# Patient Record
Sex: FEMALE | Race: WHITE | Employment: OTHER
[De-identification: names, ages, dates, MRNs, and addresses within clinical notes are randomized per-mention and may not be internally consistent; named-entity substitution may affect disease eponyms.]

---

## 2017-01-03 ENCOUNTER — HOME CARE VISIT (OUTPATIENT)
Dept: SCHEDULING | Facility: HOME HEALTH | Age: 62
End: 2017-01-03
Payer: COMMERCIAL

## 2017-01-03 VITALS
HEART RATE: 77 BPM | SYSTOLIC BLOOD PRESSURE: 130 MMHG | OXYGEN SATURATION: 98 % | TEMPERATURE: 96.3 F | DIASTOLIC BLOOD PRESSURE: 82 MMHG | RESPIRATION RATE: 16 BRPM

## 2017-01-03 PROCEDURE — G0299 HHS/HOSPICE OF RN EA 15 MIN: HCPCS

## 2017-01-05 ENCOUNTER — HOME CARE VISIT (OUTPATIENT)
Dept: HOME HEALTH SERVICES | Facility: HOME HEALTH | Age: 62
End: 2017-01-05
Payer: COMMERCIAL

## 2017-01-16 ENCOUNTER — HOME CARE VISIT (OUTPATIENT)
Dept: SCHEDULING | Facility: HOME HEALTH | Age: 62
End: 2017-01-16
Payer: COMMERCIAL

## 2017-01-16 VITALS
HEART RATE: 82 BPM | RESPIRATION RATE: 18 BRPM | TEMPERATURE: 97.2 F | DIASTOLIC BLOOD PRESSURE: 86 MMHG | OXYGEN SATURATION: 97 % | SYSTOLIC BLOOD PRESSURE: 154 MMHG

## 2017-01-16 PROCEDURE — G0299 HHS/HOSPICE OF RN EA 15 MIN: HCPCS

## 2017-02-21 ENCOUNTER — HOSPITAL ENCOUNTER (OUTPATIENT)
Dept: MAMMOGRAPHY | Age: 62
Discharge: HOME | End: 2017-02-21
Attending: OBSTETRICS & GYNECOLOGY
Payer: COMMERCIAL

## 2017-02-21 DIAGNOSIS — Z12.31 ENCOUNTER FOR SCREENING MAMMOGRAM FOR BREAST CANCER: ICD-10-CM

## 2017-02-21 PROCEDURE — 77067 SCR MAMMO BI INCL CAD: CPT

## 2017-05-23 ENCOUNTER — HOSPITAL ENCOUNTER (OUTPATIENT)
Dept: WOUND CARE | Age: 62
Discharge: HOME | End: 2017-05-23
Attending: SURGERY
Payer: COMMERCIAL

## 2017-05-23 PROCEDURE — 99214 OFFICE O/P EST MOD 30 MIN: CPT

## 2017-06-06 ENCOUNTER — HOSPITAL ENCOUNTER (OUTPATIENT)
Dept: WOUND CARE | Age: 62
Discharge: HOME | End: 2017-06-06
Attending: SURGERY
Payer: COMMERCIAL

## 2017-06-06 PROCEDURE — 99213 OFFICE O/P EST LOW 20 MIN: CPT

## 2017-09-13 PROBLEM — E03.9 ACQUIRED HYPOTHYROIDISM: Status: ACTIVE | Noted: 2017-09-13

## 2017-09-14 ENCOUNTER — HOSPITAL ENCOUNTER (OUTPATIENT)
Dept: CT IMAGING | Age: 62
Discharge: HOME | End: 2017-09-14
Attending: FAMILY MEDICINE
Payer: COMMERCIAL

## 2017-09-14 DIAGNOSIS — R79.89 ELEVATED D-DIMER: ICD-10-CM

## 2017-09-14 DIAGNOSIS — R06.02 SHORTNESS OF BREATH: ICD-10-CM

## 2017-09-14 PROCEDURE — 74011636320 HC RX REV CODE- 636/320: Performed by: FAMILY MEDICINE

## 2017-09-14 PROCEDURE — 74011000258 HC RX REV CODE- 258: Performed by: FAMILY MEDICINE

## 2017-09-14 PROCEDURE — 71260 CT THORAX DX C+: CPT

## 2017-09-14 RX ORDER — SODIUM CHLORIDE 0.9 % (FLUSH) 0.9 %
10 SYRINGE (ML) INJECTION
Status: COMPLETED | OUTPATIENT
Start: 2017-09-14 | End: 2017-09-14

## 2017-09-14 RX ADMIN — IOPAMIDOL 100 ML: 755 INJECTION, SOLUTION INTRAVENOUS at 15:23

## 2017-09-14 RX ADMIN — SODIUM CHLORIDE 100 ML: 900 INJECTION, SOLUTION INTRAVENOUS at 15:23

## 2017-09-14 RX ADMIN — Medication 10 ML: at 15:23

## 2017-09-20 ENCOUNTER — HOSPITAL ENCOUNTER (OUTPATIENT)
Dept: CT IMAGING | Age: 62
Discharge: HOME | End: 2017-09-20
Attending: FAMILY MEDICINE
Payer: COMMERCIAL

## 2017-09-20 DIAGNOSIS — R31.9 HEMATURIA: ICD-10-CM

## 2017-09-20 DIAGNOSIS — R10.9 LEFT FLANK PAIN: ICD-10-CM

## 2017-09-20 PROCEDURE — 74176 CT ABD & PELVIS W/O CONTRAST: CPT

## 2017-09-21 NOTE — PROGRESS NOTES
Please tell the patient that the CT scan final report did not show any abnormality.   See how her symptoms are doing and let me know if they persist.  The lung bases also are now clear

## 2017-12-22 PROBLEM — E66.01 OBESITY, MORBID (HCC): Status: ACTIVE | Noted: 2017-12-22

## 2018-01-10 ENCOUNTER — HOSPITAL ENCOUNTER (OUTPATIENT)
Dept: MRI IMAGING | Age: 63
Discharge: HOME | End: 2018-01-10
Attending: FAMILY MEDICINE
Payer: COMMERCIAL

## 2018-01-10 DIAGNOSIS — M51.26 HNP (HERNIATED NUCLEUS PULPOSUS), LUMBAR: ICD-10-CM

## 2018-01-10 DIAGNOSIS — G89.29 CHRONIC BILATERAL LOW BACK PAIN WITHOUT SCIATICA: ICD-10-CM

## 2018-01-10 DIAGNOSIS — M54.50 CHRONIC BILATERAL LOW BACK PAIN WITHOUT SCIATICA: ICD-10-CM

## 2018-01-10 PROCEDURE — 72148 MRI LUMBAR SPINE W/O DYE: CPT

## 2018-01-12 NOTE — PROGRESS NOTES
Tell the patient the MRI does show some degenerative changes and spinal stenosis. No major herniated disc seen or nerve impingement. If she would like to see a spine specialist let me know.

## 2018-03-16 ENCOUNTER — HOSPITAL ENCOUNTER (OUTPATIENT)
Dept: MAMMOGRAPHY | Age: 63
Discharge: HOME | End: 2018-03-16
Attending: OBSTETRICS & GYNECOLOGY
Payer: COMMERCIAL

## 2018-03-16 DIAGNOSIS — Z12.31 VISIT FOR SCREENING MAMMOGRAM: ICD-10-CM

## 2018-03-16 PROCEDURE — 77067 SCR MAMMO BI INCL CAD: CPT

## 2018-05-02 ENCOUNTER — HOSPITAL ENCOUNTER (OUTPATIENT)
Dept: ULTRASOUND IMAGING | Age: 63
Discharge: HOME | End: 2018-05-02
Attending: FAMILY MEDICINE
Payer: COMMERCIAL

## 2018-05-02 DIAGNOSIS — I65.21 CALCIFICATION OF RIGHT CAROTID ARTERY: ICD-10-CM

## 2018-05-02 PROCEDURE — 93880 EXTRACRANIAL BILAT STUDY: CPT

## 2018-05-02 NOTE — PROGRESS NOTES
The ultrasound overall looks good as it does not show any significant plaque formation. There is usually only concern if it is over 50% and on both sides it reports it at less than 50%. Would consider a baby aspirin a day just as a precaution.   Let me know if you have any questions

## 2018-10-06 ENCOUNTER — ANESTHESIA EVENT (OUTPATIENT)
Dept: ENDOSCOPY | Age: 63
End: 2018-10-06
Payer: COMMERCIAL

## 2018-10-06 RX ORDER — SODIUM CHLORIDE 0.9 % (FLUSH) 0.9 %
5-10 SYRINGE (ML) INJECTION AS NEEDED
Status: CANCELLED | OUTPATIENT
Start: 2018-10-06

## 2018-10-06 RX ORDER — SODIUM CHLORIDE, SODIUM LACTATE, POTASSIUM CHLORIDE, CALCIUM CHLORIDE 600; 310; 30; 20 MG/100ML; MG/100ML; MG/100ML; MG/100ML
100 INJECTION, SOLUTION INTRAVENOUS CONTINUOUS
Status: CANCELLED | OUTPATIENT
Start: 2018-10-06

## 2018-10-08 ENCOUNTER — ANESTHESIA (OUTPATIENT)
Dept: ENDOSCOPY | Age: 63
End: 2018-10-08
Payer: COMMERCIAL

## 2018-10-08 ENCOUNTER — HOSPITAL ENCOUNTER (OUTPATIENT)
Age: 63
Setting detail: OUTPATIENT SURGERY
Discharge: HOME | End: 2018-10-08
Attending: INTERNAL MEDICINE | Admitting: INTERNAL MEDICINE
Payer: COMMERCIAL

## 2018-10-08 VITALS
HEART RATE: 76 BPM | SYSTOLIC BLOOD PRESSURE: 133 MMHG | HEIGHT: 65 IN | TEMPERATURE: 98.4 F | RESPIRATION RATE: 18 BRPM | WEIGHT: 290 LBS | OXYGEN SATURATION: 99 % | DIASTOLIC BLOOD PRESSURE: 74 MMHG | BODY MASS INDEX: 48.32 KG/M2

## 2018-10-08 PROCEDURE — 74011250636 HC RX REV CODE- 250/636

## 2018-10-08 PROCEDURE — 76060000032 HC ANESTHESIA 0.5 TO 1 HR: Performed by: INTERNAL MEDICINE

## 2018-10-08 PROCEDURE — 74011250636 HC RX REV CODE- 250/636: Performed by: ANESTHESIOLOGY

## 2018-10-08 PROCEDURE — 77030009426 HC FCPS BIOP ENDOSC BSC -B: Performed by: INTERNAL MEDICINE

## 2018-10-08 PROCEDURE — 76040000025: Performed by: INTERNAL MEDICINE

## 2018-10-08 PROCEDURE — 88305 TISSUE EXAM BY PATHOLOGIST: CPT

## 2018-10-08 RX ORDER — LIDOCAINE HYDROCHLORIDE 20 MG/ML
INJECTION, SOLUTION EPIDURAL; INFILTRATION; INTRACAUDAL; PERINEURAL AS NEEDED
Status: DISCONTINUED | OUTPATIENT
Start: 2018-10-08 | End: 2018-10-08 | Stop reason: HOSPADM

## 2018-10-08 RX ORDER — PROPOFOL 10 MG/ML
INJECTION, EMULSION INTRAVENOUS AS NEEDED
Status: DISCONTINUED | OUTPATIENT
Start: 2018-10-08 | End: 2018-10-08 | Stop reason: HOSPADM

## 2018-10-08 RX ORDER — SODIUM CHLORIDE, SODIUM LACTATE, POTASSIUM CHLORIDE, CALCIUM CHLORIDE 600; 310; 30; 20 MG/100ML; MG/100ML; MG/100ML; MG/100ML
100 INJECTION, SOLUTION INTRAVENOUS CONTINUOUS
Status: DISCONTINUED | OUTPATIENT
Start: 2018-10-08 | End: 2018-10-08 | Stop reason: HOSPADM

## 2018-10-08 RX ORDER — PROPOFOL 10 MG/ML
INJECTION, EMULSION INTRAVENOUS
Status: DISCONTINUED | OUTPATIENT
Start: 2018-10-08 | End: 2018-10-08 | Stop reason: HOSPADM

## 2018-10-08 RX ADMIN — PROPOFOL 180 MCG/KG/MIN: 10 INJECTION, EMULSION INTRAVENOUS at 09:31

## 2018-10-08 RX ADMIN — SODIUM CHLORIDE, SODIUM LACTATE, POTASSIUM CHLORIDE, AND CALCIUM CHLORIDE 100 ML/HR: 600; 310; 30; 20 INJECTION, SOLUTION INTRAVENOUS at 08:53

## 2018-10-08 RX ADMIN — LIDOCAINE HYDROCHLORIDE 40 MG: 20 INJECTION, SOLUTION EPIDURAL; INFILTRATION; INTRACAUDAL; PERINEURAL at 09:30

## 2018-10-08 RX ADMIN — PROPOFOL 70 MG: 10 INJECTION, EMULSION INTRAVENOUS at 09:31

## 2018-10-08 NOTE — DISCHARGE INSTRUCTIONS
Gastrointestinal Colonoscopy/Flexible Sigmoidoscopy - Lower Exam Discharge Instructions  1. Call Mi Alcocer at [phone] for any problems or questions. 2. Contact the doctors office for follow up appointment as directed  3. Medication may cause drowsiness for several hours, therefore, do not drive or operate machinery for remainder of the day. 4. No alcohol today. 5. Ordinarily, you may resume regular diet and activity after exam unless otherwise specified by your physician. 6. Because of air put into your colon during exam, you may experience some abdominal distension, relieved by the passage of gas, for several hours. 7. Contact your physician if you have any of the following:  a. Excessive amount of bleeding - large amount when having a bowel movement. Occasional specks of blood with bowel movement would not be unusual.  b. Severe abdominal pain  c. Fever or Chills  8. Polyp Removal - follow these additional instructions  Any additional instructions:  Office will contact you with Pathology report, office will contact you with follow up visit       Instructions given to Marlen Callahan and other family members.   Instructions given by:

## 2018-10-08 NOTE — OP NOTES
Gastroenterology Procedure Note           Procedure:  Colonoscopy    Date of Procedure:  10/8/2018    Patient:  Kaylee Troncoso     1955    Indication:  Rectal bleeding, family history of colon cancer     Sedation:  MAC    Pre-Procedure Exam:    Mental status:  alert and oriented  Airway:  normal oropharyngeal airway and neck mobility  CV:  regular rate and rhythm   Respiratory:  clear to auscultation    Procedure:  A History and Physical has been performed, and patient medication allergies have been  reviewed. Risks of perforation, hemorrhage, adverse drug reaction, and aspiration were discussed. Informed consent was obtained for the procedure, including sedation. The patient was placed in the left lateral decubitus position. The heart rate, oxygen saturations, blood pressure, and response to care were monitored throughout the procedure. After performing a rectal exam, the colonoscope was passed through the anus and advanced under direct vision to the cecum, identified by appendiceal orifice and ileocecal valve. The quality of prep was adequate. A careful inspection was made as the colonoscope was withdrawn, including a retroflexed view of the rectum. The patient tolerated the procedure well. Findings:     ANUS:  Anal exam reveals no masses or hemorrhoids. RECTUM:  Small internal hemorrhoids were seen in the rectum. SIGMOID COLON:  The mucosa is normal with good vascular pattern and without ulcers, diverticula, and polyps. DESCENDING COLON:  One 3 mm sessile polyp was removed using a cold biopsy forceps. SPLENIC FLEXURE:  The splenic flexure is normal.   TRANSVERSE COLON:  The mucosa is normal with good vascular pattern and without ulcers, diverticula, and polyps. HEPATIC FLEXURE:  The hepatic flexure is normal.   ASCENDING COLON:  The mucosa is normal with good vascular pattern and without ulcers, diverticula, and polyps.    CECUM:  The appendiceal orifice appears normal. The ileocecal valve appears normal.   TERMINAL ILEUM:  The terminal ileum was not entered. Specimen:  Yes    Estimated Blood Loss:  Minimal    Implant:  None           Impression:    Colon polyps. .  Internal hemorrhoids. Plan:  1. Follow up pathology results. 2. Repeat colonoscopy for surveillance based on pathology results. 3. Return to office on an as-needed basis.     Signed:  Bandar Fang MD  10/8/2018  9:20 AM

## 2018-10-08 NOTE — H&P
History and Physical for Colonoscopy Date: 10/8/2018 History of Present Illness:  Patient presents to undergo colonoscopy. Past Medical History:  
Diagnosis Date  Acquired hypothyroidism 9/13/2017  Arrhythmia 2017  
 a-fib-- ablation---followed by dr [place]--- [place] cardio  Arthritis   
 osteo  Cancer ([facility] [address] skin  H/O bone density study 10/2011  
 normal  
 Hypertension   
 controlled by med  Low back pain  Morbid obesity ([facility] [address] bmi 47  
 Rectal bleeding onset x 2 months  Rosacea  Sleep apnea   
 has test planned for 11/2018  Smoker 1/2 - 3/4 ppd  Spinal stenosis  Staph infection 03/2016  
 from back surgery----- NOT mrsa Past Surgical History:  
Procedure Laterality Date  ELBOW ARTHROSCOP,DIAGNOSTIC Left 2013  
 along with carpal tunnel Brenna Del Rio APPENDECTOMY  age 18  
 HX BACK SURGERY  03/28/2016 Lumbar stenosis  HX BREAST BIOPSY  HX CARPAL TUNNEL RELEASE Left  HX CHOLECYSTECTOMY  2014  HX COLONOSCOPY  2014  HX KNEE ARTHROSCOPY Right   
 knee scope  HX KNEE ARTHROSCOPY Left  HX MALIGNANT SKIN LESION EXCISION    
 x 3   
 HX ORTHOPAEDIC    
 heel spur  HX ORTHOPAEDIC Left 2000's  
 achilles tendon  HX PELVIC LAPAROSCOPY    
 x 2  
 HX PREMALIG/BENIGN SKIN LESION EXCISION    
 HX GUSTAVO AND BSO  HX TONSILLECTOMY  age 12  
 adenoids  HX TUBAL LIGATION Family History Problem Relation Age of Onset  Cancer Mother   
  colon ca  Arthritis-rheumatoid Mother  Heart Disease Father  Diabetes Father  Hypertension Father Social History Substance Use Topics  Smoking status: Former Smoker Packs/day: 0.75 Years: 25.00 Types: Cigarettes Quit date: 5/29/2017  Smokeless tobacco: Never Used  Alcohol use No  
 
  
Allergies Allergen Reactions  Blue Dye Anaphylaxis BLUE INDIGO DYE  Pcn [Penicillins] Hives  Bee Venom Protein (Honey Bee) Swelling No current facility-administered medications for this encounter. Current Outpatient Prescriptions Medication Sig  propafenone (RYTHMOL) 150 mg tablet Take 150 mg by mouth two (2) times a day.  zolpidem (AMBIEN) 10 mg tablet TAKE 1 TABLET BY MOUTH HD AS NEEDED FOR SLEEP  estrogens, conjugated,-methylTESTOSTERone (ESTRATEST) 1.25-2.5 mg per tablet TAKE 1 TABLET BY MOUTH EVERY MORNING  
 apixaban (ELIQUIS) 5 mg tablet Take 2.5 mg by mouth two (2) times a day. Stopped 10/6/18 --oked with dr Alex Chong-- cardio  doxycycline (MONODOX) 100 mg capsule Take 1 Cap by mouth daily.  LORazepam (ATIVAN) 0.5 mg tablet Take 1 Tab by mouth every eight (8) hours as needed for Anxiety. Max Daily Amount: 1.5 mg.  
 omeprazole (PRILOSEC) 40 mg capsule Take 1 Cap by mouth daily.  escitalopram oxalate (LEXAPRO) 10 mg tablet TAKE 1 TAB BY MOUTH DAILY.  benazepril-hydroCHLOROthiazide (LOTENSIN HCT) 20-12.5 mg per tablet TAKE 1 TABLET BY MOUTH TWICE A DAY  levothyroxine (SYNTHROID) 50 mcg tablet TAKE 1 TAB BY MOUTH DAILY (BEFORE BREAKFAST).  cyclobenzaprine (FLEXERIL) 10 mg tablet Take 1 Tab by mouth three (3) times daily as needed for Muscle Spasm(s).  cholecalciferol (VITAMIN D3) 1,000 unit tablet Take 1,000 Units by mouth every morning. Review of Systems: A detailed 10 organ review of systems is obtained with pertinent positives as listed in the History of Present Illness. All others are negative. Objective:  
 
Physical Exam: 
There were no vitals taken for this visit. General:  Alert and oriented. Heart: Regular rate and rhythm Lungs:  Clear to auscultation bilaterally Abdomen: Soft, nontender, nondistended Impression/Plan:  
 
Proceed with colonoscopy as planned.  I have discussed with the patient the technique, benefits, alternatives, and risks of these procedures, including medication reaction, immediate or delayed bleeding, or perforation of the gastrointestinal tract. Signed By: Melody Milan MD   
 October 8, 2018

## 2018-10-08 NOTE — IP AVS SNAPSHOT
[address] [place] [address] 
[phone] Patient: Ziggy Barker MRN: [id number] WNA:[id number] About your hospitalization You were admitted on:  October 8, 2018 You last received care in the:  [facility] You were discharged on:  October 8, 2018 Why you were hospitalized Your primary diagnosis was:  Not on File Follow-up Information None Your Scheduled Appointments Friday October 26, 2018 10:30 AM EDT Office Visit with Lola Mayes MD  
[facility] ([address] [place] [address]) Barbara Bueno [facility] [id number] [phone] Bring all medications, insurance and prescription cards and please be prepared to pay any copay that may be due. Discharge Orders None A check adrienne indicates which time of day the medication should be taken. My Medications ASK your doctor about these medications Instructions Each Dose to Equal  
 Morning Noon Evening Bedtime  
 benazepril-hydroCHLOROthiazide 20-12.5 mg per tablet Commonly known as:  LOTENSIN HCT Your last dose was: Your next dose is: TAKE 1 TABLET BY MOUTH TWICE A DAY  
     
   
   
   
  
 cyclobenzaprine 10 mg tablet Commonly known as:  FLEXERIL Your last dose was: Your next dose is: Take 1 Tab by mouth three (3) times daily as needed for Muscle Spasm(s). 10 mg  
    
   
   
   
  
 doxycycline 100 mg capsule Commonly known as:  Rannie Piles Your last dose was: Your next dose is: Take 1 Cap by mouth daily. 100 mg  
    
   
   
   
  
 ELIQUIS 5 mg tablet Generic drug:  apixaban Your last dose was: Your next dose is: Take 2.5 mg by mouth two (2) times a day. Stopped 10/6/18 --oked with dr Zohaib Medel-- cardio  2.5 mg  
    
   
   
   
  
 escitalopram oxalate 10 mg tablet Commonly known as:  Heraclio Jeffery Your last dose was: Your next dose is: TAKE 1 TAB BY MOUTH DAILY. estrogens (conjugated)-methylTESTOSTERone 1.25-2.5 mg per tablet Commonly known as:  ESTRATEST Your last dose was: Your next dose is: TAKE 1 TABLET BY MOUTH EVERY MORNING  
     
   
   
   
  
 levothyroxine 50 mcg tablet Commonly known as:  SYNTHROID Your last dose was: Your next dose is: TAKE 1 TAB BY MOUTH DAILY (BEFORE BREAKFAST). LORazepam 0.5 mg tablet Commonly known as:  ATIVAN Your last dose was: Your next dose is: Take 1 Tab by mouth every eight (8) hours as needed for Anxiety. Max Daily Amount: 1.5 mg.  
 0.5 mg  
    
   
   
   
  
 omeprazole 40 mg capsule Commonly known as:  PRILOSEC Your last dose was: Your next dose is: Take 1 Cap by mouth daily. 40 mg  
    
   
   
   
  
 propafenone 150 mg tablet Commonly known as:  RYTHMOL Your last dose was: Your next dose is: Take 150 mg by mouth two (2) times a day. 150 mg  
    
   
   
   
  
 VITAMIN D3 1,000 unit tablet Generic drug:  cholecalciferol Your last dose was: Your next dose is: Take 1,000 Units by mouth every morning. 1000 Units  
    
   
   
   
  
 zolpidem 10 mg tablet Commonly known as:  AMBIEN Your last dose was: Your next dose is: TAKE 1 TABLET BY MOUTH HD AS NEEDED FOR SLEEP Discharge Instructions Gastrointestinal Colonoscopy/Flexible Sigmoidoscopy - Lower Exam Discharge Instructions 1. Call Layne Boothe at [phone] for any problems or questions. 2. Contact the doctors office for follow up appointment as directed 3.  Medication may cause drowsiness for several hours, therefore, do not drive or operate machinery for remainder of the day. 4. No alcohol today. 5. Ordinarily, you may resume regular diet and activity after exam unless otherwise specified by your physician. 6. Because of air put into your colon during exam, you may experience some abdominal distension, relieved by the passage of gas, for several hours. 7. Contact your physician if you have any of the following: 
a. Excessive amount of bleeding  large amount when having a bowel movement. Occasional specks of blood with bowel movement would not be unusual. 
b. Severe abdominal pain 
c. Fever or Chills 8. Polyp Removal  follow these additional instructions Any additional instructions:  Office will contact you with Pathology report, office will contact you with follow up visit Instructions given to David Cole and other family members. Instructions given by: Introducing [facility]! Dear Emigdio Wren: 
Thank you for requesting a [employer] account. Our records indicate that you already have an active [employer] account. You can access your account anytime at [email or web address] Did you know that you can access your hospital and ER discharge instructions at any time in [employer]? You can also review all of your test results from your hospital stay or ER visit. Additional Information If you have questions, please visit the Frequently Asked Questions section of the [employer] website at [email or web address] Remember, [employer] is NOT to be used for urgent needs. For medical emergencies, dial 911. Now available from your iPhone and Android! Introducing Mark Mathews As a Martha Subha patient, I wanted to make you aware of our electronic visit tool called Mark Mathews. Martha Mascorro 24/7 allows you to connect within minutes with a medical provider 24 hours a day, seven days a week via a mobile device or tablet or logging into a secure website from your computer. You can access [employer] from anywhere in the [place]. A virtual visit might be right for you when you have a simple condition and feel like you just dont want to get out of bed, or cant get away from work for an appointment, when your regular [facility] provider is not available (evenings, weekends or holidays), or when youre out of town and need minor care. Electronic visits cost only $49 and if the [employer] provider determines a prescription is needed to treat your condition, one can be electronically transmitted to a nearby pharmacy*. Please take a moment to enroll today if you have not already done so. The enrollment process is free and takes just a few minutes. To enroll, please download the [employer] yina to your tablet or phone, or visit [email or web address] to enroll on your computer. And, as an [address] [place] [address] patient with a [email or web address] account, the results of your visits will be scanned into your electronic medical record and your primary care provider will be able to view the scanned results. We urge you to continue to see your regular [facility] provider for your ongoing medical care. And while your primary care provider may not be the one available when you seek a Mark Luzfin virtual visit, the peace of mind you get from getting a real diagnosis real time can be priceless. For more information on [email or web address], view our Frequently Asked Questions (FAQs) at [email or web address]. Sincerely, 
 
Jakob Jose MD 
Chief Medical Officer [address] *:  certain medications cannot be prescribed via myVBOgiovannifin Providers Seen During Your Hospitalization Provider Specialty Primary office phone Michi Schroeder MD Gastroenterology [phone] Your Primary Care Physician (PCP) Primary Care Physician Office Phone Office Fax Carine  [phone] You are allergic to the following Allergen Reactions Blue Dye Anaphylaxis BLUE INDIGO DYE Pcn (Penicillins) Hives Bee Venom Protein (Honey Bee) Swelling Recent Documentation Height Weight BMI OB Status Smoking Status 1.651 m 131.5 kg 48.26 kg/m2 Hysterectomy Former Smoker Emergency Contacts Name Discharge Info Relation Home Work Mobile Coleman Balderrama CAREGIVER [3] Spouse [3] [phone] Patient Belongings The following personal items are in your possession at time of discharge: 
  Dental Appliances: None  Visual Aid: Glasses Please provide this summary of care documentation to your next provider. Signatures-by signing, you are acknowledging that this After Visit Summary has been reviewed with you and you have received a copy. Patient Signature:  ____________________________________________________________ Date:  ____________________________________________________________  
  
Jean Staple Provider Signature:  ____________________________________________________________ Date:  ____________________________________________________________

## 2018-10-08 NOTE — ANESTHESIA POSTPROCEDURE EVALUATION
Post-Anesthesia Evaluation and Assessment Patient: Irwin Hector MRN: [id number]  SSN: [id number] YOB: 1955  Age: 59 y.o. Sex: female Cardiovascular Function/Vital Signs Visit Vitals  /74  Pulse 76  Temp 36.9 °C (98.4 °F)  Resp 18  Ht 5' 5\" (1.651 m)  Wt 131.5 kg (290 lb)  SpO2 99%  BMI 48.26 kg/m2 Patient is status post total IV anesthesia anesthesia for Procedure(s): 
COLONOSCOPY / BMI=50  
ENDOSCOPIC POLYPECTOMY. Nausea/Vomiting: None Postoperative hydration reviewed and adequate. Pain: 
Pain Scale 1: Numeric (0 - 10) (10/08/18 1004) Pain Intensity 1: 0 (10/08/18 1004) Managed Neurological Status: At baseline Mental Status and Level of Consciousness: Alert and oriented Pulmonary Status:  
O2 Device: Room air (10/08/18 1004) Adequate oxygenation and airway patent Complications related to anesthesia: None Post-anesthesia assessment completed. No concerns Signed By: Diana Casanova MD   
 October 8, 2018

## 2018-10-08 NOTE — ANESTHESIA PREPROCEDURE EVALUATION
Anesthetic History   No history of anesthetic complications            Review of Systems / Medical History  Patient summary reviewed, nursing notes reviewed and pertinent labs reviewed    Pulmonary        Sleep apnea  Smoker (former; quit 2017)         Neuro/Psych   Within defined limits           Cardiovascular    Hypertension        Dysrhythmias (s/p ablation April 2018) : atrial fibrillation        Comments: Normal EF- low risk perfusion scan with normal perfusion March 2018   GI/Hepatic/Renal     GERD: well controlled           Endo/Other      Hypothyroidism: well controlled  Morbid obesity and arthritis     Other Findings            Physical Exam    Airway  Mallampati: III      Mouth opening: Normal     Cardiovascular  Regular rate and rhythm,  S1 and S2 normal,  no murmur, click, rub, or gallop             Dental  No notable dental hx       Pulmonary  Breath sounds clear to auscultation               Abdominal         Other Findings            Anesthetic Plan    ASA: 3  Anesthesia type: total IV anesthesia          Induction: Intravenous  Anesthetic plan and risks discussed with: Patient      Discussed TIVA with its benefits (lower risk of nausea and sore throat, etc.) and risks including possible awareness, patient understands and elects to proceed

## 2019-04-12 ENCOUNTER — HOSPITAL ENCOUNTER (OUTPATIENT)
Dept: MAMMOGRAPHY | Age: 64
Discharge: HOME | End: 2019-04-12
Attending: OBSTETRICS & GYNECOLOGY
Payer: COMMERCIAL

## 2019-04-12 DIAGNOSIS — Z12.31 VISIT FOR SCREENING MAMMOGRAM: ICD-10-CM

## 2019-04-12 PROCEDURE — 77067 SCR MAMMO BI INCL CAD: CPT

## 2020-01-10 ENCOUNTER — HOSPITAL ENCOUNTER (OUTPATIENT)
Dept: CT IMAGING | Age: 65
Discharge: HOME | End: 2020-01-10
Attending: FAMILY MEDICINE
Payer: COMMERCIAL

## 2020-01-10 DIAGNOSIS — R51.9 NONINTRACTABLE EPISODIC HEADACHE, UNSPECIFIED HEADACHE TYPE: ICD-10-CM

## 2020-01-10 DIAGNOSIS — R20.2 PARESTHESIA: ICD-10-CM

## 2020-01-10 DIAGNOSIS — R42 DIZZINESS: ICD-10-CM

## 2020-01-10 PROCEDURE — 70450 CT HEAD/BRAIN W/O DYE: CPT

## 2020-01-13 NOTE — PROGRESS NOTES
Please tell the patient that her CT scan was normal except she does have some fluid in the left maxillary sinus.   Let me know if she is having symptoms of sinus congestion or sinusitis and I will send in an antibiotic

## 2020-05-30 ENCOUNTER — HOSPITAL ENCOUNTER (OUTPATIENT)
Dept: MAMMOGRAPHY | Age: 65
Discharge: HOME | End: 2020-05-30
Attending: FAMILY MEDICINE
Payer: COMMERCIAL

## 2020-05-30 DIAGNOSIS — Z12.31 ENCOUNTER FOR SCREENING MAMMOGRAM FOR BREAST CANCER: ICD-10-CM

## 2020-05-30 PROCEDURE — 77067 SCR MAMMO BI INCL CAD: CPT

## 2020-06-24 ENCOUNTER — HOSPITAL ENCOUNTER (OUTPATIENT)
Dept: ULTRASOUND IMAGING | Age: 65
Discharge: HOME | End: 2020-06-24
Attending: FAMILY MEDICINE
Payer: COMMERCIAL

## 2020-06-24 DIAGNOSIS — M79.605 LEFT LEG PAIN: ICD-10-CM

## 2020-06-24 DIAGNOSIS — I83.892 VARICOSE VEINS OF LEFT LEG WITH EDEMA: ICD-10-CM

## 2020-06-24 PROCEDURE — 93971 EXTREMITY STUDY: CPT

## 2020-06-25 NOTE — PROGRESS NOTES
Please tell the patient that the ultrasound was negative for a blood clot. See how the swelling is doing with the Lasix as we could increase that to see if we can get that improved.   Me know what she says

## 2021-06-11 ENCOUNTER — HOSPITAL ENCOUNTER (OUTPATIENT)
Dept: MAMMOGRAPHY | Age: 66
Discharge: HOME | End: 2021-06-11
Attending: OBSTETRICS & GYNECOLOGY
Payer: COMMERCIAL

## 2021-06-11 DIAGNOSIS — Z12.31 VISIT FOR SCREENING MAMMOGRAM: ICD-10-CM

## 2021-06-11 PROCEDURE — 77067 SCR MAMMO BI INCL CAD: CPT

## 2021-07-21 ENCOUNTER — HOSPITAL ENCOUNTER (OUTPATIENT)
Dept: MRI IMAGING | Age: 66
Discharge: HOME | End: 2021-07-21
Attending: FAMILY MEDICINE
Payer: COMMERCIAL

## 2021-07-21 DIAGNOSIS — M48.061 SPINAL STENOSIS OF LUMBAR REGION, UNSPECIFIED WHETHER NEUROGENIC CLAUDICATION PRESENT: ICD-10-CM

## 2021-07-21 DIAGNOSIS — M54.41 ACUTE RIGHT-SIDED LOW BACK PAIN WITH RIGHT-SIDED SCIATICA: ICD-10-CM

## 2021-07-21 PROCEDURE — 72148 MRI LUMBAR SPINE W/O DYE: CPT

## 2021-10-18 ENCOUNTER — HOSPITAL ENCOUNTER (OUTPATIENT)
Dept: SURGERY | Age: 66
Discharge: HOME | End: 2021-10-18
Attending: ORTHOPAEDIC SURGERY
Payer: MEDICARE

## 2021-10-18 VITALS
BODY MASS INDEX: 48.36 KG/M2 | RESPIRATION RATE: 18 BRPM | WEIGHT: 283.3 LBS | HEART RATE: 74 BPM | DIASTOLIC BLOOD PRESSURE: 73 MMHG | TEMPERATURE: 97.3 F | SYSTOLIC BLOOD PRESSURE: 136 MMHG | OXYGEN SATURATION: 98 % | HEIGHT: 64 IN

## 2021-10-18 LAB
ANION GAP SERPL CALC-SCNC: 6 MMOL/L (ref 7–16)
APPEARANCE UR: NORMAL
ATRIAL RATE: 69 BPM
BACTERIA SPEC CULT: NORMAL
BASOPHILS # BLD: 0.1 K/UL (ref 0–0.2)
BASOPHILS NFR BLD: 1 % (ref 0–2)
BILIRUB UR QL: NEGATIVE
BUN SERPL-MCNC: 13 MG/DL (ref 8–23)
CALCIUM SERPL-MCNC: 9.4 MG/DL (ref 8.3–10.4)
CALCULATED P AXIS, ECG09: 74 DEGREES
CALCULATED R AXIS, ECG10: 82 DEGREES
CALCULATED T AXIS, ECG11: 38 DEGREES
CHLORIDE SERPL-SCNC: 97 MMOL/L (ref 98–107)
CO2 SERPL-SCNC: 34 MMOL/L (ref 21–32)
COLOR UR: YELLOW
CREAT SERPL-MCNC: 0.82 MG/DL (ref 0.6–1)
DIAGNOSIS, 93000: NORMAL
DIFFERENTIAL METHOD BLD: ABNORMAL
EOSINOPHIL # BLD: 0.1 K/UL (ref 0–0.8)
EOSINOPHIL NFR BLD: 1 % (ref 0.5–7.8)
ERYTHROCYTE [DISTWIDTH] IN BLOOD BY AUTOMATED COUNT: 15.9 % (ref 11.9–14.6)
GLUCOSE SERPL-MCNC: 86 MG/DL (ref 65–100)
GLUCOSE UR STRIP.AUTO-MCNC: NEGATIVE MG/DL
HCT VFR BLD AUTO: 40.8 % (ref 35.8–46.3)
HGB BLD-MCNC: 13.5 G/DL (ref 11.7–15.4)
HGB UR QL STRIP: NEGATIVE
IMM GRANULOCYTES # BLD AUTO: 0.1 K/UL (ref 0–0.5)
IMM GRANULOCYTES NFR BLD AUTO: 0 % (ref 0–5)
KETONES UR QL STRIP.AUTO: NEGATIVE MG/DL
LEUKOCYTE ESTERASE UR QL STRIP.AUTO: NEGATIVE
LYMPHOCYTES # BLD: 2.4 K/UL (ref 0.5–4.6)
LYMPHOCYTES NFR BLD: 19 % (ref 13–44)
MCH RBC QN AUTO: 30 PG (ref 26.1–32.9)
MCHC RBC AUTO-ENTMCNC: 33.1 G/DL (ref 31.4–35)
MCV RBC AUTO: 90.7 FL (ref 79.6–97.8)
MONOCYTES # BLD: 0.9 K/UL (ref 0.1–1.3)
MONOCYTES NFR BLD: 7 % (ref 4–12)
NEUTS SEG # BLD: 9.1 K/UL (ref 1.7–8.2)
NEUTS SEG NFR BLD: 72 % (ref 43–78)
NITRITE UR QL STRIP.AUTO: NEGATIVE
NRBC # BLD: 0 K/UL (ref 0–0.2)
P-R INTERVAL, ECG05: 152 MS
PH UR STRIP: 6.5 [PH] (ref 5–9)
PLATELET # BLD AUTO: 296 K/UL (ref 150–450)
PMV BLD AUTO: 10 FL (ref 9.4–12.3)
POTASSIUM SERPL-SCNC: 3.2 MMOL/L (ref 3.5–5.1)
PROT UR STRIP-MCNC: NEGATIVE MG/DL
Q-T INTERVAL, ECG07: 404 MS
QRS DURATION, ECG06: 82 MS
QTC CALCULATION (BEZET), ECG08: 432 MS
RBC # BLD AUTO: 4.5 M/UL (ref 4.05–5.2)
SERVICE CMNT-IMP: NORMAL
SODIUM SERPL-SCNC: 137 MMOL/L (ref 136–145)
SP GR UR REFRACTOMETRY: 1.01 (ref 1–1.02)
UROBILINOGEN UR QL STRIP.AUTO: 0.2 EU/DL (ref 0.2–1)
VENTRICULAR RATE, ECG03: 69 BPM
WBC # BLD AUTO: 12.6 K/UL (ref 4.3–11.1)

## 2021-10-18 PROCEDURE — 85025 COMPLETE CBC W/AUTO DIFF WBC: CPT

## 2021-10-18 PROCEDURE — 87641 MR-STAPH DNA AMP PROBE: CPT

## 2021-10-18 PROCEDURE — 80048 BASIC METABOLIC PNL TOTAL CA: CPT

## 2021-10-18 PROCEDURE — 81003 URINALYSIS AUTO W/O SCOPE: CPT

## 2021-10-18 PROCEDURE — 77030027138 HC INCENT SPIROMETER -A

## 2021-10-18 PROCEDURE — 93005 ELECTROCARDIOGRAM TRACING: CPT

## 2021-10-18 RX ORDER — ASPIRIN 81 MG/1
81 TABLET ORAL
COMMUNITY
End: 2021-12-06 | Stop reason: ALTCHOICE

## 2021-10-18 NOTE — PERIOP NOTES
PLEASE CONTINUE TAKING ALL PRESCRIPTION MEDICATIONS UP TO THE DAY OF SURGERY UNLESS OTHERWISE DIRECTED BELOW. DISCONTINUE all vitamins and supplements 5 days prior to surgery. DISCONTINUE Non-Steriodal Anti-Inflammatory (NSAIDS) such as Advil and Aleve 5 days prior to surgery. Home Medications to take  the day of surgery    Lexapro/escitalopram  Metoprolol/ lopressor  Synthroid/ levothyroxine   Propafenone/ Rhyhmol  Omeprazole/Prilosec     If needed Lorazepam/ Ativan  If needed tramadol     Home Medications   to Hold   Eliquis- hold 3 days prior to surgery per cardiologist & surgeon- takes ASPIRIN 81 mg every evening while holding Eliquis for surgery        Comments    Covid test 10/21/21 10 am @ 2 [address]    On the day before surgery please take Acetaminophen 1000mg in the morning and then again before bed. You may substitute for Tylenol 650 mg. Please do not bring home medications with you on the day of surgery unless otherwise directed by your nurse. If you are instructed to bring home medications, please give them to your nurse as they will be administered by the nursing staff. If you have any questions, please call [address] [place] [address] [phone] or [address] [place] [address] [phone]. A copy of this note was provided to the patient for reference. How to Use Your Incentive Spirometer       About Your Incentive Spirometer  An incentive spirometer is a device that will expand your lungs by helping you to breathe more deeply and fully. The parts of your incentive spirometer are labeled in Figure 1. Using your incentive spirometer  When youre using your incentive spirometer, make sure to breathe through your mouth. If you breathe through your nose, the incentive spirometer wont work properly. You can hold your nose if you have trouble. DO NOT BLOW INTO THE DEVICE.  If you feel dizzy at any time, stop and rest. Try again at a later time.  1. Sit upright in a chair or in bed. Hold the incentive spirometer at eye level. 2. Put the mouthpiece in your mouth and close your lips tightly around it. Slowly breathe out (exhale) completely. 3. Breathe in (inhale) slowly through your mouth as deeply as you can. As you take the breath, you will see the piston rise inside the large column. While the piston rises, the indicator on the right should move upwards. It should stay in between the 2 arrows (see Figure 1). 4. Try to get the piston as high as you can, while keeping the indicator between the arrows. If the indicator doesnt stay between the arrows, youre breathing either too fast or too slow. 5. When you get it as high as you can, hold your breath for 10 seconds, or as long as possible. While youre holding your breath, the piston will slowly fall to the base of the spirometer. 6. Once the piston reaches the bottom of the spirometer, breathe out slowly through your mouth. Rest for a few seconds. 7. Repeat 10 times. Try to get the piston to the same level with each breath. 8. After each set of 10 breaths, try to cough as coughing will help loosen or clear any mucus in your lungs. 9. Put the marker at the level the piston reached on your incentive spirometer. This will be your goal next time. Repeat these steps every hour that youre awake.   Cover the mouthpiece of the incentive spirometer when you arent using it

## 2021-10-18 NOTE — PERIOP NOTES
Recent Results (from the past 12 hour(s))   URINALYSIS W/ RFLX MICROSCOPIC    Collection Time: 10/18/21  1:09 PM   Result Value Ref Range    Color YELLOW      Appearance CLOUDY      Specific gravity 1.014 1.001 - 1.023      pH (UA) 6.5 5.0 - 9.0      Protein Negative NEG mg/dL    Glucose Negative mg/dL    Ketone Negative NEG mg/dL    Bilirubin Negative NEG      Blood Negative NEG      Urobilinogen 0.2 0.2 - 1.0 EU/dL    Nitrites Negative NEG      Leukocyte Esterase Negative NEG     MSSA/MRSA SC BY PCR, NASAL SWAB    Collection Time: 10/18/21  1:09 PM    Specimen: Nasal swab   Result Value Ref Range    Special Requests: NO SPECIAL REQUESTS      Culture result:        SA target not detected. A MRSA NEGATIVE, SA NEGATIVE test result does not preclude MRSA or SA nasal colonization. EKG, 12 LEAD, INITIAL    Collection Time: 10/18/21  1:24 PM   Result Value Ref Range    Ventricular Rate 69 BPM    Atrial Rate 69 BPM    P-R Interval 152 ms    QRS Duration 82 ms    Q-T Interval 404 ms    QTC Calculation (Bezet) 432 ms    Calculated P Axis 74 degrees    Calculated R Axis 82 degrees    Calculated T Axis 38 degrees    Diagnosis       Normal sinus rhythm  Normal ECG  When compared with ECG of 05-DEC-2005 13:16,  No significant change was found     CBC WITH AUTOMATED DIFF    Collection Time: 10/18/21  1:33 PM   Result Value Ref Range    WBC 12.6 (H) 4.3 - 11.1 K/uL    RBC 4.50 4.05 - 5.2 M/uL    HGB 13.5 11.7 - 15.4 g/dL    HCT 40.8 35.8 - 46.3 %    MCV 90.7 79.6 - 97.8 FL    MCH 30.0 26.1 - 32.9 PG    MCHC 33.1 31.4 - 35.0 g/dL    RDW 15.9 (H) 11.9 - 14.6 %    PLATELET 306 418 - 618 K/uL    MPV 10.0 9.4 - 12.3 FL    ABSOLUTE NRBC 0.00 0.0 - 0.2 K/uL    DF AUTOMATED      NEUTROPHILS 72 43 - 78 %    LYMPHOCYTES 19 13 - 44 %    MONOCYTES 7 4.0 - 12.0 %    EOSINOPHILS 1 0.5 - 7.8 %    BASOPHILS 1 0.0 - 2.0 %    IMMATURE GRANULOCYTES 0 0.0 - 5.0 %    ABS. NEUTROPHILS 9.1 (H) 1.7 - 8.2 K/UL    ABS. LYMPHOCYTES 2.4 0.5 - 4.6 K/UL    ABS. MONOCYTES 0.9 0.1 - 1.3 K/UL    ABS. EOSINOPHILS 0.1 0.0 - 0.8 K/UL    ABS. BASOPHILS 0.1 0.0 - 0.2 K/UL    ABS. IMM. GRANS. 0.1 0.0 - 0.5 K/UL   METABOLIC PANEL, BASIC    Collection Time: 10/18/21  1:33 PM   Result Value Ref Range    Sodium 137 136 - 145 mmol/L    Potassium 3.2 (L) 3.5 - 5.1 mmol/L    Chloride 97 (L) 98 - 107 mmol/L    CO2 34 (H) 21 - 32 mmol/L    Anion gap 6 (L) 7 - 16 mmol/L    Glucose 86 65 - 100 mg/dL    BUN 13 8 - 23 MG/DL    Creatinine 0.82 0.6 - 1.0 MG/DL    GFR est AA >60 >60 ml/min/1.73m2    GFR est non-AA >60 >60 ml/min/1.73m2    Calcium 9.4 8.3 - 10.4 MG/DL    Lab results reviewed.

## 2021-10-18 NOTE — PERIOP NOTES
Patient verified name & . Order to obtain consent  found in EHR  and matches case posting. TYPE  CASE:3              Orders:  received  Labs per Spine protocol:  CBC with dif, BMP, MRSA/MSSA nasal UA    Labs per anesthesia protocol: T&S on DOS and EKG today  EKG/cardiac records  :  10/18/21 NSR, hx of abnormal stress 2018- hx Afib & A Flutter controlled. Medication hold clearance [facility] - hold 3 days prior to surgery. Pt verbalizes understanding to take aspirin 81 mg while holding Eliquis per anetheia protocol. Hx and length of surgery discussed with Dr Kathi Alcala. No new orders received at this time. Glucose: not indicated    Patient verbalizes understanding Covid 19 swab swab scheduled 10/21/21 10 am at [address]. Medication list updated today. did not bring medication bottles or updated list today, but provided list of medications from memory to the best of their ability. Pt answered medical and surgical history questions to the best of their ability. Requested pt to validate each medication, dose and frequency while here today. Copy of medication reconciliation provided to the patient and instructed patient to compare to medication bottles. If any changes need to be made call this RN at [phone]. Pt viewed Spine Pre-hab Video. All further questions were addressed. Pt was provided with antibacterial or non-moisturizing soap, Hibiclens, lSpine Recovery booklet and incentive spirometer. Handouts and all Surgery instructions provided to pt and pt verbalizes understanding. Patient Guide to Surgery Packet provided to patient. Packet includes Patient Guide to surgery handout, Facts about Pain Management handout, Facts about Urinary Catherization handout, Hand Hygiene handout, Patient Education and Teaching Sheet -Transfusion of Blood and Blood Products handout, and  [facility] frequent question and answer sheet.  Guide reviewed with the patient and all questions answered to the satisfaction of the patient. Patient instructed on the following and verbalized understanding:  Arrive at MAIN entrance, time of arrival to be called the day before by 1700. Responsible adult must drive patient to and from hospital, stay during surgery and 24 hours postoperatively. Npo after midnight including gum, mints and ice chips. Shower using hibiclens the night before and the morning of surgery. Hibiclens provided to the patient with handout and verbal instructions for use. Leave all valuables at home. Instructed on no jewelry or body piercings on the dos. Bring insurance card and ID. No perfumes, oil, powder, colognes, makeup or  lotions on the skin. You may be required to pay a deductible or co-pay on the day of your procedure. You can pre-pay by calling [phone] if your surgery is at the [facility] or [phone] if your surgery is at the [facility]. Patient verbalized understanding of all instructions and provided all medical/health information to the best of their ability.

## 2021-10-24 ENCOUNTER — ANESTHESIA EVENT (OUTPATIENT)
Dept: SURGERY | Age: 66
DRG: 453 | End: 2021-10-24
Payer: MEDICARE

## 2021-10-24 NOTE — PROGRESS NOTES
Name: Rosie Drummond  YOB: 1955  Gender: female  MRN: [id number]     DATE: 10/22/2021     CC: Follow-up (after injection)        HPI this is a recheck on patient Mata Pérez. She is a 67-year-old white female who has a difficult problem. She has had a previous either L to or L3-S1 laminectomy by Dr. Vann Brunner in 2016 and developed a postoperative infection that required an I&D by Dr. Dewayne Carvajal. Eventually after that surgery the infection cleared up and she got very good relief of pain but over time she is noted slow progressive return of pain. In the right leg it seems to go down the lateral leg to the ankle with numbness and tingling. The pain is exacerbated by standing walking it is improved by sitting so this sounds like claudication. She has had 2 epidural steroid injections which she said helped for just a couple of days and then wore off but it was notable improvement of the pain during those several days. Her medical history is significant for taking Eliquis for atrial fibrillation and she has also had ablation treatment for that. She has no other heart disease and she is not a diabetic. She is a notably obese female but she has lost 40 pounds in the last 6 months. Her MRI scan shows significant spinal stenosis at L3-4 and L4-5 but she also has some stenosis at L2-3 that does not extend up above the disc space. She also has L4-5 spondylolisthesis. Problem is on the x-rays the L3-4 disc is not in a lordotic position and if she had a fusion she is highly likely to develop breakdown the L2-3 level with worsening stenosis and the need for and the subsequent scar which is kind of typical for post infection patient's however the scar feels much softer than I would normally expect. At that level. L2-3 disc is in a lordotic position.   Probably the patient would need laminectomy and fusion from L2 down to L5.           PE: Patient is a notably obese female who is alert and oriented and very pleasant. She is got good strength in both lower extremities. Her pupils are equal round reactive to light. Her heart is regular rate and rhythm without murmur and her abdomen is soft nontender. Her lungs are clear to auscultation bilaterally. On her back she has a deep indention where she has had     CURRENT MEDS:      Current Outpatient Medications:     benazepril-hydroCHLOROthiazide (LOTENSIN HCT) 20-12.5 mg per tablet, Take 1 Tablet by mouth two (2) times a day., Disp: 180 Tablet, Rfl: 3    furosemide (LASIX) 40 mg tablet, Take 1 Tablet by mouth daily. , Disp: 90 Tablet, Rfl: 1    apixaban (ELIQUIS) 5 mg tablet, Take 1 Tablet by mouth two (2) times a day., Disp: 180 Tablet, Rfl: 3    potassium chloride SR (KLOR-CON 10) 10 mEq tablet, Take 1 Tablet by mouth daily. , Disp: 90 Tablet, Rfl: 1    levothyroxine (SYNTHROID) 50 mcg tablet, Take 1 Tablet by mouth Daily (before breakfast). , Disp: 30 Tablet, Rfl: 6    rOPINIRole (REQUIP) 1 mg tablet, TAKE 1/2 TABLET BY MOUTH NIGHTLY, Disp: 45 Tablet, Rfl: 3    gabapentin (NEURONTIN) 300 mg capsule, Take 2 Capsules by mouth nightly., Disp: 180 Capsule, Rfl: 3    zolpidem (AMBIEN) 10 mg tablet, TAKE 1 TABLET BY MOUTH AT BEDTIME AS NEEDED FOR SLEEP, Disp: 30 Tablet, Rfl: 5    escitalopram oxalate (LEXAPRO) 20 mg tablet, TAKE 1 TABLET BY MOUTH EVERY DAY, Disp: 90 Tablet, Rfl: 3    omeprazole (PRILOSEC) 40 mg capsule, TAKE 1 CAPSULE BY MOUTH EVERY DAY, Disp: 30 Capsule, Rfl: 6    metoprolol tartrate (LOPRESSOR) 25 mg tablet, TAKE 1 TABLET (25 MG) BY MOUTH 2 (TWO) TIMES A DAY, Disp: , Rfl:     traMADoL (ULTRAM) 50 mg tablet, TAKE 1 TAB BY MOUTH EVERY 6 HRS AS NEEDED FOR PAIN FOR UP TO 3 DAYS.  MAX DAILY AMT  200 MG, Disp: , Rfl:     doxycycline (MONODOX) 100 mg capsule, TAKE 1 CAPSULE BY MOUTH EVERY DAY, Disp: 90 Capsule, Rfl: 3    LORazepam (ATIVAN) 0.5 mg tablet, TAKE 1 TAB BY MOUTH EVERY EIGHT HOURS AS NEEDED FOR ANXIETY., Disp: 45 Tablet, Rfl: 5   OTHER, Nebulizer (J11.00) Influenza and pneumonia  (primary encounter diagnosis) (J20.9) Acute bronchitis, unspecified organism (R06.2) Wheezing, Disp: 40 g, Rfl: 3    albuterol (PROVENTIL VENTOLIN) 2.5 mg /3 mL (0.083 %) nebu, 3 mL by Nebulization route every six (6) hours as needed for Wheezing., Disp: 60 Nebule, Rfl: 3    propafenone (RYTHMOL) 150 mg tablet, Take 150 mg by mouth two (2) times a day., Disp: , Rfl:     cholecalciferol (VITAMIN D3) 1,000 unit tablet, Take 1,000 Units by mouth every morning., Disp: , Rfl:          ASSESSMENT/PLAN: Patient is ready to proceed on with surgery. I had a very long discussion with her and told her it is a very difficult surgery because of her size and the fact that she is got recurrent stenosis which she will include a lot of scar tissue but she is also had an infection which will make it even more difficult. I would recommend that we do a D LIF at L2-3 and possibly L3-4 if it was easy to do. L4-5 levels below the crest so she is going to need a TLIF at that level. She will need a redo laminectomy from 2-5 and pedicle screw fusion instrumentation. We will need to contact her cardiologist to make sure she can come off her Eliquis for several days prior to surgery. We also talked about hospital stay and recovery as well as possible rehab and the use of a brace post surgery. I went through the risk including death, paralysis or nerve injury, infection, bleeding, heart attack, stroke, clot leg, clot long, dural tear, failure of fusion, failure to get adequate pain relief. Given her history of previous infection she has a greater chance of infection with the surgery as well. She understands and wants to proceed. We will see about scheduling her surgery.     Diagnoses and all orders for this visit:     1. Spinal stenosis of lumbar region with neurogenic claudication     2.  Acquired spondylolisthesis           No orders of the defined types were placed in this encounter.        Follow-up and Dispositions  ·   Return for Schedule Surgery.            Electronically signed by Obdulio Mills MD      Electronically signed by Nhi Anne MD

## 2021-10-25 ENCOUNTER — ANESTHESIA (OUTPATIENT)
Dept: SURGERY | Age: 66
DRG: 453 | End: 2021-10-25
Payer: MEDICARE

## 2021-10-25 ENCOUNTER — APPOINTMENT (OUTPATIENT)
Dept: GENERAL RADIOLOGY | Age: 66
DRG: 453 | End: 2021-10-25
Attending: ORTHOPAEDIC SURGERY
Payer: MEDICARE

## 2021-10-25 ENCOUNTER — HOSPITAL ENCOUNTER (INPATIENT)
Age: 66
LOS: 9 days | Discharge: NURSING, REHAB OR LONG-TERM CARE | DRG: 453 | End: 2021-11-03
Attending: ORTHOPAEDIC SURGERY | Admitting: ORTHOPAEDIC SURGERY
Payer: MEDICARE

## 2021-10-25 DIAGNOSIS — I95.0 IDIOPATHIC HYPOTENSION: ICD-10-CM

## 2021-10-25 DIAGNOSIS — E86.1 HYPOTENSION DUE TO HYPOVOLEMIA: ICD-10-CM

## 2021-10-25 DIAGNOSIS — M43.19 SPONDYLOLISTHESIS OF MULTIPLE SITES IN SPINE: ICD-10-CM

## 2021-10-25 DIAGNOSIS — E66.01 OBESITY, MORBID (HCC): ICD-10-CM

## 2021-10-25 DIAGNOSIS — M48.062 LUMBAR STENOSIS WITH NEUROGENIC CLAUDICATION: ICD-10-CM

## 2021-10-25 DIAGNOSIS — N80.9 ENDOMETRIOSIS: ICD-10-CM

## 2021-10-25 DIAGNOSIS — R57.1 HYPOVOLEMIC SHOCK (HCC): ICD-10-CM

## 2021-10-25 DIAGNOSIS — I95.89 HYPOTENSION DUE TO HYPOVOLEMIA: ICD-10-CM

## 2021-10-25 DIAGNOSIS — I48.0 PAROXYSMAL ATRIAL FIBRILLATION (HCC): ICD-10-CM

## 2021-10-25 PROBLEM — M48.00 SPINAL STENOSIS: Status: ACTIVE | Noted: 2021-10-25

## 2021-10-25 LAB
BASE EXCESS BLD CALC-SCNC: 2.5 MMOL/L
BASE EXCESS BLD CALC-SCNC: 4.2 MMOL/L
CA-I BLD-MCNC: 1.11 MMOL/L (ref 1.12–1.32)
CA-I BLD-MCNC: 1.12 MMOL/L (ref 1.12–1.32)
CO2 BLD-SCNC: 28 MMOL/L (ref 13–23)
CO2 BLD-SCNC: 29 MMOL/L (ref 13–23)
ERYTHROCYTE [DISTWIDTH] IN BLOOD BY AUTOMATED COUNT: 15.8 % (ref 11.9–14.6)
GLUCOSE BLD STRIP.AUTO-MCNC: 156 MG/DL (ref 65–100)
GLUCOSE BLD STRIP.AUTO-MCNC: 177 MG/DL (ref 65–100)
HCO3 BLD-SCNC: 27.5 MMOL/L (ref 22–26)
HCO3 BLD-SCNC: 29 MMOL/L (ref 22–26)
HCT VFR BLD AUTO: 27 % (ref 35.8–46.3)
HCT VFR BLD AUTO: 34 % (ref 35.8–46.3)
HGB BLD-MCNC: 11.2 G/DL (ref 11.7–15.4)
HGB BLD-MCNC: 8.9 G/DL (ref 11.7–15.4)
MCH RBC QN AUTO: 29.6 PG (ref 26.1–32.9)
MCHC RBC AUTO-ENTMCNC: 32.9 G/DL (ref 31.4–35)
MCV RBC AUTO: 89.7 FL (ref 79.6–97.8)
NRBC # BLD: 0 K/UL (ref 0–0.2)
PCO2 BLD: 43.6 MMHG (ref 35–45)
PCO2 BLD: 43.7 MMHG (ref 35–45)
PH BLD: 7.41 [PH] (ref 7.35–7.45)
PH BLD: 7.43 [PH] (ref 7.35–7.45)
PLATELET # BLD AUTO: 257 K/UL (ref 150–450)
PMV BLD AUTO: 10 FL (ref 9.4–12.3)
PO2 BLD: 197 MMHG (ref 75–100)
PO2 BLD: 278 MMHG (ref 75–100)
POTASSIUM BLD-SCNC: 3.3 MMOL/L (ref 3.5–5.1)
POTASSIUM BLD-SCNC: 3.4 MMOL/L (ref 3.5–5.1)
RBC # BLD AUTO: 3.79 M/UL (ref 4.05–5.2)
SAO2 % BLD: 100 %
SAO2 % BLD: 100 %
SERVICE CMNT-IMP: ABNORMAL
SERVICE CMNT-IMP: ABNORMAL
SODIUM BLD-SCNC: 135 MMOL/L (ref 136–145)
SODIUM BLD-SCNC: 136 MMOL/L (ref 136–145)
SPECIMEN SITE: ABNORMAL
SPECIMEN SITE: ABNORMAL
WBC # BLD AUTO: 15.5 K/UL (ref 4.3–11.1)

## 2021-10-25 PROCEDURE — 76010000186 HC OR TIME 9.5 TO 10 HR INTENSV-TIER 1: Performed by: ORTHOPAEDIC SURGERY

## 2021-10-25 PROCEDURE — 77030031139 HC SUT VCRL2 J&J -A: Performed by: ORTHOPAEDIC SURGERY

## 2021-10-25 PROCEDURE — 77030020269 HC MISC IMPL: Performed by: ORTHOPAEDIC SURGERY

## 2021-10-25 PROCEDURE — 74011250637 HC RX REV CODE- 250/637: Performed by: ORTHOPAEDIC SURGERY

## 2021-10-25 PROCEDURE — P9045 ALBUMIN (HUMAN), 5%, 250 ML: HCPCS | Performed by: NURSE ANESTHETIST, CERTIFIED REGISTERED

## 2021-10-25 PROCEDURE — 22633 ARTHRD CMBN 1NTRSPC LUMBAR: CPT | Performed by: ORTHOPAEDIC SURGERY

## 2021-10-25 PROCEDURE — 77030037713 HC CLOSR DEV INCIS ZIP STRY -B: Performed by: ORTHOPAEDIC SURGERY

## 2021-10-25 PROCEDURE — 77030040922 HC BLNKT HYPOTHRM STRY -A: Performed by: ANESTHESIOLOGY

## 2021-10-25 PROCEDURE — 72100 X-RAY EXAM L-S SPINE 2/3 VWS: CPT

## 2021-10-25 PROCEDURE — 22585 ARTHRD ANT NTRBD MIN DSC EA: CPT | Performed by: ORTHOPAEDIC SURGERY

## 2021-10-25 PROCEDURE — 77030019557 HC ELECTRD VES SEAL MEDT -F: Performed by: ORTHOPAEDIC SURGERY

## 2021-10-25 PROCEDURE — 0ST20ZZ RESECTION OF LUMBAR VERTEBRAL DISC, OPEN APPROACH: ICD-10-PCS | Performed by: ORTHOPAEDIC SURGERY

## 2021-10-25 PROCEDURE — 63048 LAM FACETEC &FORAMOT EA ADDL: CPT | Performed by: ORTHOPAEDIC SURGERY

## 2021-10-25 PROCEDURE — 63047 LAM FACETEC & FORAMOT LUMBAR: CPT | Performed by: ORTHOPAEDIC SURGERY

## 2021-10-25 PROCEDURE — 65270000029 HC RM PRIVATE

## 2021-10-25 PROCEDURE — 84295 ASSAY OF SERUM SODIUM: CPT

## 2021-10-25 PROCEDURE — 77030019908 HC STETH ESOPH SIMS -A: Performed by: ANESTHESIOLOGY

## 2021-10-25 PROCEDURE — 77030041394 HC GRFT BN PROT GRWTH FCTR BSYC -I1: Performed by: ORTHOPAEDIC SURGERY

## 2021-10-25 PROCEDURE — C1713 ANCHOR/SCREW BN/BN,TIS/BN: HCPCS | Performed by: ORTHOPAEDIC SURGERY

## 2021-10-25 PROCEDURE — 77030018673: Performed by: ORTHOPAEDIC SURGERY

## 2021-10-25 PROCEDURE — 22845 INSERT SPINE FIXATION DEVICE: CPT | Performed by: ORTHOPAEDIC SURGERY

## 2021-10-25 PROCEDURE — 82947 ASSAY GLUCOSE BLOOD QUANT: CPT

## 2021-10-25 PROCEDURE — 74011250637 HC RX REV CODE- 250/637: Performed by: ANESTHESIOLOGY

## 2021-10-25 PROCEDURE — 77030008542 HC TBNG MON PRSS EDWD -A: Performed by: ANESTHESIOLOGY

## 2021-10-25 PROCEDURE — 86901 BLOOD TYPING SEROLOGIC RH(D): CPT

## 2021-10-25 PROCEDURE — 82803 BLOOD GASES ANY COMBINATION: CPT

## 2021-10-25 PROCEDURE — 22842 INSERT SPINE FIXATION DEVICE: CPT | Performed by: ORTHOPAEDIC SURGERY

## 2021-10-25 PROCEDURE — 74011250636 HC RX REV CODE- 250/636: Performed by: ANESTHESIOLOGY

## 2021-10-25 PROCEDURE — 74011250636 HC RX REV CODE- 250/636: Performed by: ORTHOPAEDIC SURGERY

## 2021-10-25 PROCEDURE — 77030038601 HC DEV SYS W/CANN LITE BIO STRY -F: Performed by: ORTHOPAEDIC SURGERY

## 2021-10-25 PROCEDURE — 74011000272 HC RX REV CODE- 272: Performed by: ORTHOPAEDIC SURGERY

## 2021-10-25 PROCEDURE — 76210000016 HC OR PH I REC 1 TO 1.5 HR: Performed by: ORTHOPAEDIC SURGERY

## 2021-10-25 PROCEDURE — 85018 HEMOGLOBIN: CPT

## 2021-10-25 PROCEDURE — 85027 COMPLETE CBC AUTOMATED: CPT

## 2021-10-25 PROCEDURE — 2709999900 HC NON-CHARGEABLE SUPPLY: Performed by: ORTHOPAEDIC SURGERY

## 2021-10-25 PROCEDURE — 74011250636 HC RX REV CODE- 250/636: Performed by: NURSE ANESTHETIST, CERTIFIED REGISTERED

## 2021-10-25 PROCEDURE — 77030032817 HC GRFT BN CHIPS STRY -C: Performed by: ORTHOPAEDIC SURGERY

## 2021-10-25 PROCEDURE — 77030040356 HC CORD BPLR FRCP COVD -A: Performed by: ORTHOPAEDIC SURGERY

## 2021-10-25 PROCEDURE — 86923 COMPATIBILITY TEST ELECTRIC: CPT

## 2021-10-25 PROCEDURE — 77030013794 HC KT TRNSDUC BLD EDWD -B: Performed by: ANESTHESIOLOGY

## 2021-10-25 PROCEDURE — 77030040361 HC SLV COMPR DVT MDII -B: Performed by: ORTHOPAEDIC SURGERY

## 2021-10-25 PROCEDURE — 77030021678 HC GLIDESCP STAT DISP VERT -B: Performed by: ANESTHESIOLOGY

## 2021-10-25 PROCEDURE — 22853 INSJ BIOMECHANICAL DEVICE: CPT | Performed by: ORTHOPAEDIC SURGERY

## 2021-10-25 PROCEDURE — 74011000250 HC RX REV CODE- 250: Performed by: NURSE ANESTHETIST, CERTIFIED REGISTERED

## 2021-10-25 PROCEDURE — 77030028270 HC SRGFL HEMSTAT MTRX J&J -C: Performed by: ORTHOPAEDIC SURGERY

## 2021-10-25 PROCEDURE — 0HB6XZZ EXCISION OF BACK SKIN, EXTERNAL APPROACH: ICD-10-PCS | Performed by: ORTHOPAEDIC SURGERY

## 2021-10-25 PROCEDURE — 0SG10A0 FUSION OF 2 OR MORE LUMBAR VERTEBRAL JOINTS WITH INTERBODY FUSION DEVICE, ANTERIOR APPROACH, ANTERIOR COLUMN, OPEN APPROACH: ICD-10-PCS | Performed by: ORTHOPAEDIC SURGERY

## 2021-10-25 PROCEDURE — 22614 ARTHRD PST TQ 1NTRSPC EA ADD: CPT | Performed by: ORTHOPAEDIC SURGERY

## 2021-10-25 PROCEDURE — 01NB0ZZ RELEASE LUMBAR NERVE, OPEN APPROACH: ICD-10-PCS | Performed by: ORTHOPAEDIC SURGERY

## 2021-10-25 PROCEDURE — 77030005401 HC CATH RAD ARRO -A: Performed by: ANESTHESIOLOGY

## 2021-10-25 PROCEDURE — 77030037088 HC TUBE ENDOTRACH ORAL NSL COVD-A: Performed by: ANESTHESIOLOGY

## 2021-10-25 PROCEDURE — 77030034475 HC MISC IMPL SPN: Performed by: ORTHOPAEDIC SURGERY

## 2021-10-25 PROCEDURE — 0SG10K1 FUSION OF 2 OR MORE LUMBAR VERTEBRAL JOINTS WITH NONAUTOLOGOUS TISSUE SUBSTITUTE, POSTERIOR APPROACH, POSTERIOR COLUMN, OPEN APPROACH: ICD-10-PCS | Performed by: ORTHOPAEDIC SURGERY

## 2021-10-25 PROCEDURE — 76060000050 HC ANESTHESIA 9.5 TO 10 HR: Performed by: ORTHOPAEDIC SURGERY

## 2021-10-25 PROCEDURE — 74011000250 HC RX REV CODE- 250: Performed by: ORTHOPAEDIC SURGERY

## 2021-10-25 PROCEDURE — 77030029099 HC BN WAX SSPC -A: Performed by: ORTHOPAEDIC SURGERY

## 2021-10-25 PROCEDURE — 77030040830 HC CATH URETH FOL MDII -A: Performed by: ORTHOPAEDIC SURGERY

## 2021-10-25 PROCEDURE — 22558 ARTHRD ANT NTRBD MIN DSC LUM: CPT | Performed by: ORTHOPAEDIC SURGERY

## 2021-10-25 PROCEDURE — 77030025623 HC BUR RND PRECIS STRY -D: Performed by: ORTHOPAEDIC SURGERY

## 2021-10-25 PROCEDURE — 77030020268 HC MISC GENERAL SUPPLY: Performed by: ORTHOPAEDIC SURGERY

## 2021-10-25 PROCEDURE — 77030038552 HC DRN WND MDII -A: Performed by: ORTHOPAEDIC SURGERY

## 2021-10-25 DEVICE — POLYAXIAL SCREW
Type: IMPLANTABLE DEVICE | Site: SPINE LUMBAR | Status: FUNCTIONAL
Brand: XIA 3 SYSTEM - SERRATO

## 2021-10-25 DEVICE — TI ALLOY RAD ROD
Type: IMPLANTABLE DEVICE | Site: SPINE LUMBAR | Status: FUNCTIONAL
Brand: XIA 3

## 2021-10-25 DEVICE — BLOCKER
Type: IMPLANTABLE DEVICE | Site: SPINE LUMBAR | Status: FUNCTIONAL
Brand: XIA 3

## 2021-10-25 DEVICE — BIO DBM PLUS PUTTY (WITH CANCELLOUS)
Type: IMPLANTABLE DEVICE | Site: SPINE LUMBAR | Status: FUNCTIONAL
Brand: BIO DBM

## 2021-10-25 RX ORDER — ALBUTEROL SULFATE 0.83 MG/ML
2.5 SOLUTION RESPIRATORY (INHALATION) AS NEEDED
Status: DISCONTINUED | OUTPATIENT
Start: 2021-10-25 | End: 2021-10-25 | Stop reason: HOSPADM

## 2021-10-25 RX ORDER — KETAMINE HYDROCHLORIDE 50 MG/ML
INJECTION, SOLUTION INTRAMUSCULAR; INTRAVENOUS AS NEEDED
Status: DISCONTINUED | OUTPATIENT
Start: 2021-10-25 | End: 2021-10-25 | Stop reason: HOSPADM

## 2021-10-25 RX ORDER — LIDOCAINE HYDROCHLORIDE 20 MG/ML
INJECTION, SOLUTION EPIDURAL; INFILTRATION; INTRACAUDAL; PERINEURAL AS NEEDED
Status: DISCONTINUED | OUTPATIENT
Start: 2021-10-25 | End: 2021-10-25 | Stop reason: HOSPADM

## 2021-10-25 RX ORDER — METOPROLOL TARTRATE 25 MG/1
25 TABLET, FILM COATED ORAL 2 TIMES DAILY
Status: DISCONTINUED | OUTPATIENT
Start: 2021-10-25 | End: 2021-11-03 | Stop reason: HOSPADM

## 2021-10-25 RX ORDER — DEXTROSE, SODIUM CHLORIDE, AND POTASSIUM CHLORIDE 5; .45; .15 G/100ML; G/100ML; G/100ML
100 INJECTION INTRAVENOUS CONTINUOUS
Status: DISCONTINUED | OUTPATIENT
Start: 2021-10-25 | End: 2021-10-26

## 2021-10-25 RX ORDER — LEVOTHYROXINE SODIUM 50 UG/1
50 TABLET ORAL
Status: DISCONTINUED | OUTPATIENT
Start: 2021-10-26 | End: 2021-11-03 | Stop reason: HOSPADM

## 2021-10-25 RX ORDER — GABAPENTIN 300 MG/1
600 CAPSULE ORAL
Status: DISCONTINUED | OUTPATIENT
Start: 2021-10-25 | End: 2021-10-28

## 2021-10-25 RX ORDER — TRAMADOL HYDROCHLORIDE 50 MG/1
50 TABLET ORAL
Status: DISCONTINUED | OUTPATIENT
Start: 2021-10-25 | End: 2021-11-03 | Stop reason: HOSPADM

## 2021-10-25 RX ORDER — SUFENTANIL CITRATE 50 UG/ML
INJECTION EPIDURAL; INTRAVENOUS AS NEEDED
Status: DISCONTINUED | OUTPATIENT
Start: 2021-10-25 | End: 2021-10-25 | Stop reason: HOSPADM

## 2021-10-25 RX ORDER — ACETAMINOPHEN 500 MG
1000 TABLET ORAL ONCE
Status: COMPLETED | OUTPATIENT
Start: 2021-10-25 | End: 2021-10-25

## 2021-10-25 RX ORDER — ROPINIROLE 0.5 MG/1
0.5 TABLET, FILM COATED ORAL
Status: DISCONTINUED | OUTPATIENT
Start: 2021-10-25 | End: 2021-11-03 | Stop reason: HOSPADM

## 2021-10-25 RX ORDER — BENAZEPRIL HYDROCHLORIDE AND HYDROCHLOROTHIAZIDE 20; 12.5 MG/1; MG/1
1 TABLET ORAL 2 TIMES DAILY
Status: DISCONTINUED | OUTPATIENT
Start: 2021-10-25 | End: 2021-10-25 | Stop reason: CLARIF

## 2021-10-25 RX ORDER — ALBUMIN HUMAN 50 G/1000ML
SOLUTION INTRAVENOUS AS NEEDED
Status: DISCONTINUED | OUTPATIENT
Start: 2021-10-25 | End: 2021-10-25 | Stop reason: HOSPADM

## 2021-10-25 RX ORDER — HYDROCODONE BITARTRATE AND ACETAMINOPHEN 10; 325 MG/1; MG/1
1 TABLET ORAL
Status: DISCONTINUED | OUTPATIENT
Start: 2021-10-25 | End: 2021-11-03 | Stop reason: HOSPADM

## 2021-10-25 RX ORDER — DOXYCYCLINE 100 MG/1
100 CAPSULE ORAL DAILY
Status: DISCONTINUED | OUTPATIENT
Start: 2021-10-26 | End: 2021-10-28

## 2021-10-25 RX ORDER — ESCITALOPRAM OXALATE 10 MG/1
20 TABLET ORAL EVERY MORNING
Status: DISCONTINUED | OUTPATIENT
Start: 2021-10-26 | End: 2021-11-03 | Stop reason: HOSPADM

## 2021-10-25 RX ORDER — HYDROMORPHONE HYDROCHLORIDE 1 MG/ML
1 INJECTION, SOLUTION INTRAMUSCULAR; INTRAVENOUS; SUBCUTANEOUS
Status: DISCONTINUED | OUTPATIENT
Start: 2021-10-25 | End: 2021-11-03 | Stop reason: HOSPADM

## 2021-10-25 RX ORDER — VANCOMYCIN HYDROCHLORIDE 1 G/20ML
INJECTION, POWDER, LYOPHILIZED, FOR SOLUTION INTRAVENOUS AS NEEDED
Status: DISCONTINUED | OUTPATIENT
Start: 2021-10-25 | End: 2021-10-25 | Stop reason: HOSPADM

## 2021-10-25 RX ORDER — HYDROCHLOROTHIAZIDE 12.5 MG/1
12.5 CAPSULE ORAL 2 TIMES DAILY
Status: DISCONTINUED | OUTPATIENT
Start: 2021-10-26 | End: 2021-11-03 | Stop reason: HOSPADM

## 2021-10-25 RX ORDER — SODIUM CHLORIDE 0.9 G/100ML
IRRIGANT IRRIGATION AS NEEDED
Status: DISCONTINUED | OUTPATIENT
Start: 2021-10-25 | End: 2021-10-25 | Stop reason: HOSPADM

## 2021-10-25 RX ORDER — NALOXONE HYDROCHLORIDE 0.4 MG/ML
0.4 INJECTION, SOLUTION INTRAMUSCULAR; INTRAVENOUS; SUBCUTANEOUS AS NEEDED
Status: DISCONTINUED | OUTPATIENT
Start: 2021-10-25 | End: 2021-11-03 | Stop reason: HOSPADM

## 2021-10-25 RX ORDER — MIDAZOLAM HYDROCHLORIDE 1 MG/ML
2 INJECTION, SOLUTION INTRAMUSCULAR; INTRAVENOUS
Status: DISCONTINUED | OUTPATIENT
Start: 2021-10-25 | End: 2021-10-25 | Stop reason: HOSPADM

## 2021-10-25 RX ORDER — PROPAFENONE HYDROCHLORIDE 150 MG/1
150 TABLET, FILM COATED ORAL 2 TIMES DAILY
Status: DISCONTINUED | OUTPATIENT
Start: 2021-10-25 | End: 2021-10-30

## 2021-10-25 RX ORDER — PROPOFOL 10 MG/ML
INJECTION, EMULSION INTRAVENOUS AS NEEDED
Status: DISCONTINUED | OUTPATIENT
Start: 2021-10-25 | End: 2021-10-25 | Stop reason: HOSPADM

## 2021-10-25 RX ORDER — PANTOPRAZOLE SODIUM 40 MG/1
40 TABLET, DELAYED RELEASE ORAL
Status: DISCONTINUED | OUTPATIENT
Start: 2021-10-26 | End: 2021-11-03 | Stop reason: HOSPADM

## 2021-10-25 RX ORDER — FENTANYL CITRATE 50 UG/ML
100 INJECTION, SOLUTION INTRAMUSCULAR; INTRAVENOUS ONCE
Status: DISCONTINUED | OUTPATIENT
Start: 2021-10-25 | End: 2021-10-25 | Stop reason: HOSPADM

## 2021-10-25 RX ORDER — DOCUSATE SODIUM 100 MG/1
100 CAPSULE, LIQUID FILLED ORAL 2 TIMES DAILY
Status: DISCONTINUED | OUTPATIENT
Start: 2021-10-25 | End: 2021-11-03 | Stop reason: HOSPADM

## 2021-10-25 RX ORDER — ROCURONIUM BROMIDE 10 MG/ML
INJECTION, SOLUTION INTRAVENOUS AS NEEDED
Status: DISCONTINUED | OUTPATIENT
Start: 2021-10-25 | End: 2021-10-25 | Stop reason: HOSPADM

## 2021-10-25 RX ORDER — FENTANYL CITRATE 50 UG/ML
INJECTION, SOLUTION INTRAMUSCULAR; INTRAVENOUS AS NEEDED
Status: DISCONTINUED | OUTPATIENT
Start: 2021-10-25 | End: 2021-10-25 | Stop reason: HOSPADM

## 2021-10-25 RX ORDER — HYDROMORPHONE HYDROCHLORIDE 2 MG/ML
0.5 INJECTION, SOLUTION INTRAMUSCULAR; INTRAVENOUS; SUBCUTANEOUS
Status: DISCONTINUED | OUTPATIENT
Start: 2021-10-25 | End: 2021-10-25 | Stop reason: HOSPADM

## 2021-10-25 RX ORDER — ONDANSETRON 4 MG/1
4 TABLET, ORALLY DISINTEGRATING ORAL
Status: DISCONTINUED | OUTPATIENT
Start: 2021-10-25 | End: 2021-11-03 | Stop reason: HOSPADM

## 2021-10-25 RX ORDER — POTASSIUM CHLORIDE 750 MG/1
10 TABLET, EXTENDED RELEASE ORAL DAILY
Status: DISCONTINUED | OUTPATIENT
Start: 2021-10-26 | End: 2021-11-03 | Stop reason: HOSPADM

## 2021-10-25 RX ORDER — MIDAZOLAM HYDROCHLORIDE 1 MG/ML
2 INJECTION, SOLUTION INTRAMUSCULAR; INTRAVENOUS ONCE
Status: DISCONTINUED | OUTPATIENT
Start: 2021-10-25 | End: 2021-10-25 | Stop reason: HOSPADM

## 2021-10-25 RX ORDER — FUROSEMIDE 40 MG/1
40 TABLET ORAL DAILY
Status: DISCONTINUED | OUTPATIENT
Start: 2021-10-26 | End: 2021-10-26

## 2021-10-25 RX ORDER — SODIUM CHLORIDE, SODIUM LACTATE, POTASSIUM CHLORIDE, CALCIUM CHLORIDE 600; 310; 30; 20 MG/100ML; MG/100ML; MG/100ML; MG/100ML
50 INJECTION, SOLUTION INTRAVENOUS CONTINUOUS
Status: DISCONTINUED | OUTPATIENT
Start: 2021-10-25 | End: 2021-10-25 | Stop reason: HOSPADM

## 2021-10-25 RX ORDER — LORAZEPAM 0.5 MG/1
0.5 TABLET ORAL
Status: DISCONTINUED | OUTPATIENT
Start: 2021-10-25 | End: 2021-11-03 | Stop reason: HOSPADM

## 2021-10-25 RX ORDER — GLYCOPYRROLATE 0.2 MG/ML
INJECTION INTRAMUSCULAR; INTRAVENOUS AS NEEDED
Status: DISCONTINUED | OUTPATIENT
Start: 2021-10-25 | End: 2021-10-25 | Stop reason: HOSPADM

## 2021-10-25 RX ORDER — SUCCINYLCHOLINE CHLORIDE 20 MG/ML
INJECTION INTRAMUSCULAR; INTRAVENOUS AS NEEDED
Status: DISCONTINUED | OUTPATIENT
Start: 2021-10-25 | End: 2021-10-25 | Stop reason: HOSPADM

## 2021-10-25 RX ORDER — LISINOPRIL 20 MG/1
20 TABLET ORAL 2 TIMES DAILY
Status: DISCONTINUED | OUTPATIENT
Start: 2021-10-26 | End: 2021-11-03 | Stop reason: HOSPADM

## 2021-10-25 RX ORDER — ZOLPIDEM TARTRATE 5 MG/1
10 TABLET ORAL
Status: DISCONTINUED | OUTPATIENT
Start: 2021-10-25 | End: 2021-11-03 | Stop reason: HOSPADM

## 2021-10-25 RX ORDER — DIPHENHYDRAMINE HCL 25 MG
25 CAPSULE ORAL
Status: DISCONTINUED | OUTPATIENT
Start: 2021-10-25 | End: 2021-11-03 | Stop reason: HOSPADM

## 2021-10-25 RX ORDER — CEFAZOLIN SODIUM/WATER 2 G/20 ML
2 SYRINGE (ML) INTRAVENOUS EVERY 8 HOURS
Status: COMPLETED | OUTPATIENT
Start: 2021-10-26 | End: 2021-10-26

## 2021-10-25 RX ORDER — EPHEDRINE SULFATE/0.9% NACL/PF 50 MG/5 ML
SYRINGE (ML) INTRAVENOUS AS NEEDED
Status: DISCONTINUED | OUTPATIENT
Start: 2021-10-25 | End: 2021-10-25 | Stop reason: HOSPADM

## 2021-10-25 RX ORDER — ASPIRIN 81 MG/1
81 TABLET ORAL
Status: DISCONTINUED | OUTPATIENT
Start: 2021-10-25 | End: 2021-11-03 | Stop reason: HOSPADM

## 2021-10-25 RX ORDER — OXYCODONE HYDROCHLORIDE 5 MG/1
5 TABLET ORAL
Status: COMPLETED | OUTPATIENT
Start: 2021-10-25 | End: 2021-10-25

## 2021-10-25 RX ORDER — PROPOFOL 10 MG/ML
INJECTION, EMULSION INTRAVENOUS
Status: DISCONTINUED | OUTPATIENT
Start: 2021-10-25 | End: 2021-10-25 | Stop reason: HOSPADM

## 2021-10-25 RX ORDER — DEXAMETHASONE SODIUM PHOSPHATE 4 MG/ML
INJECTION, SOLUTION INTRA-ARTICULAR; INTRALESIONAL; INTRAMUSCULAR; INTRAVENOUS; SOFT TISSUE AS NEEDED
Status: DISCONTINUED | OUTPATIENT
Start: 2021-10-25 | End: 2021-10-25 | Stop reason: HOSPADM

## 2021-10-25 RX ORDER — LIDOCAINE HYDROCHLORIDE 10 MG/ML
0.1 INJECTION INFILTRATION; PERINEURAL AS NEEDED
Status: DISCONTINUED | OUTPATIENT
Start: 2021-10-25 | End: 2021-10-25 | Stop reason: HOSPADM

## 2021-10-25 RX ORDER — SODIUM CHLORIDE, SODIUM LACTATE, POTASSIUM CHLORIDE, CALCIUM CHLORIDE 600; 310; 30; 20 MG/100ML; MG/100ML; MG/100ML; MG/100ML
75 INJECTION, SOLUTION INTRAVENOUS CONTINUOUS
Status: DISCONTINUED | OUTPATIENT
Start: 2021-10-25 | End: 2021-10-25 | Stop reason: HOSPADM

## 2021-10-25 RX ORDER — ONDANSETRON 2 MG/ML
INJECTION INTRAMUSCULAR; INTRAVENOUS AS NEEDED
Status: DISCONTINUED | OUTPATIENT
Start: 2021-10-25 | End: 2021-10-25 | Stop reason: HOSPADM

## 2021-10-25 RX ORDER — SODIUM CHLORIDE 0.9 % (FLUSH) 0.9 %
5-40 SYRINGE (ML) INJECTION AS NEEDED
Status: DISCONTINUED | OUTPATIENT
Start: 2021-10-25 | End: 2021-11-03 | Stop reason: HOSPADM

## 2021-10-25 RX ORDER — SODIUM CHLORIDE 0.9 % (FLUSH) 0.9 %
5-40 SYRINGE (ML) INJECTION EVERY 8 HOURS
Status: DISCONTINUED | OUTPATIENT
Start: 2021-10-25 | End: 2021-11-03 | Stop reason: HOSPADM

## 2021-10-25 RX ADMIN — Medication 15 MG: at 08:13

## 2021-10-25 RX ADMIN — Medication 15 MG: at 08:27

## 2021-10-25 RX ADMIN — Medication 10 MG: at 13:03

## 2021-10-25 RX ADMIN — GABAPENTIN 600 MG: 300 CAPSULE ORAL at 23:44

## 2021-10-25 RX ADMIN — SODIUM CHLORIDE 120 MCG: 900 INJECTION, SOLUTION INTRAVENOUS at 16:31

## 2021-10-25 RX ADMIN — OXYCODONE 5 MG: 5 TABLET ORAL at 20:21

## 2021-10-25 RX ADMIN — POTASSIUM CHLORIDE, DEXTROSE MONOHYDRATE AND SODIUM CHLORIDE 100 ML/HR: 150; 5; 450 INJECTION, SOLUTION INTRAVENOUS at 23:41

## 2021-10-25 RX ADMIN — ONDANSETRON 4 MG: 2 INJECTION INTRAMUSCULAR; INTRAVENOUS at 08:30

## 2021-10-25 RX ADMIN — FENTANYL CITRATE 50 MCG: 50 INJECTION INTRAMUSCULAR; INTRAVENOUS at 17:44

## 2021-10-25 RX ADMIN — CEFAZOLIN 3 G: 1 INJECTION, POWDER, FOR SOLUTION INTRAVENOUS at 08:40

## 2021-10-25 RX ADMIN — PHENYLEPHRINE HYDROCHLORIDE 100 MCG: 10 INJECTION INTRAVENOUS at 16:51

## 2021-10-25 RX ADMIN — Medication 10 MG: at 08:36

## 2021-10-25 RX ADMIN — PROPOFOL 150 MG: 10 INJECTION, EMULSION INTRAVENOUS at 08:03

## 2021-10-25 RX ADMIN — KETAMINE HYDROCHLORIDE 24 MG: 50 INJECTION INTRAMUSCULAR; INTRAVENOUS at 10:45

## 2021-10-25 RX ADMIN — KETAMINE HYDROCHLORIDE 12 MG: 50 INJECTION INTRAMUSCULAR; INTRAVENOUS at 15:45

## 2021-10-25 RX ADMIN — Medication 1 AMPULE: at 23:43

## 2021-10-25 RX ADMIN — ROCURONIUM BROMIDE 5 MG: 10 INJECTION, SOLUTION INTRAVENOUS at 08:03

## 2021-10-25 RX ADMIN — Medication 3 AMPULE: at 06:39

## 2021-10-25 RX ADMIN — DEXAMETHASONE SODIUM PHOSPHATE 10 MG: 4 INJECTION, SOLUTION INTRAMUSCULAR; INTRAVENOUS at 08:30

## 2021-10-25 RX ADMIN — ALBUMIN (HUMAN) 12.5 G: 12.5 INJECTION, SOLUTION INTRAVENOUS at 14:05

## 2021-10-25 RX ADMIN — PHENYLEPHRINE HYDROCHLORIDE 100 MCG: 10 INJECTION INTRAVENOUS at 16:18

## 2021-10-25 RX ADMIN — PROPAFENONE HYDROCHLORIDE 150 MG: 150 TABLET, FILM COATED ORAL at 23:44

## 2021-10-25 RX ADMIN — KETAMINE HYDROCHLORIDE 12 MG: 50 INJECTION INTRAMUSCULAR; INTRAVENOUS at 11:45

## 2021-10-25 RX ADMIN — SUFENTANIL CITRATE 5 MCG: 50 INJECTION EPIDURAL; INTRAVENOUS at 15:25

## 2021-10-25 RX ADMIN — SODIUM CHLORIDE, SODIUM LACTATE, POTASSIUM CHLORIDE, AND CALCIUM CHLORIDE 75 ML/HR: 600; 310; 30; 20 INJECTION, SOLUTION INTRAVENOUS at 06:37

## 2021-10-25 RX ADMIN — PHENYLEPHRINE HYDROCHLORIDE 100 MCG: 10 INJECTION INTRAVENOUS at 17:17

## 2021-10-25 RX ADMIN — Medication 5 ML: at 23:45

## 2021-10-25 RX ADMIN — Medication 10 MG: at 17:18

## 2021-10-25 RX ADMIN — PHENYLEPHRINE HYDROCHLORIDE 100 MCG: 10 INJECTION INTRAVENOUS at 17:14

## 2021-10-25 RX ADMIN — PHENYLEPHRINE HYDROCHLORIDE 100 MCG: 10 INJECTION INTRAVENOUS at 17:06

## 2021-10-25 RX ADMIN — SUFENTANIL CITRATE 0.2 MCG/KG/HR: 50 INJECTION EPIDURAL; INTRAVENOUS at 08:40

## 2021-10-25 RX ADMIN — LIDOCAINE HYDROCHLORIDE 15 ML/HR: 20 INJECTION, SOLUTION EPIDURAL; INFILTRATION; INTRACAUDAL; PERINEURAL at 08:40

## 2021-10-25 RX ADMIN — Medication 10 MG: at 17:22

## 2021-10-25 RX ADMIN — PHENYLEPHRINE HYDROCHLORIDE 100 MCG: 10 INJECTION INTRAVENOUS at 17:22

## 2021-10-25 RX ADMIN — KETAMINE HYDROCHLORIDE 12 MG: 50 INJECTION INTRAMUSCULAR; INTRAVENOUS at 13:45

## 2021-10-25 RX ADMIN — PROPOFOL 200 MCG/KG/MIN: 10 INJECTION, EMULSION INTRAVENOUS at 08:40

## 2021-10-25 RX ADMIN — KETAMINE HYDROCHLORIDE 24 MG: 50 INJECTION INTRAMUSCULAR; INTRAVENOUS at 09:40

## 2021-10-25 RX ADMIN — ALBUMIN (HUMAN) 12.5 G: 12.5 INJECTION, SOLUTION INTRAVENOUS at 13:10

## 2021-10-25 RX ADMIN — CEFAZOLIN 3 G: 1 INJECTION, POWDER, FOR SOLUTION INTRAVENOUS at 12:40

## 2021-10-25 RX ADMIN — CEFAZOLIN 3 G: 1 INJECTION, POWDER, FOR SOLUTION INTRAVENOUS at 16:15

## 2021-10-25 RX ADMIN — SUFENTANIL CITRATE 25 MCG: 50 INJECTION EPIDURAL; INTRAVENOUS at 08:03

## 2021-10-25 RX ADMIN — Medication 20 MG: at 13:05

## 2021-10-25 RX ADMIN — FENTANYL CITRATE 100 MCG: 50 INJECTION INTRAMUSCULAR; INTRAVENOUS at 16:22

## 2021-10-25 RX ADMIN — HYDROMORPHONE HYDROCHLORIDE 1 MG: 1 INJECTION, SOLUTION INTRAMUSCULAR; INTRAVENOUS; SUBCUTANEOUS at 22:01

## 2021-10-25 RX ADMIN — ACETAMINOPHEN 1000 MG: 500 TABLET ORAL at 06:39

## 2021-10-25 RX ADMIN — SODIUM CHLORIDE 30 MCG/MIN: 900 INJECTION, SOLUTION INTRAVENOUS at 13:01

## 2021-10-25 RX ADMIN — ROPINIROLE HYDROCHLORIDE 0.5 MG: 0.5 TABLET, FILM COATED ORAL at 23:44

## 2021-10-25 RX ADMIN — LIDOCAINE HYDROCHLORIDE 70 MG: 20 INJECTION, SOLUTION EPIDURAL; INFILTRATION; INTRACAUDAL; PERINEURAL at 08:03

## 2021-10-25 RX ADMIN — ASPIRIN 81 MG: 81 TABLET ORAL at 23:43

## 2021-10-25 RX ADMIN — GLYCOPYRROLATE 0.2 MG: 0.2 INJECTION, SOLUTION INTRAMUSCULAR; INTRAVENOUS at 08:17

## 2021-10-25 RX ADMIN — PHENYLEPHRINE HYDROCHLORIDE 100 MCG: 10 INJECTION INTRAVENOUS at 16:57

## 2021-10-25 RX ADMIN — KETAMINE HYDROCHLORIDE 3 MG: 50 INJECTION INTRAMUSCULAR; INTRAVENOUS at 16:00

## 2021-10-25 RX ADMIN — DOCUSATE SODIUM 100 MG: 100 CAPSULE, LIQUID FILLED ORAL at 23:44

## 2021-10-25 RX ADMIN — KETAMINE HYDROCHLORIDE 12 MG: 50 INJECTION INTRAMUSCULAR; INTRAVENOUS at 12:45

## 2021-10-25 RX ADMIN — SUCCINYLCHOLINE CHLORIDE 160 MG: 20 INJECTION, SOLUTION INTRAMUSCULAR; INTRAVENOUS at 08:04

## 2021-10-25 NOTE — ANESTHESIA PREPROCEDURE EVALUATION
Anesthetic History   No history of anesthetic complications            Review of Systems / Medical History  Patient summary reviewed, nursing notes reviewed and pertinent labs reviewed    Pulmonary        Sleep apnea  Smoker         Neuro/Psych         Psychiatric history     Cardiovascular    Hypertension        Dysrhythmias (s/p ablation April 2018) : atrial fibrillation        Comments: Normal EF- low risk perfusion scan with normal perfusion March 2018    Holter Oct 2021- 0% AF, PACs with a few short SVT runs none longer than 7 beats, occ PVCs   GI/Hepatic/Renal     GERD: well controlled           Endo/Other      Hypothyroidism: well controlled  Morbid obesity and arthritis     Other Findings              Physical Exam    Airway  Mallampati: III      Mouth opening: Normal     Cardiovascular  Regular rate and rhythm,  S1 and S2 normal,  no murmur, click, rub, or gallop             Dental  No notable dental hx       Pulmonary  Breath sounds clear to auscultation               Abdominal         Other Findings            Anesthetic Plan    ASA: 3  Anesthesia type: total IV anesthesia    Monitoring Plan: Arterial line      Induction: Intravenous  Anesthetic plan and risks discussed with: Patient

## 2021-10-25 NOTE — BRIEF OP NOTE
Brief Postoperative Note    Patient: Pricilla Gallego  YOB: 1955  MRN: [id number]    Date of Procedure: 10/25/2021     Pre-Op Diagnosis: Lumbar stenosis with neurogenic claudication [M48.062]  Spondylolisthesis of lumbar region [M43.16]    Post-Op Diagnosis: same     Procedure(s):  L2-L4 SPINE LUMBAR DIRECT LATERAL INTERBODY FUSION (DLIF) ANTERIOR PLATING / SSEP  REDO L2-L5 LAMINECTOMY FUSION AND L4-L5 TLIF / NEUROMONITORING    Surgeon(s):  Donnald Kussmaul, MD    Surgical Assistant: staff    Anesthesia: General     Estimated Blood Loss (mL): 3231FO    Complications: none    Specimens: * No specimens in log *     Implants:   Implant Name Type Inv.  Item Serial No.  Lot No. LRB No. Used Action   10cc ProteiOS growth factor    [id number] BIOLOGICA  N/A 1 Implanted   15cc Cancellous chips    [id number]   N/A 1 Implanted   SERVICE FEE 10ML BIO DBM PTTY W CHIP - [id number]  SERVICE FEE 10ML BIO DBM PTTY W CHIP  JUSTINA Ecosia_ [id number] N/A 1 Implanted   SERVICE FEE 10ML BIO DBM Malika Michael CHIP - [id number]  SERVICE FEE 10ML BIO DBM PTTY W CHIP  JUSTINA Ecosia_WD [id number] N/A 1 Implanted   SERVICE FEE 10ML BIO DBM Malika Michael CHIP - [id number]  SERVICE FEE 10ML BIO DBM PTTY W CHIP  JUSTINA CORP_WD [id number] N/A 1 Implanted   one screw plate, 06 and center screw 15mm    ALPHA VERENA Corepair [id number] N/A 1 Implanted   one plate screw [id number],39 and screw, 99PC    SPEEDELO VERENA Corepair [id number] N/A 1 Implanted   5.5 x 35 bone screw x2     SPEEDELO VERENA Corepair [id number] N/A 1 Implanted   porous spacer [id number] 10 degree cage    SPEEDELO VERENA Corepair [id number] N/A 1 Implanted   porous spacer [id number] 10degree cage    ALPHA VERENA Corepair [id number] N/A 1 Implanted   Prolift Expandable cage [id number] 15 degree     JUSTINA NICHOLAS [id number] N/A 1 Implanted   BLOCKER SPNL L50MM DIA6MM TI 1 LEV LUIS DANIEL 3 - [id number]  BLOCKER SPNL L50MM DIA6MM TI 1 RADHA CARY 3  JUSTINA SPINE HOWM_WD [id number] N/A 8 Implanted   SCREW SPNL L50MM DIA6.5MM POLYAX THOMAS - [id number]  SCREW SPNL L50MM DIA6. 5MM POLYAX THOMAS  JUSTINA SPINE HOWM_WD [id number] N/A 8 Implanted   CYNTHIA SPNL L120MM DIA6MM ANT POST TI SMOOTH CRV LUIS DANIEL III - [id number]  CYNTHIA SPNL L120MM DIA6MM ANT POST TI SMOOTH CRV LUIS DANIEL III  JUSTINA SPINE HOWM_WD [id number] N/A 2 Implanted   Cellerate RX acitvated collagen powder 1gm   [id number]  [id number] N/A 1 Implanted       Drains:   Hemovac Lower;Left;Upper Back (Active)       Hemovac Lower; Left Back (Active)       Hemovac Lower;Right Back (Active)       Findings: severe spinal stenosis,spondylolisthesis, back wound scar    Electronically Signed by Doretha Conn MD on 10/25/2021 at 4:46 PM

## 2021-10-25 NOTE — PERIOP NOTES
TRANSFER - OUT REPORT:    Verbal report given to DRE Galaviz(name) on Rossi Craig  being transferred to 729(unit) for routine post - op       Report consisted of patients Situation, Background, Assessment and   Recommendations(SBAR). Information from the following report(s) SBAR, OR Summary, Procedure Summary and MAR was reviewed with the receiving nurse. Lines:   Peripheral IV 10/25/21 Left Hand (Active)   Site Assessment Clean, dry, & intact 10/25/21 1916   Phlebitis Assessment 0 10/25/21 1916   Infiltration Assessment 0 10/25/21 1916   Dressing Status Clean, dry, & intact 10/25/21 1916   Dressing Type Tape;Transparent 10/25/21 1916   Hub Color/Line Status Patent 10/25/21 1916       Peripheral IV 10/25/21 Hand (Active)   Site Assessment Clean, dry, & intact 10/25/21 1916   Phlebitis Assessment 0 10/25/21 1916   Infiltration Assessment 0 10/25/21 1916   Dressing Status Clean, dry, & intact; Clean 10/25/21 1916   Dressing Type Tape;Transparent 10/25/21 1916   Hub Color/Line Status Patent 10/25/21 1916        Opportunity for questions and clarification was provided.       Patient transported with:   O2 @ 2 liters

## 2021-10-25 NOTE — ANESTHESIA PROCEDURE NOTES
Arterial Line Placement    Start time: 10/25/2021 8:08 AM  End time: 10/25/2021 8:11 AM  Performed by: Karl Mays MD  Authorized by: Karl Mays MD     Pre-Procedure  Indications:  Arterial pressure monitoring  Preanesthetic Checklist: patient identified, risks and benefits discussed, anesthesia consent, patient being monitored, timeout performed and patient being monitored    Timeout Time: 08:08 EDT        Procedure:   Prep:  ChloraPrep  Seldinger Technique?: Yes    Orientation:  Left  Location:  Radial artery  Catheter size:  20 G  Number of attempts:  1  Cont Cardiac Output Sensor: No      Assessment:   Post-procedure:  Line secured and sterile dressing applied  Patient Tolerance:  Patient tolerated the procedure well with no immediate complications

## 2021-10-25 NOTE — ANESTHESIA POSTPROCEDURE EVALUATION
Procedure(s):  L2-L4 SPINE LUMBAR DIRECT LATERAL INTERBODY FUSION (DLIF) ANTERIOR PLATING / SSEP  REDO L2-L5 LAMINECTOMY FUSION AND L4-L5 TLIF / NEUROMONITORING. general    Anesthesia Post Evaluation        Patient location during evaluation: PACU  Patient participation: complete - patient participated  Level of consciousness: awake  Pain management: satisfactory to patient  Airway patency: patent  Anesthetic complications: no  Cardiovascular status: hemodynamically stable  Respiratory status: spontaneous ventilation  Hydration status: euvolemic  Post anesthesia nausea and vomiting:  none      INITIAL Post-op Vital signs:   Vitals Value Taken Time   /59 10/25/21 1943   Temp 36.7 °C (98 °F) 10/25/21 1858   Pulse 70 10/25/21 1945   Resp 16 10/25/21 1918   SpO2 97 % 10/25/21 1945   Vitals shown include unvalidated device data.

## 2021-10-26 PROBLEM — I95.9 HYPOTENSION: Status: ACTIVE | Noted: 2021-10-26

## 2021-10-26 PROBLEM — R57.1 HYPOVOLEMIC SHOCK (HCC): Status: ACTIVE | Noted: 2021-10-26

## 2021-10-26 LAB
ANION GAP SERPL CALC-SCNC: 3 MMOL/L (ref 7–16)
ATRIAL RATE: 67 BPM
BUN SERPL-MCNC: 17 MG/DL (ref 8–23)
CALCIUM SERPL-MCNC: 8.2 MG/DL (ref 8.3–10.4)
CALCULATED P AXIS, ECG09: 55 DEGREES
CALCULATED R AXIS, ECG10: 63 DEGREES
CALCULATED T AXIS, ECG11: 39 DEGREES
CHLORIDE SERPL-SCNC: 101 MMOL/L (ref 98–107)
CO2 SERPL-SCNC: 30 MMOL/L (ref 21–32)
CREAT SERPL-MCNC: 1.26 MG/DL (ref 0.6–1)
DIAGNOSIS, 93000: NORMAL
ERYTHROCYTE [DISTWIDTH] IN BLOOD BY AUTOMATED COUNT: 15.9 % (ref 11.9–14.6)
GLUCOSE SERPL-MCNC: 138 MG/DL (ref 65–100)
HCT VFR BLD AUTO: 22.8 % (ref 35.8–46.3)
HCT VFR BLD AUTO: 25.4 % (ref 35.8–46.3)
HGB BLD-MCNC: 7.3 G/DL (ref 11.7–15.4)
HGB BLD-MCNC: 8.3 G/DL (ref 11.7–15.4)
MCH RBC QN AUTO: 30.7 PG (ref 26.1–32.9)
MCHC RBC AUTO-ENTMCNC: 32.7 G/DL (ref 31.4–35)
MCV RBC AUTO: 94.1 FL (ref 79.6–97.8)
NRBC # BLD: 0 K/UL (ref 0–0.2)
P-R INTERVAL, ECG05: 144 MS
PLATELET # BLD AUTO: 246 K/UL (ref 150–450)
PMV BLD AUTO: 10.3 FL (ref 9.4–12.3)
POTASSIUM SERPL-SCNC: 3.9 MMOL/L (ref 3.5–5.1)
Q-T INTERVAL, ECG07: 410 MS
QRS DURATION, ECG06: 80 MS
QTC CALCULATION (BEZET), ECG08: 433 MS
RBC # BLD AUTO: 2.7 M/UL (ref 4.05–5.2)
SODIUM SERPL-SCNC: 134 MMOL/L (ref 136–145)
VENTRICULAR RATE, ECG03: 67 BPM
WBC # BLD AUTO: 25.1 K/UL (ref 4.3–11.1)

## 2021-10-26 PROCEDURE — 74011000250 HC RX REV CODE- 250: Performed by: FAMILY MEDICINE

## 2021-10-26 PROCEDURE — 85027 COMPLETE CBC AUTOMATED: CPT

## 2021-10-26 PROCEDURE — 65610000006 HC RM INTENSIVE CARE

## 2021-10-26 PROCEDURE — 74011250636 HC RX REV CODE- 250/636: Performed by: ORTHOPAEDIC SURGERY

## 2021-10-26 PROCEDURE — 74011250637 HC RX REV CODE- 250/637: Performed by: STUDENT IN AN ORGANIZED HEALTH CARE EDUCATION/TRAINING PROGRAM

## 2021-10-26 PROCEDURE — 74011000250 HC RX REV CODE- 250: Performed by: STUDENT IN AN ORGANIZED HEALTH CARE EDUCATION/TRAINING PROGRAM

## 2021-10-26 PROCEDURE — 74011000250 HC RX REV CODE- 250: Performed by: ORTHOPAEDIC SURGERY

## 2021-10-26 PROCEDURE — 36415 COLL VENOUS BLD VENIPUNCTURE: CPT

## 2021-10-26 PROCEDURE — 97530 THERAPEUTIC ACTIVITIES: CPT

## 2021-10-26 PROCEDURE — 86580 TB INTRADERMAL TEST: CPT | Performed by: ORTHOPAEDIC SURGERY

## 2021-10-26 PROCEDURE — 74011250637 HC RX REV CODE- 250/637: Performed by: ORTHOPAEDIC SURGERY

## 2021-10-26 PROCEDURE — 97161 PT EVAL LOW COMPLEX 20 MIN: CPT

## 2021-10-26 PROCEDURE — 74011000258 HC RX REV CODE- 258: Performed by: STUDENT IN AN ORGANIZED HEALTH CARE EDUCATION/TRAINING PROGRAM

## 2021-10-26 PROCEDURE — 85018 HEMOGLOBIN: CPT

## 2021-10-26 PROCEDURE — 97164 PT RE-EVAL EST PLAN CARE: CPT

## 2021-10-26 PROCEDURE — 80048 BASIC METABOLIC PNL TOTAL CA: CPT

## 2021-10-26 PROCEDURE — 74011250636 HC RX REV CODE- 250/636: Performed by: STUDENT IN AN ORGANIZED HEALTH CARE EDUCATION/TRAINING PROGRAM

## 2021-10-26 RX ORDER — NOREPINEPHRINE BITARTRATE/D5W 4MG/250ML
.5-3 PLASTIC BAG, INJECTION (ML) INTRAVENOUS
Status: DISCONTINUED | OUTPATIENT
Start: 2021-10-26 | End: 2021-11-03

## 2021-10-26 RX ORDER — MIDODRINE HYDROCHLORIDE 5 MG/1
20 TABLET ORAL ONCE
Status: COMPLETED | OUTPATIENT
Start: 2021-10-26 | End: 2021-10-26

## 2021-10-26 RX ORDER — NOREPINEPHRINE BITARTRATE/D5W 4MG/250ML
.5-16 PLASTIC BAG, INJECTION (ML) INTRAVENOUS
Status: DISCONTINUED | OUTPATIENT
Start: 2021-10-26 | End: 2021-10-26

## 2021-10-26 RX ORDER — MIDODRINE HYDROCHLORIDE 5 MG/1
10 TABLET ORAL
Status: DISCONTINUED | OUTPATIENT
Start: 2021-10-26 | End: 2021-10-30

## 2021-10-26 RX ORDER — SODIUM CHLORIDE 9 MG/ML
125 INJECTION, SOLUTION INTRAVENOUS CONTINUOUS
Status: DISCONTINUED | OUTPATIENT
Start: 2021-10-26 | End: 2021-10-30

## 2021-10-26 RX ADMIN — ASPIRIN 81 MG: 81 TABLET ORAL at 22:16

## 2021-10-26 RX ADMIN — Medication 10 ML: at 14:59

## 2021-10-26 RX ADMIN — DOXYCYCLINE HYCLATE 100 MG: 100 CAPSULE ORAL at 08:27

## 2021-10-26 RX ADMIN — Medication 1 AMPULE: at 20:36

## 2021-10-26 RX ADMIN — MIDODRINE HYDROCHLORIDE 20 MG: 5 TABLET ORAL at 13:24

## 2021-10-26 RX ADMIN — SODIUM CHLORIDE 1000 ML: 900 INJECTION, SOLUTION INTRAVENOUS at 13:20

## 2021-10-26 RX ADMIN — PROPAFENONE HYDROCHLORIDE 150 MG: 150 TABLET, FILM COATED ORAL at 17:24

## 2021-10-26 RX ADMIN — GABAPENTIN 600 MG: 300 CAPSULE ORAL at 22:16

## 2021-10-26 RX ADMIN — NOREPINEPHRINE BITARTRATE 1.5 MCG/MIN: 1 INJECTION, SOLUTION INTRAVENOUS at 15:21

## 2021-10-26 RX ADMIN — SODIUM CHLORIDE 75 ML/HR: 900 INJECTION, SOLUTION INTRAVENOUS at 11:52

## 2021-10-26 RX ADMIN — PROPAFENONE HYDROCHLORIDE 150 MG: 150 TABLET, FILM COATED ORAL at 08:27

## 2021-10-26 RX ADMIN — SODIUM CHLORIDE 500 ML: 900 INJECTION, SOLUTION INTRAVENOUS at 07:35

## 2021-10-26 RX ADMIN — SODIUM CHLORIDE 500 ML: 900 INJECTION, SOLUTION INTRAVENOUS at 12:00

## 2021-10-26 RX ADMIN — LEVOTHYROXINE SODIUM 50 MCG: 0.05 TABLET ORAL at 05:34

## 2021-10-26 RX ADMIN — Medication 10 ML: at 23:23

## 2021-10-26 RX ADMIN — NOREPINEPHRINE BITARTRATE 0.5 MCG/MIN: 1 INJECTION, SOLUTION INTRAVENOUS at 15:13

## 2021-10-26 RX ADMIN — ROPINIROLE HYDROCHLORIDE 0.5 MG: 0.5 TABLET, FILM COATED ORAL at 20:35

## 2021-10-26 RX ADMIN — DOCUSATE SODIUM 100 MG: 100 CAPSULE, LIQUID FILLED ORAL at 08:27

## 2021-10-26 RX ADMIN — HYDROMORPHONE HYDROCHLORIDE 1 MG: 1 INJECTION, SOLUTION INTRAMUSCULAR; INTRAVENOUS; SUBCUTANEOUS at 05:34

## 2021-10-26 RX ADMIN — HYDROCHLOROTHIAZIDE 12.5 MG: 12.5 CAPSULE ORAL at 08:27

## 2021-10-26 RX ADMIN — Medication 10 ML: at 05:26

## 2021-10-26 RX ADMIN — ALBUTEROL SULFATE 2.5 MG: 2.5 SOLUTION RESPIRATORY (INHALATION) at 04:10

## 2021-10-26 RX ADMIN — HYDROCODONE BITARTRATE AND ACETAMINOPHEN 1 TABLET: 10; 325 TABLET ORAL at 02:25

## 2021-10-26 RX ADMIN — HYDROMORPHONE HYDROCHLORIDE 1 MG: 1 INJECTION, SOLUTION INTRAMUSCULAR; INTRAVENOUS; SUBCUTANEOUS at 20:36

## 2021-10-26 RX ADMIN — ESCITALOPRAM OXALATE 20 MG: 10 TABLET ORAL at 08:27

## 2021-10-26 RX ADMIN — TRAMADOL HYDROCHLORIDE 50 MG: 50 TABLET ORAL at 15:53

## 2021-10-26 RX ADMIN — LISINOPRIL 20 MG: 20 TABLET ORAL at 08:27

## 2021-10-26 RX ADMIN — CEFAZOLIN SODIUM 2 G: 100 INJECTION, POWDER, LYOPHILIZED, FOR SOLUTION INTRAVENOUS at 02:10

## 2021-10-26 RX ADMIN — METOPROLOL TARTRATE 25 MG: 25 TABLET, FILM COATED ORAL at 08:27

## 2021-10-26 RX ADMIN — POTASSIUM CHLORIDE 10 MEQ: 10 TABLET, EXTENDED RELEASE ORAL at 08:27

## 2021-10-26 RX ADMIN — Medication 1 AMPULE: at 08:27

## 2021-10-26 RX ADMIN — SODIUM CHLORIDE 125 ML/HR: 900 INJECTION, SOLUTION INTRAVENOUS at 23:23

## 2021-10-26 RX ADMIN — TUBERCULIN PURIFIED PROTEIN DERIVATIVE 5 UNITS: 5 INJECTION, SOLUTION INTRADERMAL at 02:10

## 2021-10-26 RX ADMIN — MIDODRINE HYDROCHLORIDE 10 MG: 5 TABLET ORAL at 17:24

## 2021-10-26 RX ADMIN — FUROSEMIDE 40 MG: 40 TABLET ORAL at 08:27

## 2021-10-26 RX ADMIN — PANTOPRAZOLE SODIUM 40 MG: 40 TABLET, DELAYED RELEASE ORAL at 05:34

## 2021-10-26 RX ADMIN — DOCUSATE SODIUM 100 MG: 100 CAPSULE, LIQUID FILLED ORAL at 17:24

## 2021-10-26 NOTE — ADDENDUM NOTE
Addendum  created 10/25/21 2016 by Chris Maddox, HANNAH    Flowsheet accepted, Intraprocedure Event edited, Intraprocedure Flowsheets edited, Intraprocedure Staff edited

## 2021-10-26 NOTE — PROGRESS NOTES
Pt received 1 tab of Norco 10-325mg PO for complaint of lower back pain 8/10. Will continue to monitor.

## 2021-10-26 NOTE — PROGRESS NOTES
Pt is resting in bed at this time. Pt is on 2L NC. Pt has no s/sx of acute distress at this time. Pt dressings are clean, dry, and intact. Pt hemovacs are charged and draining. Pt has safety measures in place. Pt has call light within reach and is encouraged to call for assistance if needed.  Report given to [facility]

## 2021-10-26 NOTE — PROGRESS NOTES
ORTHO PROGRESS NOTE    2021    Admit Date: 10/25/2021  Admit Diagnosis: Lumbar stenosis with neurogenic claudication [M48.062]; Spondylolisthesis of lumbar region [M43.16]; Spinal stenosis [M48.00]  Post Op day: 1 Day Post-Op      Subjective:     Joshua Richardson is a patient who is now 1 Day Post-Op  and has no complaints. Objective:     PT/OT:        Vital Signs:    Patient Vitals for the past 8 hrs:   BP Temp Pulse Resp SpO2   10/26/21 1039 (!) /43 97.9 °F (36.6 °C) 79 15 92 %   10/26/21 1035 (!) 71/43 -- -- -- --   10/26/21 0850 (!) 81/60 97.8 °F (36.6 °C) 82 16 93 %   10/26/21 0756 (!) 70/54 -- -- -- --   10/26/21 0534 106/76 -- 71 -- --     Temp (24hrs), Av.9 °F (36.6 °C), Min:97.2 °F (36.2 °C), Max:98.3 °F (36.8 °C)      LAB:    Recent Labs     10/26/21  0751   HGB 8.3*   WBC 25.1*          I/O:  No intake/output data recorded. 10/24 190 - 10/26 0700  In: 5431 [P.O.:60; I.V.:3150]  Out: 9615 [Urine:375; Drains:355]    Physical Exam:    Awake and in no acute distress. Mood and affect appropriate. Respirations unlabored and no evidence cyanosis. Calves nontender. Abdomen soft and nontender. Dressing clean/dry  No new neurologic deficit.     Assessment:      Patient Active Problem List   Diagnosis Code    Essential hypertension, benign I10    Endometriosis N80.9    Colon polyp K63.5    Squamous cell skin cancer C44.92    Lumbar stenosis with neurogenic claudication M48.062    Postoperative wound infection T81.49XA    Acquired hypothyroidism E03.9    Obesity, morbid ([address] E66.01    Spondylolisthesis of multiple sites in spine M43.19    Spinal stenosis M48.00       1 Day Post-Op STATUS POST Procedure(s):  L2-L4 SPINE LUMBAR DIRECT LATERAL INTERBODY FUSION (DLIF) ANTERIOR PLATING / SSEP  REDO L2-L5 LAMINECTOMY FUSION AND L4-L5 TLIF / NEUROMONITORING      Plan:     Continue PT/OT/Rehab  Discontinue: drain  as per protocol  Consult: none  Anticipate discharge to: HOME Using cocaine   possibly thursday        Signed By: Cortney Kaiser MD

## 2021-10-26 NOTE — PROGRESS NOTES
Dr. Bhakta notified of hypotension of 70/54. New orders obtained for 500ml bolus and to verify CBC drawn this am by lab. RN called to verify lab draw and was told that someone would be up to draw, as it had not been collected.

## 2021-10-26 NOTE — PROGRESS NOTES
PT note: PM treatment attempted however pt currently very hypotensive in supine (62/43). RN notified. Will re-attempt as schedule allows.      TOSHIA HurstT

## 2021-10-26 NOTE — PROGRESS NOTES
Bedside and verbal shift change report received from  Crista Graf RN (offgoing nurse). Report included the following information SBAR, Kardex, ED Summary, Intake/Output, MAR, Recent Results, Cardiac Rhythm NSR and Alarm Parameters . Dual skin assessment completed at bedside: incision to mid back w/ drains, dressing w/ old drainage. Incision to L side, dressing c/d/i.      Dual verification of gtts completed (name of gtts verified): levo

## 2021-10-26 NOTE — PROGRESS NOTES
ACUTE PHYSICAL THERAPY GOALS:  (Developed with and agreed upon by patient and/or caregiver. )  LTG:  (1.)Ms. Thacker Rater will move from supine to sit and sit to supine, scoot up and down and roll side to side in bed INDEPENDENTLY with bed flat within 7 treatment day(s). (2.)Ms. Thacker Rater will transfer from bed to chair and chair to bed with SUPERVISION using the least restrictive device within 7 treatment day(s). (3.)Ms. Thacker Rater will ambulate with STAND BY ASSIST for 150+ feet with the least restrictive device within 7 treatment day(s).   ________________________________________________________________________________________________      PHYSICAL THERAPY ASSESSMENT: Initial Assessment and AM PT Treatment Day # 1      Pricilla Gallego is a 73 y.o. female   PRIMARY DIAGNOSIS: Lumbar stenosis with neurogenic claudication  Lumbar stenosis with neurogenic claudication [M48.062]  Spondylolisthesis of lumbar region [M43.16]  Spinal stenosis [M48.00]  Procedure(s) (LRB):  L2-L4 SPINE LUMBAR DIRECT LATERAL INTERBODY FUSION (DLIF) ANTERIOR PLATING / SSEP (Left)  REDO L2-L5 LAMINECTOMY FUSION AND L4-L5 TLIF / NEUROMONITORING (N/A)  1 Day Post-Op  Reason for Referral:  Mobility following above surgery  ICD-10: Treatment Diagnosis: Generalized Muscle Weakness (M62.81)  Difficulty in walking, Not elsewhere classified (R26.2)  Other abnormalities of gait and mobility (R26.89)  INPATIENT: [place] / [place] / Product Type: Medicare /     ASSESSMENT:     REHAB RECOMMENDATIONS:   Recommendation to date pending progress:  [address] [place] [address]  Equipment:    To Be Determined     PRIOR LEVEL OF FUNCTION:  (Prior to Hospitalization) INITIAL/CURRENT LEVEL OF FUNCTION:  (Most Recently Demonstrated)   Bed Mobility:   Independent  Sit to Stand:   Independent  Transfers:   Independent  Gait/Mobility:   Modified Independent Bed Mobility:   Minimal Assistance-moderate assistance  Sit to Stand:   Minimal Assistance x 2  Transfers:   Minimal Assistance  Gait/Mobility:   Minimal Assistance     ASSESSMENT:  Ms. Tg Villatoro is seen for initial therapy assessment following above surgery. At baseline she lives alone and is independent to modified independent using cane in community, occasionally at night. Presents with expected post op soreness and symptomatic hypotension today. She needs cueing for log roll technique, seated posture and mobility, and for spinal precautions (able to verbalize 1/3). Worked on seated activities, sit-stand transfers, and a few side steps to head of bed. Unable to progress mobility due to hypotension, therefore returned to supine after session with additional time and cueing. BP still a little low. Hopefully can progress mobility and activity when improved. Dc needs TBD pending progress. 78/57 sitting  81/52 supine     SUBJECTIVE:   Ms. Tg Villatoro states, \"I'm feeling pretty dizzy. \"    SOCIAL HISTORY/LIVING ENVIRONMENT: Lives alone. Independent in home (cane sometimes at night), mod I in community with cane. Drives. One recent fall when she was helping her mother walk. Independent with adls.    Home Environment: Private residence  One/Two Story Residence: Two story  Living Alone: Yes  Support Systems: Child(rober)  OBJECTIVE:     PAIN: VITAL SIGNS: LINES/DRAINS:   Pre Treatment: Pain Screen  Pain Scale 1: Numeric (0 - 10)  Pain Intensity 1: 6  Pain Location 1: Back  Pain Intervention(s) 1: Repositioned  Post Treatment: 3/10 Vital Signs  O2 Device: Nasal cannula  O2 Flow Rate (L/min): 2 l/min Hemovac, IV and Purewick  O2 Device: Nasal cannula     GROSS EVALUATION:   Within Functional Limits Abnormal/ Functional Abnormal/ Non-Functional (see comments) Not Tested Comments:   AROM [x] [] [] []    PROM [] [] [] []    Strength [] [x] [] []    Balance [] [x] [] []    Posture [] [x] [] []    Sensation [] [] [] []    Coordination [] [] [] []    Tone [] [] [] []    Edema [] [] [] [] Activity Tolerance [] [x] [] []     [] [] [] []      COGNITION/  PERCEPTION: Intact Impaired   (see comments) Comments:   Orientation [x] []    Vision [x] []    Hearing [x] []    Command Following [x] []    Safety Awareness [x] []     [] []      MOBILITY: I Mod I S SBA CGA Min Mod Max Total  NT x2 Comments:   Bed Mobility    Rolling [] [] [] [] [] [x] [] [] [] [] []    Supine to Sit [] [] [] [] [] [x] [x] [] [] [] []    Scooting [] [] [] [] [] [x] [] [] [] [] []    Sit to Supine [] [] [] [] [] [x] [] [] [] [] []    Transfers    Sit to Stand [] [] [] [] [] [x] [] [] [] [] [x]    Bed to Chair [] [] [] [] [] [] [] [] [] [] []    Stand to Sit [] [] [] [] [] [x] [] [] [] [] [x]    I=Independent, Mod I=Modified Independent, S=Supervision, SBA=Standby Assistance, CGA=Contact Guard Assistance,   Min=Minimal Assistance, Mod=Moderate Assistance, Max=Maximal Assistance, Total=Total Assistance, NT=Not Tested  GAIT: I Mod I S SBA CGA Min Mod Max Total  NT x2 Comments:   Level of Assistance [] [] [] [] [] [x] [] [] [] [] [x]    Distance 3'    DME handheld assist of 2    Gait Quality Slow, shuffled    Weightbearing Status N/A     I=Independent, Mod I=Modified Independent, S=Supervision, SBA=Standby Assistance, CGA=Contact Guard Assistance,   Min=Minimal Assistance, Mod=Moderate Assistance, Max=Maximal Assistance, Total=Total Assistance, NT=Not Tested    [address] [facility] [address] [address] [facility] [address] [facility]       How much difficulty does the patient currently have. .. Unable A Lot A Little None   1. Turning over in bed (including adjusting bedclothes, sheets and blankets)? [] 1   [] 2   [x] 3   [] 4   2. Sitting down on and standing up from a chair with arms ( e.g., wheelchair, bedside commode, etc.)   [] 1   [] 2   [x] 3   [] 4   3. Moving from lying on back to sitting on the side of the bed? [] 1   [] 2   [x] 3   [] 4   How much help from another person does the patient currently need. ..  Total A Lot A Little None   4. Moving to and from a bed to a chair (including a wheelchair)? [] 1   [] 2   [x] 3   [] 4   5. Need to walk in hospital room? [] 1   [x] 2   [] 3   [] 4   6. Climbing 3-5 steps with a railing? [x] 1   [] 2   [] 3   [] 4   © 2007, Trustees of [address] [place] [address], under license to [employer]. All rights reserved     Score:  Initial: 15 Most Recent: X (Date: -- )    Interpretation of Tool:  Represents activities that are increasingly more difficult (i.e. Bed mobility, Transfers, Gait). PLAN:   FREQUENCY/DURATION: PT Plan of Care: BID for duration of hospital stay or until stated goals are met, whichever comes first.    PROBLEM LIST:   (Skilled intervention is medically necessary to address:)  1. Decreased Activity Tolerance  2. Decreased Balance  3. Decreased Gait Ability  4. Decreased Strength  5. Decreased Transfer Abilities  6. Increased Pain   INTERVENTIONS PLANNED:   (Benefits and precautions of physical therapy have been discussed with the patient.)  1. Therapeutic Activity  2. Therapeutic Exercise/HEP  3. Neuromuscular Re-education  4. Gait Training  5. Manual Therapy  6. Education     TREATMENT:     EVALUATION: Low Complexity : (Untimed Charge)    TREATMENT:   ($$ Therapeutic Activity: 23-37 mins    )  Therapeutic Activity (23 Minutes): Therapeutic activity included Rolling, Supine to Sit, Sit to Supine, Scooting, Transfer Training, Ambulation on level ground, Sitting balance  and Standing balance to improve functional Mobility, Strength, Activity tolerance and balance, posture.     TREATMENT GRID:  N/A    AFTER TREATMENT POSITION/PRECAUTIONS:  Bed, Needs within reach, RN notified and Visitors at bedside    INTERDISCIPLINARY COLLABORATION:  RN/PCT and PT/PTA    TOTAL TREATMENT DURATION:  PT Patient Time In/Time Out  Time In: 1000  Time Out: [address], DPT

## 2021-10-26 NOTE — PROGRESS NOTES
Attempted to call Dr. Jose Daniel Darling and Dr. Edita Hemphill to notify of transfer order to ICU for levo per hospitalist. No answer received, will re-attempt to notify.

## 2021-10-26 NOTE — CONSULTS
Francisco J Hospitalist Consult   Admit Date:  10/25/2021  5:34 AM   Name:  Cassandra Chapman   Age:  73 y.o. Sex:  female  :  1955   MRN:  [id number]   Room:  [facility]/    Presenting Complaint: No chief complaint on file. Reason(s) for Admission: Lumbar stenosis with neurogenic claudication [M48.062]  Spondylolisthesis of lumbar region [M43.16]  Spinal stenosis [M48.00]     Hospitalists consulted by Jacey Paige MD for: hypotension    History of Presenting Illness:   Cassandra Chapman is a 73 y.o. female with history of Afib s/p ablation, Hypothyroidism, OA, lumbar spinal stenosis, HTN, morbid obesity, JULIANN who was admitted for lumbar laminectomy. Patient is postop day 1 from L2 L4 lumbar direct lateral interbody fusion with anterior plating and redo of L2 L5 laminectomy by spinal Ortho. Patient has multiple drains in place postoperatively. This morning, patient received dose of IV Dilaudid as well as multiple blood pressure medications. She subsequently suffered from hypotension with BP 70/54. She also was working with PT/OT and became significantly hypotensive when standing up. She did complain of orthostasis at this time. She was given normal saline IV fluid boluses but remains hypotensive with MAPS as low as 49 during my evaluation. Of note, she also received Midodrine 20mg once without no improvement in MAPs. Patient complains of significant drowsiness, and is hardly able to keep her eyes open with current low BP's. She denies any shortness of breath, chest pain or pressure, palpitations, nausea, vomiting, fevers or chills, diaphoresis, near-syncope or syncope. Patient is currently suffering from ÁNGEL with bump in creatinine and WBC this AM.       Review of Systems:  10 systems reviewed and negative except as noted in HPI.   Assessment & Plan:   Principal Problem:  #Hypotension  - likely due to medications including pain meds and multiple antihypertensives this AM  - s/p 2L NS boluses and Midodrine 20mg without any improvement in Bps  - STAT HH ordered  - WBC 25k, but currently on Abx per ortho and surgical site looks ok so do not suspect sepsis as source  - check EKG   - start aggressive IVF resuscitation and  scheduled midodrine   - starting Levophed due to persistent hypotension  - hold all BP meds    #ÁNGEL  - Cr bump to 1.26 this AM from normal baseline  - likely hypovolemic  - s/p Lasix and HCTZ this AM with persistent hypotension, so suspect RF may worsen tomorrow  - start on aggressive IVF rescusitation    #Lumbar stenosis with neurogenic claudication   - s/p laminectomy and drain placements per ortho spine  - management per primary ortho spine  - avoid opioids in setting of hypotension at this time    There is a high probability of acute organ impairment or life-threatening deterioration in the patient's condition from: persistent hypotension    Critical care interventions: Vasopressor support with Levo    Total critical care time spent: 38 minutes. Time is indicative of direct patient attendance at bedside and on the patient's floor nearby. Includes time spent at bedside performing history and exam, performing chart review, discussing findings and treatment plan with patient and/or family, discussing patient with consultants and colleagues, ordering and reviewing pertinent laboratory and radiographic evaluations, and discussing patient with nursing staff. Time excludes procedures.           Hospital Problems as of 10/26/2021 Date Reviewed: 9/22/2021        Codes Class Noted - Resolved POA    Hypovolemic shock ([facility] [address] ICD-10-CM: R57.1  ICD-9-CM: 785.59  10/26/2021 - Present Unknown        Hypotension ICD-10-CM: I95.9  ICD-9-CM: 458.9  10/26/2021 - Present Unknown        Spondylolisthesis of multiple sites in spine ICD-10-CM: M43.19  ICD-9-CM: 738.4  10/25/2021 - Present Yes        Spinal stenosis ICD-10-CM: M48.00  ICD-9-CM: 724.00  10/25/2021 - Present Unknown        * (Principal) Lumbar stenosis with neurogenic claudication ICD-10-CM: M48.062  ICD-9-CM: 724.03  3/28/2016 - Present Yes              Past Medical History:   Diagnosis Date    A-fib [facility])     Acquired hypothyroidism 9/13/2017    Anxiety and depression     Arthritis     osteo    Chronic pain     back and R leg    GERD (gastroesophageal reflux disease)     well controlled with med    H/O bone density study 10/2011    normal    Hypertension     controlled by med    Low back pain     Morbid obesity ([address]     bmi 48.63    Pneumonia     1/2020    Rectal bleeding onset x 2 months    Restless legs     Rosacea     Skin cancer     squamous- removed    Smoker     current 10/18/21 5-10 daily \"trying to quit\"-- previously 1 ppd x since age 22    Spinal stenosis     Staph infection 03/2016    from back surgery----- NOT mrsa    Suspected sleep apnea      test not completed 11/2018      Past Surgical History:   Procedure Laterality Date    COLONOSCOPY N/A 10/8/2018    COLONOSCOPY / BMI=50  performed by Pérez Fung MD at [address] [facility] HX AFIB ABLATION  2017 & 2018    HX APPENDECTOMY  age 13    HX BACK SURGERY  2016    spine I&D post op infection    HX BREAST BIOPSY Right 2011    benign    HX CARPAL TUNNEL RELEASE Left     HX CHOLECYSTECTOMY  2014    HX COLONOSCOPY  2014         HX CYST REMOVAL      foot    HX KNEE ARTHROSCOPY Right     HX KNEE ARTHROSCOPY Left     HX LUMBAR LAMINECTOMY  2016    L2-S1    HX MALIGNANT SKIN LESION EXCISION      x 3     HX MALIGNANT SKIN LESION EXCISION      Jan 29,2019    HX MALIGNANT SKIN LESION EXCISION  06/16/2020    Nasal    HX MALIGNANT SKIN LESION EXCISION  06/30/2021    face    HX ORTHOPAEDIC      heel spur    HX ORTHOPAEDIC Left 2000's    achilles tendon    HX PELVIC LAPAROSCOPY      x 2    HX PREMALIG/BENIGN SKIN LESION EXCISION      HX GUSTAVO AND BSO  2004    HX TONSIL AND ADENOIDECTOMY  age 12   [de-identified] TUBAL LIGATION      NC ELBOW ARTHROSCOP,DIAGNOSTIC Left 2013    along with carpal tunnel      Allergies   Allergen Reactions    Blue Dye Anaphylaxis     BLUE INDIGO DYE     Pcn [Penicillins] Hives    Adhesive Other (comments)     Skin irritation     Bee Venom Protein (Honey Bee) Swelling    Cefdinir Other (comments)     Tremor, can tolerate Ceftin       Social History     Tobacco Use    Smoking status: Current Every Day Smoker     Packs/day: 0.75     Years: 25.00     Pack years: 18.75     Types: Cigarettes    Smokeless tobacco: Never Used    Tobacco comment: 05-1 ppd since age 22 (quit briefly   Substance Use Topics    Alcohol use: No      Family History   Problem Relation Age of Onset    Cancer Mother         colon ca    Arthritis-rheumatoid Mother     Heart Disease Father     Diabetes Father     Hypertension Father       Family history reviewed and negative unless otherwise noted above.   Immunization History   Administered Date(s) Administered    COVID-19, PFIZER, MRNA, LNP-S, PF, 30MCG/0.3ML DOSE 03/17/2021    Covid-19, MODERNA, Mrna, Lnp-s, Pf, 100mcg/0.5mL 03/17/2021, 04/14/2021    Influenza Vaccine 10/26/2015, 10/24/2016, 10/02/2018, 10/30/2019, 08/28/2020    TB Skin Test (PPD) Intradermal 10/26/2021    Zoster Recombinant 10/30/2018       Objective:     Patient Vitals for the past 24 hrs:   Temp Pulse Resp BP SpO2   10/26/21 1400 -- -- -- (!) 83/45 --   10/26/21 1353 -- -- -- (!) 84/53 --   10/26/21 1337 -- -- -- (!) 75/42 --   10/26/21 1336 -- 68 -- (!) 76/37 99 %   10/26/21 1332 -- 69 -- (!) 69/43 97 %   10/26/21 1330 -- -- -- (!) 71/44 --   10/26/21 1326 -- 70 -- (!) 72/46 95 %   10/26/21 1039 97.9 °F (36.6 °C) 79 15 (!) 71/43 92 %   10/26/21 1035 -- -- -- (!) 71/43 --   10/26/21 0850 97.8 °F (36.6 °C) 82 16 (!) 81/60 93 %   10/26/21 0756 -- -- -- (!) 70/54 --   10/26/21 0534 -- 71 -- 106/76 --   10/26/21 0429 98.3 °F (36.8 °C) 70 16 (!) 98/50 96 %   10/25/21 2308 97.6 °F (36.4 °C) 70 18 (!) 100/56 97 %   10/25/21 2200 -- -- -- (!) 95/56 --   10/25/21 2158 -- -- -- 113/71 --   10/25/21 2049 97.9 °F (36.6 °C) 70 18 (!) 97/59 97 %   10/25/21 2015 98.1 °F (36.7 °C) 70 18 115/60 98 %   10/25/21 2000 -- 70 20 104/64 96 %   10/25/21 1945 -- 70 18 (!) 107/59 97 %   10/25/21 1930 -- 70 20 (!) 109/59 95 %   10/25/21 1928 -- 70 -- 108/63 95 %   10/25/21 1923 -- 70 -- 106/62 94 %   10/25/21 1918 -- 71 16 108/60 95 %   10/25/21 1913 -- 72 18 (!) 106/57 96 %   10/25/21 1908 -- 72 16 (!) 104/56 95 %   10/25/21 1903 -- 72 18 108/60 95 %   10/25/21 1858 98 °F (36.7 °C) 73 16 (!) 105/57 95 %   10/25/21 1853 -- 73 18 (!) 104/59 96 %   10/25/21 1848 -- 72 16 111/62 96 %   10/25/21 1844 -- 72 18 100/61 93 %   10/25/21 1839 -- 73 16 (!) 99/58 95 %   10/25/21 1834 -- 72 18 (!) 105/57 96 %   10/25/21 1828 -- 72 16 103/60 95 %   10/25/21 1823 -- 73 14 102/62 96 %   10/25/21 1818 -- 72 16 (!) 102/59 95 %   10/25/21 1813 -- 73 18 (!) 100/58 95 %   10/25/21 1808 -- 73 18 (!) 96/57 95 %   10/25/21 1805 -- 77 16 (!) 87/58 99 %   10/25/21 1758 -- 75 16 (!) 85/53 100 %   10/25/21 1753 -- 74 16 (!) 85/53 99 %   10/25/21 1751 97.2 °F (36.2 °C) 78 10 100/61 99 %   10/25/21 1748 -- 74 -- (!) 90/51 100 %     Oxygen Therapy  O2 Sat (%): 99 % (10/26/21 1336)  Pulse via Oximetry: 71 beats per minute (10/25/21 2015)  O2 Device: Nasal cannula (10/26/21 1336)  O2 Flow Rate (L/min): 2 l/min (10/26/21 1336)    Estimated body mass index is 48.58 kg/m² as calculated from the following:    Height as of this encounter: 5' 4\" (1.626 m). Weight as of this encounter: 128.4 kg (283 lb). Intake/Output Summary (Last 24 hours) at 10/26/2021 1401  Last data filed at 10/26/2021 0426  Gross per 24 hour   Intake 1210 ml   Output 1030 ml   Net 180 ml         Physical Exam:    Blood pressure (!) 83/45, pulse 68, temperature 97.9 °F (36.6 °C), resp. rate 15, height 5' 4\" (1.626 m), weight 128.4 kg (283 lb), SpO2 99 %. General:    Well nourished. No overt distress.  Drowsy  Head:  Normocephalic, atraumatic  Eyes:  Sclerae appear normal.  Pupils equally round. ENT:  Nares appear normal, no drainage. Moist oral mucosa  Neck:  No restricted ROM. Trachea midline   CV:   RRR. No m/r/g. No jugular venous distension. Lungs:   CTAB. No wheezing, rhonchi, or rales. Respirations even, unlabored  Abdomen: Bowel sounds present. Soft, nontender, nondistended. Extremities: No cyanosis or clubbing. No edema  Skin:     No rashes and normal coloration. Warm and dry. Back: Surgical dressing with slight drainage present, drains x3 in place with bloody output  Neuro:  CN II-XII grossly intact. Sensation intact. A&Ox3  Psych:  Normal mood and affect.       I have reviewed ordered lab tests and independently visualized imaging below:    Recent Labs:  Recent Results (from the past 48 hour(s))   TYPE & SCREEN    Collection Time: 10/25/21  6:37 AM   Result Value Ref Range    Crossmatch Expiration 10/28/2021,2359     ABO/Rh(D) A NEGATIVE     Antibody screen NEG    CBC W/O DIFF    Collection Time: 10/25/21  1:25 PM   Result Value Ref Range    WBC 15.5 (H) 4.3 - 11.1 K/uL    RBC 3.79 (L) 4.05 - 5.2 M/uL    HGB 11.2 (L) 11.7 - 15.4 g/dL    HCT 34.0 (L) 35.8 - 46.3 %    MCV 89.7 79.6 - 97.8 FL    MCH 29.6 26.1 - 32.9 PG    MCHC 32.9 31.4 - 35.0 g/dL    RDW 15.8 (H) 11.9 - 14.6 %    PLATELET 886 827 - 536 K/uL    MPV 10.0 9.4 - 12.3 FL    ABSOLUTE NRBC 0.00 0.0 - 0.2 K/uL   BLOOD GAS & LYTES, ARTERIAL    Collection Time: 10/25/21  1:36 PM   Result Value Ref Range    pH (POC) 7.43 7.35 - 7.45      pCO2 (POC) 43.7 35 - 45 MMHG    pO2 (POC) 197 (H) 75 - 100 MMHG    Sodium,  136 - 145 MMOL/L    Potassium, POC 3.3 (L) 3.5 - 5.1 MMOL/L    Calcium, ionized (POC) 1.12 1.12 - 1.32 mmol/L    Glucose,  (H) 65 - 100 MG/DL    Base excess (POC) 4.2 mmol/L    HCO3 (POC) 29.0 (H) 22 - 26 MMOL/L    CO2, POC 29 (H) 13 - 23 MMOL/L    O2  %    Sample source ARTERIAL      Performed by Benjamín Parker, POC Cannot be calculated >60 ml/min/1.73m2    GFRNA, POC Cannot be calculated >60 ml/min/1.73m2   BLOOD GAS & LYTES, ARTERIAL    Collection Time: 10/25/21  4:37 PM   Result Value Ref Range    pH (POC) 7.41 7.35 - 7.45      pCO2 (POC) 43.6 35 - 45 MMHG    pO2 (POC) 278 (H) 75 - 100 MMHG    Sodium,  (L) 136 - 145 MMOL/L    Potassium, POC 3.4 (L) 3.5 - 5.1 MMOL/L    Calcium, ionized (POC) 1.11 (L) 1.12 - 1.32 mmol/L    Glucose,  (H) 65 - 100 MG/DL    Base excess (POC) 2.5 mmol/L    HCO3 (POC) 27.5 (H) 22 - 26 MMOL/L    CO2, POC 28 (H) 13 - 23 MMOL/L    O2  %    Sample source ARTERIAL      Performed by Олег Rouse, POC Cannot be calculated >60 ml/min/1.73m2    GFRNA, POC Cannot be calculated >60 ml/min/1.73m2   HGB & HCT    Collection Time: 10/25/21  6:39 PM   Result Value Ref Range    HGB 8.9 (L) 11.7 - 15.4 g/dL    HCT 27.0 (L) 35.8 - 39.2 %   METABOLIC PANEL, BASIC    Collection Time: 10/26/21  7:51 AM   Result Value Ref Range    Sodium 134 (L) 136 - 145 mmol/L    Potassium 3.9 3.5 - 5.1 mmol/L    Chloride 101 98 - 107 mmol/L    CO2 30 21 - 32 mmol/L    Anion gap 3 (L) 7 - 16 mmol/L    Glucose 138 (H) 65 - 100 mg/dL    BUN 17 8 - 23 MG/DL    Creatinine 1.26 (H) 0.6 - 1.0 MG/DL    GFR est AA 55 (L) >60 ml/min/1.73m2    GFR est non-AA 45 (L) >60 ml/min/1.73m2    Calcium 8.2 (L) 8.3 - 10.4 MG/DL   CBC W/O DIFF    Collection Time: 10/26/21  7:51 AM   Result Value Ref Range    WBC 25.1 (H) 4.3 - 11.1 K/uL    RBC 2.70 (L) 4.05 - 5.2 M/uL    HGB 8.3 (L) 11.7 - 15.4 g/dL    HCT 25.4 (L) 35.8 - 46.3 %    MCV 94.1 79.6 - 97.8 FL    MCH 30.7 26.1 - 32.9 PG    MCHC 32.7 31.4 - 35.0 g/dL    RDW 15.9 (H) 11.9 - 14.6 %    PLATELET 911 329 - 503 K/uL    MPV 10.3 9.4 - 12.3 FL    ABSOLUTE NRBC 0.00 0.0 - 0.2 K/uL       All Micro Results     Procedure Component Value Units Date/Time    MSSA/MRSA SC BY PCR, NASAL SWAB [[id number]]     Order Status: Sent Specimen: Swab           Other Studies:  XR SPINE LUMB 2 OR 3 V    Result Date: 10/25/2021  Intraoperative fluoroscopic spot images of the lumbar spine INDICATION: Lumbar spine fusion COMPARISON: Radiographs 8/3/2021 FINDINGS: Postoperative changes at L2-L5. No gross abnormality evident radiographically. Fluoroscopy time: 5 minutes, 58 seconds Fluoroscopic spot images: 3     Postoperative changes at L2-L5 without gross abnormality evident radiographically. NC XR TECHNOLOGIST SERVICE    Result Date: 10/25/2021  Administrative Report Fluoroscopy was provided in the operating room. Images may have been saved as a reference for the surgical procedure. The operative report can be viewed in [facility] and/or retrieved by the Medical Records department.      A Radiologist has not reviewed images associated with this exam.      Current Meds:  Current Facility-Administered Medications   Medication Dose Route Frequency    0.9% sodium chloride infusion  75 mL/hr IntraVENous CONTINUOUS    sodium chloride 0.9 % bolus infusion 1,000 mL  1,000 mL IntraVENous ONCE    midodrine (PROAMATINE) tablet 10 mg  10 mg Oral TID WITH MEALS    NOREPINephrine (LEVOPHED) 4 mg in 5% dextrose 250 mL infusion  0.5-16 mcg/min IntraVENous TITRATE    aspirin delayed-release tablet 81 mg  81 mg Oral QHS    doxycycline (VIBRAMYCIN) capsule 100 mg  100 mg Oral DAILY    escitalopram oxalate (LEXAPRO) tablet 20 mg  20 mg Oral QAM    gabapentin (NEURONTIN) capsule 600 mg  600 mg Oral QHS    levothyroxine (SYNTHROID) tablet 50 mcg  50 mcg Oral ACB    LORazepam (ATIVAN) tablet 0.5 mg  0.5 mg Oral Q6H PRN    [Held by provider] metoprolol tartrate (LOPRESSOR) tablet 25 mg  25 mg Oral BID    pantoprazole (PROTONIX) tablet 40 mg  40 mg Oral ACB    potassium chloride (KLOR-CON) tablet 10 mEq  10 mEq Oral DAILY    propafenone (RYTHMOL) tablet 150 mg  150 mg Oral BID    rOPINIRole (REQUIP) tablet 0.5 mg  0.5 mg Oral QHS    traMADoL (ULTRAM) tablet 50 mg  50 mg Oral Q6H PRN    zolpidem (AMBIEN) tablet 10 mg 10 mg Oral QHS PRN    alcohol 62% (NOZIN) nasal  1 Ampule  1 Ampule Topical Q12H    sodium chloride (NS) flush 5-40 mL  5-40 mL IntraVENous Q8H    sodium chloride (NS) flush 5-40 mL  5-40 mL IntraVENous PRN    naloxone (NARCAN) injection 0.4 mg  0.4 mg IntraVENous PRN    phenol throat spray (CHLORASEPTIC) 1 Spray  1 Spray Oral PRN    diphenhydrAMINE (BENADRYL) capsule 25 mg  25 mg Oral Q4H PRN    docusate sodium (COLACE) capsule 100 mg  100 mg Oral BID    HYDROcodone-acetaminophen (NORCO)  mg tablet 1 Tablet  1 Tablet Oral Q4H PRN    HYDROmorphone (DILAUDID) injection 1 mg  1 mg IntraVENous Q4H PRN    ondansetron (ZOFRAN ODT) tablet 4 mg  4 mg Oral Q4H PRN    tuberculin injection 5 Units  5 Units IntraDERMal ONCE    [Held by provider] lisinopriL (PRINIVIL, ZESTRIL) tablet 20 mg  20 mg Oral BID    And    [Held by provider] hydroCHLOROthiazide (MICROZIDE) capsule 12.5 mg  12.5 mg Oral BID       Signed:  Zachary Baron MD    Part of this note may have been written by using a voice dictation software. The note has been proof read but may still contain some grammatical/other typographical errors.

## 2021-10-26 NOTE — PROGRESS NOTES
TRANSFER - OUT REPORT:    Verbal report given to DRE Elias(name) on London Skaggs  being transferred to ICU(unit) for change in patient condition(Hypotensive)       Report consisted of patients Situation, Background, Assessment and   Recommendations(SBAR). Information from the following report(s) SBAR, Kardex, [facility] and Recent Results was reviewed with the receiving nurse. Lines:   Peripheral IV 10/25/21 Left Hand (Active)   Site Assessment Clean, dry, & intact 10/26/21 0800   Phlebitis Assessment 0 10/26/21 0800   Infiltration Assessment 0 10/26/21 0800   Dressing Status Clean, dry, & intact 10/26/21 0800   Dressing Type Tape;Transparent 10/26/21 0800   Hub Color/Line Status Infusing 10/26/21 5806   Alcohol Cap Used No 10/25/21 2015        Opportunity for questions and clarification was provided.       Patient transported with:   Registered Nurse

## 2021-10-26 NOTE — PROGRESS NOTES
TRANSFER - IN REPORT:    Verbal report received from [facility] (name) on Erendira Ashraf  being received from 7th floor (unit) for change in patient condition(severe hypotension)      Report consisted of patients Situation, Background, Assessment and   Recommendations(SBAR). Information from the following report(s) SBAR, Kardex, Intake/Output, MAR, Recent Results, Cardiac Rhythm NSR, Alarm Parameters  and Quality Measures was reviewed with the receiving nurse. Opportunity for questions and clarification was provided. Assessment completed upon patients arrival to unit and care assumed.

## 2021-10-26 NOTE — PROGRESS NOTES
TRANSFER - IN REPORT:    Verbal report received from [facility] on Jackye Darrell  being received from PACU for routine progression of care      Report consisted of patients Situation, Background, Assessment and   Recommendations(SBAR). Information from the following report(s) SBAR, Kardex, OR Summary, Intake/Output, MAR and Recent Results was reviewed with the receiving nurse. Opportunity for questions and clarification was provided. Assessment completed upon patients arrival to unit and care assumed.

## 2021-10-26 NOTE — PROGRESS NOTES
Pt remains hypotensive after first 500ml bolus. MD notified and order obtained for another 500ml bolus and to consult hospitalist. See eMAR.

## 2021-10-26 NOTE — PROGRESS NOTES
Pt has arrived to room 729 vis stretcher. Pt is drowsy, but awakens to voice and is alert and oriented times 4. Pt is on 2L NC. Pt has no s/sx of acute distress at this time. Pt has 3 hemovacs in her back. Pt has gauze and tegaderm dressing to midline back, Left lower back, and right lower back. Pt has laboy in place and is draining. Pt has LR running to gravity at this time. Pt has MARIS and SCDs on at this time. Pt complains of pain 7/10 at this time. Pt has been oriented to room and surroundings. Pt has call light within reach and is encouraged to call for assistance if needed. Will continue to monitor.

## 2021-10-27 ENCOUNTER — APPOINTMENT (OUTPATIENT)
Dept: ULTRASOUND IMAGING | Age: 66
DRG: 453 | End: 2021-10-27
Attending: STUDENT IN AN ORGANIZED HEALTH CARE EDUCATION/TRAINING PROGRAM
Payer: MEDICARE

## 2021-10-27 ENCOUNTER — APPOINTMENT (OUTPATIENT)
Dept: ULTRASOUND IMAGING | Age: 66
DRG: 453 | End: 2021-10-27
Attending: HOSPITALIST
Payer: MEDICARE

## 2021-10-27 LAB
ANION GAP SERPL CALC-SCNC: 2 MMOL/L (ref 7–16)
APPEARANCE UR: CLEAR
BASOPHILS # BLD: 0.1 K/UL (ref 0–0.2)
BASOPHILS NFR BLD: 0 % (ref 0–2)
BILIRUB UR QL: NEGATIVE
BUN SERPL-MCNC: 25 MG/DL (ref 8–23)
CALCIUM SERPL-MCNC: 7.8 MG/DL (ref 8.3–10.4)
CHLORIDE SERPL-SCNC: 104 MMOL/L (ref 98–107)
CO2 SERPL-SCNC: 29 MMOL/L (ref 21–32)
COLOR UR: YELLOW
CREAT SERPL-MCNC: 1.9 MG/DL (ref 0.6–1)
DIFFERENTIAL METHOD BLD: ABNORMAL
EOSINOPHIL # BLD: 0 K/UL (ref 0–0.8)
EOSINOPHIL NFR BLD: 0 % (ref 0.5–7.8)
ERYTHROCYTE [DISTWIDTH] IN BLOOD BY AUTOMATED COUNT: 16 % (ref 11.9–14.6)
GLUCOSE SERPL-MCNC: 123 MG/DL (ref 65–100)
GLUCOSE UR STRIP.AUTO-MCNC: NEGATIVE MG/DL
HCT VFR BLD AUTO: 24.1 % (ref 35.8–46.3)
HGB BLD-MCNC: 7.5 G/DL (ref 11.7–15.4)
HGB UR QL STRIP: NEGATIVE
HISTORY CHECKED?,CKHIST: NORMAL
IMM GRANULOCYTES # BLD AUTO: 0.2 K/UL (ref 0–0.5)
IMM GRANULOCYTES NFR BLD AUTO: 1 % (ref 0–5)
KETONES UR QL STRIP.AUTO: NEGATIVE MG/DL
LEUKOCYTE ESTERASE UR QL STRIP.AUTO: NEGATIVE
LYMPHOCYTES # BLD: 3.3 K/UL (ref 0.5–4.6)
LYMPHOCYTES NFR BLD: 12 % (ref 13–44)
MCH RBC QN AUTO: 29.1 PG (ref 26.1–32.9)
MCHC RBC AUTO-ENTMCNC: 31.1 G/DL (ref 31.4–35)
MCV RBC AUTO: 93.4 FL (ref 79.6–97.8)
MONOCYTES # BLD: 2.2 K/UL (ref 0.1–1.3)
MONOCYTES NFR BLD: 8 % (ref 4–12)
NEUTS SEG # BLD: 21.2 K/UL (ref 1.7–8.2)
NEUTS SEG NFR BLD: 78 % (ref 43–78)
NITRITE UR QL STRIP.AUTO: NEGATIVE
NRBC # BLD: 0 K/UL (ref 0–0.2)
PH UR STRIP: 5.5 [PH] (ref 5–9)
PLATELET # BLD AUTO: 262 K/UL (ref 150–450)
PMV BLD AUTO: 10.5 FL (ref 9.4–12.3)
POTASSIUM SERPL-SCNC: 4.4 MMOL/L (ref 3.5–5.1)
PROT UR STRIP-MCNC: NEGATIVE MG/DL
RBC # BLD AUTO: 2.58 M/UL (ref 4.05–5.2)
SODIUM SERPL-SCNC: 135 MMOL/L (ref 136–145)
SP GR UR REFRACTOMETRY: 1.01 (ref 1–1.02)
TROPONIN-HIGH SENSITIVITY: 21.3 PG/ML (ref 0–14)
UROBILINOGEN UR QL STRIP.AUTO: 0.2 EU/DL (ref 0.2–1)
WBC # BLD AUTO: 27 K/UL (ref 4.3–11.1)

## 2021-10-27 PROCEDURE — 85025 COMPLETE CBC W/AUTO DIFF WBC: CPT

## 2021-10-27 PROCEDURE — 51798 US URINE CAPACITY MEASURE: CPT

## 2021-10-27 PROCEDURE — 36430 TRANSFUSION BLD/BLD COMPNT: CPT

## 2021-10-27 PROCEDURE — 30233N1 TRANSFUSION OF NONAUTOLOGOUS RED BLOOD CELLS INTO PERIPHERAL VEIN, PERCUTANEOUS APPROACH: ICD-10-PCS | Performed by: ORTHOPAEDIC SURGERY

## 2021-10-27 PROCEDURE — 94640 AIRWAY INHALATION TREATMENT: CPT

## 2021-10-27 PROCEDURE — 74011250636 HC RX REV CODE- 250/636: Performed by: STUDENT IN AN ORGANIZED HEALTH CARE EDUCATION/TRAINING PROGRAM

## 2021-10-27 PROCEDURE — 84484 ASSAY OF TROPONIN QUANT: CPT

## 2021-10-27 PROCEDURE — 74011250637 HC RX REV CODE- 250/637: Performed by: STUDENT IN AN ORGANIZED HEALTH CARE EDUCATION/TRAINING PROGRAM

## 2021-10-27 PROCEDURE — P9016 RBC LEUKOCYTES REDUCED: HCPCS

## 2021-10-27 PROCEDURE — 76775 US EXAM ABDO BACK WALL LIM: CPT

## 2021-10-27 PROCEDURE — 80048 BASIC METABOLIC PNL TOTAL CA: CPT

## 2021-10-27 PROCEDURE — 65610000006 HC RM INTENSIVE CARE

## 2021-10-27 PROCEDURE — 74011000258 HC RX REV CODE- 258: Performed by: STUDENT IN AN ORGANIZED HEALTH CARE EDUCATION/TRAINING PROGRAM

## 2021-10-27 PROCEDURE — 93970 EXTREMITY STUDY: CPT

## 2021-10-27 PROCEDURE — 74011000250 HC RX REV CODE- 250: Performed by: STUDENT IN AN ORGANIZED HEALTH CARE EDUCATION/TRAINING PROGRAM

## 2021-10-27 PROCEDURE — 36415 COLL VENOUS BLD VENIPUNCTURE: CPT

## 2021-10-27 PROCEDURE — 74011250636 HC RX REV CODE- 250/636: Performed by: INTERNAL MEDICINE

## 2021-10-27 PROCEDURE — 74011250636 HC RX REV CODE- 250/636: Performed by: FAMILY MEDICINE

## 2021-10-27 PROCEDURE — 74011250637 HC RX REV CODE- 250/637: Performed by: ORTHOPAEDIC SURGERY

## 2021-10-27 PROCEDURE — 94664 DEMO&/EVAL PT USE INHALER: CPT

## 2021-10-27 PROCEDURE — 81003 URINALYSIS AUTO W/O SCOPE: CPT

## 2021-10-27 PROCEDURE — 94761 N-INVAS EAR/PLS OXIMETRY MLT: CPT

## 2021-10-27 PROCEDURE — 77010033678 HC OXYGEN DAILY

## 2021-10-27 RX ORDER — VANCOMYCIN HYDROCHLORIDE 1 G/20ML
INJECTION, POWDER, LYOPHILIZED, FOR SOLUTION INTRAVENOUS ONCE
Status: DISCONTINUED | OUTPATIENT
Start: 2021-10-27 | End: 2021-10-27

## 2021-10-27 RX ORDER — ALBUTEROL SULFATE 0.83 MG/ML
2.5 SOLUTION RESPIRATORY (INHALATION)
Status: DISCONTINUED | OUTPATIENT
Start: 2021-10-27 | End: 2021-11-03 | Stop reason: HOSPADM

## 2021-10-27 RX ORDER — SODIUM CHLORIDE 9 MG/ML
250 INJECTION, SOLUTION INTRAVENOUS AS NEEDED
Status: DISCONTINUED | OUTPATIENT
Start: 2021-10-27 | End: 2021-11-01

## 2021-10-27 RX ADMIN — PANTOPRAZOLE SODIUM 40 MG: 40 TABLET, DELAYED RELEASE ORAL at 05:26

## 2021-10-27 RX ADMIN — SODIUM CHLORIDE 125 ML/HR: 900 INJECTION, SOLUTION INTRAVENOUS at 20:37

## 2021-10-27 RX ADMIN — Medication 10 ML: at 13:31

## 2021-10-27 RX ADMIN — MIDODRINE HYDROCHLORIDE 10 MG: 5 TABLET ORAL at 17:08

## 2021-10-27 RX ADMIN — SODIUM CHLORIDE 125 ML/HR: 900 INJECTION, SOLUTION INTRAVENOUS at 09:31

## 2021-10-27 RX ADMIN — NOREPINEPHRINE BITARTRATE 6 MCG/MIN: 1 INJECTION, SOLUTION INTRAVENOUS at 02:57

## 2021-10-27 RX ADMIN — POTASSIUM CHLORIDE 10 MEQ: 10 TABLET, EXTENDED RELEASE ORAL at 08:17

## 2021-10-27 RX ADMIN — SODIUM CHLORIDE 500 ML: 900 INJECTION, SOLUTION INTRAVENOUS at 20:01

## 2021-10-27 RX ADMIN — DOCUSATE SODIUM 100 MG: 100 CAPSULE, LIQUID FILLED ORAL at 17:08

## 2021-10-27 RX ADMIN — GABAPENTIN 600 MG: 300 CAPSULE ORAL at 21:19

## 2021-10-27 RX ADMIN — NOREPINEPHRINE BITARTRATE 8 MCG/MIN: 1 INJECTION, SOLUTION INTRAVENOUS at 22:01

## 2021-10-27 RX ADMIN — Medication 1 AMPULE: at 20:42

## 2021-10-27 RX ADMIN — SODIUM CHLORIDE 1000 ML: 900 INJECTION, SOLUTION INTRAVENOUS at 01:35

## 2021-10-27 RX ADMIN — PROPAFENONE HYDROCHLORIDE 150 MG: 150 TABLET, FILM COATED ORAL at 17:08

## 2021-10-27 RX ADMIN — VANCOMYCIN HYDROCHLORIDE 2500 MG: 10 INJECTION, POWDER, LYOPHILIZED, FOR SOLUTION INTRAVENOUS at 18:24

## 2021-10-27 RX ADMIN — NOREPINEPHRINE BITARTRATE 8 MCG/MIN: 1 INJECTION, SOLUTION INTRAVENOUS at 14:10

## 2021-10-27 RX ADMIN — PROPAFENONE HYDROCHLORIDE 150 MG: 150 TABLET, FILM COATED ORAL at 08:16

## 2021-10-27 RX ADMIN — Medication 10 ML: at 05:18

## 2021-10-27 RX ADMIN — DOXYCYCLINE HYCLATE 100 MG: 100 CAPSULE ORAL at 08:16

## 2021-10-27 RX ADMIN — HYDROCODONE BITARTRATE AND ACETAMINOPHEN 1 TABLET: 10; 325 TABLET ORAL at 12:50

## 2021-10-27 RX ADMIN — ASPIRIN 81 MG: 81 TABLET ORAL at 21:20

## 2021-10-27 RX ADMIN — DOCUSATE SODIUM 100 MG: 100 CAPSULE, LIQUID FILLED ORAL at 08:16

## 2021-10-27 RX ADMIN — ESCITALOPRAM OXALATE 20 MG: 10 TABLET ORAL at 08:16

## 2021-10-27 RX ADMIN — MIDODRINE HYDROCHLORIDE 10 MG: 5 TABLET ORAL at 08:16

## 2021-10-27 RX ADMIN — Medication 10 ML: at 21:20

## 2021-10-27 RX ADMIN — CEFEPIME HYDROCHLORIDE 2 G: 2 INJECTION, POWDER, FOR SOLUTION INTRAVENOUS at 17:46

## 2021-10-27 RX ADMIN — Medication 1 AMPULE: at 08:16

## 2021-10-27 RX ADMIN — MIDODRINE HYDROCHLORIDE 10 MG: 5 TABLET ORAL at 11:58

## 2021-10-27 RX ADMIN — LEVOTHYROXINE SODIUM 50 MCG: 0.05 TABLET ORAL at 05:26

## 2021-10-27 NOTE — PROGRESS NOTES
PT NOTE:    Pt was moved to ICU. Per protocol will discontinue from physical therapy at this time. Please reconsult when pt is medically appropriate. Thank you.     Kinza Wells, PTA

## 2021-10-27 NOTE — PROGRESS NOTES
Pt c/o legs \"feeling tight\". BLE edema present w/ R>L. . Dr. Sukhdeep King notified, orders received for bilateral venous ultrasound.

## 2021-10-27 NOTE — OP NOTES
[address] [place] [address]  OPERATIVE REPORT    Name:  Godfrey Gutiérrez  MR#:  [id number]  :  1955  ACCOUNT #:  [de-identified]  DATE OF SERVICE:  10/25/2021    PREOPERATIVE DIAGNOSES:  Recurrent spinal stenosis at L2-3, L3-4, and L4-5 with spondylolisthesis at L3-4 and L4-5 and history of previous L2 through L5 laminectomy. POSTOPERATIVE DIAGNOSES:  Recurrent spinal stenosis at L2-3, L3-4, and L4-5 with spondylolisthesis at L3-4 and L4-5 and history of previous L2 through L5 laminectomy. PROCEDURE PERFORMED:  Anterior procedure:  1. L2-3 and L3-4 anterior lumbar interbody fusion through a prone DLIF approach, CPT code 71022 and 05397. 2.  Placement of biomechanical interbody device at L2-3 and L3-4 from [address], CPT code 84241 x2.  3.  Placement of anterior spinal instrumentation from L2 through L4 using the Alphatec anterior spinal plating system, CPT code 09839. Posterior procedure:  1. Redo bilateral L2, L3, and L4 laminectomy and diskectomy, CPT code 54642-44 and 06110.  2. L4-5 combined posterolateral and posterior interbody fusion, CPT 78674. 3.  Placement of biomechanical interbody device at L4-5 using the [employer] expandable cage, CPT code 55802.  4.  L2-3 and L3-4 posterolateral fusion, CPT code 71345 x2.  5.  Placement of segmental posterior spinal instrumentation from L2 through L5, CPT code 88904. 6.  Revision closure of old wound scar deformity. SURGEON:  Barbara Medina MD    ASSISTANT:  Staff. ANESTHESIA:  GETA. COMPLICATIONS:  None. SPECIMENS REMOVED:  None. IMPLANTS:  Alphatec and [employer]. ESTIMATED BLOOD LOSS:  2000 mL. FLUIDS:  2800 mL of crystalloid. DRAINS:  Three Hemovacs.     INDICATIONS:  The patient is a 23-year-old female who has had previous L2 through L5 laminectomy by Dr. Ankit Alves in the past and initially done well, but developed recurrent stenosis and spondylolisthesis and had ongoing pain and was found by MRI scan to have recurrent stenosis from L2 through L5 with development of spondylolisthesis at L3-4 and L4-5. She felt to get better with conservative measures including physical therapy and medications, activity restriction, and epidural steroid injections. So, she is here for surgical treatment. PROCEDURE:  The patient was brought to the operating room. After administration of anesthesia, IV antibiotics and placement of monitoring lines, she was positioned prone on the [facility] frame with the Alphatec prone lateral DLIF frame attached. She was secured to the frame and was placed into a left-sided convex positioning for the procedure. Her back and flank areas were prepped with Betadine and sterilely draped. The first procedure was an anterior prone lateral DLIF fusion and C-arm was brought in and we identified the L2-3 and L3-4 disk marked front and back, and we then made an incision in between these two areas with the scalpel and dissected down to electrocautery through fascial layer. Then, we digitally probed and eventually used the Bills scissors to go through further facial layers. The patient was morbidly obese and could not put my finger in deep enough to touch her psoas muscle. We did sweep away any soft tissue in the retroperitoneal space from the psoas. Using the c-arm, we were able to take a guide pin and insert it first in the L3-4 disk space about the midline and inserted it in and then placed the distractors over this sequentially and we tested with nerve stimulation to make sure there was no stimulation below 7 to 10 mA, and once that was done, we placed the retractor over this and we ended up with the C-arm on both AP and lateral views to be aimed at the center of the disk space and we secured it with the attachment frame to the bed, and once that was done, we opened the retractor and we tested inside to make sure we were not near any nerves and there was no stimulation.   Then, we stripped the overlying muscle off the disk space with Kittners and bipolar. We then incised the disk annulus with a scalpel and removed it with pituitaries. We then used a  to remove further disk and elevated the disk off the endplates with a Rios elevator, removed disk with pituitaries and we placed the trials into the disk space and we did this in both L2-3 and L3-4. We determined at the L2-3 disk, which was much taller than the L3-4 disk that a #8-mm 10-degree lordosis cage would fit the best and then we had the cages packed with the ProteiOS bone graft substitute and we inserted it into the L2-3 disk space that was an 8 x 10 x 50 with 10 degrees lordosis, and at L3-4, we used the 6 x 10 x 50 with 10 degrees lordosis. Occasionally we inserted those into the disk space and then we took the appropriate plates, secured them to the graft and we used punch drill to make holes in the vertebral body at L2, L3, L4, and then secured the plate to the bone with 35-mm length cancellous screws to secure the plate. We checked after this for any bleeding and there was no significant bleeding. We did cauterize with bipolar some muscle bleeding areas. We then removed our retractor and took final AP and lateral C-arm x-rays and it showed the DLIF cages to be well positioned in the anterior aspect of the disk space. There was good lordosis. Recreation of lordosis was noted and the anterior plate was in position from L2 to L4 securing the vertebral bodies. At this point, we closed the lateral flank incision with interrupted figure-of-eight stitches of #1 Vicryl. Subcutaneous tissues were reapproximated with interrupted stitches of 2-0 Vicryl and the skin was closed with running subcuticular stitch of 3-0 Vicryl and Dermabond Prineo was applied to the incision. At this point, the patient was already in the prone position.   So, we uncranked the frame on the bed to put her back into the neutral position and brought C-arm in and we identified the L2 area and then L5 area and marked them on the skin. We also determined midline using the C-arm and marked this. The patient had previous wound scar defect from previous surgery with an infection and we elected to address that, but initially, we just made the midline incision, inspected down to very thick fatty tissue layer as well as scar layer with electrocautery  and there was no bleeding, which was cauterized using the electrocautery as well as the Aquamantys. We got down to the deep fascia. We incised on either side of the spinous processes of above and below and then dissected out laterally and we found bony margins and cleaned those off. We also dissected down the lateral gutter bilaterally excising the L2, L3, L4, and L5 transverse processes bilaterally and stripped down the membrane in between. Once again, there was a lot of muscle bleeding, which we cauterized with Aquamantys and electrocautery. We also packed this off. We removed the dense scar tissue off the dorsal spine and we took C-arm picture with markers on the L2 and L4 pedicles and an angled curette in the L4-5 interlaminar space and this confirmed our location at L3 of those spots. The L4-5 level had extensive hypertrophic bone and severe stenosis and we used a yumiko to yumiko the bone over the dorsal spine and burred down the medial aspect of the very thickened facet joints bilaterally and we carefully had to strip scar tissue off the thecal sac and we used small Kerrison to perform bilateral redo-laminectomy at L4-5 and then we got into the ligamentum of the above and we completed the diskectomy, laminectomy up to L2, once again, worse stenosis was L4-5 followed by L3-4. There seemed to be some degree of decompression and directly from the DLIF cage placement at L2-3 and L3-4. We retracted the neural structures and we incised the disk annulus at all three levels and removed some protruding disk with pituitaries.   At L4-5, we elected to place DLIF interbody device. We did it from the left side. We continued to yumiko out on the facet joint. We identified the nerves and retracted those, cauterized the tissue over the disk space, exposed the disk, incised the annulus with the scalpel, and removed disk with pituitaries. Then, we used the distractors and khai to further repair the endplate and remove disk with pituitaries and the angle curettes as well. Once this was done, we had used the distractor up to 9 mm in height and with the disc being somewhat fish-mouthed tolerant in the anterior aspect and more compressed posteriorly, we elected to use the [employer] expandable cage to address this lordosis. We used the cage that we had from 8 mm to 15 mm and we packed it with the previous bone graft substitute. We also injected 3 mL of ProteiOS into the disk space and tamped anteriorly. We then inserted into the disk space with the neural structures were cracked at the expandable cage and final seating was done under lateral C-arm guidance and we expanded the cage with appropriate height and we removed the insertion device. We checked the wound. Everything appeared normal.  There was no undue compression of the nerves or the thecal sac. After we were sure that all the levels were well decompressed and checked the nerve roots bilaterally at each level, we irrigated the wound all the time, suctioned it dry. We then were ready to place our pedicle screw instrumentation, which was [employer] and we made starting holes with a yumiko and then inserted the Steffee probe and tested it with nerve stimulation. There was no stimulation below 20 mA. We kept with a 5.5 mm tap and re-probed each level with nerve stimulation. No stimulation was seen below 20 mA, and then at each level, we inserted 6.5 x 50 mm length pedicle screws.   Prior to inserting the screws, we decorticated the transverse processes and the lateral bony margins with high-speed yumiko bilaterally. We then took AP and lateral C-arm x-rays to make sure the screws were appropriately placed and they were. Then, we took rods and placed them into the screw heads and placed the locking knots and securely tightened those and compressed posteriorly to improve the lordosis, took repeat AP and lateral C-arm x-ray, which showed good placement of the instrumentation as well as the interbody cages at all three levels and good alignment of the spine. We  then placed our ProteiOS bone graft substitute in the lateral gutters bilaterally from L2 down to L5 and we placed demineralized bone matrix putty on top of this, pressed it into place and then we were ready to close the wound. We suctioned out the canal and placed FloSeal over this for hemostasis. We ended up placing two deep drains and then we closed the deep fascia over that with interrupted figure-of-eight stitches of #1 Vicryl. Because of the depth of the fatty subcutaneous tissue, we elected to place another drain superficial to the deep fascia and I placed some vancomycin powder as well. Prior to closing this part, we had to excise the large depressed scar that was along the left side of the incision. We ellipsed whole skin out on the left side so that we could close the wound and we excised this with the scalpel and electrocautery. Then, we manually reapproximated the wound edges while we placed interrupted 0 Vicryl sutures deep and then 2-0 Vicryl sutures more superficially to obtain good realignment of the skin after the excision of the sunken scar. The skin was closed with a running subcuticular stitch of 3-0 Vicryl and a ZipLine was applied to the incision for more secure fixation of the edges. We placed some Telfa over that followed by dry sterile dressings. We placed a dressing over the flank incision as well. At that point, the patient was rolled supine on to the recovery room bed having tolerated the procedure well.       ROGERIO DRAKE MD BLAYNE SETH [id number]  D:  10/26/2021 13:24  T:  10/27/2021 1:34  JOB #:  [id number]

## 2021-10-27 NOTE — PROGRESS NOTES
ORTHO PROGRESS NOTE    2021    Admit Date: 10/25/2021  Admit Diagnosis: Lumbar stenosis with neurogenic claudication [M48.062]; Spondylolisthesis of lumbar region [M43.16]; Spinal stenosis [M48.00]  Post Op day: 2 Days Post-Op      Subjective:     Taylor Schmitt is a patient who is now 2 Days Post-Op  and has no complaints.        Objective:     PT/OT:        Vital Signs:    Patient Vitals for the past 8 hrs:   BP Temp Pulse Resp SpO2   10/27/21 1506 (!) 123/52 98.5 °F (36.9 °C) 77 16 99 %   10/27/21 1437 (!) 104/50 -- 79 10 99 %   10/27/21 1436 (!) 104/50 98 °F (36.7 °C) 76 10 98 %   10/27/21 1421 (!) 89/53 97.8 °F (36.6 °C) 76 11 91 %   10/27/21 1406 (!) 100/47 98.2 °F (36.8 °C) 81 11 92 %   10/27/21 1351 (!) 113/54 -- 77 10 92 %   10/27/21 1336 (!) 76/59 -- 78 23 93 %   10/27/21 1323 (!) 108/58 98.6 °F (37 °C) 75 10 94 %   10/27/21 1320 (!) 108/58 -- 73 11 94 %   10/27/21 1305 (!) 83/64 -- 77 18 95 %   10/27/21 1253 (!) 92/52 98.3 °F (36.8 °C) 78 12 95 %   10/27/21 1251 (!) 92/52 -- 79 18 94 %   10/27/21 1238 (!) 101/42 99.1 °F (37.3 °C) 78 13 95 %   10/27/21 1236 (!) 101/42 -- 78 13 96 %   10/27/21 1223 (!) 99/44 98.4 °F (36.9 °C) 78 14 99 %   10/27/21 1221 (!) 99/44 -- 78 18 99 %   10/27/21 1155 (!) 84/45 -- 74 11 99 %   10/27/21 1151 (!) 73/48 -- 75 10 99 %   10/27/21 1136 (!) 107/57 -- 74 11 100 %   10/27/21 1106 92/68 -- 77 12 99 %   10/27/21 1051 (!) 96/53 -- 78 14 99 %   10/27/21 1021 (!) 93/58 -- 76 11 99 %   10/27/21 1006 113/60 -- 77 15 99 %   10/27/21 0956 112/71 -- 77 12 99 %   10/27/21 0936 (!) 111/46 -- 78 11 99 %   10/27/21 0921 (!) 97/44 -- 77 11 99 %   10/27/21 0905 (!) 105/48 -- 79 16 100 %   10/27/21 0850 90/68 -- 79 18 99 %   10/27/21 0820 (!) 86/59 -- 77 16 100 %   10/27/21 0810 (!) 85/54 97.7 °F (36.5 °C) 76 13 100 %     Temp (24hrs), Av.2 °F (36.8 °C), Min:97.4 °F (36.3 °C), Max:99.1 °F (37.3 °C)      LAB:    Recent Labs     10/27/21  0552   HGB 7.5*   WBC 27.0*    I/O:  10/27 0701 - 10/27 1900  In: 937.5 [P.O.:660]  Out: 390 [Urine:275; Drains:115]  10/25 1901 - 10/27 0700  In: 4049.5 [P.O.:660; I.V.:3389.5]  Out: 1025 [Urine:275; Drains:750]    Physical Exam:    Awake and in no acute distress. Mood and affect appropriate. Respirations unlabored and no evidence cyanosis. Calves nontender. Abdomen soft and nontender. Dressing dressing not removed  No new neurologic deficit. Assessment:      Patient Active Problem List   Diagnosis Code    Essential hypertension, benign I10    Endometriosis N80.9    Colon polyp K63.5    Squamous cell skin cancer C44.92    Lumbar stenosis with neurogenic claudication M48.062    Postoperative wound infection T81.49XA    Acquired hypothyroidism E03.9    Obesity, morbid ([address] E66.01    Spondylolisthesis of multiple sites in spine M43.19    Spinal stenosis M48.00    Hypovolemic shock ([address] R57.1    Hypotension I95.9       2 Days Post-Op STATUS POST Procedure(s):  L2-L4 SPINE LUMBAR DIRECT LATERAL INTERBODY FUSION (DLIF) ANTERIOR PLATING / SSEP  REDO L2-L5 LAMINECTOMY FUSION AND L4-L5 TLIF / NEUROMONITORING      Plan:     Hypotension - appreciate hospitalist assistance  Continue PT/OT/Rehab when BP regulated.   Anticipate discharge to: HOME      Signed By: Missouri Castleman, MD

## 2021-10-27 NOTE — PROGRESS NOTES
Problem: Falls - Risk of  Goal: *Absence of Falls  Description: Document Jorge Cea Fall Risk and appropriate interventions in the flowsheet. Outcome: Progressing Towards Goal  Note: Fall Risk Interventions:  Mobility Interventions: Assess mobility with egress test, Bed/chair exit alarm, Patient to call before getting OOB, PT Consult for mobility concerns, PT Consult for assist device competence, Strengthening exercises (ROM-active/passive), Utilize walker, cane, or other assistive device         Medication Interventions: Assess postural VS orthostatic hypotension, Bed/chair exit alarm, Evaluate medications/consider consulting pharmacy, Patient to call before getting OOB, Teach patient to arise slowly, Utilize gait belt for transfers/ambulation    Elimination Interventions: Bed/chair exit alarm, Call light in reach, Patient to call for help with toileting needs, Toileting schedule/hourly rounds    History of Falls Interventions: Bed/chair exit alarm, Consult care management for discharge planning, Door open when patient unattended, Evaluate medications/consider consulting pharmacy, Investigate reason for fall, Room close to nurse's station, Utilize gait belt for transfer/ambulation, Assess for delayed presentation/identification of injury for 48 hrs (comment for end date), Vital signs minimum Q4HRs X 24 hrs (comment for end date)         Problem: Patient Education: Go to Patient Education Activity  Goal: Patient/Family Education  Outcome: Progressing Towards Goal     Problem: Pressure Injury - Risk of  Goal: *Prevention of pressure injury  Description: Document Trung Scale and appropriate interventions in the flowsheet.   Outcome: Progressing Towards Goal  Note: Pressure Injury Interventions:       Moisture Interventions: Apply protective barrier, creams and emollients, Absorbent underpads, Check for incontinence Q2 hours and as needed, Internal/External urinary devices, Maintain skin hydration (lotion/cream), Minimize layers, Limit adult briefs, Moisture barrier    Activity Interventions: Assess need for specialty bed, Pressure redistribution bed/mattress(bed type)    Mobility Interventions: Assess need for specialty bed, Float heels, HOB 30 degrees or less, Pressure redistribution bed/mattress (bed type), Turn and reposition approx.  every two hours(pillow and wedges)    Nutrition Interventions: Document food/fluid/supplement intake, Discuss nutritional consult with provider, Offer support with meals,snacks and hydration    Friction and Shear Interventions: Apply protective barrier, creams and emollients, Feet elevated on foot rest, Foam dressings/transparent film/skin sealants, HOB 30 degrees or less, Minimize layers, Transfer aides:transfer board/Hardik lift/ceiling lift, Transferring/repositioning devices                Problem: Patient Education: Go to Patient Education Activity  Goal: Patient/Family Education  Outcome: Progressing Towards Goal     Problem: Patient Education: Go to Patient Education Activity  Goal: Patient/Family Education  Outcome: Progressing Towards Goal

## 2021-10-27 NOTE — PROGRESS NOTES
Pt c/o inability to void, bladder scanner showing 200ml. Dr. Tin Gomez notified, orders received for I&O cath.

## 2021-10-27 NOTE — PROGRESS NOTES
Hospitalist Progress Note   Admit Date:  10/25/2021  5:34 AM   Name:  Gus Smith   Age:  73 y.o. Sex:  female  :  1955   MRN:  [id number]   Room:  [facility]/    Presenting Complaint: No chief complaint on file. Reason(s) for Admission: Lumbar stenosis with neurogenic claudication [M48.062]  Spondylolisthesis of lumbar region [M43.16]  Spinal stenosis [facility] Course & Interval History:   Gus Smith is a 73 y.o. female with history of Afib s/p ablation, Hypothyroidism, OA, lumbar spinal stenosis, HTN, morbid obesity, JULIANN who was admitted for lumbar laminectomy. Patient is postop day 1 from L2 L4 lumbar direct lateral interbody fusion with anterior plating and redo of L2 L5 laminectomy by spinal Ortho. Patient has multiple drains in place postoperatively. patient received dose of IV Dilaudid as well as multiple blood pressure medications the morning of initial hypotension. She subsequently suffered from hypotension with BP 70/54. She also was working with PT/OT and became significantly hypotensive when standing up. She did complain of orthostasis at this time. She was given normal saline IV fluid boluses but remains hypotensive with MAPS as low as 49 during my evaluation. Of note, she also received Midodrine 20mg once without no improvement in MAPs. Patient complains of significant drowsiness, and is hardly able to keep her eyes open with current low BP's. She denies any shortness of breath, chest pain or pressure, palpitations, nausea, vomiting, fevers or chills, diaphoresis, near-syncope or syncope. Patient is currently suffering from ÁNGEL with bump in creatinine and WBC this AM.     Subjective (10/27/21): Patient seen and examined at bedside this morning. Patient remains  in ICU but has required increased amounts of Levophed this morning to maintain MAPs> 65. Patient states she feels better than yesterday with less drowsiness, dizziness, and fatigue.   She denies any current chest pain or pressure, shortness of breath, nausea, vomiting, diaphoresis, shoulder or jaw pain, palpitations, fevers, chills, anorexia, near-syncope or syncope. Assessment & Plan:     Principal Problem:  #Hypotension  - likely due to medications including pain meds and multiple antihypertensives   - s/p 2L NS boluses and Midodrine 20mg without any improvement in Bps  - STAT HH 7.3, stable 7.5 this AM  - WBC up to 27k, give dose of vanc and start cefepime for empiric coverage of possible infection  - EKG without ST elevation or depression, check Trop today   - cont aggressive IVF resuscitation and scheduled midodrine   - cont Levophed due to persistent hypotension  - cont to hold all BP meds, hold home requip today with continued hypotension      #ÁNGEL  - Cr bump to 1.26 from normal baseline  - likely hypovolemic  - s/p Lasix and HCTZ 10/26 with persistent hypotension, so suspect RF may continue to worsen despite IVF in setting of ATN  - Cr up to 1.90 this AM, cont on mIVF   - check UA and ELAINE  - consider nephro consult in the Am if Cr worsening     #Lumbar stenosis with neurogenic claudication   - s/p laminectomy and drain placements per ortho spine  - management per primary ortho spine  - avoid opioids in setting of hypotension at this time     There is a high probability of acute organ impairment or life-threatening deterioration in the patient's condition from: persistent hypotension     Critical care interventions: Vasopressor support with Levo, worsening renal function and urinary output     Total critical care time spent: 38 minutes. Time is indicative of direct patient attendance at bedside and on the patient's floor nearby.   Includes time spent at bedside performing history and exam, performing chart review, discussing findings and treatment plan with patient and/or family, discussing patient with consultants and colleagues, ordering and reviewing pertinent laboratory and radiographic evaluations, and discussing patient with nursing staff. Time excludes procedures.       Dispo/Discharge Planning:    Unclear dispo with continued hypotension     Diet:  ADULT DIET Regular  DVT PPx: SCDs  Code status: No Order    Hospital Problems as of 10/27/2021 Date Reviewed: 9/22/2021        Codes Class Noted - Resolved POA    Hypovolemic shock ([facility] [address] ICD-10-CM: R57.1  ICD-9-CM: 785.59  10/26/2021 - Present Unknown        Hypotension ICD-10-CM: I95.9  ICD-9-CM: 458.9  10/26/2021 - Present Unknown        Spondylolisthesis of multiple sites in spine ICD-10-CM: M43.19  ICD-9-CM: 738.4  10/25/2021 - Present Yes        Spinal stenosis ICD-10-CM: M48.00  ICD-9-CM: 724.00  10/25/2021 - Present Unknown        * (Principal) Lumbar stenosis with neurogenic claudication ICD-10-CM: M48.062  ICD-9-CM: 724.03  3/28/2016 - Present Yes              Objective:     Patient Vitals for the past 24 hrs:   Temp Pulse Resp BP SpO2   10/27/21 1223 98.4 °F (36.9 °C) 78 14 (!) 99/44 99 %   10/27/21 1221 -- 78 18 (!) 99/44 99 %   10/27/21 1155 -- 74 11 (!) 84/45 99 %   10/27/21 1151 -- 75 10 (!) 73/48 99 %   10/27/21 1136 -- 74 11 (!) 107/57 100 %   10/27/21 1106 -- 77 12 92/68 99 %   10/27/21 1051 -- 78 14 (!) 96/53 99 %   10/27/21 1006 -- 77 15 113/60 99 %   10/27/21 0956 -- 77 12 112/71 99 %   10/27/21 0936 -- 78 11 (!) 111/46 99 %   10/27/21 0921 -- 77 11 (!) 97/44 99 %   10/27/21 0905 -- 79 16 (!) 105/48 100 %   10/27/21 0850 -- 79 18 90/68 99 %   10/27/21 0820 -- 77 16 (!) 86/59 100 %   10/27/21 0810 97.7 °F (36.5 °C) 76 13 (!) 85/54 100 %   10/27/21 0752 -- 76 9 (!) 112/53 100 %   10/27/21 0751 -- 73 13 -- 100 %   10/27/21 0736 -- 72 13 108/61 100 %   10/27/21 0720 -- 72 18 (!) 86/45 100 %   10/27/21 0706 -- 70 8 (!) 90/57 100 %   10/27/21 0651 -- 71 -- (!) 104/47 100 %   10/27/21 0620 -- 72 -- (!) 82/55 100 %   10/27/21 0555 -- 73 14 (!) 95/56 100 %   10/27/21 0537 -- 75 11 -- 100 %   10/27/21 0536 -- 77 -- (!) 72/48 100 % 10/27/21 0521 -- 73 -- (!) 92/59 100 %   10/27/21 0520 -- 74 9 -- 100 %   10/27/21 0504 -- 72 -- 108/67 100 %   10/27/21 0503 -- 72 11 -- 100 %   10/27/21 0451 -- 73 -- (!) 99/52 100 %   10/27/21 0435 -- 75 -- (!) 90/51 91 %   10/27/21 0420 -- 76 12 (!) 82/45 100 %   10/27/21 0411 -- -- -- -- 98 %   10/27/21 0404 -- 73 -- (!) 109/56 98 %   10/27/21 0351 -- 72 -- (!) 107/53 99 %   10/27/21 0335 -- 73 -- (!) 111/51 98 %   10/27/21 0319 -- 72 -- (!) 103/58 99 %   10/27/21 0317 -- 72 10 -- --   10/27/21 0304 -- 72 -- (!) 101/55 99 %   10/27/21 0251 -- 72 10 -- 98 %   10/27/21 0250 -- 72 -- (!) 82/56 98 %   10/27/21 0235 -- 71 -- 106/60 99 %   10/27/21 0220 -- 71 -- (!) 73/44 98 %   10/27/21 0219 -- 72 10 -- 98 %   10/27/21 0205 -- 71 -- (!) 100/56 98 %   10/27/21 0153 -- 72 13 (!) 92/50 97 %   10/27/21 0136 -- 72 10 -- 98 %   10/27/21 0135 -- 72 -- 100/66 98 %   10/27/21 0120 -- 72 -- (!) 100/59 98 %   10/27/21 0105 -- 75 11 -- 97 %   10/27/21 0104 -- 75 -- (!) 95/58 97 %   10/27/21 0051 -- 75 12 (!) 83/58 96 %   10/27/21 0036 -- 74 11 92/62 97 %   10/27/21 0019 -- 72 15 (!) 97/56 96 %   10/27/21 0007 -- 70 11 (!) 93/58 97 %   10/26/21 2350 -- 70 -- (!) 102/57 97 %   10/26/21 2336 -- 70 -- (!) 95/56 96 %   10/26/21 2319 -- 72 (!) 7 -- 95 %   10/26/21 2304 98.1 °F (36.7 °C) 70 12 (!) 96/56 95 %   10/26/21 2303 -- 73 12 -- 95 %   10/26/21 2251 -- 70 -- (!) 103/53 96 %   10/26/21 2235 -- 71 12 -- 95 %   10/26/21 2234 -- 72 -- 92/63 95 %   10/26/21 2219 -- 75 11 (!) 86/51 95 %   10/26/21 2204 -- 68 -- (!) 96/56 96 %   10/26/21 2151 -- 68 -- (!) 101/55 96 %   10/26/21 2136 -- 70 11 -- 95 %   10/26/21 2135 -- 69 -- (!) 70/50 95 %   10/26/21 2119 -- 68 -- (!) 85/53 98 %   10/26/21 2058 -- 68 -- 91/60 95 %   10/26/21 2049 -- 71 -- (!) 69/50 95 %   10/26/21 2032 -- 69 16 (!) 106/58 99 %   10/26/21 2017 -- 68 -- (!) 95/57 99 %   10/26/21 2001 97.4 °F (36.3 °C) 69 10 (!) 90/55 99 %   10/26/21 1948 -- 69 10 -- 98 %   10/26/21 1947 -- 69 -- 101/61 98 %   10/26/21 1932 -- 69 10 -- 99 %   10/26/21 1931 -- 69 -- 106/65 98 %   10/26/21 1921 -- 69 10 -- 99 %   10/26/21 1920 -- 69 -- 96/62 99 %   10/26/21 1902 -- 68 10 -- 99 %   10/26/21 1901 -- 68 -- 105/60 99 %   10/26/21 1831 -- 69 11 98/60 99 %   10/26/21 1816 -- 68 11 (!) 92/52 99 %   10/26/21 1801 -- 69 8 (!) 76/55 99 %   10/26/21 1750 -- 69 10 (!) 87/59 99 %   10/26/21 1745 -- 69 10 91/61 99 %   10/26/21 1740 -- 69 9 (!) 96/52 99 %   10/26/21 1734 -- 70 8 101/64 98 %   10/26/21 1701 -- 73 14 (!) 95/56 92 %   10/26/21 1648 -- 68 12 -- 98 %   10/26/21 1647 -- 68 8 (!) 96/57 98 %   10/26/21 1632 -- 68 10 -- 97 %   10/26/21 1631 -- 67 8 (!) 99/59 96 %   10/26/21 1616 -- 67 10 (!) 93/51 99 %   10/26/21 1601 -- 67 8 (!) 98/56 99 %   10/26/21 1556 -- 68 20 -- 98 %   10/26/21 1542 -- 67 10 (!) 104/55 100 %   10/26/21 1537 -- 68 8 (!) 96/57 100 %   10/26/21 1532 -- 69 22 (!) 79/42 100 %   10/26/21 1523 -- 66 11 (!) 78/49 100 %   10/26/21 1517 -- 67 13 (!) 73/52 100 %   10/26/21 1459 97.3 °F (36.3 °C) 71 14 94/65 92 %   10/26/21 1409 -- -- -- (!) 88/52 --   10/26/21 1404 -- -- -- (!) 83/50 --   10/26/21 1403 -- -- -- (!) 87/55 --   10/26/21 1400 -- -- -- (!) 83/45 --   10/26/21 1353 -- -- -- (!) 84/53 --   10/26/21 1337 -- -- -- (!) 75/42 --   10/26/21 1336 -- 68 -- (!) 76/37 99 %   10/26/21 1332 -- 69 -- (!) 69/43 97 %   10/26/21 1330 -- -- -- (!) 71/44 --   10/26/21 1326 -- 70 -- (!) 72/46 95 %     Oxygen Therapy  O2 Sat (%): 99 % (10/27/21 1223)  Pulse via Oximetry: 79 beats per minute (10/27/21 1223)  O2 Device: None (Room air) (10/27/21 1230)  Skin Assessment: Clean, dry, & intact (10/27/21 0810)  Skin Protection for O2 Device: No (10/27/21 0810)  O2 Flow Rate (L/min): 1 l/min (10/27/21 1223)    Estimated body mass index is 54 kg/m² as calculated from the following:    Height as of this encounter: 5' 4\" (1.626 m). Weight as of this encounter: 142.7 kg (314 lb 9.5 oz).     Intake/Output Summary (Last 24 hours) at 10/27/2021 1240  Last data filed at 10/27/2021 1223  Gross per 24 hour   Intake 4549.46 ml   Output 935 ml   Net 3614.46 ml         Physical Exam:     Blood pressure (!) 99/44, pulse 78, temperature 98.4 °F (36.9 °C), resp. rate 14, height 5' 4\" (1.626 m), weight 142.7 kg (314 lb 9.5 oz), SpO2 99 %. General:    Well nourished. No overt distress  Head:  Normocephalic, atraumatic  Eyes:  Sclerae appear normal.  Pupils equally round. ENT:  Nares appear normal, no drainage. Moist oral mucosa  Neck:  No restricted ROM. Trachea midline   CV:   RRR. No m/r/g. No jugular venous distension. Lungs:   CTAB. No wheezing, rhonchi, or rales. Respirations even, unlabored  Abdomen: Bowel sounds present. Soft, nontender, nondistended. Extremities: No cyanosis or clubbing. No edema  Skin:     No rashes and normal coloration. Warm and dry. Back:   Surgical dressing with slight drainage present, drains x3 in place with bloody output  Neuro:  CN II-XII grossly intact. Sensation intact. A&Ox3  Psych:  Normal mood and affect.       I have reviewed ordered lab tests and independently visualized imaging below:    Recent Labs:  Recent Results (from the past 48 hour(s))   CBC W/O DIFF    Collection Time: 10/25/21  1:25 PM   Result Value Ref Range    WBC 15.5 (H) 4.3 - 11.1 K/uL    RBC 3.79 (L) 4.05 - 5.2 M/uL    HGB 11.2 (L) 11.7 - 15.4 g/dL    HCT 34.0 (L) 35.8 - 46.3 %    MCV 89.7 79.6 - 97.8 FL    MCH 29.6 26.1 - 32.9 PG    MCHC 32.9 31.4 - 35.0 g/dL    RDW 15.8 (H) 11.9 - 14.6 %    PLATELET 618 273 - 068 K/uL    MPV 10.0 9.4 - 12.3 FL    ABSOLUTE NRBC 0.00 0.0 - 0.2 K/uL   BLOOD GAS & LYTES, ARTERIAL    Collection Time: 10/25/21  1:36 PM   Result Value Ref Range    pH (POC) 7.43 7.35 - 7.45      pCO2 (POC) 43.7 35 - 45 MMHG    pO2 (POC) 197 (H) 75 - 100 MMHG    Sodium,  136 - 145 MMOL/L    Potassium, POC 3.3 (L) 3.5 - 5.1 MMOL/L    Calcium, ionized (POC) 1.12 1.12 - 1.32 mmol/L Glucose,  (H) 65 - 100 MG/DL    Base excess (POC) 4.2 mmol/L    HCO3 (POC) 29.0 (H) 22 - 26 MMOL/L    CO2, POC 29 (H) 13 - 23 MMOL/L    O2  %    Sample source ARTERIAL      Performed by Mónica Brown, POC Cannot be calculated >60 ml/min/1.73m2    GFRNA, POC Cannot be calculated >60 ml/min/1.73m2   BLOOD GAS & LYTES, ARTERIAL    Collection Time: 10/25/21  4:37 PM   Result Value Ref Range    pH (POC) 7.41 7.35 - 7.45      pCO2 (POC) 43.6 35 - 45 MMHG    pO2 (POC) 278 (H) 75 - 100 MMHG    Sodium,  (L) 136 - 145 MMOL/L    Potassium, POC 3.4 (L) 3.5 - 5.1 MMOL/L    Calcium, ionized (POC) 1.11 (L) 1.12 - 1.32 mmol/L    Glucose,  (H) 65 - 100 MG/DL    Base excess (POC) 2.5 mmol/L    HCO3 (POC) 27.5 (H) 22 - 26 MMOL/L    CO2, POC 28 (H) 13 - 23 MMOL/L    O2  %    Sample source ARTERIAL      Performed by Caryle Li, POC Cannot be calculated >60 ml/min/1.73m2    GFRNA, POC Cannot be calculated >60 ml/min/1.73m2   HGB & HCT    Collection Time: 10/25/21  6:39 PM   Result Value Ref Range    HGB 8.9 (L) 11.7 - 15.4 g/dL    HCT 27.0 (L) 35.8 - 90.4 %   METABOLIC PANEL, BASIC    Collection Time: 10/26/21  7:51 AM   Result Value Ref Range    Sodium 134 (L) 136 - 145 mmol/L    Potassium 3.9 3.5 - 5.1 mmol/L    Chloride 101 98 - 107 mmol/L    CO2 30 21 - 32 mmol/L    Anion gap 3 (L) 7 - 16 mmol/L    Glucose 138 (H) 65 - 100 mg/dL    BUN 17 8 - 23 MG/DL    Creatinine 1.26 (H) 0.6 - 1.0 MG/DL    GFR est AA 55 (L) >60 ml/min/1.73m2    GFR est non-AA 45 (L) >60 ml/min/1.73m2    Calcium 8.2 (L) 8.3 - 10.4 MG/DL   CBC W/O DIFF    Collection Time: 10/26/21  7:51 AM   Result Value Ref Range    WBC 25.1 (H) 4.3 - 11.1 K/uL    RBC 2.70 (L) 4.05 - 5.2 M/uL    HGB 8.3 (L) 11.7 - 15.4 g/dL    HCT 25.4 (L) 35.8 - 46.3 %    MCV 94.1 79.6 - 97.8 FL    MCH 30.7 26.1 - 32.9 PG    MCHC 32.7 31.4 - 35.0 g/dL    RDW 15.9 (H) 11.9 - 14.6 %    PLATELET 921 506 - 515 K/uL    MPV 10.3 9.4 - 12.3 FL    ABSOLUTE NRBC 0.00 0.0 - 0.2 K/uL   HGB & HCT    Collection Time: 10/26/21  1:53 PM   Result Value Ref Range    HGB 7.3 (L) 11.7 - 15.4 g/dL    HCT 22.8 (L) 35.8 - 46.3 %   EKG, 12 LEAD, INITIAL    Collection Time: 10/26/21  4:19 PM   Result Value Ref Range    Ventricular Rate 67 BPM    Atrial Rate 67 BPM    P-R Interval 144 ms    QRS Duration 80 ms    Q-T Interval 410 ms    QTC Calculation (Bezet) 433 ms    Calculated P Axis 55 degrees    Calculated R Axis 63 degrees    Calculated T Axis 39 degrees    Diagnosis       Normal sinus rhythm  Cannot rule out Anterior infarct , age undetermined  Abnormal ECG  When compared with ECG of 18-OCT-2021 13:24,  T wave amplitude has decreased in Lateral leads  Confirmed by [facility] DELIA & RUTHANN [facility]  MD (), Cam Serna ([id number]) on 10/26/2021 9:09:15 PM     METABOLIC PANEL, BASIC    Collection Time: 10/27/21  5:51 AM   Result Value Ref Range    Sodium 135 (L) 136 - 145 mmol/L    Potassium 4.4 3.5 - 5.1 mmol/L    Chloride 104 98 - 107 mmol/L    CO2 29 21 - 32 mmol/L    Anion gap 2 (L) 7 - 16 mmol/L    Glucose 123 (H) 65 - 100 mg/dL    BUN 25 (H) 8 - 23 MG/DL    Creatinine 1.90 (H) 0.6 - 1.0 MG/DL    GFR est AA 34 (L) >60 ml/min/1.73m2    GFR est non-AA 28 (L) >60 ml/min/1.73m2    Calcium 7.8 (L) 8.3 - 10.4 MG/DL   CBC WITH AUTOMATED DIFF    Collection Time: 10/27/21  5:52 AM   Result Value Ref Range    WBC 27.0 (H) 4.3 - 11.1 K/uL    RBC 2.58 (L) 4.05 - 5.2 M/uL    HGB 7.5 (L) 11.7 - 15.4 g/dL    HCT 24.1 (L) 35.8 - 46.3 %    MCV 93.4 79.6 - 97.8 FL    MCH 29.1 26.1 - 32.9 PG    MCHC 31.1 (L) 31.4 - 35.0 g/dL    RDW 16.0 (H) 11.9 - 14.6 %    PLATELET 620 214 - 083 K/uL    MPV 10.5 9.4 - 12.3 FL    ABSOLUTE NRBC 0.00 0.0 - 0.2 K/uL    DF AUTOMATED      NEUTROPHILS 78 43 - 78 %    LYMPHOCYTES 12 (L) 13 - 44 %    MONOCYTES 8 4.0 - 12.0 %    EOSINOPHILS 0 (L) 0.5 - 7.8 %    BASOPHILS 0 0.0 - 2.0 %    IMMATURE GRANULOCYTES 1 0.0 - 5.0 %    ABS. NEUTROPHILS 21.2 (H) 1.7 - 8.2 K/UL    ABS. LYMPHOCYTES 3.3 0.5 - 4.6 K/UL    ABS. MONOCYTES 2.2 (H) 0.1 - 1.3 K/UL    ABS. EOSINOPHILS 0.0 0.0 - 0.8 K/UL    ABS. BASOPHILS 0.1 0.0 - 0.2 K/UL    ABS. IMM. GRANS. 0.2 0.0 - 0.5 K/UL   URINALYSIS W/ RFLX MICROSCOPIC    Collection Time: 10/27/21 11:01 AM   Result Value Ref Range    Color YELLOW      Appearance CLEAR      Specific gravity 1.013 1.001 - 1.023      pH (UA) 5.5 5.0 - 9.0      Protein Negative NEG mg/dL    Glucose Negative mg/dL    Ketone Negative NEG mg/dL    Bilirubin Negative NEG      Blood Negative NEG      Urobilinogen 0.2 0.2 - 1.0 EU/dL    Nitrites Negative NEG      Leukocyte Esterase Negative NEG     RBC, ALLOCATE    Collection Time: 10/27/21 12:00 PM   Result Value Ref Range    HISTORY CHECKED? Historical check performed        All Micro Results     Procedure Component Value Units Date/Time    MSSA/MRSA SC BY PCR, NASAL SWAB [[id number]]     Order Status: Sent Specimen: Swab           Other Studies:  DUPLEX LOWER EXT VENOUS BILAT    Result Date: 10/27/2021  Bilateral lower extremity duplex venous doppler ultrasound INDICATION: Evaluate for DVT. Bilateral lower extremity edema and tightness TECHNIQUE: Gray-scale ultrasound with and without compression and color Doppler evaluation were performed of the deep veins of bilateral lower extremities from the level of the common femoral veins to the level of the peroneal and posterior tibial veins. FINDINGS: Bilateral common femoral veins,  femoral veins, posterior tibial and peroneal veins, and popliteal veins are compressible and opacify with color at color Doppler evaluation. There is no evidence of deep vein thrombosis. No evidence of deep venous thrombosis.        Current Meds:  Current Facility-Administered Medications   Medication Dose Route Frequency    albuterol (PROVENTIL VENTOLIN) nebulizer solution 2.5 mg  2.5 mg Nebulization Q6H PRN    0.9% sodium chloride infusion 250 mL  250 mL IntraVENous PRN    0.9% sodium chloride infusion  125 mL/hr IntraVENous CONTINUOUS    midodrine (PROAMATINE) tablet 10 mg  10 mg Oral TID WITH MEALS    NOREPINephrine (LEVOPHED) 4 mg in 5% dextrose 250 mL infusion  0.5-30 mcg/min IntraVENous TITRATE    aspirin delayed-release tablet 81 mg  81 mg Oral QHS    doxycycline (VIBRAMYCIN) capsule 100 mg  100 mg Oral DAILY    escitalopram oxalate (LEXAPRO) tablet 20 mg  20 mg Oral QAM    gabapentin (NEURONTIN) capsule 600 mg  600 mg Oral QHS    levothyroxine (SYNTHROID) tablet 50 mcg  50 mcg Oral ACB    LORazepam (ATIVAN) tablet 0.5 mg  0.5 mg Oral Q6H PRN    [Held by provider] metoprolol tartrate (LOPRESSOR) tablet 25 mg  25 mg Oral BID    pantoprazole (PROTONIX) tablet 40 mg  40 mg Oral ACB    potassium chloride (KLOR-CON) tablet 10 mEq  10 mEq Oral DAILY    propafenone (RYTHMOL) tablet 150 mg  150 mg Oral BID    rOPINIRole (REQUIP) tablet 0.5 mg  0.5 mg Oral QHS    traMADoL (ULTRAM) tablet 50 mg  50 mg Oral Q6H PRN    zolpidem (AMBIEN) tablet 10 mg  10 mg Oral QHS PRN    alcohol 62% (NOZIN) nasal  1 Ampule  1 Ampule Topical Q12H    sodium chloride (NS) flush 5-40 mL  5-40 mL IntraVENous Q8H    sodium chloride (NS) flush 5-40 mL  5-40 mL IntraVENous PRN    naloxone (NARCAN) injection 0.4 mg  0.4 mg IntraVENous PRN    phenol throat spray (CHLORASEPTIC) 1 Spray  1 Spray Oral PRN    diphenhydrAMINE (BENADRYL) capsule 25 mg  25 mg Oral Q4H PRN    docusate sodium (COLACE) capsule 100 mg  100 mg Oral BID    HYDROcodone-acetaminophen (NORCO)  mg tablet 1 Tablet  1 Tablet Oral Q4H PRN    HYDROmorphone (DILAUDID) injection 1 mg  1 mg IntraVENous Q4H PRN    ondansetron (ZOFRAN ODT) tablet 4 mg  4 mg Oral Q4H PRN    [Held by provider] lisinopriL (PRINIVIL, ZESTRIL) tablet 20 mg  20 mg Oral BID    And    [Held by provider] hydroCHLOROthiazide (MICROZIDE) capsule 12.5 mg  12.5 mg Oral BID       Signed:  Lincoln Guerrier MD    Part of this note may have been written by using a voice dictation software. The note has been proof read but may still contain some grammatical/other typographical errors.

## 2021-10-27 NOTE — PROGRESS NOTES
Dr. Ricardo Matias notified by charge nurse about low blood pressure. Order received for 500ml bolus.

## 2021-10-28 LAB
ABO + RH BLD: NORMAL
ANION GAP SERPL CALC-SCNC: 3 MMOL/L (ref 7–16)
BASOPHILS # BLD: 0.1 K/UL (ref 0–0.2)
BASOPHILS NFR BLD: 0 % (ref 0–2)
BLD PROD TYP BPU: NORMAL
BLD PROD TYP BPU: NORMAL
BLOOD GROUP ANTIBODIES SERPL: NORMAL
BPU ID: NORMAL
BPU ID: NORMAL
BUN SERPL-MCNC: 16 MG/DL (ref 8–23)
CALCIUM SERPL-MCNC: 7.7 MG/DL (ref 8.3–10.4)
CHLORIDE SERPL-SCNC: 109 MMOL/L (ref 98–107)
CO2 SERPL-SCNC: 27 MMOL/L (ref 21–32)
CREAT SERPL-MCNC: 0.75 MG/DL (ref 0.6–1)
CROSSMATCH RESULT,%XM: NORMAL
CROSSMATCH RESULT,%XM: NORMAL
DIFFERENTIAL METHOD BLD: ABNORMAL
EOSINOPHIL # BLD: 0.1 K/UL (ref 0–0.8)
EOSINOPHIL NFR BLD: 1 % (ref 0.5–7.8)
ERYTHROCYTE [DISTWIDTH] IN BLOOD BY AUTOMATED COUNT: 16.3 % (ref 11.9–14.6)
GLUCOSE SERPL-MCNC: 117 MG/DL (ref 65–100)
HCT VFR BLD AUTO: 27.1 % (ref 35.8–46.3)
HGB BLD-MCNC: 8.8 G/DL (ref 11.7–15.4)
IMM GRANULOCYTES # BLD AUTO: 0.1 K/UL (ref 0–0.5)
IMM GRANULOCYTES NFR BLD AUTO: 1 % (ref 0–5)
INR PPP: 1.1
LYMPHOCYTES # BLD: 1.9 K/UL (ref 0.5–4.6)
LYMPHOCYTES NFR BLD: 12 % (ref 13–44)
MCH RBC QN AUTO: 29.3 PG (ref 26.1–32.9)
MCHC RBC AUTO-ENTMCNC: 32.5 G/DL (ref 31.4–35)
MCV RBC AUTO: 90.3 FL (ref 79.6–97.8)
MONOCYTES # BLD: 1.4 K/UL (ref 0.1–1.3)
MONOCYTES NFR BLD: 9 % (ref 4–12)
NEUTS SEG # BLD: 12 K/UL (ref 1.7–8.2)
NEUTS SEG NFR BLD: 77 % (ref 43–78)
NRBC # BLD: 0 K/UL (ref 0–0.2)
PLATELET # BLD AUTO: 199 K/UL (ref 150–450)
PMV BLD AUTO: 9.8 FL (ref 9.4–12.3)
POTASSIUM SERPL-SCNC: 4 MMOL/L (ref 3.5–5.1)
PROCALCITONIN SERPL-MCNC: 0.26 NG/ML
PROTHROMBIN TIME: 14.4 SEC (ref 12.6–14.5)
RBC # BLD AUTO: 3 M/UL (ref 4.05–5.2)
SODIUM SERPL-SCNC: 139 MMOL/L (ref 136–145)
SPECIMEN EXP DATE BLD: NORMAL
STATUS OF UNIT,%ST: NORMAL
STATUS OF UNIT,%ST: NORMAL
UNIT DIVISION, %UDIV: 0
UNIT DIVISION, %UDIV: 0
VANCOMYCIN SERPL-MCNC: 13.3 UG/ML
WBC # BLD AUTO: 15.6 K/UL (ref 4.3–11.1)

## 2021-10-28 PROCEDURE — 77030020847 HC STATLOK BARD -A

## 2021-10-28 PROCEDURE — 84145 PROCALCITONIN (PCT): CPT

## 2021-10-28 PROCEDURE — 74011250636 HC RX REV CODE- 250/636: Performed by: STUDENT IN AN ORGANIZED HEALTH CARE EDUCATION/TRAINING PROGRAM

## 2021-10-28 PROCEDURE — 85025 COMPLETE CBC W/AUTO DIFF WBC: CPT

## 2021-10-28 PROCEDURE — 36415 COLL VENOUS BLD VENIPUNCTURE: CPT

## 2021-10-28 PROCEDURE — 80048 BASIC METABOLIC PNL TOTAL CA: CPT

## 2021-10-28 PROCEDURE — 74011000250 HC RX REV CODE- 250: Performed by: STUDENT IN AN ORGANIZED HEALTH CARE EDUCATION/TRAINING PROGRAM

## 2021-10-28 PROCEDURE — 76937 US GUIDE VASCULAR ACCESS: CPT

## 2021-10-28 PROCEDURE — 77010033678 HC OXYGEN DAILY

## 2021-10-28 PROCEDURE — 85610 PROTHROMBIN TIME: CPT

## 2021-10-28 PROCEDURE — 74011250636 HC RX REV CODE- 250/636: Performed by: ORTHOPAEDIC SURGERY

## 2021-10-28 PROCEDURE — 74011000258 HC RX REV CODE- 258: Performed by: STUDENT IN AN ORGANIZED HEALTH CARE EDUCATION/TRAINING PROGRAM

## 2021-10-28 PROCEDURE — 74011250637 HC RX REV CODE- 250/637: Performed by: ORTHOPAEDIC SURGERY

## 2021-10-28 PROCEDURE — 65610000006 HC RM INTENSIVE CARE

## 2021-10-28 PROCEDURE — 77030041974 HC CATH SYS PERIPH TELE -B

## 2021-10-28 PROCEDURE — 74011250637 HC RX REV CODE- 250/637: Performed by: STUDENT IN AN ORGANIZED HEALTH CARE EDUCATION/TRAINING PROGRAM

## 2021-10-28 PROCEDURE — 80202 ASSAY OF VANCOMYCIN: CPT

## 2021-10-28 RX ORDER — DOXYCYCLINE 100 MG/1
100 CAPSULE ORAL EVERY 12 HOURS
Status: COMPLETED | OUTPATIENT
Start: 2021-10-28 | End: 2021-11-01

## 2021-10-28 RX ORDER — DOXYCYCLINE 100 MG/1
100 CAPSULE ORAL EVERY 12 HOURS
Status: DISCONTINUED | OUTPATIENT
Start: 2021-10-28 | End: 2021-10-28

## 2021-10-28 RX ORDER — GABAPENTIN 300 MG/1
300 CAPSULE ORAL ONCE
Status: COMPLETED | OUTPATIENT
Start: 2021-10-28 | End: 2021-10-28

## 2021-10-28 RX ORDER — GABAPENTIN 300 MG/1
300 CAPSULE ORAL EVERY 12 HOURS
Status: DISCONTINUED | OUTPATIENT
Start: 2021-10-28 | End: 2021-11-03 | Stop reason: HOSPADM

## 2021-10-28 RX ORDER — GABAPENTIN 300 MG/1
300 CAPSULE ORAL 2 TIMES DAILY
Status: DISCONTINUED | OUTPATIENT
Start: 2021-10-28 | End: 2021-10-28

## 2021-10-28 RX ORDER — LEVOFLOXACIN 250 MG/1
500 TABLET ORAL EVERY 24 HOURS
Status: COMPLETED | OUTPATIENT
Start: 2021-10-29 | End: 2021-11-01

## 2021-10-28 RX ORDER — LEVOFLOXACIN 500 MG/1
500 TABLET, FILM COATED ORAL EVERY 24 HOURS
Status: DISCONTINUED | OUTPATIENT
Start: 2021-10-28 | End: 2021-10-28

## 2021-10-28 RX ORDER — VANCOMYCIN/0.9 % SOD CHLORIDE 1.5G/250ML
1500 PLASTIC BAG, INJECTION (ML) INTRAVENOUS
Status: DISCONTINUED | OUTPATIENT
Start: 2021-10-28 | End: 2021-10-28

## 2021-10-28 RX ADMIN — ONDANSETRON 4 MG: 4 TABLET, ORALLY DISINTEGRATING ORAL at 22:18

## 2021-10-28 RX ADMIN — Medication 1 AMPULE: at 21:08

## 2021-10-28 RX ADMIN — GABAPENTIN 300 MG: 300 CAPSULE ORAL at 21:08

## 2021-10-28 RX ADMIN — PROPAFENONE HYDROCHLORIDE 150 MG: 150 TABLET, FILM COATED ORAL at 17:51

## 2021-10-28 RX ADMIN — SODIUM CHLORIDE 125 ML/HR: 900 INJECTION, SOLUTION INTRAVENOUS at 13:08

## 2021-10-28 RX ADMIN — SODIUM CHLORIDE 125 ML/HR: 900 INJECTION, SOLUTION INTRAVENOUS at 04:45

## 2021-10-28 RX ADMIN — PANTOPRAZOLE SODIUM 40 MG: 40 TABLET, DELAYED RELEASE ORAL at 05:00

## 2021-10-28 RX ADMIN — Medication 10 ML: at 21:15

## 2021-10-28 RX ADMIN — HYDROCODONE BITARTRATE AND ACETAMINOPHEN 1 TABLET: 10; 325 TABLET ORAL at 14:12

## 2021-10-28 RX ADMIN — DOXYCYCLINE HYCLATE 100 MG: 100 CAPSULE ORAL at 08:40

## 2021-10-28 RX ADMIN — Medication 10 ML: at 05:00

## 2021-10-28 RX ADMIN — Medication 10 ML: at 14:03

## 2021-10-28 RX ADMIN — ASPIRIN 81 MG: 81 TABLET ORAL at 21:08

## 2021-10-28 RX ADMIN — MIDODRINE HYDROCHLORIDE 10 MG: 5 TABLET ORAL at 11:43

## 2021-10-28 RX ADMIN — ESCITALOPRAM OXALATE 20 MG: 10 TABLET ORAL at 08:15

## 2021-10-28 RX ADMIN — LEVOTHYROXINE SODIUM 50 MCG: 0.05 TABLET ORAL at 05:00

## 2021-10-28 RX ADMIN — HYDROCODONE BITARTRATE AND ACETAMINOPHEN 1 TABLET: 10; 325 TABLET ORAL at 08:47

## 2021-10-28 RX ADMIN — GABAPENTIN 300 MG: 300 CAPSULE ORAL at 16:06

## 2021-10-28 RX ADMIN — HYDROMORPHONE HYDROCHLORIDE 1 MG: 1 INJECTION, SOLUTION INTRAMUSCULAR; INTRAVENOUS; SUBCUTANEOUS at 22:18

## 2021-10-28 RX ADMIN — VANCOMYCIN HYDROCHLORIDE 1500 MG: 10 INJECTION, POWDER, LYOPHILIZED, FOR SOLUTION INTRAVENOUS at 11:43

## 2021-10-28 RX ADMIN — CEFEPIME HYDROCHLORIDE 2 G: 2 INJECTION, POWDER, FOR SOLUTION INTRAVENOUS at 05:06

## 2021-10-28 RX ADMIN — Medication 1 AMPULE: at 08:16

## 2021-10-28 RX ADMIN — DOCUSATE SODIUM 100 MG: 100 CAPSULE, LIQUID FILLED ORAL at 17:51

## 2021-10-28 RX ADMIN — MIDODRINE HYDROCHLORIDE 10 MG: 5 TABLET ORAL at 08:16

## 2021-10-28 RX ADMIN — MIDODRINE HYDROCHLORIDE 10 MG: 5 TABLET ORAL at 16:05

## 2021-10-28 RX ADMIN — CEFEPIME HYDROCHLORIDE 2 G: 2 INJECTION, POWDER, FOR SOLUTION INTRAVENOUS at 13:59

## 2021-10-28 RX ADMIN — NOREPINEPHRINE BITARTRATE 8 MCG/MIN: 1 INJECTION, SOLUTION INTRAVENOUS at 04:45

## 2021-10-28 RX ADMIN — DOXYCYCLINE HYCLATE 100 MG: 100 CAPSULE ORAL at 21:08

## 2021-10-28 RX ADMIN — DOCUSATE SODIUM 100 MG: 100 CAPSULE, LIQUID FILLED ORAL at 08:15

## 2021-10-28 RX ADMIN — POTASSIUM CHLORIDE 10 MEQ: 10 TABLET, EXTENDED RELEASE ORAL at 08:15

## 2021-10-28 RX ADMIN — SODIUM CHLORIDE 125 ML/HR: 900 INJECTION, SOLUTION INTRAVENOUS at 20:59

## 2021-10-28 NOTE — PROGRESS NOTES
POD 3  Hgb - 8.8  Drains 1,2,3 - 55, 5, 8cc    AF,VSS  Patient is alert and oriented and feeling much better, only up with PT x 1  Neuro intact  Dressings okay  Improving, remove two drains, resume PT

## 2021-10-28 NOTE — PROGRESS NOTES
[facility],[address] Pharmacokinetic Monitoring Service - Vancomycin     Antonio Urena is a 73 y.o. female starting on vancomycin therapy for sepsis. Pharmacy consulted by Dr. Darvin Chambers for monitoring and adjustment. Target Concentration: Goal AUC/DALLIN 400-600 mg*hr/L    Additional Antimicrobials: cefepime, doxycycline    Pertinent Laboratory Values: Wt Readings from Last 1 Encounters:   10/27/21 142.7 kg (314 lb 9.5 oz)     Temp Readings from Last 1 Encounters:   10/28/21 98.7 °F (37.1 °C)     No components found for: PROCAL  Recent Labs     10/28/21  0607 10/27/21  0552 10/27/21  0551 10/26/21  0751   BUN 16  --  25* 17   CREA 0.75  --  1.90* 1.26*   WBC 15.6* 27.0*  --  25.1*   PCT 0.26  --   --   --      Estimated Creatinine Clearance: 106.1 mL/min (based on SCr of 0.75 mg/dL). Lab Results   Component Value Date/Time    Vancomycin,trough 17.8 05/23/2016 03:20 AM    Vancomycin, random 13.3 10/28/2021 06:07 AM       MRSA Nasal Swab: N/A. Non-respiratory infection. .    Plan:  Dosing recommendations based on Bayesian software  Start vancomycin 1500 mg every 18 hours  Anticipated AUC of 463 and trough concentration of 14.2 at steady state  Renal labs as indicated   Vancomycin concentration ordered for 10/29 @ 0400   Pharmacy will continue to monitor patient and adjust therapy as indicated    Thank you for the consult,  Veronica RubiD, [facility] Specialist  [phone]

## 2021-10-28 NOTE — PROGRESS NOTES
PICC Consult    Bilateral UE assessed for PICC for: Hemodynamically Unstable, Requiring Monitoring, Vasopressors or Volume Resuscitation. To reduce the risk of thrombosis, ultrasound measurement of patient's vessels did not have a catheter to vein ratio of 45% or less, patient is not a candidate for PICC. [address] [facility],[address]  Wayne Richard RN, PCCN    Right Basilic      Right Cephalic    Left Cephalic    Left Basilic    Left Brachial

## 2021-10-28 NOTE — PROGRESS NOTES
CM reviewed patient's chart. Per chart, patient lives alone in a two story home. Patient is independent with ADL's, uses a Cane to ambulate sometimes. Patient insured with PCP listed and daughter listed as emergency contact. Previous PT note states that due to patient being moved to ICU, PT has been discontinued at this time but can resume when medically appropriate. CM following to assist with discharge planning.

## 2021-10-28 NOTE — PROGRESS NOTES
Bedside and verbal shift change report received from  [facility] Report included the following information SBAR, Kardex, Procedure Summary, Intake/Output, MAR, Recent Results, Cardiac Rhythm NSR, Alarm Parameters  and Quality Measures.      Dual skin assessment completed at bedside: WDL   (list pertinent skin assessment findings)    Dual verification of gtts completed (name of gtts verified): NS, levo

## 2021-10-28 NOTE — PROGRESS NOTES
IV Access    Patient is not a candidate for a PICC. Noted left hand and forearm swelling from previous IV infiltrations. Placed 20g 6cm Endurance EDC in right forearm with ultrasound assistance x1 attempt Lot#[id number]. Blood return noted and PIV flushed without difficulty. Pt parvez well. [address] [facility],[address]  Hammad Benjamin RN, PCCN, VAT

## 2021-10-28 NOTE — PROGRESS NOTES
Hospitalist Progress Note   Admit Date:  10/25/2021  5:34 AM   Name:  Christiane Linn   Age:  73 y.o. Sex:  female  :  1955   MRN:  [id number]   Room:  [facility]/    Presenting Complaint: No chief complaint on file. Reason(s) for Admission: Lumbar stenosis with neurogenic claudication [M48.062]  Spondylolisthesis of lumbar region [M43.16]  Spinal stenosis [place] [facility] Course & Interval History:   Christiane Linn is a 73 y.o. female with history of Afib s/p ablation, Hypothyroidism, OA, lumbar spinal stenosis, HTN, morbid obesity, JULIANN who was admitted for lumbar laminectomy. Patient is postop day 1 from L2 L4 lumbar direct lateral interbody fusion with anterior plating and redo of L2 L5 laminectomy by spinal Ortho. Patient has multiple drains in place postoperatively. patient received dose of IV Dilaudid as well as multiple blood pressure medications the morning of initial hypotension. She subsequently suffered from hypotension with BP 70/54. She also was working with PT/OT and became significantly hypotensive when standing up. She did complain of orthostasis at this time. She was given normal saline IV fluid boluses but remains hypotensive with MAPS as low as 49 during my evaluation. Of note, she also received Midodrine 20mg once without no improvement in MAPs. Patient complains of significant drowsiness, and is hardly able to keep her eyes open with current low BP's. She denies any shortness of breath, chest pain or pressure, palpitations, nausea, vomiting, fevers or chills, diaphoresis, near-syncope or syncope. Patient is currently suffering from ÁNGEL with bump in creatinine and WBC this AM.     Subjective (10/28/21): Patient seen and examined at bedside this morning. Patient remains  in ICU and on Levophed. Patient's blood pressures have fluctuated and at times tolerates weaning of Levophed.   Only potential medication that patient is on to cause continued hypotension is Rythmol for her tachyarrhythmias with failed ablations in the past.  Patient states that she feels much better this morning and is sitting up in bed with more color to her skin. She denies fevers, chills, chest pain or pressure, shortness of breath, nausea, vomiting, diaphoresis, shoulder or jaw pain, palpitations, fevers, chills, anorexia, lightheadedness, dizziness, near-syncope or syncope. Assessment & Plan:     Principal Problem:  #Hypotension  - likely due to medications including pain meds and multiple antihypertensives   - s/p 2L NS boluses and Midodrine 20mg without any improvement in Bps  - STAT HH 7.3, s/p 1U PRBC yesterday, up to 8.8 this AM  - WBC up to 27k, s/p Vanc/Cefepime for 1 day and Wbc down to 15K this Am - transition to Levaquin and Doxycycline PO for empiric coverage x 5 days and monitor for signs of sepsis, has not spiked any fevers- consider BCx's if clinical status worsens  - EKG without ST elevation or depression, Trop negative  - cont mIVF resuscitation and scheduled midodrine   - cont Levophed due to persistent hypotension, wean as able  - cont to hold all BP meds, Requip and will hold AM dose of Rythmol with potential to worsen hypotension     #ÁNGEL  - Cr peaked at 1.90 from normal baseline  - likely hypovolemic s/p Lasix, HCTZ, Lisinopril and persistent hypotension  - UA and ELAINE unremarkable  - Urinary retention yesterday with >700cc in bladder - cont laboy for now  - on mIVF and Cr now down to 0.75 this AM     #Lumbar stenosis with neurogenic claudication   - s/p laminectomy and drain placements per ortho spine  - management per primary ortho spine  - avoid opioids in setting of hypotension at this time     There is a high probability of acute organ impairment or life-threatening deterioration in the patient's condition from: persistent hypotension     Critical care interventions: Vasopressor support with Levo     Total critical care time spent: 38 minutes.   Time is indicative of direct patient attendance at bedside and on the patient's floor nearby. Includes time spent at bedside performing history and exam, performing chart review, discussing findings and treatment plan with patient and/or family, discussing patient with consultants and colleagues, ordering and reviewing pertinent laboratory and radiographic evaluations, and discussing patient with nursing staff. Time excludes procedures.       Dispo/Discharge Planning:    PT/OT once patient able to come off Levophed    Diet:  ADULT DIET Regular  DVT PPx: SCDs  Code status: No Order    Hospital Problems as of 10/28/2021 Date Reviewed: 9/22/2021        Codes Class Noted - Resolved POA    Hypovolemic shock ([facility], [place] [address] ICD-10-CM: R57.1  ICD-9-CM: 785.59  10/26/2021 - Present Unknown        Hypotension ICD-10-CM: I95.9  ICD-9-CM: 458.9  10/26/2021 - Present Unknown        Spondylolisthesis of multiple sites in spine ICD-10-CM: M43.19  ICD-9-CM: 738.4  10/25/2021 - Present Yes        Spinal stenosis ICD-10-CM: M48.00  ICD-9-CM: 724.00  10/25/2021 - Present Unknown        * (Principal) Lumbar stenosis with neurogenic claudication ICD-10-CM: M48.062  ICD-9-CM: 724.03  3/28/2016 - Present Yes              Objective:     Patient Vitals for the past 24 hrs:   Temp Pulse Resp BP SpO2   10/28/21 1300 -- 85 16 (!) 117/54 96 %   10/28/21 1219 -- 84 16 119/62 97 %   10/28/21 1204 -- 81 23 115/60 97 %   10/28/21 1149 98.8 °F (37.1 °C) 81 18 115/62 97 %   10/28/21 1134 -- 81 13 (!) 102/49 94 %   10/28/21 1119 -- 83 20 (!) 103/50 95 %   10/28/21 1104 -- 84 21 (!) 106/51 97 %   10/28/21 1050 -- 84 20 (!) 103/55 96 %   10/28/21 1034 -- 83 16 (!) 105/54 95 %   10/28/21 1019 -- 84 21 (!) 112/54 96 %   10/28/21 1004 -- 84 21 (!) 108/58 96 %   10/28/21 0949 -- 86 20 (!) 132/51 98 %   10/28/21 0934 -- 86 26 (!) 126/55 98 %   10/28/21 0933 -- 87 22 (!) 120/54 98 %   10/28/21 0903 -- 90 29 (!) 97/50 99 %   10/28/21 0849 -- 92 24 (!) 102/57 98 %   10/28/21 0834 -- 87 25 (!) 121/56 97 %   10/28/21 0818 98.7 °F (37.1 °C) 85 30 130/63 100 %   10/28/21 0749 -- 78 16 117/67 100 %   10/28/21 0718 -- 77 23 116/73 99 %   10/28/21 0703 -- 78 26 (!) 96/56 99 %   10/28/21 0649 -- 77 21 (!) 111/47 99 %   10/28/21 0645 -- 77 17 -- 99 %   10/28/21 0630 -- 79 28 (!) 116/49 98 %   10/28/21 0615 -- 77 20 (!) 111/56 99 %   10/28/21 0600 -- 76 25 (!) 94/44 99 %   10/28/21 0545 -- 73 26 (!) 121/43 99 %   10/28/21 0530 -- 76 23 (!) 110/47 99 %   10/28/21 0515 -- 80 14 (!) 97/40 99 %   10/28/21 0500 -- 72 20 (!) 109/42 99 %   10/28/21 0445 -- 77 22 (!) 100/53 99 %   10/28/21 0430 -- 82 26 (!) 86/57 99 %   10/28/21 0415 -- 76 11 (!) 113/47 99 %   10/28/21 0400 -- 80 9 (!) 100/48 98 %   10/28/21 0345 -- 76 23 (!) 112/49 98 %   10/28/21 0330 -- 78 22 (!) 118/53 98 %   10/28/21 0315 -- 77 24 -- 98 %   10/28/21 0300 -- 77 23 (!) 122/51 99 %   10/28/21 0254 -- 80 24 -- 98 %   10/28/21 0253 -- 73 25 -- 98 %   10/28/21 0252 -- 75 19 -- 98 %   10/28/21 0251 -- 77 23 -- 98 %   10/28/21 0250 -- 75 22 (!) 102/38 97 %   10/28/21 0249 -- 74 23 -- 98 %   10/28/21 0248 -- 76 23 -- 99 %   10/28/21 0247 -- 76 29 -- 99 %   10/28/21 0246 -- 76 27 -- 98 %   10/28/21 0245 -- 77 22 -- 99 %   10/28/21 0244 -- 78 28 -- 99 %   10/28/21 0243 -- 73 26 -- 98 %   10/28/21 0242 -- 76 22 -- 99 %   10/28/21 0241 -- 76 23 -- 98 %   10/28/21 0240 -- 76 30 -- 98 %   10/28/21 0239 -- 78 25 -- 98 %   10/28/21 0238 -- 76 (!) 33 -- 98 %   10/28/21 0237 -- 75 28 -- 98 %   10/28/21 0236 -- 76 22 -- 98 %   10/28/21 0235 -- 76 22 -- 99 %   10/28/21 0234 -- 73 28 -- 99 %   10/28/21 0233 -- 76 24 -- 99 %   10/28/21 0232 -- 77 23 -- 99 %   10/28/21 0231 -- 79 21 -- 98 %   10/28/21 0230 -- 78 25 (!) 132/51 98 %   10/28/21 0215 -- 76 26 (!) 121/51 99 %   10/28/21 0200 -- 76 27 -- 98 %   10/28/21 0145 -- 74 25 (!) 83/40 99 %   10/28/21 0130 -- 75 26 (!) 103/48 99 %   10/28/21 0115 -- 75 23 110/61 98 %   10/28/21 0106 -- 73 19 (!) 125/47 99 %   10/28/21 0100 -- 74 24 -- 99 %   10/28/21 0051 -- 74 19 (!) 121/52 98 %   10/28/21 0045 -- 75 21 -- 98 %   10/28/21 0036 -- 74 20 (!) 128/44 99 %   10/28/21 0030 -- 74 15 -- 99 %   10/28/21 0021 -- 75 23 (!) 122/49 99 %   10/28/21 0015 -- 75 17 -- 99 %   10/28/21 0006 -- 73 14 (!) 117/47 98 %   10/28/21 0000 -- 75 14 -- 99 %   10/27/21 2351 -- 74 13 (!) 130/51 99 %   10/27/21 2336 -- 77 18 (!) 110/51 99 %   10/27/21 2321 -- 72 12 -- 98 %   10/27/21 2320 -- 74 17 -- 99 %   10/27/21 2319 -- 74 12 (!) 95/46 98 %   10/27/21 2318 -- 75 17 -- 99 %   10/27/21 2317 -- 73 23 -- 99 %   10/27/21 2316 -- 75 23 -- 98 %   10/27/21 2315 98.4 °F (36.9 °C) 75 11 (!) 105/53 99 %   10/27/21 2314 -- 75 15 -- 98 %   10/27/21 2313 -- 75 16 -- 98 %   10/27/21 2312 -- 75 13 -- 98 %   10/27/21 2311 -- 72 16 -- 99 %   10/27/21 2310 -- 75 14 -- 99 %   10/27/21 2309 -- 75 15 -- 98 %   10/27/21 2308 -- 74 21 -- 98 %   10/27/21 2307 -- 75 18 -- 99 %   10/27/21 2306 -- 73 23 -- 98 %   10/27/21 2305 -- 75 19 -- 98 %   10/27/21 2304 -- 72 21 -- 97 %   10/27/21 2303 -- 76 13 -- 98 %   10/27/21 2302 -- 75 19 -- 98 %   10/27/21 2301 -- 76 25 -- 98 %   10/27/21 2300 -- 76 17 -- 99 %   10/27/21 2259 -- 76 19 -- 99 %   10/27/21 2258 -- 76 14 -- 99 %   10/27/21 2257 -- 76 18 -- 99 %   10/27/21 2256 -- 76 15 -- 99 %   10/27/21 2255 -- 76 15 -- 99 %   10/27/21 2254 -- 75 13 -- 99 %   10/27/21 2253 -- 78 15 -- 98 %   10/27/21 2252 -- 77 15 -- 99 %   10/27/21 2251 -- 75 22 -- 99 %   10/27/21 2250 -- 76 13 -- 99 %   10/27/21 2249 -- 76 13 110/63 99 %   10/27/21 2248 -- 76 26 -- 99 %   10/27/21 2247 -- 76 18 -- 99 %   10/27/21 2246 -- 76 17 -- 98 %   10/27/21 2245 -- 77 13 -- 99 %   10/27/21 2244 -- 76 12 -- 98 %   10/27/21 2243 -- 74 12 -- 99 %   10/27/21 2242 -- 76 20 -- 99 %   10/27/21 2241 -- 79 12 -- 99 %   10/27/21 2240 -- 74 12 -- 99 %   10/27/21 2239 -- 76 12 -- 99 %   10/27/21 2238 -- 76 14 -- 99 %   10/27/21 2237 -- 76 12 -- 99 %   10/27/21 2236 -- 75 18 -- 99 %   10/27/21 2235 -- 76 11 -- 99 %   10/27/21 2234 -- 75 12 (!) 114/43 99 %   10/27/21 2233 -- 74 13 -- 99 %   10/27/21 2232 -- 76 20 -- 99 %   10/27/21 2231 -- 76 18 -- 99 %   10/27/21 2230 -- 77 22 -- 98 %   10/27/21 2229 -- 78 23 -- 98 %   10/27/21 2228 -- 77 19 -- 99 %   10/27/21 2227 -- 76 10 -- 99 %   10/27/21 2226 -- 75 12 -- 99 %   10/27/21 2225 -- 79 11 -- 100 %   10/27/21 2224 -- 76 12 -- 100 %   10/27/21 2223 -- 75 13 -- 100 %   10/27/21 2222 -- 74 11 -- 100 %   10/27/21 2221 -- 75 12 -- 100 %   10/27/21 2220 -- 72 12 -- 100 %   10/27/21 2219 -- 75 12 -- 99 %   10/27/21 2218 -- 75 11 -- 99 %   10/27/21 2217 -- 75 18 -- 99 %   10/27/21 2216 -- 76 14 -- 100 %   10/27/21 2215 -- 75 11 -- 100 %   10/27/21 2214 -- 76 11 -- 99 %   10/27/21 2213 -- 73 13 -- 100 %   10/27/21 2212 -- 76 11 -- 100 %   10/27/21 2211 -- 76 12 -- 100 %   10/27/21 2210 -- 75 11 -- 100 %   10/27/21 2209 -- 72 11 -- 100 %   10/27/21 2208 -- 75 12 -- 99 %   10/27/21 2207 -- 75 11 -- 100 %   10/27/21 2206 -- 75 11 -- 99 %   10/27/21 2205 -- 75 12 -- 99 %   10/27/21 2204 -- 74 17 (!) 78/60 99 %   10/27/21 2203 -- 75 12 -- 100 %   10/27/21 2202 -- 76 11 (!) 92/54 99 %   10/27/21 2201 -- 75 11 -- 100 %   10/27/21 2200 -- 75 10 (!) 105/45 99 %   10/27/21 2159 -- 75 13 -- 99 %   10/27/21 2158 -- 75 11 -- 99 %   10/27/21 2157 -- 78 12 -- 99 %   10/27/21 2156 -- 76 11 -- 99 %   10/27/21 2155 -- 75 11 -- 99 %   10/27/21 2154 -- 75 12 -- 99 %   10/27/21 2153 -- 75 10 -- 99 %   10/27/21 2152 -- 75 13 -- 99 %   10/27/21 2151 -- 75 15 -- 99 %   10/27/21 2150 -- 75 10 -- 100 %   10/27/21 2149 -- 78 12 -- 99 %   10/27/21 2148 -- 76 13 -- 99 %   10/27/21 2147 -- 76 11 -- 99 %   10/27/21 2146 -- 73 11 -- 99 %   10/27/21 2145 -- 75 10 -- 99 %   10/27/21 2144 -- 75 12 -- 100 %   10/27/21 2143 -- 75 12 -- 100 %   10/27/21 2142 -- 79 11 -- 99 %   10/27/21 2141 -- 75 12 -- 99 %   10/27/21 2140 -- 75 12 -- 99 %   10/27/21 2139 -- 77 12 -- 99 %   10/27/21 2138 -- 75 12 -- 99 %   10/27/21 2137 -- 75 11 -- 99 %   10/27/21 2136 -- 76 12 -- 99 %   10/27/21 2135 -- 78 11 -- 100 %   10/27/21 2134 -- 76 11 -- 99 %   10/27/21 2133 -- 76 13 -- 99 %   10/27/21 2132 -- 77 12 -- 99 %   10/27/21 2131 -- 77 12 -- 99 %   10/27/21 2130 -- 80 11 -- 99 %   10/27/21 2129 -- 78 10 -- 98 %   10/27/21 2128 -- 80 (!) 35 -- 94 %   10/27/21 2127 -- 81 23 -- 98 %   10/27/21 2126 -- 78 16 -- 97 %   10/27/21 2125 -- 78 10 -- 98 %   10/27/21 2124 -- 79 13 -- 98 %   10/27/21 2123 -- 77 (!) 36 -- 99 %   10/27/21 2122 -- 78 19 -- 99 %   10/27/21 2121 -- 78 14 -- 99 %   10/27/21 2120 -- 78 19 -- 97 %   10/27/21 2119 -- 73 17 -- 99 %   10/27/21 2118 -- 76 13 -- 99 %   10/27/21 2117 -- 72 11 -- 99 %   10/27/21 2116 -- 76 12 -- 99 %   10/27/21 2115 -- 76 11 (!) 105/53 99 %   10/27/21 2114 -- 76 13 -- 99 %   10/27/21 2113 -- 76 13 -- 99 %   10/27/21 2112 -- 76 12 -- 99 %   10/27/21 2111 -- 76 12 -- 99 %   10/27/21 2110 -- 76 12 -- 99 %   10/27/21 2109 -- 76 12 -- 99 %   10/27/21 2108 -- 76 15 -- 99 %   10/27/21 2107 -- 77 13 -- 99 %   10/27/21 2106 -- 77 12 -- 99 %   10/27/21 2105 -- 77 11 -- 99 %   10/27/21 2104 -- 76 11 -- 99 %   10/27/21 2103 -- 77 11 -- 99 %   10/27/21 2102 -- 77 11 -- 99 %   10/27/21 2101 -- 77 12 -- 99 %   10/27/21 2100 -- 77 10 (!) 97/49 99 %   10/27/21 2045 -- 76 11 (!) 73/51 100 %   10/27/21 2044 98.4 °F (36.9 °C) -- -- -- --   10/27/21 2030 -- 77 11 -- 98 %   10/27/21 2015 -- 76 11 (!) 95/58 98 %   10/27/21 2000 -- 77 11 95/62 98 %   10/27/21 1945 -- 77 14 95/62 98 %   10/27/21 1930 -- 76 12 (!) 93/57 98 %   10/27/21 1915 -- 77 12 -- 98 %   10/27/21 1905 -- 79 22 109/66 98 %   10/27/21 1900 -- 79 13 -- 98 %   10/27/21 1850 -- 76 15 (!) 80/50 98 %   10/27/21 1845 -- 76 11 -- 99 %   10/27/21 1844 -- 76 13 (!) 90/44 98 %   10/27/21 1830 -- 77 10 -- 98 %   10/27/21 1823 -- 75 12 (!) 94/51 98 %   10/27/21 1815 -- 77 13 -- 96 %   10/27/21 1805 -- 73 11 101/67 98 %   10/27/21 1800 -- 73 12 -- 99 %   10/27/21 1737 -- 75 13 128/76 99 %   10/27/21 1706 -- 72 11 (!) 99/51 99 %   10/27/21 1642 -- 76 11 (!) 103/43 99 %   10/27/21 1638 -- 89 18 (!) 82/55 98 %   10/27/21 1636 -- 77 15 (!) 82/63 99 %   10/27/21 1623 -- 78 15 (!) 93/58 97 %   10/27/21 1614 -- 79 14 124/63 98 %   10/27/21 1605 -- 76 11 (!) 103/53 98 %   10/27/21 1550 99.2 °F (37.3 °C) 79 16 114/79 98 %   10/27/21 1535 -- 80 17 (!) 121/54 98 %   10/27/21 1520 -- 85 20 108/74 98 %   10/27/21 1506 98.5 °F (36.9 °C) 77 16 (!) 123/52 99 %   10/27/21 1437 -- 79 10 (!) 104/50 99 %   10/27/21 1436 98 °F (36.7 °C) 76 10 (!) 104/50 98 %   10/27/21 1421 97.8 °F (36.6 °C) 76 11 (!) 89/53 91 %   10/27/21 1406 98.2 °F (36.8 °C) 81 11 (!) 100/47 92 %   10/27/21 1351 -- 77 10 (!) 113/54 92 %   10/27/21 1336 -- 78 23 (!) 76/59 93 %     Oxygen Therapy  O2 Sat (%): 96 % (10/28/21 1300)  Pulse via Oximetry: 86 beats per minute (10/28/21 1300)  O2 Device: None (Room air) (10/28/21 1149)  Skin Assessment: Clean, dry, & intact (10/28/21 1149)  Skin Protection for O2 Device: No (10/27/21 0810)  O2 Flow Rate (L/min): 1 l/min (10/27/21 2053)    Estimated body mass index is 54 kg/m² as calculated from the following:    Height as of this encounter: 5' 4\" (1.626 m). Weight as of this encounter: 142.7 kg (314 lb 9.5 oz). Intake/Output Summary (Last 24 hours) at 10/28/2021 1331  Last data filed at 10/28/2021 1149  Gross per 24 hour   Intake 5074.79 ml   Output 3998 ml   Net 1076.79 ml         Physical Exam:     Blood pressure (!) 117/54, pulse 85, temperature 98.8 °F (37.1 °C), resp. rate 16, height 5' 4\" (1.626 m), weight 142.7 kg (314 lb 9.5 oz), SpO2 96 %. General:    Well nourished. No overt distress  Head:  Normocephalic, atraumatic  Eyes:  Sclerae appear normal.  Pupils equally round. ENT:  Nares appear normal, no drainage. Moist oral mucosa  Neck:  No restricted ROM. Trachea midline   CV:   RRR. No m/r/g. No jugular venous distension. Lungs:   CTAB. No wheezing, rhonchi, or rales. Respirations even, unlabored  Abdomen: Bowel sounds present. Soft, nontender, nondistended. Extremities: No cyanosis or clubbing. No edema  Skin:     No rashes and normal coloration. Warm and dry. Back:   drains x3 in place with bloody output  Neuro:  CN II-XII grossly intact. Sensation intact. A&Ox3  Psych:  Normal mood and affect.       I have reviewed ordered lab tests and independently visualized imaging below:    Recent Labs:  Recent Results (from the past 48 hour(s))   HGB & HCT    Collection Time: 10/26/21  1:53 PM   Result Value Ref Range    HGB 7.3 (L) 11.7 - 15.4 g/dL    HCT 22.8 (L) 35.8 - 46.3 %   EKG, 12 LEAD, INITIAL    Collection Time: 10/26/21  4:19 PM   Result Value Ref Range    Ventricular Rate 67 BPM    Atrial Rate 67 BPM    P-R Interval 144 ms    QRS Duration 80 ms    Q-T Interval 410 ms    QTC Calculation (Bezet) 433 ms    Calculated P Axis 55 degrees    Calculated R Axis 63 degrees    Calculated T Axis 39 degrees    Diagnosis       Normal sinus rhythm  Cannot rule out Anterior infarct , age undetermined  Abnormal ECG  When compared with ECG of 18-OCT-2021 13:24,  T wave amplitude has decreased in Lateral leads  Confirmed by Oneil Jang MD (), Antoinette Rosenberg ([id number]) on 10/26/2021 5:90:77 PM     METABOLIC PANEL, BASIC    Collection Time: 10/27/21  5:51 AM   Result Value Ref Range    Sodium 135 (L) 136 - 145 mmol/L    Potassium 4.4 3.5 - 5.1 mmol/L    Chloride 104 98 - 107 mmol/L    CO2 29 21 - 32 mmol/L    Anion gap 2 (L) 7 - 16 mmol/L    Glucose 123 (H) 65 - 100 mg/dL    BUN 25 (H) 8 - 23 MG/DL    Creatinine 1.90 (H) 0.6 - 1.0 MG/DL    GFR est AA 34 (L) >60 ml/min/1.73m2    GFR est non-AA 28 (L) >60 ml/min/1.73m2    Calcium 7.8 (L) 8.3 - 10.4 MG/DL   CBC WITH AUTOMATED DIFF    Collection Time: 10/27/21  5:52 AM   Result Value Ref Range    WBC 27.0 (H) 4.3 - 11.1 K/uL    RBC 2.58 (L) 4.05 - 5.2 M/uL    HGB 7.5 (L) 11.7 - 15.4 g/dL    HCT 24.1 (L) 35.8 - 46.3 % MCV 93.4 79.6 - 97.8 FL    MCH 29.1 26.1 - 32.9 PG    MCHC 31.1 (L) 31.4 - 35.0 g/dL    RDW 16.0 (H) 11.9 - 14.6 %    PLATELET 968 570 - 645 K/uL    MPV 10.5 9.4 - 12.3 FL    ABSOLUTE NRBC 0.00 0.0 - 0.2 K/uL    DF AUTOMATED      NEUTROPHILS 78 43 - 78 %    LYMPHOCYTES 12 (L) 13 - 44 %    MONOCYTES 8 4.0 - 12.0 %    EOSINOPHILS 0 (L) 0.5 - 7.8 %    BASOPHILS 0 0.0 - 2.0 %    IMMATURE GRANULOCYTES 1 0.0 - 5.0 %    ABS. NEUTROPHILS 21.2 (H) 1.7 - 8.2 K/UL    ABS. LYMPHOCYTES 3.3 0.5 - 4.6 K/UL    ABS. MONOCYTES 2.2 (H) 0.1 - 1.3 K/UL    ABS. EOSINOPHILS 0.0 0.0 - 0.8 K/UL    ABS. BASOPHILS 0.1 0.0 - 0.2 K/UL    ABS. IMM. GRANS. 0.2 0.0 - 0.5 K/UL   URINALYSIS W/ RFLX MICROSCOPIC    Collection Time: 10/27/21 11:01 AM   Result Value Ref Range    Color YELLOW      Appearance CLEAR      Specific gravity 1.013 1.001 - 1.023      pH (UA) 5.5 5.0 - 9.0      Protein Negative NEG mg/dL    Glucose Negative mg/dL    Ketone Negative NEG mg/dL    Bilirubin Negative NEG      Blood Negative NEG      Urobilinogen 0.2 0.2 - 1.0 EU/dL    Nitrites Negative NEG      Leukocyte Esterase Negative NEG     RBC, ALLOCATE    Collection Time: 10/27/21 12:00 PM   Result Value Ref Range    HISTORY CHECKED?  Historical check performed    TROPONIN-HIGH SENSITIVITY    Collection Time: 10/27/21  4:23 PM   Result Value Ref Range    Troponin-High Sensitivity 21.3 (H) 0 - 14 pg/mL   CBC WITH AUTOMATED DIFF    Collection Time: 10/28/21  6:07 AM   Result Value Ref Range    WBC 15.6 (H) 4.3 - 11.1 K/uL    RBC 3.00 (L) 4.05 - 5.2 M/uL    HGB 8.8 (L) 11.7 - 15.4 g/dL    HCT 27.1 (L) 35.8 - 46.3 %    MCV 90.3 79.6 - 97.8 FL    MCH 29.3 26.1 - 32.9 PG    MCHC 32.5 31.4 - 35.0 g/dL    RDW 16.3 (H) 11.9 - 14.6 %    PLATELET 750 305 - 848 K/uL    MPV 9.8 9.4 - 12.3 FL    ABSOLUTE NRBC 0.00 0.0 - 0.2 K/uL    DF AUTOMATED      NEUTROPHILS 77 43 - 78 %    LYMPHOCYTES 12 (L) 13 - 44 %    MONOCYTES 9 4.0 - 12.0 %    EOSINOPHILS 1 0.5 - 7.8 %    BASOPHILS 0 0.0 - 2.0 %    IMMATURE GRANULOCYTES 1 0.0 - 5.0 %    ABS. NEUTROPHILS 12.0 (H) 1.7 - 8.2 K/UL    ABS. LYMPHOCYTES 1.9 0.5 - 4.6 K/UL    ABS. MONOCYTES 1.4 (H) 0.1 - 1.3 K/UL    ABS. EOSINOPHILS 0.1 0.0 - 0.8 K/UL    ABS. BASOPHILS 0.1 0.0 - 0.2 K/UL    ABS. IMM. GRANS. 0.1 0.0 - 0.5 K/UL   METABOLIC PANEL, BASIC    Collection Time: 10/28/21  6:07 AM   Result Value Ref Range    Sodium 139 136 - 145 mmol/L    Potassium 4.0 3.5 - 5.1 mmol/L    Chloride 109 (H) 98 - 107 mmol/L    CO2 27 21 - 32 mmol/L    Anion gap 3 (L) 7 - 16 mmol/L    Glucose 117 (H) 65 - 100 mg/dL    BUN 16 8 - 23 MG/DL    Creatinine 0.75 0.6 - 1.0 MG/DL    GFR est AA >60 >60 ml/min/1.73m2    GFR est non-AA >60 >60 ml/min/1.73m2    Calcium 7.7 (L) 8.3 - 10.4 MG/DL   PROTHROMBIN TIME + INR    Collection Time: 10/28/21  6:07 AM   Result Value Ref Range    Prothrombin time 14.4 12.6 - 14.5 sec    INR 1.1     VANCOMYCIN, RANDOM    Collection Time: 10/28/21  6:07 AM   Result Value Ref Range    Vancomycin, random 13.3 UG/ML   PROCALCITONIN    Collection Time: 10/28/21  6:07 AM   Result Value Ref Range    Procalcitonin 0.26 ng/mL       All Micro Results     Procedure Component Value Units Date/Time    MSSA/MRSA SC BY PCR, NASAL SWAB [[id number]] Collected: 10/25/21 0600    Order Status: Canceled Specimen: Swab           Other Studies:  US RETROPERITONEUM LTD    Result Date: 10/27/2021  EXAMINATION: RENAL ULTRASOUND 10/27/2021 7:20 PM ACCESSION NUMBER: [id number] INDICATION: ÁNGEL COMPARISON: Abdominal ultrasound 12/18/2014, CT abdomen and pelvis 9/20/2017 TECHNIQUE: Multiple real-time sonographic images of the kidneys and urinary bladder were obtained using a multihertz curvilinear transducer. FINDINGS: The right kidney measures 11.5 cm. The left kidney measures 11.6 cm. Normal cortical echogenicity bilaterally. No evidence of hydronephrosis bilaterally. The urinary bladder is decompressed with a Eugene catheter in place.      Unremarkable sonographic appearance of the kidneys, without evidence of obstructive uropathy.  VOICE DICTATED BY: Dr. Naz Ledezma       Current Meds:  Current Facility-Administered Medications   Medication Dose Route Frequency    vancomycin (VANCOCIN) 1500 mg in  ml infusion  1,500 mg IntraVENous Q18H    cefepime (MAXIPIME) 2 g in 0.9% sodium chloride (MBP/ADV) 100 mL MBP  2 g IntraVENous Q8H    albuterol (PROVENTIL VENTOLIN) nebulizer solution 2.5 mg  2.5 mg Nebulization Q6H PRN    0.9% sodium chloride infusion 250 mL  250 mL IntraVENous PRN    0.9% sodium chloride infusion  125 mL/hr IntraVENous CONTINUOUS    midodrine (PROAMATINE) tablet 10 mg  10 mg Oral TID WITH MEALS    NOREPINephrine (LEVOPHED) 4 mg in 5% dextrose 250 mL infusion  0.5-30 mcg/min IntraVENous TITRATE    aspirin delayed-release tablet 81 mg  81 mg Oral QHS    doxycycline (VIBRAMYCIN) capsule 100 mg  100 mg Oral DAILY    escitalopram oxalate (LEXAPRO) tablet 20 mg  20 mg Oral QAM    gabapentin (NEURONTIN) capsule 600 mg  600 mg Oral QHS    levothyroxine (SYNTHROID) tablet 50 mcg  50 mcg Oral ACB    LORazepam (ATIVAN) tablet 0.5 mg  0.5 mg Oral Q6H PRN    [Held by provider] metoprolol tartrate (LOPRESSOR) tablet 25 mg  25 mg Oral BID    pantoprazole (PROTONIX) tablet 40 mg  40 mg Oral ACB    potassium chloride (KLOR-CON) tablet 10 mEq  10 mEq Oral DAILY    [Held by provider] propafenone (RYTHMOL) tablet 150 mg  150 mg Oral BID    [Held by provider] rOPINIRole (REQUIP) tablet 0.5 mg  0.5 mg Oral QHS    traMADoL (ULTRAM) tablet 50 mg  50 mg Oral Q6H PRN    zolpidem (AMBIEN) tablet 10 mg  10 mg Oral QHS PRN    alcohol 62% (NOZIN) nasal  1 Ampule  1 Ampule Topical Q12H    sodium chloride (NS) flush 5-40 mL  5-40 mL IntraVENous Q8H    sodium chloride (NS) flush 5-40 mL  5-40 mL IntraVENous PRN    naloxone (NARCAN) injection 0.4 mg  0.4 mg IntraVENous PRN    phenol throat spray (CHLORASEPTIC) 1 Spray  1 Spray Oral PRN    diphenhydrAMINE (BENADRYL) capsule 25 mg  25 mg Oral Q4H PRN    docusate sodium (COLACE) capsule 100 mg  100 mg Oral BID    HYDROcodone-acetaminophen (NORCO)  mg tablet 1 Tablet  1 Tablet Oral Q4H PRN    HYDROmorphone (DILAUDID) injection 1 mg  1 mg IntraVENous Q4H PRN    ondansetron (ZOFRAN ODT) tablet 4 mg  4 mg Oral Q4H PRN    [Held by provider] lisinopriL (PRINIVIL, ZESTRIL) tablet 20 mg  20 mg Oral BID    And    [Held by provider] hydroCHLOROthiazide (MICROZIDE) capsule 12.5 mg  12.5 mg Oral BID       Signed:  Nyla Toure MD    Part of this note may have been written by using a voice dictation software. The note has been proof read but may still contain some grammatical/other typographical errors.

## 2021-10-29 ENCOUNTER — APPOINTMENT (OUTPATIENT)
Dept: NON INVASIVE DIAGNOSTICS | Age: 66
DRG: 453 | End: 2021-10-29
Attending: PHYSICIAN ASSISTANT
Payer: MEDICARE

## 2021-10-29 LAB
ANION GAP SERPL CALC-SCNC: 2 MMOL/L (ref 7–16)
BASOPHILS # BLD: 0.1 K/UL (ref 0–0.2)
BASOPHILS NFR BLD: 1 % (ref 0–2)
BUN SERPL-MCNC: 11 MG/DL (ref 8–23)
CALCIUM SERPL-MCNC: 8 MG/DL (ref 8.3–10.4)
CHLORIDE SERPL-SCNC: 110 MMOL/L (ref 98–107)
CO2 SERPL-SCNC: 30 MMOL/L (ref 21–32)
CREAT SERPL-MCNC: 0.46 MG/DL (ref 0.6–1)
DIFFERENTIAL METHOD BLD: ABNORMAL
ECHO AV AREA PEAK VELOCITY: 2.45 CM2
ECHO AV AREA VTI: 2.56 CM2
ECHO AV AREA/BSA PEAK VELOCITY: 1 CM2/M2
ECHO AV AREA/BSA VTI: 1.1 CM2/M2
ECHO AV CUSP MM: 1.6 CM
ECHO AV MEAN GRADIENT: 8 MMHG
ECHO AV PEAK GRADIENT: 19 MMHG
ECHO AV PEAK VELOCITY: 216 CM/S
ECHO AV VTI: 40.8 CM
ECHO EST RA PRESSURE: 3 MMHG
ECHO LA AREA 2C: 17 CM2
ECHO LA AREA 4C: 14.8 CM2
ECHO LA MAJOR AXIS: 4.72 CM
ECHO LA MINOR AXIS: 1.99 CM
ECHO LV E' LATERAL VELOCITY: 11.8 CM/S
ECHO LV E' SEPTAL VELOCITY: 7.93 CM/S
ECHO LV EDV A4C: 81.1 CM3
ECHO LV ESV A4C: 23.4 CM3
ECHO LV INTERNAL DIMENSION DIASTOLIC: 3.99 CM (ref 3.9–5.3)
ECHO LV INTERNAL DIMENSION SYSTOLIC: 2.97 CM
ECHO LV IVSD: 1.09 CM (ref 0.6–0.9)
ECHO LV MASS 2D: 135.7 G (ref 67–162)
ECHO LV MASS INDEX 2D: 57.2 G/M2 (ref 43–95)
ECHO LV POSTERIOR WALL DIASTOLIC: 1.01 CM (ref 0.6–0.9)
ECHO LVOT DIAM: 1.9 CM
ECHO LVOT PEAK GRADIENT: 14 MMHG
ECHO LVOT VTI: 36.8 CM
ECHO MV A VELOCITY: 76.2 CM/S
ECHO MV E DECELERATION TIME (DT): 194 MS
ECHO MV E VELOCITY: 127 CM/S
ECHO MV E/A RATIO: 1.67
ECHO MV E/E' LATERAL: 10.76
ECHO MV E/E' RATIO (AVERAGED): 13.39
ECHO MV E/E' SEPTAL: 16.02
ECHO MV MAX VELOCITY: 163 CM/S
ECHO MV MEAN GRADIENT: 3 MMHG
ECHO MV PEAK GRADIENT: 11 MMHG
ECHO MV VTI: 35.4 CM
ECHO RIGHT VENTRICULAR SYSTOLIC PRESSURE (RVSP): 61 MMHG
ECHO RV INTERNAL DIMENSION: 2.44 CM
ECHO RV TAPSE: 2.61 CM (ref 1.5–2)
ECHO TV REGURGITANT MAX VELOCITY: 381 CM/S
ECHO TV REGURGITANT PEAK GRADIENT: 58 MMHG
EOSINOPHIL # BLD: 0.2 K/UL (ref 0–0.8)
EOSINOPHIL NFR BLD: 2 % (ref 0.5–7.8)
ERYTHROCYTE [DISTWIDTH] IN BLOOD BY AUTOMATED COUNT: 16.7 % (ref 11.9–14.6)
GLUCOSE SERPL-MCNC: 100 MG/DL (ref 65–100)
HCT VFR BLD AUTO: 25.1 % (ref 35.8–46.3)
HCT VFR BLD AUTO: 25.8 % (ref 35.8–46.3)
HGB BLD-MCNC: 8.1 G/DL (ref 11.7–15.4)
HGB BLD-MCNC: 8.2 G/DL (ref 11.7–15.4)
IMM GRANULOCYTES # BLD AUTO: 0.1 K/UL (ref 0–0.5)
IMM GRANULOCYTES NFR BLD AUTO: 1 % (ref 0–5)
LYMPHOCYTES # BLD: 1.3 K/UL (ref 0.5–4.6)
LYMPHOCYTES NFR BLD: 11 % (ref 13–44)
MAGNESIUM SERPL-MCNC: 1.6 MG/DL (ref 1.8–2.4)
MCH RBC QN AUTO: 29.7 PG (ref 26.1–32.9)
MCHC RBC AUTO-ENTMCNC: 32.3 G/DL (ref 31.4–35)
MCV RBC AUTO: 91.9 FL (ref 79.6–97.8)
MONOCYTES # BLD: 0.9 K/UL (ref 0.1–1.3)
MONOCYTES NFR BLD: 8 % (ref 4–12)
NEUTS SEG # BLD: 8.8 K/UL (ref 1.7–8.2)
NEUTS SEG NFR BLD: 77 % (ref 43–78)
NRBC # BLD: 0 K/UL (ref 0–0.2)
PLATELET # BLD AUTO: 187 K/UL (ref 150–450)
PMV BLD AUTO: 9.7 FL (ref 9.4–12.3)
POTASSIUM SERPL-SCNC: 3.8 MMOL/L (ref 3.5–5.1)
RBC # BLD AUTO: 2.73 M/UL (ref 4.05–5.2)
SODIUM SERPL-SCNC: 142 MMOL/L (ref 136–145)
WBC # BLD AUTO: 11.4 K/UL (ref 4.3–11.1)

## 2021-10-29 PROCEDURE — 99223 1ST HOSP IP/OBS HIGH 75: CPT | Performed by: INTERNAL MEDICINE

## 2021-10-29 PROCEDURE — 65610000006 HC RM INTENSIVE CARE

## 2021-10-29 PROCEDURE — 74011000250 HC RX REV CODE- 250: Performed by: STUDENT IN AN ORGANIZED HEALTH CARE EDUCATION/TRAINING PROGRAM

## 2021-10-29 PROCEDURE — 85018 HEMOGLOBIN: CPT

## 2021-10-29 PROCEDURE — 97164 PT RE-EVAL EST PLAN CARE: CPT

## 2021-10-29 PROCEDURE — 36415 COLL VENOUS BLD VENIPUNCTURE: CPT

## 2021-10-29 PROCEDURE — 80048 BASIC METABOLIC PNL TOTAL CA: CPT

## 2021-10-29 PROCEDURE — 85025 COMPLETE CBC W/AUTO DIFF WBC: CPT

## 2021-10-29 PROCEDURE — 74011000258 HC RX REV CODE- 258: Performed by: STUDENT IN AN ORGANIZED HEALTH CARE EDUCATION/TRAINING PROGRAM

## 2021-10-29 PROCEDURE — 74011250637 HC RX REV CODE- 250/637: Performed by: STUDENT IN AN ORGANIZED HEALTH CARE EDUCATION/TRAINING PROGRAM

## 2021-10-29 PROCEDURE — 74011250637 HC RX REV CODE- 250/637: Performed by: ORTHOPAEDIC SURGERY

## 2021-10-29 PROCEDURE — 83735 ASSAY OF MAGNESIUM: CPT

## 2021-10-29 PROCEDURE — 77010033678 HC OXYGEN DAILY

## 2021-10-29 PROCEDURE — 74011250636 HC RX REV CODE- 250/636: Performed by: ORTHOPAEDIC SURGERY

## 2021-10-29 PROCEDURE — 74011250636 HC RX REV CODE- 250/636: Performed by: STUDENT IN AN ORGANIZED HEALTH CARE EDUCATION/TRAINING PROGRAM

## 2021-10-29 PROCEDURE — 74011000250 HC RX REV CODE- 250: Performed by: ORTHOPAEDIC SURGERY

## 2021-10-29 PROCEDURE — C8929 TTE W OR WO FOL WCON,DOPPLER: HCPCS

## 2021-10-29 PROCEDURE — 97530 THERAPEUTIC ACTIVITIES: CPT

## 2021-10-29 RX ORDER — FAMOTIDINE 20 MG/1
20 TABLET, FILM COATED ORAL EVERY EVENING
Status: DISCONTINUED | OUTPATIENT
Start: 2021-10-29 | End: 2021-11-03 | Stop reason: HOSPADM

## 2021-10-29 RX ORDER — POTASSIUM CHLORIDE 20 MEQ/1
40 TABLET, EXTENDED RELEASE ORAL
Status: COMPLETED | OUTPATIENT
Start: 2021-10-29 | End: 2021-10-29

## 2021-10-29 RX ORDER — MAGNESIUM SULFATE HEPTAHYDRATE 40 MG/ML
4 INJECTION, SOLUTION INTRAVENOUS ONCE
Status: COMPLETED | OUTPATIENT
Start: 2021-10-29 | End: 2021-10-29

## 2021-10-29 RX ADMIN — Medication 1 AMPULE: at 08:08

## 2021-10-29 RX ADMIN — MIDODRINE HYDROCHLORIDE 10 MG: 5 TABLET ORAL at 11:38

## 2021-10-29 RX ADMIN — LEVOTHYROXINE SODIUM 50 MCG: 0.05 TABLET ORAL at 05:00

## 2021-10-29 RX ADMIN — MIDODRINE HYDROCHLORIDE 10 MG: 5 TABLET ORAL at 17:43

## 2021-10-29 RX ADMIN — ESCITALOPRAM OXALATE 20 MG: 10 TABLET ORAL at 08:10

## 2021-10-29 RX ADMIN — HYDROMORPHONE HYDROCHLORIDE 1 MG: 1 INJECTION, SOLUTION INTRAMUSCULAR; INTRAVENOUS; SUBCUTANEOUS at 06:22

## 2021-10-29 RX ADMIN — Medication 10 ML: at 14:00

## 2021-10-29 RX ADMIN — NOREPINEPHRINE BITARTRATE 2 MCG/MIN: 1 INJECTION, SOLUTION INTRAVENOUS at 13:07

## 2021-10-29 RX ADMIN — DOCUSATE SODIUM 100 MG: 100 CAPSULE, LIQUID FILLED ORAL at 17:43

## 2021-10-29 RX ADMIN — Medication 10 ML: at 05:00

## 2021-10-29 RX ADMIN — Medication 1 AMPULE: at 21:13

## 2021-10-29 RX ADMIN — GABAPENTIN 300 MG: 300 CAPSULE ORAL at 08:07

## 2021-10-29 RX ADMIN — MIDODRINE HYDROCHLORIDE 10 MG: 5 TABLET ORAL at 08:10

## 2021-10-29 RX ADMIN — GABAPENTIN 300 MG: 300 CAPSULE ORAL at 21:14

## 2021-10-29 RX ADMIN — ZOLPIDEM TARTRATE 10 MG: 5 TABLET ORAL at 21:14

## 2021-10-29 RX ADMIN — SODIUM CHLORIDE 125 ML/HR: 900 INJECTION, SOLUTION INTRAVENOUS at 23:15

## 2021-10-29 RX ADMIN — POTASSIUM CHLORIDE 10 MEQ: 10 TABLET, EXTENDED RELEASE ORAL at 08:07

## 2021-10-29 RX ADMIN — PROPAFENONE HYDROCHLORIDE 150 MG: 150 TABLET, FILM COATED ORAL at 09:00

## 2021-10-29 RX ADMIN — PANTOPRAZOLE SODIUM 40 MG: 40 TABLET, DELAYED RELEASE ORAL at 05:00

## 2021-10-29 RX ADMIN — SODIUM CHLORIDE 125 ML/HR: 900 INJECTION, SOLUTION INTRAVENOUS at 04:47

## 2021-10-29 RX ADMIN — Medication 10 ML: at 21:15

## 2021-10-29 RX ADMIN — DOXYCYCLINE HYCLATE 100 MG: 100 CAPSULE ORAL at 08:07

## 2021-10-29 RX ADMIN — TRAMADOL HYDROCHLORIDE 50 MG: 50 TABLET ORAL at 21:14

## 2021-10-29 RX ADMIN — MAGNESIUM SULFATE HEPTAHYDRATE 4 G: 40 INJECTION, SOLUTION INTRAVENOUS at 18:21

## 2021-10-29 RX ADMIN — DOXYCYCLINE HYCLATE 100 MG: 100 CAPSULE ORAL at 21:14

## 2021-10-29 RX ADMIN — HYDROCODONE BITARTRATE AND ACETAMINOPHEN 1 TABLET: 10; 325 TABLET ORAL at 11:44

## 2021-10-29 RX ADMIN — ASPIRIN 81 MG: 81 TABLET ORAL at 21:14

## 2021-10-29 RX ADMIN — LEVOFLOXACIN 500 MG: 500 TABLET, FILM COATED ORAL at 08:07

## 2021-10-29 RX ADMIN — POTASSIUM CHLORIDE 40 MEQ: 20 TABLET, EXTENDED RELEASE ORAL at 15:17

## 2021-10-29 RX ADMIN — FAMOTIDINE 20 MG: 20 TABLET, FILM COATED ORAL at 17:43

## 2021-10-29 RX ADMIN — DOCUSATE SODIUM 100 MG: 100 CAPSULE, LIQUID FILLED ORAL at 08:07

## 2021-10-29 RX ADMIN — SODIUM CHLORIDE 125 ML/HR: 900 INJECTION, SOLUTION INTRAVENOUS at 14:15

## 2021-10-29 RX ADMIN — PERFLUTREN 1 ML: 6.52 INJECTION, SUSPENSION INTRAVENOUS at 13:50

## 2021-10-29 NOTE — PROGRESS NOTES
Hospitalist Progress Note   Admit Date:  10/25/2021  5:34 AM   Name:  Gayathri Ramírez   Age:  73 y.o. Sex:  female  :  1955   MRN:  [id number]   Room:  [facility]/    Presenting Complaint: No chief complaint on file. Reason(s) for Admission: Lumbar stenosis with neurogenic claudication [M48.062]  Spondylolisthesis of lumbar region [M43.16]  Spinal stenosis [facility] Course & Interval History:   Gayathri Ramírez is a 73 y.o. female with history of Afib s/p ablation, Hypothyroidism, OA, lumbar spinal stenosis, HTN, morbid obesity, JULIANN who was admitted for lumbar laminectomy. Patient is postop day 1 from L2 L4 lumbar direct lateral interbody fusion with anterior plating and redo of L2 L5 laminectomy by spinal Ortho. Patient has multiple drains in place postoperatively. patient received dose of IV Dilaudid as well as multiple blood pressure medications the morning of initial hypotension. She subsequently suffered from hypotension with BP 70/54. She also was working with PT/OT and became significantly hypotensive when standing up. She did complain of orthostasis at this time. She was given normal saline IV fluid boluses but remains hypotensive with MAPS as low as 49 during my evaluation. Of note, she also received Midodrine 20mg once without no improvement in MAPs. Patient complains of significant drowsiness, and is hardly able to keep her eyes open with current low BP's. She denies any shortness of breath, chest pain or pressure, palpitations, nausea, vomiting, fevers or chills, diaphoresis, near-syncope or syncope. Patient is currently suffering from ÁNGEL with bump in creatinine and WBC this AM.     Subjective (10/29/21):  Patient seen and examined at bedside this morning. Patient remains in ICU  For levophed. She was able to be weaned off Levophed yesterday with stable blood pressures. I also held her Rythmol yesterday which seemed to improve her episodes of hypotension.   After receiving Rythmol yesterday afternoon, her blood pressures began to decline overnight. She did require Levophed overnight but was weaned off this morning. Unfortunately, patient became hypotensive after Rythmol administration as well. Cardiology was consulted for assistance with hypotension in the setting of continued atrial fib. Patient states that she otherwise feels well this morning denies any chest pain or pressure, shortness of breath, nausea, vomiting, diaphoresis, shoulder or jaw pain, palpitations, fevers, chills, anorexia, near-syncope or syncope.     Assessment & Plan:     Principal Problem:  #Hypotension  - likely multifactorial due to post-operative blood loss and medications including pain meds, multiple antihypertensives   - s/p 2L NS boluses and Midodrine 20mg without any improvement in Bps  - post-op HH 7.3 from 11.2 prior to surgery  - WBC up to 27k, s/p Vanc/Cefepime for 1 day and Wbc down to 11.4K this Am - transitioned to Levaquin and Doxycycline PO for empiric coverage x 5 days and monitor for signs of sepsis, has not spiked any fevers- consider BCx's if clinical status worsens or fevers  - EKG without ST elevation or depression, check Trop wnl  - cont scheduled midodrine   - cont Levophed due to persistent hypotension  - Hgb down to 8.1 this AM from 8.8 yesterday, recheck [place] this afternoon and consider repeat blood transfusion if decreasing and remains hypotensive, Ortho cleared patient to restart Eliquis but will hold for now in setting of possible continued post-op blood loss  - cont to hold all BP meds, home requip with continued hypotension   - Will continue holding rythmol with clear hypotensive episode after rythmol administration     #ÁNGEL  - Cr peaked at 1.90 from normal baseline  - likely hypovolemic s/p Lasix, HCTZ, Lisinopril and persistent hypotension  - UA and ELAINE unremarkable  - Urinary retention with >700cc in bladder - cont laboy for now  - on mIVF and Cr now down to 0.46 this AM     #Afib  - s/p failed Ablations x2, managed with Rythmol, metoprolol and Eliquis outpt  - holding BB due to hypotension  - holding rythmol at this time with obvious correlation of hypotensive episodes with rythmol administration  - cont holding Eliquis with possible post-op blood loss   - keep K>4, Mg>2  - monitor on tele    #Lumbar stenosis with neurogenic claudication   - s/p laminectomy and drain placements per ortho spine  - management per primary ortho spine  - avoid opioids in setting of hypotension at this time     There is a high probability of acute organ impairment or life-threatening deterioration in the patient's condition from: persistent hypotension     Critical care interventions: Vasopressor support with Levo     Total critical care time spent: 38 minutes. Time is indicative of direct patient attendance at bedside and on the patient's floor nearby. Includes time spent at bedside performing history and exam, performing chart review, discussing findings and treatment plan with patient and/or family, discussing patient with consultants and colleagues, ordering and reviewing pertinent laboratory and radiographic evaluations, and discussing patient with nursing staff. Time excludes procedures. Dispo/Discharge Planning:    Home with HH vs rehab when able to participate with PT/OT.      Diet:  ADULT DIET Regular  DVT PPx: SCDs  Code status: No Order    Hospital Problems as of 10/29/2021 Date Reviewed: 9/22/2021        Codes Class Noted - Resolved POA    Hypovolemic shock ([facility] [address] ICD-10-CM: R57.1  ICD-9-CM: 785.59  10/26/2021 - Present Unknown        Hypotension ICD-10-CM: I95.9  ICD-9-CM: 458.9  10/26/2021 - Present Unknown        Spondylolisthesis of multiple sites in spine ICD-10-CM: M43.19  ICD-9-CM: 738.4  10/25/2021 - Present Yes        Spinal stenosis ICD-10-CM: M48.00  ICD-9-CM: 724.00  10/25/2021 - Present Unknown        * (Principal) Lumbar stenosis with neurogenic claudication ICD-10-CM: R68.991  ICD-9-CM: 724.03  3/28/2016 - Present Yes              Objective:     Patient Vitals for the past 24 hrs:   Temp Pulse Resp BP SpO2   10/29/21 1403 -- 86 19 (!) 116/59 98 %   10/29/21 1333 -- 85 13 (!) 127/52 95 %   10/29/21 1303 -- 94 17 109/63 (!) 89 %   10/29/21 1232 -- 94 16 105/61 96 %   10/29/21 1203 -- 95 23 93/65 98 %   10/29/21 1133 -- 89 20 (!) 135/57 95 %   10/29/21 1130 -- -- -- (!) 89/59 --   10/29/21 1109 -- 96 25 (!) 140/64 96 %   10/29/21 1100 97.2 °F (36.2 °C) -- -- -- --   10/29/21 1048 -- 86 28 (!) 91/44 96 %   10/29/21 1033 -- 83 17 (!) 87/37 97 %   10/29/21 1017 -- 77 9 (!) 80/51 95 %   10/29/21 0958 -- 90 13 (!) 82/41 95 %   10/29/21 0932 -- 98 19 (!) 86/50 97 %   10/29/21 0904 -- 84 14 (!) 89/55 95 %   10/29/21 0832 -- 82 19 (!) 103/52 95 %   10/29/21 0813 -- -- -- -- 100 %   10/29/21 0803 -- (!) 109 26 109/61 100 %   10/29/21 0733 -- -- -- (!) 85/41 100 %   10/29/21 0717 -- 90 13 (!) 94/51 100 %   10/29/21 0706 97.7 °F (36.5 °C) 93 10 (!) 97/47 100 %   10/29/21 0632 -- 88 8 (!) 94/48 99 %   10/29/21 0603 -- 94 (!) 35 99/60 100 %   10/29/21 0532 -- 95 18 (!) 104/49 100 %   10/29/21 0517 -- 99 25 (!) 119/54 100 %   10/29/21 0503 -- 81 17 (!) 133/57 100 %   10/29/21 0447 -- 98 24 (!) 137/58 100 %   10/29/21 0417 -- 79 18 (!) 116/59 100 %   10/29/21 0403 -- 79 11 120/63 100 %   10/29/21 0347 -- 79 15 (!) 131/58 100 %   10/29/21 0332 -- 83 11 (!) 130/56 100 %   10/29/21 0317 -- 81 12 (!) 120/59 100 %   10/29/21 0302 98.4 °F (36.9 °C) 80 17 122/62 99 %   10/29/21 0247 -- 81 16 (!) 108/56 100 %   10/29/21 0232 -- 75 28 120/60 100 %   10/29/21 0217 -- 72 -- (!) 97/50 99 %   10/29/21 0204 -- 73 11 (!) 106/53 100 %   10/29/21 0147 -- 74 19 (!) 82/49 100 %   10/29/21 0132 -- 74 11 (!) 83/51 100 %   10/29/21 0117 -- 79 18 (!) 83/46 100 %   10/29/21 0102 -- 76 -- (!) 87/42 99 %   10/29/21 0047 -- 78 17 (!) 85/45 99 %   10/29/21 0032 -- 83 15 (!) 84/45 99 %   10/29/21 0017 -- 84 10 (!) 80/43 99 %   10/29/21 0003 -- 79 13 (!) 93/47 99 %   10/28/21 2347 -- 83 18 (!) 77/47 98 %   10/28/21 2332 -- 89 9 (!) 93/47 98 %   10/28/21 2316 -- 86 12 (!) 77/50 98 %   10/28/21 2302 98.9 °F (37.2 °C) 86 18 (!) 85/47 98 %   10/28/21 2202 -- 89 24 110/60 96 %   10/28/21 2132 -- 87 23 (!) 119/56 96 %   10/28/21 2102 -- 86 24 (!) 99/45 94 %   10/28/21 2032 -- 87 20 (!) 112/48 96 %   10/28/21 2003 -- -- 18 -- --   10/28/21 2002 -- 88 -- (!) 113/48 97 %   10/28/21 1932 -- 90 12 (!) 104/57 97 %   10/28/21 1902 99 °F (37.2 °C) 90 22 (!) 106/43 98 %   10/28/21 1847 -- 91 23 (!) 95/50 99 %   10/28/21 1839 -- 90 23 (!) 106/56 97 %   10/28/21 1817 -- 92 23 (!) 94/49 99 %   10/28/21 1802 -- 94 (!) 39 (!) 97/46 97 %   10/28/21 1751 -- 93 23 (!) 116/58 97 %   10/28/21 1732 -- 90 15 (!) 107/49 97 %   10/28/21 1647 -- 92 19 (!) 100/48 93 %   10/28/21 1601 -- 84 17 (!) 111/49 94 %   10/28/21 1547 -- 88 14 (!) 105/54 96 %   10/28/21 1532 99.3 °F (37.4 °C) 96 17 (!) 98/49 96 %   10/28/21 1518 -- 96 12 -- 94 %   10/28/21 1517 -- 94 23 (!) 99/49 --   10/28/21 1501 -- 87 25 (!) 102/46 98 %   10/28/21 1447 -- 88 21 (!) 91/48 97 %   10/28/21 1432 -- 87 16 (!) 112/59 100 %   10/28/21 1429 -- 87 17 (!) 119/44 99 %   10/28/21 1417 -- 86 18 (!) 108/53 99 %     Oxygen Therapy  O2 Sat (%): 98 % (10/29/21 1403)  Pulse via Oximetry: 88 beats per minute (10/29/21 1403)  O2 Device: None (Room air) (10/29/21 9893)  Skin Assessment: Clean, dry, & intact (10/28/21 1532)  Skin Protection for O2 Device: No (10/27/21 0810)  O2 Flow Rate (L/min): 0 l/min (10/29/21 0814)    Estimated body mass index is 54 kg/m² as calculated from the following:    Height as of this encounter: 5' 4\" (1.626 m). Weight as of this encounter: 142.7 kg (314 lb 9.5 oz).     Intake/Output Summary (Last 24 hours) at 10/29/2021 1411  Last data filed at 10/29/2021 1100  Gross per 24 hour   Intake 3296.59 ml   Output 1391 ml   Net 1905.59 ml         Physical Exam:     Blood pressure (!) 116/59, pulse 86, temperature 97.2 °F (36.2 °C), resp. rate 19, height 5' 4\" (1.626 m), weight 142.7 kg (314 lb 9.5 oz), SpO2 98 %. General:    Well nourished. No overt distress  Head:  Normocephalic, atraumatic  Eyes:  Sclerae appear normal.  Pupils equally round. ENT:  Nares appear normal, no drainage. Moist oral mucosa  Neck:  No restricted ROM. Trachea midline   CV:   RRR. No m/r/g. No jugular venous distension. Lungs:   CTAB. No wheezing, rhonchi, or rales. Respirations even, unlabored  Abdomen: Bowel sounds present. Soft, nontender, nondistended. Extremities: No cyanosis or clubbing. No edema  Skin:     No rashes and normal coloration. Warm and dry. Back:   Surgical dressing with slight drainage present, drains x2 in place with bloody output  Neuro:  CN II-XII grossly intact. Sensation intact. A&Ox3  Psych:  Normal mood and affect. I have reviewed ordered lab tests and independently visualized imaging below:    Recent Labs:  Recent Results (from the past 48 hour(s))   TROPONIN-HIGH SENSITIVITY    Collection Time: 10/27/21  4:23 PM   Result Value Ref Range    Troponin-High Sensitivity 21.3 (H) 0 - 14 pg/mL   CBC WITH AUTOMATED DIFF    Collection Time: 10/28/21  6:07 AM   Result Value Ref Range    WBC 15.6 (H) 4.3 - 11.1 K/uL    RBC 3.00 (L) 4.05 - 5.2 M/uL    HGB 8.8 (L) 11.7 - 15.4 g/dL    HCT 27.1 (L) 35.8 - 46.3 %    MCV 90.3 79.6 - 97.8 FL    MCH 29.3 26.1 - 32.9 PG    MCHC 32.5 31.4 - 35.0 g/dL    RDW 16.3 (H) 11.9 - 14.6 %    PLATELET 379 752 - 701 K/uL    MPV 9.8 9.4 - 12.3 FL    ABSOLUTE NRBC 0.00 0.0 - 0.2 K/uL    DF AUTOMATED      NEUTROPHILS 77 43 - 78 %    LYMPHOCYTES 12 (L) 13 - 44 %    MONOCYTES 9 4.0 - 12.0 %    EOSINOPHILS 1 0.5 - 7.8 %    BASOPHILS 0 0.0 - 2.0 %    IMMATURE GRANULOCYTES 1 0.0 - 5.0 %    ABS. NEUTROPHILS 12.0 (H) 1.7 - 8.2 K/UL    ABS. LYMPHOCYTES 1.9 0.5 - 4.6 K/UL    ABS. MONOCYTES 1.4 (H) 0.1 - 1.3 K/UL    ABS.  EOSINOPHILS 0.1 0.0 - 0.8 K/UL    ABS. BASOPHILS 0.1 0.0 - 0.2 K/UL    ABS. IMM.  GRANS. 0.1 0.0 - 0.5 K/UL   METABOLIC PANEL, BASIC    Collection Time: 10/28/21  6:07 AM   Result Value Ref Range    Sodium 139 136 - 145 mmol/L    Potassium 4.0 3.5 - 5.1 mmol/L    Chloride 109 (H) 98 - 107 mmol/L    CO2 27 21 - 32 mmol/L    Anion gap 3 (L) 7 - 16 mmol/L    Glucose 117 (H) 65 - 100 mg/dL    BUN 16 8 - 23 MG/DL    Creatinine 0.75 0.6 - 1.0 MG/DL    GFR est AA >60 >60 ml/min/1.73m2    GFR est non-AA >60 >60 ml/min/1.73m2    Calcium 7.7 (L) 8.3 - 10.4 MG/DL   PROTHROMBIN TIME + INR    Collection Time: 10/28/21  6:07 AM   Result Value Ref Range    Prothrombin time 14.4 12.6 - 14.5 sec    INR 1.1     VANCOMYCIN, RANDOM    Collection Time: 10/28/21  6:07 AM   Result Value Ref Range    Vancomycin, random 13.3 UG/ML   PROCALCITONIN    Collection Time: 10/28/21  6:07 AM   Result Value Ref Range    Procalcitonin 5.37 ng/mL   METABOLIC PANEL, BASIC    Collection Time: 10/29/21  6:39 AM   Result Value Ref Range    Sodium 142 136 - 145 mmol/L    Potassium 3.8 3.5 - 5.1 mmol/L    Chloride 110 (H) 98 - 107 mmol/L    CO2 30 21 - 32 mmol/L    Anion gap 2 (L) 7 - 16 mmol/L    Glucose 100 65 - 100 mg/dL    BUN 11 8 - 23 MG/DL    Creatinine 0.46 (L) 0.6 - 1.0 MG/DL    GFR est AA >60 >60 ml/min/1.73m2    GFR est non-AA >60 >60 ml/min/1.73m2    Calcium 8.0 (L) 8.3 - 10.4 MG/DL   CBC WITH AUTOMATED DIFF    Collection Time: 10/29/21  6:39 AM   Result Value Ref Range    WBC 11.4 (H) 4.3 - 11.1 K/uL    RBC 2.73 (L) 4.05 - 5.2 M/uL    HGB 8.1 (L) 11.7 - 15.4 g/dL    HCT 25.1 (L) 35.8 - 46.3 %    MCV 91.9 79.6 - 97.8 FL    MCH 29.7 26.1 - 32.9 PG    MCHC 32.3 31.4 - 35.0 g/dL    RDW 16.7 (H) 11.9 - 14.6 %    PLATELET 703 966 - 676 K/uL    MPV 9.7 9.4 - 12.3 FL    ABSOLUTE NRBC 0.00 0.0 - 0.2 K/uL    DF AUTOMATED      NEUTROPHILS 77 43 - 78 %    LYMPHOCYTES 11 (L) 13 - 44 %    MONOCYTES 8 4.0 - 12.0 %    EOSINOPHILS 2 0.5 - 7.8 %    BASOPHILS 1 0.0 - 2.0 %    IMMATURE GRANULOCYTES 1 0.0 - 5.0 %    ABS. NEUTROPHILS 8.8 (H) 1.7 - 8.2 K/UL    ABS. LYMPHOCYTES 1.3 0.5 - 4.6 K/UL    ABS. MONOCYTES 0.9 0.1 - 1.3 K/UL    ABS. EOSINOPHILS 0.2 0.0 - 0.8 K/UL    ABS. BASOPHILS 0.1 0.0 - 0.2 K/UL    ABS. IMM. GRANS. 0.1 0.0 - 0.5 K/UL       All Micro Results     Procedure Component Value Units Date/Time    MSSA/MRSA SC BY PCR, NASAL SWAB [[id number]] Collected: 10/25/21 0600    Order Status: Canceled Specimen: Swab           Other Studies:  No results found.     Current Meds:  Current Facility-Administered Medications   Medication Dose Route Frequency    famotidine (PEPCID) tablet 20 mg  20 mg Oral QPM    [Held by provider] apixaban (ELIQUIS) tablet 5 mg  5 mg Oral BID    levoFLOXacin (LEVAQUIN) tablet 500 mg  500 mg Oral Q24H    doxycycline (VIBRAMYCIN) capsule 100 mg  100 mg Oral Q12H    gabapentin (NEURONTIN) capsule 300 mg  300 mg Oral Q12H    albuterol (PROVENTIL VENTOLIN) nebulizer solution 2.5 mg  2.5 mg Nebulization Q6H PRN    0.9% sodium chloride infusion 250 mL  250 mL IntraVENous PRN    0.9% sodium chloride infusion  125 mL/hr IntraVENous CONTINUOUS    midodrine (PROAMATINE) tablet 10 mg  10 mg Oral TID WITH MEALS    NOREPINephrine (LEVOPHED) 4 mg in 5% dextrose 250 mL infusion  0.5-30 mcg/min IntraVENous TITRATE    aspirin delayed-release tablet 81 mg  81 mg Oral QHS    escitalopram oxalate (LEXAPRO) tablet 20 mg  20 mg Oral QAM    levothyroxine (SYNTHROID) tablet 50 mcg  50 mcg Oral ACB    LORazepam (ATIVAN) tablet 0.5 mg  0.5 mg Oral Q6H PRN    [Held by provider] metoprolol tartrate (LOPRESSOR) tablet 25 mg  25 mg Oral BID    pantoprazole (PROTONIX) tablet 40 mg  40 mg Oral ACB    potassium chloride (KLOR-CON) tablet 10 mEq  10 mEq Oral DAILY    [Held by provider] propafenone (RYTHMOL) tablet 150 mg  150 mg Oral BID    [Held by provider] rOPINIRole (REQUIP) tablet 0.5 mg  0.5 mg Oral QHS    traMADoL (ULTRAM) tablet 50 mg  50 mg Oral Q6H PRN    zolpidem (AMBIEN) tablet 10 mg  10 mg Oral QHS PRN    alcohol 62% (NOZIN) nasal  1 Ampule  1 Ampule Topical Q12H    sodium chloride (NS) flush 5-40 mL  5-40 mL IntraVENous Q8H    sodium chloride (NS) flush 5-40 mL  5-40 mL IntraVENous PRN    naloxone (NARCAN) injection 0.4 mg  0.4 mg IntraVENous PRN    phenol throat spray (CHLORASEPTIC) 1 Spray  1 Spray Oral PRN    diphenhydrAMINE (BENADRYL) capsule 25 mg  25 mg Oral Q4H PRN    docusate sodium (COLACE) capsule 100 mg  100 mg Oral BID    HYDROcodone-acetaminophen (NORCO)  mg tablet 1 Tablet  1 Tablet Oral Q4H PRN    HYDROmorphone (DILAUDID) injection 1 mg  1 mg IntraVENous Q4H PRN    ondansetron (ZOFRAN ODT) tablet 4 mg  4 mg Oral Q4H PRN    [Held by provider] lisinopriL (PRINIVIL, ZESTRIL) tablet 20 mg  20 mg Oral BID    And    [Held by provider] hydroCHLOROthiazide (MICROZIDE) capsule 12.5 mg  12.5 mg Oral BID       Signed:  Rolanda Bamberger, MD    Part of this note may have been written by using a voice dictation software. The note has been proof read but may still contain some grammatical/other typographical errors.

## 2021-10-29 NOTE — H&P
[facility] Cardiology Initial Cardiac Evaluation      Date of  Admission: 10/25/2021  5:34 AM     Primary Care Physician: Nicki Portillo MD  Primary Cardiologist: [facility]  Referring Physician: Dr Mahesh Leonard  Supervising Physician: Dr Carmen Michele    CC/Reason for evaluation: atrial fibrillation, intolerant to Rythmol due to hypotension     HPI:  Erendira Ashraf is a 73 y.o. female with PMH of typical atrial flutter with hx ablation in 2017 and 2020, persistent atrial fibrillation with hx afib ablation in 2018, palpitations, HTN, GERD, hypothyroidism, JULIANN, OA, lumbar stenosis with neurogenic claudication, spondylolisthesis of lumbar region, and morbid obesity who presented to [facility] for  L2 L4 lumbar direct lateral interbody fusion with anterior plating and redo of L2 L5 laminectomy. Post op day 1 patient noted to have hypotension. Pt started on midodrine. Blood pressure continued to be low. Hospitalist consulted and patient transferred to ICU with levophed gtt. Patient's antihypertensive medications have been held secondary to hypotension. Cardiology consulted for atrial fibrillation and intolerant to rythmol due to hypotension.      Past Medical History:   Diagnosis Date    A-fib [facility])     Acquired hypothyroidism 9/13/2017    Anxiety and depression     Arthritis     osteo    Chronic pain     back and R leg    GERD (gastroesophageal reflux disease)     well controlled with med    H/O bone density study 10/2011    normal    Hypertension     controlled by med    Low back pain     Morbid obesity ([address]     bmi 48.63    Pneumonia     1/2020    Rectal bleeding onset x 2 months    Restless legs     Rosacea     Skin cancer     squamous- removed    Smoker     current 10/18/21 5-10 daily \"trying to quit\"-- previously 1 ppd x since age 22    Spinal stenosis     Staph infection 03/2016    from back surgery----- NOT mrsa    Suspected sleep apnea      test not completed 11/2018      Past Surgical History:   Procedure Laterality Date    COLONOSCOPY N/A 10/8/2018    COLONOSCOPY / BMI=50  performed by [facility] ENDOSCOPY    HX AFIB ABLATION  2017 & 2018    HX APPENDECTOMY  age 16    HX BACK SURGERY  2016    spine I&D post op infection    HX BREAST BIOPSY Right 2011    benign    HX CARPAL TUNNEL RELEASE Left     HX CHOLECYSTECTOMY  2014    HX COLONOSCOPY  2014         HX CYST REMOVAL      foot    HX KNEE ARTHROSCOPY Right     HX KNEE ARTHROSCOPY Left     HX LUMBAR LAMINECTOMY  2016    L2-S1    HX MALIGNANT SKIN LESION EXCISION      x 3     HX MALIGNANT SKIN LESION EXCISION      Jan 29,2019    HX MALIGNANT SKIN LESION EXCISION  06/16/2020    Nasal    HX MALIGNANT SKIN LESION EXCISION  06/30/2021    face    HX ORTHOPAEDIC      heel spur    HX ORTHOPAEDIC Left 2000's    achilles tendon    HX PELVIC LAPAROSCOPY      x 2    HX PREMALIG/BENIGN SKIN LESION EXCISION      HX GUSTAVO AND BSO  2004    HX TONSIL AND ADENOIDECTOMY  age 12   Anila Brooke TUBAL LIGATION      AK ELBOW ARTHROSCOP,DIAGNOSTIC Left 2013    along with carpal tunnel       Allergies   Allergen Reactions    Blue Dye Anaphylaxis     BLUE INDIGO DYE     Pcn [Penicillins] Hives    Adhesive Other (comments)     Skin irritation     Bee Venom Protein (Honey Bee) Swelling    Cefdinir Other (comments)     Tremor, can tolerate Ceftin       Social History     Socioeconomic History    Marital status:      Spouse name: Not on file    Number of children: Not on file    Years of education: Not on file    Highest education level: Not on file   Occupational History    Not on file   Tobacco Use    Smoking status: Current Every Day Smoker     Packs/day: 0.75     Years: 25.00     Pack years: 18.75     Types: Cigarettes    Smokeless tobacco: Never Used    Tobacco comment: 05-1 ppd since age 22 (quit briefly   Vaping Use    Vaping Use: Never used   Substance and Sexual Activity    Alcohol use: No    Drug use: No    Sexual activity: Not Currently Other Topics Concern     Service Not Asked    Blood Transfusions Not Asked    Caffeine Concern No    Occupational Exposure Not Asked    Hobby Hazards Not Asked    Sleep Concern Not Asked    Stress Concern Not Asked    Weight Concern Not Asked    Special Diet Not Asked    Back Care Not Asked    Exercise Yes    Bike Helmet Not Asked    Seat Belt Yes    Self-Exams Yes   Social History Narrative    No history of physical or sexual abuse feels safe at home     Social Determinants of Health     Financial Resource Strain:     Difficulty of Paying Living Expenses:    Food Insecurity:     Worried About Running Out of Food in the Last Year:     [address] in the Last Year:    Transportation Needs:     Lack of Transportation (Medical):      Lack of Transportation (Non-Medical):    Physical Activity:     Days of Exercise per Week:     Minutes of Exercise per Session:    Stress:     Feeling of Stress :    Social Connections:     Frequency of Communication with Friends and Family:     Frequency of Social Gatherings with Friends and Family:     Attends Gnosticist Services:     Active Member of Clubs or Organizations:     Attends Club or Organization Meetings:     Marital Status:    Intimate Partner Violence:     Fear of Current or Ex-Partner:     Emotionally Abused:     Physically Abused:     Sexually Abused:      Family History   Problem Relation Age of Onset    Cancer Mother         colon ca    Arthritis-rheumatoid Mother     Heart Disease Father     Diabetes Father     Hypertension Father         Current Facility-Administered Medications   Medication Dose Route Frequency    famotidine (PEPCID) tablet 20 mg  20 mg Oral QPM    apixaban (ELIQUIS) tablet 5 mg  5 mg Oral BID    levoFLOXacin (LEVAQUIN) tablet 500 mg  500 mg Oral Q24H    doxycycline (VIBRAMYCIN) capsule 100 mg  100 mg Oral Q12H    gabapentin (NEURONTIN) capsule 300 mg  300 mg Oral Q12H    albuterol (PROVENTIL VENTOLIN) nebulizer solution 2.5 mg  2.5 mg Nebulization Q6H PRN    0.9% sodium chloride infusion 250 mL  250 mL IntraVENous PRN    0.9% sodium chloride infusion  125 mL/hr IntraVENous CONTINUOUS    midodrine (PROAMATINE) tablet 10 mg  10 mg Oral TID WITH MEALS    NOREPINephrine (LEVOPHED) 4 mg in 5% dextrose 250 mL infusion  0.5-30 mcg/min IntraVENous TITRATE    aspirin delayed-release tablet 81 mg  81 mg Oral QHS    escitalopram oxalate (LEXAPRO) tablet 20 mg  20 mg Oral QAM    levothyroxine (SYNTHROID) tablet 50 mcg  50 mcg Oral ACB    LORazepam (ATIVAN) tablet 0.5 mg  0.5 mg Oral Q6H PRN    [Held by provider] metoprolol tartrate (LOPRESSOR) tablet 25 mg  25 mg Oral BID    pantoprazole (PROTONIX) tablet 40 mg  40 mg Oral ACB    potassium chloride (KLOR-CON) tablet 10 mEq  10 mEq Oral DAILY    [Held by provider] propafenone (RYTHMOL) tablet 150 mg  150 mg Oral BID    [Held by provider] rOPINIRole (REQUIP) tablet 0.5 mg  0.5 mg Oral QHS    traMADoL (ULTRAM) tablet 50 mg  50 mg Oral Q6H PRN    zolpidem (AMBIEN) tablet 10 mg  10 mg Oral QHS PRN    alcohol 62% (NOZIN) nasal  1 Ampule  1 Ampule Topical Q12H    sodium chloride (NS) flush 5-40 mL  5-40 mL IntraVENous Q8H    sodium chloride (NS) flush 5-40 mL  5-40 mL IntraVENous PRN    naloxone (NARCAN) injection 0.4 mg  0.4 mg IntraVENous PRN    phenol throat spray (CHLORASEPTIC) 1 Spray  1 Spray Oral PRN    diphenhydrAMINE (BENADRYL) capsule 25 mg  25 mg Oral Q4H PRN    docusate sodium (COLACE) capsule 100 mg  100 mg Oral BID    HYDROcodone-acetaminophen (NORCO)  mg tablet 1 Tablet  1 Tablet Oral Q4H PRN    HYDROmorphone (DILAUDID) injection 1 mg  1 mg IntraVENous Q4H PRN    ondansetron (ZOFRAN ODT) tablet 4 mg  4 mg Oral Q4H PRN    [Held by provider] lisinopriL (PRINIVIL, ZESTRIL) tablet 20 mg  20 mg Oral BID    And    [Held by provider] hydroCHLOROthiazide (MICROZIDE) capsule 12.5 mg  12.5 mg Oral BID       Review of Symptoms:    General: No weight changes, weakness, fever or chills  Skin: no rashes, lumps, or other skin changes  HEENT: no headache, dizziness, lightheadedness, vision changes, hearing changes, tinnitus, vertigo, sinus pressure/pain, bleeding gums, sore throat, or hoarseness  Neck: no swollen glands, goiter, pain or stiffness  Respiratory: No cough, sputum, hemoptysis, dyspnea, wheezing  Cardiovascular: + as per HPI  Gastrointestinal: Positive for GERD. No constipation, diarrhea, liver problems, or h/o GI bleed  Urinary: no frequency, urgency , hematuria, burning/pain with urination, recent flank pain, polyuria, nocturia, or difficulty urinating  Peripheral Vascular: Positive for claudication, bilateral lower extremity edema. No leg cramps, prior DVTs  Musculoskeletal: Positive for back pain. Psychiatric: Positive for depression   Neurological: no sensory or motor loss, seizures, syncope, tremors, numbness, no dementia  Hematologic: no anemia, easy bruising or bleeding  Endocrine: Positive for thyroid problems. No diabetes.        Subjective:     Physical Exam:    Vitals:    10/29/21 1100 10/29/21 1109 10/29/21 1130 10/29/21 1133   BP:  (!) 140/64 (!) 89/59 (!) 135/57   Pulse:  96  89   Resp:  25  20   Temp: 97.2 °F (36.2 °C)      SpO2:  96%  95%   Weight:       Height:           General: Well Developed, Well Nourished, No Acute Distress  HEENT: pupils equal and round, no abnormalities noted  Neck: supple, no JVD, no carotid bruits  Heart: IRR without murmurs or gallops  Lungs: Clear throughout auscultation bilaterally without adventitious sounds  Abd: soft, nontender, nondistended, with good bowel sounds  Ext: warm, 3+ edema, calves supple/nontender, pulses 2+ bilaterally  Skin: warm and dry  Psychiatric: Normal mood and affect  Neurologic: Alert and oriented X 3    Cardiographics    Telemetry: sinus rhythm  ECG: normal sinus rhythm    Labs:   Recent Results (from the past 24 hour(s))   METABOLIC PANEL, BASIC Collection Time: 10/29/21  6:39 AM   Result Value Ref Range    Sodium 142 136 - 145 mmol/L    Potassium 3.8 3.5 - 5.1 mmol/L    Chloride 110 (H) 98 - 107 mmol/L    CO2 30 21 - 32 mmol/L    Anion gap 2 (L) 7 - 16 mmol/L    Glucose 100 65 - 100 mg/dL    BUN 11 8 - 23 MG/DL    Creatinine 0.46 (L) 0.6 - 1.0 MG/DL    GFR est AA >60 >60 ml/min/1.73m2    GFR est non-AA >60 >60 ml/min/1.73m2    Calcium 8.0 (L) 8.3 - 10.4 MG/DL   CBC WITH AUTOMATED DIFF    Collection Time: 10/29/21  6:39 AM   Result Value Ref Range    WBC 11.4 (H) 4.3 - 11.1 K/uL    RBC 2.73 (L) 4.05 - 5.2 M/uL    HGB 8.1 (L) 11.7 - 15.4 g/dL    HCT 25.1 (L) 35.8 - 46.3 %    MCV 91.9 79.6 - 97.8 FL    MCH 29.7 26.1 - 32.9 PG    MCHC 32.3 31.4 - 35.0 g/dL    RDW 16.7 (H) 11.9 - 14.6 %    PLATELET 010 311 - 646 K/uL    MPV 9.7 9.4 - 12.3 FL    ABSOLUTE NRBC 0.00 0.0 - 0.2 K/uL    DF AUTOMATED      NEUTROPHILS 77 43 - 78 %    LYMPHOCYTES 11 (L) 13 - 44 %    MONOCYTES 8 4.0 - 12.0 %    EOSINOPHILS 2 0.5 - 7.8 %    BASOPHILS 1 0.0 - 2.0 %    IMMATURE GRANULOCYTES 1 0.0 - 5.0 %    ABS. NEUTROPHILS 8.8 (H) 1.7 - 8.2 K/UL    ABS. LYMPHOCYTES 1.3 0.5 - 4.6 K/UL    ABS. MONOCYTES 0.9 0.1 - 1.3 K/UL    ABS. EOSINOPHILS 0.2 0.0 - 0.8 K/UL    ABS. BASOPHILS 0.1 0.0 - 0.2 K/UL    ABS. IMM. GRANS. 0.1 0.0 - 0.5 K/UL     Pt has been seen and examined by Dr. Joanne Denver. He agrees with the following assessment and plan.      Assessment/Plan:      Principal Problem:    Lumbar stenosis with neurogenic claudication (3/28/2016)  - s/p  L2 L4 lumbar direct lateral interbody fusion with anterior plating and redo of L2 L5 laminectomy  - per primary     Active Problems:    Spondylolisthesis of multiple sites in spine (10/25/2021)  - per primary       Spinal stenosis (10/25/2021)  - per primary       Hypotension (10/26/2021)  - on Levophed   - likely hypovolemic shock secondary to large volume blood loss  - unlikely to be related to rythmol       Atrial fibrillation/Atrial flutter  - hx aflutter/fib ablation   - rate controlled  - on Eliquis for CVA protection  - cont Rythmol   - check limited ECHO      Anemia  - Hgb continued to trend down   - required transfusion  - per primary     Thank you for requesting cardiac evaluation and allowing us to participate in the care of this patient. We will continue to follow along with you.       Lalit Mora PA-C  Supervising Physician: Dr Ester Osullivan

## 2021-10-29 NOTE — PROGRESS NOTES
ORTHO PROGRESS NOTE    2021    Admit Date: 10/25/2021  Admit Diagnosis: Lumbar stenosis with neurogenic claudication [M48.062]; Spondylolisthesis of lumbar region [M43.16]; Spinal stenosis [M48.00]  Post Op day: 4 Days Post-Op      Subjective:     Sherie Watts is a patient who is now 4 Days Post-Op  and has no complaints. Objective:     PT/OT:    Progressing    Vital Signs:    Patient Vitals for the past 8 hrs:   BP Temp Pulse Resp SpO2 Height Weight   10/29/21 1442 (!) 135/57 -- -- -- -- 5' 4\" (1.626 m) 314 lb (142.4 kg)   10/29/21 1403 (!) 116/59 -- 86 19 98 % -- --   10/29/21 1333 (!) 127/52 -- 85 13 95 % -- --   10/29/21 1303 109/63 -- 94 17 (!) 89 % -- --   10/29/21 1232 105/61 -- 94 16 96 % -- --   10/29/21 1203 93/65 -- 95 23 98 % -- --   10/29/21 1133 (!) 135/57 -- 89 20 95 % -- --   10/29/21 1130 (!) 89/59 -- -- -- -- -- --   10/29/21 1109 (!) 140/64 -- 96 25 96 % -- --   10/29/21 1100 -- 97.2 °F (36.2 °C) -- -- -- -- --   10/29/21 1048 (!) 91/44 -- 86 28 96 % -- --   10/29/21 1033 (!) 87/37 -- 83 17 97 % -- --   10/29/21 1017 (!) 80/51 -- 77 9 95 % -- --   10/29/21 0958 (!) 82/41 -- 90 13 95 % -- --   10/29/21 0932 (!) 86/50 -- 98 19 97 % -- --   10/29/21 0904 (!) 89/55 -- 84 14 95 % -- --   10/29/21 0832 (!) 103/52 -- 82 19 95 % -- --   10/29/21 0813 -- -- -- -- 100 % -- --   10/29/21 0803 109/61 -- (!) 109 26 100 % -- --   10/29/21 0733 (!) 85/41 -- -- -- 100 % -- --   10/29/21 0717 (!) 94/51 -- 90 13 100 % -- --   10/29/21 0706 (!) 97/47 97.7 °F (36.5 °C) 93 10 100 % -- --     Temp (24hrs), Av.4 °F (36.9 °C), Min:97.2 °F (36.2 °C), Max:99.3 °F (37.4 °C)      LAB:    Recent Labs     10/29/21  0639   HGB 8.1*   WBC 11.4*          I/O:  10/29 0701 - 10/29 1900  In: -   Out: 300 [Urine:300]  10/27 1901 - 10/29 0700  In: 5970.2 [P.O.:900; I.V.:5070.2]  Out: 2625 [Urine:3875; Drains:339]    Physical Exam:    Awake and in no acute distress.   Mood and affect appropriate. Respirations unlabored and no evidence cyanosis. Calves nontender. Abdomen soft and nontender. No new neurologic deficit. Assessment:      Patient Active Problem List   Diagnosis Code    Essential hypertension, benign I10    Endometriosis N80.9    Colon polyp K63.5    Squamous cell skin cancer C44.92    Lumbar stenosis with neurogenic claudication M48.062    Postoperative wound infection T81.49XA    Acquired hypothyroidism E03.9    Obesity, morbid ([address] E66.01    Spondylolisthesis of multiple sites in spine M43.19    Spinal stenosis M48.00    Hypovolemic shock ([address] R57.1    Hypotension I95.9       4 Days Post-Op STATUS POST Procedure(s):  L2-L4 SPINE LUMBAR DIRECT LATERAL INTERBODY FUSION (DLIF) ANTERIOR PLATING / SSEP  REDO L2-L5 LAMINECTOMY FUSION AND L4-L5 TLIF / NEUROMONITORING      Plan:     Continue PT/OT  Drains removed. Needs dressing change.          Signed By: Chuy Rivera MD

## 2021-10-29 NOTE — PROGRESS NOTES
ACUTE PHYSICAL THERAPY GOALS:  (Developed with and agreed upon by patient and/or caregiver. )  LTG: (Reviewed and updated 10/29/21)  (1.)Ms. Mandi Parham will move from supine to sit and sit to supine, scoot up and down and roll side to side in bed INDEPENDENTLY via log roll within 7 treatment day(s). (2.)Ms. Mandi Parham will transfer from bed to chair and chair to bed with SUPERVISION using the least restrictive device within 7 treatment day(s). (3.)Ms. Mandi Parham will ambulate with STAND BY ASSIST for 150+ feet with the least restrictive device within 7 treatment day(s). (4.)Ms. Mandi Parham will perform LE exercises per HEP independently to improve strength and mobility within 7 days. (5.)Ms. Mandi Parham will independently verbalize 3/3 post op spinal precautions and maintain compliance within 7 days.   ________________________________________________________________________________________________      PHYSICAL THERAPY ASSESSMENT: Re-evaluation and AM PT Treatment Day # 1     **Corset brace w/ambulation      Antonio Urena is a 73 y.o. female   PRIMARY DIAGNOSIS: Lumbar stenosis with neurogenic claudication  Lumbar stenosis with neurogenic claudication [M48.062]  Spondylolisthesis of lumbar region [M43.16]  Spinal stenosis [M48.00]  Procedure(s) (LRB):  L2-L4 SPINE LUMBAR DIRECT LATERAL INTERBODY FUSION (DLIF) ANTERIOR PLATING / SSEP (Left)  REDO L2-L5 LAMINECTOMY FUSION AND L4-L5 TLIF / NEUROMONITORING (N/A)  4 Days Post-Op  Reason for Referral:  Mobility following above surgery  ICD-10: Treatment Diagnosis: Generalized Muscle Weakness (M62.81)  Difficulty in walking, Not elsewhere classified (R26.2)  Other abnormalities of gait and mobility (R26.89)  [place]: [place] / [place] / Product Type: Medicare /     ASSESSMENT:     REHAB RECOMMENDATIONS:   Recommendation to date pending progress:  [address] [place] [address] hopefully pending progress  Equipment:    To Be Determined PRIOR LEVEL OF FUNCTION:  (Prior to Hospitalization) INITIAL/CURRENT LEVEL OF FUNCTION:  (Most Recently Demonstrated)   Bed Mobility:   Independent  Sit to Stand:   Independent  Transfers:   Independent  Gait/Mobility:   Modified Independent Bed Mobility:   Minimal Assistance  Sit to Stand:   Minimal Assistance  Transfers:   Minimal Assistance  Gait/Mobility:   Minimal Assistance     ASSESSMENT:  Ms. Aishwarya Anderson is seen for therapy reassessment; she is still in ICU with continued hypotension but improved somewhat with levophed. Pt feels much better than on previous assessment; she was asymptomatic throughout with improved BP. Transfers to sitting with additional time and min assist. Worked on seated static/dynamic activities and mobility, seated exercises, sit-stand transfer training, and ambulation 5 ft from bed to chair with min HHA. Positioned comfortably in chair with stable BP, dtr at bedside, RN notified. Ms. Aishwarya Anderson did show progress today and did much better with BP under control. Discharge recommendations tbd pending progress- previous plan was [facility] PT.       SUBJECTIVE:   Ms. Aishwarya Anderson states, \"I'm ready to get out of this bed\"    SOCIAL HISTORY/LIVING ENVIRONMENT: Lives alone. Independent in home (cane sometimes at night), mod I in community with cane. Drives. One recent fall when she was helping her mother walk. Independent with adls.    Home Environment: Private residence  One/Two Story Residence: Two story  Living Alone: Yes  Support Systems: Child(rober)  OBJECTIVE:     PAIN: VITAL SIGNS: LINES/DRAINS:   Pre Treatment: Pain Screen  Pain Scale 1: Numeric (0 - 10)  Pain Intensity 1: 0  Post Treatment: 3/10 Vital Signs  BP: (!) 89/59  MAP (Calculated): 69  BP 1 Location: Left arm  BP 1 Method: Automatic  BP Patient Position: Supine  More BP/Pulse rows needed?: Yes  Additional Blood Pressure/Pulse Data  BP 2: 140/64  MAP 2 (Calculated): 89  BP 2 Location: Left arm  BP Method 2: Automatic  Patient Position 2: Sitting Eugene Catheter, Hemovac and IV  O2 Device: None (Room air)     GROSS EVALUATION:   Within Functional Limits Abnormal/ Functional Abnormal/ Non-Functional (see comments) Not Tested Comments:   AROM [x] [] [] []    PROM [] [] [] []    Strength [] [x] [] []    Balance [] [x] [] []    Posture [] [x] [] []    Sensation [] [] [] []    Coordination [] [] [] []    Tone [] [] [] []    Edema [] [] [] []    Activity Tolerance [] [x] [] []     [] [] [] []      COGNITION/  PERCEPTION: Intact Impaired   (see comments) Comments:   Orientation [x] []    Vision [x] []    Hearing [x] []    Command Following [x] []    Safety Awareness [x] []     [] []      MOBILITY: I Mod I S SBA CGA Min Mod Max Total  NT x2 Comments:   Bed Mobility    Rolling [] [] [] [] [] [x] [] [] [] [] []    Supine to Sit [] [] [] [] [] [x] [] [] [] [] []    Scooting [] [] [] [] [] [x] [] [] [] [] []    Sit to Supine [] [] [] [] [] [x] [] [] [] [] []    Transfers    Sit to Stand [] [] [] [] [] [x] [] [] [] [] []    Bed to Chair [] [] [] [] [] [x] [] [] [] [] []    Stand to Sit [] [] [] [] [] [x] [] [] [] [] []    I=Independent, Mod I=Modified Independent, S=Supervision, SBA=Standby Assistance, CGA=Contact Guard Assistance,   Min=Minimal Assistance, Mod=Moderate Assistance, Max=Maximal Assistance, Total=Total Assistance, NT=Not Tested  GAIT: I Mod I S SBA CGA Min Mod Max Total  NT x2 Comments:   Level of Assistance [] [] [] [] [] [x] [] [] [] [] []    Distance 5'    DME min HHA    Gait Quality Slow, shuffled    Weightbearing Status N/A     I=Independent, Mod I=Modified Independent, S=Supervision, SBA=Standby Assistance, CGA=Contact Guard Assistance,   Min=Minimal Assistance, Mod=Moderate Assistance, Max=Maximal Assistance, Total=Total Assistance, NT=Not Tested    [address] [place] [address] [address] [id number] [facility] [address] [facility]       How much difficulty does the patient currently have. .. Unable A Lot A Little None   1.   Turning over in bed (including adjusting bedclothes, sheets and blankets)? [] 1   [] 2   [] 3   [x] 4   2. Sitting down on and standing up from a chair with arms ( e.g., wheelchair, bedside commode, etc.)   [] 1   [] 2   [x] 3   [] 4   3. Moving from lying on back to sitting on the side of the bed? [] 1   [] 2   [x] 3   [] 4   How much help from another person does the patient currently need. .. Total A Lot A Little None   4. Moving to and from a bed to a chair (including a wheelchair)? [] 1   [] 2   [x] 3   [] 4   5. Need to walk in hospital room? [] 1   [] 2   [x] 3   [] 4   6. Climbing 3-5 steps with a railing? [x] 1   [] 2   [] 3   [] 4   © 2007, Trustees of [facility] [employer], under license to [employer]. All rights reserved     Score:  Initial: 15 Most Recent: 18 (Date: 10/29/21 )    Interpretation of Tool:  Represents activities that are increasingly more difficult (i.e. Bed mobility, Transfers, Gait). PLAN:   FREQUENCY/DURATION: PT Plan of Care: BID for duration of hospital stay or until stated goals are met, whichever comes first.    PROBLEM LIST:   (Skilled intervention is medically necessary to address:)  1. Decreased Activity Tolerance  2. Decreased Balance  3. Decreased Gait Ability  4. Decreased Strength  5. Decreased Transfer Abilities  6. Increased Pain   INTERVENTIONS PLANNED:   (Benefits and precautions of physical therapy have been discussed with the patient.)  1. Therapeutic Activity  2. Therapeutic Exercise/HEP  3. Neuromuscular Re-education  4. Gait Training  5. Manual Therapy  6. Education     TREATMENT:     EVALUATION: Low Complexity : (Untimed Charge)    TREATMENT:   (     )  Therapeutic Activity (23 Minutes):  Therapeutic activity included Rolling, Supine to Sit, Scooting, Transfer Training, Ambulation on level ground, Sitting balance , Standing balance and chair transfer, LE exercises at edge of bed to improve functional Mobility, Strength, Activity tolerance and balance, posture.     TREATMENT GRID:  N/A    AFTER TREATMENT POSITION/PRECAUTIONS:  Chair, Needs within reach, RN notified and Visitors at bedside    INTERDISCIPLINARY COLLABORATION:  RN/PCT and PT/PTA    TOTAL TREATMENT DURATION:  PT Patient Time In/Time Out  Time In: 1100  Time Out: [id number] [address] Angel Garcia DPT

## 2021-10-29 NOTE — PROGRESS NOTES
PT note: Returned for afternoon session however pt had just returned to bed after sitting up in chair following morning therapy. Will check back tomorrow.      Fernando Lu, DPT

## 2021-10-30 ENCOUNTER — APPOINTMENT (OUTPATIENT)
Dept: CT IMAGING | Age: 66
DRG: 453 | End: 2021-10-30
Attending: STUDENT IN AN ORGANIZED HEALTH CARE EDUCATION/TRAINING PROGRAM
Payer: MEDICARE

## 2021-10-30 ENCOUNTER — APPOINTMENT (OUTPATIENT)
Dept: GENERAL RADIOLOGY | Age: 66
DRG: 453 | End: 2021-10-30
Attending: STUDENT IN AN ORGANIZED HEALTH CARE EDUCATION/TRAINING PROGRAM
Payer: MEDICARE

## 2021-10-30 PROBLEM — J96.01 ACUTE RESPIRATORY FAILURE WITH HYPOXIA (HCC): Status: ACTIVE | Noted: 2021-10-30

## 2021-10-30 PROBLEM — N17.9 AKI (ACUTE KIDNEY INJURY) (HCC): Status: ACTIVE | Noted: 2021-10-30

## 2021-10-30 PROBLEM — I48.91 A-FIB (HCC): Status: ACTIVE | Noted: 2021-10-30

## 2021-10-30 LAB
ANION GAP SERPL CALC-SCNC: 1 MMOL/L (ref 7–16)
ATRIAL RATE: 86 BPM
BASOPHILS # BLD: 0.1 K/UL (ref 0–0.2)
BASOPHILS NFR BLD: 0 % (ref 0–2)
BUN SERPL-MCNC: 10 MG/DL (ref 8–23)
CALCIUM SERPL-MCNC: 7.9 MG/DL (ref 8.3–10.4)
CALCULATED P AXIS, ECG09: 75 DEGREES
CALCULATED R AXIS, ECG10: 68 DEGREES
CALCULATED T AXIS, ECG11: -22 DEGREES
CHLORIDE SERPL-SCNC: 110 MMOL/L (ref 98–107)
CO2 SERPL-SCNC: 30 MMOL/L (ref 21–32)
CREAT SERPL-MCNC: 0.42 MG/DL (ref 0.6–1)
DIAGNOSIS, 93000: NORMAL
DIFFERENTIAL METHOD BLD: ABNORMAL
EOSINOPHIL # BLD: 0.3 K/UL (ref 0–0.8)
EOSINOPHIL NFR BLD: 2 % (ref 0.5–7.8)
ERYTHROCYTE [DISTWIDTH] IN BLOOD BY AUTOMATED COUNT: 16.7 % (ref 11.9–14.6)
GLUCOSE SERPL-MCNC: 100 MG/DL (ref 65–100)
HCT VFR BLD AUTO: 23.5 % (ref 35.8–46.3)
HGB BLD-MCNC: 7.6 G/DL (ref 11.7–15.4)
HISTORY CHECKED?,CKHIST: NORMAL
IMM GRANULOCYTES # BLD AUTO: 0.1 K/UL (ref 0–0.5)
IMM GRANULOCYTES NFR BLD AUTO: 1 % (ref 0–5)
LACTATE SERPL-SCNC: 2.2 MMOL/L (ref 0.4–2)
LYMPHOCYTES # BLD: 1.4 K/UL (ref 0.5–4.6)
LYMPHOCYTES NFR BLD: 12 % (ref 13–44)
MAGNESIUM SERPL-MCNC: 2 MG/DL (ref 1.8–2.4)
MCH RBC QN AUTO: 30.5 PG (ref 26.1–32.9)
MCHC RBC AUTO-ENTMCNC: 32.3 G/DL (ref 31.4–35)
MCV RBC AUTO: 94.4 FL (ref 79.6–97.8)
MONOCYTES # BLD: 0.9 K/UL (ref 0.1–1.3)
MONOCYTES NFR BLD: 8 % (ref 4–12)
NEUTS SEG # BLD: 8.5 K/UL (ref 1.7–8.2)
NEUTS SEG NFR BLD: 76 % (ref 43–78)
NRBC # BLD: 0 K/UL (ref 0–0.2)
P-R INTERVAL, ECG05: 148 MS
PLATELET # BLD AUTO: 192 K/UL (ref 150–450)
PMV BLD AUTO: 9.6 FL (ref 9.4–12.3)
POTASSIUM SERPL-SCNC: 4 MMOL/L (ref 3.5–5.1)
PROCALCITONIN SERPL-MCNC: <0.05 NG/ML
Q-T INTERVAL, ECG07: 370 MS
QRS DURATION, ECG06: 76 MS
QTC CALCULATION (BEZET), ECG08: 442 MS
RBC # BLD AUTO: 2.49 M/UL (ref 4.05–5.2)
SODIUM SERPL-SCNC: 141 MMOL/L (ref 136–145)
VENTRICULAR RATE, ECG03: 86 BPM
WBC # BLD AUTO: 11.2 K/UL (ref 4.3–11.1)

## 2021-10-30 PROCEDURE — 80048 BASIC METABOLIC PNL TOTAL CA: CPT

## 2021-10-30 PROCEDURE — 77010033678 HC OXYGEN DAILY

## 2021-10-30 PROCEDURE — 36430 TRANSFUSION BLD/BLD COMPNT: CPT

## 2021-10-30 PROCEDURE — 86901 BLOOD TYPING SEROLOGIC RH(D): CPT

## 2021-10-30 PROCEDURE — 93005 ELECTROCARDIOGRAM TRACING: CPT | Performed by: INTERNAL MEDICINE

## 2021-10-30 PROCEDURE — 36415 COLL VENOUS BLD VENIPUNCTURE: CPT

## 2021-10-30 PROCEDURE — 71045 X-RAY EXAM CHEST 1 VIEW: CPT

## 2021-10-30 PROCEDURE — 74011000258 HC RX REV CODE- 258: Performed by: ORTHOPAEDIC SURGERY

## 2021-10-30 PROCEDURE — 77030041974 HC CATH SYS PERIPH TELE -B

## 2021-10-30 PROCEDURE — 65270000029 HC RM PRIVATE

## 2021-10-30 PROCEDURE — 74011250637 HC RX REV CODE- 250/637: Performed by: ORTHOPAEDIC SURGERY

## 2021-10-30 PROCEDURE — 74011250637 HC RX REV CODE- 250/637: Performed by: STUDENT IN AN ORGANIZED HEALTH CARE EDUCATION/TRAINING PROGRAM

## 2021-10-30 PROCEDURE — 99232 SBSQ HOSP IP/OBS MODERATE 35: CPT | Performed by: INTERNAL MEDICINE

## 2021-10-30 PROCEDURE — 83735 ASSAY OF MAGNESIUM: CPT

## 2021-10-30 PROCEDURE — 87040 BLOOD CULTURE FOR BACTERIA: CPT

## 2021-10-30 PROCEDURE — 76937 US GUIDE VASCULAR ACCESS: CPT

## 2021-10-30 PROCEDURE — P9016 RBC LEUKOCYTES REDUCED: HCPCS

## 2021-10-30 PROCEDURE — 85025 COMPLETE CBC W/AUTO DIFF WBC: CPT

## 2021-10-30 PROCEDURE — 83605 ASSAY OF LACTIC ACID: CPT

## 2021-10-30 PROCEDURE — 2709999900 HC NON-CHARGEABLE SUPPLY

## 2021-10-30 PROCEDURE — 84145 PROCALCITONIN (PCT): CPT

## 2021-10-30 PROCEDURE — 86923 COMPATIBILITY TEST ELECTRIC: CPT

## 2021-10-30 PROCEDURE — 74011000636 HC RX REV CODE- 636: Performed by: ORTHOPAEDIC SURGERY

## 2021-10-30 PROCEDURE — 97530 THERAPEUTIC ACTIVITIES: CPT

## 2021-10-30 PROCEDURE — 71260 CT THORAX DX C+: CPT

## 2021-10-30 RX ORDER — DIPHENHYDRAMINE HCL 25 MG
25 CAPSULE ORAL
Status: DISCONTINUED | OUTPATIENT
Start: 2021-10-30 | End: 2021-11-03 | Stop reason: HOSPADM

## 2021-10-30 RX ORDER — ACETAMINOPHEN 325 MG/1
650 TABLET ORAL
Status: DISCONTINUED | OUTPATIENT
Start: 2021-10-30 | End: 2021-11-03 | Stop reason: HOSPADM

## 2021-10-30 RX ORDER — PROPAFENONE HYDROCHLORIDE 150 MG/1
150 TABLET, FILM COATED ORAL 2 TIMES DAILY
Status: DISCONTINUED | OUTPATIENT
Start: 2021-10-30 | End: 2021-11-03 | Stop reason: HOSPADM

## 2021-10-30 RX ORDER — SODIUM CHLORIDE 9 MG/ML
250 INJECTION, SOLUTION INTRAVENOUS AS NEEDED
Status: DISCONTINUED | OUTPATIENT
Start: 2021-10-30 | End: 2021-11-03 | Stop reason: HOSPADM

## 2021-10-30 RX ORDER — MIDODRINE HYDROCHLORIDE 5 MG/1
10 TABLET ORAL EVERY 8 HOURS
Status: DISCONTINUED | OUTPATIENT
Start: 2021-10-30 | End: 2021-11-03 | Stop reason: HOSPADM

## 2021-10-30 RX ORDER — FACIAL-BODY WIPES
10 EACH TOPICAL DAILY PRN
Status: DISCONTINUED | OUTPATIENT
Start: 2021-10-30 | End: 2021-11-03 | Stop reason: HOSPADM

## 2021-10-30 RX ORDER — SODIUM CHLORIDE 0.9 % (FLUSH) 0.9 %
10 SYRINGE (ML) INJECTION
Status: COMPLETED | OUTPATIENT
Start: 2021-10-30 | End: 2021-10-30

## 2021-10-30 RX ADMIN — BISACODYL 10 MG: 10 SUPPOSITORY RECTAL at 12:47

## 2021-10-30 RX ADMIN — MIDODRINE HYDROCHLORIDE 10 MG: 5 TABLET ORAL at 13:43

## 2021-10-30 RX ADMIN — ZOLPIDEM TARTRATE 10 MG: 5 TABLET ORAL at 21:30

## 2021-10-30 RX ADMIN — Medication 1 AMPULE: at 08:55

## 2021-10-30 RX ADMIN — FAMOTIDINE 20 MG: 20 TABLET, FILM COATED ORAL at 16:30

## 2021-10-30 RX ADMIN — DOXYCYCLINE HYCLATE 100 MG: 100 CAPSULE ORAL at 08:55

## 2021-10-30 RX ADMIN — DOXYCYCLINE HYCLATE 100 MG: 100 CAPSULE ORAL at 21:30

## 2021-10-30 RX ADMIN — Medication 10 ML: at 13:51

## 2021-10-30 RX ADMIN — PROPAFENONE HYDROCHLORIDE 150 MG: 150 TABLET, FILM COATED ORAL at 10:45

## 2021-10-30 RX ADMIN — GABAPENTIN 300 MG: 300 CAPSULE ORAL at 08:55

## 2021-10-30 RX ADMIN — DOCUSATE SODIUM 100 MG: 100 CAPSULE, LIQUID FILLED ORAL at 08:54

## 2021-10-30 RX ADMIN — HYDROCODONE BITARTRATE AND ACETAMINOPHEN 1 TABLET: 10; 325 TABLET ORAL at 12:47

## 2021-10-30 RX ADMIN — GABAPENTIN 300 MG: 300 CAPSULE ORAL at 21:30

## 2021-10-30 RX ADMIN — PANTOPRAZOLE SODIUM 40 MG: 40 TABLET, DELAYED RELEASE ORAL at 05:00

## 2021-10-30 RX ADMIN — TRAMADOL HYDROCHLORIDE 50 MG: 50 TABLET ORAL at 21:30

## 2021-10-30 RX ADMIN — LEVOFLOXACIN 500 MG: 500 TABLET, FILM COATED ORAL at 08:54

## 2021-10-30 RX ADMIN — MIDODRINE HYDROCHLORIDE 10 MG: 5 TABLET ORAL at 21:30

## 2021-10-30 RX ADMIN — Medication 5 ML: at 21:30

## 2021-10-30 RX ADMIN — Medication 1 AMPULE: at 21:30

## 2021-10-30 RX ADMIN — LEVOTHYROXINE SODIUM 50 MCG: 0.05 TABLET ORAL at 05:00

## 2021-10-30 RX ADMIN — ESCITALOPRAM OXALATE 20 MG: 10 TABLET ORAL at 08:55

## 2021-10-30 RX ADMIN — SODIUM CHLORIDE 100 ML: 900 INJECTION, SOLUTION INTRAVENOUS at 19:39

## 2021-10-30 RX ADMIN — Medication 10 ML: at 19:39

## 2021-10-30 RX ADMIN — PROPAFENONE HYDROCHLORIDE 150 MG: 150 TABLET, FILM COATED ORAL at 16:30

## 2021-10-30 RX ADMIN — DOCUSATE SODIUM 100 MG: 100 CAPSULE, LIQUID FILLED ORAL at 16:30

## 2021-10-30 RX ADMIN — POTASSIUM CHLORIDE 10 MEQ: 10 TABLET, EXTENDED RELEASE ORAL at 08:55

## 2021-10-30 RX ADMIN — IOPAMIDOL 80 ML: 755 INJECTION, SOLUTION INTRAVENOUS at 19:38

## 2021-10-30 RX ADMIN — MIDODRINE HYDROCHLORIDE 10 MG: 5 TABLET ORAL at 08:55

## 2021-10-30 RX ADMIN — ASPIRIN 81 MG: 81 TABLET ORAL at 21:30

## 2021-10-30 RX ADMIN — Medication 10 ML: at 05:00

## 2021-10-30 NOTE — PROGRESS NOTES
TRANSFER - IN REPORT:    [facility] from [facility] on [facility]t  being received from ICU for routine progression of care      Report consisted of patients Situation, Background, Assessment and   Recommendations(SBAR). Information from the following report(s) SBAR was reviewed with the receiving nurse. Opportunity for questions and clarification was provided. Assessment completed upon patients arrival to unit and care assumed.

## 2021-10-30 NOTE — PROGRESS NOTES
IV access was not able to be obtained by nursing staff due to the patient having very difficult vein access. Skin was cleaned and disinfected prior to IV puncture. Ultrasound was used to find the vein which was compressible and does not have any ultrasound features of an artery or nerve bundle. Under real-time ultrasound guidance peripheral access was obtained in left fore arm after 2 attempts using 20g 1.75\" iv. 1st attempt with 20g 6cm EDC unsuccessful- unable to thread catheter lot [id number]. Blood return was present and IV flushed without difficulty with no clinical signs of infiltration. IV dressing applied and there were no immediate complications noted and patient tolerated the procedure well.

## 2021-10-30 NOTE — PROGRESS NOTES
Hospitalist Progress Note   Admit Date:  10/25/2021  5:34 AM   Name:  Rob Will   Age:  73 y.o. Sex:  female  :  1955   MRN:  [id number]   Room:  [facility]/    Presenting Complaint: No chief complaint on file. Reason(s) for Admission: Lumbar stenosis with neurogenic claudication [M48.062]  Spondylolisthesis of lumbar region [M43.16]  Spinal stenosis [facility] Course & Interval History:   Rob Will is a 73 y.o. female with history of Afib s/p ablation, Hypothyroidism, OA, lumbar spinal stenosis, HTN, morbid obesity, JULIANN who was admitted for lumbar laminectomy. Patient is postop day 1 from L2 L4 lumbar direct lateral interbody fusion with anterior plating and redo of L2 L5 laminectomy by spinal Ortho. Patient has multiple drains in place postoperatively. patient received dose of IV Dilaudid as well as multiple blood pressure medications the morning of initial hypotension. She subsequently suffered from hypotension with BP 70/54. She also was working with PT/OT and became significantly hypotensive when standing up. She did complain of orthostasis at this time. She was given normal saline IV fluid boluses but remains hypotensive with MAPS as low as 49 during my evaluation. Of note, she also received Midodrine 20mg once without no improvement in MAPs. Patient complains of significant drowsiness, and is hardly able to keep her eyes open with current low BP's. She denies any shortness of breath, chest pain or pressure, palpitations, nausea, vomiting, fevers or chills, diaphoresis, near-syncope or syncope. Patient suffered from ÁNGEL with bump in creatinine and WBC after hypotensive episodes. Her ÁNGEL resolved with Eugene catheter placement and IV fluid resuscitation. She continues to suffer from persistent hypotension requiring intermittent Levophed, likely related to postop blood loss and pain medication administration.   She has required 2 units PRBC transfusion for slow drop in hemoglobin and persistent hypotension. Her Rythmol was held due to hypotension occurring solely after Rythmol administration. Her blood pressures have slowly improved and she has been stable off of Levophed. Subjective (10/30/21):  Patient seen and examined at bedside this morning. Overnight, patient had slight hypotension requiring low-dose Levophed. She is off Levophed this morning with normal blood pressures. She received dose of Rythmol and blood pressures remained stable. Discussed with nursing patient's drop in O2 sat overnight, with patient likely suffering from JULIANN. Pt also had Tmax 100.2 overnight, but nursing reports that patient had several blankets covering her and and she felt hot. Pt denies any chills during elevated temp overnight. Patient states that she feels well this morning and denies any chest pain or pressure, shortness of breath, nausea, vomiting, diaphoresis, palpitations, fevers, chills, anorexia, near-syncope or syncope. She is tolerating her diet well without issues. She does complain of mild abd pain and constipation and requests something to help her have a BM.      Assessment & Plan:     Principal Problem:  #Hypotension  - likely multifactorial due to post-operative blood loss and medications including pain meds, multiple antihypertensives on day of hypotensive episode  - s/p 2L NS boluses and Midodrine 20mg without any improvement in Bps  - post-op HH 7.3 from 11.2 prior to surgery, s/p 2U PRBC transfusion due to persistent hypotension  - WBC up to 27k, s/p Vanc/Cefepime for 1 day and Wbc down to 11.4K this Am - transitioned to Levaquin and Doxycycline PO for empiric coverage x 5 days and monitor for signs of sepsis - 10/30 temp 100.2 overnight, obtain BCx's and LA. procal negative, will continue with PO abx for now  - EKG without ST elevation or depression, Trop wnl  - cont scheduled midodrine, switch from TID to q8h to assist with overnight drops in BP    - Hgb down to 7.6 this AM from 8.2 yesterday, repeat 1U PRBC transfusion today. Ortho cleared patient to restart Eliquis but will hold for now in setting of possible continued post-op blood loss  - weaned off Levophed, cont to hold all BP meds, home requip and restart back slowly with very labile BP's  - Held rythmol dosing yesterday due to persistent hypotension, pt given dose this AM and tolerated without any hypotension, Cont rythmol BID      #ÁNGEL  - Cr peaked at 1.90 from normal baseline  - likely hypovolemic s/p Lasix, HCTZ, Lisinopril and persistent hypotension  - UA and ELAINE unremarkable  - Urinary retention with >700cc in bladder - cont laboy for now  - on mIVF and Cr now down to 0.46 this AM  - stop IVF today with worsening oxygenation     #Afib  - s/p failed Ablations x2, managed with Rythmol, metoprolol and Eliquis outpt  - holding BB due to hypotension  - held rythmol with obvious correlation of hypotensive episodes with rythmol administration; pt tolerated rythmol dose this AM with recovering BP's, cont BID  - cont holding Eliquis with possible post-op blood loss   - keep K>4, Mg>2  - monitor on tele  - Cards consulted    #Lumbar stenosis with neurogenic claudication   - s/p laminectomy and drain placements per ortho spine  - management per primary ortho spine  - avoid opioids in setting of hypotension at this time    #acute hypoxic respiratory Failure  - pt desat 89% on RA yesterday, placed on 2L NC  - CXR reviewed and showing mild bibasilar atelectasis, no obvious edema or effusions  - encourage ICS use  - consider lasix dose as BP recovers and pt having been receiving IVF  - possible JULIANN component with additional desats during night, consdier BRIDGETT and outpt sleep study    #Constipation  - try dulcolax suppository today       Dispo/Discharge Planning:    Home with [facility] vs rehab when able to participate with PT/OT.      Diet:  ADULT DIET Regular  DVT PPx: SCDs  Code status: No Order    Hospital Problems as of 10/30/2021 Date Reviewed: 9/22/2021        Codes Class Noted - Resolved POA    A-fib [facility]) ICD-10-CM: I48.91  ICD-9-CM: 427.31  10/30/2021 - Present Unknown        ÁNGEL (acute kidney injury) ([facility] ICD-10-CM: N17.9  ICD-9-CM: 584.9  10/30/2021 - Present Unknown        Acute respiratory failure with hypoxia [facility]) ICD-10-CM: J96.01  ICD-9-CM: 518.81  10/30/2021 - Present Unknown        Hypovolemic shock ([facility] ICD-10-CM: R57.1  ICD-9-CM: 785.59  10/26/2021 - Present Unknown        Hypotension ICD-10-CM: I95.9  ICD-9-CM: 458.9  10/26/2021 - Present Unknown        Spondylolisthesis of multiple sites in spine ICD-10-CM: M43.19  ICD-9-CM: 738.4  10/25/2021 - Present Yes        Spinal stenosis ICD-10-CM: M48.00  ICD-9-CM: 724.00  10/25/2021 - Present Unknown        * (Principal) Lumbar stenosis with neurogenic claudication ICD-10-CM: M48.062  ICD-9-CM: 724.03  3/28/2016 - Present Yes              Objective:     Patient Vitals for the past 24 hrs:   Temp Pulse Resp BP SpO2   10/30/21 1233 -- 95 15 125/72 97 %   10/30/21 1218 -- 97 25 138/61 98 %   10/30/21 1202 -- 94 18 135/65 99 %   10/30/21 1148 98.8 °F (37.1 °C) 94 16 128/71 98 %   10/30/21 1045 -- 98 -- 130/68 --   10/30/21 1044 -- -- -- 130/68 --   10/30/21 1003 -- -- -- 115/67 100 %   10/30/21 0904 -- 98 15 121/65 100 %   10/30/21 0855 -- (!) 101 -- 106/60 --   10/30/21 0804 -- -- -- -- 100 %   10/30/21 0719 -- -- 16 -- --   10/30/21 0718 98.8 °F (37.1 °C) 92 16 117/63 97 %   10/30/21 0704 -- 93 22 (!) 106/42 96 %   10/30/21 0618 -- 93 (!) 31 100/61 94 %   10/30/21 0604 -- 92 (!) 35 106/65 95 %   10/30/21 0548 -- 90 28 (!) 100/42 94 %   10/30/21 0533 -- 94 15 108/62 94 %   10/30/21 0504 -- 94 29 (!) 115/49 95 %   10/30/21 0433 99.1 °F (37.3 °C) 93 16 116/60 100 %   10/30/21 0418 -- 92 25 (!) 119/53 99 %   10/30/21 0403 -- 93 23 125/62 99 %   10/30/21 0348 -- 93 21 120/63 99 %   10/30/21 0334 -- (!) 101 18 (!) 112/50 98 %   10/30/21 0318 -- 95 27 (!) 118/51 100 % 10/30/21 0303 100.2 °F (37.9 °C) 90 28 (!) 114/51 100 %   10/30/21 0248 -- 85 27 (!) 118/57 100 %   10/30/21 0233 -- 87 27 (!) 116/51 100 %   10/30/21 0217 -- 87 25 (!) 118/54 99 %   10/30/21 0203 -- 87 25 (!) 105/54 99 %   10/30/21 0148 -- 86 24 117/63 99 %   10/30/21 0133 -- 88 26 (!) 102/53 100 %   10/30/21 0118 -- 82 26 (!) 112/53 100 %   10/30/21 0103 -- 85 28 (!) 110/49 100 %   10/30/21 0048 -- 85 29 (!) 114/48 99 %   10/30/21 0033 -- 89 26 (!) 114/46 100 %   10/30/21 0018 -- 86 (!) 31 (!) 112/47 99 %   10/30/21 0003 -- 86 26 (!) 117/51 99 %   10/29/21 2348 -- 85 24 110/62 99 %   10/29/21 2333 -- 91 16 127/65 99 %   10/29/21 2318 -- 93 16 118/61 98 %   10/29/21 2303 98.9 °F (37.2 °C) 91 28 (!) 93/49 92 %   10/29/21 2248 -- 91 27 (!) 108/52 93 %   10/29/21 2233 -- 95 20 (!) 112/50 94 %   10/29/21 2217 -- 86 26 (!) 110/55 94 %   10/29/21 2205 -- 93 26 (!) 100/55 94 %   10/29/21 2148 -- 94 21 (!) 113/55 96 %   10/29/21 2132 -- 95 13 118/62 95 %   10/29/21 2118 -- 97 19 110/64 98 %   10/29/21 2103 -- 96 17 (!) 110/53 96 %   10/29/21 2048 -- 96 16 (!) 109/53 96 %   10/29/21 2033 -- 100 18 (!) 120/55 97 %   10/29/21 2018 -- 95 15 (!) 114/56 97 %   10/29/21 2003 -- 95 15 (!) 114/56 97 %   10/29/21 1948 -- 93 20 (!) 109/58 97 %   10/29/21 1933 -- 97 15 (!) 91/46 96 %   10/29/21 1903 98.8 °F (37.1 °C) 98 12 (!) 80/48 97 %   10/29/21 1748 -- 93 14 (!) 115/55 98 %   10/29/21 1733 98.8 °F (37.1 °C) 91 13 (!) 143/63 97 %   10/29/21 1500 97.4 °F (36.3 °C) -- -- -- --   10/29/21 1442 -- -- -- (!) 135/57 --   10/29/21 1403 -- 86 19 (!) 116/59 98 %   10/29/21 1333 -- 85 13 (!) 127/52 95 %   10/29/21 1303 -- 94 17 109/63 (!) 89 %     Oxygen Therapy  O2 Sat (%): 97 % (10/30/21 1233)  Pulse via Oximetry: 102 beats per minute (10/30/21 1233)  O2 Device: Nasal cannula (10/30/21 0804)  Skin Assessment: Clean, dry, & intact (10/30/21 3121)  Skin Protection for O2 Device: No (10/30/21 0718)  O2 Flow Rate (L/min): 2 l/min (10/30/21 9149)    Estimated body mass index is 54.57 kg/m² as calculated from the following:    Height as of this encounter: 5' 4\" (1.626 m). Weight as of this encounter: 144.2 kg (317 lb 14.5 oz). Intake/Output Summary (Last 24 hours) at 10/30/2021 1253  Last data filed at 10/30/2021 0501  Gross per 24 hour   Intake 1717.73 ml   Output 700 ml   Net 1017.73 ml         Physical Exam:     Blood pressure 125/72, pulse 95, temperature 98.8 °F (37.1 °C), resp. rate 15, height 5' 4\" (1.626 m), weight 144.2 kg (317 lb 14.5 oz), SpO2 97 %. General:    Well nourished. No overt distress  Head:  Normocephalic, atraumatic  Eyes:  Sclerae appear normal.  Pupils equally round. ENT:  Nares appear normal, no drainage. Moist oral mucosa  Neck:  No restricted ROM. Trachea midline   CV:   RRR. No m/r/g. No jugular venous distension. Lungs:   CTAB. No wheezing, rhonchi, or rales. Respirations even, unlabored  Abdomen: Bowel sounds present. Soft, nontender, nondistended. Extremities: No cyanosis or clubbing. No edema  Skin:     No rashes and normal coloration. Warm and dry. Back:   Surgical dressing c/d/i  Neuro:  CN II-XII grossly intact. Sensation intact. A&Ox3  Psych:  Normal mood and affect.       I have reviewed ordered lab tests and independently visualized imaging below:    Recent Labs:  Recent Results (from the past 48 hour(s))   METABOLIC PANEL, BASIC    Collection Time: 10/29/21  6:39 AM   Result Value Ref Range    Sodium 142 136 - 145 mmol/L    Potassium 3.8 3.5 - 5.1 mmol/L    Chloride 110 (H) 98 - 107 mmol/L    CO2 30 21 - 32 mmol/L    Anion gap 2 (L) 7 - 16 mmol/L    Glucose 100 65 - 100 mg/dL    BUN 11 8 - 23 MG/DL    Creatinine 0.46 (L) 0.6 - 1.0 MG/DL    GFR est AA >60 >60 ml/min/1.73m2    GFR est non-AA >60 >60 ml/min/1.73m2    Calcium 8.0 (L) 8.3 - 10.4 MG/DL   CBC WITH AUTOMATED DIFF    Collection Time: 10/29/21  6:39 AM   Result Value Ref Range    WBC 11.4 (H) 4.3 - 11.1 K/uL    RBC 2.73 (L) 4.05 - 5.2 M/uL    HGB 8.1 (L) 11.7 - 15.4 g/dL    HCT 25.1 (L) 35.8 - 46.3 %    MCV 91.9 79.6 - 97.8 FL    MCH 29.7 26.1 - 32.9 PG    MCHC 32.3 31.4 - 35.0 g/dL    RDW 16.7 (H) 11.9 - 14.6 %    PLATELET 358 853 - 686 K/uL    MPV 9.7 9.4 - 12.3 FL    ABSOLUTE NRBC 0.00 0.0 - 0.2 K/uL    DF AUTOMATED      NEUTROPHILS 77 43 - 78 %    LYMPHOCYTES 11 (L) 13 - 44 %    MONOCYTES 8 4.0 - 12.0 %    EOSINOPHILS 2 0.5 - 7.8 %    BASOPHILS 1 0.0 - 2.0 %    IMMATURE GRANULOCYTES 1 0.0 - 5.0 %    ABS. NEUTROPHILS 8.8 (H) 1.7 - 8.2 K/UL    ABS. LYMPHOCYTES 1.3 0.5 - 4.6 K/UL    ABS. MONOCYTES 0.9 0.1 - 1.3 K/UL    ABS. EOSINOPHILS 0.2 0.0 - 0.8 K/UL    ABS. BASOPHILS 0.1 0.0 - 0.2 K/UL    ABS. IMM.  GRANS. 0.1 0.0 - 0.5 K/UL   ECHO ADULT COMPLETE    Collection Time: 10/29/21  1:55 PM   Result Value Ref Range    Left Atrium Minor Axis 1.99 cm    Left Atrium Major Axis 4.72 cm    LA Area 2C 17.00 cm2    LA Area 4C 14.80 cm2    LV ED Vol A4C 81.10 cm3    LV ES Vol A4C 23.40 cm3    IVSd 1.09 (A) 0.60 - 0.90 cm    LVIDd 3.99 3.90 - 5.30 cm    LVIDs 2.97 cm    LVOT d 1.90 cm    LVOT VTI 36.80 cm    LVOT Peak Gradient 14.00 mmHg    LVPWd 1.01 (A) 0.60 - 0.90 cm    LV E' Lateral Velocity 11.80 cm/s    LV E' Septal Velocity 7.93 cm/s    AV Cusp 1.60 cm    Aortic Valve Systolic Mean Gradient 7.59 mmHg    AoV VTI 40.80 cm    Aortic Valve Systolic Peak Velocity 815.74 cm/s    AoV PG 19.00 mmHg    Aortic Valve Area by Continuity of VTI 2.56 cm2    Aortic Valve Area by Continuity of Peak Velocity 2.45 cm2    Mitral Valve E Wave Deceleration Time 194.00 ms    MV A Bib 76.20 cm/s    MV E Bib 127.00 cm/s    MV Mean Gradient 3.00 mmHg    Mitral Valve Annulus Velocity Time Integral 35.40 cm    Mitral Valve Max Velocity 163.00 cm/s    MV Peak Gradient 11.00 mmHg    RVIDd 2.44 cm    Tapse 2.61 (A) 1.50 - 2.00 cm    TR Max Velocity 381.00 cm/s    Triscuspid Valve Regurgitation Peak Gradient 58.00 mmHg    MV E/A 1.67     LV Mass .7 67.0 - 162.0 g    LV Mass AL Index 57.2 43.0 - 95.0 g/m2    E/E' lateral 10.76     E/E' septal 16.02     E/E' ratio (averaged) 13.39     CANDELARIO/BSA Pk Bib 1.0 cm2/m2    CANDELARIO/BSA VTI 1.1 cm2/m2    RVSP 61.0 mmHg    Est. RA Pressure 3.0 mmHg   MAGNESIUM    Collection Time: 10/29/21  3:11 PM   Result Value Ref Range    Magnesium 1.6 (L) 1.8 - 2.4 mg/dL   HGB & HCT    Collection Time: 10/29/21  3:11 PM   Result Value Ref Range    HGB 8.2 (L) 11.7 - 15.4 g/dL    HCT 25.8 (L) 35.8 - 41.6 %   METABOLIC PANEL, BASIC    Collection Time: 10/30/21  6:09 AM   Result Value Ref Range    Sodium 141 136 - 145 mmol/L    Potassium 4.0 3.5 - 5.1 mmol/L    Chloride 110 (H) 98 - 107 mmol/L    CO2 30 21 - 32 mmol/L    Anion gap 1 (L) 7 - 16 mmol/L    Glucose 100 65 - 100 mg/dL    BUN 10 8 - 23 MG/DL    Creatinine 0.42 (L) 0.6 - 1.0 MG/DL    GFR est AA >60 >60 ml/min/1.73m2    GFR est non-AA >60 >60 ml/min/1.73m2    Calcium 7.9 (L) 8.3 - 10.4 MG/DL   CBC WITH AUTOMATED DIFF    Collection Time: 10/30/21  6:09 AM   Result Value Ref Range    WBC 11.2 (H) 4.3 - 11.1 K/uL    RBC 2.49 (L) 4.05 - 5.2 M/uL    HGB 7.6 (L) 11.7 - 15.4 g/dL    HCT 23.5 (L) 35.8 - 46.3 %    MCV 94.4 79.6 - 97.8 FL    MCH 30.5 26.1 - 32.9 PG    MCHC 32.3 31.4 - 35.0 g/dL    RDW 16.7 (H) 11.9 - 14.6 %    PLATELET 955 410 - 136 K/uL    MPV 9.6 9.4 - 12.3 FL    ABSOLUTE NRBC 0.00 0.0 - 0.2 K/uL    DF AUTOMATED      NEUTROPHILS 76 43 - 78 %    LYMPHOCYTES 12 (L) 13 - 44 %    MONOCYTES 8 4.0 - 12.0 %    EOSINOPHILS 2 0.5 - 7.8 %    BASOPHILS 0 0.0 - 2.0 %    IMMATURE GRANULOCYTES 1 0.0 - 5.0 %    ABS. NEUTROPHILS 8.5 (H) 1.7 - 8.2 K/UL    ABS. LYMPHOCYTES 1.4 0.5 - 4.6 K/UL    ABS. MONOCYTES 0.9 0.1 - 1.3 K/UL    ABS. EOSINOPHILS 0.3 0.0 - 0.8 K/UL    ABS. BASOPHILS 0.1 0.0 - 0.2 K/UL    ABS. IMM.  GRANS. 0.1 0.0 - 0.5 K/UL   MAGNESIUM    Collection Time: 10/30/21  6:09 AM   Result Value Ref Range    Magnesium 2.0 1.8 - 2.4 mg/dL   PROCALCITONIN    Collection Time: 10/30/21  6:09 AM   Result Value Ref Range    Procalcitonin <0.05 ng/mL       All Micro Results     Procedure Component Value Units Date/Time    CULTURE, BLOOD [[id number]]     Order Status: Sent Specimen: Blood     MSSA/MRSA SC BY PCR, NASAL SWAB [[id number]] Collected: 10/25/21 0600    Order Status: Canceled Specimen: Swab           Other Studies:  XR CHEST SNGL V    Result Date: 10/30/2021  PORTABLE CHEST, October 30, 2021 at 0812 hours CLINICAL HISTORY:  Hypoxemia with sepsis. COMPARISON:  CT of September 14, 2017. FINDINGS:  AP erect image demonstrates no confluent infiltrate or significant pleural fluid. There is mild atelectasis/infiltrate at both lung bases. Mild elevation of the right hemidiaphragm is stable. The heart size is within normal limits without evidence of congestive heart failure or pneumothorax. The bony thorax appears intact on this view. There are overlying radiopaque support devices. MILD BIBASILAR ATELECTASIS/INFILTRATE WITHOUT CONSOLIDATIVE PNEUMONIA.    ECHO ADULT COMPLETE    Result Date: 10/29/2021  · Contrast used: DEFINITY. · Image quality for this study was technically difficult. · LV: Estimated LVEF is >70%. Normal cavity size and wall thickness. Hyperdynamic systolic function. · TV: Mild to moderate tricuspid valve regurgitation is present. Right Ventricular Arterial Pressure (RVSP) is 61 mmHg. Pulmonary hypertension found to be severe. · LA: Moderately dilated left atrium. · RA: Mildly dilated right atrium. · MV: Mild mitral valve regurgitation is present. · PV: Mild pulmonic valve regurgitation is present.   Consider CT of chest to rule out pulmonary embolus       Current Meds:  Current Facility-Administered Medications   Medication Dose Route Frequency    acetaminophen (TYLENOL) tablet 650 mg  650 mg Oral Q6H PRN    0.9% sodium chloride infusion 250 mL  250 mL IntraVENous PRN    diphenhydrAMINE (BENADRYL) capsule 25 mg  25 mg Oral Q6H PRN    propafenone (RYTHMOL) tablet 150 mg  150 mg Oral BID    midodrine (PROAMATINE) tablet 10 mg  10 mg Oral Q8H    bisacodyL (DULCOLAX) suppository 10 mg  10 mg Rectal DAILY PRN    famotidine (PEPCID) tablet 20 mg  20 mg Oral QPM    [Held by provider] apixaban (ELIQUIS) tablet 5 mg  5 mg Oral BID    levoFLOXacin (LEVAQUIN) tablet 500 mg  500 mg Oral Q24H    doxycycline (VIBRAMYCIN) capsule 100 mg  100 mg Oral Q12H    gabapentin (NEURONTIN) capsule 300 mg  300 mg Oral Q12H    albuterol (PROVENTIL VENTOLIN) nebulizer solution 2.5 mg  2.5 mg Nebulization Q6H PRN    0.9% sodium chloride infusion 250 mL  250 mL IntraVENous PRN    NOREPINephrine (LEVOPHED) 4 mg in 5% dextrose 250 mL infusion  0.5-30 mcg/min IntraVENous TITRATE    aspirin delayed-release tablet 81 mg  81 mg Oral QHS    escitalopram oxalate (LEXAPRO) tablet 20 mg  20 mg Oral QAM    levothyroxine (SYNTHROID) tablet 50 mcg  50 mcg Oral ACB    LORazepam (ATIVAN) tablet 0.5 mg  0.5 mg Oral Q6H PRN    [Held by provider] metoprolol tartrate (LOPRESSOR) tablet 25 mg  25 mg Oral BID    pantoprazole (PROTONIX) tablet 40 mg  40 mg Oral ACB    potassium chloride (KLOR-CON) tablet 10 mEq  10 mEq Oral DAILY    [Held by provider] rOPINIRole (REQUIP) tablet 0.5 mg  0.5 mg Oral QHS    traMADoL (ULTRAM) tablet 50 mg  50 mg Oral Q6H PRN    zolpidem (AMBIEN) tablet 10 mg  10 mg Oral QHS PRN    alcohol 62% (NOZIN) nasal  1 Ampule  1 Ampule Topical Q12H    sodium chloride (NS) flush 5-40 mL  5-40 mL IntraVENous Q8H    sodium chloride (NS) flush 5-40 mL  5-40 mL IntraVENous PRN    naloxone (NARCAN) injection 0.4 mg  0.4 mg IntraVENous PRN    phenol throat spray (CHLORASEPTIC) 1 Spray  1 Spray Oral PRN    diphenhydrAMINE (BENADRYL) capsule 25 mg  25 mg Oral Q4H PRN    docusate sodium (COLACE) capsule 100 mg  100 mg Oral BID    HYDROcodone-acetaminophen (NORCO)  mg tablet 1 Tablet  1 Tablet Oral Q4H PRN    HYDROmorphone (DILAUDID) injection 1 mg  1 mg IntraVENous Q4H PRN    ondansetron (ZOFRAN ODT) tablet 4 mg  4 mg Oral Q4H PRN    [Held by provider] lisinopriL (PRINIVIL, ZESTRIL) tablet 20 mg  20 mg Oral BID    And    [Held by provider] hydroCHLOROthiazide (MICROZIDE) capsule 12.5 mg  12.5 mg Oral BID       Signed:  Rod Daniels MD    Part of this note may have been written by using a voice dictation software. The note has been proof read but may still contain some grammatical/other typographical errors.

## 2021-10-30 NOTE — PROGRESS NOTES
ACUTE PHYSICAL THERAPY GOALS:  (Developed with and agreed upon by patient and/or caregiver. )  LTG: (Reviewed and updated 10/29/21)  (1.)Ms. Brandy Zambrano will move from supine to sit and sit to supine, scoot up and down and roll side to side in bed INDEPENDENTLY via log roll within 7 treatment day(s). (2.)Ms. Brandy Zambrano will transfer from bed to chair and chair to bed with SUPERVISION using the least restrictive device within 7 treatment day(s). (3.)Ms. Brandy Zambrano will ambulate with STAND BY ASSIST for 150+ feet with the least restrictive device within 7 treatment day(s). (4.)Ms. Brandy Zambrano will perform LE exercises per HEP independently to improve strength and mobility within 7 days. (5.)Ms. Brandy Zambrano will independently verbalize 3/3 post op spinal precautions and maintain compliance within 7 days. PHYSICAL THERAPY: Daily Note and PM Treatment Day # 2   **Corset brace w/ambulation    Gus Smith is a 73 y.o. female   PRIMARY DIAGNOSIS: Lumbar stenosis with neurogenic claudication  Lumbar stenosis with neurogenic claudication [M48.062]  Spondylolisthesis of lumbar region [M43.16]  Spinal stenosis [M48.00]  Procedure(s) (LRB):  L2-L4 SPINE LUMBAR DIRECT LATERAL INTERBODY FUSION (DLIF) ANTERIOR PLATING / SSEP (Left)  REDO L2-L5 LAMINECTOMY FUSION AND L4-L5 TLIF / NEUROMONITORING (N/A)  5 Days Post-Op    ASSESSMENT:     REHAB RECOMMENDATIONS: CURRENT LEVEL OF FUNCTION:  (Most Recently Demonstrated)   Recommendation to date pending progress:  [address] [place] [address]  Equipment:    Rolling Walker Bed Mobility:   Contact Guard Assistance  Sit to Stand:   Contact Guard Assistance  Transfers:   Contact Guard Assistance  Gait/Mobility:   Contact Guard Assistance- SBA     ASSESSMENT:  Ms. Brandy Zambrano continues to make good progress toward goals. She performed multiple transfers from various surfaces and increased gait distances to 15'x2 w/ RW and CGA/SBA. Good standing balance and improved gait mechanics.   On w/c for transport to 7th floor upon contact.       SUBJECTIVE:   Ms. David Barkley states, \"I am feeling a lot better\"    SOCIAL HISTORY/ LIVING ENVIRONMENT: see eval  Home Environment: Private residence  One/Two Story Residence: Two story  Living Alone: Yes  Support Systems: Child(rober)  OBJECTIVE:     PAIN: VITAL SIGNS: LINES/DRAINS:   Pre Treatment: Pain Screen  Pain Scale 1: Numeric (0 - 10)  Pain Intensity 1: 5  Post Treatment: 3      O2 Device: Nasal cannula     MOBILITY: I Mod I S SBA CGA Min Mod Max Total  NT x2 Comments:   Bed Mobility    Rolling [] [] [] [] [x] [] [] [] [] [] []    Supine to Sit [] [] [] [] [x] [x] [] [] [] [] []    Scooting [] [] [] [] [x] [] [] [] [] [] []    Sit to Supine [] [] [] [] [] [] [] [] [] [x] []    Transfers    Sit to Stand [] [] [] [] [x] [] [] [] [] [] []    Bed to Chair [] [] [] [x] [x] [] [] [] [] [] []    Stand to Sit [] [] [] [] [x] [] [] [] [] [] []    I=Independent, Mod I=Modified Independent, S=Supervision, SBA=Standby Assistance, CGA=Contact Guard Assistance,   Min=Minimal Assistance, Mod=Moderate Assistance, Max=Maximal Assistance, Total=Total Assistance, NT=Not Tested    BALANCE: Good Fair+ Fair Fair- Poor NT Comments   Sitting Static [x] [] [] [] [] []    Sitting Dynamic [x] [] [] [] [] []              Standing Static [] [x] [] [] [] []    Standing Dynamic [] [x] [] [] [] []      GAIT: I Mod I S SBA CGA Min Mod Max Total  NT x2 Comments:   Level of Assistance [] [] [] [x] [x] [] [] [] [] [] []    Distance 15'x2    DME Rolling Walker    Gait Quality Decreased pace    Weightbearing  Status N/A     I=Independent, Mod I=Modified Independent, S=Supervision, SBA=Standby Assistance, CGA=Contact Guard Assistance,   Min=Minimal Assistance, Mod=Moderate Assistance, Max=Maximal Assistance, Total=Total Assistance, NT=Not Tested    PLAN:   FREQUENCY/DURATION: PT Plan of Care: BID for duration of hospital stay or until stated goals are met, whichever comes first.  TREATMENT:     TREATMENT: ($$ Therapeutic Activity: 23-37 mins    )  Therapeutic Activity (24 Minutes): Therapeutic activity included Supine to Sit, Scooting, Transfer Training, Ambulation on level ground, Sitting balance  and Standing balance to improve functional Mobility, Strength and Activity tolerance.     TREATMENT GRID:  N/A    AFTER TREATMENT POSITION/PRECAUTIONS:  Needs within reach, RN notified, Visitors at bedside and On w/c for transport    INTERDISCIPLINARY COLLABORATION:  RN/PCT and PT/PTA    TOTAL TREATMENT DURATION:  PT Patient Time In/Time Out  Time In: 1342  Time Out: Zulay Villagran, PTA

## 2021-10-30 NOTE — PROGRESS NOTES
ACUTE PHYSICAL THERAPY GOALS:  (Developed with and agreed upon by patient and/or caregiver. )  LTG: (Reviewed and updated 10/29/21)  (1.)Ms. Manish Chakraborty will move from supine to sit and sit to supine, scoot up and down and roll side to side in bed INDEPENDENTLY via log roll within 7 treatment day(s). (2.)Ms. Manish Chakraborty will transfer from bed to chair and chair to bed with SUPERVISION using the least restrictive device within 7 treatment day(s). (3.)Ms. Manish Chakraborty will ambulate with STAND BY ASSIST for 150+ feet with the least restrictive device within 7 treatment day(s). (4.)Ms. Manish Chakraborty will perform LE exercises per HEP independently to improve strength and mobility within 7 days. (5.)Ms. Manish Chakraborty will independently verbalize 3/3 post op spinal precautions and maintain compliance within 7 days. PHYSICAL THERAPY: Daily Note and AM Treatment Day # 2   **Corset brace w/ambulation    Rand Quiles is a 73 y.o. female   PRIMARY DIAGNOSIS: Lumbar stenosis with neurogenic claudication  Lumbar stenosis with neurogenic claudication [M48.062]  Spondylolisthesis of lumbar region [M43.16]  Spinal stenosis [M48.00]  Procedure(s) (LRB):  L2-L4 SPINE LUMBAR DIRECT LATERAL INTERBODY FUSION (DLIF) ANTERIOR PLATING / SSEP (Left)  REDO L2-L5 LAMINECTOMY FUSION AND L4-L5 TLIF / NEUROMONITORING (N/A)  5 Days Post-Op    ASSESSMENT:     REHAB RECOMMENDATIONS: CURRENT LEVEL OF FUNCTION:  (Most Recently Demonstrated)   Recommendation to date pending progress:  [address] [place] [address]  Equipment:    Rolling Walker Bed Mobility:   Minimal Assistance  Sit to Stand:   Contact Guard Assistance  Transfers:   Contact Guard Assistance  Gait/Mobility:   Contact Guard Assistance     ASSESSMENT:  Ms. Manish Chakraborty is making good progress toward goals. BP still a little low, but moving well and asymptomatic. Multiple transfers and ambulated 5' with RW and CGA for multiple line management.   Brace not seen during treatment, will locate before increasing ambulation distances. Comfortable in chair post treatment. SUBJECTIVE:   Ms. Sarahi Petersen states, \"I'm ready to get movig. \"    SOCIAL HISTORY/ LIVING ENVIRONMENT: see eval  Home Environment: Private residence  One/Two Story Residence: Two story  Living Alone: Yes  Support Systems: Child(rober)  OBJECTIVE:     PAIN: VITAL SIGNS: LINES/DRAINS:   Pre Treatment: Pain Screen  Pain Scale 1: Numeric (0 - 10)  Pain Intensity 1: 3  Post Treatment: 3  supine: 112/58  Sittin/63  Stand: 107/54 IV and ICU lines  O2 Device: Nasal cannula     MOBILITY: I Mod I S SBA CGA Min Mod Max Total  NT x2 Comments:   Bed Mobility    Rolling [] [] [] [] [x] [x] [] [] [] [] []    Supine to Sit [] [] [] [] [] [x] [] [] [] [] []    Scooting [] [] [] [] [x] [] [] [] [] [] []    Sit to Supine [] [] [] [] [] [] [] [] [] [x] []    Transfers    Sit to Stand [] [] [] [] [x] [] [] [] [] [] []    Bed to Chair [] [] [] [] [x] [] [] [] [] [] []    Stand to Sit [] [] [] [] [x] [] [] [] [] [] []    I=Independent, Mod I=Modified Independent, S=Supervision, SBA=Standby Assistance, CGA=Contact Guard Assistance,   Min=Minimal Assistance, Mod=Moderate Assistance, Max=Maximal Assistance, Total=Total Assistance, NT=Not Tested    BALANCE: Good Fair+ Fair Fair- Poor NT Comments   Sitting Static [x] [] [] [] [] []    Sitting Dynamic [x] [] [] [] [] []              Standing Static [] [x] [] [] [] []    Standing Dynamic [] [x] [] [] [] []      GAIT: I Mod I S SBA CGA Min Mod Max Total  NT x2 Comments:   Level of Assistance [] [] [] [] [x] [] [] [] [] [] []    Distance 5'    DME Rolling Walker    Gait Quality Decreased pace    Weightbearing  Status N/A     I=Independent, Mod I=Modified Independent, S=Supervision, SBA=Standby Assistance, CGA=Contact Guard Assistance,   Min=Minimal Assistance, Mod=Moderate Assistance, Max=Maximal Assistance, Total=Total Assistance, NT=Not Tested    PLAN:   FREQUENCY/DURATION: PT Plan of Care: BID for duration of hospital stay or until stated goals are met, whichever comes first.  TREATMENT:     TREATMENT:   ($$ Therapeutic Activity: 38-52 mins    )  Therapeutic Activity (39 Minutes): Therapeutic activity included Supine to Sit, Scooting, Transfer Training, Ambulation on level ground, Sitting balance  and Standing balance to improve functional Mobility, Strength and Activity tolerance.     TREATMENT GRID:  N/A    AFTER TREATMENT POSITION/PRECAUTIONS:  Chair, Needs within reach and RN notified    INTERDISCIPLINARY COLLABORATION:  RN/PCT and PT/PTA    TOTAL TREATMENT DURATION:  PT Patient Time In/Time Out  Time In: 9373  Time Out: JODY Gregorio

## 2021-10-30 NOTE — PROGRESS NOTES
TRANSFER - OUT REPORT:    Verbal report given to Adama Holden RN on Sherie Watts  being transferred to   for routine progression of care       Report consisted of patients Situation, Background, Assessment and   Recommendations(SBAR). Information from the following report(s) SBAR, Kardex, OR Summary, Procedure Summary, Intake/Output, MAR, Recent Results, Med Rec Status, Cardiac Rhythm NSR and Alarm Parameters  was reviewed with the receiving nurse. Lines:   Peripheral IV 10/27/21 Right Antecubital (Active)   Site Assessment Clean, dry, & intact 10/30/21 0718   Phlebitis Assessment 0 10/30/21 0718   Infiltration Assessment 0 10/30/21 0718   Dressing Status Clean, dry, & intact 10/30/21 0718   Dressing Type Tape;Transparent 10/30/21 0718   Hub Color/Line Status Flushed;Patent 10/30/21 0718   Alcohol Cap Used No 10/30/21 0718       Peripheral IV 10/30/21 Anterior; Left Forearm (Active)        Opportunity for questions and clarification was provided.       Patient transported with:   Monitor  Registered Nurse

## 2021-10-30 NOTE — PROGRESS NOTES
October 30, 2021         Post Op day: 5 Days Post-Op   Admit Diagnosis: Lumbar stenosis with neurogenic claudication [M48.062]  Spondylolisthesis of lumbar region [M43.16]  Spinal stenosis [M48.00]  LAB:    Recent Results (from the past 24 hour(s))   ECHO ADULT COMPLETE    Collection Time: 10/29/21  1:55 PM   Result Value Ref Range    Left Atrium Minor Axis 1.99 cm    Left Atrium Major Axis 4.72 cm    LA Area 2C 17.00 cm2    LA Area 4C 14.80 cm2    LV ED Vol A4C 81.10 cm3    LV ES Vol A4C 23.40 cm3    IVSd 1.09 (A) 0.60 - 0.90 cm    LVIDd 3.99 3.90 - 5.30 cm    LVIDs 2.97 cm    LVOT d 1.90 cm    LVOT VTI 36.80 cm    LVOT Peak Gradient 14.00 mmHg    LVPWd 1.01 (A) 0.60 - 0.90 cm    LV E' Lateral Velocity 11.80 cm/s    LV E' Septal Velocity 7.93 cm/s    AV Cusp 1.60 cm    Aortic Valve Systolic Mean Gradient 5.28 mmHg    AoV VTI 40.80 cm    Aortic Valve Systolic Peak Velocity 358.81 cm/s    AoV PG 19.00 mmHg    Aortic Valve Area by Continuity of VTI 2.56 cm2    Aortic Valve Area by Continuity of Peak Velocity 2.45 cm2    Mitral Valve E Wave Deceleration Time 194.00 ms    MV A Bib 76.20 cm/s    MV E Bib 127.00 cm/s    MV Mean Gradient 3.00 mmHg    Mitral Valve Annulus Velocity Time Integral 35.40 cm    Mitral Valve Max Velocity 163.00 cm/s    MV Peak Gradient 11.00 mmHg    RVIDd 2.44 cm    Tapse 2.61 (A) 1.50 - 2.00 cm    TR Max Velocity 381.00 cm/s    Triscuspid Valve Regurgitation Peak Gradient 58.00 mmHg    MV E/A 1.67     LV Mass .7 67.0 - 162.0 g    LV Mass AL Index 57.2 43.0 - 95.0 g/m2    E/E' lateral 10.76     E/E' septal 16.02     E/E' ratio (averaged) 13.39     CANDELARIO/BSA Pk Bib 1.0 cm2/m2    CANDELARIO/BSA VTI 1.1 cm2/m2    RVSP 61.0 mmHg    Est. RA Pressure 3.0 mmHg   MAGNESIUM    Collection Time: 10/29/21  3:11 PM   Result Value Ref Range    Magnesium 1.6 (L) 1.8 - 2.4 mg/dL   HGB & HCT    Collection Time: 10/29/21  3:11 PM   Result Value Ref Range    HGB 8.2 (L) 11.7 - 15.4 g/dL    HCT 25.8 (L) 35.8 - 46.3 % METABOLIC PANEL, BASIC    Collection Time: 10/30/21  6:09 AM   Result Value Ref Range    Sodium 141 136 - 145 mmol/L    Potassium 4.0 3.5 - 5.1 mmol/L    Chloride 110 (H) 98 - 107 mmol/L    CO2 30 21 - 32 mmol/L    Anion gap 1 (L) 7 - 16 mmol/L    Glucose 100 65 - 100 mg/dL    BUN 10 8 - 23 MG/DL    Creatinine 0.42 (L) 0.6 - 1.0 MG/DL    GFR est AA >60 >60 ml/min/1.73m2    GFR est non-AA >60 >60 ml/min/1.73m2    Calcium 7.9 (L) 8.3 - 10.4 MG/DL   CBC WITH AUTOMATED DIFF    Collection Time: 10/30/21  6:09 AM   Result Value Ref Range    WBC 11.2 (H) 4.3 - 11.1 K/uL    RBC 2.49 (L) 4.05 - 5.2 M/uL    HGB 7.6 (L) 11.7 - 15.4 g/dL    HCT 23.5 (L) 35.8 - 46.3 %    MCV 94.4 79.6 - 97.8 FL    MCH 30.5 26.1 - 32.9 PG    MCHC 32.3 31.4 - 35.0 g/dL    RDW 16.7 (H) 11.9 - 14.6 %    PLATELET 768 264 - 089 K/uL    MPV 9.6 9.4 - 12.3 FL    ABSOLUTE NRBC 0.00 0.0 - 0.2 K/uL    DF AUTOMATED      NEUTROPHILS 76 43 - 78 %    LYMPHOCYTES 12 (L) 13 - 44 %    MONOCYTES 8 4.0 - 12.0 %    EOSINOPHILS 2 0.5 - 7.8 %    BASOPHILS 0 0.0 - 2.0 %    IMMATURE GRANULOCYTES 1 0.0 - 5.0 %    ABS. NEUTROPHILS 8.5 (H) 1.7 - 8.2 K/UL    ABS. LYMPHOCYTES 1.4 0.5 - 4.6 K/UL    ABS. MONOCYTES 0.9 0.1 - 1.3 K/UL    ABS. EOSINOPHILS 0.3 0.0 - 0.8 K/UL    ABS. BASOPHILS 0.1 0.0 - 0.2 K/UL    ABS. IMM.  GRANS. 0.1 0.0 - 0.5 K/UL   MAGNESIUM    Collection Time: 10/30/21  6:09 AM   Result Value Ref Range    Magnesium 2.0 1.8 - 2.4 mg/dL   PROCALCITONIN    Collection Time: 10/30/21  6:09 AM   Result Value Ref Range    Procalcitonin <0.05 ng/mL     Vital Signs:    Patient Vitals for the past 8 hrs:   BP Temp Pulse Resp SpO2 Weight   10/30/21 1045 130/68 -- 98 -- -- --   10/30/21 1044 130/68 -- -- -- -- --   10/30/21 1003 115/67 -- -- -- 100 % --   10/30/21 0904 121/65 -- 98 15 100 % --   10/30/21 0855 106/60 -- (!) 101 -- -- --   10/30/21 0804 -- -- -- -- 100 % --   10/30/21 0719 -- -- -- 16 -- --   10/30/21 0718 117/63 98.8 °F (37.1 °C) 92 16 97 % --   10/30/21 0704 (!) 106/42 -- 93 22 96 % --   10/30/21 0618 100/61 -- 93 (!) 31 94 % --   10/30/21 0604 106/65 -- 92 (!) 35 95 % --   10/30/21 0548 (!) 100/42 -- 90 28 94 % --   10/30/21 0533 108/62 -- 94 15 94 % --   10/30/21 0504 (!) 115/49 -- 94 29 95 % --   10/30/21 0500 -- -- -- -- -- 144.2 kg (317 lb 14.5 oz)   10/30/21 0433 116/60 99.1 °F (37.3 °C) 93 16 100 % --   10/30/21 0418 (!) 119/53 -- 92 25 99 % --   10/30/21 0403 125/62 -- 93 23 99 % --     Temp (24hrs), Av.9 °F (37.2 °C), Min:97.4 °F (36.3 °C), Max:100.2 °F (37.9 °C)    Pain Control:   Pain Assessment  Pain Scale 1: Numeric (0 - 10)  Pain Intensity 1: 3  Pain Onset 1: acute   Pain Location 1: Back  Pain Orientation 1: Mid, Lower  Pain Description 1: Aching  Pain Intervention(s) 1: Repositioned, Rest  Subjective: Doing well, resting comfortably in bed,  no complaints     Objective:  No Acute Distress, Alert and Oriented, Neurovascular exam is normal       Assessment:   Patient Active Problem List   Diagnosis Code    Essential hypertension, benign I10    Endometriosis N80.9    Colon polyp K63.5    Squamous cell skin cancer C44.92    Lumbar stenosis with neurogenic claudication M48.062    Postoperative wound infection T81.49XA    Acquired hypothyroidism E03.9    Obesity, morbid (HCC) E66.01    Spondylolisthesis of multiple sites in spine M43.19    Spinal stenosis M48.00    Hypovolemic shock (HCC) R57.1    Hypotension I95.9       Status Post Procedure(s) (LRB):  L2-L4 SPINE LUMBAR DIRECT LATERAL INTERBODY FUSION (DLIF) ANTERIOR PLATING / SSEP (Left)  REDO L2-L5 LAMINECTOMY FUSION AND L4-L5 TLIF / NEUROMONITORING (N/A)        Plan: Continue PT/OT, Monitor Hgb. Medical management per hospitalist and cards. Dispo planning per SW.    Signed By: GRAHAM Wilkerson

## 2021-10-30 NOTE — PROGRESS NOTES
Physician Progress Note      PATIENT:               Jade Hutchins  CSN #:                  [id number]  :                       1955  ADMIT DATE:       10/25/2021 5:34 AM  DISCH DATE:  RESPONDING  PROVIDER #:        Lazaro Nguyen MD        QUERY TEXT:    Type of Anemia: Please provide further specificity, if known. Clinical indicators include: blood loss, transfusion, hemoglobin, bleeding, cancer, blood transfusions, hemoptysis, gi bleed, hematuria, rbc, hgb, hct, platelet, anemia  Options provided:  -- Anemia due to acute blood loss  -- Anemia due to chronic blood loss  -- Anemia due to iron deficiency  -- Anemia due to postoperative blood loss  -- Anemia due to chronic disease  -- Other - I will add my own diagnosis  -- Disagree - Not applicable / Not valid  -- Disagree - Clinically Unable to determine / Unknown        PROVIDER RESPONSE TEXT:    The patient has anemia due to acute blood loss.       Electronically signed by:  Lazaro Nguyen MD 10/30/2021 6:19 PM

## 2021-10-30 NOTE — PROGRESS NOTES
[facility] CARDIOLOGY PROGRESS NOTE           10/30/2021 12:16 PM    Admit Date: 10/25/2021      Subjective:     No cp sob     Review of Systems   Constitutional: Positive for fever. Respiratory: Negative for cough. Cardiovascular: Negative for chest pain. Objective:      Vitals:    10/30/21 1044 10/30/21 1045 10/30/21 1148 10/30/21 1202   BP: 130/68 130/68 128/71 135/65   Pulse:  98 94 94   Resp:   16 18   Temp:   98.8 °F (37.1 °C)    SpO2:   98% 99%   Weight:       Height:             Physical Exam  HENT:      Head: Normocephalic. Nose: No congestion. Mouth/Throat:      Pharynx: No oropharyngeal exudate. Cardiovascular:      Rate and Rhythm: Normal rate. Pulmonary:      Effort: Pulmonary effort is normal.   Abdominal:      General: Bowel sounds are normal.   Musculoskeletal:      Right lower leg: Edema present. Left lower leg: Edema present. Neurological:      Mental Status: She is alert and oriented to person, place, and time. Psychiatric:         Mood and Affect: Mood normal.         Data Review:   Recent Labs     10/30/21  0609 10/29/21  1511 10/29/21  0639 10/29/21  0639 10/28/21  0607 10/28/21  0607     --   --  142   < > 139   K 4.0  --   --  3.8   < > 4.0   MG 2.0 1.6*  --   --   --   --    BUN 10  --   --  11   < > 16   CREA 0.42*  --   --  0.46*   < > 0.75     --   --  100   < > 117*   WBC 11.2*  --   --  11.4*   < > 15.6*   HGB 7.6* 8.2*   < > 8.1*   < > 8.8*   HCT 23.5* 25.8*   < > 25.1*   < > 27.1*     --   --  187   < > 199   INR  --   --   --   --   --  1.1    < > = values in this interval not displayed.          Intake/Output Summary (Last 24 hours) at 10/30/2021 1216  Last data filed at 10/30/2021 0501  Gross per 24 hour   Intake 1717.73 ml   Output 700 ml   Net 1017.73 ml     Current Facility-Administered Medications   Medication Dose Route Frequency    acetaminophen (TYLENOL) tablet 650 mg  650 mg Oral Q6H PRN    0.9% sodium chloride infusion 250 mL  250 mL IntraVENous PRN    diphenhydrAMINE (BENADRYL) capsule 25 mg  25 mg Oral Q6H PRN    propafenone (RYTHMOL) tablet 150 mg  150 mg Oral BID    midodrine (PROAMATINE) tablet 10 mg  10 mg Oral Q8H    bisacodyL (DULCOLAX) suppository 10 mg  10 mg Rectal DAILY PRN    famotidine (PEPCID) tablet 20 mg  20 mg Oral QPM    [Held by provider] apixaban (ELIQUIS) tablet 5 mg  5 mg Oral BID    levoFLOXacin (LEVAQUIN) tablet 500 mg  500 mg Oral Q24H    doxycycline (VIBRAMYCIN) capsule 100 mg  100 mg Oral Q12H    gabapentin (NEURONTIN) capsule 300 mg  300 mg Oral Q12H    albuterol (PROVENTIL VENTOLIN) nebulizer solution 2.5 mg  2.5 mg Nebulization Q6H PRN    0.9% sodium chloride infusion 250 mL  250 mL IntraVENous PRN    0.9% sodium chloride infusion  125 mL/hr IntraVENous CONTINUOUS    NOREPINephrine (LEVOPHED) 4 mg in 5% dextrose 250 mL infusion  0.5-30 mcg/min IntraVENous TITRATE    aspirin delayed-release tablet 81 mg  81 mg Oral QHS    escitalopram oxalate (LEXAPRO) tablet 20 mg  20 mg Oral QAM    levothyroxine (SYNTHROID) tablet 50 mcg  50 mcg Oral ACB    LORazepam (ATIVAN) tablet 0.5 mg  0.5 mg Oral Q6H PRN    [Held by provider] metoprolol tartrate (LOPRESSOR) tablet 25 mg  25 mg Oral BID    pantoprazole (PROTONIX) tablet 40 mg  40 mg Oral ACB    potassium chloride (KLOR-CON) tablet 10 mEq  10 mEq Oral DAILY    [Held by provider] rOPINIRole (REQUIP) tablet 0.5 mg  0.5 mg Oral QHS    traMADoL (ULTRAM) tablet 50 mg  50 mg Oral Q6H PRN    zolpidem (AMBIEN) tablet 10 mg  10 mg Oral QHS PRN    alcohol 62% (NOZIN) nasal  1 Ampule  1 Ampule Topical Q12H    sodium chloride (NS) flush 5-40 mL  5-40 mL IntraVENous Q8H    sodium chloride (NS) flush 5-40 mL  5-40 mL IntraVENous PRN    naloxone (NARCAN) injection 0.4 mg  0.4 mg IntraVENous PRN    phenol throat spray (CHLORASEPTIC) 1 Spray  1 Spray Oral PRN    diphenhydrAMINE (BENADRYL) capsule 25 mg  25 mg Oral Q4H PRN  docusate sodium (COLACE) capsule 100 mg  100 mg Oral BID    HYDROcodone-acetaminophen (NORCO)  mg tablet 1 Tablet  1 Tablet Oral Q4H PRN    HYDROmorphone (DILAUDID) injection 1 mg  1 mg IntraVENous Q4H PRN    ondansetron (ZOFRAN ODT) tablet 4 mg  4 mg Oral Q4H PRN    [Held by provider] lisinopriL (PRINIVIL, ZESTRIL) tablet 20 mg  20 mg Oral BID    And    [Held by provider] hydroCHLOROthiazide (MICROZIDE) capsule 12.5 mg  12.5 mg Oral BID           Assessment/Plan:     73 y.o. female     Hypotension  ·  improving as stated above multifactorial and . Agree with holding hypertensive medications. · Rythmol may be reresumed appears to have been given this a.m. Elevated RVSP  · Results discussed with the primary team due to recent hypotension as well as profoundly elevated RVSP work-up for pulmonary embolus will be considered. · Patient will need additional work-up for pulmonary hypertension including sleep apnea work-up discussed with patient. Patient stated   approximately a year ago but did noted snoring at night. Nighttime oximetry reasonable during hospitalization to evaluate for nighttime hypoxia additional outpatient sleep study should be considered as well  · Deferring additional right heart catheterization VQ scan RAMSEY PFTs until acute issues improved. Paroxysmal atrial fibrillation  ·  recent ECGs with sinus rhythm. Patient with some elevated heart rates today and ectopic beats on exam.  Plan for ECG to determine rhythm. · Eliquis to be resumed when safe from a bleeding standpoint  Now hgb worse. AC on Hold     Anemia   · HGB lower today.      Benigno Bender MD  10/30/2021 12:16 PM

## 2021-10-30 NOTE — PROGRESS NOTES
10/30/21 1408   Dual Skin Pressure Injury Assessment   Dual Skin Pressure Injury Assessment WDL   Second Care Provider (Based on [address] [place] [address]) Marina Grace RN    Skin Integumentary   Skin Integumentary (WDL) X   Skin Integrity Incision (comment); Blister  (back, l hip)   Skin Color Appropriate for ethnicity   Skin Condition/Temp Warm;Dry;Fragile   Turgor Epidermis thin w/ loss of subcut tissue   Wound Prevention and Protection Methods   Orientation of Wound Prevention Posterior   Location of Wound Prevention Sacrum/Coccyx   Dressing Present  Yes   Dressing Status Intact   Wound Offloading (Prevention Methods) Bed, pressure reduction mattress

## 2021-10-31 LAB
ABO + RH BLD: NORMAL
ANION GAP SERPL CALC-SCNC: 2 MMOL/L (ref 7–16)
BASOPHILS # BLD: 0.1 K/UL (ref 0–0.2)
BASOPHILS NFR BLD: 0 % (ref 0–2)
BLD PROD TYP BPU: NORMAL
BLOOD GROUP ANTIBODIES SERPL: NORMAL
BPU ID: NORMAL
BUN SERPL-MCNC: 9 MG/DL (ref 8–23)
CALCIUM SERPL-MCNC: 8.1 MG/DL (ref 8.3–10.4)
CHLORIDE SERPL-SCNC: 109 MMOL/L (ref 98–107)
CO2 SERPL-SCNC: 30 MMOL/L (ref 21–32)
CREAT SERPL-MCNC: 0.42 MG/DL (ref 0.6–1)
CROSSMATCH RESULT,%XM: NORMAL
DIFFERENTIAL METHOD BLD: ABNORMAL
EOSINOPHIL # BLD: 0.3 K/UL (ref 0–0.8)
EOSINOPHIL NFR BLD: 2 % (ref 0.5–7.8)
ERYTHROCYTE [DISTWIDTH] IN BLOOD BY AUTOMATED COUNT: 17.9 % (ref 11.9–14.6)
GLUCOSE SERPL-MCNC: 96 MG/DL (ref 65–100)
HCT VFR BLD AUTO: 27.1 % (ref 35.8–46.3)
HGB BLD-MCNC: 8.6 G/DL (ref 11.7–15.4)
IMM GRANULOCYTES # BLD AUTO: 0.1 K/UL (ref 0–0.5)
IMM GRANULOCYTES NFR BLD AUTO: 1 % (ref 0–5)
LYMPHOCYTES # BLD: 1.9 K/UL (ref 0.5–4.6)
LYMPHOCYTES NFR BLD: 15 % (ref 13–44)
MAGNESIUM SERPL-MCNC: 1.9 MG/DL (ref 1.8–2.4)
MCH RBC QN AUTO: 29.5 PG (ref 26.1–32.9)
MCHC RBC AUTO-ENTMCNC: 31.7 G/DL (ref 31.4–35)
MCV RBC AUTO: 92.8 FL (ref 79.6–97.8)
MONOCYTES # BLD: 1.2 K/UL (ref 0.1–1.3)
MONOCYTES NFR BLD: 10 % (ref 4–12)
NEUTS SEG # BLD: 8.8 K/UL (ref 1.7–8.2)
NEUTS SEG NFR BLD: 72 % (ref 43–78)
NRBC # BLD: 0 K/UL (ref 0–0.2)
PLATELET # BLD AUTO: 204 K/UL (ref 150–450)
PMV BLD AUTO: 9.5 FL (ref 9.4–12.3)
POTASSIUM SERPL-SCNC: 4 MMOL/L (ref 3.5–5.1)
RBC # BLD AUTO: 2.92 M/UL (ref 4.05–5.2)
SODIUM SERPL-SCNC: 141 MMOL/L (ref 136–145)
SPECIMEN EXP DATE BLD: NORMAL
STATUS OF UNIT,%ST: NORMAL
UNIT DIVISION, %UDIV: 0
WBC # BLD AUTO: 12.3 K/UL (ref 4.3–11.1)

## 2021-10-31 PROCEDURE — 74011250637 HC RX REV CODE- 250/637: Performed by: STUDENT IN AN ORGANIZED HEALTH CARE EDUCATION/TRAINING PROGRAM

## 2021-10-31 PROCEDURE — 97530 THERAPEUTIC ACTIVITIES: CPT

## 2021-10-31 PROCEDURE — 99232 SBSQ HOSP IP/OBS MODERATE 35: CPT | Performed by: INTERNAL MEDICINE

## 2021-10-31 PROCEDURE — 85025 COMPLETE CBC W/AUTO DIFF WBC: CPT

## 2021-10-31 PROCEDURE — 80048 BASIC METABOLIC PNL TOTAL CA: CPT

## 2021-10-31 PROCEDURE — 74011250637 HC RX REV CODE- 250/637: Performed by: ORTHOPAEDIC SURGERY

## 2021-10-31 PROCEDURE — 83735 ASSAY OF MAGNESIUM: CPT

## 2021-10-31 PROCEDURE — 74011250636 HC RX REV CODE- 250/636: Performed by: ORTHOPAEDIC SURGERY

## 2021-10-31 PROCEDURE — 36415 COLL VENOUS BLD VENIPUNCTURE: CPT

## 2021-10-31 PROCEDURE — 65270000029 HC RM PRIVATE

## 2021-10-31 RX ORDER — POLYETHYLENE GLYCOL 3350 17 G/17G
17 POWDER, FOR SOLUTION ORAL DAILY
Status: DISCONTINUED | OUTPATIENT
Start: 2021-10-31 | End: 2021-11-03 | Stop reason: HOSPADM

## 2021-10-31 RX ADMIN — MIDODRINE HYDROCHLORIDE 10 MG: 5 TABLET ORAL at 04:54

## 2021-10-31 RX ADMIN — METOPROLOL TARTRATE 25 MG: 25 TABLET, FILM COATED ORAL at 16:17

## 2021-10-31 RX ADMIN — Medication 1 AMPULE: at 21:40

## 2021-10-31 RX ADMIN — MIDODRINE HYDROCHLORIDE 10 MG: 5 TABLET ORAL at 21:40

## 2021-10-31 RX ADMIN — ASPIRIN 81 MG: 81 TABLET ORAL at 21:40

## 2021-10-31 RX ADMIN — POLYETHYLENE GLYCOL 3350 17 G: 17 POWDER, FOR SOLUTION ORAL at 10:08

## 2021-10-31 RX ADMIN — Medication 1 AMPULE: at 08:40

## 2021-10-31 RX ADMIN — LEVOFLOXACIN 500 MG: 500 TABLET, FILM COATED ORAL at 08:40

## 2021-10-31 RX ADMIN — APIXABAN 5 MG: 5 TABLET, FILM COATED ORAL at 08:40

## 2021-10-31 RX ADMIN — DOXYCYCLINE HYCLATE 100 MG: 100 CAPSULE ORAL at 21:40

## 2021-10-31 RX ADMIN — DOXYCYCLINE HYCLATE 100 MG: 100 CAPSULE ORAL at 08:41

## 2021-10-31 RX ADMIN — GABAPENTIN 300 MG: 300 CAPSULE ORAL at 08:41

## 2021-10-31 RX ADMIN — Medication 10 ML: at 21:40

## 2021-10-31 RX ADMIN — DOCUSATE SODIUM 100 MG: 100 CAPSULE, LIQUID FILLED ORAL at 08:40

## 2021-10-31 RX ADMIN — FAMOTIDINE 20 MG: 20 TABLET, FILM COATED ORAL at 16:18

## 2021-10-31 RX ADMIN — METOPROLOL TARTRATE 25 MG: 25 TABLET, FILM COATED ORAL at 08:40

## 2021-10-31 RX ADMIN — ESCITALOPRAM OXALATE 20 MG: 10 TABLET ORAL at 08:40

## 2021-10-31 RX ADMIN — LEVOTHYROXINE SODIUM 50 MCG: 0.05 TABLET ORAL at 04:54

## 2021-10-31 RX ADMIN — ZOLPIDEM TARTRATE 10 MG: 5 TABLET ORAL at 21:46

## 2021-10-31 RX ADMIN — Medication 10 ML: at 14:22

## 2021-10-31 RX ADMIN — HYDROMORPHONE HYDROCHLORIDE 1 MG: 1 INJECTION, SOLUTION INTRAMUSCULAR; INTRAVENOUS; SUBCUTANEOUS at 07:43

## 2021-10-31 RX ADMIN — HYDROMORPHONE HYDROCHLORIDE 1 MG: 1 INJECTION, SOLUTION INTRAMUSCULAR; INTRAVENOUS; SUBCUTANEOUS at 16:13

## 2021-10-31 RX ADMIN — PANTOPRAZOLE SODIUM 40 MG: 40 TABLET, DELAYED RELEASE ORAL at 04:54

## 2021-10-31 RX ADMIN — DOCUSATE SODIUM 100 MG: 100 CAPSULE, LIQUID FILLED ORAL at 16:18

## 2021-10-31 RX ADMIN — Medication 10 ML: at 04:54

## 2021-10-31 RX ADMIN — POTASSIUM CHLORIDE 10 MEQ: 10 TABLET, EXTENDED RELEASE ORAL at 08:40

## 2021-10-31 RX ADMIN — PROPAFENONE HYDROCHLORIDE 150 MG: 150 TABLET, FILM COATED ORAL at 16:17

## 2021-10-31 RX ADMIN — GABAPENTIN 300 MG: 300 CAPSULE ORAL at 21:40

## 2021-10-31 RX ADMIN — PROPAFENONE HYDROCHLORIDE 150 MG: 150 TABLET, FILM COATED ORAL at 08:41

## 2021-10-31 RX ADMIN — APIXABAN 5 MG: 5 TABLET, FILM COATED ORAL at 16:18

## 2021-10-31 NOTE — PROGRESS NOTES
POD 6    Hgb - 8.6  Eugene and drains removed    AF,VSS  Patient  Reports feeling good with out complaint. Minimal ambulation to date  Neuro intact  Continue PT, consult  for rehab/snf.

## 2021-10-31 NOTE — PROGRESS NOTES
ACUTE PHYSICAL THERAPY GOALS:  (Developed with and agreed upon by patient and/or caregiver. )  LTG: (Reviewed and updated 10/29/21)  (1.)Ms. Tg Villatoro will move from supine to sit and sit to supine, scoot up and down and roll side to side in bed INDEPENDENTLY via log roll within 7 treatment day(s). (2.)Ms. Tg Villatoro will transfer from bed to chair and chair to bed with SUPERVISION using the least restrictive device within 7 treatment day(s). (3.)Ms. Tg Villatoro will ambulate with STAND BY ASSIST for 150+ feet with the least restrictive device within 7 treatment day(s). (4.)Ms. Tg Villatoro will perform LE exercises per HEP independently to improve strength and mobility within 7 days. (5.)Ms. Tg Villatoro will independently verbalize 3/3 post op spinal precautions and maintain compliance within 7 days. PHYSICAL THERAPY: Daily Note and PM Treatment Day # 3   **Corset brace w/ambulation    Trevor Maxwell is a 73 y.o. female   PRIMARY DIAGNOSIS: Lumbar stenosis with neurogenic claudication  Lumbar stenosis with neurogenic claudication [M48.062]  Spondylolisthesis of lumbar region [M43.16]  Spinal stenosis [M48.00]  Procedure(s) (LRB):  L2-L4 SPINE LUMBAR DIRECT LATERAL INTERBODY FUSION (DLIF) ANTERIOR PLATING / SSEP (Left)  REDO L2-L5 LAMINECTOMY FUSION AND L4-L5 TLIF / NEUROMONITORING (N/A)  6 Days Post-Op    ASSESSMENT:     REHAB RECOMMENDATIONS: CURRENT LEVEL OF FUNCTION:  (Most Recently Demonstrated)   Recommendation to date pending progress:  Setting:   Short-term Rehab  Equipment:    Rolling Walker Bed Mobility:  Contact Guard Assistance -min A BTB  Sit to Stand:   Contact Guard Assistance  Transfers:   Contact Guard Assistance  Gait/Mobility:   Contact Guard Assistance     ASSESSMENT:  Ms. Tg Villatoro continues to make slow progress toward goals. She required additional assistance getting back to bed this session with lifting BLE. Multiple transfers and static/dynamic standing balance.   Review of spinal precautions and ambulation in the room with cueing for posture and gait technique.       SUBJECTIVE:   Ms. Carmen Paige states, \"I'm hurting in my legs today\"    SOCIAL HISTORY/ LIVING ENVIRONMENT: see eval  Home Environment: Private residence  One/Two Story Residence: Two story  Living Alone: Yes  Support Systems: Child(rober)  OBJECTIVE:     PAIN: VITAL SIGNS: LINES/DRAINS:   Pre Treatment: Pain Screen  Pain Scale 1: Numeric (0 - 10)  Pain Intensity 1: 0  Post Treatment: 0      O2 Device: None (Room air)     MOBILITY: I Mod I S SBA CGA Min Mod Max Total  NT x2 Comments:   Bed Mobility    Rolling [] [] [] [] [x] [] [] [] [] [] []    Supine to Sit [] [] [] [] [x] [] [] [] [] [] []    Scooting [] [] [] [] [x] [] [] [] [] [] []    Sit to Supine [] [] [] [] [] [x] [] [] [] [] []    Transfers    Sit to Stand [] [] [] [] [x] [] [] [] [] [] []    Bed to Chair [] [] [] [] [x] [] [] [] [] [] []    Stand to Sit [] [] [] [] [x] [] [] [] [] [] []    I=Independent, Mod I=Modified Independent, S=Supervision, SBA=Standby Assistance, CGA=Contact Guard Assistance,   Min=Minimal Assistance, Mod=Moderate Assistance, Max=Maximal Assistance, Total=Total Assistance, NT=Not Tested    BALANCE: Good Fair+ Fair Fair- Poor NT Comments   Sitting Static [x] [] [] [] [] []    Sitting Dynamic [x] [] [] [] [] []              Standing Static [] [x] [] [] [] []    Standing Dynamic [] [x] [] [] [] []      GAIT: I Mod I S SBA CGA Min Mod Max Total  NT x2 Comments:   Level of Assistance [] [] [] [] [x] [] [] [] [] [] []    Distance 40'    DME Rolling Walker    Gait Quality Decreased pace    Weightbearing  Status N/A     I=Independent, Mod I=Modified Independent, S=Supervision, SBA=Standby Assistance, CGA=Contact Guard Assistance,   Min=Minimal Assistance, Mod=Moderate Assistance, Max=Maximal Assistance, Total=Total Assistance, NT=Not Tested    PLAN:   FREQUENCY/DURATION: PT Plan of Care: BID for duration of hospital stay or until stated goals are met, whichever comes first.  TREATMENT:     TREATMENT:   ($$ Therapeutic Activity: 23-37 mins    )  Therapeutic Activity (27 Minutes): Therapeutic activity included Supine to Sit, Sit to Supine, Scooting, Transfer Training, Ambulation on level ground, Sitting balance  and Standing balance to improve functional Mobility, Strength and Activity tolerance.     TREATMENT GRID:  N/A    AFTER TREATMENT POSITION/PRECAUTIONS:  Bed, Needs within reach and RN notified    INTERDISCIPLINARY COLLABORATION:  RN/PCT and PT/PTA    TOTAL TREATMENT DURATION:  PT Patient Time In/Time Out  Time In: 1439  Time Out: JODY Norman

## 2021-10-31 NOTE — PROGRESS NOTES
Reviewed notes for concerns      Per notes:    Angeles Freeman    Daughter - Dianne Phoenix    Lives in [facility]      Exp d/c  3  Days    High risk sepsis          Will continue to assess how to best serve this family

## 2021-10-31 NOTE — PROGRESS NOTES
[facility] CARDIOLOGY PROGRESS NOTE           10/31/2021 12:16 PM    Admit Date: 10/25/2021      Subjective:     No cp sob     Review of Systems   Constitutional: Negative for chills. Eyes: Negative for blurred vision. Respiratory: Negative for cough. Cardiovascular: Negative for chest pain. Objective:      Vitals:    10/1955 10/31/21 0030 10/31/21 0357 10/31/21 0724   BP: 132/75 117/76 139/63 (!) 142/71   Pulse: 95 88 87 91   Resp: 20 18 20 20   Temp: 98.6 °F (37 °C) 98.4 °F (36.9 °C) 98.1 °F (36.7 °C) 98.2 °F (36.8 °C)   SpO2: 95% 94% 96% 97%   Weight:       Height:             Physical Exam  HENT:      Head: Normocephalic. Nose: No congestion. Mouth/Throat:      Pharynx: No oropharyngeal exudate. Cardiovascular:      Rate and Rhythm: Normal rate. Pulmonary:      Effort: Pulmonary effort is normal.   Abdominal:      General: Bowel sounds are normal.   Musculoskeletal:      Right lower leg: Edema present. Left lower leg: Edema present. Neurological:      Mental Status: She is alert and oriented to person, place, and time.    Psychiatric:         Mood and Affect: Mood normal.         Data Review:   Recent Labs     10/31/21  0558 10/30/21  0609    141   K 4.0 4.0   MG 1.9 2.0   BUN 9 10   CREA 0.42* 0.42*   GLU 96 100   WBC 12.3* 11.2*   HGB 8.6* 7.6*   HCT 27.1* 23.5*    192         Intake/Output Summary (Last 24 hours) at 10/31/2021 0838  Last data filed at 10/31/2021 0429  Gross per 24 hour   Intake 390 ml   Output 1050 ml   Net -660 ml     Current Facility-Administered Medications   Medication Dose Route Frequency    acetaminophen (TYLENOL) tablet 650 mg  650 mg Oral Q6H PRN    0.9% sodium chloride infusion 250 mL  250 mL IntraVENous PRN    diphenhydrAMINE (BENADRYL) capsule 25 mg  25 mg Oral Q6H PRN    propafenone (RYTHMOL) tablet 150 mg  150 mg Oral BID    midodrine (PROAMATINE) tablet 10 mg  10 mg Oral Q8H    bisacodyL (DULCOLAX) suppository 10 mg  10 mg Rectal DAILY PRN    famotidine (PEPCID) tablet 20 mg  20 mg Oral QPM    apixaban (ELIQUIS) tablet 5 mg  5 mg Oral BID    levoFLOXacin (LEVAQUIN) tablet 500 mg  500 mg Oral Q24H    doxycycline (VIBRAMYCIN) capsule 100 mg  100 mg Oral Q12H    gabapentin (NEURONTIN) capsule 300 mg  300 mg Oral Q12H    albuterol (PROVENTIL VENTOLIN) nebulizer solution 2.5 mg  2.5 mg Nebulization Q6H PRN    0.9% sodium chloride infusion 250 mL  250 mL IntraVENous PRN    NOREPINephrine (LEVOPHED) 4 mg in 5% dextrose 250 mL infusion  0.5-30 mcg/min IntraVENous TITRATE    aspirin delayed-release tablet 81 mg  81 mg Oral QHS    escitalopram oxalate (LEXAPRO) tablet 20 mg  20 mg Oral QAM    levothyroxine (SYNTHROID) tablet 50 mcg  50 mcg Oral ACB    LORazepam (ATIVAN) tablet 0.5 mg  0.5 mg Oral Q6H PRN    metoprolol tartrate (LOPRESSOR) tablet 25 mg  25 mg Oral BID    pantoprazole (PROTONIX) tablet 40 mg  40 mg Oral ACB    potassium chloride (KLOR-CON) tablet 10 mEq  10 mEq Oral DAILY    [Held by provider] rOPINIRole (REQUIP) tablet 0.5 mg  0.5 mg Oral QHS    traMADoL (ULTRAM) tablet 50 mg  50 mg Oral Q6H PRN    zolpidem (AMBIEN) tablet 10 mg  10 mg Oral QHS PRN    alcohol 62% (NOZIN) nasal  1 Ampule  1 Ampule Topical Q12H    sodium chloride (NS) flush 5-40 mL  5-40 mL IntraVENous Q8H    sodium chloride (NS) flush 5-40 mL  5-40 mL IntraVENous PRN    naloxone (NARCAN) injection 0.4 mg  0.4 mg IntraVENous PRN    phenol throat spray (CHLORASEPTIC) 1 Spray  1 Spray Oral PRN    diphenhydrAMINE (BENADRYL) capsule 25 mg  25 mg Oral Q4H PRN    docusate sodium (COLACE) capsule 100 mg  100 mg Oral BID    HYDROcodone-acetaminophen (NORCO)  mg tablet 1 Tablet  1 Tablet Oral Q4H PRN    HYDROmorphone (DILAUDID) injection 1 mg  1 mg IntraVENous Q4H PRN    ondansetron (ZOFRAN ODT) tablet 4 mg  4 mg Oral Q4H PRN    [Held by provider] lisinopriL (PRINIVIL, ZESTRIL) tablet 20 mg  20 mg Oral BID    And    [Held by provider] hydroCHLOROthiazide (MICROZIDE) capsule 12.5 mg  12.5 mg Oral BID           Assessment/Plan:     73 y.o. female     PE   · Subsegmental pulmonary embolus noted  · Anticoagulation should be initiated when possible  · PE regimen for Eliquis would be appropriate. · US 10/26 with no DVT  · Defer to primary team/ hospitalist    Hypotension  ·  improving as stated above multifactorial    · Agree with holding hypertensive medications. Elevated RVSP  · Small subsegmental pulmonary embolus unlikely to cause elevation of RVSP  · Chronic thromboembolic disease possible and would be better determined on VQ scan  · Deferring right heart catheterization at this time    Paroxysmal atrial fibrillation  ·  recent ECGs with sinus rhythm. Patient with some elevated heart rates today and ectopic beats on exam.   · Eliquis to be resumed when safe from a bleeding standpoint  · Patient now with PE and has indications for alternative dosing regimen.               Tete Luther MD  10/31/2021 12:16 PM

## 2021-10-31 NOTE — PROGRESS NOTES
Hospitalist Progress Note   Admit Date:  10/25/2021  5:34 AM   Name:  Alexander Wiggins   Age:  73 y.o. Sex:  female  :  1955   MRN:  [id number]   Room:      Presenting Complaint: No chief complaint on file. Reason(s) for Admission: Lumbar stenosis with neurogenic claudication [M48.062]  Spondylolisthesis of lumbar region [M43.16]  Spinal stenosis [facility] Course & Interval History:   Daryll Lesches a 73 y. o. female with history of Afib s/p ablation, Hypothyroidism, OA, lumbar spinal stenosis, HTN, morbid obesity, JULIANN who was admitted for lumbar laminectomy.  Patient is postop day 1 from L2 L4 lumbar direct lateral interbody fusion with anterior plating and redo of L2 L5 laminectomy by spinal Ortho.  Patient has multiple drains in place postoperatively.   patient received dose of IV Dilaudid as well as multiple blood pressure medications the morning of initial hypotension.  She subsequently suffered from hypotension with BP 70/54.  She also was working with PT/OT and became significantly hypotensive when standing up. Adriana Leigh did complain of orthostasis at this time. Adriana Leigh was given normal saline IV fluid boluses but remains hypotensive with MAPS as low as 49 during my evaluation. Of note, she also received Midodrine 20mg once without no improvement in MAPs. Patient complains of significant drowsiness, and is hardly able to keep her eyes open with current low BP's.  She denies any shortness of breath, chest pain or pressure, palpitations, nausea, vomiting, fevers or chills, diaphoresis, near-syncope or syncope.  Patient suffered from ÁNGEL with bump in creatinine and WBC after hypotensive episodes. Her ÁNGEL resolved with Eugene catheter placement and IV fluid resuscitation. She continues to suffer from persistent hypotension requiring intermittent Levophed, likely related to postop blood loss and pain medication administration.   She has required 2 units PRBC transfusion for slow drop in hemoglobin and persistent hypotension. Her Rythmol was held due to hypotension occurring solely after Rythmol administration. Her blood pressures have slowly improved and she has been stable off of Levophed. Subjective (10/31/21): Was seen and examined at the bedside. Patient had no overnight events. Blood pressures been stable patient off Levophed greater than 24 hours. Patient denies any cardiac chest pain/pressure, shortness of breath, diaphoresis, syncope, palpitations.     Assessment & Plan:   Hypotension  -Likely multifactorial due to postoperative blood loss and medications including antihypertensives and pain meds  -Patient started with fluid resuscitation and midodrine 20 mg without improvement  -Postop hemoglobin 7.3 from 11.2 prior to surgery, patient was given 20 units packed red blood cell  -WBC up to 27,000 status post Vanco/cefepime for 1 day and WBC downtrending to 11.4  -Currently on Levaquin and doxycycline p.o. for empiric coverage for next 5 days; continue to monitor for sepsis  -EKG without ST elevation or depression, troponin within normal limits  -Ortho cleared patient to restart Eliquis was on hold due to postop blood loss  -Patient currently off Levophed  -Blood pressures improving, restarted beta-blocker 10/31  -Continue Rythmol twice daily    ÁNGEL  -Creatinine peaked at 1.9 from baseline  -Likely secondary to hypovolemia status post Lasix, HCTZ, lisinopril persistent hypotension  -UA and ultrasound unremarkable  -Urinary retention with greater than 700 cc in bladder, continue Eugene  -Creatinine improved  -IV fluid stopped, patient tolerating oral intake    Atrial fibrillation  -Status post failed 2 ablations, managed with Rythmol, metoprolol and Eliquis outpatient  -Restarted beta-blocker 10/31 for better blood pressures  -Rythmol restarted was held due to hypotensive episodes  -Eliquis restarted in setting of small single segmental PE in the right upper lobe  -Continue to monitor electrolytes potassium and magnesium  -Continue on telemetry  -Cardiology consulted    Pulmonary embolism  -CTA showed single segmental pulmonary embolism in the right upper lobe  -Eliquis restarted 10/31  -Dose of Eliquis 5 mg twice daily we will continue at this dose due to postop bleeding prior  -Cardiology consulted  -Ultrasound 10/26 with no evidence of DVT    Lumbar stenosis with neurogenic claudication  -Status post laminectomy and drain placement per Ortho  -Management per Ortho spine  -Opioids were held in setting of hypotension  -Pain currently controlled    Acute hypoxemic respiratory failure  -Chest x-ray showed mid bibasilar atelectasis, no obvious edema or effusion  -Encourage ICS use  -Patient likely has some JULIANN component with additional desaturation during the night  -Patient will need outpatient sleep study    Constipation  -Status post Dulcolax suppository      Dispo/Discharge Planning:    Dispo pending clinical course. Patient to be evaluated by PT/OT. Per orthopedics and patient's decision she would like rehab.   Case management consult    Diet:  ADULT DIET Regular  DVT PPx: Eliquis  Code status: No Order    Hospital Problems as of 10/31/2021 Date Reviewed: 9/22/2021        Codes Class Noted - Resolved POA    A-fib [facility]) ICD-10-CM: I48.91  ICD-9-CM: 427.31  10/30/2021 - Present Unknown        ÁNGEL (acute kidney injury) ([facility] ICD-10-CM: N17.9  ICD-9-CM: 584.9  10/30/2021 - Present Unknown        Acute respiratory failure with hypoxia ([facility] ICD-10-CM: J96.01  ICD-9-CM: 518.81  10/30/2021 - Present Unknown        Hypovolemic shock ([facility] ICD-10-CM: R57.1  ICD-9-CM: 785.59  10/26/2021 - Present Unknown        Hypotension ICD-10-CM: I95.9  ICD-9-CM: 458.9  10/26/2021 - Present Unknown        Spondylolisthesis of multiple sites in spine ICD-10-CM: M43.19  ICD-9-CM: 738.4  10/25/2021 - Present Yes        Spinal stenosis ICD-10-CM: M48.00  ICD-9-CM: 724.00  10/25/2021 - Present Unknown        * (Principal) Lumbar stenosis with neurogenic claudication ICD-10-CM: M48.062  ICD-9-CM: 724.03  3/28/2016 - Present Yes              Objective:     Patient Vitals for the past 24 hrs:   Temp Pulse Resp BP SpO2   10/31/21 1131 98.2 °F (36.8 °C) 71 18 123/87 97 %   10/31/21 0724 98.2 °F (36.8 °C) 91 20 (!) 142/71 97 %   10/31/21 0357 98.1 °F (36.7 °C) 87 20 139/63 96 %   10/31/21 0030 98.4 °F (36.9 °C) 88 18 117/76 94 %   10/1955 98.6 °F (37 °C) 95 20 132/75 95 %   10/30/21 1753 99.1 °F (37.3 °C) 100 20 (!) 164/76 96 %   10/30/21 1722 99 °F (37.2 °C) 87 20 133/66 99 %   10/30/21 1622 97.9 °F (36.6 °C) 91 20 (!) 154/71 97 %   10/30/21 1522 98.7 °F (37.1 °C) 87 16 (!) 143/65 96 %   10/30/21 1507 98.6 °F (37 °C) 87 20 131/62 96 %   10/30/21 1427 98.7 °F (37.1 °C) (!) 104 18 (!) 135/90 98 %   10/30/21 1343 -- (!) 105 -- (!) 119/50 --   10/30/21 1333 -- 94 18 (!) 119/50 98 %   10/30/21 1302 -- 94 27 (!) 117/53 99 %   10/30/21 1233 -- 95 15 125/72 97 %   10/30/21 1218 -- 97 25 138/61 98 %   10/30/21 1202 -- 94 18 135/65 99 %   10/30/21 1148 98.8 °F (37.1 °C) 94 16 128/71 98 %     Oxygen Therapy  O2 Sat (%): 97 % (10/31/21 1131)  Pulse via Oximetry: 98 beats per minute (10/30/21 1333)  O2 Device: None (Room air) (10/30/21 2000)  Skin Assessment: Clean, dry, & intact (10/30/21 2903)  Skin Protection for O2 Device: No (10/30/21 0718)  O2 Flow Rate (L/min): 2 l/min (10/30/21 0804)    Estimated body mass index is 54.57 kg/m² as calculated from the following:    Height as of this encounter: 5' 4\" (1.626 m). Weight as of this encounter: 144.2 kg (317 lb 14.5 oz). Intake/Output Summary (Last 24 hours) at 10/31/2021 1145  Last data filed at 10/31/2021 0853  Gross per 24 hour   Intake 390 ml   Output 1350 ml   Net -960 ml         Physical Exam:   Blood pressure 123/87, pulse 71, temperature 98.2 °F (36.8 °C), resp. rate 18, height 5' 4\" (1.626 m), weight 144.2 kg (317 lb 14.5 oz), SpO2 97 %. General:    Well nourished. No overt distress  Head:  Normocephalic, atraumatic  Eyes:  Sclerae appear normal.  Pupils equally round. ENT:  Nares appear normal, no drainage. Moist oral mucosa  Neck:  No restricted ROM. Trachea midline   CV:   RRR. No m/r/g. No jugular venous distension. Lungs:   CTAB. No wheezing, rhonchi, or rales. Respirations even, unlabored  Abdomen: Bowel sounds present. Soft, nontender, nondistended. Extremities: No cyanosis or clubbing. No edema  Skin:     No rashes and normal coloration. Warm and dry. Neuro:  CN II-XII grossly intact. Sensation intact. A&Ox3  Psych:  Normal mood and affect.       I have reviewed ordered lab tests and independently visualized imaging below:    Recent Labs:  Recent Results (from the past 48 hour(s))   ECHO ADULT COMPLETE    Collection Time: 10/29/21  1:55 PM   Result Value Ref Range    Left Atrium Minor Axis 1.99 cm    Left Atrium Major Axis 4.72 cm    LA Area 2C 17.00 cm2    LA Area 4C 14.80 cm2    LV ED Vol A4C 81.10 cm3    LV ES Vol A4C 23.40 cm3    IVSd 1.09 (A) 0.60 - 0.90 cm    LVIDd 3.99 3.90 - 5.30 cm    LVIDs 2.97 cm    LVOT d 1.90 cm    LVOT VTI 36.80 cm    LVOT Peak Gradient 14.00 mmHg    LVPWd 1.01 (A) 0.60 - 0.90 cm    LV E' Lateral Velocity 11.80 cm/s    LV E' Septal Velocity 7.93 cm/s    AV Cusp 1.60 cm    Aortic Valve Systolic Mean Gradient 2.21 mmHg    AoV VTI 40.80 cm    Aortic Valve Systolic Peak Velocity 664.28 cm/s    AoV PG 19.00 mmHg    Aortic Valve Area by Continuity of VTI 2.56 cm2    Aortic Valve Area by Continuity of Peak Velocity 2.45 cm2    Mitral Valve E Wave Deceleration Time 194.00 ms    MV A Bib 76.20 cm/s    MV E Bib 127.00 cm/s    MV Mean Gradient 3.00 mmHg    Mitral Valve Annulus Velocity Time Integral 35.40 cm    Mitral Valve Max Velocity 163.00 cm/s    MV Peak Gradient 11.00 mmHg    RVIDd 2.44 cm    Tapse 2.61 (A) 1.50 - 2.00 cm    TR Max Velocity 381.00 cm/s    Triscuspid Valve Regurgitation Peak Gradient 58.00 mmHg    MV E/A 1. 67     LV Mass .7 67.0 - 162.0 g    LV Mass AL Index 57.2 43.0 - 95.0 g/m2    E/E' lateral 10.76     E/E' septal 16.02     E/E' ratio (averaged) 13.39     CANDELARIO/BSA Pk Bib 1.0 cm2/m2    CANDELARIO/BSA VTI 1.1 cm2/m2    RVSP 61.0 mmHg    Est. RA Pressure 3.0 mmHg   MAGNESIUM    Collection Time: 10/29/21  3:11 PM   Result Value Ref Range    Magnesium 1.6 (L) 1.8 - 2.4 mg/dL   HGB & HCT    Collection Time: 10/29/21  3:11 PM   Result Value Ref Range    HGB 8.2 (L) 11.7 - 15.4 g/dL    HCT 25.8 (L) 35.8 - 06.9 %   METABOLIC PANEL, BASIC    Collection Time: 10/30/21  6:09 AM   Result Value Ref Range    Sodium 141 136 - 145 mmol/L    Potassium 4.0 3.5 - 5.1 mmol/L    Chloride 110 (H) 98 - 107 mmol/L    CO2 30 21 - 32 mmol/L    Anion gap 1 (L) 7 - 16 mmol/L    Glucose 100 65 - 100 mg/dL    BUN 10 8 - 23 MG/DL    Creatinine 0.42 (L) 0.6 - 1.0 MG/DL    GFR est AA >60 >60 ml/min/1.73m2    GFR est non-AA >60 >60 ml/min/1.73m2    Calcium 7.9 (L) 8.3 - 10.4 MG/DL   CBC WITH AUTOMATED DIFF    Collection Time: 10/30/21  6:09 AM   Result Value Ref Range    WBC 11.2 (H) 4.3 - 11.1 K/uL    RBC 2.49 (L) 4.05 - 5.2 M/uL    HGB 7.6 (L) 11.7 - 15.4 g/dL    HCT 23.5 (L) 35.8 - 46.3 %    MCV 94.4 79.6 - 97.8 FL    MCH 30.5 26.1 - 32.9 PG    MCHC 32.3 31.4 - 35.0 g/dL    RDW 16.7 (H) 11.9 - 14.6 %    PLATELET 664 215 - 186 K/uL    MPV 9.6 9.4 - 12.3 FL    ABSOLUTE NRBC 0.00 0.0 - 0.2 K/uL    DF AUTOMATED      NEUTROPHILS 76 43 - 78 %    LYMPHOCYTES 12 (L) 13 - 44 %    MONOCYTES 8 4.0 - 12.0 %    EOSINOPHILS 2 0.5 - 7.8 %    BASOPHILS 0 0.0 - 2.0 %    IMMATURE GRANULOCYTES 1 0.0 - 5.0 %    ABS. NEUTROPHILS 8.5 (H) 1.7 - 8.2 K/UL    ABS. LYMPHOCYTES 1.4 0.5 - 4.6 K/UL    ABS. MONOCYTES 0.9 0.1 - 1.3 K/UL    ABS. EOSINOPHILS 0.3 0.0 - 0.8 K/UL    ABS. BASOPHILS 0.1 0.0 - 0.2 K/UL    ABS. IMM.  GRANS. 0.1 0.0 - 0.5 K/UL   MAGNESIUM    Collection Time: 10/30/21  6:09 AM   Result Value Ref Range    Magnesium 2.0 1.8 - 2.4 mg/dL   PROCALCITONIN Collection Time: 10/30/21  6:09 AM   Result Value Ref Range    Procalcitonin <0.05 ng/mL   RBC, ALLOCATE    Collection Time: 10/30/21  7:30 AM   Result Value Ref Range    HISTORY CHECKED?  Historical check performed    CULTURE, BLOOD    Collection Time: 10/30/21  1:19 PM    Specimen: Blood   Result Value Ref Range    Special Requests: LEFT  Antecubital        Culture result: NO GROWTH AFTER 16 HOURS     LACTIC ACID    Collection Time: 10/30/21  1:19 PM   Result Value Ref Range    Lactic acid 2.2 (H) 0.4 - 2.0 MMOL/L   TYPE & SCREEN    Collection Time: 10/30/21  1:20 PM   Result Value Ref Range    Crossmatch Expiration 11/02/2021,2359     ABO/Rh(D) A NEGATIVE     Antibody screen NEG     Unit number [id number]     Blood component type RC LR     Unit division 00     Status of unit TRANSFUSED     Crossmatch result Compatible    EKG, 12 LEAD, INITIAL    Collection Time: 10/30/21  3:34 PM   Result Value Ref Range    Ventricular Rate 86 BPM    Atrial Rate 86 BPM    P-R Interval 148 ms    QRS Duration 76 ms    Q-T Interval 370 ms    QTC Calculation (Bezet) 442 ms    Calculated P Axis 75 degrees    Calculated R Axis 68 degrees    Calculated T Axis -22 degrees    Diagnosis       Sinus rhythm with Premature supraventricular complexes  Nonspecific T wave abnormality  Abnormal ECG  When compared with ECG of 26-OCT-2021 16:19,  Premature supraventricular complexes are now Present  Confirmed by Francesco Shepard ([id number]) on 10/30/2021 6:35:16 PM     CBC WITH AUTOMATED DIFF    Collection Time: 10/31/21  5:58 AM   Result Value Ref Range    WBC 12.3 (H) 4.3 - 11.1 K/uL    RBC 2.92 (L) 4.05 - 5.2 M/uL    HGB 8.6 (L) 11.7 - 15.4 g/dL    HCT 27.1 (L) 35.8 - 46.3 %    MCV 92.8 79.6 - 97.8 FL    MCH 29.5 26.1 - 32.9 PG    MCHC 31.7 31.4 - 35.0 g/dL    RDW 17.9 (H) 11.9 - 14.6 %    PLATELET 718 358 - 439 K/uL    MPV 9.5 9.4 - 12.3 FL    ABSOLUTE NRBC 0.00 0.0 - 0.2 K/uL    DF AUTOMATED      NEUTROPHILS 72 43 - 78 %    LYMPHOCYTES 15 13 - 44 %    MONOCYTES 10 4.0 - 12.0 %    EOSINOPHILS 2 0.5 - 7.8 %    BASOPHILS 0 0.0 - 2.0 %    IMMATURE GRANULOCYTES 1 0.0 - 5.0 %    ABS. NEUTROPHILS 8.8 (H) 1.7 - 8.2 K/UL    ABS. LYMPHOCYTES 1.9 0.5 - 4.6 K/UL    ABS. MONOCYTES 1.2 0.1 - 1.3 K/UL    ABS. EOSINOPHILS 0.3 0.0 - 0.8 K/UL    ABS. BASOPHILS 0.1 0.0 - 0.2 K/UL    ABS. IMM.  GRANS. 0.1 0.0 - 0.5 K/UL   METABOLIC PANEL, BASIC    Collection Time: 10/31/21  5:58 AM   Result Value Ref Range    Sodium 141 136 - 145 mmol/L    Potassium 4.0 3.5 - 5.1 mmol/L    Chloride 109 (H) 98 - 107 mmol/L    CO2 30 21 - 32 mmol/L    Anion gap 2 (L) 7 - 16 mmol/L    Glucose 96 65 - 100 mg/dL    BUN 9 8 - 23 MG/DL    Creatinine 0.42 (L) 0.6 - 1.0 MG/DL    GFR est AA >60 >60 ml/min/1.73m2    GFR est non-AA >60 >60 ml/min/1.73m2    Calcium 8.1 (L) 8.3 - 10.4 MG/DL   MAGNESIUM    Collection Time: 10/31/21  5:58 AM   Result Value Ref Range    Magnesium 1.9 1.8 - 2.4 mg/dL       All Micro Results     Procedure Component Value Units Date/Time    CULTURE, BLOOD [[id number]] Collected: 10/30/21 1319    Order Status: Completed Specimen: Blood Updated: 10/31/21 0641     Special Requests: --        LEFT  Antecubital       Culture result: NO GROWTH AFTER 16 HOURS       MSSA/MRSA SC BY PCR, NASAL SWAB [[id number]] Collected: 10/25/21 0600    Order Status: Canceled Specimen: Swab           Other Studies:  CT CHEST PULMONARY EMBOLISM    Result Date: 10/30/2021  EXAMINATION: CT CHEST PULMONARY EMBOLISM 10/30/2021 7:46 PM INDICATION: Elevated right heart pressure COMPARISON: Chest radiograph 10/30/2021 and CTA chest 9/14/2017 TECHNIQUE: Multiple contiguous axial CT images of the chest were obtained from the lung apices to the lung bases after the intravenous administration of 80 mL Isovue-370 contrast material . Radiation dose reduction techniques were used for this study:  Our CT scanners use one or all of the following: Automated exposure control, adjustment of the mA and/or kVp according to patient's size, iterative reconstruction. FINDINGS: Lower Neck: No abnormality Chest soft tissues: No abnormality Heart/Mediastinum: No pulmonary embolism. Normal caliber thoracic aorta. No pericardial abnormality. No abnormality of the right ventricle on CT Lungs: There is a single pulmonary embolism in the apical segment right upper lobe with some segmental extension. There is a peripheral consolidation with slight central cavitation likely reflecting pulmonary infarct in the right upper lobe. Mild diffuse large airways thickening. Pleura: No pleural effusion or pneumothorax. Visualized upper abdomen: No abnormality. Osseous structures: No suspicious osseous lesion. 1. Single segmental pulmonary embolism and apical segment right upper lobe. Associated pulmonary infarct in the right upper lobe. No CT evidence of right heart strain.  Dr. Annamarie Courtney reviewed the findings with the patient's nurse on 10/30/2021 8:09 PM       Current Meds:  Current Facility-Administered Medications   Medication Dose Route Frequency    polyethylene glycol (MIRALAX) packet 17 g  17 g Oral DAILY    acetaminophen (TYLENOL) tablet 650 mg  650 mg Oral Q6H PRN    0.9% sodium chloride infusion 250 mL  250 mL IntraVENous PRN    diphenhydrAMINE (BENADRYL) capsule 25 mg  25 mg Oral Q6H PRN    propafenone (RYTHMOL) tablet 150 mg  150 mg Oral BID    midodrine (PROAMATINE) tablet 10 mg  10 mg Oral Q8H    bisacodyL (DULCOLAX) suppository 10 mg  10 mg Rectal DAILY PRN    famotidine (PEPCID) tablet 20 mg  20 mg Oral QPM    apixaban (ELIQUIS) tablet 5 mg  5 mg Oral BID    levoFLOXacin (LEVAQUIN) tablet 500 mg  500 mg Oral Q24H    doxycycline (VIBRAMYCIN) capsule 100 mg  100 mg Oral Q12H    gabapentin (NEURONTIN) capsule 300 mg  300 mg Oral Q12H    albuterol (PROVENTIL VENTOLIN) nebulizer solution 2.5 mg  2.5 mg Nebulization Q6H PRN    0.9% sodium chloride infusion 250 mL  250 mL IntraVENous PRN    NOREPINephrine (LEVOPHED) 4 mg in 5% dextrose 250 mL infusion  0.5-30 mcg/min IntraVENous TITRATE    aspirin delayed-release tablet 81 mg  81 mg Oral QHS    escitalopram oxalate (LEXAPRO) tablet 20 mg  20 mg Oral QAM    levothyroxine (SYNTHROID) tablet 50 mcg  50 mcg Oral ACB    LORazepam (ATIVAN) tablet 0.5 mg  0.5 mg Oral Q6H PRN    metoprolol tartrate (LOPRESSOR) tablet 25 mg  25 mg Oral BID    pantoprazole (PROTONIX) tablet 40 mg  40 mg Oral ACB    potassium chloride (KLOR-CON) tablet 10 mEq  10 mEq Oral DAILY    [Held by provider] rOPINIRole (REQUIP) tablet 0.5 mg  0.5 mg Oral QHS    traMADoL (ULTRAM) tablet 50 mg  50 mg Oral Q6H PRN    zolpidem (AMBIEN) tablet 10 mg  10 mg Oral QHS PRN    alcohol 62% (NOZIN) nasal  1 Ampule  1 Ampule Topical Q12H    sodium chloride (NS) flush 5-40 mL  5-40 mL IntraVENous Q8H    sodium chloride (NS) flush 5-40 mL  5-40 mL IntraVENous PRN    naloxone (NARCAN) injection 0.4 mg  0.4 mg IntraVENous PRN    phenol throat spray (CHLORASEPTIC) 1 Spray  1 Spray Oral PRN    diphenhydrAMINE (BENADRYL) capsule 25 mg  25 mg Oral Q4H PRN    docusate sodium (COLACE) capsule 100 mg  100 mg Oral BID    HYDROcodone-acetaminophen (NORCO)  mg tablet 1 Tablet  1 Tablet Oral Q4H PRN    HYDROmorphone (DILAUDID) injection 1 mg  1 mg IntraVENous Q4H PRN    ondansetron (ZOFRAN ODT) tablet 4 mg  4 mg Oral Q4H PRN    [Held by provider] lisinopriL (PRINIVIL, ZESTRIL) tablet 20 mg  20 mg Oral BID    And    [Held by provider] hydroCHLOROthiazide (MICROZIDE) capsule 12.5 mg  12.5 mg Oral BID       Signed:  Viky Griggs MD    Part of this note may have been written by using a voice dictation software. The note has been proof read but may still contain some grammatical/other typographical errors.

## 2021-10-31 NOTE — PROGRESS NOTES
ACUTE PHYSICAL THERAPY GOALS:  (Developed with and agreed upon by patient and/or caregiver. )  LTG: (Reviewed and updated 10/29/21)  (1.)Ms. Aishwarya Anderson will move from supine to sit and sit to supine, scoot up and down and roll side to side in bed INDEPENDENTLY via log roll within 7 treatment day(s). (2.)Ms. Aishwarya Anderson will transfer from bed to chair and chair to bed with SUPERVISION using the least restrictive device within 7 treatment day(s). (3.)Ms. Aishwarya Anderson will ambulate with STAND BY ASSIST for 150+ feet with the least restrictive device within 7 treatment day(s). (4.)Ms. Aishwarya Anderson will perform LE exercises per HEP independently to improve strength and mobility within 7 days. (5.)Ms. Aishwarya Anderson will independently verbalize 3/3 post op spinal precautions and maintain compliance within 7 days. PHYSICAL THERAPY: Daily Note and AM Treatment Day # 3   **Corset brace w/ambulation    Joshua Richardson is a 73 y.o. female   PRIMARY DIAGNOSIS: Lumbar stenosis with neurogenic claudication  Lumbar stenosis with neurogenic claudication [M48.062]  Spondylolisthesis of lumbar region [M43.16]  Spinal stenosis [M48.00]  Procedure(s) (LRB):  L2-L4 SPINE LUMBAR DIRECT LATERAL INTERBODY FUSION (DLIF) ANTERIOR PLATING / SSEP (Left)  REDO L2-L5 LAMINECTOMY FUSION AND L4-L5 TLIF / NEUROMONITORING (N/A)  6 Days Post-Op    ASSESSMENT:     REHAB RECOMMENDATIONS: CURRENT LEVEL OF FUNCTION:  (Most Recently Demonstrated)   Recommendation to date pending progress:  [address] [place] [address]  Equipment:    Rolling Walker Bed Mobility:   Contact Guard Assistance  Sit to Stand:   Contact Guard Assistance  Transfers:   Contact Guard Assistance  Gait/Mobility:   Contact Guard Assistance     ASSESSMENT:  Ms. Aishwarya Anderson demonstrated increased gait distances, multiple transfers, and good progress with balance and mobility. Sitting and standing balance good throughout.   Assisted with donning LSO brace for ambulation with education on technique. Brace was very tight due to swelling and required assist to fasten.       SUBJECTIVE:   Ms. Rios Nolan states, \"Yes, I'll walk a bit\"    SOCIAL HISTORY/ LIVING ENVIRONMENT: see eval  Home Environment: Private residence  One/Two Story Residence: Two story  Living Alone: Yes  Support Systems: Child(rober)  OBJECTIVE:     PAIN: VITAL SIGNS: LINES/DRAINS:   Pre Treatment: Pain Screen  Pain Scale 1: Numeric (0 - 10)  Pain Intensity 1: 0  Post Treatment: 0      O2 Device: None (Room air)     MOBILITY: I Mod I S SBA CGA Min Mod Max Total  NT x2 Comments:   Bed Mobility    Rolling [] [] [] [] [x] [] [] [] [] [] []    Supine to Sit [] [] [] [] [x] [] [] [] [] [] []    Scooting [] [] [] [] [x] [] [] [] [] [] []    Sit to Supine [] [] [] [] [] [] [] [] [] [x] []    Transfers    Sit to Stand [] [] [] [] [x] [] [] [] [] [] []    Bed to Chair [] [] [] [] [x] [] [] [] [] [] []    Stand to Sit [] [] [] [] [x] [] [] [] [] [] []    I=Independent, Mod I=Modified Independent, S=Supervision, SBA=Standby Assistance, CGA=Contact Guard Assistance,   Min=Minimal Assistance, Mod=Moderate Assistance, Max=Maximal Assistance, Total=Total Assistance, NT=Not Tested    BALANCE: Good Fair+ Fair Fair- Poor NT Comments   Sitting Static [x] [] [] [] [] []    Sitting Dynamic [x] [] [] [] [] []              Standing Static [] [x] [] [] [] []    Standing Dynamic [] [x] [] [] [] []      GAIT: I Mod I S SBA CGA Min Mod Max Total  NT x2 Comments:   Level of Assistance [] [] [] [] [x] [] [] [] [] [] []    Distance 50'    DME Rolling Walker and BRACE    Gait Quality Decreased pace    Weightbearing  Status N/A     I=Independent, Mod I=Modified Independent, S=Supervision, SBA=Standby Assistance, CGA=Contact Guard Assistance,   Min=Minimal Assistance, Mod=Moderate Assistance, Max=Maximal Assistance, Total=Total Assistance, NT=Not Tested    PLAN:   FREQUENCY/DURATION: PT Plan of Care: BID for duration of hospital stay or until stated goals are met, whichever comes first.  TREATMENT:     TREATMENT:   ($$ Therapeutic Activity: 23-37 mins    )  Therapeutic Activity (25 Minutes): Therapeutic activity included Supine to Sit, Scooting, Transfer Training, Ambulation on level ground, Sitting balance  and Standing balance to improve functional Mobility, Strength and Activity tolerance.     TREATMENT GRID:  N/A    AFTER TREATMENT POSITION/PRECAUTIONS:  Chair, Needs within reach, RN notified and Visitors at bedside    INTERDISCIPLINARY COLLABORATION:  RN/PCT and PT/PTA    TOTAL TREATMENT DURATION:  PT Patient Time In/Time Out  Time In: 1116  Time Out: [address] [place] 80, PTA

## 2021-11-01 LAB
ALBUMIN SERPL-MCNC: 1.8 G/DL (ref 3.2–4.6)
ALBUMIN/GLOB SERPL: 0.6 {RATIO} (ref 1.2–3.5)
ALP SERPL-CCNC: 118 U/L (ref 50–136)
ALT SERPL-CCNC: 26 U/L (ref 12–65)
ANION GAP SERPL CALC-SCNC: 2 MMOL/L (ref 7–16)
AST SERPL-CCNC: 54 U/L (ref 15–37)
BILIRUB SERPL-MCNC: 0.6 MG/DL (ref 0.2–1.1)
BUN SERPL-MCNC: 14 MG/DL (ref 8–23)
CALCIUM SERPL-MCNC: 8.5 MG/DL (ref 8.3–10.4)
CHLORIDE SERPL-SCNC: 106 MMOL/L (ref 98–107)
CO2 SERPL-SCNC: 33 MMOL/L (ref 21–32)
CREAT SERPL-MCNC: 0.51 MG/DL (ref 0.6–1)
ERYTHROCYTE [DISTWIDTH] IN BLOOD BY AUTOMATED COUNT: 17.7 % (ref 11.9–14.6)
GLOBULIN SER CALC-MCNC: 3.1 G/DL (ref 2.3–3.5)
GLUCOSE SERPL-MCNC: 95 MG/DL (ref 65–100)
HCT VFR BLD AUTO: 28.1 % (ref 35.8–46.3)
HGB BLD-MCNC: 8.8 G/DL (ref 11.7–15.4)
MAGNESIUM SERPL-MCNC: 1.9 MG/DL (ref 1.8–2.4)
MCH RBC QN AUTO: 30 PG (ref 26.1–32.9)
MCHC RBC AUTO-ENTMCNC: 31.3 G/DL (ref 31.4–35)
MCV RBC AUTO: 95.9 FL (ref 79.6–97.8)
MM INDURATION POC: 0 MM (ref 0–5)
NRBC # BLD: 0 K/UL (ref 0–0.2)
PLATELET # BLD AUTO: 220 K/UL (ref 150–450)
PMV BLD AUTO: 9.4 FL (ref 9.4–12.3)
POTASSIUM SERPL-SCNC: 4.5 MMOL/L (ref 3.5–5.1)
PPD POC: NEGATIVE NEGATIVE
PROT SERPL-MCNC: 4.9 G/DL (ref 6.3–8.2)
RBC # BLD AUTO: 2.93 M/UL (ref 4.05–5.2)
SARS-COV-2, COV2: NORMAL
SARS-COV-2, COV2: NOT DETECTED
SODIUM SERPL-SCNC: 141 MMOL/L (ref 136–145)
SPECIMEN SOURCE, FCOV2M: NORMAL
WBC # BLD AUTO: 13.1 K/UL (ref 4.3–11.1)

## 2021-11-01 PROCEDURE — U0005 INFEC AGEN DETEC AMPLI PROBE: HCPCS

## 2021-11-01 PROCEDURE — 65270000029 HC RM PRIVATE

## 2021-11-01 PROCEDURE — 99232 SBSQ HOSP IP/OBS MODERATE 35: CPT | Performed by: INTERNAL MEDICINE

## 2021-11-01 PROCEDURE — 99221 1ST HOSP IP/OBS SF/LOW 40: CPT | Performed by: PHYSICAL MEDICINE & REHABILITATION

## 2021-11-01 PROCEDURE — 97116 GAIT TRAINING THERAPY: CPT

## 2021-11-01 PROCEDURE — 36415 COLL VENOUS BLD VENIPUNCTURE: CPT

## 2021-11-01 PROCEDURE — 74011250636 HC RX REV CODE- 250/636: Performed by: ORTHOPAEDIC SURGERY

## 2021-11-01 PROCEDURE — 80053 COMPREHEN METABOLIC PANEL: CPT

## 2021-11-01 PROCEDURE — 74011250637 HC RX REV CODE- 250/637: Performed by: STUDENT IN AN ORGANIZED HEALTH CARE EDUCATION/TRAINING PROGRAM

## 2021-11-01 PROCEDURE — 83735 ASSAY OF MAGNESIUM: CPT

## 2021-11-01 PROCEDURE — 97530 THERAPEUTIC ACTIVITIES: CPT

## 2021-11-01 PROCEDURE — 85027 COMPLETE CBC AUTOMATED: CPT

## 2021-11-01 PROCEDURE — 74011250637 HC RX REV CODE- 250/637: Performed by: ORTHOPAEDIC SURGERY

## 2021-11-01 RX ADMIN — ASPIRIN 81 MG: 81 TABLET ORAL at 21:48

## 2021-11-01 RX ADMIN — POLYETHYLENE GLYCOL 3350 17 G: 17 POWDER, FOR SOLUTION ORAL at 09:29

## 2021-11-01 RX ADMIN — LEVOFLOXACIN 500 MG: 500 TABLET, FILM COATED ORAL at 09:31

## 2021-11-01 RX ADMIN — FAMOTIDINE 20 MG: 20 TABLET, FILM COATED ORAL at 17:45

## 2021-11-01 RX ADMIN — GABAPENTIN 300 MG: 300 CAPSULE ORAL at 09:31

## 2021-11-01 RX ADMIN — DOCUSATE SODIUM 100 MG: 100 CAPSULE, LIQUID FILLED ORAL at 09:31

## 2021-11-01 RX ADMIN — HYDROMORPHONE HYDROCHLORIDE 1 MG: 1 INJECTION, SOLUTION INTRAMUSCULAR; INTRAVENOUS; SUBCUTANEOUS at 01:24

## 2021-11-01 RX ADMIN — PROPAFENONE HYDROCHLORIDE 150 MG: 150 TABLET, FILM COATED ORAL at 17:45

## 2021-11-01 RX ADMIN — ZOLPIDEM TARTRATE 10 MG: 5 TABLET ORAL at 21:48

## 2021-11-01 RX ADMIN — Medication 1 AMPULE: at 21:48

## 2021-11-01 RX ADMIN — ESCITALOPRAM OXALATE 20 MG: 10 TABLET ORAL at 09:31

## 2021-11-01 RX ADMIN — PROPAFENONE HYDROCHLORIDE 150 MG: 150 TABLET, FILM COATED ORAL at 09:30

## 2021-11-01 RX ADMIN — MIDODRINE HYDROCHLORIDE 10 MG: 5 TABLET ORAL at 06:24

## 2021-11-01 RX ADMIN — LEVOTHYROXINE SODIUM 50 MCG: 0.05 TABLET ORAL at 06:24

## 2021-11-01 RX ADMIN — HYDROCODONE BITARTRATE AND ACETAMINOPHEN 1 TABLET: 10; 325 TABLET ORAL at 17:45

## 2021-11-01 RX ADMIN — GABAPENTIN 300 MG: 300 CAPSULE ORAL at 21:49

## 2021-11-01 RX ADMIN — POTASSIUM CHLORIDE 10 MEQ: 10 TABLET, EXTENDED RELEASE ORAL at 09:31

## 2021-11-01 RX ADMIN — PANTOPRAZOLE SODIUM 40 MG: 40 TABLET, DELAYED RELEASE ORAL at 06:24

## 2021-11-01 RX ADMIN — METOPROLOL TARTRATE 25 MG: 25 TABLET, FILM COATED ORAL at 17:45

## 2021-11-01 RX ADMIN — APIXABAN 5 MG: 5 TABLET, FILM COATED ORAL at 09:30

## 2021-11-01 RX ADMIN — HYDROCODONE BITARTRATE AND ACETAMINOPHEN 1 TABLET: 10; 325 TABLET ORAL at 21:48

## 2021-11-01 RX ADMIN — Medication 10 ML: at 21:49

## 2021-11-01 RX ADMIN — Medication 1 AMPULE: at 09:29

## 2021-11-01 RX ADMIN — DOCUSATE SODIUM 100 MG: 100 CAPSULE, LIQUID FILLED ORAL at 17:45

## 2021-11-01 RX ADMIN — APIXABAN 5 MG: 5 TABLET, FILM COATED ORAL at 17:45

## 2021-11-01 RX ADMIN — HYDROCODONE BITARTRATE AND ACETAMINOPHEN 1 TABLET: 10; 325 TABLET ORAL at 09:31

## 2021-11-01 RX ADMIN — DOXYCYCLINE HYCLATE 100 MG: 100 CAPSULE ORAL at 09:30

## 2021-11-01 RX ADMIN — Medication 10 ML: at 06:24

## 2021-11-01 RX ADMIN — Medication 5 ML: at 14:23

## 2021-11-01 RX ADMIN — MIDODRINE HYDROCHLORIDE 10 MG: 5 TABLET ORAL at 21:51

## 2021-11-01 NOTE — PROGRESS NOTES
POD 7    AF,VSS  No complaints  Still very limited ambulation  Moves all 4 extremities but some weakness with left hip flexion. Slow progress, probably needs snf before going home.

## 2021-11-01 NOTE — PROGRESS NOTES
ACUTE PHYSICAL THERAPY GOALS:  (Developed with and agreed upon by patient and/or caregiver. )  LTG: (Reviewed and updated 10/29/21)  (1.)Ms. Ladi Escamilla will move from supine to sit and sit to supine, scoot up and down and roll side to side in bed INDEPENDENTLY via log roll within 7 treatment day(s). (2.)Ms. Ladi Escamilla will transfer from bed to chair and chair to bed with SUPERVISION using the least restrictive device within 7 treatment day(s). (3.)Ms. Ladi Escamilla will ambulate with STAND BY ASSIST for 150+ feet with the least restrictive device within 7 treatment day(s). (4.)Ms. Ladi Escamilla will perform LE exercises per HEP independently to improve strength and mobility within 7 days. (5.)Ms. Ladi Escamilla will independently verbalize 3/3 post op spinal precautions and maintain compliance within 7 days. PHYSICAL THERAPY: Daily Note and PM Treatment Day # 4   **Corset brace w/ambulation    Rayna Morales is a 73 y.o. female   PRIMARY DIAGNOSIS: Lumbar stenosis with neurogenic claudication  Lumbar stenosis with neurogenic claudication [M48.062]  Spondylolisthesis of lumbar region [M43.16]  Spinal stenosis [M48.00]  Procedure(s) (LRB):  L2-L4 SPINE LUMBAR DIRECT LATERAL INTERBODY FUSION (DLIF) ANTERIOR PLATING / SSEP (Left)  REDO L2-L5 LAMINECTOMY FUSION AND L4-L5 TLIF / NEUROMONITORING (N/A)  7 Days Post-Op    ASSESSMENT:     REHAB RECOMMENDATIONS: CURRENT LEVEL OF FUNCTION:  (Most Recently Demonstrated)   Recommendation to date pending progress:  [address] [place] [address]  Equipment:    Rolling Walker Bed Mobility:  Minimal Assistance   Sit to Stand:   Contact Guard Assistance  Transfers:   Contact Guard Assistance  Gait/Mobility:   Contact Guard Assistance     ASSESSMENT:  Ms. Ladi Escamilla continues to progress slowly toward goals with increased gait distances. Minimal assist with bed mobility for LE during supine<>sit. Steady during gait with use of RW. Reminders for spinal precautions to prevent twisting. SUBJECTIVE:   Ms. Thacker Rater states, \"I do feel more positive. \"    SOCIAL HISTORY/ LIVING ENVIRONMENT: see eval  Home Environment: Private residence  One/Two Story Residence: Two story  Living Alone: Yes  Support Systems: Child(rober)  OBJECTIVE:     PAIN: VITAL SIGNS: LINES/DRAINS:   Pre Treatment: Pain Screen  Pain Scale 1: Numeric (0 - 10)  Pain Intensity 1: 0  Post Treatment: 0      O2 Device: None (Room air)     MOBILITY: I Mod I S SBA CGA Min Mod Max Total  NT x2 Comments:   Bed Mobility    Rolling [] [] [] [] [x] [] [] [] [] [] []    Supine to Sit [] [] [] [] [] [x] [] [] [] [] []    Scooting [] [] [] [] [x] [] [] [] [] [] []    Sit to Supine [] [] [] [] [] [x] [] [] [] [] []    Transfers    Sit to Stand [] [] [] [] [x] [] [] [] [] [] []    Bed to Chair [] [] [] [] [x] [] [] [] [] [] []    Stand to Sit [] [] [] [] [x] [] [] [] [] [] []    I=Independent, Mod I=Modified Independent, S=Supervision, SBA=Standby Assistance, CGA=Contact Guard Assistance,   Min=Minimal Assistance, Mod=Moderate Assistance, Max=Maximal Assistance, Total=Total Assistance, NT=Not Tested    BALANCE: Good Fair+ Fair Fair- Poor NT Comments   Sitting Static [x] [] [] [] [] []    Sitting Dynamic [x] [] [] [] [] []              Standing Static [x] [x] [] [] [] []    Standing Dynamic [] [x] [] [] [] []      GAIT: I Mod I S SBA CGA Min Mod Max Total  NT x2 Comments:   Level of Assistance [] [] [] [] [x] [] [] [] [] [] []    Distance 90'    DME Rolling Walker    Gait Quality Decreased pace    Weightbearing  Status N/A     I=Independent, Mod I=Modified Independent, S=Supervision, SBA=Standby Assistance, CGA=Contact Guard Assistance,   Min=Minimal Assistance, Mod=Moderate Assistance, Max=Maximal Assistance, Total=Total Assistance, NT=Not Tested    PLAN:   FREQUENCY/DURATION: PT Plan of Care: BID for duration of hospital stay or until stated goals are met, whichever comes first.  TREATMENT:     TREATMENT:   ($$ Gait Trainin-37 mins  $$ Therapeutic Activity: 8-22 mins    )  Therapeutic Activity (28 Minutes): Therapeutic activity included Rolling, Supine to Sit, Sit to Supine, Scooting, Transfer Training, Sitting balance  and Standing balance to improve functional Mobility, Strength and Activity tolerance. Gait Training (20 Minutes): Gait training for 90 feet utilizing [address] and chair follow. Patient required Manual and Verbal cueing to improve Activity Pacing, Assistive Device Utilization, Dynamic Standing Balance and Gait Mechanics.      TREATMENT GRID:  N/A    AFTER TREATMENT POSITION/PRECAUTIONS:  Bed, Needs within reach, RN notified and Visitors at bedside    INTERDISCIPLINARY COLLABORATION:  RN/PCT and PT/PTA    TOTAL TREATMENT DURATION:  PT Patient Time In/Time Out  Time In: 1500  Time Out: [address] PTA

## 2021-11-01 NOTE — PROGRESS NOTES
CM in to see patient for discharge planning. Patient is alert and laying in bed. Juanjose placed to daughter, Joan Sharma, on speaker phone. Demographics, PCP and insurance verified. Therapy recommendations reviewed. Patient and daughter are hopeful for IRF with [facility] at discharge, but if not appropriate, patient would like referral sent to [facility] for STR. Message sent to Dr. Rik Rust who has screened chart and is agreeable to accept patient pending complete consult. Per daughter, upon discharge from [facility], patient's sister will be staying with her around the clock. OT and physiatrist consult ordered. Covid testing completed today, 11/1/2021. IRF can admit on Wednesday, 11/3/2021. Daughter updated over phone. Care Management Interventions  PCP Verified by CM:  Yes Romulo Lala)  Mode of Transport at Discharge: BLS  Transition of Care Consult (CM Consult): Discharge Planning  Discharge Durable Medical Equipment: No  Physical Therapy Consult: Yes  Occupational Therapy Consult: Yes  Speech Therapy Consult: No  Support Systems: Child(rober)  Confirm Follow Up Transport: Family  The Plan for Transition of Care is Related to the Following Treatment Goals : Patient will discharge to IRF in order to return home with family assistance and [facility]   The Patient and/or Patient Representative was Provided with a Choice of Provider and Agrees with the Discharge Plan?: Yes  Freedom of Choice List was Provided with Basic Dialogue that Supports the Patient's Individualized Plan of Care/Goals, Treatment Preferences and Shares the Quality Data Associated with the Providers?: Yes  Discharge Location  Discharge Placement: Rehab hospital/unit acute

## 2021-11-01 NOTE — PROGRESS NOTES
Hospitalist Progress Note   Admit Date:  10/25/2021  5:34 AM   Name:  Gus Smith   Age:  73 y.o. Sex:  female  :  1955   MRN:  [id number]   Room:  /    Presenting Complaint: No chief complaint on file. Reason(s) for Admission: Lumbar stenosis with neurogenic claudication [M48.062]  Spondylolisthesis of lumbar region [M43.16]  Spinal stenosis [facility] Course & Interval History:   Melbourne Mcardle a 73 y. o. female with history of Afib s/p ablation, Hypothyroidism, OA, lumbar spinal stenosis, HTN, morbid obesity, JULIANN who was admitted for lumbar laminectomy.  Patient is postop day 1 from L2 L4 lumbar direct lateral interbody fusion with anterior plating and redo of L2 L5 laminectomy by spinal Ortho.  Patient has multiple drains in place postoperatively.   patient received dose of IV Dilaudid as well as multiple blood pressure medications the morning of initial hypotension.  She subsequently suffered from hypotension with BP 70/54.  She also was working with PT/OT and became significantly hypotensive when standing up. Arcelia Trinidad did complain of orthostasis at this time. Arcelia Trinidad was given normal saline IV fluid boluses but remains hypotensive with MAPS as low as 49 during my evaluation. Of note, she also received Midodrine 20mg once without no improvement in MAPs. Patient complains of significant drowsiness, and is hardly able to keep her eyes open with current low BP's.  She denies any shortness of breath, chest pain or pressure, palpitations, nausea, vomiting, fevers or chills, diaphoresis, near-syncope or syncope.  Patient suffered from ÁNGEL with bump in creatinine and WBC after hypotensive episodes. Her ÁNGEL resolved with Eugene catheter placement and IV fluid resuscitation. She continues to suffer from persistent hypotension requiring intermittent Levophed, likely related to postop blood loss and pain medication administration.   She has required 2 units PRBC transfusion for slow drop in hemoglobin and persistent hypotension. Her Rythmol was held due to hypotension occurring solely after Rythmol administration. Her blood pressures have slowly improved and she has been stable off of Levophed. Subjective (11/01/21): Was seen and examined at the bedside. Patient had no overnight events. Pressures continue to remain stable. Patient continues to work with physical therapy. Patient without any complaints this morning. Patient denies any cardiac chest pain/pressure, shortness of breath, diaphoresis, syncope, palpitations.     Assessment & Plan:   Hypotension  -Likely multifactorial due to postoperative blood loss and medications including antihypertensives and pain meds  -Patient started with fluid resuscitation and midodrine 20 mg without improvement  -Postop hemoglobin 7.3 from 11.2 prior to surgery, patient was given 20 units packed red blood cell  -WBC up to 27,000 status post Vanco/cefepime for 1 day and WBC downtrending to 11.4  -Currently on Levaquin and doxycycline p.o. for empiric coverage for next 5 days; continue to monitor for sepsis  -EKG without ST elevation or depression, troponin within normal limits  -Ortho cleared patient to restart Eliquis was on hold due to postop blood loss  -Patient currently off Levophed  -Blood pressures improving, restarted beta-blocker 10/31  -Continue Rythmol twice daily    ÁNGEL  -Creatinine peaked at 1.9 from baseline  -Likely secondary to hypovolemia status post Lasix, HCTZ, lisinopril persistent hypotension  -UA and ultrasound unremarkable  -Urinary retention with greater than 700 cc in bladder, continue Eugene  -Creatinine improved  -IV fluid stopped, patient tolerating oral intake  -Creatinine at baseline    Atrial fibrillation  -Status post failed 2 ablations, managed with Rythmol, metoprolol and Eliquis outpatient  -Restarted beta-blocker 10/31 for better blood pressures  -Rythmol restarted was held due to hypotensive episodes  -Eliquis restarted in setting of small single segmental PE in the right upper lobe  -Continue to monitor electrolytes potassium and magnesium  -Continue on telemetry  -Cardiology consulted  -Patient in normal sinus rhythm    Pulmonary embolism  -CTA showed single segmental pulmonary embolism in the right upper lobe  -Eliquis restarted 10/31  -Dose of Eliquis 5 mg twice daily we will continue at this dose due to postop bleeding prior  -Cardiology consulted  -Ultrasound 10/26 with no evidence of DVT    Lumbar stenosis with neurogenic claudication  -Status post laminectomy and drain placement per Ortho  -Management per Ortho spine  -Opioids were held in setting of hypotension  -Pain currently controlled   -11/1 patient continues to work with physical therapy, able to move all 4 extremities but weakness with left hip flexion; progress has been slow recommend SNF before going home    Acute hypoxemic respiratory failure  -Chest x-ray showed mid bibasilar atelectasis, no obvious edema or effusion  -Encourage ICS use  -Patient likely has some JULIANN component with additional desaturation during the night  -Patient will need outpatient sleep study    Constipation  -Status post Dulcolax suppository      Dispo/Discharge Planning:    Dispo pending clinical course. Patient to be evaluated by PT/OT. Per orthopedics and patient's decision she would like rehab. Case management consult. Physical therapy recommending short-term rehab. Referral sent.   PPD placed    Diet:  ADULT DIET Regular  DVT PPx: Eliquis  Code status: No Order    Hospital Problems as of 11/1/2021 Date Reviewed: 9/22/2021        Codes Class Noted - Resolved POA    A-fib [facility]) ICD-10-CM: I48.91  ICD-9-CM: 427.31  10/30/2021 - Present Unknown        ÁNGEL (acute kidney injury) ([facility] [address] ICD-10-CM: N17.9  ICD-9-CM: 584.9  10/30/2021 - Present Unknown        Acute respiratory failure with hypoxia ([facility] [address] ICD-10-CM: J96.01  ICD-9-CM: 518.81  10/30/2021 - Present Unknown Hypovolemic shock ([facility] [address] ICD-10-CM: R57.1  ICD-9-CM: 785.59  10/26/2021 - Present Unknown        Hypotension ICD-10-CM: I95.9  ICD-9-CM: 458.9  10/26/2021 - Present Unknown        Spondylolisthesis of multiple sites in spine ICD-10-CM: M43.19  ICD-9-CM: 738.4  10/25/2021 - Present Yes        Spinal stenosis ICD-10-CM: M48.00  ICD-9-CM: 724.00  10/25/2021 - Present Unknown        * (Principal) Lumbar stenosis with neurogenic claudication ICD-10-CM: M48.062  ICD-9-CM: 724.03  3/28/2016 - Present Yes              Objective:     Patient Vitals for the past 24 hrs:   Temp Pulse Resp BP SpO2   11/01/21 1133 97.6 °F (36.4 °C) 70 16 121/66 100 %   11/01/21 0703 97.9 °F (36.6 °C) 64 18 117/80 98 %   11/01/21 0432 97.7 °F (36.5 °C) 69 18 103/65 93 %   11/01/21 0036 98.4 °F (36.9 °C) 82 18 131/66 96 %   10/31/21 1933 98.6 °F (37 °C) 70 18 107/61 97 %   10/31/21 1611 98.1 °F (36.7 °C) 77 18 (!) 142/77 98 %   10/31/21 1420 -- 68 -- 121/66 --     Oxygen Therapy  O2 Sat (%): 100 % (11/01/21 1133)  Pulse via Oximetry: 98 beats per minute (10/30/21 1333)  O2 Device: None (Room air) (10/30/21 2000)  Skin Assessment: Clean, dry, & intact (10/30/21 2748)  Skin Protection for O2 Device: No (10/30/21 0718)  O2 Flow Rate (L/min): 2 l/min (10/30/21 0804)    Estimated body mass index is 54.57 kg/m² as calculated from the following:    Height as of this encounter: 5' 4\" (1.626 m). Weight as of this encounter: 144.2 kg (317 lb 14.5 oz). No intake or output data in the 24 hours ending 11/01/21 1416      Physical Exam:   Blood pressure 121/66, pulse 70, temperature 97.6 °F (36.4 °C), resp. rate 16, height 5' 4\" (1.626 m), weight 144.2 kg (317 lb 14.5 oz), SpO2 100 %. General:    Well nourished. No overt distress  Head:  Normocephalic, atraumatic  Eyes:  Sclerae appear normal.  Pupils equally round. ENT:  Nares appear normal, no drainage. Moist oral mucosa  Neck:  No restricted ROM. Trachea midline   CV:   RRR. No m/r/g.   No jugular venous distension. Lungs:   CTAB. No wheezing, rhonchi, or rales. Respirations even, unlabored  Abdomen: Bowel sounds present. Soft, nontender, nondistended. Extremities: No cyanosis or clubbing. No edema  Skin:     No rashes and normal coloration. Warm and dry. Neuro:  CN II-XII grossly intact. Sensation intact. A&Ox3  Psych:  Normal mood and affect. I have reviewed ordered lab tests and independently visualized imaging below:    Recent Labs:  Recent Results (from the past 48 hour(s))   EKG, 12 LEAD, INITIAL    Collection Time: 10/30/21  3:34 PM   Result Value Ref Range    Ventricular Rate 86 BPM    Atrial Rate 86 BPM    P-R Interval 148 ms    QRS Duration 76 ms    Q-T Interval 370 ms    QTC Calculation (Bezet) 442 ms    Calculated P Axis 75 degrees    Calculated R Axis 68 degrees    Calculated T Axis -22 degrees    Diagnosis       Sinus rhythm with Premature supraventricular complexes  Nonspecific T wave abnormality  Abnormal ECG  When compared with ECG of 26-OCT-2021 16:19,  Premature supraventricular complexes are now Present  Confirmed by Juan Pablo Salomon ([id number]) on 10/30/2021 6:35:16 PM     CBC WITH AUTOMATED DIFF    Collection Time: 10/31/21  5:58 AM   Result Value Ref Range    WBC 12.3 (H) 4.3 - 11.1 K/uL    RBC 2.92 (L) 4.05 - 5.2 M/uL    HGB 8.6 (L) 11.7 - 15.4 g/dL    HCT 27.1 (L) 35.8 - 46.3 %    MCV 92.8 79.6 - 97.8 FL    MCH 29.5 26.1 - 32.9 PG    MCHC 31.7 31.4 - 35.0 g/dL    RDW 17.9 (H) 11.9 - 14.6 %    PLATELET 754 807 - 494 K/uL    MPV 9.5 9.4 - 12.3 FL    ABSOLUTE NRBC 0.00 0.0 - 0.2 K/uL    DF AUTOMATED      NEUTROPHILS 72 43 - 78 %    LYMPHOCYTES 15 13 - 44 %    MONOCYTES 10 4.0 - 12.0 %    EOSINOPHILS 2 0.5 - 7.8 %    BASOPHILS 0 0.0 - 2.0 %    IMMATURE GRANULOCYTES 1 0.0 - 5.0 %    ABS. NEUTROPHILS 8.8 (H) 1.7 - 8.2 K/UL    ABS. LYMPHOCYTES 1.9 0.5 - 4.6 K/UL    ABS. MONOCYTES 1.2 0.1 - 1.3 K/UL    ABS. EOSINOPHILS 0.3 0.0 - 0.8 K/UL    ABS.  BASOPHILS 0.1 0.0 - 0.2 K/UL ABS. IMM. GRANS. 0.1 0.0 - 0.5 K/UL   METABOLIC PANEL, BASIC    Collection Time: 10/31/21  5:58 AM   Result Value Ref Range    Sodium 141 136 - 145 mmol/L    Potassium 4.0 3.5 - 5.1 mmol/L    Chloride 109 (H) 98 - 107 mmol/L    CO2 30 21 - 32 mmol/L    Anion gap 2 (L) 7 - 16 mmol/L    Glucose 96 65 - 100 mg/dL    BUN 9 8 - 23 MG/DL    Creatinine 0.42 (L) 0.6 - 1.0 MG/DL    GFR est AA >60 >60 ml/min/1.73m2    GFR est non-AA >60 >60 ml/min/1.73m2    Calcium 8.1 (L) 8.3 - 10.4 MG/DL   MAGNESIUM    Collection Time: 10/31/21  5:58 AM   Result Value Ref Range    Magnesium 1.9 1.8 - 2.4 mg/dL   CBC W/O DIFF    Collection Time: 11/01/21  6:22 AM   Result Value Ref Range    WBC 13.1 (H) 4.3 - 11.1 K/uL    RBC 2.93 (L) 4.05 - 5.2 M/uL    HGB 8.8 (L) 11.7 - 15.4 g/dL    HCT 28.1 (L) 35.8 - 46.3 %    MCV 95.9 79.6 - 97.8 FL    MCH 30.0 26.1 - 32.9 PG    MCHC 31.3 (L) 31.4 - 35.0 g/dL    RDW 17.7 (H) 11.9 - 14.6 %    PLATELET 773 007 - 623 K/uL    MPV 9.4 9.4 - 12.3 FL    ABSOLUTE NRBC 0.00 0.0 - 0.2 K/uL   METABOLIC PANEL, COMPREHENSIVE    Collection Time: 11/01/21  6:22 AM   Result Value Ref Range    Sodium 141 136 - 145 mmol/L    Potassium 4.5 3.5 - 5.1 mmol/L    Chloride 106 98 - 107 mmol/L    CO2 33 (H) 21 - 32 mmol/L    Anion gap 2 (L) 7 - 16 mmol/L    Glucose 95 65 - 100 mg/dL    BUN 14 8 - 23 MG/DL    Creatinine 0.51 (L) 0.6 - 1.0 MG/DL    GFR est AA >60 >60 ml/min/1.73m2    GFR est non-AA >60 >60 ml/min/1.73m2    Calcium 8.5 8.3 - 10.4 MG/DL    Bilirubin, total 0.6 0.2 - 1.1 MG/DL    ALT (SGPT) 26 12 - 65 U/L    AST (SGOT) 54 (H) 15 - 37 U/L    Alk.  phosphatase 118 50 - 136 U/L    Protein, total 4.9 (L) 6.3 - 8.2 g/dL    Albumin 1.8 (L) 3.2 - 4.6 g/dL    Globulin 3.1 2.3 - 3.5 g/dL    A-G Ratio 0.6 (L) 1.2 - 3.5     MAGNESIUM    Collection Time: 11/01/21  6:22 AM   Result Value Ref Range    Magnesium 1.9 1.8 - 2.4 mg/dL       All Micro Results     Procedure Component Value Units Date/Time    CULTURE, BLOOD [[id number]] Collected: 10/30/21 1319    Order Status: Completed Specimen: Blood Updated: 11/01/21 0706     Special Requests: --        LEFT  Antecubital       Culture result: NO GROWTH 2 DAYS       MSSA/MRSA SC BY PCR, NASAL SWAB [[id number]] Collected: 10/25/21 0600    Order Status: Canceled Specimen: Swab           Other Studies:  No results found.     Current Meds:  Current Facility-Administered Medications   Medication Dose Route Frequency    polyethylene glycol (MIRALAX) packet 17 g  17 g Oral DAILY    acetaminophen (TYLENOL) tablet 650 mg  650 mg Oral Q6H PRN    0.9% sodium chloride infusion 250 mL  250 mL IntraVENous PRN    diphenhydrAMINE (BENADRYL) capsule 25 mg  25 mg Oral Q6H PRN    propafenone (RYTHMOL) tablet 150 mg  150 mg Oral BID    midodrine (PROAMATINE) tablet 10 mg  10 mg Oral Q8H    bisacodyL (DULCOLAX) suppository 10 mg  10 mg Rectal DAILY PRN    famotidine (PEPCID) tablet 20 mg  20 mg Oral QPM    apixaban (ELIQUIS) tablet 5 mg  5 mg Oral BID    gabapentin (NEURONTIN) capsule 300 mg  300 mg Oral Q12H    albuterol (PROVENTIL VENTOLIN) nebulizer solution 2.5 mg  2.5 mg Nebulization Q6H PRN    0.9% sodium chloride infusion 250 mL  250 mL IntraVENous PRN    NOREPINephrine (LEVOPHED) 4 mg in 5% dextrose 250 mL infusion  0.5-30 mcg/min IntraVENous TITRATE    aspirin delayed-release tablet 81 mg  81 mg Oral QHS    escitalopram oxalate (LEXAPRO) tablet 20 mg  20 mg Oral QAM    levothyroxine (SYNTHROID) tablet 50 mcg  50 mcg Oral ACB    LORazepam (ATIVAN) tablet 0.5 mg  0.5 mg Oral Q6H PRN    metoprolol tartrate (LOPRESSOR) tablet 25 mg  25 mg Oral BID    pantoprazole (PROTONIX) tablet 40 mg  40 mg Oral ACB    potassium chloride (KLOR-CON) tablet 10 mEq  10 mEq Oral DAILY    [Held by provider] rOPINIRole (REQUIP) tablet 0.5 mg  0.5 mg Oral QHS    traMADoL (ULTRAM) tablet 50 mg  50 mg Oral Q6H PRN    zolpidem (AMBIEN) tablet 10 mg  10 mg Oral QHS PRN    alcohol 62% (NOZIN) nasal  1 Ampule  1 Ampule Topical Q12H    sodium chloride (NS) flush 5-40 mL  5-40 mL IntraVENous Q8H    sodium chloride (NS) flush 5-40 mL  5-40 mL IntraVENous PRN    naloxone (NARCAN) injection 0.4 mg  0.4 mg IntraVENous PRN    phenol throat spray (CHLORASEPTIC) 1 Spray  1 Spray Oral PRN    diphenhydrAMINE (BENADRYL) capsule 25 mg  25 mg Oral Q4H PRN    docusate sodium (COLACE) capsule 100 mg  100 mg Oral BID    HYDROcodone-acetaminophen (NORCO)  mg tablet 1 Tablet  1 Tablet Oral Q4H PRN    HYDROmorphone (DILAUDID) injection 1 mg  1 mg IntraVENous Q4H PRN    ondansetron (ZOFRAN ODT) tablet 4 mg  4 mg Oral Q4H PRN    [Held by provider] lisinopriL (PRINIVIL, ZESTRIL) tablet 20 mg  20 mg Oral BID    And    [Held by provider] hydroCHLOROthiazide (MICROZIDE) capsule 12.5 mg  12.5 mg Oral BID       Signed:  Oracio Reagan MD    Part of this note may have been written by using a voice dictation software. The note has been proof read but may still contain some grammatical/other typographical errors.

## 2021-11-01 NOTE — PROGRESS NOTES
ACUTE PHYSICAL THERAPY GOALS:  (Developed with and agreed upon by patient and/or caregiver. )  LTG: (Reviewed and updated 10/29/21)  (1.)Ms. Yudith Castillo will move from supine to sit and sit to supine, scoot up and down and roll side to side in bed INDEPENDENTLY via log roll within 7 treatment day(s). (2.)Ms. Yudith Castillo will transfer from bed to chair and chair to bed with SUPERVISION using the least restrictive device within 7 treatment day(s). (3.)Ms. Yudith Castillo will ambulate with STAND BY ASSIST for 150+ feet with the least restrictive device within 7 treatment day(s). (4.)Ms. Yudith Castillo will perform LE exercises per HEP independently to improve strength and mobility within 7 days. (5.)Ms. Yudith Castillo will independently verbalize 3/3 post op spinal precautions and maintain compliance within 7 days. PHYSICAL THERAPY: Daily Note and AM Treatment Day # 4   **Corset brace w/ambulation    Katherine Mendez is a 73 y.o. female   PRIMARY DIAGNOSIS: Lumbar stenosis with neurogenic claudication  Lumbar stenosis with neurogenic claudication [M48.062]  Spondylolisthesis of lumbar region [M43.16]  Spinal stenosis [M48.00]  Procedure(s) (LRB):  L2-L4 SPINE LUMBAR DIRECT LATERAL INTERBODY FUSION (DLIF) ANTERIOR PLATING / SSEP (Left)  REDO L2-L5 LAMINECTOMY FUSION AND L4-L5 TLIF / NEUROMONITORING (N/A)  7 Days Post-Op    ASSESSMENT:     REHAB RECOMMENDATIONS: CURRENT LEVEL OF FUNCTION:  (Most Recently Demonstrated)   Recommendation to date pending progress:  Setting:   Short-term Rehab  Equipment:    Rolling Walker Bed Mobility:  Contact Guard Assistance   Sit to Stand:   Contact Guard Assistance  Transfers:   Contact Guard Assistance  Gait/Mobility:   Contact Guard Assistance     ASSESSMENT:  Ms. Yudith Castillo demonstrated slow progress toward goals with increased gait distances. Upset about going to rehab, provided encouragement. Transfers with CGA and ambulation for 80' w/ RW and chair follow.   Improved balance and good gait mechanics. Would benefit from STR to improve gait and safety.       SUBJECTIVE:   Ms. Martha Mccann states, \"my legs are still hurting\"    SOCIAL HISTORY/ LIVING ENVIRONMENT: see eval  Home Environment: Private residence  One/Two Story Residence: Two story  Living Alone: Yes  Support Systems: Child(rober)  OBJECTIVE:     PAIN: VITAL SIGNS: LINES/DRAINS:   Pre Treatment: Pain Screen  Pain Scale 1: Numeric (0 - 10)  Pain Intensity 1: 3  Post Treatment: 3      O2 Device: None (Room air)     MOBILITY: I Mod I S SBA CGA Min Mod Max Total  NT x2 Comments:   Bed Mobility    Rolling [] [] [] [] [x] [] [] [] [] [] []    Supine to Sit [] [] [] [] [x] [] [] [] [] [] []    Scooting [] [] [] [] [x] [] [] [] [] [] []    Sit to Supine [] [] [] [] [] [] [] [] [] [x] []    Transfers    Sit to Stand [] [] [] [] [x] [] [] [] [] [] []    Bed to Chair [] [] [] [] [x] [] [] [] [] [] []    Stand to Sit [] [] [] [] [x] [] [] [] [] [] []    I=Independent, Mod I=Modified Independent, S=Supervision, SBA=Standby Assistance, CGA=Contact Guard Assistance,   Min=Minimal Assistance, Mod=Moderate Assistance, Max=Maximal Assistance, Total=Total Assistance, NT=Not Tested    BALANCE: Good Fair+ Fair Fair- Poor NT Comments   Sitting Static [x] [] [] [] [] []    Sitting Dynamic [x] [] [] [] [] []              Standing Static [x] [x] [] [] [] []    Standing Dynamic [] [x] [] [] [] []      GAIT: I Mod I S SBA CGA Min Mod Max Total  NT x2 Comments:   Level of Assistance [] [] [] [] [x] [] [] [] [] [] []    Distance 80'    DME Rolling Walker    Gait Quality Decreased pace    Weightbearing  Status N/A     I=Independent, Mod I=Modified Independent, S=Supervision, SBA=Standby Assistance, CGA=Contact Guard Assistance,   Min=Minimal Assistance, Mod=Moderate Assistance, Max=Maximal Assistance, Total=Total Assistance, NT=Not Tested    PLAN:   FREQUENCY/DURATION: PT Plan of Care: BID for duration of hospital stay or until stated goals are met, whichever comes first.  TREATMENT:     TREATMENT:   ($$ Gait Trainin-37 mins    )  Gait Training (24 Minutes): Gait training for 80 feet utilizing [facility] [facility]. Patient required Manual and Verbal cueing to improve Activity Pacing, Assistive Device Utilization, Dynamic Standing Balance and Gait Mechanics.      TREATMENT GRID:  N/A    AFTER TREATMENT POSITION/PRECAUTIONS:  Chair, Needs within reach, RN notified and Visitors at bedside    INTERDISCIPLINARY COLLABORATION:  RN/PCT and PT/PTA    TOTAL TREATMENT DURATION:  PT Patient Time In/Time Out  Time In: 9749  Time Out: [address] [place], PTA

## 2021-11-01 NOTE — PROGRESS NOTES
[facility] CARDIOLOGY PROGRESS NOTE           11/1/2021   Admit Date: 10/25/2021      Subjective:     No cp sob. Hemodynamics markedly better. Review of Systems   Constitutional: Negative for chills. Eyes: Negative for blurred vision. Respiratory: Negative for cough. Cardiovascular: Negative for chest pain. Objective:      Vitals:    10/31/21 1933 11/01/21 0036 11/01/21 0432 11/01/21 0703   BP: 107/61 131/66 103/65 117/80   Pulse: 70 82 69 64   Resp: 18 18 18 18   Temp: 98.6 °F (37 °C) 98.4 °F (36.9 °C) 97.7 °F (36.5 °C) 97.9 °F (36.6 °C)   SpO2: 97% 96% 93% 98%   Weight:       Height:             Physical Exam  HENT:      Head: Normocephalic. Nose: No congestion. Mouth/Throat:      Pharynx: No oropharyngeal exudate. Cardiovascular:      Rate and Rhythm: Normal rate. Pulmonary:      Effort: Pulmonary effort is normal.   Abdominal:      General: Bowel sounds are normal.   Musculoskeletal:      Right lower leg: Edema present. Left lower leg: Edema present. Neurological:      Mental Status: She is alert and oriented to person, place, and time.    Psychiatric:         Mood and Affect: Mood normal.         Data Review:   Recent Labs     11/01/21  0622 10/31/21  0558    141   K 4.5 4.0   MG 1.9 1.9   BUN 14 9   CREA 0.51* 0.42*   GLU 95 96   WBC 13.1* 12.3*   HGB 8.8* 8.6*   HCT 28.1* 27.1*    204       No intake or output data in the 24 hours ending 11/01/21 1116  Current Facility-Administered Medications   Medication Dose Route Frequency    polyethylene glycol (MIRALAX) packet 17 g  17 g Oral DAILY    acetaminophen (TYLENOL) tablet 650 mg  650 mg Oral Q6H PRN    0.9% sodium chloride infusion 250 mL  250 mL IntraVENous PRN    diphenhydrAMINE (BENADRYL) capsule 25 mg  25 mg Oral Q6H PRN    propafenone (RYTHMOL) tablet 150 mg  150 mg Oral BID    midodrine (PROAMATINE) tablet 10 mg  10 mg Oral Q8H    bisacodyL (DULCOLAX) suppository 10 mg  10 mg Rectal DAILY PRN    famotidine (PEPCID) tablet 20 mg  20 mg Oral QPM    apixaban (ELIQUIS) tablet 5 mg  5 mg Oral BID    gabapentin (NEURONTIN) capsule 300 mg  300 mg Oral Q12H    albuterol (PROVENTIL VENTOLIN) nebulizer solution 2.5 mg  2.5 mg Nebulization Q6H PRN    0.9% sodium chloride infusion 250 mL  250 mL IntraVENous PRN    NOREPINephrine (LEVOPHED) 4 mg in 5% dextrose 250 mL infusion  0.5-30 mcg/min IntraVENous TITRATE    aspirin delayed-release tablet 81 mg  81 mg Oral QHS    escitalopram oxalate (LEXAPRO) tablet 20 mg  20 mg Oral QAM    levothyroxine (SYNTHROID) tablet 50 mcg  50 mcg Oral ACB    LORazepam (ATIVAN) tablet 0.5 mg  0.5 mg Oral Q6H PRN    metoprolol tartrate (LOPRESSOR) tablet 25 mg  25 mg Oral BID    pantoprazole (PROTONIX) tablet 40 mg  40 mg Oral ACB    potassium chloride (KLOR-CON) tablet 10 mEq  10 mEq Oral DAILY    [Held by provider] rOPINIRole (REQUIP) tablet 0.5 mg  0.5 mg Oral QHS    traMADoL (ULTRAM) tablet 50 mg  50 mg Oral Q6H PRN    zolpidem (AMBIEN) tablet 10 mg  10 mg Oral QHS PRN    alcohol 62% (NOZIN) nasal  1 Ampule  1 Ampule Topical Q12H    sodium chloride (NS) flush 5-40 mL  5-40 mL IntraVENous Q8H    sodium chloride (NS) flush 5-40 mL  5-40 mL IntraVENous PRN    naloxone (NARCAN) injection 0.4 mg  0.4 mg IntraVENous PRN    phenol throat spray (CHLORASEPTIC) 1 Spray  1 Spray Oral PRN    diphenhydrAMINE (BENADRYL) capsule 25 mg  25 mg Oral Q4H PRN    docusate sodium (COLACE) capsule 100 mg  100 mg Oral BID    HYDROcodone-acetaminophen (NORCO)  mg tablet 1 Tablet  1 Tablet Oral Q4H PRN    HYDROmorphone (DILAUDID) injection 1 mg  1 mg IntraVENous Q4H PRN    ondansetron (ZOFRAN ODT) tablet 4 mg  4 mg Oral Q4H PRN    [Held by provider] lisinopriL (PRINIVIL, ZESTRIL) tablet 20 mg  20 mg Oral BID    And    [Held by provider] hydroCHLOROthiazide (MICROZIDE) capsule 12.5 mg  12.5 mg Oral BID           Assessment/Plan:     73 y.o. female     PE · Subsegmental pulmonary embolus noted  · Anticoagulation should be initiated when possible  · PE regimen for Eliquis would be appropriate. · US 10/26 with no DVT  · Defer to primary team/ hospitalist    Hypotension  ·  improving as stated above multifactorial    · Agree with holding hypertensive medications. · Continue midodrine. Suspect this is primarily multifactorial with CNS, pulmonary embolus, morbid obesity and severe pulmonary hypertension with vasodilation. Elevated RVSP  · Small subsegmental pulmonary embolus unlikely to cause elevation of RVSP  · Chronic thromboembolic disease possible and would be better determined on VQ scan  · Deferring right heart catheterization at this time    Paroxysmal atrial fibrillation  ·  recent ECGs with sinus rhythm. Patient with some elevated heart rates today and ectopic beats on exam.   · Eliquis to be resumed when safe from a bleeding standpoint  · Patient now with PE and has indications for alternative dosing regimen.               Gus Lam MD  11/1/2021

## 2021-11-01 NOTE — CONSULTS
Liza Stewart MD  Medical Director  [address] [place] [address], [address] [facility]  Tel: [phone]       Physical Medicine & Rehabilitation Consult    Subjective:     Date of Consultation:  November 1, 2021  Referring Provider: Dr Rose Mary Broderick is a 73 y.o. female who is being evaluated at the request of the  service for consideration for inpatient rehabilitation following lumbar spine surgery due to  Spinal stenosis with neurogenic claudication    HPI: The patient is a right-hand dominant, previously functionally independent 74 yo super obese female 73 y.o. with history of Afib s/p ablation, Hypothyroidism, OA, lumbar spinal stenosis, HTN, morbid obesity, JULIANN who was admitted for lumbar laminectomy. Patient is postop day 1 from L2 L4 lumbar direct lateral interbody fusion with anterior plating and redo of L2 L5 laminectomy by spinal Ortho. Patient had multiple drains in place postoperatively. on 10/26 POD 1, patient received one dose of IV Dilaudid as well as multiple blood pressure medications. She subsequently suffered from hypotension with BP 70/54. She also was working with PT/OT and became significantly hypotensive when standing up. She did complain of orthostasis at this time. She was given normal saline IV fluid boluses but remains hypotensive with MAPS as low as 49 during my evaluation. Of note, she also received Midodrine 20mg once without no improvement in MAPs. Patient complained of significant drowsiness, and was hardly able to keep her eyes open with current low BP's. She denied any shortness of breath, chest pain or pressure, palpitations, nausea, vomiting, fevers or chills, diaphoresis, near-syncope or syncope. Patient was suffering from ÁNGEL with bump in creatinine and WBC 1027. Hypotension was thought to be secondary to pain meds and mutli antihypertensives. WBC was 25K. And pod 1, she reqiuired Levophed to maintain BP.  This occurred in the face of large volume blood loss. Cardiology, Shaila, felt that hypotension is mixed etiology related to CNS driven event postoperatively and hypovolemic component due to large volume blood loss. Patient with history of atrial fibrillation ablation. She has had intermittent episodes of A. fib atrial fibrillation which are rate controlled. Radha not feel Rythmol is contributing to hypotension. Certainly large volume blood loss was likely etiology with possible underlying CNS mediated component. Agreed with ProAmatine. Volume status appearedstable. Held  Eliquis at this time until we feel patient is no longer actively bleeding.    With ST is min assist, CGA for STS, CGA tranfers gait, CGA 9fgt with SR     Principal Problem:    Lumbar stenosis with neurogenic claudication (3/28/2016)    Active Problems:    Spondylolisthesis of multiple sites in spine (10/25/2021)      Spinal stenosis (10/25/2021)      Hypovolemic shock ([address] (10/26/2021)      Hypotension (10/26/2021)      A-fib ([address] (10/30/2021)      ÁNGEL (acute kidney injury) ([address] (10/30/2021)      Acute respiratory failure with hypoxia ([address] (10/30/2021)      Past Medical History:   Diagnosis Date    A-fib ([address]     Acquired hypothyroidism 9/13/2017    Anxiety and depression     Arthritis     osteo    Chronic pain     back and R leg    GERD (gastroesophageal reflux disease)     well controlled with med    H/O bone density study 10/2011    normal    Hypertension     controlled by med    Low back pain     Morbid obesity ([address]     bmi 48.63    Pneumonia     1/2020    Rectal bleeding onset x 2 months    Restless legs     Rosacea     Skin cancer     squamous- removed    Smoker     current 10/18/21 5-10 daily \"trying to quit\"-- previously 1 ppd x since age 22    Spinal stenosis     Staph infection 03/2016    from back surgery----- NOT mrsa    Suspected sleep apnea      test not completed 11/2018      Past Surgical History: Procedure Laterality Date    COLONOSCOPY N/A 10/8/2018    COLONOSCOPY / BMI=50  performed by Earle Hurley MD at [facility] END[facility]    HX AFIB ABLATION  2017 & 2018    HX APPENDECTOMY  age 13    HX BACK SURGERY  2016    spine I&D post op infection    HX BREAST BIOPSY Right 2011    benign    HX CARPAL TUNNEL RELEASE Left     HX CHOLECYSTECTOMY  2014    HX COLONOSCOPY  2014         HX CYST REMOVAL      foot    HX KNEE ARTHROSCOPY Right     HX KNEE ARTHROSCOPY Left     HX LUMBAR LAMINECTOMY  2016    L2-S1    HX MALIGNANT SKIN LESION EXCISION      x 3     HX MALIGNANT SKIN LESION EXCISION      Jan 29,2019    HX MALIGNANT SKIN LESION EXCISION  06/16/2020    Nasal    HX MALIGNANT SKIN LESION EXCISION  06/30/2021    face    HX ORTHOPAEDIC      heel spur    HX ORTHOPAEDIC Left 2000's    achilles tendon    HX PELVIC LAPAROSCOPY      x 2    HX PREMALIG/BENIGN SKIN LESION EXCISION      HX GUSTAVO AND BSO  2004    HX TONSIL AND ADENOIDECTOMY  age 12   [de-identified] TUBAL LIGATION      LA ELBOW ARTHROSCOP,DIAGNOSTIC Left 2013    along with carpal tunnel      Family History   Problem Relation Age of Onset    Cancer Mother         colon ca   [employer] Arthritis-rheumatoid Mother     Heart Disease Father     Diabetes Father     Hypertension Father       Social History     Tobacco Use    Smoking status: Current Every Day Smoker     Packs/day: 0.75     Years: 25.00     Pack years: 18.75     Types: Cigarettes    Smokeless tobacco: Never Used    Tobacco comment: 05-1 ppd since age 22 (quit briefly   Substance Use Topics    Alcohol use: No     Home Environment: Private residence  One/Two Story Residence: Two story  Living Alone: Yes  Support Systems: Child(rober)    Prior to Admission medications    Medication Sig Start Date End Date Taking? Authorizing Provider   aspirin delayed-release 81 mg tablet Take 81 mg by mouth nightly.  Take only while holding Eliquis for surgery per anesthesia protocol   Yes Provider, Historical benazepril-hydroCHLOROthiazide (LOTENSIN HCT) 20-12.5 mg per tablet Take 1 Tablet by mouth two (2) times a day. 9/21/21  Yes Ryland Estrada MD   potassium chloride SR (KLOR-CON 10) 10 mEq tablet Take 1 Tablet by mouth daily. Patient taking differently: Take 10 mEq by mouth daily as needed. With Lasix prn 9/21/21  Yes Ryland Estrada MD   levothyroxine (SYNTHROID) 50 mcg tablet Take 1 Tablet by mouth Daily (before breakfast). 9/21/21  Yes Ryland Estrada MD   rOPINIRole (REQUIP) 1 mg tablet TAKE 1/2 TABLET BY MOUTH NIGHTLY  Patient taking differently: TAKE 1/2 TABLET BY MOUTH NIGHTLY  Indications: restless legs syndrome, an extreme discomfort in the calf muscles when sitting or lying down 9/21/21  Yes Ryland Estrada MD   gabapentin (NEURONTIN) 300 mg capsule Take 2 Capsules by mouth nightly. 9/21/21  Yes Ryland Estrada MD   zolpidem (AMBIEN) 10 mg tablet TAKE 1 TABLET BY MOUTH AT BEDTIME AS NEEDED FOR SLEEP 9/15/21  Yes Ryland Estrada MD   escitalopram oxalate (LEXAPRO) 20 mg tablet TAKE 1 TABLET BY MOUTH EVERY DAY  Patient taking differently: Take 20 mg by mouth Every morning. 9/8/21  Yes Ryland Estrada MD   omeprazole (PRILOSEC) 40 mg capsule TAKE 1 CAPSULE BY MOUTH EVERY DAY 8/9/21  Yes Ryland Estrada MD   metoprolol tartrate (LOPRESSOR) 25 mg tablet TAKE 1 TABLET (25 MG) BY MOUTH 2 (TWO) TIMES A DAY 6/17/21  Yes Provider, Historical   traMADoL (ULTRAM) 50 mg tablet TAKE 1 TAB BY MOUTH EVERY 6 HRS AS NEEDED FOR PAIN FOR UP TO 3 DAYS. MAX DAILY AMT  200 MG 7/1/21  Yes Provider, Historical   doxycycline (MONODOX) 100 mg capsule TAKE 1 CAPSULE BY MOUTH EVERY DAY 7/6/21  Yes Ryland Estrada MD   LORazepam (ATIVAN) 0.5 mg tablet TAKE 1 TAB BY MOUTH EVERY EIGHT HOURS AS NEEDED FOR ANXIETY. 7/6/21  Yes Ryland Estrada MD   propafenone (RYTHMOL) 150 mg tablet Take 150 mg by mouth two (2) times a day.    Yes Provider, Historical   cholecalciferol (VITAMIN D3) 1,000 unit tablet Take 1,000 Units by mouth every morning. Yes Provider, Historical   furosemide (LASIX) 40 mg tablet Take 1 Tablet by mouth daily. Patient taking differently: Take 40 mg by mouth daily as needed. 21   Zina Hyman MD   apixaban (ELIQUIS) 5 mg tablet Take 1 Tablet by mouth two (2) times a day. Patient taking differently: Take 5 mg by mouth two (2) times a day. Cardiology instructions- hold 3 days prior to surgery 21   Zina Hyman MD   OTHER Nebulizer (J11.00) Influenza and pneumonia  (primary encounter diagnosis)  (J20.9) Acute bronchitis, unspecified organism  (R06.2) Wheezing 3/12/20   Jeannie Em MD     Allergies   Allergen Reactions    Blue Dye Anaphylaxis     BLUE INDIGO DYE     Pcn [Penicillins] Hives    Adhesive Other (comments)     Skin irritation     Bee Venom Protein (Honey Bee) Swelling    Cefdinir Other (comments)     Tremor, can tolerate Ceftin         Review of Systems:  A comprehensive review of systems was negative. Objective:     Vitals:  Blood pressure 132/77, pulse 90, temperature 97.8 °F (36.6 °C), resp. rate 18, height 5' 4\" (1.626 m), weight 317 lb 14.5 oz (144.2 kg), SpO2 100 %.   Temp (24hrs), Av °F (36.7 °C), Min:97.6 °F (36.4 °C), Max:98.6 °F (37 °C)      Intake and Output:  10/30 1901 -  0700  In: -   Out: 950 [Urine:950]    Physical Exam:  Awake , alert, NAD , morbidly obese  HEENT NCAT  UE strength 5/5  LEs prox 3+, distally intact  Pt working with PT/PTA    Labs/Studies:  Recent Results (from the past 72 hour(s))   PLEASE READ & DOCUMENT PPD TEST IN 72 HRS    Collection Time: 10/29/21  9:00 PM   Result Value Ref Range    PPD Negative Negative    mm Induration 0 0 - 5 mm   METABOLIC PANEL, BASIC    Collection Time: 10/30/21  6:09 AM   Result Value Ref Range    Sodium 141 136 - 145 mmol/L    Potassium 4.0 3.5 - 5.1 mmol/L    Chloride 110 (H) 98 - 107 mmol/L    CO2 30 21 - 32 mmol/L    Anion gap 1 (L) 7 - 16 mmol/L    Glucose 100 65 - 100 mg/dL    BUN 10 8 - 23 MG/DL    Creatinine 0.42 (L) 0.6 - 1.0 MG/DL    GFR est AA >60 >60 ml/min/1.73m2    GFR est non-AA >60 >60 ml/min/1.73m2    Calcium 7.9 (L) 8.3 - 10.4 MG/DL   CBC WITH AUTOMATED DIFF    Collection Time: 10/30/21  6:09 AM   Result Value Ref Range    WBC 11.2 (H) 4.3 - 11.1 K/uL    RBC 2.49 (L) 4.05 - 5.2 M/uL    HGB 7.6 (L) 11.7 - 15.4 g/dL    HCT 23.5 (L) 35.8 - 46.3 %    MCV 94.4 79.6 - 97.8 FL    MCH 30.5 26.1 - 32.9 PG    MCHC 32.3 31.4 - 35.0 g/dL    RDW 16.7 (H) 11.9 - 14.6 %    PLATELET 589 343 - 235 K/uL    MPV 9.6 9.4 - 12.3 FL    ABSOLUTE NRBC 0.00 0.0 - 0.2 K/uL    DF AUTOMATED      NEUTROPHILS 76 43 - 78 %    LYMPHOCYTES 12 (L) 13 - 44 %    MONOCYTES 8 4.0 - 12.0 %    EOSINOPHILS 2 0.5 - 7.8 %    BASOPHILS 0 0.0 - 2.0 %    IMMATURE GRANULOCYTES 1 0.0 - 5.0 %    ABS. NEUTROPHILS 8.5 (H) 1.7 - 8.2 K/UL    ABS. LYMPHOCYTES 1.4 0.5 - 4.6 K/UL    ABS. MONOCYTES 0.9 0.1 - 1.3 K/UL    ABS. EOSINOPHILS 0.3 0.0 - 0.8 K/UL    ABS. BASOPHILS 0.1 0.0 - 0.2 K/UL    ABS. IMM. GRANS. 0.1 0.0 - 0.5 K/UL   MAGNESIUM    Collection Time: 10/30/21  6:09 AM   Result Value Ref Range    Magnesium 2.0 1.8 - 2.4 mg/dL   PROCALCITONIN    Collection Time: 10/30/21  6:09 AM   Result Value Ref Range    Procalcitonin <0.05 ng/mL   RBC, ALLOCATE    Collection Time: 10/30/21  7:30 AM   Result Value Ref Range    HISTORY CHECKED?  Historical check performed    CULTURE, BLOOD    Collection Time: 10/30/21  1:19 PM    Specimen: Blood   Result Value Ref Range    Special Requests: LEFT  Antecubital        Culture result: NO GROWTH 2 DAYS     LACTIC ACID    Collection Time: 10/30/21  1:19 PM   Result Value Ref Range    Lactic acid 2.2 (H) 0.4 - 2.0 MMOL/L   TYPE & SCREEN    Collection Time: 10/30/21  1:20 PM   Result Value Ref Range    Crossmatch Expiration 11/02/2021,2359     ABO/Rh(D) A NEGATIVE     Antibody screen NEG     Unit number [id number]     Blood component type  LR     Unit division 00     Status of unit TRANSFUSED     Crossmatch result Compatible    EKG, 12 LEAD, INITIAL    Collection Time: 10/30/21  3:34 PM   Result Value Ref Range    Ventricular Rate 86 BPM    Atrial Rate 86 BPM    P-R Interval 148 ms    QRS Duration 76 ms    Q-T Interval 370 ms    QTC Calculation (Bezet) 442 ms    Calculated P Axis 75 degrees    Calculated R Axis 68 degrees    Calculated T Axis -22 degrees    Diagnosis       Sinus rhythm with Premature supraventricular complexes  Nonspecific T wave abnormality  Abnormal ECG  When compared with ECG of 26-OCT-2021 16:19,  Premature supraventricular complexes are now Present  Confirmed by Sabi Edwards ([id number]) on 10/30/2021 6:35:16 PM     CBC WITH AUTOMATED DIFF    Collection Time: 10/31/21  5:58 AM   Result Value Ref Range    WBC 12.3 (H) 4.3 - 11.1 K/uL    RBC 2.92 (L) 4.05 - 5.2 M/uL    HGB 8.6 (L) 11.7 - 15.4 g/dL    HCT 27.1 (L) 35.8 - 46.3 %    MCV 92.8 79.6 - 97.8 FL    MCH 29.5 26.1 - 32.9 PG    MCHC 31.7 31.4 - 35.0 g/dL    RDW 17.9 (H) 11.9 - 14.6 %    PLATELET 113 482 - 605 K/uL    MPV 9.5 9.4 - 12.3 FL    ABSOLUTE NRBC 0.00 0.0 - 0.2 K/uL    DF AUTOMATED      NEUTROPHILS 72 43 - 78 %    LYMPHOCYTES 15 13 - 44 %    MONOCYTES 10 4.0 - 12.0 %    EOSINOPHILS 2 0.5 - 7.8 %    BASOPHILS 0 0.0 - 2.0 %    IMMATURE GRANULOCYTES 1 0.0 - 5.0 %    ABS. NEUTROPHILS 8.8 (H) 1.7 - 8.2 K/UL    ABS. LYMPHOCYTES 1.9 0.5 - 4.6 K/UL    ABS. MONOCYTES 1.2 0.1 - 1.3 K/UL    ABS. EOSINOPHILS 0.3 0.0 - 0.8 K/UL    ABS. BASOPHILS 0.1 0.0 - 0.2 K/UL    ABS. IMM.  GRANS. 0.1 0.0 - 0.5 K/UL   METABOLIC PANEL, BASIC    Collection Time: 10/31/21  5:58 AM   Result Value Ref Range    Sodium 141 136 - 145 mmol/L    Potassium 4.0 3.5 - 5.1 mmol/L    Chloride 109 (H) 98 - 107 mmol/L    CO2 30 21 - 32 mmol/L    Anion gap 2 (L) 7 - 16 mmol/L    Glucose 96 65 - 100 mg/dL    BUN 9 8 - 23 MG/DL    Creatinine 0.42 (L) 0.6 - 1.0 MG/DL    GFR est AA >60 >60 ml/min/1.73m2    GFR est non-AA >60 >60 ml/min/1.73m2    Calcium 8.1 (L) 8.3 - 10.4 MG/DL MAGNESIUM    Collection Time: 10/31/21  5:58 AM   Result Value Ref Range    Magnesium 1.9 1.8 - 2.4 mg/dL   CBC W/O DIFF    Collection Time: 11/01/21  6:22 AM   Result Value Ref Range    WBC 13.1 (H) 4.3 - 11.1 K/uL    RBC 2.93 (L) 4.05 - 5.2 M/uL    HGB 8.8 (L) 11.7 - 15.4 g/dL    HCT 28.1 (L) 35.8 - 46.3 %    MCV 95.9 79.6 - 97.8 FL    MCH 30.0 26.1 - 32.9 PG    MCHC 31.3 (L) 31.4 - 35.0 g/dL    RDW 17.7 (H) 11.9 - 14.6 %    PLATELET 368 334 - 229 K/uL    MPV 9.4 9.4 - 12.3 FL    ABSOLUTE NRBC 0.00 0.0 - 0.2 K/uL   METABOLIC PANEL, COMPREHENSIVE    Collection Time: 11/01/21  6:22 AM   Result Value Ref Range    Sodium 141 136 - 145 mmol/L    Potassium 4.5 3.5 - 5.1 mmol/L    Chloride 106 98 - 107 mmol/L    CO2 33 (H) 21 - 32 mmol/L    Anion gap 2 (L) 7 - 16 mmol/L    Glucose 95 65 - 100 mg/dL    BUN 14 8 - 23 MG/DL    Creatinine 0.51 (L) 0.6 - 1.0 MG/DL    GFR est AA >60 >60 ml/min/1.73m2    GFR est non-AA >60 >60 ml/min/1.73m2    Calcium 8.5 8.3 - 10.4 MG/DL    Bilirubin, total 0.6 0.2 - 1.1 MG/DL    ALT (SGPT) 26 12 - 65 U/L    AST (SGOT) 54 (H) 15 - 37 U/L    Alk. phosphatase 118 50 - 136 U/L    Protein, total 4.9 (L) 6.3 - 8.2 g/dL    Albumin 1.8 (L) 3.2 - 4.6 g/dL    Globulin 3.1 2.3 - 3.5 g/dL    A-G Ratio 0.6 (L) 1.2 - 3.5     MAGNESIUM    Collection Time: 11/01/21  6:22 AM   Result Value Ref Range    Magnesium 1.9 1.8 - 2.4 mg/dL   SARS-COV-2    Collection Time: 11/01/21  2:38 PM   Result Value Ref Range    SARS-CoV-2 Please find results under separate order         Ambulation:  Activity and Safety  Activity Level: Up with Assistance  Ambulate: Ambulate to bathroom  Activity: In bed  Activity Assistance: Partial (one person)  Weight Bearing Status: WBAT (Weight Bearing as Tolerated)  Mode of Transportation: Stretcher  Repositioned: Head of bed elevated (degrees)  Patient Turned: Turns self  Head of Bed Elevated: Self regulated  Activity Response:  Tolerated well  Assistive Device: Fall prevention device  Safety Measures: Bed/Chair-Wheels locked, Bed in low position, Call light within reach, Fall prevention (comment), Gripper socks     Impression / Assessment:     Principal Problem:    Lumbar stenosis with neurogenic claudication (3/28/2016)    Active Problems:    Spondylolisthesis of multiple sites in spine (10/25/2021)      Spinal stenosis (10/25/2021)      Hypovolemic shock ([address] (10/26/2021)      Hypotension (10/26/2021)      A-fib ([address] (10/30/2021)      ÁNGEL (acute kidney injury) ([address] (10/30/2021)      Acute respiratory failure with hypoxia ([address] (10/30/2021)    LUMBAR STENOSIS WITH NEUROGENIC CLAUDICATION    Plan / Recommendations / Medical Decision Making:     Recommendations:   Continue Acute Rehab Program  Coordination of rehab / medical care  Counseling of PM&R care issues management  Monitoring and management of medical conditions per plan of care / orders  -patient appropriate for [facility]. pts morbid obesity and med comorbidities makes her hi risk for decompensation  -cont PT/OT  -will plan of [facility] Wed 11/3   Discussion with Family / Caregiver / Staff    Reviewed Therapies / Gurpreet Vaughn / Sherif Nazario / Records      Thank you very much for this referral. We appreciate the opportunity to participate in this patient's care. We will continue to follow. Margret Damon MD, Medical Director  [address] [place] [address]

## 2021-11-02 LAB
ALBUMIN SERPL-MCNC: 1.9 G/DL (ref 3.2–4.6)
ALBUMIN/GLOB SERPL: 0.6 {RATIO} (ref 1.2–3.5)
ALP SERPL-CCNC: 107 U/L (ref 50–136)
ALT SERPL-CCNC: 22 U/L (ref 12–65)
ANION GAP SERPL CALC-SCNC: 0 MMOL/L (ref 7–16)
AST SERPL-CCNC: 29 U/L (ref 15–37)
BILIRUB SERPL-MCNC: 0.6 MG/DL (ref 0.2–1.1)
BUN SERPL-MCNC: 18 MG/DL (ref 8–23)
CALCIUM SERPL-MCNC: 8.4 MG/DL (ref 8.3–10.4)
CHLORIDE SERPL-SCNC: 105 MMOL/L (ref 98–107)
CO2 SERPL-SCNC: 34 MMOL/L (ref 21–32)
CREAT SERPL-MCNC: 0.58 MG/DL (ref 0.6–1)
ERYTHROCYTE [DISTWIDTH] IN BLOOD BY AUTOMATED COUNT: 17.5 % (ref 11.9–14.6)
GLOBULIN SER CALC-MCNC: 3.2 G/DL (ref 2.3–3.5)
GLUCOSE SERPL-MCNC: 88 MG/DL (ref 65–100)
HCT VFR BLD AUTO: 29.7 % (ref 35.8–46.3)
HGB BLD-MCNC: 9.1 G/DL (ref 11.7–15.4)
MAGNESIUM SERPL-MCNC: 1.9 MG/DL (ref 1.8–2.4)
MCH RBC QN AUTO: 29.1 PG (ref 26.1–32.9)
MCHC RBC AUTO-ENTMCNC: 30.6 G/DL (ref 31.4–35)
MCV RBC AUTO: 94.9 FL (ref 79.6–97.8)
NRBC # BLD: 0 K/UL (ref 0–0.2)
PLATELET # BLD AUTO: 260 K/UL (ref 150–450)
PMV BLD AUTO: 9.3 FL (ref 9.4–12.3)
POTASSIUM SERPL-SCNC: 4.7 MMOL/L (ref 3.5–5.1)
PROT SERPL-MCNC: 5.1 G/DL (ref 6.3–8.2)
RBC # BLD AUTO: 3.13 M/UL (ref 4.05–5.2)
SODIUM SERPL-SCNC: 139 MMOL/L (ref 136–145)
WBC # BLD AUTO: 11.5 K/UL (ref 4.3–11.1)

## 2021-11-02 PROCEDURE — 97530 THERAPEUTIC ACTIVITIES: CPT

## 2021-11-02 PROCEDURE — 85027 COMPLETE CBC AUTOMATED: CPT

## 2021-11-02 PROCEDURE — 65270000029 HC RM PRIVATE

## 2021-11-02 PROCEDURE — 83735 ASSAY OF MAGNESIUM: CPT

## 2021-11-02 PROCEDURE — 80053 COMPREHEN METABOLIC PANEL: CPT

## 2021-11-02 PROCEDURE — 99231 SBSQ HOSP IP/OBS SF/LOW 25: CPT | Performed by: PHYSICAL MEDICINE & REHABILITATION

## 2021-11-02 PROCEDURE — 97535 SELF CARE MNGMENT TRAINING: CPT

## 2021-11-02 PROCEDURE — 74011250637 HC RX REV CODE- 250/637: Performed by: STUDENT IN AN ORGANIZED HEALTH CARE EDUCATION/TRAINING PROGRAM

## 2021-11-02 PROCEDURE — 97165 OT EVAL LOW COMPLEX 30 MIN: CPT

## 2021-11-02 PROCEDURE — 99232 SBSQ HOSP IP/OBS MODERATE 35: CPT | Performed by: INTERNAL MEDICINE

## 2021-11-02 PROCEDURE — 74011250636 HC RX REV CODE- 250/636: Performed by: ORTHOPAEDIC SURGERY

## 2021-11-02 PROCEDURE — 36415 COLL VENOUS BLD VENIPUNCTURE: CPT

## 2021-11-02 PROCEDURE — 74011250637 HC RX REV CODE- 250/637: Performed by: ORTHOPAEDIC SURGERY

## 2021-11-02 RX ADMIN — HYDROMORPHONE HYDROCHLORIDE 1 MG: 1 INJECTION, SOLUTION INTRAMUSCULAR; INTRAVENOUS; SUBCUTANEOUS at 19:24

## 2021-11-02 RX ADMIN — ASPIRIN 81 MG: 81 TABLET ORAL at 21:38

## 2021-11-02 RX ADMIN — Medication 10 ML: at 21:41

## 2021-11-02 RX ADMIN — HYDROCODONE BITARTRATE AND ACETAMINOPHEN 1 TABLET: 10; 325 TABLET ORAL at 21:40

## 2021-11-02 RX ADMIN — POTASSIUM CHLORIDE 10 MEQ: 10 TABLET, EXTENDED RELEASE ORAL at 08:20

## 2021-11-02 RX ADMIN — MIDODRINE HYDROCHLORIDE 10 MG: 5 TABLET ORAL at 06:36

## 2021-11-02 RX ADMIN — GABAPENTIN 300 MG: 300 CAPSULE ORAL at 21:38

## 2021-11-02 RX ADMIN — MIDODRINE HYDROCHLORIDE 10 MG: 5 TABLET ORAL at 21:38

## 2021-11-02 RX ADMIN — Medication 1 AMPULE: at 08:19

## 2021-11-02 RX ADMIN — Medication 10 ML: at 06:36

## 2021-11-02 RX ADMIN — Medication 1 AMPULE: at 21:38

## 2021-11-02 RX ADMIN — GABAPENTIN 300 MG: 300 CAPSULE ORAL at 08:20

## 2021-11-02 RX ADMIN — HYDROCODONE BITARTRATE AND ACETAMINOPHEN 1 TABLET: 10; 325 TABLET ORAL at 08:20

## 2021-11-02 RX ADMIN — PROPAFENONE HYDROCHLORIDE 150 MG: 150 TABLET, FILM COATED ORAL at 08:20

## 2021-11-02 RX ADMIN — APIXABAN 5 MG: 5 TABLET, FILM COATED ORAL at 08:20

## 2021-11-02 RX ADMIN — FAMOTIDINE 20 MG: 20 TABLET, FILM COATED ORAL at 17:54

## 2021-11-02 RX ADMIN — PROPAFENONE HYDROCHLORIDE 150 MG: 150 TABLET, FILM COATED ORAL at 17:54

## 2021-11-02 RX ADMIN — PANTOPRAZOLE SODIUM 40 MG: 40 TABLET, DELAYED RELEASE ORAL at 06:36

## 2021-11-02 RX ADMIN — APIXABAN 5 MG: 5 TABLET, FILM COATED ORAL at 17:54

## 2021-11-02 RX ADMIN — ESCITALOPRAM OXALATE 20 MG: 10 TABLET ORAL at 08:20

## 2021-11-02 RX ADMIN — HYDROCODONE BITARTRATE AND ACETAMINOPHEN 1 TABLET: 10; 325 TABLET ORAL at 17:54

## 2021-11-02 RX ADMIN — Medication 10 ML: at 13:52

## 2021-11-02 RX ADMIN — HYDROCODONE BITARTRATE AND ACETAMINOPHEN 1 TABLET: 10; 325 TABLET ORAL at 12:32

## 2021-11-02 RX ADMIN — LEVOTHYROXINE SODIUM 50 MCG: 0.05 TABLET ORAL at 06:36

## 2021-11-02 NOTE — PROGRESS NOTES
[facility] CARDIOLOGY PROGRESS NOTE           11/2/2021   Admit Date: 10/25/2021      Subjective:     No cp sob. Hemodynamics markedly better. Review of Systems   Constitutional: Negative for chills. Eyes: Negative for blurred vision. Respiratory: Negative for cough. Cardiovascular: Negative for chest pain. Objective:      Vitals:    11/02/21 0032 11/02/21 0508 11/02/21 0720 11/02/21 1050   BP: 98/65 (!) 116/57 (!) 147/83 125/62   Pulse: 75 63 67 73   Resp: 18 18 17 18   Temp: 98 °F (36.7 °C) 97.8 °F (36.6 °C) 97.6 °F (36.4 °C) 98 °F (36.7 °C)   SpO2: 100% 96% 99% 95%   Weight:       Height:             Physical Exam  HENT:      Head: Normocephalic. Nose: No congestion. Mouth/Throat:      Pharynx: No oropharyngeal exudate. Cardiovascular:      Rate and Rhythm: Normal rate. Pulmonary:      Effort: Pulmonary effort is normal.   Abdominal:      General: Bowel sounds are normal.   Musculoskeletal:      Right lower leg: Edema present. Left lower leg: Edema present. Neurological:      Mental Status: She is alert and oriented to person, place, and time.    Psychiatric:         Mood and Affect: Mood normal.         Data Review:   Recent Labs     11/02/21  0559 11/01/21  0622    141   K 4.7 4.5   MG 1.9 1.9   BUN 18 14   CREA 0.58* 0.51*   GLU 88 95   WBC 11.5* 13.1*   HGB 9.1* 8.8*   HCT 29.7* 28.1*    220         Intake/Output Summary (Last 24 hours) at 11/2/2021 1346  Last data filed at 11/2/2021 9892  Gross per 24 hour   Intake 900 ml   Output --   Net 900 ml     Current Facility-Administered Medications   Medication Dose Route Frequency    polyethylene glycol (MIRALAX) packet 17 g  17 g Oral DAILY    acetaminophen (TYLENOL) tablet 650 mg  650 mg Oral Q6H PRN    0.9% sodium chloride infusion 250 mL  250 mL IntraVENous PRN    diphenhydrAMINE (BENADRYL) capsule 25 mg  25 mg Oral Q6H PRN    propafenone (RYTHMOL) tablet 150 mg  150 mg Oral BID    midodrine (PROAMATINE) tablet 10 mg  10 mg Oral Q8H    bisacodyL (DULCOLAX) suppository 10 mg  10 mg Rectal DAILY PRN    famotidine (PEPCID) tablet 20 mg  20 mg Oral QPM    apixaban (ELIQUIS) tablet 5 mg  5 mg Oral BID    gabapentin (NEURONTIN) capsule 300 mg  300 mg Oral Q12H    albuterol (PROVENTIL VENTOLIN) nebulizer solution 2.5 mg  2.5 mg Nebulization Q6H PRN    NOREPINephrine (LEVOPHED) 4 mg in 5% dextrose 250 mL infusion  0.5-30 mcg/min IntraVENous TITRATE    aspirin delayed-release tablet 81 mg  81 mg Oral QHS    escitalopram oxalate (LEXAPRO) tablet 20 mg  20 mg Oral QAM    levothyroxine (SYNTHROID) tablet 50 mcg  50 mcg Oral ACB    LORazepam (ATIVAN) tablet 0.5 mg  0.5 mg Oral Q6H PRN    metoprolol tartrate (LOPRESSOR) tablet 25 mg  25 mg Oral BID    pantoprazole (PROTONIX) tablet 40 mg  40 mg Oral ACB    potassium chloride (KLOR-CON) tablet 10 mEq  10 mEq Oral DAILY    [Held by provider] rOPINIRole (REQUIP) tablet 0.5 mg  0.5 mg Oral QHS    traMADoL (ULTRAM) tablet 50 mg  50 mg Oral Q6H PRN    zolpidem (AMBIEN) tablet 10 mg  10 mg Oral QHS PRN    alcohol 62% (NOZIN) nasal  1 Ampule  1 Ampule Topical Q12H    sodium chloride (NS) flush 5-40 mL  5-40 mL IntraVENous Q8H    sodium chloride (NS) flush 5-40 mL  5-40 mL IntraVENous PRN    naloxone (NARCAN) injection 0.4 mg  0.4 mg IntraVENous PRN    phenol throat spray (CHLORASEPTIC) 1 Spray  1 Spray Oral PRN    diphenhydrAMINE (BENADRYL) capsule 25 mg  25 mg Oral Q4H PRN    docusate sodium (COLACE) capsule 100 mg  100 mg Oral BID    HYDROcodone-acetaminophen (NORCO)  mg tablet 1 Tablet  1 Tablet Oral Q4H PRN    HYDROmorphone (DILAUDID) injection 1 mg  1 mg IntraVENous Q4H PRN    ondansetron (ZOFRAN ODT) tablet 4 mg  4 mg Oral Q4H PRN    [Held by provider] lisinopriL (PRINIVIL, ZESTRIL) tablet 20 mg  20 mg Oral BID    And    [Held by provider] hydroCHLOROthiazide (MICROZIDE) capsule 12.5 mg  12.5 mg Oral BID Assessment/Plan:     73 y.o. female     PE   · Subsegmental pulmonary embolus noted  · Anticoagulation with Eliquis 5 mg twice daily    Hypotension  ·  improving as stated above multifactorial    · Agree with holding hypertensive medications. · Continue midodrine. Suspect this is primarily multifactorial with CNS, pulmonary embolus, morbid obesity and severe pulmonary hypertension with vasodilation. Suspect can begin weaning midodrine over the next week. Elevated RVSP  · Small subsegmental pulmonary embolus unlikely to cause elevation of RVSP  · Chronic thromboembolic disease possible and would be better determined on VQ scan    Paroxysmal atrial fibrillation  · Recent ECGs with sinus rhythm. · On Eliquis 5 mg twice daily. No additional CV work-up needed at this time. Agree with current medical regimen. Suspect will be able to wean midodrine over the next several weeks. Recommend reducing to 5 mg 3 times daily at time of transfer to rehabilitation facility. We will be on standby please call with questions.       Sanaz Ambrocio MD  11/2/2021

## 2021-11-02 NOTE — PROGRESS NOTES
ACUTE OT GOALS:  (Developed with and agreed upon by patient and/or caregiver.)  1. Pt will toilet with SBA    2. Pt will complete functional mobility for ADLs with mod I using AD as needed  3. Pt will complete lower body dressing with mod I using AE as needed  4. Pt will complete grooming and hygiene at sink independently  5. Pt will demonstrate independence with HEP to promote increased BUE strength and functional use for ADLs  6.  Pt will complete UB bathing and dressing independently       Timeframe: 7 days      OCCUPATIONAL THERAPY ASSESSMENT: Initial Assessment and Daily Note OT Treatment Day # 1    Christiane Linn is a 73 y.o. female   PRIMARY DIAGNOSIS: Lumbar stenosis with neurogenic claudication  Lumbar stenosis with neurogenic claudication [M48.062]  Spondylolisthesis of lumbar region [M43.16]  Spinal stenosis [M48.00]  Procedure(s) (LRB):  L2-L4 SPINE LUMBAR DIRECT LATERAL INTERBODY FUSION (DLIF) ANTERIOR PLATING / SSEP (Left)  REDO L2-L5 LAMINECTOMY FUSION AND L4-L5 TLIF / NEUROMONITORING (N/A)  8 Days Post-Op  Reason for Referral:  Decreased ADLs post op lumbar surgery  ICD-10: Treatment Diagnosis: Low Back Pain (M54.5)  [place]PATIENT: [place] / [place] PART A [place] B / Product Type: Medicare /   ASSESSMENT:     REHAB RECOMMENDATIONS:   Recommendation to date pending progress:  [address] [place] [address]  Equipment:    None     PRIOR LEVEL OF FUNCTION:  (Prior to Hospitalization)  INITIAL/CURRENT LEVEL OF FUNCTION:  (Based on today's evaluation)   Bathing:   Independent  Dressing:   Independent  Feeding/Grooming:   Independent  Toileting:   Independent  Functional Mobility:   Modified Independent Bathing:   Minimal Assistance  Dressing:   Minimal Assistance  Feeding/Grooming:  [facility] Standby Assistance  Toileting:   Contact Guard Assistance  Functional Mobility:   Contact Guard Assistance     ASSESSMENT:  Ms. Jade Conley presented with decreased ADLs and mobility for ADLs post op L2-5 surgery. Pt educated on lumbar precautions and on adaptive techniques to maintain during ADLs and mobility for ADLs; pt was able to verbalize and demonstrate independently. Pt requires min A for LB bathing and dressing and may benefit from use of AE for ease of tasks. Pt completed ADLs at sink with SBA. CGA for bed mobility via log roll, mobilized around room and to/ from sink with CGA using RW. Pt is below her functional baseline and would benefit from skilled OT services to address deficits. SUBJECTIVE:   Ms. Arya Mathis states, \"I'm doing okay. \"    SOCIAL HISTORY/LIVING ENVIRONMENT: Lives alone; sister in law at bedside will be staying with pt after d/c. Independent, cane for mobility. WIS w/ shower chair, grab bars around shower and toilet, elevated toilet. Stays on 1L, ramp.    Home Environment: Private residence  One/Two Story Residence: Two story  Living Alone: Yes  Support Systems: Child(rober)    OBJECTIVE:     PAIN: VITAL SIGNS: LINES/DRAINS:   Pre Treatment: Pain Screen  Pain Scale 1: Numeric (0 - 10)  Pain Intensity 1: 3  Pain Location 1: Back  Pain Intervention(s) 1: Repositioned  Post Treatment: 3   O2 Device: None (Room air)     GROSS EVALUATION:  BUE Within Functional Limits Abnormal/ Functional Abnormal/ Non-Functional (see comments) Not Tested Comments:   AROM [x] [] [] []    PROM [] [] [] []    Strength [] [x] [] []    Balance [] [x] [] []    Posture [] [] [] []    Sensation [] [] [] []    Coordination [x] [] [] []    Tone [] [] [] []    Edema [] [] [] []    Activity Tolerance [] [x] [] []     [] [] [] []      COGNITION/  PERCEPTION: Intact Impaired   (see comments) Comments:   Orientation [x] []    Vision [x] []    Hearing [x] []    Judgment/ Insight [x] []    Attention [x] []    Memory [x] []    Command Following [x] []    Emotional Regulation [x] []     [] []      ACTIVITIES OF DAILY LIVING: I Mod I S SBA CGA Min Mod Max Total NT Comments   BASIC ADLs:              Bathing/ Showering [] [] [] [] [] [] [] [] [] []    Toileting [] [] [] [] [] [] [] [] [] []    Dressing [] [] [] [] [] [x] [] [] [] [] LB, SBA UB   Feeding [] [] [] [] [] [] [] [] [] []    Grooming [] [] [] [x] [] [] [] [] [] []    Personal Device Care [] [] [] [] [] [] [] [] [] []    Functional Mobility [] [] [] [] [x] [] [] [] [] [] RW   I=Independent, Mod I=Modified Independent, S=Supervision, SBA=Standby Assistance, CGA=Contact Guard Assistance,   Min=Minimal Assistance, Mod=Moderate Assistance, Max=Maximal Assistance, Total=Total Assistance, NT=Not Tested    MOBILITY: I Mod I S SBA CGA Min Mod Max Total  NT x2 Comments:   Supine to sit [] [] [] [] [x] [] [] [] [] [] []    Sit to supine [] [] [] [] [x] [] [] [] [] [] []    Sit to stand [] [] [] [] [x] [] [] [] [] [] []    Bed to chair [] [] [] [] [x] [] [] [] [] [] []    I=Independent, Mod I=Modified Independent, S=Supervision, SBA=Standby Assistance, CGA=Contact Guard Assistance,   Min=Minimal Assistance, Mod=Moderate Assistance, Max=Maximal Assistance, Total=Total Assistance, NT=Not Tested    [address] [facility] [address] [address] AM-PAC 6 Clicks   Daily Activity Inpatient Short Form        How much help from another person does the patient currently need. .. Total A Lot A Little None   1. Putting on and taking off regular lower body clothing? [] 1   [] 2   [x] 3   [] 4   2. Bathing (including washing, rinsing, drying)? [] 1   [] 2   [x] 3   [] 4   3. Toileting, which includes using toilet, bedpan or urinal?   [] 1   [] 2   [x] 3   [] 4   4. Putting on and taking off regular upper body clothing? [] 1   [] 2   [] 3   [x] 4   5. Taking care of personal grooming such as brushing teeth? [] 1   [] 2   [] 3   [x] 4   6. Eating meals? [] 1   [] 2   [] 3   [x] 4   © 2007, Trustees of [address] [place] [address] [address] [id number], under license to [employer].  All rights reserved     Score:  Initial: 21 Most Recent: X (Date: -- )   Interpretation of Tool:  Represents activities that are increasingly more difficult (i.e. Bed mobility, Transfers, Gait). PLAN:   FREQUENCY/DURATION: OT Plan of Care: 3 times/week for duration of hospital stay or until stated goals are met, whichever comes first.    PROBLEM LIST:   (Skilled intervention is medically necessary to address:)  1. Decreased ADL/Functional Activities  2. Decreased Activity Tolerance  3. Decreased Balance  4. Decreased Strength   INTERVENTIONS PLANNED:   (Benefits and precautions of occupational therapy have been discussed with the patient.)  1. Self Care Training  2. Therapeutic Activity  3. Therapeutic Exercise/HEP  4. Neuromuscular Re-education  5. Education     TREATMENT:     EVALUATION: Low Complexity : (Untimed Charge)    TREATMENT:   ($$ Self Care/Home Management: 8-22 mins    )  Self Care (10 Minutes): Self care including Lower Body Dressing and Grooming to increase independence and decrease level of assistance required.     TREATMENT GRID:  N/A    AFTER TREATMENT POSITION/PRECAUTIONS:  Bed, Needs within reach, RN notified and Visitors at bedside    INTERDISCIPLINARY COLLABORATION:  RN/PCT    TOTAL TREATMENT DURATION:  OT Patient Time In/Time Out  Time In: 1032  Time Out: [address], OT

## 2021-11-02 NOTE — PROGRESS NOTES
ORTHO PROGRESS NOTE    2021    Admit Date: 10/25/2021  Admit Diagnosis: Lumbar stenosis with neurogenic claudication [M48.062]; Spondylolisthesis of lumbar region [M43.16]; Spinal stenosis [M48.00]  Post Op day: 8 Days Post-Op      Subjective:     Leigha Cisneros is a patient who is now 8 Days Post-Op  and has no complaints. Objective:     PT/OT:    Progressing    Vital Signs:    Patient Vitals for the past 8 hrs:   BP Temp Pulse Resp SpO2   21 1050 125/62 98 °F (36.7 °C) 73 18 95 %   21 0720 (!) 147/83 97.6 °F (36.4 °C) 67 17 99 %     Temp (24hrs), Av.9 °F (36.6 °C), Min:97.6 °F (36.4 °C), Max:98.2 °F (36.8 °C)      LAB:    Recent Labs     21  0559   HGB 9.1*   WBC 11.5*          I/O:  No intake/output data recorded. 10/31 1901 -  0700  In: 900 [P.O.:900]  Out: -     Physical Exam:    Awake and in no acute distress. Mood and affect appropriate. Respirations unlabored and no evidence cyanosis. Calves nontender. Abdomen soft and nontender. Dressing clean/dry  No new neurologic deficit.     Assessment:      Patient Active Problem List   Diagnosis Code    Essential hypertension, benign I10    Endometriosis N80.9    Colon polyp K63.5    Squamous cell skin cancer C44.92    Lumbar stenosis with neurogenic claudication M48.062    Postoperative wound infection T81.49XA    Acquired hypothyroidism E03.9    Obesity, morbid ([address] E66.01    Spondylolisthesis of multiple sites in spine M43.19    Spinal stenosis M48.00    Hypovolemic shock ([address] R57.1    Hypotension I95.9    A-fib ([address] I48.91    ÁNGEL (acute kidney injury) ([address] N17.9    Acute respiratory failure with hypoxia ([address] J96.01       8 Days Post-Op STATUS POST Procedure(s):  L2-L4 SPINE LUMBAR DIRECT LATERAL INTERBODY FUSION (DLIF) ANTERIOR PLATING / SSEP  REDO L2-L5 LAMINECTOMY FUSION AND L4-L5 TLIF / NEUROMONITORING      Plan:     Continue PT/OT/Rehab  [facility] tomorrow      Signed By: Vanessa Martino Jazzy Vazquez MD

## 2021-11-02 NOTE — PROGRESS NOTES
Hospitalist Progress Note   Admit Date:  10/25/2021  5:34 AM   Name:  Antonio Urena   Age:  73 y.o. Sex:  female  :  1955   MRN:  [id number]   Room:      Presenting Complaint: No chief complaint on file. Reason(s) for Admission: Lumbar stenosis with neurogenic claudication [M48.062]  Spondylolisthesis of lumbar region [M43.16]  Spinal stenosis [facility] Course & Interval History:   Saulo Crockett a 73 y. o. female with history of Afib s/p ablation, Hypothyroidism, OA, lumbar spinal stenosis, HTN, morbid obesity, JULIANN who was admitted for lumbar laminectomy.  Patient is postop day 1 from L2 L4 lumbar direct lateral interbody fusion with anterior plating and redo of L2 L5 laminectomy by spinal Ortho.  Patient has multiple drains in place postoperatively.   patient received dose of IV Dilaudid as well as multiple blood pressure medications the morning of initial hypotension.  She subsequently suffered from hypotension with BP 70/54.  She also was working with PT/OT and became significantly hypotensive when standing up. Angelo Del Castillo did complain of orthostasis at this time. Angelo Del Castillo was given normal saline IV fluid boluses but remains hypotensive with MAPS as low as 49 during my evaluation. Of note, she also received Midodrine 20mg once without no improvement in MAPs. Patient complains of significant drowsiness, and is hardly able to keep her eyes open with current low BP's.  She denies any shortness of breath, chest pain or pressure, palpitations, nausea, vomiting, fevers or chills, diaphoresis, near-syncope or syncope.  Patient suffered from ÁNGEL with bump in creatinine and WBC after hypotensive episodes. Her ÁNGEL resolved with Eugene catheter placement and IV fluid resuscitation. She continues to suffer from persistent hypotension requiring intermittent Levophed, likely related to postop blood loss and pain medication administration.   She has required 2 units PRBC transfusion for slow drop in hemoglobin and persistent hypotension. Her Rythmol was held due to hypotension occurring solely after Rythmol administration. Her blood pressures have slowly improved and she has been stable off of Levophed. Subjective (11/02/21): Was seen and examined at the bedside. Patient had no overnight events. Pressures continue to remain stable. Sister at bedside. Patient without any complaints this morning. Patient denies any cardiac chest pain/pressure, shortness of breath, diaphoresis, syncope, palpitations.     Assessment & Plan:   Hypotension  -Likely multifactorial due to postoperative blood loss and medications including antihypertensives and pain meds  -Patient started with fluid resuscitation and midodrine 20 mg without improvement  -Postop hemoglobin 7.3 from 11.2 prior to surgery, patient was given 20 units packed red blood cell  -WBC up to 27,000 status post Vanco/cefepime for 1 day and WBC downtrending to 11.4  -Currently on Levaquin and doxycycline p.o. for empiric coverage for next 5 days; continue to monitor for sepsis  -EKG without ST elevation or depression, troponin within normal limits  -Ortho cleared patient to restart Eliquis was on hold due to postop blood loss  -Patient currently off Levophed  -Blood pressures improving, restarted beta-blocker 10/31  -Continue Rythmol twice daily    ÁNGEL  -Creatinine peaked at 1.9 from baseline  -Likely secondary to hypovolemia status post Lasix, HCTZ, lisinopril persistent hypotension  -UA and ultrasound unremarkable  -Urinary retention with greater than 700 cc in bladder, continue Eugene  -Creatinine improved  -IV fluid stopped, patient tolerating oral intake  -Creatinine at baseline    Atrial fibrillation  -Status post failed 2 ablations, managed with Rythmol, metoprolol and Eliquis outpatient  -Restarted beta-blocker 10/31 for better blood pressures  -Rythmol restarted was held due to hypotensive episodes  -Eliquis restarted in setting of small single segmental PE in the right upper lobe  -Continue to monitor electrolytes potassium and magnesium  -Continue on telemetry  -Cardiology consulted  -Patient in normal sinus rhythm    Pulmonary embolism  -CTA showed single segmental pulmonary embolism in the right upper lobe  -Eliquis restarted 10/31  -Dose of Eliquis 5 mg twice daily we will continue at this dose due to postop bleeding prior  -Cardiology consulted  -Ultrasound 10/26 with no evidence of DVT    Lumbar stenosis with neurogenic claudication  -Status post laminectomy and drain placement per Ortho  -Management per Ortho spine  -Opioids were held in setting of hypotension  -Pain currently controlled   -11/1 patient continues to work with physical therapy, able to move all 4 extremities but weakness with left hip flexion; progress has been slow recommend SNF before going home  -11/2 patient accepted to [facility] will be discharged 11/3    Acute hypoxemic respiratory failure  -Chest x-ray showed mid bibasilar atelectasis, no obvious edema or effusion  -Encourage ICS use  -Patient likely has some JULIANN component with additional desaturation during the night  -Patient will need outpatient sleep study    Constipation  -Status post Dulcolax suppository      Dispo/Discharge Planning:    Dispo pending clinical course. Patient to be evaluated by PT/OT. PPD placed.   Patient will be discharged to [facility] 11/3    Diet:  ADULT DIET Regular  DVT PPx: Eliquis  Code status: No Order    Hospital Problems as of 11/2/2021 Date Reviewed: 9/22/2021        Codes Class Noted - Resolved POA    A-fib [facility]) ICD-10-CM: I48.91  ICD-9-CM: 427.31  10/30/2021 - Present Unknown        ÁNGEL (acute kidney injury) ([address] ICD-10-CM: N17.9  ICD-9-CM: 584.9  10/30/2021 - Present Unknown        Acute respiratory failure with hypoxia ([facility] ICD-10-CM: J96.01  ICD-9-CM: 518.81  10/30/2021 - Present Unknown        Hypovolemic shock ([facility] ICD-10-CM: R57.1  ICD-9-CM: 785.59  10/26/2021 - Present Unknown Hypotension ICD-10-CM: I95.9  ICD-9-CM: 458.9  10/26/2021 - Present Unknown        Spondylolisthesis of multiple sites in spine ICD-10-CM: M43.19  ICD-9-CM: 738.4  10/25/2021 - Present Yes        Spinal stenosis ICD-10-CM: M48.00  ICD-9-CM: 724.00  10/25/2021 - Present Unknown        * (Principal) Lumbar stenosis with neurogenic claudication ICD-10-CM: M48.062  ICD-9-CM: 724.03  3/28/2016 - Present Yes              Objective:     Patient Vitals for the past 24 hrs:   Temp Pulse Resp BP SpO2   11/02/21 1050 98 °F (36.7 °C) 73 18 125/62 95 %   11/02/21 0720 97.6 °F (36.4 °C) 67 17 (!) 147/83 99 %   11/02/21 0508 97.8 °F (36.6 °C) 63 18 (!) 116/57 96 %   11/02/21 0032 98 °F (36.7 °C) 75 18 98/65 100 %   11/01/21 2041 98.2 °F (36.8 °C) 75 18 95/63 96 %   11/01/21 1650 97.8 °F (36.6 °C) 90 18 132/77 100 %     Oxygen Therapy  O2 Sat (%): 95 % (11/02/21 1050)  Pulse via Oximetry: 98 beats per minute (10/30/21 1333)  O2 Device: None (Room air) (10/30/21 2000)  Skin Assessment: Clean, dry, & intact (10/30/21 0870)  Skin Protection for O2 Device: No (10/30/21 0718)  O2 Flow Rate (L/min): 2 l/min (10/30/21 0804)    Estimated body mass index is 54.57 kg/m² as calculated from the following:    Height as of this encounter: 5' 4\" (1.626 m). Weight as of this encounter: 144.2 kg (317 lb 14.5 oz). Intake/Output Summary (Last 24 hours) at 11/2/2021 1500  Last data filed at 11/2/2021 0508  Gross per 24 hour   Intake 900 ml   Output --   Net 900 ml         Physical Exam:   Blood pressure 125/62, pulse 73, temperature 98 °F (36.7 °C), resp. rate 18, height 5' 4\" (1.626 m), weight 144.2 kg (317 lb 14.5 oz), SpO2 95 %. General:    Well nourished. No overt distress  Head:  Normocephalic, atraumatic  Eyes:  Sclerae appear normal.  Pupils equally round. ENT:  Nares appear normal, no drainage. Moist oral mucosa  Neck:  No restricted ROM. Trachea midline   CV:   RRR. No m/r/g. No jugular venous distension. Lungs:   CTAB.   No wheezing, rhonchi, or rales. Respirations even, unlabored  Abdomen: Bowel sounds present. Soft, nontender, nondistended. Extremities: No cyanosis or clubbing. No edema. Mild bilateral edema present  Skin:     No rashes and normal coloration. Warm and dry. Neuro:  CN II-XII grossly intact. Sensation intact. A&Ox3  Psych:  Normal mood and affect. I have reviewed ordered lab tests and independently visualized imaging below:    Recent Labs:  Recent Results (from the past 48 hour(s))   CBC W/O DIFF    Collection Time: 11/01/21  6:22 AM   Result Value Ref Range    WBC 13.1 (H) 4.3 - 11.1 K/uL    RBC 2.93 (L) 4.05 - 5.2 M/uL    HGB 8.8 (L) 11.7 - 15.4 g/dL    HCT 28.1 (L) 35.8 - 46.3 %    MCV 95.9 79.6 - 97.8 FL    MCH 30.0 26.1 - 32.9 PG    MCHC 31.3 (L) 31.4 - 35.0 g/dL    RDW 17.7 (H) 11.9 - 14.6 %    PLATELET 082 254 - 655 K/uL    MPV 9.4 9.4 - 12.3 FL    ABSOLUTE NRBC 0.00 0.0 - 0.2 K/uL   METABOLIC PANEL, COMPREHENSIVE    Collection Time: 11/01/21  6:22 AM   Result Value Ref Range    Sodium 141 136 - 145 mmol/L    Potassium 4.5 3.5 - 5.1 mmol/L    Chloride 106 98 - 107 mmol/L    CO2 33 (H) 21 - 32 mmol/L    Anion gap 2 (L) 7 - 16 mmol/L    Glucose 95 65 - 100 mg/dL    BUN 14 8 - 23 MG/DL    Creatinine 0.51 (L) 0.6 - 1.0 MG/DL    GFR est AA >60 >60 ml/min/1.73m2    GFR est non-AA >60 >60 ml/min/1.73m2    Calcium 8.5 8.3 - 10.4 MG/DL    Bilirubin, total 0.6 0.2 - 1.1 MG/DL    ALT (SGPT) 26 12 - 65 U/L    AST (SGOT) 54 (H) 15 - 37 U/L    Alk.  phosphatase 118 50 - 136 U/L    Protein, total 4.9 (L) 6.3 - 8.2 g/dL    Albumin 1.8 (L) 3.2 - 4.6 g/dL    Globulin 3.1 2.3 - 3.5 g/dL    A-G Ratio 0.6 (L) 1.2 - 3.5     MAGNESIUM    Collection Time: 11/01/21  6:22 AM   Result Value Ref Range    Magnesium 1.9 1.8 - 2.4 mg/dL   SARS-COV-2    Collection Time: 11/01/21  2:38 PM   Result Value Ref Range    SARS-CoV-2 Please find results under separate order     SARS-COV-2, PCR    Collection Time: 11/01/21  2:38 PM Specimen: Nasopharyngeal   Result Value Ref Range    Specimen source Nasopharyngeal      SARS-CoV-2 Not detected NOTD     CBC W/O DIFF    Collection Time: 11/02/21  5:59 AM   Result Value Ref Range    WBC 11.5 (H) 4.3 - 11.1 K/uL    RBC 3.13 (L) 4.05 - 5.2 M/uL    HGB 9.1 (L) 11.7 - 15.4 g/dL    HCT 29.7 (L) 35.8 - 46.3 %    MCV 94.9 79.6 - 97.8 FL    MCH 29.1 26.1 - 32.9 PG    MCHC 30.6 (L) 31.4 - 35.0 g/dL    RDW 17.5 (H) 11.9 - 14.6 %    PLATELET 446 159 - 693 K/uL    MPV 9.3 (L) 9.4 - 12.3 FL    ABSOLUTE NRBC 0.00 0.0 - 0.2 K/uL   METABOLIC PANEL, COMPREHENSIVE    Collection Time: 11/02/21  5:59 AM   Result Value Ref Range    Sodium 139 136 - 145 mmol/L    Potassium 4.7 3.5 - 5.1 mmol/L    Chloride 105 98 - 107 mmol/L    CO2 34 (H) 21 - 32 mmol/L    Anion gap 0 (L) 7 - 16 mmol/L    Glucose 88 65 - 100 mg/dL    BUN 18 8 - 23 MG/DL    Creatinine 0.58 (L) 0.6 - 1.0 MG/DL    GFR est AA >60 >60 ml/min/1.73m2    GFR est non-AA >60 >60 ml/min/1.73m2    Calcium 8.4 8.3 - 10.4 MG/DL    Bilirubin, total 0.6 0.2 - 1.1 MG/DL    ALT (SGPT) 22 12 - 65 U/L    AST (SGOT) 29 15 - 37 U/L    Alk.  phosphatase 107 50 - 136 U/L    Protein, total 5.1 (L) 6.3 - 8.2 g/dL    Albumin 1.9 (L) 3.2 - 4.6 g/dL    Globulin 3.2 2.3 - 3.5 g/dL    A-G Ratio 0.6 (L) 1.2 - 3.5     MAGNESIUM    Collection Time: 11/02/21  5:59 AM   Result Value Ref Range    Magnesium 1.9 1.8 - 2.4 mg/dL       All Micro Results     Procedure Component Value Units Date/Time    CULTURE, BLOOD [[id number]] Collected: 10/30/21 1319    Order Status: Completed Specimen: Blood Updated: 11/02/21 0749     Special Requests: --        LEFT  Antecubital       Culture result: NO GROWTH 3 DAYS       SARS-COV-2, PCR [[id number]] Collected: 11/01/21 1438    Order Status: Completed Specimen: Nasopharyngeal Updated: 11/01/21 2227     Specimen source Nasopharyngeal        SARS-CoV-2 Not detected        Comment:      The specimen is NEGATIVE for SARS-CoV-2, the novel coronavirus associated with COVID-19. This test has been authorized by the FDA under an Emergency Use Authorization (EUA) for use by authorized laboratories. Fact sheet for Healthcare Providers: ConventionUpdate.co.nz       Fact sheet for Patients: ConventionUpdate.co.nz       Methodology: RT-PCR         MSSA/MRSA SC BY PCR, NASAL SWAB [[id number]] Collected: 10/25/21 0600    Order Status: Canceled Specimen: Swab           Other Studies:  No results found.     Current Meds:  Current Facility-Administered Medications   Medication Dose Route Frequency    polyethylene glycol (MIRALAX) packet 17 g  17 g Oral DAILY    acetaminophen (TYLENOL) tablet 650 mg  650 mg Oral Q6H PRN    0.9% sodium chloride infusion 250 mL  250 mL IntraVENous PRN    diphenhydrAMINE (BENADRYL) capsule 25 mg  25 mg Oral Q6H PRN    propafenone (RYTHMOL) tablet 150 mg  150 mg Oral BID    midodrine (PROAMATINE) tablet 10 mg  10 mg Oral Q8H    bisacodyL (DULCOLAX) suppository 10 mg  10 mg Rectal DAILY PRN    famotidine (PEPCID) tablet 20 mg  20 mg Oral QPM    apixaban (ELIQUIS) tablet 5 mg  5 mg Oral BID    gabapentin (NEURONTIN) capsule 300 mg  300 mg Oral Q12H    albuterol (PROVENTIL VENTOLIN) nebulizer solution 2.5 mg  2.5 mg Nebulization Q6H PRN    NOREPINephrine (LEVOPHED) 4 mg in 5% dextrose 250 mL infusion  0.5-30 mcg/min IntraVENous TITRATE    aspirin delayed-release tablet 81 mg  81 mg Oral QHS    escitalopram oxalate (LEXAPRO) tablet 20 mg  20 mg Oral QAM    levothyroxine (SYNTHROID) tablet 50 mcg  50 mcg Oral ACB    LORazepam (ATIVAN) tablet 0.5 mg  0.5 mg Oral Q6H PRN    metoprolol tartrate (LOPRESSOR) tablet 25 mg  25 mg Oral BID    pantoprazole (PROTONIX) tablet 40 mg  40 mg Oral ACB    potassium chloride (KLOR-CON) tablet 10 mEq  10 mEq Oral DAILY    [Held by provider] rOPINIRole (REQUIP) tablet 0.5 mg  0.5 mg Oral QHS    traMADoL (ULTRAM) tablet 50 mg  50 mg Oral Q6H PRN    zolpidem (AMBIEN) tablet 10 mg  10 mg Oral QHS PRN    alcohol 62% (NOZIN) nasal  1 Ampule  1 Ampule Topical Q12H    sodium chloride (NS) flush 5-40 mL  5-40 mL IntraVENous Q8H    sodium chloride (NS) flush 5-40 mL  5-40 mL IntraVENous PRN    naloxone (NARCAN) injection 0.4 mg  0.4 mg IntraVENous PRN    phenol throat spray (CHLORASEPTIC) 1 Spray  1 Spray Oral PRN    diphenhydrAMINE (BENADRYL) capsule 25 mg  25 mg Oral Q4H PRN    docusate sodium (COLACE) capsule 100 mg  100 mg Oral BID    HYDROcodone-acetaminophen (NORCO)  mg tablet 1 Tablet  1 Tablet Oral Q4H PRN    HYDROmorphone (DILAUDID) injection 1 mg  1 mg IntraVENous Q4H PRN    ondansetron (ZOFRAN ODT) tablet 4 mg  4 mg Oral Q4H PRN    [Held by provider] lisinopriL (PRINIVIL, ZESTRIL) tablet 20 mg  20 mg Oral BID    And    [Held by provider] hydroCHLOROthiazide (MICROZIDE) capsule 12.5 mg  12.5 mg Oral BID       Signed:  Yolanda Jasmine MD    Part of this note may have been written by using a voice dictation software. The note has been proof read but may still contain some grammatical/other typographical errors.

## 2021-11-02 NOTE — PROGRESS NOTES
Jesus Austin MD  Medical Director  [address] [place] [address], [address] [facility]  Tel: [phone]       Physical Medicine & Rehab Consult Progress Note      Patient: Diana Roe  Admit Date: 10/25/2021  Admit Diagnosis: Lumbar stenosis with neurogenic claudication [M48.062]; Spondylolisthesis of lumbar region [M43.16]; Spinal stenosis [M48.00]    Recommendations:   Hospital Inpatient Rehab, Continue acute rehabilitation program, Coordination of rehab / medical care, Counseling of PM&R care issues management   -plan for [facility] admission tomorrow. Need OT eval documented. Ordered consult yesterday. Therapists are on a tight schedule  -Pain controlled. -off levophed. On Proamatine and BP stable  History / Subjective / Complaint:     Patient seen and examined, interim EMR reviewed. C/o left groin pain.  . Back pain controlled  Allergies   Allergen Reactions    Blue Dye Anaphylaxis     BLUE INDIGO DYE     Pcn [Penicillins] Hives    Adhesive Other (comments)     Skin irritation     Bee Venom Protein (Honey Bee) Swelling    Cefdinir Other (comments)     Tremor, can tolerate Ceftin      Current Facility-Administered Medications   Medication Dose Route Frequency    polyethylene glycol (MIRALAX) packet 17 g  17 g Oral DAILY    acetaminophen (TYLENOL) tablet 650 mg  650 mg Oral Q6H PRN    0.9% sodium chloride infusion 250 mL  250 mL IntraVENous PRN    diphenhydrAMINE (BENADRYL) capsule 25 mg  25 mg Oral Q6H PRN    propafenone (RYTHMOL) tablet 150 mg  150 mg Oral BID    midodrine (PROAMATINE) tablet 10 mg  10 mg Oral Q8H    bisacodyL (DULCOLAX) suppository 10 mg  10 mg Rectal DAILY PRN    famotidine (PEPCID) tablet 20 mg  20 mg Oral QPM    apixaban (ELIQUIS) tablet 5 mg  5 mg Oral BID    gabapentin (NEURONTIN) capsule 300 mg  300 mg Oral Q12H    albuterol (PROVENTIL VENTOLIN) nebulizer solution 2.5 mg  2.5 mg Nebulization Q6H PRN    NOREPINephrine (LEVOPHED) 4 mg in 5% dextrose 250 mL infusion  0.5-30 mcg/min IntraVENous TITRATE    aspirin delayed-release tablet 81 mg  81 mg Oral QHS    escitalopram oxalate (LEXAPRO) tablet 20 mg  20 mg Oral QAM    levothyroxine (SYNTHROID) tablet 50 mcg  50 mcg Oral ACB    LORazepam (ATIVAN) tablet 0.5 mg  0.5 mg Oral Q6H PRN    metoprolol tartrate (LOPRESSOR) tablet 25 mg  25 mg Oral BID    pantoprazole (PROTONIX) tablet 40 mg  40 mg Oral ACB    potassium chloride (KLOR-CON) tablet 10 mEq  10 mEq Oral DAILY    [Held by provider] rOPINIRole (REQUIP) tablet 0.5 mg  0.5 mg Oral QHS    traMADoL (ULTRAM) tablet 50 mg  50 mg Oral Q6H PRN    zolpidem (AMBIEN) tablet 10 mg  10 mg Oral QHS PRN    alcohol 62% (NOZIN) nasal  1 Ampule  1 Ampule Topical Q12H    sodium chloride (NS) flush 5-40 mL  5-40 mL IntraVENous Q8H    sodium chloride (NS) flush 5-40 mL  5-40 mL IntraVENous PRN    naloxone (NARCAN) injection 0.4 mg  0.4 mg IntraVENous PRN    phenol throat spray (CHLORASEPTIC) 1 Spray  1 Spray Oral PRN    diphenhydrAMINE (BENADRYL) capsule 25 mg  25 mg Oral Q4H PRN    docusate sodium (COLACE) capsule 100 mg  100 mg Oral BID    HYDROcodone-acetaminophen (NORCO)  mg tablet 1 Tablet  1 Tablet Oral Q4H PRN    HYDROmorphone (DILAUDID) injection 1 mg  1 mg IntraVENous Q4H PRN    ondansetron (ZOFRAN ODT) tablet 4 mg  4 mg Oral Q4H PRN    [Held by provider] lisinopriL (PRINIVIL, ZESTRIL) tablet 20 mg  20 mg Oral BID    And    [Held by provider] hydroCHLOROthiazide (MICROZIDE) capsule 12.5 mg  12.5 mg Oral BID       Objective:     Vitals:  Patient Vitals for the past 8 hrs:   BP Temp Pulse Resp SpO2   11/02/21 1050 125/62 98 °F (36.7 °C) 73 18 95 %   11/02/21 0720 (!) 147/83 97.6 °F (36.4 °C) 67 17 99 %   11/02/21 0508 (!) 116/57 97.8 °F (36.6 °C) 63 18 96 %      Intake and Output:  10/31 1901 - 11/02 0700  In: 900 [P.O.:900]  Out: -     Physical Exam:  Obese, pleasant and conversant female in NAD  CV rrr no m  LUNGS cta b  ABD obese ntnd bs +  EXT 1+ edema  NEURO UES 5/5. Hip flexion 2+, KE 4+ , DF 5  Left lower ext sltly weaker than right    Incision(s)/Wound(s): Wound Back Mid (Active)   Number of days: 1993       Incision 10/25/21 Back (Active)   Dressing Status Clean;Dry; Intact 11/02/21 0944   Dressing/Treatment ABD pad;Gauze dressing/dressing sponge 10/31/21 0300   Drainage Amount Scant 10/31/21 0300   Wound Odor None 10/31/21 0300   Number of days: 8       Incision 10/25/21 Flank Left (Active)   Dressing Status Clean;Dry; Intact 11/02/21 0944   Dressing/Treatment Open to air 11/02/21 0944   Drainage Amount None 10/31/21 0300   Wound Odor None 10/31/21 0300   Number of days: 8          Pain 1  Pain Scale 1: Numeric (0 - 10) (11/02/21 0944)  Pain Intensity 1: 3 (11/02/21 0944)  Patient Stated Pain Goal: 3 (11/02/21 0944)  Pain Reassessment 1: Patient resting w/respiratory rate greater than 10 (11/02/21 0712)  Pain Onset 1: acute (11/01/21 2145)  Pain Location 1: Back (11/01/21 2145)  Pain Orientation 1: Lower (11/01/21 2145)  Pain Description 1: Aching (11/02/21 0712)  Pain Intervention(s) 1: Medication (see MAR) (11/02/21 5494)         Labs/Studies:  Recent Results (from the past 72 hour(s))   CULTURE, BLOOD    Collection Time: 10/30/21  1:19 PM    Specimen: Blood   Result Value Ref Range    Special Requests: LEFT  Antecubital        Culture result: NO GROWTH 3 DAYS     LACTIC ACID    Collection Time: 10/30/21  1:19 PM   Result Value Ref Range    Lactic acid 2.2 (H) 0.4 - 2.0 MMOL/L   TYPE & SCREEN    Collection Time: 10/30/21  1:20 PM   Result Value Ref Range    Crossmatch Expiration 11/02/2021,2359     ABO/Rh(D) A NEGATIVE     Antibody screen NEG     Unit number [id number]     Blood component type RC LR     Unit division 00     Status of unit TRANSFUSED     Crossmatch result Compatible    EKG, 12 LEAD, INITIAL    Collection Time: 10/30/21  3:34 PM   Result Value Ref Range    Ventricular Rate 86 BPM    Atrial Rate 86 BPM    P-R Interval 148 ms    QRS Duration 76 ms    Q-T Interval 370 ms    QTC Calculation (Bezet) 442 ms    Calculated P Axis 75 degrees    Calculated R Axis 68 degrees    Calculated T Axis -22 degrees    Diagnosis       Sinus rhythm with Premature supraventricular complexes  Nonspecific T wave abnormality  Abnormal ECG  When compared with ECG of 26-OCT-2021 16:19,  Premature supraventricular complexes are now Present  Confirmed by Ivonne Buerger ([id number]) on 10/30/2021 6:35:16 PM     CBC WITH AUTOMATED DIFF    Collection Time: 10/31/21  5:58 AM   Result Value Ref Range    WBC 12.3 (H) 4.3 - 11.1 K/uL    RBC 2.92 (L) 4.05 - 5.2 M/uL    HGB 8.6 (L) 11.7 - 15.4 g/dL    HCT 27.1 (L) 35.8 - 46.3 %    MCV 92.8 79.6 - 97.8 FL    MCH 29.5 26.1 - 32.9 PG    MCHC 31.7 31.4 - 35.0 g/dL    RDW 17.9 (H) 11.9 - 14.6 %    PLATELET 078 053 - 611 K/uL    MPV 9.5 9.4 - 12.3 FL    ABSOLUTE NRBC 0.00 0.0 - 0.2 K/uL    DF AUTOMATED      NEUTROPHILS 72 43 - 78 %    LYMPHOCYTES 15 13 - 44 %    MONOCYTES 10 4.0 - 12.0 %    EOSINOPHILS 2 0.5 - 7.8 %    BASOPHILS 0 0.0 - 2.0 %    IMMATURE GRANULOCYTES 1 0.0 - 5.0 %    ABS. NEUTROPHILS 8.8 (H) 1.7 - 8.2 K/UL    ABS. LYMPHOCYTES 1.9 0.5 - 4.6 K/UL    ABS. MONOCYTES 1.2 0.1 - 1.3 K/UL    ABS. EOSINOPHILS 0.3 0.0 - 0.8 K/UL    ABS. BASOPHILS 0.1 0.0 - 0.2 K/UL    ABS. IMM.  GRANS. 0.1 0.0 - 0.5 K/UL   METABOLIC PANEL, BASIC    Collection Time: 10/31/21  5:58 AM   Result Value Ref Range    Sodium 141 136 - 145 mmol/L    Potassium 4.0 3.5 - 5.1 mmol/L    Chloride 109 (H) 98 - 107 mmol/L    CO2 30 21 - 32 mmol/L    Anion gap 2 (L) 7 - 16 mmol/L    Glucose 96 65 - 100 mg/dL    BUN 9 8 - 23 MG/DL    Creatinine 0.42 (L) 0.6 - 1.0 MG/DL    GFR est AA >60 >60 ml/min/1.73m2    GFR est non-AA >60 >60 ml/min/1.73m2    Calcium 8.1 (L) 8.3 - 10.4 MG/DL   MAGNESIUM    Collection Time: 10/31/21  5:58 AM   Result Value Ref Range    Magnesium 1.9 1.8 - 2.4 mg/dL   CBC W/O DIFF    Collection Time: 11/01/21  6:22 AM   Result Value Ref Range    WBC 13.1 (H) 4.3 - 11.1 K/uL    RBC 2.93 (L) 4.05 - 5.2 M/uL    HGB 8.8 (L) 11.7 - 15.4 g/dL    HCT 28.1 (L) 35.8 - 46.3 %    MCV 95.9 79.6 - 97.8 FL    MCH 30.0 26.1 - 32.9 PG    MCHC 31.3 (L) 31.4 - 35.0 g/dL    RDW 17.7 (H) 11.9 - 14.6 %    PLATELET 078 314 - 795 K/uL    MPV 9.4 9.4 - 12.3 FL    ABSOLUTE NRBC 0.00 0.0 - 0.2 K/uL   METABOLIC PANEL, COMPREHENSIVE    Collection Time: 11/01/21  6:22 AM   Result Value Ref Range    Sodium 141 136 - 145 mmol/L    Potassium 4.5 3.5 - 5.1 mmol/L    Chloride 106 98 - 107 mmol/L    CO2 33 (H) 21 - 32 mmol/L    Anion gap 2 (L) 7 - 16 mmol/L    Glucose 95 65 - 100 mg/dL    BUN 14 8 - 23 MG/DL    Creatinine 0.51 (L) 0.6 - 1.0 MG/DL    GFR est AA >60 >60 ml/min/1.73m2    GFR est non-AA >60 >60 ml/min/1.73m2    Calcium 8.5 8.3 - 10.4 MG/DL    Bilirubin, total 0.6 0.2 - 1.1 MG/DL    ALT (SGPT) 26 12 - 65 U/L    AST (SGOT) 54 (H) 15 - 37 U/L    Alk.  phosphatase 118 50 - 136 U/L    Protein, total 4.9 (L) 6.3 - 8.2 g/dL    Albumin 1.8 (L) 3.2 - 4.6 g/dL    Globulin 3.1 2.3 - 3.5 g/dL    A-G Ratio 0.6 (L) 1.2 - 3.5     MAGNESIUM    Collection Time: 11/01/21  6:22 AM   Result Value Ref Range    Magnesium 1.9 1.8 - 2.4 mg/dL   SARS-COV-2    Collection Time: 11/01/21  2:38 PM   Result Value Ref Range    SARS-CoV-2 Please find results under separate order     SARS-COV-2, PCR    Collection Time: 11/01/21  2:38 PM    Specimen: Nasopharyngeal   Result Value Ref Range    Specimen source Nasopharyngeal      SARS-CoV-2 Not detected NOTD     CBC W/O DIFF    Collection Time: 11/02/21  5:59 AM   Result Value Ref Range    WBC 11.5 (H) 4.3 - 11.1 K/uL    RBC 3.13 (L) 4.05 - 5.2 M/uL    HGB 9.1 (L) 11.7 - 15.4 g/dL    HCT 29.7 (L) 35.8 - 46.3 %    MCV 94.9 79.6 - 97.8 FL    MCH 29.1 26.1 - 32.9 PG    MCHC 30.6 (L) 31.4 - 35.0 g/dL    RDW 17.5 (H) 11.9 - 14.6 %    PLATELET 651 217 - 969 K/uL    MPV 9.3 (L) 9.4 - 12.3 FL    ABSOLUTE NRBC 0.00 0.0 - 0.2 K/uL   METABOLIC PANEL, COMPREHENSIVE    Collection Time: 11/02/21  5:59 AM   Result Value Ref Range    Sodium 139 136 - 145 mmol/L    Potassium 4.7 3.5 - 5.1 mmol/L    Chloride 105 98 - 107 mmol/L    CO2 34 (H) 21 - 32 mmol/L    Anion gap 0 (L) 7 - 16 mmol/L    Glucose 88 65 - 100 mg/dL    BUN 18 8 - 23 MG/DL    Creatinine 0.58 (L) 0.6 - 1.0 MG/DL    GFR est AA >60 >60 ml/min/1.73m2    GFR est non-AA >60 >60 ml/min/1.73m2    Calcium 8.4 8.3 - 10.4 MG/DL    Bilirubin, total 0.6 0.2 - 1.1 MG/DL    ALT (SGPT) 22 12 - 65 U/L    AST (SGOT) 29 15 - 37 U/L    Alk.  phosphatase 107 50 - 136 U/L    Protein, total 5.1 (L) 6.3 - 8.2 g/dL    Albumin 1.9 (L) 3.2 - 4.6 g/dL    Globulin 3.2 2.3 - 3.5 g/dL    A-G Ratio 0.6 (L) 1.2 - 3.5     MAGNESIUM    Collection Time: 11/02/21  5:59 AM   Result Value Ref Range    Magnesium 1.9 1.8 - 2.4 mg/dL        Assessment:     Principal Problem:    Lumbar stenosis with neurogenic claudication (3/28/2016)    Active Problems:    Spondylolisthesis of multiple sites in spine (10/25/2021)      Spinal stenosis (10/25/2021)      Hypovolemic shock ([facility] (10/26/2021)      Hypotension (10/26/2021)      A-fib ([address] (10/30/2021)      ÁNGEL (acute kidney injury) ([facility] [address] [facility] (10/30/2021)      Acute respiratory failure with hypoxia ([facility] [address] [facility] (10/30/2021)        Plan / Recommendations / Medical Decision Making:     Recommendations:   Continue acute rehabilitation program  Coordination of rehab / medical care  Counseling of PM&R care issues management  Monitoring and management of medical conditions per plan of care / orders    Discussion with Family / Caregiver / Staff    Reviewed Therapies / [place] [facility] / Medications / Records

## 2021-11-02 NOTE — PROGRESS NOTES
ACUTE PHYSICAL THERAPY GOALS:  (Developed with and agreed upon by patient and/or caregiver. )  LTG: (Reviewed and updated 10/29/21)  (1.)Ms. Manish Chakraborty will move from supine to sit and sit to supine, scoot up and down and roll side to side in bed INDEPENDENTLY via log roll within 7 treatment day(s). (2.)Ms. Manish Chakraborty will transfer from bed to chair and chair to bed with SUPERVISION using the least restrictive device within 7 treatment day(s). (3.)Ms. Manish Chakraborty will ambulate with STAND BY ASSIST for 150+ feet with the least restrictive device within 7 treatment day(s). (4.)Ms. Manish Chakraborty will perform LE exercises per HEP independently to improve strength and mobility within 7 days. (5.)Ms. Manish Chakraborty will independently verbalize 3/3 post op spinal precautions and maintain compliance within 7 days. PHYSICAL THERAPY: Daily Note and PM Treatment Day # 5   **Corset brace w/ambulation    Rand Quiles is a 73 y.o. female   PRIMARY DIAGNOSIS: Lumbar stenosis with neurogenic claudication  Lumbar stenosis with neurogenic claudication [M48.062]  Spondylolisthesis of lumbar region [M43.16]  Spinal stenosis [M48.00]  Procedure(s) (LRB):  L2-L4 SPINE LUMBAR DIRECT LATERAL INTERBODY FUSION (DLIF) ANTERIOR PLATING / SSEP (Left)  REDO L2-L5 LAMINECTOMY FUSION AND L4-L5 TLIF / NEUROMONITORING (N/A)  8 Days Post-Op    ASSESSMENT:     REHAB RECOMMENDATIONS: CURRENT LEVEL OF FUNCTION:  (Most Recently Demonstrated)   Recommendation to date pending progress:  [address] [place] [address]  Equipment:    Rolling Walker Bed Mobility:  Minimal Assistance   Sit to Stand:   Contact Guard Assistance  Transfers:   Minimal Assistance  Gait/Mobility:   Minimal Assistance     ASSESSMENT:  Ms. Manish Chakraborty presented with greatly increased pain this PM.  She required min A for all mobiltiy due to pain. Decreased gait distances and requiring additional time and cueing. Back to bed post treatment.   Patient was pre-medicated and very concerned about increased pain.         SUBJECTIVE:   Ms. Rickey Miles states, \"I don't understand why it's like this now\"    SOCIAL HISTORY/ LIVING ENVIRONMENT: see eval  Home Environment: Private residence  One/Two Story Residence: Two story  Living Alone: Yes  Support Systems: Child(rober)  OBJECTIVE:     PAIN: VITAL SIGNS: LINES/DRAINS:   Pre Treatment: Pain Screen  Pain Scale 1: Numeric (0 - 10)  Pain Intensity 1: 8  Post Treatment: 8      O2 Device: None (Room air)     MOBILITY: I Mod I S SBA CGA Min Mod Max Total  NT x2 Comments:   Bed Mobility    Rolling [] [] [] [] [x] [x] [] [] [] [] []    Supine to Sit [] [] [] [] [] [x] [] [] [] [] []    Scooting [] [] [] [] [x] [] [] [] [] [] []    Sit to Supine [] [] [] [] [] [x] [] [] [] [] []    Transfers    Sit to Stand [] [] [] [] [x] [x] [] [] [] [] []    Bed to Chair [] [] [] [] [x] [x] [] [] [] [] []    Stand to Sit [] [] [] [] [x] [x] [] [] [] [] []    I=Independent, Mod I=Modified Independent, S=Supervision, SBA=Standby Assistance, CGA=Contact Guard Assistance,   Min=Minimal Assistance, Mod=Moderate Assistance, Max=Maximal Assistance, Total=Total Assistance, NT=Not Tested    BALANCE: Good Fair+ Fair Fair- Poor NT Comments   Sitting Static [x] [] [] [] [] []    Sitting Dynamic [x] [] [] [] [] []              Standing Static [x] [x] [] [] [] []    Standing Dynamic [] [x] [] [] [] []      GAIT: I Mod I S SBA CGA Min Mod Max Total  NT x2 Comments:   Level of Assistance [] [] [] [] [x] [] [] [] [] [] []    Distance 15'    DME Rolling Walker    Gait Quality Decreased pace    Weightbearing  Status N/A     I=Independent, Mod I=Modified Independent, S=Supervision, SBA=Standby Assistance, CGA=Contact Guard Assistance,   Min=Minimal Assistance, Mod=Moderate Assistance, Max=Maximal Assistance, Total=Total Assistance, NT=Not Tested    PLAN:   FREQUENCY/DURATION: PT Plan of Care: BID for duration of hospital stay or until stated goals are met, whichever comes first.  TREATMENT: TREATMENT:   ($$ Therapeutic Activity: 23-37 mins    )  Therapeutic Activity (24 Minutes): Therapeutic activity included Supine to Sit, Sit to Supine, Scooting, Transfer Training, Ambulation on level ground, Sitting balance  and Standing balance to improve functional Mobility, Strength and Activity tolerance.     TREATMENT GRID:  N/A    AFTER TREATMENT POSITION/PRECAUTIONS:  Bed, Needs within reach, RN notified and Visitors at bedside    INTERDISCIPLINARY COLLABORATION:  RN/PCT, PT/PTA and Rehab Attendant     TOTAL TREATMENT DURATION:  PT Patient Time In/Time Out  Time In: 1439  Time Out: [address] PTA

## 2021-11-02 NOTE — PROGRESS NOTES
ACUTE PHYSICAL THERAPY GOALS:  (Developed with and agreed upon by patient and/or caregiver. )  LTG: (Reviewed and updated 10/29/21)  (1.)Ms. Rios Nolan will move from supine to sit and sit to supine, scoot up and down and roll side to side in bed INDEPENDENTLY via log roll within 7 treatment day(s). (2.)Ms. Rios Nolan will transfer from bed to chair and chair to bed with SUPERVISION using the least restrictive device within 7 treatment day(s). (3.)Ms. Rios Nolan will ambulate with STAND BY ASSIST for 150+ feet with the least restrictive device within 7 treatment day(s). (4.)Ms. Rios Nolan will perform LE exercises per HEP independently to improve strength and mobility within 7 days. (5.)Ms. Rios Nolan will independently verbalize 3/3 post op spinal precautions and maintain compliance within 7 days. PHYSICAL THERAPY: Daily Note and AM Treatment Day # 5   **Corset brace w/ambulation    Jacqui Santiago is a 73 y.o. female   PRIMARY DIAGNOSIS: Lumbar stenosis with neurogenic claudication  Lumbar stenosis with neurogenic claudication [M48.062]  Spondylolisthesis of lumbar region [M43.16]  Spinal stenosis [M48.00]  Procedure(s) (LRB):  L2-L4 SPINE LUMBAR DIRECT LATERAL INTERBODY FUSION (DLIF) ANTERIOR PLATING / SSEP (Left)  REDO L2-L5 LAMINECTOMY FUSION AND L4-L5 TLIF / NEUROMONITORING (N/A)  8 Days Post-Op    ASSESSMENT:     REHAB RECOMMENDATIONS: CURRENT LEVEL OF FUNCTION:  (Most Recently Demonstrated)   Recommendation to date pending progress:  [address] [place] [address]  Equipment:    Rolling Walker Bed Mobility:  Minimal Assistance   Sit to Stand:  Faud Innocent Assistance  Transfers:   Contact Guard Assistance  Gait/Mobility:   Contact Guard Assistance     ASSESSMENT:  Ms. Rios Nolan is making progress toward goals with increased gait distances and decreased assistance levels. Continued complaints of discomfort in the LLE especially in quad and glut. Good gait technique but decreased pace and use of RW. Comfortable in bed post treatment.       SUBJECTIVE:   Ms. Anabela Garcia states, \"I'm looking forward to rehab and getting home\"    SOCIAL HISTORY/ LIVING ENVIRONMENT: see eval  Home Environment: Private residence  One/Two Story Residence: Two story  Living Alone: Yes  Support Systems: Child(rober)  OBJECTIVE:     PAIN: VITAL SIGNS: LINES/DRAINS:   Pre Treatment: Pain Screen  Pain Scale 1: Numeric (0 - 10)  Pain Intensity 1: 0  Post Treatment: 0      O2 Device: None (Room air)     MOBILITY: I Mod I S SBA CGA Min Mod Max Total  NT x2 Comments:   Bed Mobility    Rolling [] [] [] [] [x] [] [] [] [] [] []    Supine to Sit [] [] [] [] [] [x] [] [] [] [] []    Scooting [] [] [] [] [x] [] [] [] [] [] []    Sit to Supine [] [] [] [] [] [x] [] [] [] [] []    Transfers    Sit to Stand [] [] [] [] [x] [] [] [] [] [] []    Bed to Chair [] [] [] [] [x] [] [] [] [] [] []    Stand to Sit [] [] [] [] [x] [] [] [] [] [] []    I=Independent, Mod I=Modified Independent, S=Supervision, SBA=Standby Assistance, CGA=Contact Guard Assistance,   Min=Minimal Assistance, Mod=Moderate Assistance, Max=Maximal Assistance, Total=Total Assistance, NT=Not Tested    BALANCE: Good Fair+ Fair Fair- Poor NT Comments   Sitting Static [x] [] [] [] [] []    Sitting Dynamic [x] [] [] [] [] []              Standing Static [x] [x] [] [] [] []    Standing Dynamic [] [x] [] [] [] []      GAIT: I Mod I S SBA CGA Min Mod Max Total  NT x2 Comments:   Level of Assistance [] [] [] [] [x] [] [] [] [] [] []    Distance 100'    DME Rolling Walker    Gait Quality Decreased pace    Weightbearing  Status N/A     I=Independent, Mod I=Modified Independent, S=Supervision, SBA=Standby Assistance, CGA=Contact Guard Assistance,   Min=Minimal Assistance, Mod=Moderate Assistance, Max=Maximal Assistance, Total=Total Assistance, NT=Not Tested    PLAN:   FREQUENCY/DURATION: PT Plan of Care: BID for duration of hospital stay or until stated goals are met, whichever comes first.  TREATMENT: TREATMENT:   ($$ Therapeutic Activity: 23-37 mins    )  Therapeutic Activity (24 Minutes): Therapeutic activity included Supine to Sit, Sit to Supine, Scooting, Transfer Training, Ambulation on level ground, Sitting balance  and Standing balance to improve functional Mobility, Strength and Activity tolerance.     TREATMENT GRID:  N/A    AFTER TREATMENT POSITION/PRECAUTIONS:  Bed, Needs within reach, RN notified and Visitors at bedside    INTERDISCIPLINARY COLLABORATION:  RN/PCT, PT/PTA and Rehab Attendant     TOTAL TREATMENT DURATION:  PT Patient Time In/Time Out  Time In: [address]  Time Out: [address], PTA

## 2021-11-02 NOTE — PROGRESS NOTES
Call received from [facility] admission who confirm they can accept patient tomorrow. Transport set up for 10:00 on 11/3/2021 with reference # [id number]. Patient will admit to room 121 and report can be called to [phone]. Attending notified. Care Management Interventions  PCP Verified by CM:  Yes Cyrus Sena)  Mode of Transport at Discharge: [address]  [address] Time of Discharge: 1000  Transition of Care Consult (CM Consult): Discharge Planning  Discharge Durable Medical Equipment: No  Physical Therapy Consult: Yes  Occupational Therapy Consult: Yes  Speech Therapy Consult: No  Support Systems: Child(rober)  Confirm Follow Up Transport: Family  The Plan for Transition of Care is Related to the Following Treatment Goals : Patient will discharge to IRF in order to [facility]   The Patient and/or Patient Representative was Provided with a Choice of Provider and Agrees with the Discharge Plan?: Yes  Freedom of Choice List was Provided with Basic Dialogue that Supports the Patient's Individualized Plan of Care/Goals, Treatment Preferences and Shares the Quality Data Associated with the Providers?: Yes  Discharge Location  Discharge Placement: Rehab hospital/unit acute

## 2021-11-03 ENCOUNTER — HOSPITAL ENCOUNTER (INPATIENT)
Age: 66
LOS: 20 days | Discharge: ANOTHER HOSPITAL | DRG: 560 | End: 2021-11-23
Attending: PHYSICAL MEDICINE & REHABILITATION | Admitting: PHYSICAL MEDICINE & REHABILITATION
Payer: MEDICARE

## 2021-11-03 VITALS
RESPIRATION RATE: 19 BRPM | OXYGEN SATURATION: 95 % | HEIGHT: 64 IN | BODY MASS INDEX: 50.02 KG/M2 | SYSTOLIC BLOOD PRESSURE: 117 MMHG | DIASTOLIC BLOOD PRESSURE: 54 MMHG | WEIGHT: 293 LBS | TEMPERATURE: 98.6 F | HEART RATE: 89 BPM

## 2021-11-03 DIAGNOSIS — N17.9 AKI (ACUTE KIDNEY INJURY) (HCC): ICD-10-CM

## 2021-11-03 DIAGNOSIS — E03.9 ACQUIRED HYPOTHYROIDISM: ICD-10-CM

## 2021-11-03 DIAGNOSIS — M48.062 LUMBAR STENOSIS WITH NEUROGENIC CLAUDICATION: ICD-10-CM

## 2021-11-03 DIAGNOSIS — J96.01 ACUTE RESPIRATORY FAILURE WITH HYPOXIA (HCC): ICD-10-CM

## 2021-11-03 DIAGNOSIS — E66.01 OBESITY, MORBID (HCC): ICD-10-CM

## 2021-11-03 DIAGNOSIS — I48.0 PAROXYSMAL ATRIAL FIBRILLATION (HCC): ICD-10-CM

## 2021-11-03 DIAGNOSIS — I10 ESSENTIAL HYPERTENSION, BENIGN: ICD-10-CM

## 2021-11-03 DIAGNOSIS — M48.062 SPINAL STENOSIS, LUMBAR REGION WITH NEUROGENIC CLAUDICATION: ICD-10-CM

## 2021-11-03 LAB
ALBUMIN SERPL-MCNC: 1.9 G/DL (ref 3.2–4.6)
ALBUMIN/GLOB SERPL: 0.6 {RATIO} (ref 1.2–3.5)
ALP SERPL-CCNC: 96 U/L (ref 50–136)
ALT SERPL-CCNC: 18 U/L (ref 12–65)
ANION GAP SERPL CALC-SCNC: 1 MMOL/L (ref 7–16)
AST SERPL-CCNC: 20 U/L (ref 15–37)
BILIRUB SERPL-MCNC: 0.5 MG/DL (ref 0.2–1.1)
BUN SERPL-MCNC: 17 MG/DL (ref 8–23)
CALCIUM SERPL-MCNC: 8.3 MG/DL (ref 8.3–10.4)
CHLORIDE SERPL-SCNC: 105 MMOL/L (ref 98–107)
CO2 SERPL-SCNC: 32 MMOL/L (ref 21–32)
CREAT SERPL-MCNC: 0.53 MG/DL (ref 0.6–1)
ERYTHROCYTE [DISTWIDTH] IN BLOOD BY AUTOMATED COUNT: 17.2 % (ref 11.9–14.6)
GLOBULIN SER CALC-MCNC: 3 G/DL (ref 2.3–3.5)
GLUCOSE BLD STRIP.AUTO-MCNC: 119 MG/DL (ref 65–100)
GLUCOSE SERPL-MCNC: 84 MG/DL (ref 65–100)
HCT VFR BLD AUTO: 29.6 % (ref 35.8–46.3)
HGB BLD-MCNC: 9 G/DL (ref 11.7–15.4)
MAGNESIUM SERPL-MCNC: 2 MG/DL (ref 1.8–2.4)
MCH RBC QN AUTO: 29.7 PG (ref 26.1–32.9)
MCHC RBC AUTO-ENTMCNC: 30.4 G/DL (ref 31.4–35)
MCV RBC AUTO: 97.7 FL (ref 79.6–97.8)
NRBC # BLD: 0 K/UL (ref 0–0.2)
PLATELET # BLD AUTO: 280 K/UL (ref 150–450)
PMV BLD AUTO: 9.3 FL (ref 9.4–12.3)
POTASSIUM SERPL-SCNC: 4.3 MMOL/L (ref 3.5–5.1)
PROT SERPL-MCNC: 4.9 G/DL (ref 6.3–8.2)
RBC # BLD AUTO: 3.03 M/UL (ref 4.05–5.2)
SERVICE CMNT-IMP: ABNORMAL
SODIUM SERPL-SCNC: 138 MMOL/L (ref 136–145)
WBC # BLD AUTO: 11.9 K/UL (ref 4.3–11.1)

## 2021-11-03 PROCEDURE — 97530 THERAPEUTIC ACTIVITIES: CPT

## 2021-11-03 PROCEDURE — 97116 GAIT TRAINING THERAPY: CPT

## 2021-11-03 PROCEDURE — 2709999900 HC NON-CHARGEABLE SUPPLY

## 2021-11-03 PROCEDURE — 36415 COLL VENOUS BLD VENIPUNCTURE: CPT

## 2021-11-03 PROCEDURE — 74011250637 HC RX REV CODE- 250/637: Performed by: PHYSICAL MEDICINE & REHABILITATION

## 2021-11-03 PROCEDURE — 74011250636 HC RX REV CODE- 250/636: Performed by: ORTHOPAEDIC SURGERY

## 2021-11-03 PROCEDURE — 85027 COMPLETE CBC AUTOMATED: CPT

## 2021-11-03 PROCEDURE — 83735 ASSAY OF MAGNESIUM: CPT

## 2021-11-03 PROCEDURE — 80053 COMPREHEN METABOLIC PANEL: CPT

## 2021-11-03 PROCEDURE — 82962 GLUCOSE BLOOD TEST: CPT

## 2021-11-03 PROCEDURE — 74011250637 HC RX REV CODE- 250/637: Performed by: STUDENT IN AN ORGANIZED HEALTH CARE EDUCATION/TRAINING PROGRAM

## 2021-11-03 PROCEDURE — 97535 SELF CARE MNGMENT TRAINING: CPT

## 2021-11-03 PROCEDURE — 97110 THERAPEUTIC EXERCISES: CPT

## 2021-11-03 PROCEDURE — 97165 OT EVAL LOW COMPLEX 30 MIN: CPT

## 2021-11-03 PROCEDURE — 99223 1ST HOSP IP/OBS HIGH 75: CPT | Performed by: PHYSICAL MEDICINE & REHABILITATION

## 2021-11-03 PROCEDURE — 74011250637 HC RX REV CODE- 250/637: Performed by: ORTHOPAEDIC SURGERY

## 2021-11-03 PROCEDURE — 97161 PT EVAL LOW COMPLEX 20 MIN: CPT

## 2021-11-03 PROCEDURE — 65310000000 HC RM PRIVATE REHAB

## 2021-11-03 RX ORDER — ESCITALOPRAM OXALATE 10 MG/1
20 TABLET ORAL EVERY MORNING
Status: CANCELLED | OUTPATIENT
Start: 2021-11-04

## 2021-11-03 RX ORDER — FAMOTIDINE 20 MG/1
20 TABLET, FILM COATED ORAL EVERY EVENING
Status: DISCONTINUED | OUTPATIENT
Start: 2021-11-03 | End: 2021-11-18 | Stop reason: HOSPADM

## 2021-11-03 RX ORDER — NALOXONE HYDROCHLORIDE 0.4 MG/ML
0.4 INJECTION, SOLUTION INTRAMUSCULAR; INTRAVENOUS; SUBCUTANEOUS AS NEEDED
Status: CANCELLED | OUTPATIENT
Start: 2021-11-03

## 2021-11-03 RX ORDER — PANTOPRAZOLE SODIUM 40 MG/1
40 TABLET, DELAYED RELEASE ORAL
Status: DISCONTINUED | OUTPATIENT
Start: 2021-11-04 | End: 2021-11-18 | Stop reason: HOSPADM

## 2021-11-03 RX ORDER — MAG HYDROX/ALUMINUM HYD/SIMETH 200-200-20
30 SUSPENSION, ORAL (FINAL DOSE FORM) ORAL
Status: DISCONTINUED | OUTPATIENT
Start: 2021-11-03 | End: 2021-11-18 | Stop reason: HOSPADM

## 2021-11-03 RX ORDER — MIDODRINE HYDROCHLORIDE 5 MG/1
10 TABLET ORAL EVERY 8 HOURS
Status: DISCONTINUED | OUTPATIENT
Start: 2021-11-03 | End: 2021-11-04

## 2021-11-03 RX ORDER — ESCITALOPRAM OXALATE 10 MG/1
20 TABLET ORAL EVERY MORNING
Status: DISCONTINUED | OUTPATIENT
Start: 2021-11-04 | End: 2021-11-18 | Stop reason: HOSPADM

## 2021-11-03 RX ORDER — FACIAL-BODY WIPES
10 EACH TOPICAL DAILY PRN
Status: CANCELLED | OUTPATIENT
Start: 2021-11-03

## 2021-11-03 RX ORDER — ASPIRIN 81 MG/1
81 TABLET ORAL
Status: DISCONTINUED | OUTPATIENT
Start: 2021-11-03 | End: 2021-11-18 | Stop reason: HOSPADM

## 2021-11-03 RX ORDER — LORAZEPAM 0.5 MG/1
0.5 TABLET ORAL
Status: DISCONTINUED | OUTPATIENT
Start: 2021-11-03 | End: 2021-11-18 | Stop reason: HOSPADM

## 2021-11-03 RX ORDER — POTASSIUM CHLORIDE 750 MG/1
10 TABLET, EXTENDED RELEASE ORAL DAILY
Status: CANCELLED | OUTPATIENT
Start: 2021-11-04

## 2021-11-03 RX ORDER — DIPHENHYDRAMINE HCL 25 MG
25 CAPSULE ORAL
Status: DISCONTINUED | OUTPATIENT
Start: 2021-11-03 | End: 2021-11-18 | Stop reason: HOSPADM

## 2021-11-03 RX ORDER — FAMOTIDINE 20 MG/1
20 TABLET, FILM COATED ORAL EVERY EVENING
Status: CANCELLED | OUTPATIENT
Start: 2021-11-03

## 2021-11-03 RX ORDER — DOCUSATE SODIUM 100 MG/1
100 CAPSULE, LIQUID FILLED ORAL 2 TIMES DAILY
Status: DISCONTINUED | OUTPATIENT
Start: 2021-11-03 | End: 2021-11-18 | Stop reason: HOSPADM

## 2021-11-03 RX ORDER — LEVOTHYROXINE SODIUM 50 UG/1
50 TABLET ORAL
Status: DISCONTINUED | OUTPATIENT
Start: 2021-11-04 | End: 2021-11-18 | Stop reason: HOSPADM

## 2021-11-03 RX ORDER — ADHESIVE BANDAGE
30 BANDAGE TOPICAL DAILY PRN
Status: CANCELLED | OUTPATIENT
Start: 2021-11-03

## 2021-11-03 RX ORDER — HYDROCODONE BITARTRATE AND ACETAMINOPHEN 10; 325 MG/1; MG/1
1 TABLET ORAL
Status: DISCONTINUED | OUTPATIENT
Start: 2021-11-03 | End: 2021-11-18 | Stop reason: HOSPADM

## 2021-11-03 RX ORDER — METOPROLOL TARTRATE 25 MG/1
25 TABLET, FILM COATED ORAL 2 TIMES DAILY
Status: CANCELLED | OUTPATIENT
Start: 2021-11-03

## 2021-11-03 RX ORDER — HYDROCODONE BITARTRATE AND ACETAMINOPHEN 10; 325 MG/1; MG/1
1 TABLET ORAL
Status: CANCELLED | OUTPATIENT
Start: 2021-11-03

## 2021-11-03 RX ORDER — ACETAMINOPHEN 325 MG/1
650 TABLET ORAL
Status: CANCELLED | OUTPATIENT
Start: 2021-11-03

## 2021-11-03 RX ORDER — MIDODRINE HYDROCHLORIDE 5 MG/1
10 TABLET ORAL EVERY 8 HOURS
Status: CANCELLED | OUTPATIENT
Start: 2021-11-03

## 2021-11-03 RX ORDER — POLYETHYLENE GLYCOL 3350 17 G/17G
17 POWDER, FOR SOLUTION ORAL DAILY
Status: DISCONTINUED | OUTPATIENT
Start: 2021-11-04 | End: 2021-11-18 | Stop reason: HOSPADM

## 2021-11-03 RX ORDER — PROPAFENONE HYDROCHLORIDE 150 MG/1
150 TABLET, FILM COATED ORAL 2 TIMES DAILY
Status: DISCONTINUED | OUTPATIENT
Start: 2021-11-03 | End: 2021-11-18 | Stop reason: HOSPADM

## 2021-11-03 RX ORDER — FACIAL-BODY WIPES
10 EACH TOPICAL DAILY PRN
Status: DISCONTINUED | OUTPATIENT
Start: 2021-11-03 | End: 2021-11-11 | Stop reason: SDUPTHER

## 2021-11-03 RX ORDER — PROPAFENONE HYDROCHLORIDE 150 MG/1
150 TABLET, FILM COATED ORAL 2 TIMES DAILY
Status: CANCELLED | OUTPATIENT
Start: 2021-11-03

## 2021-11-03 RX ORDER — FACIAL-BODY WIPES
10 EACH TOPICAL DAILY PRN
Status: DISCONTINUED | OUTPATIENT
Start: 2021-11-03 | End: 2021-11-18 | Stop reason: HOSPADM

## 2021-11-03 RX ORDER — MAG HYDROX/ALUMINUM HYD/SIMETH 200-200-20
30 SUSPENSION, ORAL (FINAL DOSE FORM) ORAL
Status: CANCELLED | OUTPATIENT
Start: 2021-11-03

## 2021-11-03 RX ORDER — ACETAMINOPHEN 325 MG/1
650 TABLET ORAL
Status: DISCONTINUED | OUTPATIENT
Start: 2021-11-03 | End: 2021-11-18 | Stop reason: HOSPADM

## 2021-11-03 RX ORDER — ZOLPIDEM TARTRATE 5 MG/1
10 TABLET ORAL
Status: CANCELLED | OUTPATIENT
Start: 2021-11-03

## 2021-11-03 RX ORDER — ZOLPIDEM TARTRATE 5 MG/1
10 TABLET ORAL
Status: DISCONTINUED | OUTPATIENT
Start: 2021-11-03 | End: 2021-11-18 | Stop reason: HOSPADM

## 2021-11-03 RX ORDER — DOCUSATE SODIUM 100 MG/1
100 CAPSULE, LIQUID FILLED ORAL 2 TIMES DAILY
Status: CANCELLED | OUTPATIENT
Start: 2021-11-03

## 2021-11-03 RX ORDER — TRAMADOL HYDROCHLORIDE 50 MG/1
50 TABLET ORAL
Status: DISCONTINUED | OUTPATIENT
Start: 2021-11-03 | End: 2021-11-18 | Stop reason: HOSPADM

## 2021-11-03 RX ORDER — DIPHENHYDRAMINE HCL 25 MG
25 CAPSULE ORAL
Status: CANCELLED | OUTPATIENT
Start: 2021-11-03

## 2021-11-03 RX ORDER — PANTOPRAZOLE SODIUM 40 MG/1
40 TABLET, DELAYED RELEASE ORAL
Status: CANCELLED | OUTPATIENT
Start: 2021-11-04

## 2021-11-03 RX ORDER — IBUPROFEN 200 MG
1 TABLET ORAL EVERY 24 HOURS
Status: DISCONTINUED | OUTPATIENT
Start: 2021-11-04 | End: 2021-11-18 | Stop reason: HOSPADM

## 2021-11-03 RX ORDER — LORAZEPAM 0.5 MG/1
0.5 TABLET ORAL
Status: CANCELLED | OUTPATIENT
Start: 2021-11-03

## 2021-11-03 RX ORDER — POLYETHYLENE GLYCOL 3350 17 G/17G
17 POWDER, FOR SOLUTION ORAL DAILY
Status: CANCELLED | OUTPATIENT
Start: 2021-11-04

## 2021-11-03 RX ORDER — METOPROLOL TARTRATE 25 MG/1
25 TABLET, FILM COATED ORAL 2 TIMES DAILY
Status: DISCONTINUED | OUTPATIENT
Start: 2021-11-03 | End: 2021-11-18 | Stop reason: HOSPADM

## 2021-11-03 RX ORDER — ONDANSETRON 4 MG/1
4 TABLET, ORALLY DISINTEGRATING ORAL
Status: CANCELLED | OUTPATIENT
Start: 2021-11-03

## 2021-11-03 RX ORDER — TRAMADOL HYDROCHLORIDE 50 MG/1
50 TABLET ORAL
Status: CANCELLED | OUTPATIENT
Start: 2021-11-03

## 2021-11-03 RX ORDER — ONDANSETRON 4 MG/1
4 TABLET, ORALLY DISINTEGRATING ORAL
Status: DISCONTINUED | OUTPATIENT
Start: 2021-11-03 | End: 2021-11-18 | Stop reason: HOSPADM

## 2021-11-03 RX ORDER — GABAPENTIN 300 MG/1
300 CAPSULE ORAL EVERY 12 HOURS
Status: DISCONTINUED | OUTPATIENT
Start: 2021-11-03 | End: 2021-11-04

## 2021-11-03 RX ORDER — POTASSIUM CHLORIDE 750 MG/1
10 TABLET, EXTENDED RELEASE ORAL DAILY
Status: DISCONTINUED | OUTPATIENT
Start: 2021-11-04 | End: 2021-11-10

## 2021-11-03 RX ORDER — ADHESIVE BANDAGE
30 BANDAGE TOPICAL DAILY PRN
Status: DISCONTINUED | OUTPATIENT
Start: 2021-11-03 | End: 2021-11-18 | Stop reason: HOSPADM

## 2021-11-03 RX ORDER — NALOXONE HYDROCHLORIDE 0.4 MG/ML
0.4 INJECTION, SOLUTION INTRAMUSCULAR; INTRAVENOUS; SUBCUTANEOUS AS NEEDED
Status: DISCONTINUED | OUTPATIENT
Start: 2021-11-03 | End: 2021-11-18 | Stop reason: HOSPADM

## 2021-11-03 RX ORDER — LEVOTHYROXINE SODIUM 50 UG/1
50 TABLET ORAL
Status: CANCELLED | OUTPATIENT
Start: 2021-11-04

## 2021-11-03 RX ORDER — GABAPENTIN 300 MG/1
300 CAPSULE ORAL EVERY 12 HOURS
Status: CANCELLED | OUTPATIENT
Start: 2021-11-03

## 2021-11-03 RX ORDER — ASPIRIN 81 MG/1
81 TABLET ORAL
Status: CANCELLED | OUTPATIENT
Start: 2021-11-03

## 2021-11-03 RX ADMIN — METOPROLOL TARTRATE 25 MG: 25 TABLET, FILM COATED ORAL at 17:33

## 2021-11-03 RX ADMIN — GABAPENTIN 300 MG: 300 CAPSULE ORAL at 09:21

## 2021-11-03 RX ADMIN — TRAMADOL HYDROCHLORIDE 50 MG: 50 TABLET, FILM COATED ORAL at 21:22

## 2021-11-03 RX ADMIN — ESCITALOPRAM OXALATE 20 MG: 10 TABLET ORAL at 09:21

## 2021-11-03 RX ADMIN — HYDROCODONE BITARTRATE AND ACETAMINOPHEN 1 TABLET: 10; 325 TABLET ORAL at 09:27

## 2021-11-03 RX ADMIN — ASPIRIN 81 MG: 81 TABLET, COATED ORAL at 21:22

## 2021-11-03 RX ADMIN — PANTOPRAZOLE SODIUM 40 MG: 40 TABLET, DELAYED RELEASE ORAL at 05:09

## 2021-11-03 RX ADMIN — POTASSIUM CHLORIDE 10 MEQ: 10 TABLET, EXTENDED RELEASE ORAL at 09:21

## 2021-11-03 RX ADMIN — APIXABAN 5 MG: 5 TABLET, FILM COATED ORAL at 09:21

## 2021-11-03 RX ADMIN — HYDROCODONE BITARTRATE AND ACETAMINOPHEN 1 TABLET: 10; 325 TABLET ORAL at 14:39

## 2021-11-03 RX ADMIN — Medication 5 ML: at 05:09

## 2021-11-03 RX ADMIN — LEVOTHYROXINE SODIUM 50 MCG: 0.05 TABLET ORAL at 05:09

## 2021-11-03 RX ADMIN — ZOLPIDEM TARTRATE 10 MG: 5 TABLET ORAL at 21:03

## 2021-11-03 RX ADMIN — HYDROMORPHONE HYDROCHLORIDE 1 MG: 1 INJECTION, SOLUTION INTRAMUSCULAR; INTRAVENOUS; SUBCUTANEOUS at 07:11

## 2021-11-03 RX ADMIN — APIXABAN 5 MG: 5 TABLET, FILM COATED ORAL at 17:33

## 2021-11-03 RX ADMIN — PROPAFENONE HYDROCHLORIDE 150 MG: 150 TABLET, FILM COATED ORAL at 17:33

## 2021-11-03 RX ADMIN — MIDODRINE HYDROCHLORIDE 10 MG: 5 TABLET ORAL at 21:03

## 2021-11-03 RX ADMIN — PROPAFENONE HYDROCHLORIDE 150 MG: 150 TABLET, FILM COATED ORAL at 09:21

## 2021-11-03 RX ADMIN — HYDROCODONE BITARTRATE AND ACETAMINOPHEN 1 TABLET: 10; 325 TABLET ORAL at 18:39

## 2021-11-03 RX ADMIN — FAMOTIDINE 20 MG: 20 TABLET ORAL at 17:33

## 2021-11-03 RX ADMIN — Medication 1 AMPULE: at 09:22

## 2021-11-03 RX ADMIN — Medication 1 AMPULE: at 21:03

## 2021-11-03 RX ADMIN — MIDODRINE HYDROCHLORIDE 10 MG: 5 TABLET ORAL at 05:08

## 2021-11-03 RX ADMIN — GABAPENTIN 300 MG: 300 CAPSULE ORAL at 21:03

## 2021-11-03 RX ADMIN — HYDROCODONE BITARTRATE AND ACETAMINOPHEN 1 TABLET: 10; 325 TABLET ORAL at 04:18

## 2021-11-03 NOTE — PROGRESS NOTES
[facility] OCCUPATIONAL THERAPY INITIAL EVALUATION    Time In: 2218  Time Out: 1206    Precautions: Falls and Spinal    Pain: Pt reported discomfort in R side of lower back. History of Presenting Illness (per previous reports): Please see MD's H&P for information regarding HPI. Past Medical History/ Co-morbidities:   Past Medical History:   Diagnosis Date    A-fib [facility])     Acquired hypothyroidism 9/13/2017    Anxiety and depression     Arthritis     osteo    Chronic pain     back and R leg    GERD (gastroesophageal reflux disease)     well controlled with med    H/O bone density study 10/2011    normal    Hypertension     controlled by med    Low back pain     Morbid obesity ([address]     bmi 48.63    Pneumonia     1/2020    Rectal bleeding onset x 2 months    Restless legs     Rosacea     Skin cancer     squamous- removed    Smoker     current 10/18/21 5-10 daily \"trying to quit\"-- previously 1 ppd x since age 22    Spinal stenosis     Staph infection 03/2016    from back surgery----- NOT mrsa    Suspected sleep apnea      test not completed 11/2018       Patient's Goal: \"I want to cut my grass. \"    Previous Level of Function: Prior to admission, Pt was I with ADLs and IADLs. Pt was managing medications and finances. Pt was working part time for [employer] Pt was still driving.       Home Situation    Environment   House Situation Private residence   Lives Alone No   New Marya Child(rober), Other Family Member(s)   Shower Situation Shower   Current DME Cane, straight, Grab bars, Shower chair   Lift Chair     Stairs to Reflux Medical Bilateral      W/C Ramp Yes (railings on both sides)   Interior Steps       Upper Extremity Function   LUE RUE   FMC Intact Intact   GMC Intact Intact      LUE RUE   General Evalutaion  5  5   Shoulder Flexion 5  5   Shoulder Extension  5  5   Shoulder Abduction 5  5   Shoulder Adduction 5  5   Elbow Flexion 5  5   Elbow Extension  5  5     5  5 0/5 No palpable muscle contraction  1/5 Palpable muscle contraction, no joint movement  2-/5 Less than full range of motion in gravity eliminated position  2/5 Able to complete full range of motion in gravity eliminated position  2+/5 Able to initiate movement against gravity  3-/5 More than half but not full range of motion against gravity  3/5 Able to complete full range of motion against gravity  3+/5 Completes full range of motion against gravity with minimal resistance  4-/5 Completes full range of motion against gravity with minimal-moderate resistance  4/5 Completes full range of motion against gravity with moderate resistance  4+/5 Completes full range of motion against gravity with moderate-maximum resistance  5/5 Completes full range of motion against gravity with maximum resistance      Functional Mobility   Score Comments   Rolling 1: Dependent Side: Bilateral  MIN A   Supine to Sit 1: Dependent    Sit to Stand 3: Partial/Moderate A MOD A   Transfer Assist 3: Partial/Moderate A Transfer Type: SPT   Equipment: Rolling Walker   Comments: MIN A     Activities of Daily Living    Score Comments   Eating 6: Independent    Upper Body  Dressing 5: S/U or clean-up assist Items Applied: Pullover  Position: Unsupported Sitting   Lower Body Dressing 3: Partial/Moderate A Items Applied: Underwear and Elastic pants  Position: Standing PRN and Unsupported Sitting  Adaptive Equipment: Reacher and RW  Comments:    Donning/[place] Footwear 1: Dependent Items Applied: Socks and Shoes with fasteners   Adaptive Equipment: N/A     Cognition: Pt is alert and oriented x4. Pt scored a 13/15 on the IRF-NASIMA. Will continue to assess cognition during sessions and address as needed. Vision/Perception: Formal testing needs to be completed. Session: PT was received supine in bed and agreeable to OT tx. Pt's performance with ADL is reflected in above chart. Pt reported having pain in R side of lower back.  Pt was educated on using adaptive equipment (Reacher) for LB dressing. Pt is able to recall all of spinal precautions. Pt was left seated in wheelchair with all needs met and call bell within reach. Interdisciplinary Communication: Collaborated with PT to ensure progress towards POC and goals. Patient/Family Education: Patient was/were educated On the role of OT, On POC and On IRC expectations. Problem List: [facility] [place] [address], Activity Tolerance, Visual Perceptual , Safety Awareness, Strength, Pain, Sitting Balance and Standing Balance    Functional Limitations: ADL, IADL, Functional Transfers and Functional Mobility    Goals: Please see Care Plan  LTG 1: Pt will be independent with toilet transfer by 14 days to prevent skin breakdown. STG 1: Pt will be CGA with toilet transfer by 7 days to prevent skin breakdown. LTG 2: Pt will be independent with toilet hygiene by 14 days to prevent skin breakdown. STG 2: Pt will be CGA with toilet hygiene by 7 days to prevent skin breakdown. LTG 3: Pt will be independent with bathing using AE/DME PRN by 14 days to promote good skin integrity. STG 3: Pt will be supervision  with bathing  using AE/DME PRN by 7 days to promote good skin integrity. LTG 4: Pt will be independent with LB dressing  using AE/DME PRN by 14 days to reduce risk of falls. STG 4: Pt will be CGA with LB dressing  using AE/DME PRN by 7 days to reduce risk of falls. LTG 5:  Pt will complete simple IADL/home management tasks independently within 14 days to promote increased safety awareness for tasks at home. OT order received and chart reviewed. OT orders have been acknowledged. Patient will benefit from skilled OT services to address ADL, functional transfers, UE strength, UE coordination, balance, cognition and activity tolerance to maximize functional performance with daily self-care tasks and functional mobility.  Treatment is likely to include ADL, Balance, Strength, Activity tolerance, Visual perceptual, Cognitive, DME, AE, Family  and Safety awareness training to increase independence with self-care. Patient will be seen for 1.5-2 hours of skilled OT services 5-6 days a week as appropriate. Initate POC.      Domenic Burroughs MS OTR/L  11/3/2021

## 2021-11-03 NOTE — PROGRESS NOTES
Pt admitted to room 127 from the 7th floor. Pt is alert and oriented with stable vital signs. Dual skin assessment completed with Gabbi Marie RN. Back incision noted with gauze and tegaderm and is clean/dry/inact without drainage. Old incision noted on left lower lateral back with old dressing. Open blister with drainage noted next to the incision on the left lateral black. Open blister covered with guaze and tegaderm. Excoriation noted between buttocks most likely from moisture. Excoriation and redness also noted in the farideh area and could benefit from cream/powder to protect from moisture. Call light given to pt and instructed to call before getting out of bed. Pt in room to work with therapy.

## 2021-11-03 NOTE — PROGRESS NOTES
ORTHO PROGRESS NOTE    November 3, 2021    Admit Date: 10/25/2021  Admit Diagnosis: Lumbar stenosis with neurogenic claudication [M48.062]; Spondylolisthesis of lumbar region [M43.16]; Spinal stenosis [M48.00]  Post Op day: 9 Days Post-Op      Subjective:     Joshua Richardson is a patient who is now 9 Days Post-Op  and has no complaints. Objective:     PT/OT:    Progressing    Vital Signs:    Patient Vitals for the past 8 hrs:   BP Temp Pulse Resp SpO2   21 0707 (!) 117/54 98.6 °F (37 °C) 89 19 95 %   21 0456 (!) 150/65 98 °F (36.7 °C) 90 18 96 %   21 0419 114/65 -- -- -- --     Temp (24hrs), Av.1 °F (36.7 °C), Min:98 °F (36.7 °C), Max:98.6 °F (37 °C)      LAB:    Recent Labs     21  0542   HGB 9.0*   WBC 11.9*          I/O:  No intake/output data recorded.  1901 -  0700  In: 900 [P.O.:900]  Out: -     Physical Exam:    Awake and in no acute distress. Mood and affect appropriate. Respirations unlabored and no evidence cyanosis. Calves nontender. Abdomen soft and nontender. Dressing clean/dry  No new neurologic deficit.     Assessment:      Patient Active Problem List   Diagnosis Code    Essential hypertension, benign I10    Endometriosis N80.9    Colon polyp K63.5    Squamous cell skin cancer C44.92    Lumbar stenosis with neurogenic claudication M48.062    Postoperative wound infection T81.49XA    Acquired hypothyroidism E03.9    Obesity, morbid ([address] E66.01    Spondylolisthesis of multiple sites in spine M43.19    Spinal stenosis M48.00    Hypovolemic shock ([address] R57.1    Hypotension I95.9    A-fib ([address] I48.91    ÁNGEL (acute kidney injury) ([address] N17.9    Acute respiratory failure with hypoxia ([address] J96.01       9 Days Post-Op STATUS POST Procedure(s):  L2-L4 SPINE LUMBAR DIRECT LATERAL INTERBODY FUSION (DLIF) ANTERIOR PLATING / SSEP  REDO L2-L5 LAMINECTOMY FUSION AND L4-L5 TLIF / NEUROMONITORING      Plan:     Continue PT/OT/Rehab  Anticipate discharge to:  Inpatient Rehab today      Signed By: Myrna Penny MD

## 2021-11-03 NOTE — PROGRESS NOTES
The documentation for this period is being entered following the guidelines as defined in the [facility] downtime policy by Harry Maurer RN.     Scheduled downtime from 0100 to 0330

## 2021-11-03 NOTE — PROGRESS NOTES
PHYSICAL THERAPY EXAMINATION  TIME IN [address] [place] [address],[address]  Patient Name: Wild España  Patient Age: 73 y.o.   Past Medical History:   Past Medical History:   Diagnosis Date    A-fib [facility])     Acquired hypothyroidism 9/13/2017    Anxiety and depression     Arthritis     osteo    Chronic pain     back and R leg    GERD (gastroesophageal reflux disease)     well controlled with med    H/O bone density study 10/2011    normal    Hypertension     controlled by med    Low back pain     Morbid obesity ([address]     bmi 48.63    Pneumonia     1/2020    Rectal bleeding onset x 2 months    Restless legs     Rosacea     Skin cancer     squamous- removed    Smoker     current 10/18/21 5-10 daily \"trying to quit\"-- previously 1 ppd x since age 22    Spinal stenosis     Staph infection 03/2016    from back surgery----- NOT mrsa    Suspected sleep apnea      test not completed 11/2018       Medical Diagnosis:  Lumbar stenosis with neurogenic claudication [M48.062]  Spondylolisthesis of lumbar region [M43.16]  Spinal stenosis [M48.00]  Spinal stenosis, lumbar region with neurogenic claudication [M48.062] <principal problem not specified>    Precautions at Admission: Spinal; Other (comment) (Falls)    Therapy Diagnosis:   Difficulty with bed mobility  [x]     Difficulty with functional transfers  [x]     Difficulty with ambulation  [x]     Difficulty with stair negotiations  [x]       Problem List:    Decreased strength B LE  [x]     Decreased strength trunk/core  [x]     Decreased AROM   [x]     Decreased PROM  [x]    Decreased endurance  [x]     Decreased balance sitting  [x]     Decreased balance standing  [x]     Pain   [x]     Slow ambulation velocity  [x]    Decreased coordination  [x]    Decreased safety awareness  []      Functional Limitations:   Decreased independence with bed mobility  [x]     Decreased independence with functional transfers  [x]     Decreased independence with ambulation  [x] Decreased independence with stair negotiation  [x]       Previous Functional Level: Prior to admission, Pt was I with ADLs and IADLs. Pt was managing medications and finances. Pt was working part time for [employer] Pt was still driving. reports ambulating with straight cane independently inside and outside home but ambulating was limited due to low back pain    Home Environment: Home Environment: Private residence  Hand Rails : Bilateral  Wheelchair Ramp: Yes (railings on both sides)  One/Two Story Residence: One story (laundry room is moving upstairs to main level)  Living Alone: No  Support Systems: Child(rober), Other Family Member(s)  Patient Expects to be Discharged to[de-identified] House  Current DME Used/Available at Home: Cane, straight, Grab bars, Shower chair  Tub or Shower Type: Shower         Outcome Measures:   Patient Vitals for the past 12 hrs:   Temp Pulse Resp BP SpO2   11/03/21 1441  82  (!) 163/83    11/03/21 1122 98.1 °F (36.7 °C) 80 16 (!) 156/76 95 %     Pain level:2 to 7 out of 10  Pain location:low back R>L side, RLE radiating pain L5 S1 dermatome  Pain interventions:Medication per order, rest, positioning,relaxation, body mechanics, corset,       Patient education:PT POC and goals, Bed mobility training,transfer training, gait training, balance training,fall precautions, body mechanics,activity pacing, spinal precautions, Patient verbalizing understanding and demonstrating partial understanding of patient education. Recommend follow up education. Interdisciplinary Communication:co assessment with OT.  Spoke with RN regarding pain level and pain medication schedule and regarding mobility status      Cognition: Orientation:A+O x 4  Communication:able to express needs verbally, able to follow single step commands  Social Interaction:cooperating, appropriate with PT, participating, motivated to improve  Problem Solving:difficulty with managing body mechanics during functional mobility  Memory:able to recall name, , PLOF, PMH         MMT Initial Asssessment   Right Lower Extremity Left Lower Extremity   Hip Flexion 2+ 2+   Knee Extension 5 5   Knee Flexion 4 4   Ankle Dorsiflexion 5 5   0/5 No palpable muscle contraction  1/5 Palpable muscle contraction, no joint movement  2-/5 Less than full range of motion in gravity eliminated position  2/5 Able to complete full range of motion in gravity eliminated position  2+/5 Able to initiate movement against gravity  3-/5 More than half but not full range of motion against gravity  3/5 Able to complete full range of motion against gravity  3+/5 Completes full range of motion against gravity with minimal resistance  4-/5 Completes full range of motion against gravity with minimal-moderate resistance  4/5 Completes full range of motion against gravity with moderate resistance  4+/5 Completes full range of motion against gravity with moderate-maximum resistance  5/5 Completes full range of motion against gravity with maximum resistance     AROM: Bilateral hip flex 40, ABD 10, ext 0, knee -10 to 75, ankle WFL    PRIMARY MODE OF LOCOMOTION: ambulation      BED/CHAIR/WHEELCHAIR TRANSFERS Initial Assessment   Rolling Right 3 (Moderate assistance )   Rolling Left 3 (Moderate assistance )   Supine to Sit 1 (Total assistance) (Min to mod assist x 2)   Sit to Stand Moderate assistance (with RW)   Sit to Supine 0 (Not tested)   Transfer Type SPT with walker   Comments Increased time and effort to complete with verbal cues for log rolling and supine to sidelying to sit to stand.  Patient using bed rail and HOB at 20 degrees   Car Transfer Not tested   Car Type         WHEELCHAIR MOBILITY/MANAGEMENT Initial Assessment   Able to Propel     W/C Assistance     Curbs/ramps assistance required 0 (Not tested)   Wheelchair set up assistance required 0 (Not tested)   Wheelchair management         WALKING INDEPENDENCE Initial Assessment   Assistive device Paresh rolling, Gait belt, Orthotic device (Aspen lumbar sacral corset)   Ambulation assistance - level surface 4 (Contact guard assistance)   Distance 30 Feet (ft)   Comments slow start stop step to gait pattern leading with RLE and stepping to with LLE. Decreased step length and step clearance with flexed posture.  Foot flat initial contact   Ambulation assistance - unlevel surface         STEPS/STAIRS Initial Assessment   Steps/Stairs ambulated 0   Stairs Assistance     Rail Use     Comments Unable to ambulate up/down steps due to LE weakness, impaired balance and pain level    Curbs/Ramps 0 (Not tested)       QUALITY INDICATOR ASSIST COMMENTS   Roll right (&return to back) 3: Partial/Moderate A    Roll left (& return to back) 3: Partial/Moderate A    Supine to sit 1: Dependent    Sit to stand 3: Partial/Moderate A    Chair/bed-to-chair transfer 3: Partial/Moderate A    Walk 10 feet 4: Supervision or touching A    Walk 50 feet with 2 turns Not Tested: Not attempted due to Increased RLE radiating pain    Walk 150 feet Not Tested: Not attempted due to Increased RLE radiating pain and decreased endurance    Walk 10 feet on uneven  Not Tested: Not attempted due to impaired balance    1 step/curb Not Tested: Not attempted due to LE weakness, pain level and impaired balance    4 steps Not Tested: Not attempted due to LE weakness, pain level and impaired balance    12 steps Not Tested: Not attempted due to LE weakness, pain level and impaired balance, decreased endurance     object Not Tested: Not attempted due to spinal precautions and impaired balance    Wheel 50' w/2 turns Not tested: Not appicable secondary to pt not completing activity previously    Wheel 150' Not Tested: Not applicable secondary to pt not completing activity previously    Car Transfer Not Tested: Not attempted due to environmental concerns: Equipment No car available            PHYSICAL THERAPY PLAN OF CARE    Therapy Diagnosis:   Please see table above    Order received from MD for physical therapy services and chart reviewed. Pt to be seen at least 5 times per week for at least 1.5 hours of physical therapy per day for 2  weeks. Thank you for the referral.    LTGs:  LTG 1. Patient roll left and right and transfer supine<>sidelying<>sit with modified independence in 2 weeks  LTG 2. Patient transfer sit<>stand and perform stand pivot transfer with RW and modified independence in 2 weeks  LTG 3. Patient ambulate  150 feet with RW and modified independence in 2 weeks  LTG 4. Patient ambulate up/down 4 steps with hand rails and SBA in 2 weeks  LTG 5. Patient ambulate up/down 20 ft ramp with RW and SBA in 2 weeks    Pt would benefit from skilled physical therapy in order to improve independent functional mobility within the home. Interventions may include range of motion (AROM, PROM B LE/trunk), motor function (B LE/trunk strengthening/coordination), activity tolerance (vitals, oxygen saturation levels), bed mobility training, balance activities, gait training (progressive ambulation program), and functional transfer training. Please see IRC; Interdisciplinary Eval, Care Plan, and Patient Education for further information regarding physical therapy examination and plan of care. Patient return to room at end of treatment and remained up in wheelchair with needs in reach.     Kenton Perez, PT  11/3/2021

## 2021-11-03 NOTE — PROGRESS NOTES
PHYSICAL THERAPY DAILY NOTE  Time In: 6117  Time Out: 1502  Patient Seen For: PM; Gait training; Patient education; Therapeutic exercise; Transfer training; Other (see progress notes)    Subjective: patient reporting the RLE radiating pain and low back pain are better since taking pain medication and resting in the bed. Reports if she sits up too long the RLE radiating pain becomes unbearable. Objective:Vital Signs:  Patient Vitals for the past 12 hrs:   Temp Pulse Resp BP SpO2   11/03/21 1550 97.8 °F (36.6 °C) 81 18 (!) 120/59 100 %   11/03/21 1441  82  (!) 163/83    11/03/21 1122 98.1 °F (36.7 °C) 80 16 (!) 156/76 95 %     Pain level:1 to 4 out of 10  Pain location:low back Right side>Left and RLE radiating pain  Pain interventions:Medication per order, rest, positioning,relaxation, body mechanics, L/S corset      Patient education:Bed mobility training,transfer training, gait training, balance training,fall precautions, body mechanics,activity pacing,spinal precautions, donning L/S corset, Patient verbalizing understanding and demonstrating  understanding of patient education. Recommend follow up education. Interdisciplinary Communication:spoke with RN regarding functional level and pain medication schedule    Spinal, Other (comment) (Falls)  GROSS ASSESSMENT Daily Assessment     Mod assist to [facility] lumbar sacral corset       COGNITION Daily Assessment    No change       BED/MAT MOBILITY Daily Assessment   Increased time and effort to complete with cues for body mechanics  Using bed rail and HOB at 20 degrees.  Log roll and sidelying to sit from right side Rolling Right : 3 (Moderate assistance )  Rolling Left : 3 (Moderate assistance )  Supine to Sit : 3 (Moderate assistance)  Sit to Supine : 0 (Not tested)       TRANSFERS Daily Assessment   Increased time and effort to complete with cues for body mechanics   Transfer Type: SPT with walker  Transfer Assistance : 4 (Minimal assistance)  Sit to Stand Assistance: Moderate assistance (with RW)  Car Transfers: Not tested       GAIT Daily Assessment    Amount of Assistance: 4 (Contact guard assistance)  Distance (ft): 30 Feet (ft)  Assistive Device: Walker, rolling; Gait belt; Orthotic device (Aspen lumbar sacral corset)   Gait training with one time cue for posture correction and cues for improved step length and step clearance    STEPS or STAIRS Daily Assessment    Steps/Stairs Ambulated (#): 0  Level of Assist : 0 (Not tested)       BALANCE Daily Assessment   Static standing with RW, lumbar corset and CGA to SBA with no loss of balance Sitting - Static: Good (unsupported)  Sitting - Dynamic: Fair (occasional)  Standing - Static: Fair (with RW)  Standing - Dynamic : Impaired       WHEELCHAIR MOBILITY Daily Assessment    Curbs/Ramps Assist Required (FIM Score): 0 (Not tested)  Wheelchair Setup Assist Required : 0 (Not tested)       LOWER EXTREMITY EXERCISES Daily Assessment   Increased time and effort to complete with cues for body mechanics   Extremity: Both  Exercise Type #1: Supine lower extremity strengthening  Sets Performed: 2  Reps Performed: 10  Level of Assist: Minimal assistance     SUPINE EXERCISES Sets Reps Comments   Ankle Pumps 3 10    Heel Slides 3 10    Hip Abduction 3 10    Short Arc Quad 3 10             Assessment: Improved tolerance to exercise and functional mobility with less RLE radiating pain. Sitting tolerance will initially be limited due to RLE radiating pain. Patient return to room at end of treatment and remained up in wheelchair with needs in reach. Plan of Care: Continue with POC and progress as tolerated.      Branden Crespo, PT  11/3/2021

## 2021-11-03 NOTE — H&P
Elier Marques MD  Medical Director  [address] [facility], [address] [facility]  Tel: [phone]       Admission History and Physical Exam  Chava Gross Date: 11/3/2021  Surgeon: Dr Yasmeen Mc  Primary Care Provider: Selene Landrum MD  Specialty Group / Referring Service: Ortho Spine service and Hospitalist service      Chief Complaint : \" I gotta get moving\"  Admitting Diagnosis:   Lumbar stenosis with neurogenic claudication [M48.062]; Spondylolisthesis of lumbar region [M43.16]; Spinal stenosis [M48.00]; Spinal stenosis, lumbar region with neurogenic claudication [M48.062]    Active Problems:    Spinal stenosis, lumbar region with neurogenic claudication (11/3/2021)    post op hypotension  ÁNGEL  Leukocytosis  Hx afib post ablation  JULIANN  Morbid obesity  Blood loss anemia    Acute Rehab Dx:  Gait impairment / gait dysfunction  Debility  Deconditioning  Mobility and ambulation deficits  Self care / ADL deficits    Medical Dx:  Past Medical History:   Diagnosis Date    A-fib ([address]     Acquired hypothyroidism 9/13/2017    Anxiety and depression     Arthritis     osteo    Chronic pain     back and R leg    GERD (gastroesophageal reflux disease)     well controlled with med    H/O bone density study 10/2011    normal    Hypertension     controlled by med    Low back pain     Morbid obesity ([address]     bmi 48.63    Pneumonia     1/2020    Rectal bleeding onset x 2 months    Restless legs     Rosacea     Skin cancer     squamous- removed    Smoker     current 10/18/21 5-10 daily \"trying to quit\"-- previously 1 ppd x since age 22    Spinal stenosis     Staph infection 03/2016    from back surgery----- NOT mrsa    Suspected sleep apnea      test not completed 11/2018       Date of Evaluation: November 3, 2021    Rob Will is a 73 y.o. female patient at [facility] who was admitted to [facility] on 11/3/2021 by Charlie Hernandez MD of Physical Medicine and Rehabilitation service with below-mentioned medical history. HPI: The patient is a right-hand dominant, previously functionally independent 72 yo super obese female 73 y. o. with history of Afib s/p ablation, Hypothyroidism, OA, lumbar spinal stenosis, HTN, morbid obesity, JULIANN who was admitted for lumbar laminectomy.  Patient is postop day 1 from L2 L4 lumbar direct lateral interbody fusion with anterior plating and redo of L2 L5 laminectomy by spinal Ortho.  Patient had multiple drains in place postoperatively.  on 10/26 POD 1, patient received one dose of IV Dilaudid as well as multiple blood pressure medications.  She subsequently suffered from hypotension with BP 70/54.  She also was working with PT/OT and became significantly hypotensive when standing up. [facility] did complain of orthostasis at this time. [facility] was given normal saline IV fluid boluses but remains hypotensive with MAPS as low as 49 during my evaluation. Of note, she also received Midodrine 20mg once without no improvement in MAPs. Patient complained of significant drowsiness, and was hardly able to keep her eyes open with current low BP's.  She denied any shortness of breath, chest pain or pressure, palpitations, nausea, vomiting, fevers or chills, diaphoresis, near-syncope or syncope.  Patient was suffering from ÁNGEL with bump in creatinine and WBC 10/27.   Hypotension was thought to be secondary to pain meds and mutli antihypertensives. WBC was 25K. And pod 1, she reqiuired Levophed to maintain BP. This occurred in the face of large volume blood loss. Cardiology, Shaila, felt that hypotension is mixed etiology related to CNS driven event postoperatively and hypovolemic component due to large volume blood loss.  Patient with history of atrial fibrillation ablation.  She has had intermittent episodes of A. fib atrial fibrillation which are rate controlled. Radha urbano feel Davis is contributing to hypotension.  Certainly large volume blood loss was likely etiology with possible underlying CNS mediated component.  Agreed with ProAmatine.  Volume status appearedstable.  Held  Eliquis at this time until we feel patient is no longer actively bleeding. She has required 2 units PRBC transfusion for slow drop in hemoglobin and persistent hypotension. Her Rythmol was held due to hypotension occurring solely after Rythmol administration. Sabrina Lopez blood pressures have slowly improved and she has been stable off of Levophed.       Physical Medicine and Rehabilitation service was consulted, and patient was initially evaluated by Dr. Migel Tavares on 11/1/2021. During reevaluation earlier today, patient shows improvement from initial consult but still shows significant functional deficits. We recommend inpatient hospital rehabilitation as reasonable and necessary due to ongoing acute medical issues which have not changed since initial pre-admission evaluation. We reviewed the preadmission screening and have approved the patient for admission to the [facility]. Attending service cleared patient for transfer to rehab today. Patient continues to have ongoing medical issues outlined above requiring physician / PA medical supervision and has functional deficits which would benefit from continued intensive therapies. Current Level of Function: (evaluated by acute therapy staff, with bed mobility, transfers, balance personally observed post-admission in the [facility] setting minutes prior to submission of document)   OT; mod assist for dressing, min assist functional mobility, others not tested  PT; min assist bed mobility, CGA STS, min assist transfers, gait 15ft with RW with CGA    Prior Home Situation:   Lives in a private residence alone but will have sister in law staying with her. She has a cane , grab bars, shower chair.  She has a wc ramp and bilateral railings      Previous Level of Function / Work / Activity: Prior to admission, Pt was I with ADLs and IADLs. Pt was managing medications and finances. Pt was working part time for [employer] Pt was still driving.           Past Medical History:   Diagnosis Date    A-fib [facility])     Acquired hypothyroidism 9/13/2017    Anxiety and depression     Arthritis     osteo    Chronic pain     back and R leg    GERD (gastroesophageal reflux disease)     well controlled with med    H/O bone density study 10/2011    normal    Hypertension     controlled by med    Low back pain     Morbid obesity ([address]     bmi 48.63    Pneumonia     1/2020    Rectal bleeding onset x 2 months    Restless legs     Rosacea     Skin cancer     squamous- removed    Smoker     current 10/18/21 5-10 daily \"trying to quit\"-- previously 1 ppd x since age 22    Spinal stenosis     Staph infection 03/2016    from back surgery----- NOT mrsa    Suspected sleep apnea      test not completed 11/2018      Past Surgical History:   Procedure Laterality Date    COLONOSCOPY N/A 10/8/2018    COLONOSCOPY / BMI=50  performed by Diallo Bell MD at [facility] HX AFIB ABLATION  2017 & 2018    HX APPENDECTOMY  age 13    HX BACK SURGERY  2016    spine I&D post op infection    HX BREAST BIOPSY Right 2011    benign    HX CARPAL TUNNEL RELEASE Left     HX CHOLECYSTECTOMY  2014    HX COLONOSCOPY  2014         HX CYST REMOVAL      foot    HX KNEE ARTHROSCOPY Right     HX KNEE ARTHROSCOPY Left     HX LUMBAR LAMINECTOMY  2016    L2-S1    HX MALIGNANT SKIN LESION EXCISION      x 3     HX MALIGNANT SKIN LESION EXCISION      Jan 29,2019    HX MALIGNANT SKIN LESION EXCISION  06/16/2020    Nasal    HX MALIGNANT SKIN LESION EXCISION  06/30/2021    face    HX ORTHOPAEDIC      heel spur    HX ORTHOPAEDIC Left 2000's    achilles tendon    HX PELVIC LAPAROSCOPY      x 2    HX PREMALIG/BENIGN SKIN LESION EXCISION      HX GUSTAVO AND BSO  2004    HX TONSIL AND ADENOIDECTOMY  age 11    HX TUBAL LIGATION      OH ELBOW ARTHROSCOP,DIAGNOSTIC Left 2013    along with carpal tunnel     Allergies   Allergen Reactions    Blue Dye Anaphylaxis     BLUE INDIGO DYE     Pcn [Penicillins] Hives    Adhesive Other (comments)     Skin irritation     Bee Venom Protein (Honey Bee) Swelling    Cefdinir Other (comments)     Tremor, can tolerate Ceftin       Family History   Problem Relation Age of Onset    Cancer Mother         colon ca    Arthritis-rheumatoid Mother     Heart Disease Father     Diabetes Father     Hypertension Father       Social History     Tobacco Use    Smoking status: Current Every Day Smoker     Packs/day: 0.75     Years: 25.00     Pack years: 18.75     Types: Cigarettes    Smokeless tobacco: Never Used    Tobacco comment: 05-1 ppd since age 22 (quit briefly   Substance Use Topics    Alcohol use: No      Prior to Admission Medications   Prescriptions Last Dose Informant Patient Reported? Taking? LORazepam (ATIVAN) 0.5 mg tablet Unknown at Unknown time  No No   Sig: TAKE 1 TAB BY MOUTH EVERY EIGHT HOURS AS NEEDED FOR ANXIETY. OTHER Unknown at Unknown time  No No   Sig: Nebulizer (J11.00) Influenza and pneumonia  (primary encounter diagnosis)  (J20.9) Acute bronchitis, unspecified organism  (R06.2) Wheezing   apixaban (ELIQUIS) 5 mg tablet Unknown at Unknown time  No No   Sig: Take 1 Tablet by mouth two (2) times a day. Patient taking differently: Take 5 mg by mouth two (2) times a day. Cardiology instructions- hold 3 days prior to surgery   aspirin delayed-release 81 mg tablet Unknown at Unknown time  Yes No   Sig: Take 81 mg by mouth nightly. Take only while holding Eliquis for surgery per anesthesia protocol   benazepril-hydroCHLOROthiazide (LOTENSIN HCT) 20-12.5 mg per tablet Unknown at Unknown time  No No   Sig: Take 1 Tablet by mouth two (2) times a day.    cholecalciferol (VITAMIN D3) 1,000 unit tablet Unknown at Unknown time  Yes No   Sig: Take 1,000 Units by mouth every morning. doxycycline (MONODOX) 100 mg capsule Unknown at Unknown time  No No   Sig: TAKE 1 CAPSULE BY MOUTH EVERY DAY   escitalopram oxalate (LEXAPRO) 20 mg tablet Unknown at Unknown time  No No   Sig: TAKE 1 TABLET BY MOUTH EVERY DAY   Patient taking differently: Take 20 mg by mouth Every morning. furosemide (LASIX) 40 mg tablet Unknown at Unknown time  No No   Sig: Take 1 Tablet by mouth daily. Patient taking differently: Take 40 mg by mouth daily as needed. gabapentin (NEURONTIN) 300 mg capsule Unknown at Unknown time  No No   Sig: Take 2 Capsules by mouth nightly. levothyroxine (SYNTHROID) 50 mcg tablet Unknown at Unknown time  No No   Sig: Take 1 Tablet by mouth Daily (before breakfast). metoprolol tartrate (LOPRESSOR) 25 mg tablet Unknown at Unknown time  Yes No   Sig: TAKE 1 TABLET (25 MG) BY MOUTH 2 (TWO) TIMES A DAY   omeprazole (PRILOSEC) 40 mg capsule Unknown at Unknown time  No No   Sig: TAKE 1 CAPSULE BY MOUTH EVERY DAY   potassium chloride SR (KLOR-CON 10) 10 mEq tablet Unknown at Unknown time  No No   Sig: Take 1 Tablet by mouth daily. Patient taking differently: Take 10 mEq by mouth daily as needed. With Lasix prn   propafenone (RYTHMOL) 150 mg tablet Unknown at Unknown time  Yes No   Sig: Take 150 mg by mouth two (2) times a day. rOPINIRole (REQUIP) 1 mg tablet Unknown at Unknown time  No No   Sig: TAKE 1/2 TABLET BY MOUTH NIGHTLY   Patient taking differently: TAKE 1/2 TABLET BY MOUTH NIGHTLY  Indications: restless legs syndrome, an extreme discomfort in the calf muscles when sitting or lying down   traMADoL (ULTRAM) 50 mg tablet Unknown at Unknown time  Yes No   Sig: TAKE 1 TAB BY MOUTH EVERY 6 HRS AS NEEDED FOR PAIN FOR UP TO 3 DAYS.  MAX DAILY AMT  200 MG   zolpidem (AMBIEN) 10 mg tablet Unknown at Unknown time  No No   Sig: TAKE 1 TABLET BY MOUTH AT BEDTIME AS NEEDED FOR SLEEP      Facility-Administered Medications: None     Current Facility-Administered Medications   Medication Dose Route Frequency    diphenhydrAMINE (BENADRYL) capsule 25 mg  25 mg Oral Q6H PRN    docusate sodium (COLACE) capsule 100 mg  100 mg Oral BID    HYDROcodone-acetaminophen (NORCO)  mg tablet 1 Tablet  1 Tablet Oral Q4H PRN    naloxone (NARCAN) injection 0.4 mg  0.4 mg IntraVENous PRN    ondansetron (ZOFRAN ODT) tablet 4 mg  4 mg Oral Q4H PRN    phenol throat spray (CHLORASEPTIC) 1 Spray  1 Spray Oral PRN    alcohol 62% (NOZIN) nasal  1 Ampule  1 Ampule Topical Q12H    acetaminophen (TYLENOL) tablet 650 mg  650 mg Oral Q6H PRN    alum-mag hydroxide-simeth (MYLANTA) oral suspension 30 mL  30 mL Oral Q4H PRN    apixaban (ELIQUIS) tablet 5 mg  5 mg Oral BID    aspirin delayed-release tablet 81 mg  81 mg Oral QHS    bisacodyL (DULCOLAX) suppository 10 mg  10 mg Rectal DAILY PRN    bisacodyL (DULCOLAX) suppository 10 mg  10 mg Rectal DAILY PRN    [START ON 11/4/2021] escitalopram oxalate (LEXAPRO) tablet 20 mg  20 mg Oral QAM    famotidine (PEPCID) tablet 20 mg  20 mg Oral QPM    gabapentin (NEURONTIN) capsule 300 mg  300 mg Oral Q12H    [START ON 11/4/2021] levothyroxine (SYNTHROID) tablet 50 mcg  50 mcg Oral ACB    LORazepam (ATIVAN) tablet 0.5 mg  0.5 mg Oral Q6H PRN    magnesium hydroxide (MILK OF MAGNESIA) 400 mg/5 mL oral suspension 30 mL  30 mL Oral DAILY PRN    metoprolol tartrate (LOPRESSOR) tablet 25 mg  25 mg Oral BID    midodrine (PROAMATINE) tablet 10 mg  10 mg Oral Q8H    [START ON 11/4/2021] pantoprazole (PROTONIX) tablet 40 mg  40 mg Oral ACB    [START ON 11/4/2021] polyethylene glycol (MIRALAX) packet 17 g  17 g Oral DAILY    [START ON 11/4/2021] potassium chloride (KLOR-CON) tablet 10 mEq  10 mEq Oral DAILY    propafenone (RYTHMOL) tablet 150 mg  150 mg Oral BID    traMADoL (ULTRAM) tablet 50 mg  50 mg Oral Q6H PRN    zolpidem (AMBIEN) tablet 10 mg  10 mg Oral QHS PRN    influenza vaccine 2021-22 (6 mos+)(PF) (FLUARIX/FLULAVAL/FLUZONE QUAD) injection 0.5 mL  1 Each IntraMUSCular PRIOR TO DISCHARGE       Review of Systems:  General: Denies: fevers, chills, sweats, fatigue, malaise, anorexia, weight loss   Eyes:  Denies: blurry vision, loss of vision, eye pain, photophobia   HENT:  Denies: hearing loss, tinnitus, earache, epistaxis, sore throat, hoarseness  Lungs: Denies: cough, wheeze, asthma, hemoptysis, dyspnea  CV:  Denies: chest pain, palpitations, syncope, orthopnea, paroxysmal nocturnal dyspnea, claudication   GI:  Denies: dysphagia, odynophagia, nausea, vomiting, diarrhea, constipation, abdominal pain, jaundice, melena   :  Denies: frequency, dysuria, nocturia, urinary incontinence, stones, hematuria   Endocrine: Denies: polydipsia/polyuria, skin changes, temperature intolerance, unexpected weight gain or loss  MSK:  Denies:joint pain, joint swelling, muscle pain, muscle weakness + back pain, leg weakness  Heme:  Denies: bleeding problems, blood transfusions, bruising, pallor, swollen lymph nodes   Neuro:  Denies: headache, dysarthria, blurred vision, diplopia, seizure, memory problems, speech problems, + gait problems, coordination problems      Objective:     Visit Vitals  BP (!) 156/76   Pulse 80   Temp 98.1 °F (36.7 °C)   Resp 16   SpO2 95%      Intake and Output:  No intake/output data recorded. Physical Exam:   General:  Alert, appears stated age. No acute distress. HEENT:  Normocephalic, EOM intact, facial symmetry noted. Nares patent, oral mucosa moist without lesions. Lungs:  Clear to auscultation bilaterally. Respirations even and unlabored. Heart:  Regular rate and underlying rhythm, S1, S2. No obvious murmur, click, rub or gallop. Genitourinary: Deferred. Abdomen:  Soft, non-tender, not distended. Bowel sounds normoactive. Super obese  No obvious masses or organomegaly palpated.     Neuromuscular:  UEs nl m bulk and tone 5/5  prox hip flexion 3+ ,distally intact  Sensation intact  No clonus       Skin:  Intact, dry, good skin turgor, age related changes present   Edema: 1, groin/perineal area with excoriation   Incision:  healing well, clean, dry, and intact and covered with gauze and tegaderm   2nd older inc left lateral back with open blister     Psych: Patient was oriented to person, place, and time. Without hallucinations, abnormal affect or abnormal behaviors during the examination. Recent Results (from the past 72 hour(s))   CBC W/O DIFF    Collection Time: 11/01/21  6:22 AM   Result Value Ref Range    WBC 13.1 (H) 4.3 - 11.1 K/uL    RBC 2.93 (L) 4.05 - 5.2 M/uL    HGB 8.8 (L) 11.7 - 15.4 g/dL    HCT 28.1 (L) 35.8 - 46.3 %    MCV 95.9 79.6 - 97.8 FL    MCH 30.0 26.1 - 32.9 PG    MCHC 31.3 (L) 31.4 - 35.0 g/dL    RDW 17.7 (H) 11.9 - 14.6 %    PLATELET 934 589 - 092 K/uL    MPV 9.4 9.4 - 12.3 FL    ABSOLUTE NRBC 0.00 0.0 - 0.2 K/uL   METABOLIC PANEL, COMPREHENSIVE    Collection Time: 11/01/21  6:22 AM   Result Value Ref Range    Sodium 141 136 - 145 mmol/L    Potassium 4.5 3.5 - 5.1 mmol/L    Chloride 106 98 - 107 mmol/L    CO2 33 (H) 21 - 32 mmol/L    Anion gap 2 (L) 7 - 16 mmol/L    Glucose 95 65 - 100 mg/dL    BUN 14 8 - 23 MG/DL    Creatinine 0.51 (L) 0.6 - 1.0 MG/DL    GFR est AA >60 >60 ml/min/1.73m2    GFR est non-AA >60 >60 ml/min/1.73m2    Calcium 8.5 8.3 - 10.4 MG/DL    Bilirubin, total 0.6 0.2 - 1.1 MG/DL    ALT (SGPT) 26 12 - 65 U/L    AST (SGOT) 54 (H) 15 - 37 U/L    Alk.  phosphatase 118 50 - 136 U/L    Protein, total 4.9 (L) 6.3 - 8.2 g/dL    Albumin 1.8 (L) 3.2 - 4.6 g/dL    Globulin 3.1 2.3 - 3.5 g/dL    A-G Ratio 0.6 (L) 1.2 - 3.5     MAGNESIUM    Collection Time: 11/01/21  6:22 AM   Result Value Ref Range    Magnesium 1.9 1.8 - 2.4 mg/dL   SARS-COV-2    Collection Time: 11/01/21  2:38 PM   Result Value Ref Range    SARS-CoV-2 Please find results under separate order     SARS-COV-2, PCR    Collection Time: 11/01/21  2:38 PM    Specimen: Nasopharyngeal   Result Value Ref Range    Specimen source Nasopharyngeal SARS-CoV-2 Not detected NOTD     CBC W/O DIFF    Collection Time: 11/02/21  5:59 AM   Result Value Ref Range    WBC 11.5 (H) 4.3 - 11.1 K/uL    RBC 3.13 (L) 4.05 - 5.2 M/uL    HGB 9.1 (L) 11.7 - 15.4 g/dL    HCT 29.7 (L) 35.8 - 46.3 %    MCV 94.9 79.6 - 97.8 FL    MCH 29.1 26.1 - 32.9 PG    MCHC 30.6 (L) 31.4 - 35.0 g/dL    RDW 17.5 (H) 11.9 - 14.6 %    PLATELET 730 742 - 970 K/uL    MPV 9.3 (L) 9.4 - 12.3 FL    ABSOLUTE NRBC 0.00 0.0 - 0.2 K/uL   METABOLIC PANEL, COMPREHENSIVE    Collection Time: 11/02/21  5:59 AM   Result Value Ref Range    Sodium 139 136 - 145 mmol/L    Potassium 4.7 3.5 - 5.1 mmol/L    Chloride 105 98 - 107 mmol/L    CO2 34 (H) 21 - 32 mmol/L    Anion gap 0 (L) 7 - 16 mmol/L    Glucose 88 65 - 100 mg/dL    BUN 18 8 - 23 MG/DL    Creatinine 0.58 (L) 0.6 - 1.0 MG/DL    GFR est AA >60 >60 ml/min/1.73m2    GFR est non-AA >60 >60 ml/min/1.73m2    Calcium 8.4 8.3 - 10.4 MG/DL    Bilirubin, total 0.6 0.2 - 1.1 MG/DL    ALT (SGPT) 22 12 - 65 U/L    AST (SGOT) 29 15 - 37 U/L    Alk.  phosphatase 107 50 - 136 U/L    Protein, total 5.1 (L) 6.3 - 8.2 g/dL    Albumin 1.9 (L) 3.2 - 4.6 g/dL    Globulin 3.2 2.3 - 3.5 g/dL    A-G Ratio 0.6 (L) 1.2 - 3.5     MAGNESIUM    Collection Time: 11/02/21  5:59 AM   Result Value Ref Range    Magnesium 1.9 1.8 - 2.4 mg/dL   CBC W/O DIFF    Collection Time: 11/03/21  5:42 AM   Result Value Ref Range    WBC 11.9 (H) 4.3 - 11.1 K/uL    RBC 3.03 (L) 4.05 - 5.2 M/uL    HGB 9.0 (L) 11.7 - 15.4 g/dL    HCT 29.6 (L) 35.8 - 46.3 %    MCV 97.7 79.6 - 97.8 FL    MCH 29.7 26.1 - 32.9 PG    MCHC 30.4 (L) 31.4 - 35.0 g/dL    RDW 17.2 (H) 11.9 - 14.6 %    PLATELET 573 376 - 552 K/uL    MPV 9.3 (L) 9.4 - 12.3 FL    ABSOLUTE NRBC 0.00 0.0 - 0.2 K/uL   MAGNESIUM    Collection Time: 11/03/21  5:42 AM   Result Value Ref Range    Magnesium 2.0 1.8 - 2.4 mg/dL   METABOLIC PANEL, COMPREHENSIVE    Collection Time: 11/03/21  5:42 AM   Result Value Ref Range    Sodium 138 136 - 145 mmol/L Potassium 4.3 3.5 - 5.1 mmol/L    Chloride 105 98 - 107 mmol/L    CO2 32 21 - 32 mmol/L    Anion gap 1 (L) 7 - 16 mmol/L    Glucose 84 65 - 100 mg/dL    BUN 17 8 - 23 MG/DL    Creatinine 0.53 (L) 0.6 - 1.0 MG/DL    GFR est AA >60 >60 ml/min/1.73m2    GFR est non-AA >60 >60 ml/min/1.73m2    Calcium 8.3 8.3 - 10.4 MG/DL    Bilirubin, total 0.5 0.2 - 1.1 MG/DL    ALT (SGPT) 18 12 - 65 U/L    AST (SGOT) 20 15 - 37 U/L    Alk. phosphatase 96 50 - 136 U/L    Protein, total 4.9 (L) 6.3 - 8.2 g/dL    Albumin 1.9 (L) 3.2 - 4.6 g/dL    Globulin 3.0 2.3 - 3.5 g/dL    A-G Ratio 0.6 (L) 1.2 - 3.5           Condition on Admission: Good    S/p L2 L4 lumbar direct lateral interbody fusion with anterior plating and redo of L2 L5 laminectomy 10/26; Dr Agustin Nip due to spinal stenosis with neurogenic claudication    Assessment:     The Post Assessment Physician Evaluation (PEDRO) found the current functional status to be comparable with the pre-admission screening. The patient is a good candidate for acute inpatient rehabilitation. Nothing since the pre-admission screen has changed that determination. Rehabilitation Plan  The patient has shown the ability to tolerate and benefit from 3 hours of therapy daily and is being admitted to a comprehensive acute inpatient rehabilitation program consisting of at least 3 hours of combined physical and occupational therapies. - Begin intensive Physical Therapy for a minimum of 1.5 hours a day, at least 5 out of 7 days per week to address bed mobility, transfers, ambulation, strengthening, balance, and endurance. - Begin intensive Occupational Therapy for a minimum of 1.5 hours a day, at least 5 out of 7 days per week to address ADLs (bathing, LE dressing, toileting) and adaptive equipment as needed. \  Each of these therapies will be continued as above for the duration of the inpatient rehab stay.     The patient will also require 24-hour skilled rehabilitation nursing for bowel and bladder management, skin care for decubitus ulcer prevention, pain management and ongoing medication administration. S/p L2 L4 lumbar direct lateral interbody fusion with anterior plating and redo of L2 L5 laminectomy 10/26; Dr Johnnie Trinidad due to spinal stenosis with neurogenic claudication  Plan / Recommendations / Medical Decision Making:     Daily physician / PA medical management:    Lumbar stenosis with neurogenic claudication [M48.062]; Spondylolisthesis of lumbar region [M43.16]; Spinal stenosis [M48.00]; Spinal stenosis, lumbar region with neurogenic claudication [M48.062] -   -PT/OT; spinal precautions, girdle when ambulating    -Atrial Fibrillation; s/p 2 failed ablations, managed with rhythmol , metoprolol and Eliquis. Currently NSR; eliquis restarted 10/31 in light of PE RUL    Hypertension - BP fluctuating, managed medically. Has been hypotensive requiring levophed and now midodrine. (pts home Prinivil 20mg bid and HCTZ 12.5 bid are oh hole.); needs teds when OOB. Pain control - ongoing significant pain in back which is stable and controlled by PRN meds. Will require regular pain assessment and comprehensive pain management. Cont prn Norco, Ultram and tylenol and neurontin    Hypokalemia; monitor; currently 4.3 on supplement due to hctz; may be able to hold supplement    Acquired hypothyroidism; cont synthroid    Pneumonia prophylaxis - incentive spirometer every hour while awake. DVT risk / DVT prophylaxis - daily physician / PA exam to assess as patient is at increased risk for of thromboembolism. Mobilize as tolerated. Sequential pneumatic compression devices (SCDs) when in bed; thigh-high or knee-high thromboembolic deterrent hose when out of bed. On Eliquis    GI prophylaxis - resume PPI. At times may need additional antacids, Maalox prn. Depression - continue on Lexapro.  At risk of depression exacerbation due tp pain and loss of independent function    General skin care / wound prevention - monitor lumbar incision and general skin wound status daily per staff and physician / PA. At risk for failure. Will require 24/7 rehab nursing. Bladder program / urinary retention / neurogenic bladder - schedule voids q 6-8 hrs. Check post-void residual as needed; in-and-out catheter if post-void residual is more than 400ml. Bowel program - at risk for constipation as a side effect of opioids, other medications, impaired mobility, etc. MiraLAX daily for regularity, Azul-Colace for stool softener. PRN MOM, bisacodyl suppository or tablets for constipation. Disposition: The patient's prognosis for significant practical improvement within a reasonable period of time appears good and the estimated length of stay is 10-14 days; patient is expected to return home with family supervision and continued rehabilitation with home health therapy. Given the patient's complex neurologic / medical condition, risks of further medical complications include but are not limited to: thromboembolism / pulmonary embolism, skin breakdown, pneumonia due to decreased mobility, CVA, MI, cardiac arrhythmias due to HTN, postural hypotension. For these ongoing medical issues, rehabilitation services could not be safely provided at a lower level of care such as a skilled nursing facility or nursing home.         Signed By: Satinder Hartman MD     November 3, 2021

## 2021-11-03 NOTE — PROGRESS NOTES
Pt discharged and transferred to [facility] today. No other discharge needs were identified. Tx goals have been met. Care Management Interventions  PCP Verified by CM:  Yes (Manav)  Mode of Transport at Discharge: [address]  [address] Time of Discharge: 1000  Transition of Care Consult (CM Consult): Discharge Planning  Discharge Durable Medical Equipment: No  Physical Therapy Consult: Yes  Occupational Therapy Consult: Yes  Speech Therapy Consult: No  Support Systems: Child(rober), Other Family Member(s), Religious/[employer] Community, Friend/Neighbor  Confirm Follow Up Transport: Family  The Plan for Transition of Care is Related to the Following Treatment Goals : Return home and back to her baseline  The Patient and/or Patient Representative was Provided with a Choice of Provider and Agrees with the Discharge Plan?: Yes  Name of the Patient Representative Who was Provided with a Choice of Provider and Agrees with the Discharge Plan: Patient  Freedom of Choice List was Provided with Basic Dialogue that Supports the Patient's Individualized Plan of Care/Goals, Treatment Preferences and Shares the Quality Data Associated with the Providers?: Yes  [place] Resource Information Provided?: No  Discharge Location  Discharge Placement: Rehab hospital/unit acute [facility])

## 2021-11-03 NOTE — PROGRESS NOTES
ACUTE OT GOALS:  (Developed with and agreed upon by patient and/or caregiver.)  1. Pt will toilet with SBA    2. Pt will complete functional mobility for ADLs with mod I using AD as needed  3. Pt will complete lower body dressing with mod I using AE as needed  4. Pt will complete grooming and hygiene at sink independently  5. Pt will demonstrate independence with HEP to promote increased BUE strength and functional use for ADLs  6. Pt will complete UB bathing and dressing independently         Timeframe: 7 days         OCCUPATIONAL THERAPY: Daily Note OT Treatment Day # 2    Katy Zambrano is a 73 y.o. female   PRIMARY DIAGNOSIS: Lumbar stenosis with neurogenic claudication  Lumbar stenosis with neurogenic claudication [M48.062]  Spondylolisthesis of lumbar region [M43.16]  Spinal stenosis [M48.00]  Procedure(s) (LRB):  L2-L4 SPINE LUMBAR DIRECT LATERAL INTERBODY FUSION (DLIF) ANTERIOR PLATING / SSEP (Left)  REDO L2-L5 LAMINECTOMY FUSION AND L4-L5 TLIF / NEUROMONITORING (N/A)  9 Days Post-Op  [place] / [place] / Product Type: Medicare /   ASSESSMENT:     REHAB RECOMMENDATIONS: CURRENT LEVEL OF FUNCTION:  (Most Recently Demonstrated)   Recommendation to date pending progress:  [address] [place] [address]  Equipment:    To Be Determined Bathing:   Not tested  Dressing:   Moderate Assistance for shoes  Feeding/Grooming:   Not tested  Toileting:   Not tested  Functional Mobility:   Minimal Assistance     ASSESSMENT:  Ms. Nicolette Aranda is doing well today. Pt required min A for bed mobility and sit to stand transfer. Pt introduced to and practiced using AE to assist with LB dressing. Pt did well using AE. Pt instructed in UE exercises as well to increase strength and activity tolerance. Pt verbalized understanding of spinal precautions. Pt assisted to stretcher for discharge. Minimal progress made today. Pt discharged to [facility].      SUBJECTIVE:   Ms. Nicolette Aranda states, \"I don't have no grandkids\"    SOCIAL HISTORY/LIVING ENVIRONMENT:   Home Environment: Private residence  One/Two Story Residence: Two story  Living Alone: Yes  Support Systems: Child(rober), Other Family Member(s), [employer], Friend/Neighbor    OBJECTIVE:     PAIN: VITAL SIGNS: LINES/DRAINS:   Pre Treatment: Pain Screen  Pain Scale 1: Numeric (0 - 10)  Pain Intensity 1: 0  Post Treatment: 0   N/A  O2 Device: None (Room air)     ACTIVITIES OF DAILY LIVING: I Mod I S SBA CGA Min Mod Max Total NT Comments   BASIC ADLs:              Bathing/ Showering [] [] [] [] [] [] [] [] [] [x]    Toileting [] [] [] [] [] [] [] [] [] [x]    Dressing [] [] [] [] [] [] [x] [] [] []    Feeding [] [] [] [] [] [] [] [] [] [x]    Grooming [] [] [] [] [] [] [] [] [] [x]    Personal Device Care [] [] [] [] [] [] [] [] [] [x]    Functional Mobility [] [] [] [] [] [x] [] [] [] []    I=Independent, Mod I=Modified Independent, S=Supervision, SBA=Standby Assistance, CGA=Contact Guard Assistance,   Min=Minimal Assistance, Mod=Moderate Assistance, Max=Maximal Assistance, Total=Total Assistance, NT=Not Tested    MOBILITY: I Mod I S SBA CGA Min Mod Max Total  NT x2 Comments:   Supine to sit [] [] [] [] [] [x] [] [] [] [] []    Sit to supine [] [] [] [] [] [] [] [] [] [x] []    Sit to stand [] [] [] [] [] [x] [] [] [] [] []    Bed to chair [] [] [] [] [] [x] [] [] [] [] []    I=Independent, Mod I=Modified Independent, S=Supervision, SBA=Standby Assistance, CGA=Contact Guard Assistance,   Min=Minimal Assistance, Mod=Moderate Assistance, Max=Maximal Assistance, Total=Total Assistance, NT=Not Tested    BALANCE: Good Fair+ Fair Fair- Poor NT Comments   Sitting Static [] [x] [] [] [] []    Sitting Dynamic [] [x] [] [] [] []              Standing Static [] [] [x] [] [] []    Standing Dynamic [] [] [x] [] [] []      PLAN:   FREQUENCY/DURATION: OT Plan of Care: 3 times/week for duration of hospital stay or until stated goals are met, whichever comes first.    TREATMENT:   TREATMENT:   ($$ Self Care/Home Management: 8-22 mins$$ Therapeutic Activity: 8-22 mins   )  Therapeutic Activity (10 Minutes): Therapeutic activity included Supine to Sit, Transfer Training, Ambulation on level ground, Sitting balance  and Standing balance to improve functional Mobility, Strength and Activity tolerance. Self Care (15 Minutes): Self care including Lower Body Dressing and ADL Adaptive Equipment Training to increase independence and decrease level of assistance required.     TREATMENT GRID:  N/A    AFTER TREATMENT POSITION/PRECAUTIONS:  On stretcher for transport    INTERDISCIPLINARY COLLABORATION:  RN/PCT and OT/WILKERSON    TOTAL TREATMENT DURATION:  OT Patient Time In/Time Out  Time In: 0940  Time Out: [address], \[facility]\""

## 2021-11-03 NOTE — PROGRESS NOTES
Problem: Falls - Risk of  Goal: *Absence of Falls  Description: Document Velasquez Onofre Fall Risk and appropriate interventions in the flowsheet. Outcome: Progressing Towards Goal  Note: Fall Risk Interventions:  Mobility Interventions: Patient to call before getting OOB         Medication Interventions: Patient to call before getting OOB    Elimination Interventions: Call light in reach, Patient to call for help with toileting needs    History of Falls Interventions: Door open when patient unattended, Bed/chair exit alarm         Problem: Patient Education: Go to Patient Education Activity  Goal: Patient/Family Education  Outcome: Progressing Towards Goal     Problem: Pressure Injury - Risk of  Goal: *Prevention of pressure injury  Description: Document Trung Scale and appropriate interventions in the flowsheet.   Outcome: Progressing Towards Goal  Note: Pressure Injury Interventions:  Sensory Interventions: Assess changes in LOC    Moisture Interventions: Absorbent underpads, Apply protective barrier, creams and emollients, Assess need for specialty bed, Internal/External urinary devices    Activity Interventions: Increase time out of bed, Pressure redistribution bed/mattress(bed type)    Mobility Interventions: Pressure redistribution bed/mattress (bed type), PT/OT evaluation    Nutrition Interventions: Document food/fluid/supplement intake    Friction and Shear Interventions: Foam dressings/transparent film/skin sealants, HOB 30 degrees or less, Minimize layers                Problem: Patient Education: Go to Patient Education Activity  Goal: Patient/Family Education  Outcome: Progressing Towards Goal     Problem: Patient Education: Go to Patient Education Activity  Goal: Patient/Family Education  Outcome: Progressing Towards Goal     Problem: Patient Education: Go to Patient Education Activity  Goal: Patient/Family Education  Outcome: Progressing Towards Goal

## 2021-11-04 LAB
ALBUMIN SERPL-MCNC: 2.2 G/DL (ref 3.2–4.6)
ALBUMIN/GLOB SERPL: 0.7 {RATIO} (ref 1.2–3.5)
ALP SERPL-CCNC: 103 U/L (ref 50–136)
ALT SERPL-CCNC: 22 U/L (ref 12–65)
ANION GAP SERPL CALC-SCNC: ABNORMAL MMOL/L (ref 7–16)
AST SERPL-CCNC: 24 U/L (ref 15–37)
BACTERIA SPEC CULT: NORMAL
BILIRUB SERPL-MCNC: 0.6 MG/DL (ref 0.2–1.1)
BUN SERPL-MCNC: 13 MG/DL (ref 8–23)
CALCIUM SERPL-MCNC: 8.6 MG/DL (ref 8.3–10.4)
CHLORIDE SERPL-SCNC: 101 MMOL/L (ref 98–107)
CO2 SERPL-SCNC: 37 MMOL/L (ref 21–32)
CREAT SERPL-MCNC: 0.54 MG/DL (ref 0.6–1)
ERYTHROCYTE [DISTWIDTH] IN BLOOD BY AUTOMATED COUNT: 17.1 % (ref 11.9–14.6)
GLOBULIN SER CALC-MCNC: 3 G/DL (ref 2.3–3.5)
GLUCOSE SERPL-MCNC: 81 MG/DL (ref 65–100)
HCT VFR BLD AUTO: 30.3 % (ref 35.8–46.3)
HGB BLD-MCNC: 9.3 G/DL (ref 11.7–15.4)
MCH RBC QN AUTO: 29.6 PG (ref 26.1–32.9)
MCHC RBC AUTO-ENTMCNC: 30.7 G/DL (ref 31.4–35)
MCV RBC AUTO: 96.5 FL (ref 79.6–97.8)
NRBC # BLD: 0 K/UL (ref 0–0.2)
PLATELET # BLD AUTO: 320 K/UL (ref 150–450)
PMV BLD AUTO: 9.6 FL (ref 9.4–12.3)
POTASSIUM SERPL-SCNC: 4.3 MMOL/L (ref 3.5–5.1)
PROT SERPL-MCNC: 5.2 G/DL (ref 6.3–8.2)
RBC # BLD AUTO: 3.14 M/UL (ref 4.05–5.2)
SERVICE CMNT-IMP: NORMAL
SODIUM SERPL-SCNC: 137 MMOL/L (ref 136–145)
WBC # BLD AUTO: 11.5 K/UL (ref 4.3–11.1)

## 2021-11-04 PROCEDURE — 97116 GAIT TRAINING THERAPY: CPT

## 2021-11-04 PROCEDURE — 65310000000 HC RM PRIVATE REHAB

## 2021-11-04 PROCEDURE — 99232 SBSQ HOSP IP/OBS MODERATE 35: CPT | Performed by: PHYSICAL MEDICINE & REHABILITATION

## 2021-11-04 PROCEDURE — 97110 THERAPEUTIC EXERCISES: CPT

## 2021-11-04 PROCEDURE — 74011000250 HC RX REV CODE- 250: Performed by: PHYSICIAN ASSISTANT

## 2021-11-04 PROCEDURE — 74011250637 HC RX REV CODE- 250/637: Performed by: PHYSICAL MEDICINE & REHABILITATION

## 2021-11-04 PROCEDURE — 80053 COMPREHEN METABOLIC PANEL: CPT

## 2021-11-04 PROCEDURE — 97530 THERAPEUTIC ACTIVITIES: CPT

## 2021-11-04 PROCEDURE — 85027 COMPLETE CBC AUTOMATED: CPT

## 2021-11-04 PROCEDURE — 97535 SELF CARE MNGMENT TRAINING: CPT

## 2021-11-04 RX ORDER — MIDODRINE HYDROCHLORIDE 5 MG/1
5 TABLET ORAL EVERY 8 HOURS
Status: DISCONTINUED | OUTPATIENT
Start: 2021-11-04 | End: 2021-11-06 | Stop reason: CLARIF

## 2021-11-04 RX ORDER — LIDOCAINE 4 G/100G
1 PATCH TOPICAL EVERY 24 HOURS
Status: DISCONTINUED | OUTPATIENT
Start: 2021-11-04 | End: 2021-11-18 | Stop reason: HOSPADM

## 2021-11-04 RX ORDER — AMITRIPTYLINE HYDROCHLORIDE 25 MG/1
25 TABLET, FILM COATED ORAL
Status: DISCONTINUED | OUTPATIENT
Start: 2021-11-04 | End: 2021-11-18 | Stop reason: HOSPADM

## 2021-11-04 RX ORDER — GABAPENTIN 300 MG/1
300 CAPSULE ORAL 3 TIMES DAILY
Status: DISCONTINUED | OUTPATIENT
Start: 2021-11-04 | End: 2021-11-18 | Stop reason: HOSPADM

## 2021-11-04 RX ORDER — ROPINIROLE 1 MG/1
1 TABLET, FILM COATED ORAL
Status: DISCONTINUED | OUTPATIENT
Start: 2021-11-04 | End: 2021-11-18 | Stop reason: HOSPADM

## 2021-11-04 RX ADMIN — DOCUSATE SODIUM 100 MG: 100 CAPSULE, LIQUID FILLED ORAL at 17:21

## 2021-11-04 RX ADMIN — HYDROCODONE BITARTRATE AND ACETAMINOPHEN 1 TABLET: 10; 325 TABLET ORAL at 18:57

## 2021-11-04 RX ADMIN — GABAPENTIN 300 MG: 300 CAPSULE ORAL at 08:23

## 2021-11-04 RX ADMIN — AMITRIPTYLINE HYDROCHLORIDE 25 MG: 25 TABLET, FILM COATED ORAL at 21:13

## 2021-11-04 RX ADMIN — APIXABAN 5 MG: 5 TABLET, FILM COATED ORAL at 08:23

## 2021-11-04 RX ADMIN — Medication 1 AMPULE: at 21:14

## 2021-11-04 RX ADMIN — Medication 1 AMPULE: at 08:24

## 2021-11-04 RX ADMIN — LEVOTHYROXINE SODIUM 50 MCG: 0.05 TABLET ORAL at 06:21

## 2021-11-04 RX ADMIN — POTASSIUM CHLORIDE 10 MEQ: 750 TABLET, EXTENDED RELEASE ORAL at 08:24

## 2021-11-04 RX ADMIN — MIDODRINE HYDROCHLORIDE 5 MG: 5 TABLET ORAL at 14:28

## 2021-11-04 RX ADMIN — ASPIRIN 81 MG: 81 TABLET, COATED ORAL at 21:12

## 2021-11-04 RX ADMIN — DOCUSATE SODIUM 100 MG: 100 CAPSULE, LIQUID FILLED ORAL at 08:24

## 2021-11-04 RX ADMIN — MIDODRINE HYDROCHLORIDE 10 MG: 5 TABLET ORAL at 06:21

## 2021-11-04 RX ADMIN — POLYETHYLENE GLYCOL 3350 17 G: 17 POWDER, FOR SOLUTION ORAL at 08:24

## 2021-11-04 RX ADMIN — PANTOPRAZOLE SODIUM 40 MG: 40 TABLET, DELAYED RELEASE ORAL at 06:21

## 2021-11-04 RX ADMIN — ESCITALOPRAM OXALATE 20 MG: 10 TABLET ORAL at 08:23

## 2021-11-04 RX ADMIN — PROPAFENONE HYDROCHLORIDE 150 MG: 150 TABLET, FILM COATED ORAL at 08:23

## 2021-11-04 RX ADMIN — GABAPENTIN 300 MG: 300 CAPSULE ORAL at 15:48

## 2021-11-04 RX ADMIN — PROPAFENONE HYDROCHLORIDE 150 MG: 150 TABLET, FILM COATED ORAL at 17:21

## 2021-11-04 RX ADMIN — METOPROLOL TARTRATE 25 MG: 25 TABLET, FILM COATED ORAL at 08:24

## 2021-11-04 RX ADMIN — HYDROCODONE BITARTRATE AND ACETAMINOPHEN 1 TABLET: 10; 325 TABLET ORAL at 14:27

## 2021-11-04 RX ADMIN — MIDODRINE HYDROCHLORIDE 5 MG: 5 TABLET ORAL at 21:13

## 2021-11-04 RX ADMIN — FAMOTIDINE 20 MG: 20 TABLET ORAL at 17:21

## 2021-11-04 RX ADMIN — GABAPENTIN 300 MG: 300 CAPSULE ORAL at 21:12

## 2021-11-04 RX ADMIN — HYDROCODONE BITARTRATE AND ACETAMINOPHEN 1 TABLET: 10; 325 TABLET ORAL at 04:56

## 2021-11-04 RX ADMIN — HYDROCODONE BITARTRATE AND ACETAMINOPHEN 1 TABLET: 10; 325 TABLET ORAL at 08:57

## 2021-11-04 RX ADMIN — APIXABAN 5 MG: 5 TABLET, FILM COATED ORAL at 17:21

## 2021-11-04 RX ADMIN — TRAMADOL HYDROCHLORIDE 50 MG: 50 TABLET, FILM COATED ORAL at 10:13

## 2021-11-04 RX ADMIN — METOPROLOL TARTRATE 25 MG: 25 TABLET, FILM COATED ORAL at 17:21

## 2021-11-04 NOTE — PROGRESS NOTES
John Thompson MD  Medical Director  [address] [facility], [address] [facility]  Tel: [phone]       [facility] PROGRESS NOTE    Conchis lAan  Admit Date: 11/3/2021  Admit Diagnosis:   Lumbar stenosis with neurogenic claudication [M48.062]; Spondylolisthesis of lumbar region [M43.16]; Spinal stenosis [M48.00]; Spinal stenosis, lumbar region with neurogenic claudication [M48.062]    Subjective     Patient seen and examined. Radiating burning pain right buttocks down back of leg , then numb distally. + BM yesterday. C/o left hip pain where graft was taken. Good spirits.  Excited about showering    Objective:     Current Facility-Administered Medications   Medication Dose Route Frequency    rOPINIRole (REQUIP) tablet 1 mg  1 mg Oral QHS PRN    gabapentin (NEURONTIN) capsule 300 mg  300 mg Oral TID    amitriptyline (ELAVIL) tablet 25 mg  25 mg Oral QHS    midodrine (PROAMATINE) tablet 5 mg  5 mg Oral Q8H    diphenhydrAMINE (BENADRYL) capsule 25 mg  25 mg Oral Q6H PRN    docusate sodium (COLACE) capsule 100 mg  100 mg Oral BID    HYDROcodone-acetaminophen (NORCO)  mg tablet 1 Tablet  1 Tablet Oral Q4H PRN    naloxone (NARCAN) injection 0.4 mg  0.4 mg IntraVENous PRN    ondansetron (ZOFRAN ODT) tablet 4 mg  4 mg Oral Q4H PRN    phenol throat spray (CHLORASEPTIC) 1 Spray  1 Spray Oral PRN    alcohol 62% (NOZIN) nasal  1 Ampule  1 Ampule Topical Q12H    acetaminophen (TYLENOL) tablet 650 mg  650 mg Oral Q6H PRN    alum-mag hydroxide-simeth (MYLANTA) oral suspension 30 mL  30 mL Oral Q4H PRN    apixaban (ELIQUIS) tablet 5 mg  5 mg Oral BID    aspirin delayed-release tablet 81 mg  81 mg Oral QHS    bisacodyL (DULCOLAX) suppository 10 mg  10 mg Rectal DAILY PRN    bisacodyL (DULCOLAX) suppository 10 mg  10 mg Rectal DAILY PRN    escitalopram oxalate (LEXAPRO) tablet 20 mg  20 mg Oral QAM    famotidine (PEPCID) tablet 20 mg 20 mg Oral QPM    levothyroxine (SYNTHROID) tablet 50 mcg  50 mcg Oral ACB    LORazepam (ATIVAN) tablet 0.5 mg  0.5 mg Oral Q6H PRN    magnesium hydroxide (MILK OF MAGNESIA) 400 mg/5 mL oral suspension 30 mL  30 mL Oral DAILY PRN    metoprolol tartrate (LOPRESSOR) tablet 25 mg  25 mg Oral BID    pantoprazole (PROTONIX) tablet 40 mg  40 mg Oral ACB    polyethylene glycol (MIRALAX) packet 17 g  17 g Oral DAILY    potassium chloride (KLOR-CON) tablet 10 mEq  10 mEq Oral DAILY    propafenone (RYTHMOL) tablet 150 mg  150 mg Oral BID    traMADoL (ULTRAM) tablet 50 mg  50 mg Oral Q6H PRN    zolpidem (AMBIEN) tablet 10 mg  10 mg Oral QHS PRN    influenza vaccine 2021-22 (6 mos+)(PF) (FLUARIX/FLULAVAL/FLUZONE QUAD) injection 0.5 mL  1 Each IntraMUSCular PRIOR TO DISCHARGE    nicotine (NICODERM CQ) 14 mg/24 hr patch 1 Patch  1 Patch TransDERmal Q24H       Review of Systems:   Denies chest pain, shortness of breath, cough, headache, visual problems, abdominal pain, dysuria, calf pain. Pertinent positives are as noted in the HPI, ROS unremarkable otherwise. Visit Vitals  BP (!) 125/52 (BP 1 Location: Left arm, BP Patient Position: At rest;Supine)   Pulse 73   Temp 97.8 °F (36.6 °C)   Resp 16   SpO2 100%        Physical Exam:   General: Alert and age appropriately oriented. No acute cardiorespiratory distress. HEENT: Normocephalic, no scleral icterus. Oral mucosa moist without cyanosis. Lungs: Clear to auscultation bilaterally. Respiration even and unlabored. Heart: Ireg, rate contr, S1, S2. No murmurs, clicks, rub or gallops. Abdomen: Soft, non-tender, not distended. Bowel sounds normoactive. No organomegaly. morbidly obese   Genitourinary: Deferred. Neuromuscular:      Hip flexion 2+, KE 5, DF 5, KF 4  Sensation intact  UEs 5/5   Skin/extremity: No rashes, no erythema. No calf tenderness B LE.   Back incision dressing intact  Left hip graft site with steri strips and dried blood  Pitting pedal edema , varicosities                                                                              Functional Assessment:          Balance  Sitting - Static: Good (unsupported) (11/03/21 1600)  Sitting - Dynamic: Fair (occasional) (11/03/21 1600)  Standing - Static: Fair (with RW) (11/03/21 1600)  Standing - Dynamic : Impaired (11/03/21 1600)                     Alka Fall Risk Assessment:  Alka Fall Risk  Mobility: Ambulates or transfers with assist devices or assistance (11/03/21 2059)  Mobility Interventions: Patient to call before getting OOB; Strengthening exercises (ROM-active/passive); Utilize walker, cane, or other assistive device (11/03/21 2059)  Mentation: Alert, oriented x 3 (11/03/21 2059)  Medication: Patient receiving anticonvulsants, sedatives(tranquilizers), psychotropics or hypnotics, hypoglycemics, narcotics, sleep aids, antihypertensives, laxatives, or diuretics (11/03/21 2059)  Medication Interventions: Evaluate medications/consider consulting pharmacy; Patient to call before getting OOB; Teach patient to arise slowly (11/03/21 2059)  Elimination: Needs assistance with toileting (11/03/21 2059)  Elimination Interventions: Call light in reach; Patient to call for help with toileting needs; Toilet paper/wipes in reach; Toileting schedule/hourly rounds (11/03/21 2059)  Prior Fall History: No (11/03/21 2059)  History of Falls Interventions: Door open when patient unattended; Utilize gait belt for transfer/ambulation;  Evaluate medications/consider consulting pharmacy (11/03/21 2059)  Total Score: 3 (11/03/21 2059)  High Fall Risk: Yes (11/03/21 2059)     Speech Assessment:         Ambulation:  Gait  Distance (ft): 30 Feet (ft) (11/03/21 1600)  Assistive Device: Walker, rolling; Gait belt; Orthotic device (Aspen lumbar sacral corset) (11/03/21 1600)     Labs/Studies:  Recent Results (from the past 72 hour(s))   SARS-COV-2    Collection Time: 11/01/21  2:38 PM   Result Value Ref Range    SARS-CoV-2 Please find results under separate order     SARS-COV-2, PCR    Collection Time: 11/01/21  2:38 PM    Specimen: Nasopharyngeal   Result Value Ref Range    Specimen source Nasopharyngeal      SARS-CoV-2 Not detected NOTD     CBC W/O DIFF    Collection Time: 11/02/21  5:59 AM   Result Value Ref Range    WBC 11.5 (H) 4.3 - 11.1 K/uL    RBC 3.13 (L) 4.05 - 5.2 M/uL    HGB 9.1 (L) 11.7 - 15.4 g/dL    HCT 29.7 (L) 35.8 - 46.3 %    MCV 94.9 79.6 - 97.8 FL    MCH 29.1 26.1 - 32.9 PG    MCHC 30.6 (L) 31.4 - 35.0 g/dL    RDW 17.5 (H) 11.9 - 14.6 %    PLATELET 621 144 - 820 K/uL    MPV 9.3 (L) 9.4 - 12.3 FL    ABSOLUTE NRBC 0.00 0.0 - 0.2 K/uL   METABOLIC PANEL, COMPREHENSIVE    Collection Time: 11/02/21  5:59 AM   Result Value Ref Range    Sodium 139 136 - 145 mmol/L    Potassium 4.7 3.5 - 5.1 mmol/L    Chloride 105 98 - 107 mmol/L    CO2 34 (H) 21 - 32 mmol/L    Anion gap 0 (L) 7 - 16 mmol/L    Glucose 88 65 - 100 mg/dL    BUN 18 8 - 23 MG/DL    Creatinine 0.58 (L) 0.6 - 1.0 MG/DL    GFR est AA >60 >60 ml/min/1.73m2    GFR est non-AA >60 >60 ml/min/1.73m2    Calcium 8.4 8.3 - 10.4 MG/DL    Bilirubin, total 0.6 0.2 - 1.1 MG/DL    ALT (SGPT) 22 12 - 65 U/L    AST (SGOT) 29 15 - 37 U/L    Alk.  phosphatase 107 50 - 136 U/L    Protein, total 5.1 (L) 6.3 - 8.2 g/dL    Albumin 1.9 (L) 3.2 - 4.6 g/dL    Globulin 3.2 2.3 - 3.5 g/dL    A-G Ratio 0.6 (L) 1.2 - 3.5     MAGNESIUM    Collection Time: 11/02/21  5:59 AM   Result Value Ref Range    Magnesium 1.9 1.8 - 2.4 mg/dL   CBC W/O DIFF    Collection Time: 11/03/21  5:42 AM   Result Value Ref Range    WBC 11.9 (H) 4.3 - 11.1 K/uL    RBC 3.03 (L) 4.05 - 5.2 M/uL    HGB 9.0 (L) 11.7 - 15.4 g/dL    HCT 29.6 (L) 35.8 - 46.3 %    MCV 97.7 79.6 - 97.8 FL    MCH 29.7 26.1 - 32.9 PG    MCHC 30.4 (L) 31.4 - 35.0 g/dL    RDW 17.2 (H) 11.9 - 14.6 %    PLATELET 670 844 - 126 K/uL    MPV 9.3 (L) 9.4 - 12.3 FL    ABSOLUTE NRBC 0.00 0.0 - 0.2 K/uL   MAGNESIUM    Collection Time: 11/03/21  5:42 AM Result Value Ref Range    Magnesium 2.0 1.8 - 2.4 mg/dL   METABOLIC PANEL, COMPREHENSIVE    Collection Time: 11/03/21  5:42 AM   Result Value Ref Range    Sodium 138 136 - 145 mmol/L    Potassium 4.3 3.5 - 5.1 mmol/L    Chloride 105 98 - 107 mmol/L    CO2 32 21 - 32 mmol/L    Anion gap 1 (L) 7 - 16 mmol/L    Glucose 84 65 - 100 mg/dL    BUN 17 8 - 23 MG/DL    Creatinine 0.53 (L) 0.6 - 1.0 MG/DL    GFR est AA >60 >60 ml/min/1.73m2    GFR est non-AA >60 >60 ml/min/1.73m2    Calcium 8.3 8.3 - 10.4 MG/DL    Bilirubin, total 0.5 0.2 - 1.1 MG/DL    ALT (SGPT) 18 12 - 65 U/L    AST (SGOT) 20 15 - 37 U/L    Alk.  phosphatase 96 50 - 136 U/L    Protein, total 4.9 (L) 6.3 - 8.2 g/dL    Albumin 1.9 (L) 3.2 - 4.6 g/dL    Globulin 3.0 2.3 - 3.5 g/dL    A-G Ratio 0.6 (L) 1.2 - 3.5     GLUCOSE, POC    Collection Time: 11/03/21  9:07 PM   Result Value Ref Range    Glucose (POC) 119 (H) 65 - 100 mg/dL    Performed by Chilango Jose        Assessment:     Problem List as of 11/4/2021 Date Reviewed: 9/22/2021          Codes Class Noted - Resolved    Spinal stenosis, lumbar region with neurogenic claudication ICD-10-CM: M48.062  ICD-9-CM: 724.03  11/3/2021 - Present        A-fib ([facility] ICD-10-CM: I48.91  ICD-9-CM: 427.31  10/30/2021 - Present        ÁNGEL (acute kidney injury) ([facility] ICD-10-CM: N17.9  ICD-9-CM: 584.9  10/30/2021 - Present        Ac[facility] failure [facility] hypoxia ([facility] ICD-10-CM: J96.01  ICD-9-CM: 518.81  10/30/2021 - Present        Hypovolemic shock ([facility] ICD-10-CM: R57.1  ICD-9-CM: 785.59  10/26/2021 - Present        Hypotension ICD-10-CM: I95.9  ICD-9-CM: 458.9  10/26/2021 - Present        Spondylolisthesis of multiple sites in spine ICD-10-CM: M43.19  ICD-9-CM: 738.4  10/25/2021 - Present        Spinal stenosis ICD-10-CM: M48.00  ICD-9-CM: 724.00  10/25/2021 - Present        Obesity, morbid ([facility] ICD-10-CM: E66.01  ICD-9-CM: 278.01  12/22/2017 - Present        Acquired hypothyroidism ICD-10-CM: E03.9  ICD-9-CM: 244.9  9/13/2017 - Present        Postoperative wound infection ICD-10-CM: T81.49XA  ICD-9-CM: 998.59  5/19/2016 - Present        Lumbar stenosis with neurogenic claudication ICD-10-CM: M48.062  ICD-9-CM: 724.03  3/28/2016 - Present        Essential hypertension, benign ICD-10-CM: I10  ICD-9-CM: 401.1  7/24/2013 - Present        Endometriosis ICD-10-CM: N80.9  ICD-9-CM: 617.9  7/24/2013 - Present        Colon polyp ICD-10-CM: K63.5  ICD-9-CM: 211.3  7/24/2013 - Present        Squamous cell skin cancer ICD-10-CM: C44.92  ICD-9-CM: 173.92  7/24/2013 - Present              S/p L2 L4 lumbar direct lateral interbody fusion with anterior plating and redo of L2 L5 laminectomy 10/26; Dr Jazzy Renteria due to spinal stenosis with neurogenic claudication  Plan / Recommendations / Medical Decision Making:      Daily physician / PA medical management:     Lumbar stenosis with neurogenic claudication [M48.062]; Spondylolisthesis of lumbar region [M43.16]; Spinal stenosis [M48.00]; Spinal stenosis, lumbar region with neurogenic claudication [M48.062] -   -PT/OT; spinal precautions, girdle when ambulating     -Atrial Fibrillation; s/p 2 failed ablations, managed with rhythmol , metoprolol and Eliquis. Currently NSR; eliquis restarted 10/31 in light of PE RUL  -11/4 irreg, rate controlled, cont meds     Hypertension - BP fluctuating, managed medically. Has been hypotensive requiring levophed and now midodrine. (pts home Prinivil 20mg bid and HCTZ 12.5 bid are oh hole.); needs teds when OOB. -hypotensive episodes improved. Dec midodrine to 5mg tid. Cont metoprolol for rate control     Pain control - ongoing significant pain in back which is stable and controlled by PRN meds. Will require regular pain assessment and comprehensive pain management.  Cont prn Norco, Ultram and tylenol and neurontin  -11/4 inc Neurontin to 300mg tid and elavil at noc 25mg due to sciatic pain; add back requip 1mg qhs      Hypokalemia; monitor; currently 4.3 on supplement due to hctz; may be able to hold supplement  11/4 labs pending; dc daily labs    Mild leukocytosis; wbc 11.9 . No s/s of infxn    Blood loss anemia; hgb 9.0, improving; had 2000ml blood loss and received blood transfusion as well as 2800ml of crystalloid     Acquired hypothyroidism; cont synthroid     Pneumonia prophylaxis - incentive spirometer every hour while awake.     DVT risk / DVT prophylaxis - daily physician / PA exam to assess as patient is at increased risk for of thromboembolism. Mobilize as tolerated. Sequential pneumatic compression devices (SCDs) when in bed; thigh-high or knee-high thromboembolic deterrent hose when out of bed. On Eliquis     GI prophylaxis - resume PPI. At times may need additional antacids, Maalox prn.     Depression - continue on Lexapro. At risk of depression exacerbation due tp pain and loss of independent function     General skin care / wound prevention - monitor lumbar incision and general skin wound status daily per staff and physician / PA. At risk for failure. Will require 24/7 rehab nursing. -11/4 lateral left hip wound; looks like a blister that had opened up vs sheer injury. Cleanse daily and cover. Back incision healing     Bladder program / urinary retention / neurogenic bladder - schedule voids q 6-8 hrs. Check post-void residual as needed; in-and-out catheter if post-void residual is more than 400ml.     Bowel program - at risk for constipation as a side effect of opioids, other medications, impaired mobility, etc. MiraLAX daily for regularity, Azul-Colace for stool softener. PRN MOM, bisacodyl suppository or tablets for constipation.       Time spent was 25 minutes with over 1/2 in direct patient care/examination, consultation and coordination of care.      Signed By: Mega Loera MD     November 4, 2021

## 2021-11-04 NOTE — PROGRESS NOTES
Patient working with physical therapy and incision to her back started draining serosanguinous fluids. I took dressing off and redressed her back midline dressing with steristrips and 4X4 gauze with tegaderm. Shane STEVENSON assessed patient's back and drainage. Patient complaining of right hip pain and Marylee also aware of that pain. Safety precautions maintained. Continuing to monitor patient.

## 2021-11-04 NOTE — PROGRESS NOTES
CM met with pt yesterday in the rehab unit to complete initial assessment and introduce care planning role for support/services to transition back to the community upon discharge from rehab. Pt normally manages her ADL's, drives and still works part time. Pt has a straight cane and a ramp into her one story home. She has a supportive daughter Mona Faith and her sister is planning to come stay with her at discharge for support. Will continue to follow her plan of care and assist as needs are known. Care Management Interventions  PCP Verified by CM:  (Dr Jose Gooden)  Mode of Transport at Discharge: Other (see comment) (family)  Transition of Care Consult (CM Consult): Discharge Planning (Pt is insured by Medicare with a pharmacy benefit.  )  MyChart Signup:  (Pt has a straight cane and a ramp into her one story home.)  Discharge Durable Medical Equipment: No  Physical Therapy Consult: Yes  Occupational Therapy Consult: Yes  Speech Therapy Consult: No  Support Systems: Child(rober), Other Family Member(s) (Pt's daughter Jaimie Knott [phone] is primary contact. Pt's sister is planning to come and stay with her for support when she leaves rehab.  )  Confirm Follow Up Transport: Family  The Plan for Transition of Care is Related to the Following Treatment Goals : Pt will need supportive care to return to her functional baseline in the community.   The Patient and/or Patient Representative was Provided with a Choice of Provider and Agrees with the Discharge Plan?: Yes  Name of the Patient Representative Who was Provided with a Choice of Provider and Agrees with the Discharge Plan: pt  Freedom of Choice List was Provided with Basic Dialogue that Supports the Patient's Individualized Plan of Care/Goals, Treatment Preferences and Shares the Quality Data Associated with the Providers?: Yes  [facility] Resource Information Provided?: No  Discharge Location  Discharge Placement: Home with home health (Anticipated discharge plan is for pt to return home with her daughter and sister for support.  )

## 2021-11-04 NOTE — PROGRESS NOTES
PHYSICAL THERAPY DAILY NOTE  Time In: 1116  Time Out: 5113  Patient Seen For: AM; Patient education; Transfer training; Other (see progress notes)    Subjective: patient reporting she is hurting more today. Reports severe RLE radiating pain all the way down into her right calf. Reports pain is so bad she is about to \"cry\". Reports tramadol does not help with the pain. Reports lying on her right side helps. Objective:Vital Signs:  Patient Vitals for the past 12 hrs:   Temp Pulse Resp BP SpO2   11/04/21 0733 98.7 °F (37.1 °C) 72 18 (!) 147/67 100 %   11/04/21 0456 97.8 °F (36.6 °C) 73 16 (!) 125/52 100 %     Pain level:7 to 10 out of 10  Pain location:right side lumbar paraspinals, RLE L5-S1 radiating pain  Pain interventions:Medication per order, rest, positioning,relaxation, body mechanics      Patient education:Bed mobility training,transfer training, balance training,fall precautions, body mechanics,activity pacing, spinal precautions,Patient verbalizing understanding and demonstrating understanding of patient education. Recommend follow up education. Interdisciplinary Communication:spoke with RN and PA regarding pain level, pain medication and status of incision    Spinal, Other (comment) (Falls)  GROSS ASSESSMENT Daily Assessment     Large amounts of serosanguineous drainage leaking through dressing and clothing onto exercise mat. RN in to change dressing and assess wound. PA in to assess wound.         COGNITION Daily Assessment    No change       BED/MAT MOBILITY Daily Assessment   Increased time and effort to complete with cues for body mechanics   Rolling Right : 3 (Moderate assistance )  Rolling Left : 3 (Moderate assistance )  Supine to Sit : 3 (Moderate assistance)  Sit to Supine : 3 (Moderate assistance)       TRANSFERS Daily Assessment   Increased time and effort to complete with cues for body mechanics   Transfer Type: SPT with walker  Transfer Assistance : 4 (Minimal assistance)  Sit to Stand Assistance: Minimal assistance  Car Transfers: Not tested       GAIT Daily Assessment   Unable to tolerate gait training due to incisional drainage and RLE radiating pain Amount of Assistance: 0 (Not tested)  Distance (ft): 0 Feet (ft)       STEPS or STAIRS Daily Assessment    Steps/Stairs Ambulated (#): 0  Level of Assist : 0 (Not tested)       BALANCE Daily Assessment   Static standing with RW and SBA with no loss of balance Sitting - Static: Good (unsupported)  Sitting - Dynamic: Fair (occasional)  Standing - Static: Fair (with RW)  Standing - Dynamic : Impaired       WHEELCHAIR MOBILITY Daily Assessment    Curbs/Ramps Assist Required (FIM Score): 0 (Not tested)  Wheelchair Setup Assist Required : 0 (Not tested)       LOWER EXTREMITY EXERCISES Daily Assessment   Unable to tolerate gait training due to incisional drainage and RLE radiating pain  NA          Assessment: Decreased tolerance to treatment due to increased RLE radiating pain and drainage from lumbar incision. Initial progress towards goals will be slow. Difficulty with log rolling and supine<>sidelying<>sit body mechanics due to pain and BMI       Patient returned to room at end of treatment. Patient in right sidelying in bed with head of bed elevated and bed rails up x 2. Needs placed in reach of patient. Plan of Care: Continue with POC and progress as tolerated.      Malissa Sparks, PT  11/4/2021

## 2021-11-04 NOTE — PROGRESS NOTES
PHYSICAL THERAPY DAILY NOTE  Time In: 9886  Time Out: 9335  Patient Seen For: AM; Gait training; Patient education; Therapeutic exercise; Transfer training; Other (see progress notes)    Subjective: patient reporting the RLE radiating pain and low back pain are better since resting In the bed and taking medication. Reports she is still have some pain but not as bad         Objective:Vital Signs:  Patient Vitals for the past 12 hrs:   Temp Pulse Resp BP SpO2   11/04/21 0733 98.7 °F (37.1 °C) 72 18 (!) 147/67 100 %     Pain level:1 to 4 out of 10  Pain location:low back Right side>Left and RLE radiating pain L5 S1 dermatome  Pain interventions:Medication per order, rest, positioning,relaxation, body mechanics, L/S corset      Patient education:Bed mobility training,transfer training, gait training, balance training,fall precautions, body mechanics,activity pacing,spinal precautions, donning L/S corset, Patient verbalizing understanding and demonstrating  understanding of patient education. Recommend follow up education. Interdisciplinary Communication:spoke with RN regarding functional level and pain medication schedule    Spinal, Other (comment) (Falls)  GROSS ASSESSMENT Daily Assessment     Mod assist to [facility] lumbar sacral corset       COGNITION Daily Assessment    No change       BED/MAT MOBILITY Daily Assessment   Increased time and effort to complete with cues for body mechanics  Using bed rail and HOB at 20 degrees.  Log roll and sidelying to sit from right side Rolling Right : 3 (Moderate assistance )  Rolling Left : 3 (Moderate assistance )  Supine to Sit : 3 (Moderate assistance)  Sit to Supine : 0 (Not tested)       TRANSFERS Daily Assessment   Increased time and effort to complete with cues for body mechanics   Transfer Type: SPT with walker  Transfer Assistance : 4 (Minimal assistance)  Sit to Stand Assistance: Minimal assistance  Car Transfers: Not tested       GAIT Daily Assessment    Amount of Assistance: 4 (Contact guard assistance)  Distance (ft): 45 Feet (ft)  Assistive Device: Walker, rolling; Gait belt; Orthotic device ([place] lumbar sacral corset)   Gait training with one time cue for posture correction and cues for improved step length and step clearance    STEPS or STAIRS Daily Assessment    Steps/Stairs Ambulated (#): 0  Level of Assist : 0 (Not tested)       BALANCE Daily Assessment   Static standing with RW, lumbar corset and CGA to SBA with no loss of balance Sitting - Static: Good (unsupported)  Sitting - Dynamic: Fair (occasional)  Standing - Static: Fair (with RW)  Standing - Dynamic : Impaired       WHEELCHAIR MOBILITY Daily Assessment    Curbs/Ramps Assist Required (FIM Score): 0 (Not tested)  Wheelchair Setup Assist Required : 0 (Not tested)       LOWER EXTREMITY EXERCISES Daily Assessment   Increased time and effort to complete with cues for body mechanics   Extremity: Both  Exercise Type #1: Supine lower extremity strengthening  Sets Performed: 3  Reps Performed: 10  Level of Assist: Minimal assistance     SUPINE EXERCISES Sets Reps Comments   Ankle Pumps 3 10    Heel Slides 3 10    Hip Abduction 3 10    SAQ                            3         10     LLE only         Assessment: Progress towards goals is being delayed by ongoing RLE radiating pain. Pain has tendency to be less in PM.        Patient return to room at end of treatment and remained up in wheelchair with needs in reach. Plan of Care: Continue with POC and progress as tolerated.      Yudith Arambula, PT  11/4/2021

## 2021-11-04 NOTE — PROGRESS NOTES
Time In 0935   Time Out 1112     Mobility   Score Comments   Sit to Supine 1: Dependent    Sit to Stand 3: Partial/Moderate A    Transfer Assist 3: Partial/Moderate A Transfer Type: SPT   Equipment: Rolling Walker   Comments:      Activities of Daily Living    Score Comments   Oral Hyigene 6: Independent    Bathing 3: Partial/Moderate A Type of Shower: Bath Pack  Position: Supported sitting and Standing PRN   Adaptive  Equipment: Grab Bars, W/C and Long Handled Sponge  Comments: assist to wash farideh area and buttocks in stance   Upper Body  Dressing 5: S/U or clean-up assist Items Applied: Dress  Position: Supported Sitting  Comments:    Lower Body Dressing 3: Partial/Moderate A Items Applied: Underwear and Elastic pants  Position: Supported sitting and Standing PRN  Adaptive Equipment: Reacher, Grab Bars and W/C  Comments: assist to pull clothing over hips, cues for use of reacher   Donning/[place] Footwear 1: Dependent Items Applied: Socks  Adaptive Equipment: N/A  Comments: assist secondary to increased back pain   Toilet Transfer 3: Partial/Moderate A Transfer Type: SPT   Equipment: Rolling Walker and Grab Bars   Comments: Toileting Hygiene 2: Substantial/Maximal A Output: urine and BM x1 each  Comments: assist for cleanup   Education  energy conservation, AE/DME training and functional transfers       S: \"My back is really bothering me. I've never had pain like this. \" Agreeable to therapy. Patient was able to complete SPT using a RW with mod assist. Pt completed morning ADLs with quality indicators reflected in chart above. Pain Pt reported 6/10 pain in lower back and R hip. Patient Vitals for the past 12 hrs:   Temp Pulse Resp BP SpO2   11/04/21 0733 98.7 °F (37.1 °C) 72 18 (!) 147/67 100 %   11/04/21 0456 97.8 °F (36.6 °C) 73 16 (!) 125/52 100 %       Collaborated with PT and RN regarding's pain and draining back incision.    Patient tolerated session well, but activity tolerance, balance, functional mobility, strength, coordination, cognition are still below baseline and require skilled facilitation to successfully and safely complete ADLs and transfers. Patient ended session supine on gym mat with PT assuming care.       Eduardo Perales OTR/BERNARDO  11/4/2021

## 2021-11-05 LAB
ALBUMIN SERPL-MCNC: 2.1 G/DL (ref 3.2–4.6)
ALBUMIN/GLOB SERPL: 0.7 {RATIO} (ref 1.2–3.5)
ALP SERPL-CCNC: 103 U/L (ref 50–136)
ALT SERPL-CCNC: 19 U/L (ref 12–65)
ANION GAP SERPL CALC-SCNC: 1 MMOL/L (ref 7–16)
AST SERPL-CCNC: 18 U/L (ref 15–37)
BILIRUB SERPL-MCNC: 0.5 MG/DL (ref 0.2–1.1)
BUN SERPL-MCNC: 13 MG/DL (ref 8–23)
CALCIUM SERPL-MCNC: 8.4 MG/DL (ref 8.3–10.4)
CHLORIDE SERPL-SCNC: 102 MMOL/L (ref 98–107)
CO2 SERPL-SCNC: 35 MMOL/L (ref 21–32)
CREAT SERPL-MCNC: 0.53 MG/DL (ref 0.6–1)
ERYTHROCYTE [DISTWIDTH] IN BLOOD BY AUTOMATED COUNT: 16.8 % (ref 11.9–14.6)
GLOBULIN SER CALC-MCNC: 2.9 G/DL (ref 2.3–3.5)
GLUCOSE SERPL-MCNC: 78 MG/DL (ref 65–100)
HCT VFR BLD AUTO: 29.2 % (ref 35.8–46.3)
HGB BLD-MCNC: 8.8 G/DL (ref 11.7–15.4)
MCH RBC QN AUTO: 29.2 PG (ref 26.1–32.9)
MCHC RBC AUTO-ENTMCNC: 30.1 G/DL (ref 31.4–35)
MCV RBC AUTO: 97 FL (ref 79.6–97.8)
NRBC # BLD: 0 K/UL (ref 0–0.2)
PLATELET # BLD AUTO: 309 K/UL (ref 150–450)
PMV BLD AUTO: 9.5 FL (ref 9.4–12.3)
POTASSIUM SERPL-SCNC: 4.2 MMOL/L (ref 3.5–5.1)
PROT SERPL-MCNC: 5 G/DL (ref 6.3–8.2)
RBC # BLD AUTO: 3.01 M/UL (ref 4.05–5.2)
SODIUM SERPL-SCNC: 138 MMOL/L (ref 136–145)
WBC # BLD AUTO: 11.9 K/UL (ref 4.3–11.1)

## 2021-11-05 PROCEDURE — 80053 COMPREHEN METABOLIC PANEL: CPT

## 2021-11-05 PROCEDURE — 97116 GAIT TRAINING THERAPY: CPT

## 2021-11-05 PROCEDURE — 97110 THERAPEUTIC EXERCISES: CPT

## 2021-11-05 PROCEDURE — 74011000250 HC RX REV CODE- 250: Performed by: PHYSICIAN ASSISTANT

## 2021-11-05 PROCEDURE — 74011636637 HC RX REV CODE- 636/637: Performed by: PHYSICAL MEDICINE & REHABILITATION

## 2021-11-05 PROCEDURE — 65310000000 HC RM PRIVATE REHAB

## 2021-11-05 PROCEDURE — 74011250637 HC RX REV CODE- 250/637: Performed by: PHYSICIAN ASSISTANT

## 2021-11-05 PROCEDURE — 85027 COMPLETE CBC AUTOMATED: CPT

## 2021-11-05 PROCEDURE — 97530 THERAPEUTIC ACTIVITIES: CPT

## 2021-11-05 PROCEDURE — 97535 SELF CARE MNGMENT TRAINING: CPT

## 2021-11-05 PROCEDURE — 99232 SBSQ HOSP IP/OBS MODERATE 35: CPT | Performed by: PHYSICAL MEDICINE & REHABILITATION

## 2021-11-05 PROCEDURE — 74011250637 HC RX REV CODE- 250/637: Performed by: PHYSICAL MEDICINE & REHABILITATION

## 2021-11-05 RX ORDER — PREDNISONE 10 MG/1
5 TABLET ORAL
Status: COMPLETED | OUTPATIENT
Start: 2021-11-06 | End: 2021-11-06

## 2021-11-05 RX ORDER — PREDNISONE 10 MG/1
5 TABLET ORAL
Status: COMPLETED | OUTPATIENT
Start: 2021-11-07 | End: 2021-11-10

## 2021-11-05 RX ORDER — FUROSEMIDE 20 MG/1
40 TABLET ORAL DAILY
Status: DISCONTINUED | OUTPATIENT
Start: 2021-11-05 | End: 2021-11-08 | Stop reason: DRUGHIGH

## 2021-11-05 RX ORDER — PREDNISONE 10 MG/1
5 TABLET ORAL ONCE
Status: COMPLETED | OUTPATIENT
Start: 2021-11-05 | End: 2021-11-05

## 2021-11-05 RX ORDER — PREDNISONE 10 MG/1
10 TABLET ORAL ONCE
Status: COMPLETED | OUTPATIENT
Start: 2021-11-05 | End: 2021-11-05

## 2021-11-05 RX ORDER — PREDNISONE 10 MG/1
10 TABLET ORAL ONCE
Status: COMPLETED | OUTPATIENT
Start: 2021-11-06 | End: 2021-11-06

## 2021-11-05 RX ADMIN — DOCUSATE SODIUM 100 MG: 100 CAPSULE, LIQUID FILLED ORAL at 17:39

## 2021-11-05 RX ADMIN — GABAPENTIN 300 MG: 300 CAPSULE ORAL at 17:39

## 2021-11-05 RX ADMIN — METOPROLOL TARTRATE 25 MG: 25 TABLET, FILM COATED ORAL at 08:10

## 2021-11-05 RX ADMIN — ESCITALOPRAM OXALATE 20 MG: 10 TABLET ORAL at 08:10

## 2021-11-05 RX ADMIN — HYDROCODONE BITARTRATE AND ACETAMINOPHEN 1 TABLET: 10; 325 TABLET ORAL at 00:33

## 2021-11-05 RX ADMIN — HYDROCODONE BITARTRATE AND ACETAMINOPHEN 1 TABLET: 10; 325 TABLET ORAL at 09:31

## 2021-11-05 RX ADMIN — ASPIRIN 81 MG: 81 TABLET, COATED ORAL at 20:51

## 2021-11-05 RX ADMIN — HYDROCODONE BITARTRATE AND ACETAMINOPHEN 1 TABLET: 10; 325 TABLET ORAL at 14:39

## 2021-11-05 RX ADMIN — MIDODRINE HYDROCHLORIDE 5 MG: 5 TABLET ORAL at 23:02

## 2021-11-05 RX ADMIN — HYDROCODONE BITARTRATE AND ACETAMINOPHEN 1 TABLET: 10; 325 TABLET ORAL at 20:59

## 2021-11-05 RX ADMIN — APIXABAN 5 MG: 5 TABLET, FILM COATED ORAL at 17:39

## 2021-11-05 RX ADMIN — APIXABAN 5 MG: 5 TABLET, FILM COATED ORAL at 08:10

## 2021-11-05 RX ADMIN — GABAPENTIN 300 MG: 300 CAPSULE ORAL at 20:51

## 2021-11-05 RX ADMIN — Medication 1 AMPULE: at 20:50

## 2021-11-05 RX ADMIN — PROPAFENONE HYDROCHLORIDE 150 MG: 150 TABLET, FILM COATED ORAL at 08:10

## 2021-11-05 RX ADMIN — LEVOTHYROXINE SODIUM 50 MCG: 0.05 TABLET ORAL at 06:16

## 2021-11-05 RX ADMIN — POTASSIUM CHLORIDE 10 MEQ: 750 TABLET, EXTENDED RELEASE ORAL at 08:10

## 2021-11-05 RX ADMIN — TRAMADOL HYDROCHLORIDE 50 MG: 50 TABLET, FILM COATED ORAL at 17:38

## 2021-11-05 RX ADMIN — Medication 1 AMPULE: at 08:10

## 2021-11-05 RX ADMIN — AMITRIPTYLINE HYDROCHLORIDE 25 MG: 25 TABLET, FILM COATED ORAL at 20:51

## 2021-11-05 RX ADMIN — FAMOTIDINE 20 MG: 20 TABLET ORAL at 17:39

## 2021-11-05 RX ADMIN — POLYETHYLENE GLYCOL 3350 17 G: 17 POWDER, FOR SOLUTION ORAL at 08:10

## 2021-11-05 RX ADMIN — PANTOPRAZOLE SODIUM 40 MG: 40 TABLET, DELAYED RELEASE ORAL at 06:16

## 2021-11-05 RX ADMIN — MIDODRINE HYDROCHLORIDE 5 MG: 5 TABLET ORAL at 06:16

## 2021-11-05 RX ADMIN — PREDNISONE 5 MG: 10 TABLET ORAL at 20:51

## 2021-11-05 RX ADMIN — PREDNISONE 10 MG: 10 TABLET ORAL at 23:02

## 2021-11-05 RX ADMIN — PREDNISONE 5 MG: 10 TABLET ORAL at 13:56

## 2021-11-05 RX ADMIN — PREDNISONE 10 MG: 10 TABLET ORAL at 08:10

## 2021-11-05 RX ADMIN — ZOLPIDEM TARTRATE 10 MG: 5 TABLET ORAL at 00:33

## 2021-11-05 RX ADMIN — GABAPENTIN 300 MG: 300 CAPSULE ORAL at 08:10

## 2021-11-05 RX ADMIN — PROPAFENONE HYDROCHLORIDE 150 MG: 150 TABLET, FILM COATED ORAL at 17:39

## 2021-11-05 RX ADMIN — METOPROLOL TARTRATE 25 MG: 25 TABLET, FILM COATED ORAL at 17:39

## 2021-11-05 RX ADMIN — FUROSEMIDE 40 MG: 20 TABLET ORAL at 14:39

## 2021-11-05 RX ADMIN — DOCUSATE SODIUM 100 MG: 100 CAPSULE, LIQUID FILLED ORAL at 08:10

## 2021-11-05 RX ADMIN — MIDODRINE HYDROCHLORIDE 5 MG: 5 TABLET ORAL at 13:55

## 2021-11-05 NOTE — PROGRESS NOTES
Doretha Mar MD  Medical Director  [address] [facility], [address] [facility]  Tel: [phone]       [facility] PROGRESS NOTE    Cassandra Chapman  Admit Date: 11/3/2021  Admit Diagnosis:   Lumbar stenosis with neurogenic claudication [M48.062]; Spondylolisthesis of lumbar region [M43.16]; Spinal stenosis [M48.00]; Spinal stenosis, lumbar region with neurogenic claudication [M48.062]    Subjective     Patient seen and examined after lunch. Felt lightheaded during meal, BP checked by RN: 125/72. Pain in R LE roughly in L5-S1 dermatome, steroid taper started this AM. Significant B LE edema, reports she usually takes Lasix 40mg PRN fluid overload (~2/wk). Notes her abdominal girth is masters additionally, reports subsequent orthopnea. Believes UE tremor (noted several months pre-surgical) is worse, feels anxious.     Objective:     Current Facility-Administered Medications   Medication Dose Route Frequency    predniSONE (DELTASONE) tablet 5 mg  5 mg Oral ONCE    Followed by   Kurtis Steve predniSONE (DELTASONE) tablet 5 mg  5 mg Oral ONCE    Followed by   Kurtis Steve predniSONE (DELTASONE) tablet 10 mg  10 mg Oral ONCE    Followed by   Cydne Rosin ON 11/6/2021] predniSONE (DELTASONE) tablet 5 mg  5 mg Oral TID WITH MEALS    Followed by   Cydne Rosin ON 11/6/2021] predniSONE (DELTASONE) tablet 10 mg  10 mg Oral ONCE    Followed by   Cydne Rosin ON 11/7/2021] predniSONE (DELTASONE) tablet 5 mg  5 mg Oral 4x Daily Taper    rOPINIRole (REQUIP) tablet 1 mg  1 mg Oral QHS PRN    gabapentin (NEURONTIN) capsule 300 mg  300 mg Oral TID    amitriptyline (ELAVIL) tablet 25 mg  25 mg Oral QHS    midodrine (PROAMATINE) tablet 5 mg  5 mg Oral Q8H    lidocaine 4 % patch 1 Patch  1 Patch TransDERmal Q24H    diphenhydrAMINE (BENADRYL) capsule 25 mg  25 mg Oral Q6H PRN    docusate sodium (COLACE) capsule 100 mg  100 mg Oral BID    HYDROcodone-acetaminophen (NORCO)  mg tablet 1 Tablet  1 Tablet Oral Q4H PRN    naloxone (NARCAN) injection 0.4 mg  0.4 mg IntraVENous PRN    ondansetron (ZOFRAN ODT) tablet 4 mg  4 mg Oral Q4H PRN    phenol throat spray (CHLORASEPTIC) 1 Spray  1 Spray Oral PRN    alcohol 62% (NOZIN) nasal  1 Ampule  1 Ampule Topical Q12H    acetaminophen (TYLENOL) tablet 650 mg  650 mg Oral Q6H PRN    alum-mag hydroxide-simeth (MYLANTA) oral suspension 30 mL  30 mL Oral Q4H PRN    apixaban (ELIQUIS) tablet 5 mg  5 mg Oral BID    aspirin delayed-release tablet 81 mg  81 mg Oral QHS    bisacodyL (DULCOLAX) suppository 10 mg  10 mg Rectal DAILY PRN    bisacodyL (DULCOLAX) suppository 10 mg  10 mg Rectal DAILY PRN    escitalopram oxalate (LEXAPRO) tablet 20 mg  20 mg Oral QAM    famotidine (PEPCID) tablet 20 mg  20 mg Oral QPM    levothyroxine (SYNTHROID) tablet 50 mcg  50 mcg Oral ACB    LORazepam (ATIVAN) tablet 0.5 mg  0.5 mg Oral Q6H PRN    magnesium hydroxide (MILK OF MAGNESIA) 400 mg/5 mL oral suspension 30 mL  30 mL Oral DAILY PRN    metoprolol tartrate (LOPRESSOR) tablet 25 mg  25 mg Oral BID    pantoprazole (PROTONIX) tablet 40 mg  40 mg Oral ACB    polyethylene glycol (MIRALAX) packet 17 g  17 g Oral DAILY    potassium chloride (KLOR-CON) tablet 10 mEq  10 mEq Oral DAILY    propafenone (RYTHMOL) tablet 150 mg  150 mg Oral BID    traMADoL (ULTRAM) tablet 50 mg  50 mg Oral Q6H PRN    zolpidem (AMBIEN) tablet 10 mg  10 mg Oral QHS PRN    influenza vaccine 2021-22 (6 mos+)(PF) (FLUARIX/FLULAVAL/FLUZONE QUAD) injection 0.5 mL  1 Each IntraMUSCular PRIOR TO DISCHARGE    nicotine (NICODERM CQ) 14 mg/24 hr patch 1 Patch  1 Patch TransDERmal Q24H       Review of Systems:   Denies chest pain, shortness of breath, cough, headache, visual problems, abdominal pain, dysuria, calf pain. Pertinent positives are as noted in the HPI, ROS unremarkable otherwise.      Visit Vitals  /72   Pulse 74   Temp 98 °F (36.7 °C)   Resp 16   SpO2 96%        Physical Exam: General: Alert, appropriately oriented. Lying in bed after lunch, visiting with family. HEENT: Normocephalic, EOM intact. Oral mucosa moist.   Lungs: Clear to auscultation bilaterally, no crackle or wheeze. Respirations even and unlabored. Heart: Regular rate, remarkably regular underlying rhythm. No appreciable murmur. Abdomen: Soft, non-tender, obese and protuberant but not distended. Bowel sounds normoactive, no organomegaly but proper exam precluded by obesity. Genitourinary: Deferred. Neuromuscular:      UE strength 5/5 and grossly symmetric. LE strength at HF 3+/5 (R), 2+/5 (L, guarding with groin / skin graft pain). Sensation intact   Skin/extremity: No rashes, no erythema. Calves soft, non-tender B LE. Lumbar incision dressing intact. B LE with marked pitting pedal edema.        Functional Assessment:  Balance  Sitting - Static: Good (unsupported) (11/05/21 1200)  Sitting - Dynamic: Fair (occasional) (11/05/21 1200)  Standing - Static: Fair (with RW) (11/05/21 1200)  Standing - Dynamic : Impaired (11/05/21 1200)       Danne Lobe Fall Risk Assessment:  Danne Lobe Fall Risk  Mobility: Ambulates or transfers with assist devices or assistance (11/05/21 0957)  Mobility Interventions: Patient to call before getting OOB (11/05/21 0957)  Mentation: Alert, oriented x 3 (11/05/21 0957)  Medication: Patient receiving anticonvulsants, sedatives(tranquilizers), psychotropics or hypnotics, hypoglycemics, narcotics, sleep aids, antihypertensives, laxatives, or diuretics (11/05/21 0957)  Medication Interventions: Patient to call before getting OOB (11/05/21 0957)  Elimination: Needs assistance with toileting (11/05/21 0957)  Elimination Interventions: Call light in reach (11/05/21 0957)  Prior Fall History: No (11/05/21 0957)  History of Falls Interventions: Door open when patient unattended (11/05/21 0957)  Total Score: 3 (11/05/21 0957)  High Fall Risk: Yes (11/05/21 0957)     Ambulation:  Gait  Distance (ft): 20 Feet (ft) (20ft x 4 with 5 min rest break between attempts) (11/05/21 1200)  Assistive Device: Walker, rolling; Gait belt; Orthotic device (Aspen lumbar sacral corset) (11/05/21 1200)     Labs/Studies:  Recent Results (from the past 72 hour(s))   CBC W/O DIFF    Collection Time: 11/03/21  5:42 AM   Result Value Ref Range    WBC 11.9 (H) 4.3 - 11.1 K/uL    RBC 3.03 (L) 4.05 - 5.2 M/uL    HGB 9.0 (L) 11.7 - 15.4 g/dL    HCT 29.6 (L) 35.8 - 46.3 %    MCV 97.7 79.6 - 97.8 FL    MCH 29.7 26.1 - 32.9 PG    MCHC 30.4 (L) 31.4 - 35.0 g/dL    RDW 17.2 (H) 11.9 - 14.6 %    PLATELET 627 959 - 029 K/uL    MPV 9.3 (L) 9.4 - 12.3 FL    ABSOLUTE NRBC 0.00 0.0 - 0.2 K/uL   MAGNESIUM    Collection Time: 11/03/21  5:42 AM   Result Value Ref Range    Magnesium 2.0 1.8 - 2.4 mg/dL   METABOLIC PANEL, COMPREHENSIVE    Collection Time: 11/03/21  5:42 AM   Result Value Ref Range    Sodium 138 136 - 145 mmol/L    Potassium 4.3 3.5 - 5.1 mmol/L    Chloride 105 98 - 107 mmol/L    CO2 32 21 - 32 mmol/L    Anion gap 1 (L) 7 - 16 mmol/L    Glucose 84 65 - 100 mg/dL    BUN 17 8 - 23 MG/DL    Creatinine 0.53 (L) 0.6 - 1.0 MG/DL    GFR est AA >60 >60 ml/min/1.73m2    GFR est non-AA >60 >60 ml/min/1.73m2    Calcium 8.3 8.3 - 10.4 MG/DL    Bilirubin, total 0.5 0.2 - 1.1 MG/DL    ALT (SGPT) 18 12 - 65 U/L    AST (SGOT) 20 15 - 37 U/L    Alk.  phosphatase 96 50 - 136 U/L    Protein, total 4.9 (L) 6.3 - 8.2 g/dL    Albumin 1.9 (L) 3.2 - 4.6 g/dL    Globulin 3.0 2.3 - 3.5 g/dL    A-G Ratio 0.6 (L) 1.2 - 3.5     GLUCOSE, POC    Collection Time: 11/03/21  9:07 PM   Result Value Ref Range    Glucose (POC) 119 (H) 65 - 100 mg/dL    Performed by Shaista    CBC W/O DIFF    Collection Time: 11/04/21  5:00 AM   Result Value Ref Range    WBC 11.5 (H) 4.3 - 11.1 K/uL    RBC 3.14 (L) 4.05 - 5.2 M/uL    HGB 9.3 (L) 11.7 - 15.4 g/dL    HCT 30.3 (L) 35.8 - 46.3 %    MCV 96.5 79.6 - 97.8 FL    MCH 29.6 26.1 - 32.9 PG    MCHC 30.7 (L) 31.4 - 35.0 g/dL RDW 17.1 (H) 11.9 - 14.6 %    PLATELET 539 786 - 233 K/uL    MPV 9.6 9.4 - 12.3 FL    ABSOLUTE NRBC 0.00 0.0 - 0.2 K/uL   METABOLIC PANEL, COMPREHENSIVE    Collection Time: 11/04/21  5:00 AM   Result Value Ref Range    Sodium 137 136 - 145 mmol/L    Potassium 4.3 3.5 - 5.1 mmol/L    Chloride 101 98 - 107 mmol/L    CO2 37 (H) 21 - 32 mmol/L    Anion gap Cannot be calculated 7 - 16 mmol/L    Glucose 81 65 - 100 mg/dL    BUN 13 8 - 23 MG/DL    Creatinine 0.54 (L) 0.6 - 1.0 MG/DL    GFR est AA >60 >60 ml/min/1.73m2    GFR est non-AA >60 >60 ml/min/1.73m2    Calcium 8.6 8.3 - 10.4 MG/DL    Bilirubin, total 0.6 0.2 - 1.1 MG/DL    ALT (SGPT) 22 12 - 65 U/L    AST (SGOT) 24 15 - 37 U/L    Alk. phosphatase 103 50 - 136 U/L    Protein, total 5.2 (L) 6.3 - 8.2 g/dL    Albumin 2.2 (L) 3.2 - 4.6 g/dL    Globulin 3.0 2.3 - 3.5 g/dL    A-G Ratio 0.7 (L) 1.2 - 3.5     CBC W/O DIFF    Collection Time: 11/05/21  6:00 AM   Result Value Ref Range    WBC 11.9 (H) 4.3 - 11.1 K/uL    RBC 3.01 (L) 4.05 - 5.2 M/uL    HGB 8.8 (L) 11.7 - 15.4 g/dL    HCT 29.2 (L) 35.8 - 46.3 %    MCV 97.0 79.6 - 97.8 FL    MCH 29.2 26.1 - 32.9 PG    MCHC 30.1 (L) 31.4 - 35.0 g/dL    RDW 16.8 (H) 11.9 - 14.6 %    PLATELET 731 123 - 412 K/uL    MPV 9.5 9.4 - 12.3 FL    ABSOLUTE NRBC 0.00 0.0 - 0.2 K/uL   METABOLIC PANEL, COMPREHENSIVE    Collection Time: 11/05/21  6:00 AM   Result Value Ref Range    Sodium 138 136 - 145 mmol/L    Potassium 4.2 3.5 - 5.1 mmol/L    Chloride 102 98 - 107 mmol/L    CO2 35 (H) 21 - 32 mmol/L    Anion gap 1 (L) 7 - 16 mmol/L    Glucose 78 65 - 100 mg/dL    BUN 13 8 - 23 MG/DL    Creatinine 0.53 (L) 0.6 - 1.0 MG/DL    GFR est AA >60 >60 ml/min/1.73m2    GFR est non-AA >60 >60 ml/min/1.73m2    Calcium 8.4 8.3 - 10.4 MG/DL    Bilirubin, total 0.5 0.2 - 1.1 MG/DL    ALT (SGPT) 19 12 - 65 U/L    AST (SGOT) 18 15 - 37 U/L    Alk.  phosphatase 103 50 - 136 U/L    Protein, total 5.0 (L) 6.3 - 8.2 g/dL    Albumin 2.1 (L) 3.2 - 4.6 g/dL Globulin 2.9 2.3 - 3.5 g/dL    A-G Ratio 0.7 (L) 1.2 - 3.5         Assessment:     Problem List as of 11/5/2021 Date Reviewed: 11/5/2021          Codes Class Noted - Resolved    * (Principal) Spinal stenosis, lumbar region with neurogenic claudication ICD-10-CM: M48.062  ICD-9-CM: 724.03  11/3/2021 - Present        A-fib ([facility] ICD-10-CM: I48.91  ICD-9-CM: 427.31  10/30/2021 - Present        ÁNGEL (acute kidney injury) ([facility] ICD-10-CM: N17.9  ICD-9-CM: 584.9  10/30/2021 - Present        Acute respiratory failure with hypoxia ([facility] ICD-10-CM: J96.01  ICD-9-CM: 518.81  10/30/2021 - Present        Hypovolemic shock ([facility] ICD-10-CM: R57.1  ICD-9-CM: 785.59  10/26/2021 - Present        Hypotension ICD-10-CM: I95.9  ICD-9-CM: 458.9  10/26/2021 - Present        Spondylolisthesis of multiple sites in spine ICD-10-CM: M43.19  ICD-9-CM: 738.4  10/25/2021 - Present        Spinal stenosis ICD-10-CM: M48.00  ICD-9-CM: 724.00  10/25/2021 - Present        Obesity, morbid ([facility] ICD-10-CM: E66.01  ICD-9-CM: 278.01  12/22/2017 - Present        Acquired hypothyroidism ICD-10-CM: E03.9  ICD-9-CM: 244.9  9/13/2017 - Present        Postoperative wound infection ICD-10-CM: T81.49XA  ICD-9-CM: 998.59  5/19/2016 - Present        Lumbar stenosis with neurogenic claudication ICD-10-CM: M48.062  ICD-9-CM: 724.03  3/28/2016 - Present        Essential hypertension, benign ICD-10-CM: I10  ICD-9-CM: 401.1  7/24/2013 - Present        Endometriosis ICD-10-CM: N80.9  ICD-9-CM: 617.9  7/24/2013 - Present        Colon polyp ICD-10-CM: K63.5  ICD-9-CM: 211.3  7/24/2013 - Present        Squamous cell skin cancer ICD-10-CM: C44.92  ICD-9-CM: 173.92  7/24/2013 - Present            L2 L4 lumbar direct lateral interbody fusion with anterior plating and redo of L2 L5 laminectomy (10/26/2021 // Dr Marissa Hare) due to spinal stenosis with neurogenic claudication    Plan / Recommendations / Medical Decision Making:      Daily physician / PA medical management:     Lumbar stenosis with neurogenic claudication, spondylolisthesis of lumbar region, lumbar spinal stenosis region with neurogenic claudication - PT / OT; spinal precautions, girdle when ambulating.     Atrial fibrillation - s/p 2 failed ablations, managed with Rhythmol, metoprolol and Eliquis. Currently NSR; Eliquis restarted 10/31 in light of RUL pulmonary embolism. -11/4 irreg, rate controlled, continue meds  -11/5 noticeably RRR to prolonged auscultation     Hypotension / hypertension - BP fluctuating, managed medically. Has been hypotensive requiring levophed and now midodrine (pt's home lisinopril 20mg BID and HCTZ 12.5mg BID are on hold); needs TEDs when OOB.  -11/4 hypotensive episodes improved; decrease midodrine to 5mg TID. Continue metoprolol for rate control  -11/5 sBP fluctuating from 102-163, dBP all <85, mostly in 50-60s; monitor as add back PRN furosemide for edema     Pain control - ongoing significant pain in back which is stable and controlled by PRN meds. Will require regular pain assessment and comprehensive pain management. Continue PRN Norco, tramadol, APAP and gabapentin. -11/4 increase Neurontin to 300mg TID and Elavil at night 25mg due to sciatic pain; add back Requip 1mg QHS  -11/5 start steroid taper for R LE pain     Hypokalemia - monitor; currently 4.3 on supplement due to HCTZ; may be able to hold supplement. -11/4 labs pending; d/c daily labs  -11/5 K+ 4.2    Leukocytosis, mild - wbc 11.9 at [facility] admission (11/3). No s/sx of infxn. -11/5 WBC 11.9, afebrile, no s/sx infection; will likely increase with steroids    Anemia, blood loss - Hgb 9.0, improving; had 2000ml blood loss and received blood transfusion x 3, as well as 2800ml of crystalloid. Hypothyroidism - continue Synthroid.     Pneumonia prophylaxis - incentive spirometer every hour while awake.     DVT risk / DVT prophylaxis - daily physician / PA exam to assess as patient is at increased risk for of thromboembolism.  Mobilize as tolerated. Sequential pneumatic compression devices (SCDs) when in bed; thigh-high or knee-high thromboembolic deterrent hose when out of bed. On Eliquis.     GI prophylaxis - resume PPI. At times may need additional antacids, Maalox prn.     Depression - continue on Lexapro. At risk of depression exacerbation due to pain and loss of independent function.     General skin care / wound prevention - monitor lumbar incision and general skin wound status daily per staff and physician / PA. At risk for failure. Will require 24/7 rehab nursing. -11/4 lateral left hip wound; looks like a blister that had opened up vs sheer injury. Cleanse daily and cover. Back incision healing     Bladder program / urinary retention / neurogenic bladder - schedule voids q 6-8 hrs. Check post-void residual as needed; in-and-out catheter if post-void residual is more than 400ml.     Bowel program - at risk for constipation as a side effect of opioids, other medications, impaired mobility, etc. MiraLAX daily for regularity, Azul-Colace for stool softener. PRN MOM, bisacodyl suppository or tablets for constipation.           Time spent was 25 minutes with over 1/2 in direct patient care/examination, consultation and coordination of care. Signed By: Tejas Cross PA-C    November 5, 2021      Physician Assistant with [facility]  Margret Díaz MD, Medical Director

## 2021-11-05 NOTE — PROGRESS NOTES
PHYSICAL THERAPY DAILY NOTE  Time In: 1116  Time Out: 4512  Patient Seen For: AM; Balance activities; Gait training; Patient education; Transfer training; Other (see progress notes)    Subjective: patient reporting AM ADLs and mobility were not as bad this AM. Reports less pain but is still having pain radiating into there RLE down into her calf muscle. Reports sometimes it feels like she os going to have a cramp         Objective:Vital Signs:   Patient Vitals for the past 12 hrs:   Temp Pulse Resp BP SpO2   11/05/21 1220    125/72    11/05/21 0744 98 °F (36.7 °C) 74 16 112/64 96 %   11/05/21 0616  62  111/60      Pain level:2 to 7 out of 10  Pain location:low back Right side>Left and RLE radiating pain L5 S1 dermatome  Pain interventions:Medication per order, rest, positioning,relaxation, body mechanics, L/S corset      Patient education:Bed mobility training,transfer training, gait training, balance training,fall precautions, body mechanics,activity pacing,spinal precautions, donning L/S corset, Patient verbalizing understanding and demonstrating  understanding of patient education. Recommend follow up education. Interdisciplinary Communication:spoke with RN regarding drainage from dressing.  RN in to change dressing with PT assist with transfers and standing balance during dressing change    Spinal, Other (comment) (Falls)  GROSS ASSESSMENT Daily Assessment     Min assist to Jia Bowels lumbar sacral corset       COGNITION Daily Assessment    No change       BED/MAT MOBILITY Daily Assessment    Rolling Right : 0 (Not tested)  Rolling Left : 0 (Not tested)  Supine to Sit : 0 (Not tested)  Sit to Supine : 0 (Not tested)       TRANSFERS Daily Assessment   Increased time and effort to complete with cues for body mechanics   Transfer Type: SPT with walker  Transfer Assistance : 4 (Minimal assistance)  Sit to Stand Assistance: Minimal assistance  Car Transfers: Not tested       GAIT Daily Assessment    Amount of Assistance: 4 (Contact guard assistance)  Distance (ft): 20 Feet (ft) (20ft x 4 with 5 min rest break between attempts)  Assistive Device: Walker, rolling; Gait belt; Orthotic device (Aspen lumbar sacral corset)   Gait training with one time cue for posture correction and cues for improved step length and step clearance    STEPS or STAIRS Daily Assessment    Steps/Stairs Ambulated (#): 0  Level of Assist : 0 (Not tested)       BALANCE Daily Assessment   Static standing with RW, lumbar corset and CGA to SBA with no loss of balance Sitting - Static: Good (unsupported)  Sitting - Dynamic: Fair (occasional)  Standing - Static: Fair (with RW)  Standing - Dynamic : Impaired       WHEELCHAIR MOBILITY Daily Assessment    Curbs/Ramps Assist Required (FIM Score): 0 (Not tested)  Wheelchair Setup Assist Required : 0 (Not tested)       LOWER EXTREMITY EXERCISES Daily Assessment   \    NA              Assessment: Progress towards goals is being delayed by ongoing RLE radiating pain. Pain has tendency to be worse in RM. LE edema increased with patient unable to get her shoes on today       Patient return to room at end of treatment and remained up in wheelchair with needs in reach. Plan of Care: Continue with POC and progress as tolerated.      Sergey Bryant, PT  11/5/2021

## 2021-11-05 NOTE — PROGRESS NOTES
Time In 0918   Time Out 1055     Mobility   Score Comments   Supine to Sit 2: Substantial/Maximal A    Sit to Stand 3: Partial/Moderate A    Transfer Assist 3: Partial/Moderate A Transfer Type: SPT   Equipment: Rolling Walker   Comments:      Activities of Daily Living    Score Comments   Oral Hyigene 5: S/U or clean-up assist    Bathing 3: Partial/Moderate A Type of Shower: Bath Pack  Position: Standing PRN and Unsupported Sitting   Adaptive  Equipment: W/C, Walker and Long Handled Sponge  Comments: assist to wash bottom and farideh area   Upper Body  Dressing 5: S/U or clean-up assist Items Applied: Pullover  Position: Supported Sitting  Comments:    Lower Body Dressing 3: Partial/Moderate A Items Applied: Underwear and Elastic pants  Position: Standing PRN and Unsupported Sitting  Adaptive Equipment: Reacher, RW and W/C  Comments: assist to pull clothing over waist   Donning/Calypso Footwear 4: Supervision or touching A Items Applied: Socks  Adaptive Equipment: Sock Aide  Comments:    Education  AE/DME training, safety awareness, functional transfers and bed mobility, pain management       S: \"I don't think I have taken anything for pain in a little while . \" Agreeable to therapy. Patient was able to complete SPTs from EOB > wc > recliner using a RW with min assist. Pt completed morning ADLs with quality indicators reflected in chart above. Pain pt reporting increased back pain, RN provided pain medicine. Patient Vitals for the past 12 hrs:   Temp Pulse Resp BP SpO2   11/05/21 0744 98 °F (36.7 °C) 74 16 112/64 96 %   11/05/21 0616  62  111/60    11/05/21 0031  62  (!) 108/54 96 %       Collaborated with RN regarding patient performance and POC. Patient tolerated session well, but activity tolerance, balance, functional mobility, strength, coordination, cognition are still below baseline and require skilled facilitation to successfully and safely complete ADLs and transfers.    Patient ended session seated in recliner and call remote, phone, all needs within reach.       Eduardo Perales, OTR/L  11/5/2021

## 2021-11-05 NOTE — PROGRESS NOTES
PHYSICAL THERAPY DAILY NOTE  Time In: 5089  Time Out: 3105  Patient Seen For: PM; Gait training; Patient education; Therapeutic exercise; Transfer training; Other (see progress notes)    Subjective: patient reporting the RLE radiating pain and low back pain are better since resting In the bed and taking medication. Reports her pain is less in the afternoon. Reports less radiating pain when she is walking. Objective:Vital Signs:  Patient Vitals for the past 12 hrs:   Temp Pulse Resp BP SpO2   11/05/21 1220    125/72    11/05/21 0744 98 °F (36.7 °C) 74 16 112/64 96 %   11/05/21 0616  62  111/60      Pain level:1 to 4 out of 10  Pain location:low back Right side>Left and RLE radiating pain L5 S1 dermatome  Pain interventions:Medication per order, rest, positioning,relaxation, body mechanics, L/S corset      Patient education:Bed mobility training,transfer training, gait training, balance training,fall precautions, body mechanics,activity pacing,spinal precautions, donning L/S corset, Patient verbalizing understanding and demonstrating  understanding of patient education. Recommend follow up education. Interdisciplinary Communication:spoke with RN regarding functional level and pain medication schedule    Spinal, Other (comment) (Falls)  GROSS ASSESSMENT Daily Assessment     Min assist to [facility] lumbar sacral corset       COGNITION Daily Assessment    No change       BED/MAT MOBILITY Daily Assessment   Increased time and effort to complete with cues for body mechanics  Using bed rail and HOB at 60 degrees.  Log roll and sidelying to sit from right side Rolling Right : 3 (Moderate assistance )  Rolling Left : 0 (Not tested)  Supine to Sit : 3 (Moderate assistance)  Sit to Supine : 0 (Not tested)       TRANSFERS Daily Assessment   Increased time and effort to complete with cues for body mechanics   Transfer Type: SPT with walker  Transfer Assistance : 4 (Minimal assistance)  Sit to Stand Assistance: Minimal assistance  Car Transfers: Not tested       GAIT Daily Assessment    Amount of Assistance: 4 (Contact guard assistance)  Distance (ft): 50 Feet (ft) (50ft x2 )  Assistive Device: Walker, rolling; Gait belt; Orthotic device (Aspen lumbar sacral corset)   Gait training with one time cue for posture correction and cues for improved step length and step clearance    STEPS or STAIRS Daily Assessment    Steps/Stairs Ambulated (#): 0  Level of Assist : 0 (Not tested)       BALANCE Daily Assessment   Static standing with RW, lumbar corset and CGA to SBA with no loss of balance Sitting - Static: Good (unsupported)  Sitting - Dynamic: Fair (occasional)  Standing - Static: Fair (with RW)  Standing - Dynamic : Impaired       WHEELCHAIR MOBILITY Daily Assessment    Curbs/Ramps Assist Required (FIM Score): 0 (Not tested)  Wheelchair Setup Assist Required : 0 (Not tested)       LOWER EXTREMITY EXERCISES Daily Assessment   Increased time and effort to complete with cues for body mechanics   Extremity: Both  Exercise Type #1: Supine lower extremity strengthening  Sets Performed: 2  Reps Performed: 10  Level of Assist: Minimal assistance     SUPINE EXERCISES Sets Reps Comments   Ankle Pumps 3 10    Heel Slides 3 10    Hip Abduction 3 10    SAQ                            3         10     LLE only         Assessment: R LE radiating pain lessening. . Pain has tendency to be less in PM.  Body mechanics with bed mobility slowly improving. Improved supine to sit with less pain with HOB elevated to 60 degrees. As pain improved will lower HOB in order to simulate her bed at home       Patient return to room at end of treatment and remained up in wheelchair with needs in reach. Plan of Care: Continue with POC and progress as tolerated.      Constantino Carbajal, PT  11/5/2021

## 2021-11-06 PROCEDURE — 97110 THERAPEUTIC EXERCISES: CPT

## 2021-11-06 PROCEDURE — 97116 GAIT TRAINING THERAPY: CPT

## 2021-11-06 PROCEDURE — 74011250637 HC RX REV CODE- 250/637: Performed by: PHYSICIAN ASSISTANT

## 2021-11-06 PROCEDURE — 99232 SBSQ HOSP IP/OBS MODERATE 35: CPT | Performed by: PHYSICIAN ASSISTANT

## 2021-11-06 PROCEDURE — 97535 SELF CARE MNGMENT TRAINING: CPT

## 2021-11-06 PROCEDURE — 74011000250 HC RX REV CODE- 250: Performed by: PHYSICIAN ASSISTANT

## 2021-11-06 PROCEDURE — 65310000000 HC RM PRIVATE REHAB

## 2021-11-06 PROCEDURE — 74011636637 HC RX REV CODE- 636/637: Performed by: PHYSICAL MEDICINE & REHABILITATION

## 2021-11-06 PROCEDURE — 74011250637 HC RX REV CODE- 250/637: Performed by: PHYSICAL MEDICINE & REHABILITATION

## 2021-11-06 PROCEDURE — 97530 THERAPEUTIC ACTIVITIES: CPT

## 2021-11-06 RX ORDER — MIDODRINE HYDROCHLORIDE 5 MG/1
5 TABLET ORAL
Status: DISCONTINUED | OUTPATIENT
Start: 2021-11-07 | End: 2021-11-13

## 2021-11-06 RX ADMIN — ZOLPIDEM TARTRATE 10 MG: 5 TABLET ORAL at 22:26

## 2021-11-06 RX ADMIN — PREDNISONE 5 MG: 10 TABLET ORAL at 08:36

## 2021-11-06 RX ADMIN — METOPROLOL TARTRATE 25 MG: 25 TABLET, FILM COATED ORAL at 17:47

## 2021-11-06 RX ADMIN — GABAPENTIN 300 MG: 300 CAPSULE ORAL at 15:35

## 2021-11-06 RX ADMIN — ESCITALOPRAM OXALATE 20 MG: 10 TABLET ORAL at 08:37

## 2021-11-06 RX ADMIN — PROPAFENONE HYDROCHLORIDE 150 MG: 150 TABLET, FILM COATED ORAL at 17:47

## 2021-11-06 RX ADMIN — POTASSIUM CHLORIDE 10 MEQ: 750 TABLET, EXTENDED RELEASE ORAL at 08:36

## 2021-11-06 RX ADMIN — PREDNISONE 5 MG: 10 TABLET ORAL at 12:21

## 2021-11-06 RX ADMIN — PREDNISONE 5 MG: 10 TABLET ORAL at 17:46

## 2021-11-06 RX ADMIN — PANTOPRAZOLE SODIUM 40 MG: 40 TABLET, DELAYED RELEASE ORAL at 05:14

## 2021-11-06 RX ADMIN — Medication 1 AMPULE: at 22:15

## 2021-11-06 RX ADMIN — Medication 1 AMPULE: at 08:38

## 2021-11-06 RX ADMIN — ASPIRIN 81 MG: 81 TABLET, COATED ORAL at 22:15

## 2021-11-06 RX ADMIN — HYDROCODONE BITARTRATE AND ACETAMINOPHEN 1 TABLET: 10; 325 TABLET ORAL at 17:51

## 2021-11-06 RX ADMIN — DOCUSATE SODIUM 100 MG: 100 CAPSULE, LIQUID FILLED ORAL at 08:36

## 2021-11-06 RX ADMIN — APIXABAN 5 MG: 5 TABLET, FILM COATED ORAL at 17:48

## 2021-11-06 RX ADMIN — FUROSEMIDE 40 MG: 20 TABLET ORAL at 08:37

## 2021-11-06 RX ADMIN — HYDROCODONE BITARTRATE AND ACETAMINOPHEN 1 TABLET: 10; 325 TABLET ORAL at 12:21

## 2021-11-06 RX ADMIN — LEVOTHYROXINE SODIUM 50 MCG: 0.05 TABLET ORAL at 05:14

## 2021-11-06 RX ADMIN — PREDNISONE 10 MG: 10 TABLET ORAL at 22:14

## 2021-11-06 RX ADMIN — FAMOTIDINE 20 MG: 20 TABLET ORAL at 17:48

## 2021-11-06 RX ADMIN — MIDODRINE HYDROCHLORIDE 5 MG: 5 TABLET ORAL at 15:35

## 2021-11-06 RX ADMIN — AMITRIPTYLINE HYDROCHLORIDE 25 MG: 25 TABLET, FILM COATED ORAL at 22:15

## 2021-11-06 RX ADMIN — MIDODRINE HYDROCHLORIDE 5 MG: 5 TABLET ORAL at 05:14

## 2021-11-06 RX ADMIN — PROPAFENONE HYDROCHLORIDE 150 MG: 150 TABLET, FILM COATED ORAL at 08:36

## 2021-11-06 RX ADMIN — APIXABAN 5 MG: 5 TABLET, FILM COATED ORAL at 08:37

## 2021-11-06 RX ADMIN — METOPROLOL TARTRATE 25 MG: 25 TABLET, FILM COATED ORAL at 08:37

## 2021-11-06 RX ADMIN — ROPINIROLE HYDROCHLORIDE 1 MG: 1 TABLET, FILM COATED ORAL at 22:26

## 2021-11-06 RX ADMIN — GABAPENTIN 300 MG: 300 CAPSULE ORAL at 22:14

## 2021-11-06 RX ADMIN — GABAPENTIN 300 MG: 300 CAPSULE ORAL at 08:36

## 2021-11-06 RX ADMIN — HYDROCODONE BITARTRATE AND ACETAMINOPHEN 1 TABLET: 10; 325 TABLET ORAL at 08:36

## 2021-11-06 NOTE — PROGRESS NOTES
PHYSICAL THERAPY DAILY NOTE  Time In: 1017  Time Out: 1102  Patient Seen For: AM; Gait training; Patient education; Therapeutic exercise; Transfer training; Other (see progress notes)    Subjective: patient reporting no RLE radiating pain this AM so far. Reports she is having some left buttock pain but no LLE radiating pain. Reports getting out of bed is easier with HOB elevated. Objective:Vital Signs:  Patient Vitals for the past 12 hrs:   Temp Pulse Resp BP SpO2   11/06/21 0750 97.4 °F (36.3 °C) 65 16 (!) 156/73 99 %     Pain level:1 to 4 out of 10  Pain location:low back Right side>Left   Pain interventions:Medication per order, rest, positioning,relaxation, body mechanics, L/S corset      Patient education:Bed mobility training,transfer training, gait training, balance training,fall precautions, body mechanics,activity pacing,spinal precautions, donning L/S corset, Patient verbalizing understanding and demonstrating  understanding of patient education. Recommend follow up education. Interdisciplinary Communication:spoke with RN regarding functional level and pain medication schedule    Spinal, Other (comment) (Falls)  GROSS ASSESSMENT Daily Assessment     Min assist to [facility] lumbar sacral corset       COGNITION Daily Assessment    No change       BED/MAT MOBILITY Daily Assessment   Increased time and effort to complete with cues for body mechanics  Using bed rail and HOB at 60 degrees.  Log roll and sidelying to sit from right side Rolling Right : 3 (Moderate assistance )  Rolling Left : 0 (Not tested)  Supine to Sit : 0 (Not tested)  Sit to Supine : 3 (Moderate assistance) (sit to right sidelying to supine using R bed rail)       TRANSFERS Daily Assessment   Increased time and effort to complete with cues for body mechanics   Transfer Type: SPT with walker  Transfer Assistance : 4 (Contact guard assistance)  Sit to Stand Assistance: Minimal assistance  Car Transfers: Not tested       GAIT Daily Assessment    Amount of Assistance: 4 (Contact guard assistance)  Distance (ft): 65 Feet (ft) (65 ft x 2)  Assistive Device: Walker, rolling; Gait belt; Orthotic device (Aspen lumbar sacral corset)   Gait training with one time cue for posture correction and cues for improved step length and step clearance    STEPS or STAIRS Daily Assessment    Steps/Stairs Ambulated (#): 0  Level of Assist : 0 (Not tested)       BALANCE Daily Assessment   Static standing with RW, lumbar corset and CGA to SBA with no loss of balance Sitting - Static: Good (unsupported)  Sitting - Dynamic: Fair (occasional)  Standing - Static: Fair (with RW)  Standing - Dynamic : Impaired       WHEELCHAIR MOBILITY Daily Assessment    Curbs/Ramps Assist Required (FIM Score): 0 (Not tested)  Wheelchair Setup Assist Required : 0 (Not tested)       LOWER EXTREMITY EXERCISES Daily Assessment   Increased time and effort to complete with cues for body mechanics   Extremity: Both  Exercise Type #1: Supine lower extremity strengthening  Sets Performed: 2  Reps Performed: 10  Level of Assist: Minimal assistance     SUPINE EXERCISES Sets Reps Comments   Ankle Pumps 3 10    Heel Slides 3 10    Hip Abduction 3 10    SAQ                            3         10     LLE only         Assessment: Improved tolerance to treatment due to less pain. RLE radiating pain resolved today. Will cont to assess. LE edema cont to delay progress with gait and LE exercises. Progressing towards goals       Patient returned to room at end of treatment. Patient supine in bed with head of bed elevated and bed rails up x 2. Needs placed in reach of patient. Plan of Care: Continue with POC and progress as tolerated.      Riccardo Nice, PT  11/6/2021

## 2021-11-06 NOTE — PROGRESS NOTES
11/06/21 1200   Time Spent With Patient   Time In 0915   Time Out 1012   Patient Seen For: AM; ADLs     Time In 0915   Time Out 1012     Mobility   Score Comments   Rolling 3: Partial/Moderate A Side: Right  Assistance with left leg    Supine to Sit 3: Partial/Moderate A With head of bed raised, pt needs assist to pull up to sit and bring left leg over edge    Sit to Stand 4: Supervision or touching A Min A sit to stand using walker 2   Transfer Assist 5: S/U or clean-up assist Transfer Type: SPT   Equipment: Rolling Walker      Activities of Daily Living    Score Comments   Bathing 3: Partial/Moderate A Type of Shower: Bath Pack  Position: Supine and Long Sitting   Adaptive  Equipment: N/A  Comments: bed bath. Assistance needed for back,  pericare and LE    Upper Body  Dressing 5: S/U or clean-up assist Items Applied: Pullover  Position: Unsupported Sitting  Comments: don shirt at EOB    Lower Body Dressing 4: Supervision or touching A Items Applied: Elastic pants  Position: Standing PRN and Unsupported Sitting  Adaptive Equipment: Reacher  Comments: used reacher to don pants. Decided not to wear underwear    Donning/[place] Footwear 4: Supervision or touching A Items Applied:  Socks   Adaptive Equipment: Foot Funnel  Comments: Able to use Sock Aide and min A to pull up    Patient tolerated treatment well. ADL completed as stated in chart above. Plan:  Continue OT POC with focus on ADL/IADL skills, functional transfers, functional mobility, coordination, strength, static and dynamic balance, and activity tolerance to maximize independence with ADLs and functional transfers. Patient left in recliner with all essentials within reach.       SIRI Talley  11/6/2021

## 2021-11-06 NOTE — PROGRESS NOTES
Benigno Palmer MD  Medical Director  [address] [facility], [address]  Tel: [phone]       [facility] PROGRESS NOTE    Ranjit Poon  Admit Date: 11/3/2021  Admit Diagnosis:   Lumbar stenosis with neurogenic claudication [M48.062]; Spondylolisthesis of lumbar region [M43.16]; Spinal stenosis [M48.00]; Spinal stenosis, lumbar region with neurogenic claudication [M48.062]    Subjective     Patient seen and examined after lunch. Reports radiating R LE pain has resolved, has only mild low back pain. Still with significant B LE edema but initial improvement after 2 doses of furosemide; will continue daily until her edema improves but will need to check BMP Monday, 11/8. Reports 1 episode of brief, sudden-onset dyspnea this AM when transferring from bed to / for toileting, resolved spontaneously after several seconds. Denies CP, palpitations.     Objective:     Current Facility-Administered Medications   Medication Dose Route Frequency    predniSONE (DELTASONE) tablet 5 mg  5 mg Oral TID WITH MEALS    Followed by   Wanda Ricketts predniSONE (DELTASONE) tablet 10 mg  10 mg Oral ONCE    Followed by   Sierra Suárez ON 11/7/2021] predniSONE (DELTASONE) tablet 5 mg  5 mg Oral 4x Daily Taper    furosemide (LASIX) tablet 40 mg  40 mg Oral DAILY    rOPINIRole (REQUIP) tablet 1 mg  1 mg Oral QHS PRN    gabapentin (NEURONTIN) capsule 300 mg  300 mg Oral TID    amitriptyline (ELAVIL) tablet 25 mg  25 mg Oral QHS    midodrine (PROAMATINE) tablet 5 mg  5 mg Oral Q8H    lidocaine 4 % patch 1 Patch  1 Patch TransDERmal Q24H    diphenhydrAMINE (BENADRYL) capsule 25 mg  25 mg Oral Q6H PRN    docusate sodium (COLACE) capsule 100 mg  100 mg Oral BID    HYDROcodone-acetaminophen (NORCO)  mg tablet 1 Tablet  1 Tablet Oral Q4H PRN    naloxone (NARCAN) injection 0.4 mg  0.4 mg IntraVENous PRN    ondansetron (ZOFRAN ODT) tablet 4 mg  4 mg Oral Q4H PRN    phenol throat spray (CHLORASEPTIC) 1 Spray  1 Spray Oral PRN    alcohol 62% (NOZIN) nasal  1 Ampule  1 Ampule Topical Q12H    acetaminophen (TYLENOL) tablet 650 mg  650 mg Oral Q6H PRN    alum-mag hydroxide-simeth (MYLANTA) oral suspension 30 mL  30 mL Oral Q4H PRN    apixaban (ELIQUIS) tablet 5 mg  5 mg Oral BID    aspirin delayed-release tablet 81 mg  81 mg Oral QHS    bisacodyL (DULCOLAX) suppository 10 mg  10 mg Rectal DAILY PRN    bisacodyL (DULCOLAX) suppository 10 mg  10 mg Rectal DAILY PRN    escitalopram oxalate (LEXAPRO) tablet 20 mg  20 mg Oral QAM    famotidine (PEPCID) tablet 20 mg  20 mg Oral QPM    levothyroxine (SYNTHROID) tablet 50 mcg  50 mcg Oral ACB    LORazepam (ATIVAN) tablet 0.5 mg  0.5 mg Oral Q6H PRN    magnesium hydroxide (MILK OF MAGNESIA) 400 mg/5 mL oral suspension 30 mL  30 mL Oral DAILY PRN    metoprolol tartrate (LOPRESSOR) tablet 25 mg  25 mg Oral BID    pantoprazole (PROTONIX) tablet 40 mg  40 mg Oral ACB    polyethylene glycol (MIRALAX) packet 17 g  17 g Oral DAILY    potassium chloride (KLOR-CON) tablet 10 mEq  10 mEq Oral DAILY    propafenone (RYTHMOL) tablet 150 mg  150 mg Oral BID    traMADoL (ULTRAM) tablet 50 mg  50 mg Oral Q6H PRN    zolpidem (AMBIEN) tablet 10 mg  10 mg Oral QHS PRN    influenza vaccine 2021-22 (6 mos+)(PF) (FLUARIX/FLULAVAL/FLUZONE QUAD) injection 0.5 mL  1 Each IntraMUSCular PRIOR TO DISCHARGE    nicotine (NICODERM CQ) 14 mg/24 hr patch 1 Patch  1 Patch TransDERmal Q24H       Review of Systems:   Denies cough, headache, visual problems, abdominal pain, dysuria, calf pain. Pertinent positives are as noted in the HPI, ROS unremarkable otherwise. Visit Vitals  BP (!) 156/73   Pulse 65   Temp 97.4 °F (36.3 °C)   Resp 16   SpO2 99%        Physical Exam:   General: Alert, appropriately oriented. Lying in bed after lunch watching TV. HEENT: Normocephalic, EOM intact.   Oral mucosa moist.   Lungs: Clear to auscultation bilaterally, no crackle or wheeze. Respirations even and unlabored. Heart: Regular rate, irregularly irregular underlying rhythm today. No appreciable murmur. Abdomen: Soft, non-tender, obese and protuberant but not distended. Bowel sounds normoactive, no organomegaly but proper exam precluded by obesity. Genitourinary: Deferred. Neuromuscular:      UE strength 5/5 and grossly symmetric. LE strength at HF 3+/5 (R), 2+/5 (L, guarding with groin / skin graft pain). Sensation intact   Skin/extremity: No rashes, no erythema. Calves soft, non-tender B LE. Lumbar incision dressing intact. B LE with significant but slightly diminished pitting edema to mid-shaft leg.        Functional Assessment:  Balance  Sitting - Static: Good (unsupported) (11/06/21 1200)  Sitting - Dynamic: Fair (occasional) (11/06/21 1200)  Standing - Static: Fair (with RW) (11/06/21 1200)  Standing - Dynamic : Impaired (11/06/21 1200)       Sarah Renteria Fall Risk Assessment:  Sarah Renteria Fall Risk  Mobility: Ambulates or transfers with assist devices or assistance (11/06/21 0741)  Mobility Interventions: Patient to call before getting OOB (11/05/21 0957)  Mentation: Alert, oriented x 3 (11/05/21 0957)  Medication: Patient receiving anticonvulsants, sedatives(tranquilizers), psychotropics or hypnotics, hypoglycemics, narcotics, sleep aids, antihypertensives, laxatives, or diuretics (11/05/21 0957)  Medication Interventions: Patient to call before getting OOB (11/05/21 0957)  Elimination: Needs assistance with toileting (11/05/21 0957)  Elimination Interventions: Call light in reach (11/05/21 0957)  Prior Fall History: No (11/05/21 0957)  History of Falls Interventions: Door open when patient unattended (11/05/21 0957)  Total Score: 3 (11/05/21 0957)  High Fall Risk: Yes (11/05/21 0957)     Ambulation:  Gait  Distance (ft): 65 Feet (ft) (65 ft x 2) (11/06/21 1200)  Assistive Device: Walker, rolling; Gait belt; Orthotic device (Aspen lumbar sacral corset) (11/06/21 1200)     Labs/Studies:  Recent Results (from the past 72 hour(s))   GLUCOSE, POC    Collection Time: 11/03/21  9:07 PM   Result Value Ref Range    Glucose (POC) 119 (H) 65 - 100 mg/dL    Performed by Shaista    CBC W/O DIFF    Collection Time: 11/04/21  5:00 AM   Result Value Ref Range    WBC 11.5 (H) 4.3 - 11.1 K/uL    RBC 3.14 (L) 4.05 - 5.2 M/uL    HGB 9.3 (L) 11.7 - 15.4 g/dL    HCT 30.3 (L) 35.8 - 46.3 %    MCV 96.5 79.6 - 97.8 FL    MCH 29.6 26.1 - 32.9 PG    MCHC 30.7 (L) 31.4 - 35.0 g/dL    RDW 17.1 (H) 11.9 - 14.6 %    PLATELET 673 917 - 376 K/uL    MPV 9.6 9.4 - 12.3 FL    ABSOLUTE NRBC 0.00 0.0 - 0.2 K/uL   METABOLIC PANEL, COMPREHENSIVE    Collection Time: 11/04/21  5:00 AM   Result Value Ref Range    Sodium 137 136 - 145 mmol/L    Potassium 4.3 3.5 - 5.1 mmol/L    Chloride 101 98 - 107 mmol/L    CO2 37 (H) 21 - 32 mmol/L    Anion gap Cannot be calculated 7 - 16 mmol/L    Glucose 81 65 - 100 mg/dL    BUN 13 8 - 23 MG/DL    Creatinine 0.54 (L) 0.6 - 1.0 MG/DL    GFR est AA >60 >60 ml/min/1.73m2    GFR est non-AA >60 >60 ml/min/1.73m2    Calcium 8.6 8.3 - 10.4 MG/DL    Bilirubin, total 0.6 0.2 - 1.1 MG/DL    ALT (SGPT) 22 12 - 65 U/L    AST (SGOT) 24 15 - 37 U/L    Alk.  phosphatase 103 50 - 136 U/L    Protein, total 5.2 (L) 6.3 - 8.2 g/dL    Albumin 2.2 (L) 3.2 - 4.6 g/dL    Globulin 3.0 2.3 - 3.5 g/dL    A-G Ratio 0.7 (L) 1.2 - 3.5     CBC W/O DIFF    Collection Time: 11/05/21  6:00 AM   Result Value Ref Range    WBC 11.9 (H) 4.3 - 11.1 K/uL    RBC 3.01 (L) 4.05 - 5.2 M/uL    HGB 8.8 (L) 11.7 - 15.4 g/dL    HCT 29.2 (L) 35.8 - 46.3 %    MCV 97.0 79.6 - 97.8 FL    MCH 29.2 26.1 - 32.9 PG    MCHC 30.1 (L) 31.4 - 35.0 g/dL    RDW 16.8 (H) 11.9 - 14.6 %    PLATELET 717 071 - 701 K/uL    MPV 9.5 9.4 - 12.3 FL    ABSOLUTE NRBC 0.00 0.0 - 0.2 K/uL   METABOLIC PANEL, COMPREHENSIVE    Collection Time: 11/05/21  6:00 AM   Result Value Ref Range    Sodium 138 136 - 145 mmol/L    Potassium 4.2 3.5 - 5.1 mmol/L    Chloride 102 98 - 107 mmol/L    CO2 35 (H) 21 - 32 mmol/L    Anion gap 1 (L) 7 - 16 mmol/L    Glucose 78 65 - 100 mg/dL    BUN 13 8 - 23 MG/DL    Creatinine 0.53 (L) 0.6 - 1.0 MG/DL    GFR est AA >60 >60 ml/min/1.73m2    GFR est non-AA >60 >60 ml/min/1.73m2    Calcium 8.4 8.3 - 10.4 MG/DL    Bilirubin, total 0.5 0.2 - 1.1 MG/DL    ALT (SGPT) 19 12 - 65 U/L    AST (SGOT) 18 15 - 37 U/L    Alk.  phosphatase 103 50 - 136 U/L    Protein, total 5.0 (L) 6.3 - 8.2 g/dL    Albumin 2.1 (L) 3.2 - 4.6 g/dL    Globulin 2.9 2.3 - 3.5 g/dL    A-G Ratio 0.7 (L) 1.2 - 3.5         Assessment:     Problem List as of 11/6/2021 Date Reviewed: 11/5/2021          Codes Class Noted - Resolved    * (Principal) Spinal stenosis, lumbar region with neurogenic claudication ICD-10-CM: M48.062  ICD-9-CM: 724.03  11/3/2021 - Present        A-fib ([facility] ICD-10-CM: I48.91  ICD-9-CM: 427.31  10/30/2021 - Present        ÁNGEL (acute kidney injury) ([facility] ICD-10-CM: N17.9  ICD-9-CM: 584.9  10/30/2021 - Present        Acute respiratory failure with hypoxia ([facility] ICD-10-CM: J96.01  ICD-9-CM: 518.81  10/30/2021 - Present        Hypovolemic shock ([facility] ICD-10-CM: R57.1  ICD-9-CM: 785.59  10/26/2021 - Present        Hypotension ICD-10-CM: I95.9  ICD-9-CM: 458.9  10/26/2021 - Present        Spondylolisthesis of multiple sites in spine ICD-10-CM: M43.19  ICD-9-CM: 738.4  10/25/2021 - Present        Spinal stenosis ICD-10-CM: M48.00  ICD-9-CM: 724.00  10/25/2021 - Present        Obesity, morbid ([address] ICD-10-CM: E66.01  ICD-9-CM: 278.01  12/22/2017 - Present        Acquired hypothyroidism ICD-10-CM: E03.9  ICD-9-CM: 244.9  9/13/2017 - Present        Postoperative wound infection ICD-10-CM: T81.49XA  ICD-9-CM: 998.59  5/19/2016 - Present        Lumbar stenosis with neurogenic claudication ICD-10-CM: M48.062  ICD-9-CM: 724.03  3/28/2016 - Present        Essential hypertension, benign ICD-10-CM: I10  ICD-9-CM: 401.1  7/24/2013 - Present Endometriosis ICD-10-CM: N80.9  ICD-9-CM: 617.9  7/24/2013 - Present        Colon polyp ICD-10-CM: K63.5  ICD-9-CM: 211.3  7/24/2013 - Present        Squamous cell skin cancer ICD-10-CM: C44.92  ICD-9-CM: 173.92  7/24/2013 - Present            L2 L4 lumbar direct lateral interbody fusion with anterior plating and redo of L2 L5 laminectomy (10/26/2021 // Dr Alexis Matias) due to spinal stenosis with neurogenic claudication    Plan / Recommendations / Medical Decision Making:      Daily physician / PA medical management:     Lumbar stenosis with neurogenic claudication, spondylolisthesis of lumbar region, lumbar spinal stenosis region with neurogenic claudication - PT / OT; spinal precautions, girdle when ambulating.     Atrial fibrillation - s/p 2 failed ablations, managed with Rhythmol, metoprolol and Eliquis. Currently NSR; Eliquis restarted 10/31 in light of RUL pulmonary embolism. -11/4 irreg, rate controlled, continue meds  -11/5 noticeably RRR to prolonged auscultation  -11/6 IIR today but rate-controlled     Hypotension / hypertension - BP fluctuating, managed medically. Has been hypotensive requiring levophed and now midodrine (pt's home lisinopril 20mg BID and HCTZ 12.5mg BID are on hold); needs TEDs when OOB.  -11/4 hypotensive episodes improved; decrease midodrine to 5mg TID. Continue metoprolol for rate control  -11/5 sBP fluctuating from 102-163, dBP all <85, mostly in 50-60s; monitor as add back PRN furosemide for edema  -11/6 /73     Pain control - ongoing significant pain in back which is stable and controlled by PRN meds. Will require regular pain assessment and comprehensive pain management. Continue PRN Norco, tramadol, APAP and gabapentin.   -11/4 increase Neurontin to 300mg TID and Elavil at night 25mg due to sciatic pain; add back Requip 1mg QHS  -11/5 start steroid taper for R LE pain  -11/6 R LE radiating pain resolved, finish steroid taper     Hypokalemia - monitor; currently 4.3 on supplement due to HCTZ; may be able to hold supplement. -11/4 labs pending; d/c daily labs  -11/5 K+ 4.2  -11/6 recheck BMP 11/8 since continuing daily furosemide    Leukocytosis, mild - wbc 11.9 at [facility] admission (11/3). No s/sx of infxn. -11/5 WBC 11.9, afebrile, no s/sx infection; will likely increase with steroids  -11/6 recheck CBC 11/8    Anemia, blood loss - Hgb 9.0, improving; had 2000ml blood loss and received blood transfusion x 3, as well as 2800ml of crystalloid. -11/6 recheck CBC 11/8    Hypothyroidism - continue Synthroid.     Pneumonia prophylaxis - incentive spirometer every hour while awake.     DVT risk / DVT prophylaxis - daily physician / PA exam to assess as patient is at increased risk for of thromboembolism. Mobilize as tolerated. Sequential pneumatic compression devices (SCDs) when in bed; thigh-high or knee-high thromboembolic deterrent hose when out of bed. On Eliquis.     GI prophylaxis - resume PPI. At times may need additional antacids, Maalox prn.     Depression - continue on Lexapro. At risk of depression exacerbation due to pain and loss of independent function.     General skin care / wound prevention - monitor lumbar incision and general skin wound status daily per staff and physician / PA. At risk for failure. Will require 24/7 rehab nursing. -11/4 lateral left hip wound; looks like a blister that had opened up vs sheer injury. Cleanse daily and cover. Back incision healing     Bladder program / urinary retention / neurogenic bladder - schedule voids q 6-8 hrs. Check post-void residual as needed; in-and-out catheter if post-void residual is more than 400ml.     Bowel program - at risk for constipation as a side effect of opioids, other medications, impaired mobility, etc. MiraLAX daily for regularity, Azul-Colace for stool softener.  PRN MOM, bisacodyl suppository or tablets for constipation.           Time spent was 25 minutes with over 1/2 in direct patient care/examination, consultation and coordination of care. Signed By: Evon Sibley PA-C    November 6, 2021      Physician Assistant with [facility]  Margret Echeverria MD, Medical Director

## 2021-11-06 NOTE — PROGRESS NOTES
Purposeful rounds completed this shift, needs met. Continent voids and bm on toilet. Stand pivot transfer to wheelchair , to toilet. Min assist with clothing. Max assist to get to side of bed, leg lift required. Small amt drainage on back dsg , changed. Pain controlled with prn medications. Calls appropriately for assistance.

## 2021-11-07 PROCEDURE — 74011250637 HC RX REV CODE- 250/637: Performed by: PHYSICAL MEDICINE & REHABILITATION

## 2021-11-07 PROCEDURE — 74011000250 HC RX REV CODE- 250: Performed by: PHYSICIAN ASSISTANT

## 2021-11-07 PROCEDURE — 74011250637 HC RX REV CODE- 250/637: Performed by: PHYSICIAN ASSISTANT

## 2021-11-07 PROCEDURE — 65310000000 HC RM PRIVATE REHAB

## 2021-11-07 PROCEDURE — 74011636637 HC RX REV CODE- 636/637: Performed by: PHYSICAL MEDICINE & REHABILITATION

## 2021-11-07 RX ADMIN — LEVOTHYROXINE SODIUM 50 MCG: 0.05 TABLET ORAL at 06:15

## 2021-11-07 RX ADMIN — PREDNISONE 5 MG: 10 TABLET ORAL at 13:50

## 2021-11-07 RX ADMIN — HYDROCODONE BITARTRATE AND ACETAMINOPHEN 1 TABLET: 10; 325 TABLET ORAL at 02:58

## 2021-11-07 RX ADMIN — GABAPENTIN 300 MG: 300 CAPSULE ORAL at 16:35

## 2021-11-07 RX ADMIN — FUROSEMIDE 40 MG: 20 TABLET ORAL at 08:53

## 2021-11-07 RX ADMIN — PANTOPRAZOLE SODIUM 40 MG: 40 TABLET, DELAYED RELEASE ORAL at 06:14

## 2021-11-07 RX ADMIN — PREDNISONE 5 MG: 10 TABLET ORAL at 00:59

## 2021-11-07 RX ADMIN — HYDROCODONE BITARTRATE AND ACETAMINOPHEN 1 TABLET: 10; 325 TABLET ORAL at 08:48

## 2021-11-07 RX ADMIN — POTASSIUM CHLORIDE 10 MEQ: 750 TABLET, EXTENDED RELEASE ORAL at 08:49

## 2021-11-07 RX ADMIN — PREDNISONE 5 MG: 10 TABLET ORAL at 06:41

## 2021-11-07 RX ADMIN — Medication 1 AMPULE: at 21:06

## 2021-11-07 RX ADMIN — ZOLPIDEM TARTRATE 10 MG: 5 TABLET ORAL at 21:06

## 2021-11-07 RX ADMIN — ESCITALOPRAM OXALATE 20 MG: 10 TABLET ORAL at 08:49

## 2021-11-07 RX ADMIN — AMITRIPTYLINE HYDROCHLORIDE 25 MG: 25 TABLET, FILM COATED ORAL at 21:07

## 2021-11-07 RX ADMIN — HYDROCODONE BITARTRATE AND ACETAMINOPHEN 1 TABLET: 10; 325 TABLET ORAL at 18:29

## 2021-11-07 RX ADMIN — DOCUSATE SODIUM 100 MG: 100 CAPSULE, LIQUID FILLED ORAL at 08:49

## 2021-11-07 RX ADMIN — LORAZEPAM 0.5 MG: 0.5 TABLET ORAL at 06:18

## 2021-11-07 RX ADMIN — FAMOTIDINE 20 MG: 20 TABLET ORAL at 17:22

## 2021-11-07 RX ADMIN — APIXABAN 5 MG: 5 TABLET, FILM COATED ORAL at 17:22

## 2021-11-07 RX ADMIN — GABAPENTIN 300 MG: 300 CAPSULE ORAL at 08:49

## 2021-11-07 RX ADMIN — ASPIRIN 81 MG: 81 TABLET, COATED ORAL at 21:06

## 2021-11-07 RX ADMIN — PREDNISONE 5 MG: 10 TABLET ORAL at 17:24

## 2021-11-07 RX ADMIN — DOCUSATE SODIUM 100 MG: 100 CAPSULE, LIQUID FILLED ORAL at 17:21

## 2021-11-07 RX ADMIN — METOPROLOL TARTRATE 25 MG: 25 TABLET, FILM COATED ORAL at 08:53

## 2021-11-07 RX ADMIN — GABAPENTIN 300 MG: 300 CAPSULE ORAL at 21:07

## 2021-11-07 RX ADMIN — METOPROLOL TARTRATE 25 MG: 25 TABLET, FILM COATED ORAL at 17:22

## 2021-11-07 RX ADMIN — Medication 1 AMPULE: at 08:56

## 2021-11-07 RX ADMIN — PROPAFENONE HYDROCHLORIDE 150 MG: 150 TABLET, FILM COATED ORAL at 08:53

## 2021-11-07 RX ADMIN — APIXABAN 5 MG: 5 TABLET, FILM COATED ORAL at 08:49

## 2021-11-07 RX ADMIN — PROPAFENONE HYDROCHLORIDE 150 MG: 150 TABLET, FILM COATED ORAL at 17:22

## 2021-11-07 RX ADMIN — HYDROCODONE BITARTRATE AND ACETAMINOPHEN 1 TABLET: 10; 325 TABLET ORAL at 13:49

## 2021-11-07 NOTE — PROGRESS NOTES
Fall time change for Daylight Savings occurred at 0100, November 7th. The documentation for this period is being entered following the guidelines as defined in the [facility] downtime policy by Michele Ingram RN.

## 2021-11-07 NOTE — PROGRESS NOTES
Purposeful hourly rounds completed this shift, needs met. Continent voids and bm on toilet. Assist needed for clothing and hygiene. Max assist with transfers. Dsg to back changed 3 x. Pleasant, cooperative, motivated.

## 2021-11-08 LAB
ANION GAP SERPL CALC-SCNC: 6 MMOL/L (ref 7–16)
BUN SERPL-MCNC: 16 MG/DL (ref 8–23)
CALCIUM SERPL-MCNC: 8.7 MG/DL (ref 8.3–10.4)
CHLORIDE SERPL-SCNC: 99 MMOL/L (ref 98–107)
CO2 SERPL-SCNC: 32 MMOL/L (ref 21–32)
CREAT SERPL-MCNC: 0.56 MG/DL (ref 0.6–1)
ERYTHROCYTE [DISTWIDTH] IN BLOOD BY AUTOMATED COUNT: 16.4 % (ref 11.9–14.6)
GLUCOSE SERPL-MCNC: 94 MG/DL (ref 65–100)
HCT VFR BLD AUTO: 30.9 % (ref 35.8–46.3)
HGB BLD-MCNC: 9.4 G/DL (ref 11.7–15.4)
MCH RBC QN AUTO: 29.7 PG (ref 26.1–32.9)
MCHC RBC AUTO-ENTMCNC: 30.4 G/DL (ref 31.4–35)
MCV RBC AUTO: 97.8 FL (ref 79.6–97.8)
NRBC # BLD: 0 K/UL (ref 0–0.2)
PLATELET # BLD AUTO: 396 K/UL (ref 150–450)
PMV BLD AUTO: 9.7 FL (ref 9.4–12.3)
POTASSIUM SERPL-SCNC: 4.2 MMOL/L (ref 3.5–5.1)
RBC # BLD AUTO: 3.16 M/UL (ref 4.05–5.2)
SODIUM SERPL-SCNC: 137 MMOL/L (ref 136–145)
WBC # BLD AUTO: 16.2 K/UL (ref 4.3–11.1)

## 2021-11-08 PROCEDURE — 97116 GAIT TRAINING THERAPY: CPT

## 2021-11-08 PROCEDURE — 97110 THERAPEUTIC EXERCISES: CPT

## 2021-11-08 PROCEDURE — 74011250637 HC RX REV CODE- 250/637: Performed by: PHYSICAL MEDICINE & REHABILITATION

## 2021-11-08 PROCEDURE — 80048 BASIC METABOLIC PNL TOTAL CA: CPT

## 2021-11-08 PROCEDURE — 97535 SELF CARE MNGMENT TRAINING: CPT

## 2021-11-08 PROCEDURE — 85027 COMPLETE CBC AUTOMATED: CPT

## 2021-11-08 PROCEDURE — 65310000000 HC RM PRIVATE REHAB

## 2021-11-08 PROCEDURE — 97530 THERAPEUTIC ACTIVITIES: CPT

## 2021-11-08 PROCEDURE — 74011636637 HC RX REV CODE- 636/637: Performed by: PHYSICAL MEDICINE & REHABILITATION

## 2021-11-08 PROCEDURE — 74011000250 HC RX REV CODE- 250: Performed by: PHYSICIAN ASSISTANT

## 2021-11-08 PROCEDURE — 99232 SBSQ HOSP IP/OBS MODERATE 35: CPT | Performed by: PHYSICAL MEDICINE & REHABILITATION

## 2021-11-08 RX ORDER — FUROSEMIDE 40 MG/1
40 TABLET ORAL 2 TIMES DAILY
Status: DISCONTINUED | OUTPATIENT
Start: 2021-11-08 | End: 2021-11-10

## 2021-11-08 RX ADMIN — PREDNISONE 5 MG: 10 TABLET ORAL at 21:35

## 2021-11-08 RX ADMIN — Medication 1 AMPULE: at 21:08

## 2021-11-08 RX ADMIN — HYDROCODONE BITARTRATE AND ACETAMINOPHEN 1 TABLET: 10; 325 TABLET ORAL at 21:07

## 2021-11-08 RX ADMIN — AMITRIPTYLINE HYDROCHLORIDE 25 MG: 25 TABLET, FILM COATED ORAL at 21:07

## 2021-11-08 RX ADMIN — APIXABAN 5 MG: 5 TABLET, FILM COATED ORAL at 08:23

## 2021-11-08 RX ADMIN — ESCITALOPRAM OXALATE 20 MG: 10 TABLET ORAL at 07:40

## 2021-11-08 RX ADMIN — Medication 1 AMPULE: at 08:25

## 2021-11-08 RX ADMIN — FUROSEMIDE 40 MG: 40 TABLET ORAL at 08:23

## 2021-11-08 RX ADMIN — METOPROLOL TARTRATE 25 MG: 25 TABLET, FILM COATED ORAL at 08:22

## 2021-11-08 RX ADMIN — HYDROCODONE BITARTRATE AND ACETAMINOPHEN 1 TABLET: 10; 325 TABLET ORAL at 11:46

## 2021-11-08 RX ADMIN — GABAPENTIN 300 MG: 300 CAPSULE ORAL at 08:22

## 2021-11-08 RX ADMIN — GABAPENTIN 300 MG: 300 CAPSULE ORAL at 16:23

## 2021-11-08 RX ADMIN — HYDROCODONE BITARTRATE AND ACETAMINOPHEN 1 TABLET: 10; 325 TABLET ORAL at 07:39

## 2021-11-08 RX ADMIN — FUROSEMIDE 40 MG: 40 TABLET ORAL at 17:32

## 2021-11-08 RX ADMIN — DOCUSATE SODIUM 100 MG: 100 CAPSULE, LIQUID FILLED ORAL at 17:32

## 2021-11-08 RX ADMIN — PREDNISONE 5 MG: 10 TABLET ORAL at 04:43

## 2021-11-08 RX ADMIN — PANTOPRAZOLE SODIUM 40 MG: 40 TABLET, DELAYED RELEASE ORAL at 04:43

## 2021-11-08 RX ADMIN — POTASSIUM CHLORIDE 10 MEQ: 750 TABLET, EXTENDED RELEASE ORAL at 08:22

## 2021-11-08 RX ADMIN — HYDROCODONE BITARTRATE AND ACETAMINOPHEN 1 TABLET: 10; 325 TABLET ORAL at 16:23

## 2021-11-08 RX ADMIN — MIDODRINE HYDROCHLORIDE 5 MG: 5 TABLET ORAL at 08:22

## 2021-11-08 RX ADMIN — METOPROLOL TARTRATE 25 MG: 25 TABLET, FILM COATED ORAL at 17:32

## 2021-11-08 RX ADMIN — APIXABAN 5 MG: 5 TABLET, FILM COATED ORAL at 17:32

## 2021-11-08 RX ADMIN — ASPIRIN 81 MG: 81 TABLET, COATED ORAL at 21:07

## 2021-11-08 RX ADMIN — PROPAFENONE HYDROCHLORIDE 150 MG: 150 TABLET, FILM COATED ORAL at 08:22

## 2021-11-08 RX ADMIN — FAMOTIDINE 20 MG: 20 TABLET ORAL at 17:32

## 2021-11-08 RX ADMIN — PROPAFENONE HYDROCHLORIDE 150 MG: 150 TABLET, FILM COATED ORAL at 17:32

## 2021-11-08 RX ADMIN — DOCUSATE SODIUM 100 MG: 100 CAPSULE, LIQUID FILLED ORAL at 08:23

## 2021-11-08 RX ADMIN — GABAPENTIN 300 MG: 300 CAPSULE ORAL at 21:07

## 2021-11-08 RX ADMIN — LEVOTHYROXINE SODIUM 50 MCG: 0.05 TABLET ORAL at 04:43

## 2021-11-08 RX ADMIN — PREDNISONE 5 MG: 10 TABLET ORAL at 14:32

## 2021-11-08 NOTE — PROGRESS NOTES
PHYSICAL THERAPY DAILY NOTE  Time In: 1115  Time Out: 7974  Patient Seen For: AM; Balance activities; Gait training; Patient education; Transfer training; Other (see progress notes)    Subjective: patient reporting no radiating pain in her Legs but is having low back pain. Reports overall she feels better except swelling in her feet and legs. Reports transfers and walking are easier but still having difficulty getting out of bed. Objective:Vital Signs:  Patient Vitals for the past 12 hrs:   Temp Pulse Resp BP SpO2   11/08/21 0658 97.5 °F (36.4 °C) 63 20 (!) 148/73 99 %     Pain level:1 to 4 out of 10  Pain location:low back Right side>Left   Pain interventions:Medication per order, rest, positioning,relaxation, body mechanics, L/S corset      Patient education:Bed mobility training,transfer training, gait training, balance training,fall precautions, body mechanics,activity pacing,spinal precautions, donning L/S corset, Patient verbalizing understanding and demonstrating  understanding of patient education. Recommend follow up education. Interdisciplinary Communication:spoke with RN regarding functional level and pain medication schedule    Spinal, Other (comment) (Falls)  GROSS ASSESSMENT Daily Assessment   Independent removing corset  Min assist to Jia Bowels lumbar sacral corset       COGNITION Daily Assessment    No change       BED/MAT MOBILITY Daily Assessment    Rolling Right : 0 (Not tested)  Rolling Left : 0 (Not tested)  Supine to Sit : 0 (Not tested)  Sit to Supine : 0 (Not tested)       TRANSFERS Daily Assessment   Increased time and effort to complete with cues for body mechanics   Transfer Type: SPT with walker  Other: commode transfer using grab bar with SBA and cues. Independent with hygiene and clothing management. Loss of balance when attempting to flush commode. Min assist to correct loss of balance.    Transfer Assistance : 4 (Contact guard assistance)  Sit to Stand Assistance: Minimal assistance  Car Transfers: Not tested       GAIT Daily Assessment   5 mins of rest between each attempt Amount of Assistance: 5 (Stand-by assistance)  Distance (ft): 50 Feet (ft) (50 ft x 3   30 ft x 1)  Assistive Device: Walker, rolling; Gait belt; Orthotic device (Aspen lumbar sacral corset)   Gait training with one time cue for posture correction and cues for improved step length and step clearance    STEPS or STAIRS Daily Assessment    Steps/Stairs Ambulated (#): 0  Level of Assist : 0 (Not tested)       BALANCE Daily Assessment   Static standing with RW, lumbar corset and CGA to SBA with no loss of balance Sitting - Static: Good (unsupported)  Sitting - Dynamic: Fair (occasional)  Standing - Static: Fair (with RW, NWB RLE)  Standing - Dynamic : Impaired       WHEELCHAIR MOBILITY Daily Assessment    Curbs/Ramps Assist Required (FIM Score): 0 (Not tested)  Wheelchair Setup Assist Required : 0 (Not tested)       LOWER EXTREMITY EXERCISES Daily Assessment   NA                Assessment: LE radiating pain remains resolved. Body mechanics with sit<>stand slowly improving. Overall gait distance and tolerance to gait training improving. Patient returned to room at end of treatment and remained up in recliner with LEs elevated and with needs in reach. Plan of Care: Continue with POC and progress as tolerated.      Smooth Brito, PT  11/8/2021

## 2021-11-08 NOTE — PROGRESS NOTES
Problem: Patient Education: Go to Patient Education Activity  Goal: Patient/Family Education  Description: Pt/caregiver will verbalize  understanding of OT recommendations regarding ADL status, functional transfer status, home safety, DME, AE, energy conservation techniques, safety awareness, activity tolerance, and/or follow-up therapy to increase safety with functional tasks upon discharge. Outcome: Progressing Towards Goal     Problem: Falls - Risk of  Goal: *Absence of Falls  Description: Document Jameson Montiel Fall Risk and appropriate interventions in the flowsheet. Outcome: Progressing Towards Goal  Note: Fall Risk Interventions:  Mobility Interventions: Bed/chair exit alarm, OT consult for ADLs, Patient to call before getting OOB, PT Consult for mobility concerns         Medication Interventions: Bed/chair exit alarm, Patient to call before getting OOB, Teach patient to arise slowly    Elimination Interventions: Call light in reach, Toilet paper/wipes in reach, Toileting schedule/hourly rounds    History of Falls Interventions: Bed/chair exit alarm         Problem: Patient Education: Go to Patient Education Activity  Goal: Patient/Family Education  Outcome: Progressing Towards Goal     Problem: Pressure Injury - Risk of  Goal: *Prevention of pressure injury  Description: Document Trung Scale and appropriate interventions in the flowsheet.   Outcome: Progressing Towards Goal  Note: Pressure Injury Interventions:  Sensory Interventions: Assess changes in LOC, Float heels    Moisture Interventions: Absorbent underpads, Minimize layers    Activity Interventions: Increase time out of bed, Pressure redistribution bed/mattress(bed type), PT/OT evaluation    Mobility Interventions: Pressure redistribution bed/mattress (bed type), PT/OT evaluation, HOB 30 degrees or less    Nutrition Interventions: Document food/fluid/supplement intake, Offer support with meals,snacks and hydration    Friction and Shear Interventions: Lift sheet, HOB 30 degrees or less, Minimize layers                Problem: Patient Education: Go to Patient Education Activity  Goal: Patient/Family Education  Outcome: Progressing Towards Goal     Problem: Patient Education: Go to Patient Education Activity  Goal: Patient/Family Education  Description: Patient / Patient's family will verbalize understanding of PT safety recommendations, demonstrate appropriate assist for current functional mobility status, safety, and home exercise program by time of discharge.     Outcome: Progressing Towards Goal

## 2021-11-08 NOTE — PROGRESS NOTES
Heather Groves MD  Medical Director  [address] [facility], [address] [facility]  Tel: [phone]       [facility] PROGRESS NOTE    Rayna Loss  Admit Date: 11/3/2021  Admit Diagnosis:   Lumbar stenosis with neurogenic claudication [M48.062]; Spondylolisthesis of lumbar region [M43.16]; Spinal stenosis [M48.00]; Spinal stenosis, lumbar region with neurogenic claudication [M48.062]    Subjective     Patient seen and examined. RLE pain much improved with addition of steroid dose lyudmila. Continent b/b. Back pain controlled. Increased edema LEs. Has not been elevating or wearing TEDs.      Objective:     Current Facility-Administered Medications   Medication Dose Route Frequency    furosemide (LASIX) tablet 40 mg  40 mg Oral BID    midodrine (PROAMATINE) tablet 5 mg  5 mg Oral TID WITH MEALS    predniSONE (DELTASONE) tablet 5 mg  5 mg Oral 4x Daily Taper    rOPINIRole (REQUIP) tablet 1 mg  1 mg Oral QHS PRN    gabapentin (NEURONTIN) capsule 300 mg  300 mg Oral TID    amitriptyline (ELAVIL) tablet 25 mg  25 mg Oral QHS    lidocaine 4 % patch 1 Patch  1 Patch TransDERmal Q24H    diphenhydrAMINE (BENADRYL) capsule 25 mg  25 mg Oral Q6H PRN    docusate sodium (COLACE) capsule 100 mg  100 mg Oral BID    HYDROcodone-acetaminophen (NORCO)  mg tablet 1 Tablet  1 Tablet Oral Q4H PRN    naloxone (NARCAN) injection 0.4 mg  0.4 mg IntraVENous PRN    ondansetron (ZOFRAN ODT) tablet 4 mg  4 mg Oral Q4H PRN    phenol throat spray (CHLORASEPTIC) 1 Spray  1 Spray Oral PRN    alcohol 62% (NOZIN) nasal  1 Ampule  1 Ampule Topical Q12H    acetaminophen (TYLENOL) tablet 650 mg  650 mg Oral Q6H PRN    alum-mag hydroxide-simeth (MYLANTA) oral suspension 30 mL  30 mL Oral Q4H PRN    apixaban (ELIQUIS) tablet 5 mg  5 mg Oral BID    aspirin delayed-release tablet 81 mg  81 mg Oral QHS    bisacodyL (DULCOLAX) suppository 10 mg  10 mg Rectal DAILY PRN  bisacodyL (DULCOLAX) suppository 10 mg  10 mg Rectal DAILY PRN    escitalopram oxalate (LEXAPRO) tablet 20 mg  20 mg Oral QAM    famotidine (PEPCID) tablet 20 mg  20 mg Oral QPM    levothyroxine (SYNTHROID) tablet 50 mcg  50 mcg Oral ACB    LORazepam (ATIVAN) tablet 0.5 mg  0.5 mg Oral Q6H PRN    magnesium hydroxide (MILK OF MAGNESIA) 400 mg/5 mL oral suspension 30 mL  30 mL Oral DAILY PRN    metoprolol tartrate (LOPRESSOR) tablet 25 mg  25 mg Oral BID    pantoprazole (PROTONIX) tablet 40 mg  40 mg Oral ACB    polyethylene glycol (MIRALAX) packet 17 g  17 g Oral DAILY    potassium chloride (KLOR-CON) tablet 10 mEq  10 mEq Oral DAILY    propafenone (RYTHMOL) tablet 150 mg  150 mg Oral BID    traMADoL (ULTRAM) tablet 50 mg  50 mg Oral Q6H PRN    zolpidem (AMBIEN) tablet 10 mg  10 mg Oral QHS PRN    influenza vaccine 2021-22 (6 mos+)(PF) (FLUARIX/FLULAVAL/FLUZONE QUAD) injection 0.5 mL  1 Each IntraMUSCular PRIOR TO DISCHARGE    nicotine (NICODERM CQ) 14 mg/24 hr patch 1 Patch  1 Patch TransDERmal Q24H       Review of Systems:   Denies chest pain, shortness of breath, cough, headache, visual problems, abdominal pain, dysuria, calf pain. Pertinent positives are as noted in the HPI, ROS unremarkable otherwise. Visit Vitals  BP (!) 148/73   Pulse 63   Temp 97.5 °F (36.4 °C)   Resp 20   SpO2 99%        Physical Exam:   General: Alert and age appropriately oriented. No acute cardiorespiratory distress. HEENT: Normocephalic, no scleral icterus. Oral mucosa moist without cyanosis. Lungs: Clear to auscultation bilaterally. Respiration even and unlabored. Heart: Regular rate and rhythm, S1, S2. No murmurs, clicks, rub or gallops. Abdomen: Soft, non-tender, not distended. Bowel sounds normoactive. No organomegaly. Morbidly obese   Genitourinary: Deferred. Neuromuscular:      Hip flexion 2+ ,KE 3+  DF 4  Sensation intact   Skin/extremity: No rashes, no erythema.  No calf tenderness B LE.  2+ LE edema with pitting to below knees bilaterally  Neg taran's                                                                              Functional Assessment:          Balance  Sitting - Static: Good (unsupported) (11/06/21 1200)  Sitting - Dynamic: Fair (occasional) (11/06/21 1200)  Standing - Static: Fair (with RW) (11/06/21 1200)  Standing - Dynamic : Impaired (11/06/21 1200)                     Bard Morales Fall Risk Assessment:  Bard Morales Fall Risk  Mobility: Ambulates or transfers with assist devices or assistance (11/07/21 2008)  Mobility Interventions: Patient to call before getting OOB; Strengthening exercises (ROM-active/passive); Utilize walker, cane, or other assistive device (11/07/21 2008)  Mentation: Alert, oriented x 3 (11/07/21 2008)  Medication: Patient receiving anticonvulsants, sedatives(tranquilizers), psychotropics or hypnotics, hypoglycemics, narcotics, sleep aids, antihypertensives, laxatives, or diuretics (11/07/21 2008)  Medication Interventions: Patient to call before getting OOB; Teach patient to arise slowly (11/07/21 2008)  Elimination: Needs assistance with toileting (11/07/21 2008)  Elimination Interventions: Toilet paper/wipes in reach; Toileting schedule/hourly rounds; Call light in reach (11/07/21 2008)  Prior Fall History: No (11/07/21 2008)  History of Falls Interventions: Evaluate medications/consider consulting pharmacy; Room close to nurse's station; Utilize gait belt for transfer/ambulation (11/07/21 2008)  Total Score: 3 (11/07/21 2008)  High Fall Risk: Yes (11/07/21 2008)     Speech Assessment:         Ambulation:  Gait  Distance (ft): 65 Feet (ft) (65 ft x 2) (11/06/21 1200)  Assistive Device: Walker, rolling; Gait belt; Orthotic device (Aspen lumbar sacral corset) (11/06/21 1200)     Labs/Studies:  No results found for this or any previous visit (from the past 72 hour(s)).     Assessment:     Problem List as of 11/8/2021 Date Reviewed: 11/5/2021          Codes Class Noted - Resolved    * (Principal) Spinal stenosis, lumbar region with neurogenic claudication ICD-10-CM: M48.062  ICD-9-CM: 724.03  11/3/2021 - Present        A-fib ([facility] ICD-10-CM: I48.91  ICD-9-CM: 427.31  10/30/2021 - Present        ÁNGEL (acute kidney injury) ([facility] ICD-10-CM: N17.9  ICD-9-CM: 584.9  10/30/2021 - Present        Acute respiratory failure with hypoxia (HCC) ICD-10-CM: J96.01  ICD-9-CM: 518.81  10/30/2021 - Present        Hypovolemic shock ([facility] ICD-10-CM: R57.1  ICD-9-CM: 785.59  10/26/2021 - Present        Hypotension ICD-10-CM: I95.9  ICD-9-CM: 458.9  10/26/2021 - Present        Spondylolisthesis of multiple sites in spine ICD-10-CM: M43.19  ICD-9-CM: 738.4  10/25/2021 - Present        Spinal stenosis ICD-10-CM: M48.00  ICD-9-CM: 724.00  10/25/2021 - Present        Obesity, morbid ([facility] ICD-10-CM: E66.01  ICD-9-CM: 278.01  12/22/2017 - Present        Acquired hypothyroidism ICD-10-CM: E03.9  ICD-9-CM: 244.9  9/13/2017 - Present        Postoperative wound infection ICD-10-CM: T81.49XA  ICD-9-CM: 998.59  5/19/2016 - Present        Lumbar stenosis with neurogenic claudication ICD-10-CM: M48.062  ICD-9-CM: 724.03  3/28/2016 - Present        Essential hypertension, benign ICD-10-CM: I10  ICD-9-CM: 401.1  7/24/2013 - Present        Endometriosis ICD-10-CM: N80.9  ICD-9-CM: 617.9  7/24/2013 - Present        Colon polyp ICD-10-CM: K63.5  ICD-9-CM: 211.3  7/24/2013 - Present        Squamous cell skin cancer ICD-10-CM: C44.92  ICD-9-CM: 173.92  7/24/2013 - Present            L2 L4 lumbar direct lateral interbody fusion with anterior plating and redo of L2 L5 laminectomy (10/26/2021 // Dr Vamsi Blanc) due to spinal stenosis with neurogenic claudication     Plan / Recommendations / Medical Decision Making:      Daily physician / PA medical management:     Lumbar stenosis with neurogenic claudication, spondylolisthesis of lumbar region, lumbar spinal stenosis region with neurogenic claudication - PT / OT; spinal precautions, girdle when ambulating.     Atrial fibrillation - s/p 2 failed ablations, managed with Rhythmol, metoprolol and Eliquis. Currently NSR; Eliquis restarted 10/31 in light of RUL pulmonary embolism. -11/4 irreg, rate controlled, continue meds  -11/5 noticeably RRR to prolonged auscultation  -11/6 IIR today but rate-controlled     Hypotension / hypertension - BP fluctuating, managed medically. Has been hypotensive requiring levophed and now midodrine (pt's home lisinopril 20mg BID and HCTZ 12.5mg BID are on hold); needs TEDs when OOB.  -11/4 hypotensive episodes improved; decrease midodrine to 5mg TID. Continue metoprolol for rate control  -11/5 sBP fluctuating from 102-163, dBP all <85, mostly in 50-60s; monitor as add back PRN furosemide for edema  -11/6 /73  -11/8 119-148/63-73 ; starr midodrine to 5mg bid and wean off; MUST wear TEDs when oob and elevate LEs whenever possible. Bilateral pitting edema; will give 40mg bid of Lasix today and reassess daily. Check renal fxn     Pain control - ongoing significant pain in back which is stable and controlled by PRN meds. Will require regular pain assessment and comprehensive pain management. Continue PRN Norco, tramadol, APAP and gabapentin. -11/4 increase Neurontin to 300mg TID and Elavil at night 25mg due to sciatic pain; add back Requip 1mg QHS  -11/5 start steroid taper for R LE pain  -11/6 R LE radiating pain resolved, finish steroid taper  -11/8 MUCH improved with steroid dose lyudmila     Hypokalemia - monitor; currently 4.3 on supplement due to HCTZ; may be able to hold supplement. -11/4 labs pending; d/c daily labs  -11/5 K+ 4.2  -11/6 recheck BMP 11/8 since continuing daily furosemide; pending 11/8     Leukocytosis, mild - wbc 11.9 at [facility] admission (11/3). No s/sx of infxn.   -11/5 WBC 11.9, afebrile, no s/sx infection; will likely increase with steroids  -11/6 recheck CBC 11/8;pending     Anemia, blood loss - Hgb 9.0, improving; had 2000ml blood loss and received blood transfusion x 3, as well as 2800ml of crystalloid. -11/6 recheck CBC 11/8; pending     Hypothyroidism - continue Synthroid.     Pneumonia prophylaxis - incentive spirometer every hour while awake.     DVT risk / DVT prophylaxis - daily physician / PA exam to assess as patient is at increased risk for of thromboembolism. Mobilize as tolerated. Sequential pneumatic compression devices (SCDs) when in bed; thigh-high or knee-high thromboembolic deterrent hose when out of bed. On Eliquis.     GI prophylaxis - resume PPI. At times may need additional antacids, Maalox prn.     Depression - continue on Lexapro. At risk of depression exacerbation due to pain and loss of independent function.     General skin care / wound prevention - monitor lumbar incision and general skin wound status daily per staff and physician / PA. At risk for failure. Will require 24/7 rehab nursing. -11/4 lateral left hip wound; looks like a blister that had opened up vs sheer injury. Cleanse daily and cover. Back incision healing  --11/8 incision c/d/i     Bladder program / urinary retention / neurogenic bladder - schedule voids q 6-8 hrs. Check post-void residual as needed; in-and-out catheter if post-void residual is more than 400ml.  -voiding continently without residuals     Bowel program - at risk for constipation as a side effect of opioids, other medications, impaired mobility, etc. MiraLAX daily for regularity, Azul-Colace for stool softener. PRN MOM, bisacodyl suppository or tablets for constipation.  -continent, regular        Time spent was 25 minutes with over 1/2 in direct patient care/examination, consultation and coordination of care.      Signed By: Ino Perez MD     November 8, 2021

## 2021-11-08 NOTE — PROGRESS NOTES
Time In 0918   Time Out 1055     Mobility   Score Comments   Supine to Sit 3: Partial/Moderate A    Sit to Stand 4: Supervision or touching A CGA   Transfer Assist 4: Supervision or touching A Transfer Type: SPT   Equipment: Rolling Walker   Comments: CGA     Activities of Daily Living    Score Comments   Oral Hyigene 6: Independent    Bathing 3: Partial/Moderate A Type of Shower: Shower  Position: Standing PRN and Unsupported Sitting   Adaptive  Equipment: Tub Transfer Bench, Grab Bars and Long Handled Sponge  Comments: Assist to wash bottom in stance   Upper Body  Dressing 5: S/U or clean-up assist Items Applied: Pullover and lumbar brace  Position: Unsupported Sitting  Comments:    Lower Body Dressing 4: Supervision or touching A Items Applied: Elastic pants  Position: Standing PRN and Unsupported Sitting  Adaptive Equipment: Reacher, RW and W/C  Comments: CGA in stance   Donning/[place] Footwear 4: Supervision or touching A Items Applied: Socks  Adaptive Equipment: Sock Aide  Comments: Toilet Transfer 4: Supervision or touching A Transfer Type: SPT   Equipment: Grab Bars   Comments: CGA   Toileting Hygiene 3: Partial/Moderate A Output: urine x1  Comments: assist for cleanup due to increased back pain at end of session   Education  energy conservation, AE/DME training, functional transfers and activity pacing       S: \"My back didn't bother me this whole time until I sat on the hard surface of the toilet. \" Agreeable to therapy. Patient was able to ambulate ~5 feet using a RW with CGA. Pt completed morning ADLs with quality indicators reflected in chart above. Pt transitioned to gym and completed activity tolerance and reaching task.  Pt completed the following exercises with unweighted anjelica to promote UB strength, activity tolerance, and shoulder stabilization for integration into functional reaching activities:  Exercise Reps Comments   Protraction/Retraction 20    Abduction/Adduction 15    Circles 30 1/2 CW, 1/2 CCW   Vs 15    Pt demonstrated good quality during all exercises. Pain Pt reported 4/10 pain in lower back. Patient Vitals for the past 12 hrs:   Temp Pulse Resp BP SpO2   11/08/21 0658 97.5 °F (36.4 °C) 63 20 (!) 148/73 99 %       Collaborated with RN and PT regarding patient performance and POC. Patient tolerated session well, but activity tolerance, balance, functional mobility, strength, coordination, cognition are still below baseline and require skilled facilitation to successfully and safely complete ADLs and transfers. Patient ended session seated in recliner and call remote, phone, all needs within reach.       Eduardo Perales, OTR/L  11/8/2021

## 2021-11-08 NOTE — PROGRESS NOTES
PHYSICAL THERAPY DAILY NOTE  Time In: 0911  Time Out: 7220  Patient Seen For: PM; Patient education; Therapeutic exercise; Other (see progress notes)    Subjective: patient reporting the lasix is working. Reports she has made several trips to the bathroom. Reports the nurse positioned her in the bed to help with swelling in her feet. Objective:Vital Signs:  Patient Vitals for the past 12 hrs:   Temp Pulse Resp BP SpO2   11/08/21 0658 97.5 °F (36.4 °C) 63 20 (!) 148/73 99 %     Pain level:2 out of 10  Pain location:low back Right side>Left   Pain interventions:Medication per order, rest, positioning,relaxation, body mechanics, L/S corset      Patient education:Bed mobility training,transfer training, gait training, balance training,fall precautions, body mechanics,activity pacing,spinal precautions, donning L/S corset, Patient verbalizing understanding and demonstrating  understanding of patient education. Recommend follow up education.     Interdisciplinary Communication:spoke with RN regarding functional level and pain medication schedule    Spinal, Other (comment) (Falls)  GROSS ASSESSMENT Daily Assessment     NA       COGNITION Daily Assessment    No change       BED/MAT MOBILITY Daily Assessment   NA Rolling Right : 0 (Not tested)  Rolling Left : 0 (Not tested)  Supine to Sit : 0 (Not tested)  Sit to Supine : 0 (Not tested)       TRANSFERS Daily Assessment       GAIT Daily Assessment           STEPS or STAIRS Daily Assessment    Steps/Stairs Ambulated (#): 0  Level of Assist : 0 (Not tested)       BALANCE Daily Assessment    Sitting - Static: Not tested  Sitting - Dynamic: Not tested  Standing - Static: Not tested  Standing - Dynamic : NT       WHEELCHAIR MOBILITY Daily Assessment    Curbs/Ramps Assist Required (FIM Score): 0 (Not tested)  Wheelchair Setup Assist Required : 0 (Not tested)       LOWER EXTREMITY EXERCISES Daily Assessment   Increased time and effort to complete with cues for body mechanics   Extremity: Both  Exercise Type #1: Supine lower extremity strengthening  Sets Performed: 3  Reps Performed: 10  Level of Assist: Minimal assistance     SUPINE EXERCISES Sets Reps Comments   Ankle Pumps 3 10    Heel Slides 3 10    Hip Abduction 3 10    SAQ                            3         10     LLE only         Assessment: Improved tolerance to treatment due to less pain. RLE radiating pain resolved today. Improved LE ROM during exercises without increased pain. LE ROM limited due to amount of edema in LEs       Patient remained in  room at end of treatment. Patient supine in bed with head of bed elevated and bed rails up x 2. Needs placed in reach of patient. Plan of Care: Continue with POC and progress as tolerated.      Idalia Bangura, PT  11/8/2021

## 2021-11-08 NOTE — PROGRESS NOTES
Chart reviewed. No new needs noted. Pt plans to d/c home and have sister stay for support as needed. Will follow up on further needs at [facility] meeting. CM to continue to follow and monitor for any further needs.

## 2021-11-09 LAB
APPEARANCE UR: CLEAR
BILIRUB UR QL: NEGATIVE
COLOR UR: YELLOW
GLUCOSE UR STRIP.AUTO-MCNC: NEGATIVE MG/DL
HGB UR QL STRIP: NEGATIVE
KETONES UR QL STRIP.AUTO: NEGATIVE MG/DL
LEUKOCYTE ESTERASE UR QL STRIP.AUTO: NEGATIVE
NITRITE UR QL STRIP.AUTO: NEGATIVE
PH UR STRIP: 7.5 [PH] (ref 5–9)
PROT UR STRIP-MCNC: NEGATIVE MG/DL
SP GR UR REFRACTOMETRY: 1.01 (ref 1–1.02)
UROBILINOGEN UR QL STRIP.AUTO: 1 EU/DL (ref 0.2–1)

## 2021-11-09 PROCEDURE — 74011000250 HC RX REV CODE- 250: Performed by: PHYSICIAN ASSISTANT

## 2021-11-09 PROCEDURE — 97116 GAIT TRAINING THERAPY: CPT

## 2021-11-09 PROCEDURE — 97110 THERAPEUTIC EXERCISES: CPT

## 2021-11-09 PROCEDURE — 74011250637 HC RX REV CODE- 250/637: Performed by: PHYSICAL MEDICINE & REHABILITATION

## 2021-11-09 PROCEDURE — 65310000000 HC RM PRIVATE REHAB

## 2021-11-09 PROCEDURE — 99232 SBSQ HOSP IP/OBS MODERATE 35: CPT | Performed by: PHYSICAL MEDICINE & REHABILITATION

## 2021-11-09 PROCEDURE — 97530 THERAPEUTIC ACTIVITIES: CPT

## 2021-11-09 PROCEDURE — 87086 URINE CULTURE/COLONY COUNT: CPT

## 2021-11-09 PROCEDURE — 81003 URINALYSIS AUTO W/O SCOPE: CPT

## 2021-11-09 PROCEDURE — 74011636637 HC RX REV CODE- 636/637: Performed by: PHYSICAL MEDICINE & REHABILITATION

## 2021-11-09 PROCEDURE — 97535 SELF CARE MNGMENT TRAINING: CPT

## 2021-11-09 RX ADMIN — ASPIRIN 81 MG: 81 TABLET, COATED ORAL at 21:51

## 2021-11-09 RX ADMIN — HYDROCODONE BITARTRATE AND ACETAMINOPHEN 1 TABLET: 10; 325 TABLET ORAL at 10:49

## 2021-11-09 RX ADMIN — GABAPENTIN 300 MG: 300 CAPSULE ORAL at 21:51

## 2021-11-09 RX ADMIN — LEVOTHYROXINE SODIUM 50 MCG: 0.05 TABLET ORAL at 06:12

## 2021-11-09 RX ADMIN — PANTOPRAZOLE SODIUM 40 MG: 40 TABLET, DELAYED RELEASE ORAL at 06:11

## 2021-11-09 RX ADMIN — ZOLPIDEM TARTRATE 10 MG: 5 TABLET ORAL at 21:50

## 2021-11-09 RX ADMIN — METOPROLOL TARTRATE 25 MG: 25 TABLET, FILM COATED ORAL at 08:31

## 2021-11-09 RX ADMIN — DOCUSATE SODIUM 100 MG: 100 CAPSULE, LIQUID FILLED ORAL at 08:31

## 2021-11-09 RX ADMIN — POTASSIUM CHLORIDE 10 MEQ: 750 TABLET, EXTENDED RELEASE ORAL at 08:31

## 2021-11-09 RX ADMIN — HYDROCODONE BITARTRATE AND ACETAMINOPHEN 1 TABLET: 10; 325 TABLET ORAL at 14:49

## 2021-11-09 RX ADMIN — METOPROLOL TARTRATE 25 MG: 25 TABLET, FILM COATED ORAL at 17:32

## 2021-11-09 RX ADMIN — APIXABAN 5 MG: 5 TABLET, FILM COATED ORAL at 17:32

## 2021-11-09 RX ADMIN — ESCITALOPRAM OXALATE 20 MG: 10 TABLET ORAL at 08:31

## 2021-11-09 RX ADMIN — PROPAFENONE HYDROCHLORIDE 150 MG: 150 TABLET, FILM COATED ORAL at 08:31

## 2021-11-09 RX ADMIN — AMITRIPTYLINE HYDROCHLORIDE 25 MG: 25 TABLET, FILM COATED ORAL at 21:51

## 2021-11-09 RX ADMIN — DOCUSATE SODIUM 100 MG: 100 CAPSULE, LIQUID FILLED ORAL at 17:32

## 2021-11-09 RX ADMIN — FUROSEMIDE 40 MG: 40 TABLET ORAL at 08:31

## 2021-11-09 RX ADMIN — FUROSEMIDE 40 MG: 40 TABLET ORAL at 17:32

## 2021-11-09 RX ADMIN — HYDROCODONE BITARTRATE AND ACETAMINOPHEN 1 TABLET: 10; 325 TABLET ORAL at 06:58

## 2021-11-09 RX ADMIN — HYDROCODONE BITARTRATE AND ACETAMINOPHEN 1 TABLET: 10; 325 TABLET ORAL at 18:27

## 2021-11-09 RX ADMIN — Medication 1 AMPULE: at 08:31

## 2021-11-09 RX ADMIN — PREDNISONE 5 MG: 10 TABLET ORAL at 21:50

## 2021-11-09 RX ADMIN — APIXABAN 5 MG: 5 TABLET, FILM COATED ORAL at 08:31

## 2021-11-09 RX ADMIN — GABAPENTIN 300 MG: 300 CAPSULE ORAL at 16:56

## 2021-11-09 RX ADMIN — FAMOTIDINE 20 MG: 20 TABLET ORAL at 17:32

## 2021-11-09 RX ADMIN — HYDROCODONE BITARTRATE AND ACETAMINOPHEN 1 TABLET: 10; 325 TABLET ORAL at 23:27

## 2021-11-09 RX ADMIN — PREDNISONE 5 MG: 10 TABLET ORAL at 06:12

## 2021-11-09 RX ADMIN — GABAPENTIN 300 MG: 300 CAPSULE ORAL at 08:31

## 2021-11-09 RX ADMIN — Medication 1 AMPULE: at 21:51

## 2021-11-09 RX ADMIN — PROPAFENONE HYDROCHLORIDE 150 MG: 150 TABLET, FILM COATED ORAL at 17:32

## 2021-11-09 NOTE — PROGRESS NOTES
Problem: Patient Education: Go to Patient Education Activity  Goal: Patient/Family Education  Description: Pt/caregiver will verbalize  understanding of OT recommendations regarding ADL status, functional transfer status, home safety, DME, AE, energy conservation techniques, safety awareness, activity tolerance, and/or follow-up therapy to increase safety with functional tasks upon discharge. Outcome: Progressing Towards Goal     Problem: Falls - Risk of  Goal: *Absence of Falls  Description: Document [place] Fall Risk and appropriate interventions in the flowsheet. Outcome: Progressing Towards Goal  Note: Fall Risk Interventions:  Mobility Interventions: Bed/chair exit alarm, PT Consult for mobility concerns, OT consult for ADLs, Patient to call before getting OOB         Medication Interventions: Bed/chair exit alarm, Patient to call before getting OOB, Teach patient to arise slowly    Elimination Interventions: Bed/chair exit alarm, Call light in reach    History of Falls Interventions: Bed/chair exit alarm, Evaluate medications/consider consulting pharmacy         Problem: Patient Education: Go to Patient Education Activity  Goal: Patient/Family Education  Outcome: Progressing Towards Goal     Problem: Pressure Injury - Risk of  Goal: *Prevention of pressure injury  Description: Document Trung Scale and appropriate interventions in the flowsheet.   Outcome: Progressing Towards Goal  Note: Pressure Injury Interventions:  Sensory Interventions: Assess changes in LOC, Discuss PT/OT consult with provider, Float heels    Moisture Interventions: Check for incontinence Q2 hours and as needed, Apply protective barrier, creams and emollients    Activity Interventions: Increase time out of bed, Pressure redistribution bed/mattress(bed type), PT/OT evaluation    Mobility Interventions: Pressure redistribution bed/mattress (bed type), PT/OT evaluation, HOB 30 degrees or less, Float heels    Nutrition Interventions: Document food/fluid/supplement intake, Offer support with meals,snacks and hydration    Friction and Shear Interventions: HOB 30 degrees or less, Lift sheet, Apply protective barrier, creams and emollients                Problem: Patient Education: Go to Patient Education Activity  Goal: Patient/Family Education  Outcome: Progressing Towards Goal     Problem: Patient Education: Go to Patient Education Activity  Goal: Patient/Family Education  Description: Patient / Patient's family will verbalize understanding of PT safety recommendations, demonstrate appropriate assist for current functional mobility status, safety, and home exercise program by time of discharge. Outcome: Progressing Towards Goal     Problem: Patient Education: Go to Patient Education Activity  Goal: Patient/Family Education  Description: Pt/caregiver will verbalize  understanding of OT recommendations regarding ADL status, functional transfer status, home safety, DME, AE, energy conservation techniques, safety awareness, activity tolerance, and/or follow-up therapy to increase safety with functional tasks upon discharge. Outcome: Progressing Towards Goal     Problem: Falls - Risk of  Goal: *Absence of Falls  Description: Document Sujit Mendes Fall Risk and appropriate interventions in the flowsheet.   Outcome: Progressing Towards Goal  Note: Fall Risk Interventions:  Mobility Interventions: Bed/chair exit alarm, PT Consult for mobility concerns, OT consult for ADLs, Patient to call before getting OOB         Medication Interventions: Bed/chair exit alarm, Patient to call before getting OOB, Teach patient to arise slowly    Elimination Interventions: Bed/chair exit alarm, Call light in reach    History of Falls Interventions: Bed/chair exit alarm, Evaluate medications/consider consulting pharmacy         Problem: Patient Education: Go to Patient Education Activity  Goal: Patient/Family Education  Outcome: Progressing Towards Goal     Problem: Pressure Injury - Risk of  Goal: *Prevention of pressure injury  Description: Document Trung Scale and appropriate interventions in the flowsheet. Outcome: Progressing Towards Goal  Note: Pressure Injury Interventions:  Sensory Interventions: Assess changes in LOC, Discuss PT/OT consult with provider, Float heels    Moisture Interventions: Check for incontinence Q2 hours and as needed, Apply protective barrier, creams and emollients    Activity Interventions: Increase time out of bed, Pressure redistribution bed/mattress(bed type), PT/OT evaluation    Mobility Interventions: Pressure redistribution bed/mattress (bed type), PT/OT evaluation, HOB 30 degrees or less, Float heels    Nutrition Interventions: Document food/fluid/supplement intake, Offer support with meals,snacks and hydration    Friction and Shear Interventions: HOB 30 degrees or less, Lift sheet, Apply protective barrier, creams and emollients                Problem: Patient Education: Go to Patient Education Activity  Goal: Patient/Family Education  Outcome: Progressing Towards Goal     Problem: Patient Education: Go to Patient Education Activity  Goal: Patient/Family Education  Description: Patient / Patient's family will verbalize understanding of PT safety recommendations, demonstrate appropriate assist for current functional mobility status, safety, and home exercise program by time of discharge.     Outcome: Progressing Towards Goal

## 2021-11-09 NOTE — PROGRESS NOTES
Time In 0748   Time Out 0830     Mobility   Score Comments   Supine to Sit 3: Partial/Moderate A    Sit to Stand 4: Supervision or touching A CGA   Transfer Assist 4: Supervision or touching A Transfer Type: SPT   Equipment: Rolling Walker   Comments: CGA     Activities of Daily Living    Score Comments   Eating 6: Independent    Oral Hyigene 6: Independent    Bathing 3: Partial/Moderate A Type of Shower: Bath Pack  Position: Supported sitting and Standing PRN   Adaptive  Equipment: W/C, Long Handled Sponge and sink counter  Comments: assist to wash bottom   Upper Body  Dressing 5: S/U or clean-up assist Items Applied: Pullover and LSO brace  Position: Unsupported Sitting  Comments:    Lower Body Dressing 4: Supervision or touching A Items Applied: Underwear and Elastic pants  Position: Standing PRN and Unsupported Sitting  Adaptive Equipment: Reacher, RW and W/C  Comments: CGA in stance   Donning/Wiley Footwear 2: Substantial/Maximal A Items Applied: Socks and MARIS hose  Adaptive Equipment: Sock Aide  Comments: Toilet Transfer 4: Supervision or touching A Transfer Type: SPT   Equipment: Grab Bars   Comments: CGA   Toileting Hygiene 3: Partial/Moderate A Output: 300 ml of urine  Comments: assist for wiping posteriorly   Education  energy conservation, AE/DME training and functional transfers       S: \"I didn't sleep well last night. I was up peeing. \" Agreeable to therapy. Patient was able to ambulate ~5 feet using a RW with CGA. Pt completed morning ADLs with quality indicators reflected in chart above. Pain RN provided pain meds prior to session. Patient Vitals for the past 12 hrs:   Temp Pulse Resp BP SpO2   11/09/21 0734 98.1 °F (36.7 °C) 67 18 (!) 155/82 99 %       Collaborated with RN regarding patient performance and POC.    Patient tolerated session well, but activity tolerance, balance, functional mobility, strength, coordination, cognition are still below baseline and require skilled facilitation to successfully and safely complete ADLs and transfers. Patient ended session seated in recliner and call remote, phone, all needs within reach.       Eduardo Perales, OTR/L  11/9/2021

## 2021-11-09 NOTE — PROGRESS NOTES
PHYSICAL THERAPY DAILY NOTE  Time In: 0060  Time Out: 1436  Patient Seen For: PM; Gait training; Patient education; Transfer training; Other (see progress notes)    Subjective: patient reporting overall her pain is better but she is hurting more now because her AM pain medication is not really helping any more. Reports she is not due for more pain medication at this time         Objective:Vital Signs:  Patient Vitals for the past 12 hrs:   Temp Pulse Resp BP SpO2   11/09/21 0734 98.1 °F (36.7 °C) 67 18 (!) 155/82 99 %     Pain level:1 to 6 out of 10  Pain location:low back Right side>Left   Pain interventions:Medication per order, rest, positioning,relaxation, body mechanics, L/S corset      Patient education:Bed mobility training,transfer training, gait training, balance training,fall precautions, body mechanics,activity pacing,spinal precautions, donning L/S corset, Patient verbalizing understanding and demonstrating  understanding of patient education. Recommend follow up education.     Interdisciplinary Communication:spoke with RN regarding functional level and pain medication schedule    Spinal, Other (comment) (Falls)  GROSS ASSESSMENT Daily Assessment   Independent removing corset  Min assist to [facility] lumbar sacral corset       COGNITION Daily Assessment    No change       BED/MAT MOBILITY Daily Assessment    Rolling Right : 4 (Minimal assistance) (using R bed rail)  Rolling Left : 0 (Not tested)  Supine to Sit : 4 (Minimal assistance) (using R bed rail and HOB at 60 degrees)  Sit to Supine : 4 (Minimal assistance) (using R bed rail)       TRANSFERS Daily Assessment   Increased time and effort to complete with cues for body mechanics   Transfer Type: SPT with walker  Other: Commode transfers with SBA using grab bar, Independent with clothing management and hygiene  Transfer Assistance : 4 (Contact guard assistance)  Sit to Stand Assistance: Contact guard assistance  Car Transfers: Not tested       GAIT Daily Assessment   5 mins of rest between each attempt Amount of Assistance: 5 (Stand-by assistance)  Distance (ft): 100 Feet (ft) (100ft x 2  30 ft x 2 )  Assistive Device: Walker, rolling; Gait belt; Orthotic device ([place]en LSO)   Gait training with one time cue for posture correction and cues for improved step length and step clearance    STEPS or STAIRS Daily Assessment    Steps/Stairs Ambulated (#): 0  Level of Assist : 0 (Not tested)       BALANCE Daily Assessment   Static standing with RW, lumbar corset and CGA to SBA with no loss of balance Sitting - Static: Good (unsupported)  Sitting - Dynamic: Fair (occasional)  Standing - Static: Fair (with RW)  Standing - Dynamic : Impaired       WHEELCHAIR MOBILITY Daily Assessment    Curbs/Ramps Assist Required (FIM Score): 0 (Not tested)  Wheelchair Setup Assist Required : 0 (Not tested)       LOWER EXTREMITY EXERCISES Daily Assessment   NA   NA            Assessment: LE radiating pain remains resolved. Body mechanics with supine<> sit<>stand slowly improving. Overall gait distance and tolerance to gait training improving. Patient returned to room at end of treatment. Patient supine in bed with head of bed elevated and bed rails up x 2. Needs placed in reach of patient. [place] alarm on    Plan of Care: Continue with POC and progress as tolerated.      Biju Theodore, PT  11/9/2021

## 2021-11-09 NOTE — PROGRESS NOTES
OT Daily Note  Time In 0914   Time Out 1005     Subjective: \"I need to pee again. \"  Pain: Pt tolerated pain in lumbar region  Education: activity tolerance building  Interdisciplinary Communication: Discussed RN completing dressing change  Precautions: Falls and Spinal  Patient Vitals for the past 12 hrs:   Temp Pulse Resp BP SpO2   11/09/21 0734 98.1 °F (36.7 °C) 67 18 (!) 155/82 99 %         Mobility   Score Comments   Sit to Supine 3: Partial/Moderate A    Sit to Stand 4: Supervision or touching A CGA   Transfer Assist 4: Supervision or touching A Transfer Type: SPT   Equipment: Grab Bars   Comments: CGA     Self-Care Daily Assessment    Score Comments   Toilet Transfer 4: Supervision or touching A Transfer Type: SPT   Equipment: Grab Bars   Comments: [facility] Hygiene 4: Supervision or touching A Output: 200 ml at beginning of session, 200 ml at end of session  Comments: CGA           Activity Tolerance and Strengthening Daily Assessment   Patient completed x 10 minutes UBE on medium heavy resistance using BUEs while seated in wheelchair. Patient completed x 5 minutes forward rotation, x 5 minutes backward rotation. Tolerated well with no additional pain noted. Reaching/Endurance Daily Assessment   Pt engaged in anterior and lateral reaching, activity tolerance, visual perceptual, and coordination activity utilizing numbered board of [employer]. Pt reached across board to shoulder height level to retrieve numbered objects and place numbers 1-60 in order. Pt completed activity while in seated position at table top. Pt required no verbal cues for visual perception of labeled numbers on objects. Required no additional rest breaks throughout activity. Assessment: Pt tolerated first full gym session well but disrupted by needing to void frequently. Pt voided 400 ml total during session. Pt is progressing well in managing pain and building activity tolerance.     Pt demonstrated good participation and engagement in OT session. Pt continues to benefit from skilled OT services to address remaining deficits and return back to baseline with improved independence and safety during ADLs and IADLs. Patient ended session supine in bed and call remote, phone, all needs within reach. Plan: Continue OT POC with focus on ADL/IADL skills, functional transfers, functional mobility, coordination, strength, static and dynamic balance, and activity tolerance to maximize safety and independence with ADLs and functional transfers.      Eduardo Perales, OTR/L  11/9/2021

## 2021-11-09 NOTE — PROGRESS NOTES
Linda Pacheco MD  Medical Director  [address] [facility], [address] [facility]  Tel: [phone]       [facility] PROGRESS NOTE    Kalani Mart  Admit Date: 11/3/2021  Admit Diagnosis:   Lumbar stenosis with neurogenic claudication [M48.062]; Spondylolisthesis of lumbar region [M43.16]; Spinal stenosis [M48.00]; Spinal stenosis, lumbar region with neurogenic claudication [M48.062]    Subjective     Patient seen and examined. Pain is much improved with steroids. No radicular pain down RLE. Feels edema improved with inc lasix but had nocturia.     Objective:     Current Facility-Administered Medications   Medication Dose Route Frequency    furosemide (LASIX) tablet 40 mg  40 mg Oral BID    midodrine (PROAMATINE) tablet 5 mg  5 mg Oral TID WITH MEALS    predniSONE (DELTASONE) tablet 5 mg  5 mg Oral 4x Daily Taper    rOPINIRole (REQUIP) tablet 1 mg  1 mg Oral QHS PRN    gabapentin (NEURONTIN) capsule 300 mg  300 mg Oral TID    amitriptyline (ELAVIL) tablet 25 mg  25 mg Oral QHS    lidocaine 4 % patch 1 Patch  1 Patch TransDERmal Q24H    diphenhydrAMINE (BENADRYL) capsule 25 mg  25 mg Oral Q6H PRN    docusate sodium (COLACE) capsule 100 mg  100 mg Oral BID    HYDROcodone-acetaminophen (NORCO)  mg tablet 1 Tablet  1 Tablet Oral Q4H PRN    naloxone (NARCAN) injection 0.4 mg  0.4 mg IntraVENous PRN    ondansetron (ZOFRAN ODT) tablet 4 mg  4 mg Oral Q4H PRN    phenol throat spray (CHLORASEPTIC) 1 Spray  1 Spray Oral PRN    alcohol 62% (NOZIN) nasal  1 Ampule  1 Ampule Topical Q12H    acetaminophen (TYLENOL) tablet 650 mg  650 mg Oral Q6H PRN    alum-mag hydroxide-simeth (MYLANTA) oral suspension 30 mL  30 mL Oral Q4H PRN    apixaban (ELIQUIS) tablet 5 mg  5 mg Oral BID    aspirin delayed-release tablet 81 mg  81 mg Oral QHS    bisacodyL (DULCOLAX) suppository 10 mg  10 mg Rectal DAILY PRN    bisacodyL (DULCOLAX) suppository 10 mg  10 mg Rectal DAILY PRN    escitalopram oxalate (LEXAPRO) tablet 20 mg  20 mg Oral QAM    famotidine (PEPCID) tablet 20 mg  20 mg Oral QPM    levothyroxine (SYNTHROID) tablet 50 mcg  50 mcg Oral ACB    LORazepam (ATIVAN) tablet 0.5 mg  0.5 mg Oral Q6H PRN    magnesium hydroxide (MILK OF MAGNESIA) 400 mg/5 mL oral suspension 30 mL  30 mL Oral DAILY PRN    metoprolol tartrate (LOPRESSOR) tablet 25 mg  25 mg Oral BID    pantoprazole (PROTONIX) tablet 40 mg  40 mg Oral ACB    polyethylene glycol (MIRALAX) packet 17 g  17 g Oral DAILY    potassium chloride (KLOR-CON) tablet 10 mEq  10 mEq Oral DAILY    propafenone (RYTHMOL) tablet 150 mg  150 mg Oral BID    traMADoL (ULTRAM) tablet 50 mg  50 mg Oral Q6H PRN    zolpidem (AMBIEN) tablet 10 mg  10 mg Oral QHS PRN    influenza vaccine 2021-22 (6 mos+)(PF) (FLUARIX/FLULAVAL/FLUZONE QUAD) injection 0.5 mL  1 Each IntraMUSCular PRIOR TO DISCHARGE    nicotine (NICODERM CQ) 14 mg/24 hr patch 1 Patch  1 Patch TransDERmal Q24H       Review of Systems:   Denies chest pain, shortness of breath, cough, headache, visual problems, abdominal pain, dysuria, calf pain. Pertinent positives are as noted in the HPI, ROS unremarkable otherwise. Visit Vitals  BP (!) 155/82 (BP 1 Location: Right arm, BP Patient Position: At rest)   Pulse 67   Temp 98.1 °F (36.7 °C)   Resp 18   SpO2 99%        Physical Exam:   General: Alert and age appropriately oriented. No acute cardiorespiratory distress. HEENT: Normocephalic, no scleral icterus. Oral mucosa moist without cyanosis. Lungs: Clear to auscultation bilaterally. Respiration even and unlabored. Heart: Regular rate and rhythm, S1, S2. No murmurs, clicks, rub or gallops. Abdomen: Soft, non-tender, not distended. Bowel sounds normoactive. No organomegaly. Morbidly obese   Genitourinary: Deferred.    Neuromuscular:      Hip flexion 2+ on right, 3- on left  Sensation intact  DF 4+    Skin/extremity: No rashes, no erythema. No calf tenderness B LE. Back incision covered. Still with pitting edema but improved                                                                              Functional Assessment:          Balance  Sitting - Static: Not tested (11/08/21 1400)  Sitting - Dynamic: Not tested (11/08/21 1400)  Standing - Static: Not tested (11/08/21 1400)  Standing - Dynamic : Impaired (11/08/21 1200)                     Sujit Mendes Fall Risk Assessment:  Sujit Mendes Fall Risk  Mobility: Ambulates or transfers with assist devices or assistance (11/08/21 1138)  Mobility Interventions: Bed/chair exit alarm; OT consult for ADLs; Patient to call before getting OOB; PT Consult for mobility concerns (11/08/21 1138)  Mentation: Alert, oriented x 3 (11/08/21 1138)  Medication: Patient receiving anticonvulsants, sedatives(tranquilizers), psychotropics or hypnotics, hypoglycemics, narcotics, sleep aids, antihypertensives, laxatives, or diuretics (11/08/21 1138)  Medication Interventions: Bed/chair exit alarm; Patient to call before getting OOB; Teach patient to arise slowly (11/08/21 1138)  Elimination: Needs assistance with toileting (11/08/21 1138)  Elimination Interventions: Call light in reach; Toilet paper/wipes in reach;  Toileting schedule/hourly rounds (11/08/21 1138)  Prior Fall History: No (11/08/21 1138)  History of Falls Interventions: Bed/chair exit alarm (11/08/21 1138)  Total Score: 3 (11/08/21 1138)  High Fall Risk: Yes (11/08/21 1138)     Speech Assessment:         Ambulation:  Gait  Distance (ft): 0 Feet (ft) (11/08/21 1400)  Assistive Device: Walker, rolling; Gait belt; Orthotic device (Aspen lumbar sacral corset) (11/08/21 1200)     Labs/Studies:  Recent Results (from the past 72 hour(s))   METABOLIC PANEL, BASIC    Collection Time: 11/08/21  5:00 AM   Result Value Ref Range    Sodium 137 136 - 145 mmol/L    Potassium 4.2 3.5 - 5.1 mmol/L    Chloride 99 98 - 107 mmol/L    CO2 32 21 - 32 mmol/L    Anion gap 6 (L) 7 - 16 mmol/L    Glucose 94 65 - 100 mg/dL    BUN 16 8 - 23 MG/DL    Creatinine 0.56 (L) 0.6 - 1.0 MG/DL    GFR est AA >60 >60 ml/min/1.73m2    GFR est non-AA >60 >60 ml/min/1.73m2    Calcium 8.7 8.3 - 10.4 MG/DL   CBC W/O DIFF    Collection Time: 11/08/21  5:00 AM   Result Value Ref Range    WBC 16.2 (H) 4.3 - 11.1 K/uL    RBC 3.16 (L) 4.05 - 5.2 M/uL    HGB 9.4 (L) 11.7 - 15.4 g/dL    HCT 30.9 (L) 35.8 - 46.3 %    MCV 97.8 79.6 - 97.8 FL    MCH 29.7 26.1 - 32.9 PG    MCHC 30.4 (L) 31.4 - 35.0 g/dL    RDW 16.4 (H) 11.9 - 14.6 %    PLATELET 419 925 - 288 K/uL    MPV 9.7 9.4 - 12.3 FL    ABSOLUTE NRBC 0.00 0.0 - 0.2 K/uL       Assessment:     Problem List as of 11/9/2021 Date Reviewed: 11/5/2021          Codes Class Noted - Resolved    * (Principal) Spinal stenosis, lumbar region with neurogenic claudication ICD-10-CM: M48.062  ICD-9-CM: 724.03  11/3/2021 - Present        A-fib ([facility] ICD-10-CM: I48.91  ICD-9-CM: 427.31  10/30/2021 - Present        ÁNGEL (acute kidney injury) ([facility] ICD-10-CM: N17.9  ICD-9-CM: 584.9  10/30/2021 - Present        Acute respiratory failure with hypoxia ([facility] ICD-10-CM: J96.01  ICD-9-CM: 518.81  10/30/2021 - Present        Hypovolemic shock ([facility] ICD-10-CM: R57.1  ICD-9-CM: 785.59  10/26/2021 - Present        Hypotension ICD-10-CM: I95.9  ICD-9-CM: 458.9  10/26/2021 - Present        Spondylolisthesis of multiple sites in spine ICD-10-CM: M43.19  ICD-9-CM: 738.4  10/25/2021 - Present        Spinal stenosis ICD-10-CM: M48.00  ICD-9-CM: 724.00  10/25/2021 - Present        Obesity, morbid ([facility] ICD-10-CM: E66.01  ICD-9-CM: 278.01  12/22/2017 - Present        Acquired hypothyroidism ICD-10-CM: E03.9  ICD-9-CM: 244.9  9/13/2017 - Present        Postoperative wound infection ICD-10-CM: T81.49XA  ICD-9-CM: 998.59  5/19/2016 - Present        Lumbar stenosis with neurogenic claudication ICD-10-CM: W04.708  ICD-9-CM: 724.03  3/28/2016 - Present        Essential hypertension, benign ICD-10-CM: I10  ICD-9-CM: 401.1  7/24/2013 - Present        Endometriosis ICD-10-CM: N80.9  ICD-9-CM: 617.9  7/24/2013 - Present        Colon polyp ICD-10-CM: K63.5  ICD-9-CM: 211.3  7/24/2013 - Present        Squamous cell skin cancer ICD-10-CM: C44.92  ICD-9-CM: 173.92  7/24/2013 - Present              L2 L4 lumbar direct lateral interbody fusion with anterior plating and redo of L2 L5 laminectomy (10/26/2021 // Dr Chetan Diehl) due to spinal stenosis with neurogenic claudication     Plan / Recommendations / Medical Decision Making:      Daily physician / PA medical management:     Lumbar stenosis with neurogenic claudication, spondylolisthesis of lumbar region, lumbar spinal stenosis region with neurogenic claudication - PT / OT; spinal precautions, girdle when ambulating.     Atrial fibrillation - s/p 2 failed ablations, managed with Rhythmol, metoprolol and Eliquis. Currently NSR; Eliquis restarted 10/31 in light of RUL pulmonary embolism. -11/4 irreg, rate controlled, continue meds  -11/5 noticeably RRR to prolonged auscultation  -11/6 IIR today but rate-controlled  -11/9 nsr     Hypotension / hypertension - BP fluctuating, managed medically. Has been hypotensive requiring levophed and now midodrine (pt's home lisinopril 20mg BID and HCTZ 12.5mg BID are on hold); needs TEDs when OOB.  -11/4 hypotensive episodes improved; decrease midodrine to 5mg TID. Continue metoprolol for rate control  -11/5 sBP fluctuating from 102-163, dBP all <85, mostly in 50-60s; monitor as add back PRN furosemide for edema  -11/6 /73  -11/8 119-148/63-73 ; starr midodrine to 5mg bid and wean off; MUST wear TEDs when oob and elevate LEs whenever possible. Bilateral pitting edema; will give 40mg bid of Lasix today and reassess daily. Check renal fxn  -11/9 creat 0.56, bun 16     Pain control - ongoing significant pain in back which is stable and controlled by PRN meds.  Will require regular pain assessment and comprehensive pain management. Continue PRN Norco, tramadol, APAP and gabapentin. -11/4 increase Neurontin to 300mg TID and Elavil at night 25mg due to sciatic pain; add back Requip 1mg QHS  -11/5 start steroid taper for R LE pain  -11/6 R LE radiating pain resolved, finish steroid taper  -11/8 MUCH improved with steroid dose lyudmila     Hypokalemia - monitor; currently 4.3 on supplement due to HCTZ; may be able to hold supplement. -11/4 labs pending; d/c daily labs  -11/5 K+ 4.2  -11/6 recheck BMP 11/8 since continuing daily furosemide; pending 11/8; k 4.2     Leukocytosis, mild - wbc 11.9 at [facility] admission (11/3). No s/sx of infxn. -11/5 WBC 11.9, afebrile, no s/sx infection; will likely increase with steroids  -11/6 recheck CBC 11/8; 16.2 from 11.9 ; likely steroid induced; will check UA     Anemia, blood loss - Hgb 9.0, improving; had 2000ml blood loss and received blood transfusion x 3, as well as 2800ml of crystalloid. -11/6 recheck CBC 11/8; 9.4 from 8.8 11/5     Hypothyroidism - continue Synthroid.     Pneumonia prophylaxis - incentive spirometer every hour while awake.     DVT risk / DVT prophylaxis - daily physician / PA exam to assess as patient is at increased risk for of thromboembolism. Mobilize as tolerated. Sequential pneumatic compression devices (SCDs) when in bed; thigh-high or knee-high thromboembolic deterrent hose when out of bed. On Eliquis.     GI prophylaxis - resume PPI. At times may need additional antacids, Maalox prn.     Depression - continue on Lexapro. At risk of depression exacerbation due to pain and loss of independent function.     General skin care / wound prevention - monitor lumbar incision and general skin wound status daily per staff and physician / PA. At risk for failure. Will require 24/7 rehab nursing. -11/4 lateral left hip wound; looks like a blister that had opened up vs sheer injury. Cleanse daily and cover.  Back incision healing  --11/8 incision c/d/i     Bladder program / urinary retention / neurogenic bladder - schedule voids q 6-8 hrs. Check post-void residual as needed; in-and-out catheter if post-void residual is more than 400ml.  -voiding continently without residuals     Bowel program - at risk for constipation as a side effect of opioids, other medications, impaired mobility, etc. MiraLAX daily for regularity, Azul-Colace for stool softener. PRN MOM, bisacodyl suppository or tablets for constipation.  -continent, regular    Edema; 11/9 ; >1800ml out; cont lasix 40mg bid for today . Pt requesting laboy at noc     Time spent was 25 minutes with over 1/2 in direct patient care/examination, consultation and coordination of care.      Signed By: Shayy Clemens MD     November 9, 2021

## 2021-11-09 NOTE — PROGRESS NOTES
PHYSICAL THERAPY DAILY NOTE  Time In: 1115  Time Out: 8568  Patient Seen For: AM; Balance activities; Gait training; Patient education; Therapeutic exercise; Transfer training; Other (see progress notes)    Subjective: patient reporting the lasix is working. Reports she was up all night going to the bathroom. Reports her feet are still really swollen. Reports she weighed 268 pounds before coming into hospital.         Objective:Vital Signs:  Patient Vitals for the past 12 hrs:   Temp Pulse Resp BP SpO2   11/09/21 0734 98.1 °F (36.7 °C) 67 18 (!) 155/82 99 %     Pain level:1 to 4 out of 10  Pain location:low back Right side>Left   Pain interventions:Medication per order, rest, positioning,relaxation, body mechanics, L/S corset      Patient education:Bed mobility training,transfer training, gait training, balance training,fall precautions, body mechanics,activity pacing,spinal precautions, donning L/S corset, Patient verbalizing understanding and demonstrating  understanding of patient education. Recommend follow up education. Interdisciplinary Communication:spoke with RN regarding functional level and pain medication schedule    Spinal, Other (comment) (Falls)  GROSS ASSESSMENT Daily Assessment   Independent removing corset    Assisting RN with changing dressing. Weighed patient.  327 pounds, RN to record weight  Min assist to [facility] lumbar sacral corset       COGNITION Daily Assessment    No change       BED/MAT MOBILITY Daily Assessment   Increased time and effort to complete with cues for body mechanics   Rolling Right : 4 (Minimal assistance) (using R bed rail)  Rolling Left : 0 (Not tested)  Supine to Sit : 4 (Minimal assistance) (using R bed rail and HOB at 60 degrees)  Sit to Supine : 0 (Not tested)       TRANSFERS Daily Assessment   Increased time and effort to complete with cues for body mechanics   Transfer Type: SPT with walker  Transfer Assistance : 4 (Contact guard assistance)  Sit to Stand Assistance: Contact guard assistance  Car Transfers: Not tested       GAIT Daily Assessment   5 mins of rest between each attempt Amount of Assistance: 5 (Stand-by assistance)  Distance (ft): 100 Feet (ft) (100ft x 2 with 5 min rest break)  Assistive Device: Walker, rolling; Gait belt; Orthotic device (Aspen LSO)   Gait training with one time cue for posture correction and cues for improved step length and step clearance    STEPS or STAIRS Daily Assessment    Steps/Stairs Ambulated (#): 0  Level of Assist : 0 (Not tested)       BALANCE Daily Assessment   Static standing with RW, lumbar corset and CGA to SBA with no loss of balance Sitting - Static: Good (unsupported)  Sitting - Dynamic: Fair (occasional)  Standing - Static: Fair (with RW)  Standing - Dynamic : Impaired       WHEELCHAIR MOBILITY Daily Assessment    Curbs/Ramps Assist Required (FIM Score): 0 (Not tested)  Wheelchair Setup Assist Required : 0 (Not tested)       LOWER EXTREMITY EXERCISES Daily Assessment   Increased time and effort to complete with multiple and frequent rest breaks. Cues for correct form     Extremity: Both  Exercise Type #1: supine lower extremity strengthening: ankle pumps, hip abd with skate, heel slides, SAQ  Sets Performed: 3  Reps Performed: 10  Level of Assist: min assist with cues            Assessment: LE radiating pain remains resolved. Body mechanics with sit<>stand slowly improving. Gait distance improving. Ongoing edema delaying progress. Patient returned to room at end of treatment and remained up in recliner with LEs elevated and with needs in reach. Plan of Care: Continue with POC and progress as tolerated.      Demar Rodriguez, PT  11/9/2021

## 2021-11-10 LAB
ALBUMIN SERPL-MCNC: 2.7 G/DL (ref 3.2–4.6)
ALBUMIN/GLOB SERPL: 0.8 {RATIO} (ref 1.2–3.5)
ALP SERPL-CCNC: 133 U/L (ref 50–136)
ALT SERPL-CCNC: 26 U/L (ref 12–65)
ANION GAP SERPL CALC-SCNC: 3 MMOL/L (ref 7–16)
AST SERPL-CCNC: 21 U/L (ref 15–37)
BASOPHILS # BLD: 0.1 K/UL (ref 0–0.2)
BASOPHILS NFR BLD: 1 % (ref 0–2)
BILIRUB SERPL-MCNC: 0.6 MG/DL (ref 0.2–1.1)
BNP SERPL-MCNC: 2757 PG/ML (ref 5–125)
BUN SERPL-MCNC: 14 MG/DL (ref 8–23)
CALCIUM SERPL-MCNC: 8.3 MG/DL (ref 8.3–10.4)
CHLORIDE SERPL-SCNC: 97 MMOL/L (ref 98–107)
CO2 SERPL-SCNC: 35 MMOL/L (ref 21–32)
CREAT SERPL-MCNC: 0.67 MG/DL (ref 0.6–1)
DIFFERENTIAL METHOD BLD: ABNORMAL
EOSINOPHIL # BLD: 0.2 K/UL (ref 0–0.8)
EOSINOPHIL NFR BLD: 1 % (ref 0.5–7.8)
ERYTHROCYTE [DISTWIDTH] IN BLOOD BY AUTOMATED COUNT: 16 % (ref 11.9–14.6)
GLOBULIN SER CALC-MCNC: 3.2 G/DL (ref 2.3–3.5)
GLUCOSE SERPL-MCNC: 116 MG/DL (ref 65–100)
HCT VFR BLD AUTO: 31.5 % (ref 35.8–46.3)
HGB BLD-MCNC: 9.7 G/DL (ref 11.7–15.4)
IMM GRANULOCYTES # BLD AUTO: 0.1 K/UL (ref 0–0.5)
IMM GRANULOCYTES NFR BLD AUTO: 1 % (ref 0–5)
LYMPHOCYTES # BLD: 1.3 K/UL (ref 0.5–4.6)
LYMPHOCYTES NFR BLD: 10 % (ref 13–44)
MCH RBC QN AUTO: 29.3 PG (ref 26.1–32.9)
MCHC RBC AUTO-ENTMCNC: 30.8 G/DL (ref 31.4–35)
MCV RBC AUTO: 95.2 FL (ref 79.6–97.8)
MONOCYTES # BLD: 0.7 K/UL (ref 0.1–1.3)
MONOCYTES NFR BLD: 6 % (ref 4–12)
NEUTS SEG # BLD: 10.6 K/UL (ref 1.7–8.2)
NEUTS SEG NFR BLD: 82 % (ref 43–78)
NRBC # BLD: 0 K/UL (ref 0–0.2)
PLATELET # BLD AUTO: 435 K/UL (ref 150–450)
PMV BLD AUTO: 9.4 FL (ref 9.4–12.3)
POTASSIUM SERPL-SCNC: 4.1 MMOL/L (ref 3.5–5.1)
PROT SERPL-MCNC: 5.9 G/DL (ref 6.3–8.2)
RBC # BLD AUTO: 3.31 M/UL (ref 4.05–5.2)
SODIUM SERPL-SCNC: 135 MMOL/L (ref 136–145)
WBC # BLD AUTO: 13 K/UL (ref 4.3–11.1)

## 2021-11-10 PROCEDURE — 74011000250 HC RX REV CODE- 250: Performed by: PHYSICIAN ASSISTANT

## 2021-11-10 PROCEDURE — 80053 COMPREHEN METABOLIC PANEL: CPT

## 2021-11-10 PROCEDURE — 97110 THERAPEUTIC EXERCISES: CPT

## 2021-11-10 PROCEDURE — 65310000000 HC RM PRIVATE REHAB

## 2021-11-10 PROCEDURE — 74011636637 HC RX REV CODE- 636/637: Performed by: PHYSICAL MEDICINE & REHABILITATION

## 2021-11-10 PROCEDURE — 74011250636 HC RX REV CODE- 250/636: Performed by: PHYSICAL MEDICINE & REHABILITATION

## 2021-11-10 PROCEDURE — 74011250637 HC RX REV CODE- 250/637: Performed by: PHYSICAL MEDICINE & REHABILITATION

## 2021-11-10 PROCEDURE — 97535 SELF CARE MNGMENT TRAINING: CPT

## 2021-11-10 PROCEDURE — 97530 THERAPEUTIC ACTIVITIES: CPT

## 2021-11-10 PROCEDURE — 99232 SBSQ HOSP IP/OBS MODERATE 35: CPT | Performed by: PHYSICAL MEDICINE & REHABILITATION

## 2021-11-10 PROCEDURE — 83880 ASSAY OF NATRIURETIC PEPTIDE: CPT

## 2021-11-10 PROCEDURE — 97116 GAIT TRAINING THERAPY: CPT

## 2021-11-10 PROCEDURE — 85025 COMPLETE CBC W/AUTO DIFF WBC: CPT

## 2021-11-10 RX ORDER — POTASSIUM CHLORIDE 20 MEQ/1
20 TABLET, EXTENDED RELEASE ORAL DAILY
Status: DISCONTINUED | OUTPATIENT
Start: 2021-11-10 | End: 2021-11-18 | Stop reason: HOSPADM

## 2021-11-10 RX ORDER — FUROSEMIDE 10 MG/ML
40 INJECTION INTRAMUSCULAR; INTRAVENOUS DAILY
Status: DISCONTINUED | OUTPATIENT
Start: 2021-11-10 | End: 2021-11-12

## 2021-11-10 RX ADMIN — HYDROCODONE BITARTRATE AND ACETAMINOPHEN 1 TABLET: 10; 325 TABLET ORAL at 22:49

## 2021-11-10 RX ADMIN — ESCITALOPRAM OXALATE 20 MG: 10 TABLET ORAL at 09:07

## 2021-11-10 RX ADMIN — PROPAFENONE HYDROCHLORIDE 150 MG: 150 TABLET, FILM COATED ORAL at 17:28

## 2021-11-10 RX ADMIN — LEVOTHYROXINE SODIUM 50 MCG: 0.05 TABLET ORAL at 05:17

## 2021-11-10 RX ADMIN — PANTOPRAZOLE SODIUM 40 MG: 40 TABLET, DELAYED RELEASE ORAL at 05:17

## 2021-11-10 RX ADMIN — AMITRIPTYLINE HYDROCHLORIDE 25 MG: 25 TABLET, FILM COATED ORAL at 21:59

## 2021-11-10 RX ADMIN — GABAPENTIN 300 MG: 300 CAPSULE ORAL at 09:07

## 2021-11-10 RX ADMIN — METOPROLOL TARTRATE 25 MG: 25 TABLET, FILM COATED ORAL at 09:07

## 2021-11-10 RX ADMIN — Medication 1 AMPULE: at 21:59

## 2021-11-10 RX ADMIN — GABAPENTIN 300 MG: 300 CAPSULE ORAL at 17:28

## 2021-11-10 RX ADMIN — ZOLPIDEM TARTRATE 10 MG: 5 TABLET ORAL at 21:59

## 2021-11-10 RX ADMIN — DOCUSATE SODIUM 100 MG: 100 CAPSULE, LIQUID FILLED ORAL at 17:28

## 2021-11-10 RX ADMIN — ASPIRIN 81 MG: 81 TABLET, COATED ORAL at 21:59

## 2021-11-10 RX ADMIN — HYDROCODONE BITARTRATE AND ACETAMINOPHEN 1 TABLET: 10; 325 TABLET ORAL at 13:22

## 2021-11-10 RX ADMIN — APIXABAN 5 MG: 5 TABLET, FILM COATED ORAL at 09:06

## 2021-11-10 RX ADMIN — METOPROLOL TARTRATE 25 MG: 25 TABLET, FILM COATED ORAL at 17:28

## 2021-11-10 RX ADMIN — HYDROCODONE BITARTRATE AND ACETAMINOPHEN 1 TABLET: 10; 325 TABLET ORAL at 09:21

## 2021-11-10 RX ADMIN — GABAPENTIN 300 MG: 300 CAPSULE ORAL at 21:59

## 2021-11-10 RX ADMIN — FAMOTIDINE 20 MG: 20 TABLET ORAL at 17:29

## 2021-11-10 RX ADMIN — PREDNISONE 5 MG: 10 TABLET ORAL at 05:18

## 2021-11-10 RX ADMIN — POTASSIUM CHLORIDE 20 MEQ: 20 TABLET, EXTENDED RELEASE ORAL at 09:07

## 2021-11-10 RX ADMIN — PROPAFENONE HYDROCHLORIDE 150 MG: 150 TABLET, FILM COATED ORAL at 09:06

## 2021-11-10 RX ADMIN — APIXABAN 5 MG: 5 TABLET, FILM COATED ORAL at 17:28

## 2021-11-10 RX ADMIN — FUROSEMIDE 40 MG: 10 INJECTION, SOLUTION INTRAMUSCULAR; INTRAVENOUS at 10:50

## 2021-11-10 RX ADMIN — DOCUSATE SODIUM 100 MG: 100 CAPSULE, LIQUID FILLED ORAL at 09:08

## 2021-11-10 RX ADMIN — Medication 1 AMPULE: at 09:05

## 2021-11-10 NOTE — PROGRESS NOTES
OT Daily Note  Time In 1302   Time Out 1345     Subjective: \"The nurse just drained 1200 ml right around lunch time. \"  Pain: pt noting increased LBP, RN provided pain med  Education: activity tolerance building  Interdisciplinary Communication: Discussed pt's BP with PT  Precautions: Falls and Spinal  Patient Vitals for the past 12 hrs:   Temp Pulse Resp BP SpO2   11/10/21 1146    123/69    11/10/21 1145    (!) 157/87    11/10/21 1049  65  129/71 97 %   11/10/21 0730 98.4 °F (36.9 °C) 66 18 (!) 143/80 99 %         Mobility   Score Comments   Sit to Stand 4: Supervision or touching A CGA   Transfer Assist 4: Supervision or touching A Transfer Type: SPT   Equipment: Rolling Walker   Comments: CGA     Activity Tolerance, Strengthening and Cognition Daily Assessment   Pt completed 75% of the border of a 100 piece jigsaw puzzle to increase UE strength, visual perceptual skills, coordination, and problem solving. Pt competed puzzle independently but with extra time while seated at table top with bilateral 2 1/2 lb wrist weights donned for UB strengthening. Pt will complete remainder of puzzle next gym session. Assessment: Pt stating RN had drained 1200 ml of fluid from laboy around lunchtime, OT drained additional 475 ml at start of afternoon session. Pt tolerated session in seated position but continues to benefit from endurance building activities. Pt noted that her back pain began to improve 30 to 45 minutes after pain medicine. Pt demonstrated good participation and engagement in OT session. Pt continues to benefit from skilled OT services to address remaining deficits and return back to baseline with improved independence and safety during ADLs and IADLs. Patient ended session seated in wc and with PT assuming care.   Plan: Continue OT POC with focus on ADL/IADL skills, functional transfers, functional mobility, coordination, strength, static and dynamic balance, and activity tolerance to maximize safety and independence with ADLs and functional transfers.      Eduardo Perales, OTR/L  11/10/2021

## 2021-11-10 NOTE — PROGRESS NOTES
PT WEEKLY PROGRESS NOTE   Time In: 1116   Time Out: 1159    Subjective: patient reporting she is feeling better except for the swelling in her legs and feet. Reports the new bed was more comfortable and easier to get out of. Reports she slept better last night. Reports she has an adjustable bed at home and can raise the head of her bed. Reports she feel light headed and for a few seconds could not hear anything when she lost her balance as noted below. Objective: Vital Signs:Patient with 30 point BP drop for systolic and 20 point diastolic drop as noted below with patient symptomatic. Patient Vitals for the past 12 hrs:   Temp Pulse Resp BP SpO2   11/10/21 1146    123/69 standing    11/10/21 1145    (!) 157/87 sitting    11/10/21 1049  65  129/71 97 %   11/10/21 0730 98.4 °F (36.9 °C) 66 18 (!) 143/80 99 %     Pain level:0 to 4 out of 10  Pain location:low back   Pain interventions:Medication per order, rest, positioning,relaxation, LSO, body mechanics      Patient education:Bed mobility training,transfer training, gait training, balance training,fall precautions, body mechanics,activity pacing, spinal precautions,don/doff LSO, Patient verbalizing understanding and demonstrating understanding of patient education. Recommend follow up education.       Interdisciplinary Communication:spoke with OT regarding progress towards goals and orthostatic hypotension symptoms    Spinal, Other (comment) (Falls)    Outcome Measures:     Cognition: Orientation:A+O x 4  Communication:able to express needs verbally, able to follow multi step commands  Social Interaction:cooperating, appropriate with PT, participating, motivated to improve  Problem Solving:difficulty with managing body mechanics during functional mobility  Memory:occasional cues to recall spinal precautions and body mechanics           BED/CHAIR/WHEELCHAIR TRANSFERS Initial Assessment Weekly Progress Assessment 11/10/2021   Rolling Right 3 (Moderate assistance ) 4 (Minimal assistance) (using bed rail)   Rolling Left 3 (Moderate assistance ) 4 (Minimal assistance) (using bed rail)   Supine to Sit 1 (Total assistance) (Min to mod assist x 2) 4 (Minimal assistance) (using bed rail, HOB at 40 degrees)   Sit to Stand Moderate assistance (with RW) Stand-by assistance   Sit to Supine 0 (Not tested) 4 (Minimal assistance)   Transfer Type SPT with walker SPT with walker   Comments Increased time and effort to complete with verbal cues for log rolling and supine to sidelying to sit to stand.  Patient using bed rail and HOB at 20 degrees   Increased time and effort to complete with cues for body mechanics     Car Transfer Not tested Not tested   Car Type         GROSS ASSESSMENT Weekly Progress Assessment 11/10/2021   AROM  bilateral hip decreased 50%, knee 10 to 60, ankle DF 5,    Strength  Bilateral hip +2/5 to -3/5, knee 4/5 to 5/5, ankle 4/5 to 5/5   Coordination  Bilateral LE gross motor intact, fine motor impaired   Tone  Normal for LEs   Sensation  LEs intact   PROM  Limited as noted above under AROM     POSTURE Weekly Progress Assessment 11/10/2021   Posture (WDL) Exceptions to WDL   Posture Assessment Forward head; Rounded shoulders; Trunk flexion; Lordosis, lumbar     WHEELCHAIR MOBILITY/MANAGEMENT Initial Assessment Weekly Progress Assessment 11/10/2021   Able to Propel    NA   Curbs/ramps assistance required 0 (Not tested) 0 (Not tested)   Wheelchair set up assistance required 0 (Not tested)  NA   Wheelchair management    NA     WALKING INDEPENDENCE Initial Assessment Weekly Progress Assessment 11/10/2021   Assistive device Walker, rolling, Gait belt, Orthotic device ([place] lumbar sacral corset)  RW, gait belt, [place] LSO   Ambulation assistance - level surface 4 (Contact guard assistance)  SBA with cues for posture correction   Distance 30 Feet (ft) 100 Feet (ft) (100ft x 2 with 5 min rest break between attempts)   Comments slow start stop step to gait pattern leading with RLE and stepping to with LLE. Decreased step length and step clearance with flexed posture. Foot flat initial contact Patient with loss of balance posteriorly after 2nd 100ft  to the right due to wheelchair lock on right failing and drop in BP with patient verbalizing orthostatic hypotension symptoms. Min assist with cues to correct     GAIT Weekly Progress Assessment 11/10/2021   Gait Description (WDL)  slow cont step through gait with flexed posture and increased width of base of support   Gait Abnormalities  decreased ankle PF and knee flex at terminal stance, decreased knee ext and ankle DF with ankle inversion at initial contact. STEPS/STAIRS Initial Assessment Weekly Progress Assessment 11/10/2021   Steps/Stairs ambulated 0 0   Rail Use       Comments Unable to ambulate up/down steps due to LE weakness, impaired balance and pain level   Unable to ambulate up/down steps due to LE weakness,limited ROM due to edema and impaired balance   Curbs/Ramps 0 (Not tested)            Assessment: patient progressing towards goals. Patient has met STGs for bed mobility, transfers and gait with RW. Progress towards STGs for stairs and ramp have been delayed due to extent of LE edema and inability to put shoes on. Will advance to gait up/down steps and up/down ramp with RW as LE edema resolves. LE strength and ROM slowly improving now that initial RLE radiating pain has resolved. Body mechanics with log rolling and supine<>sidelying<>sit slowly improving but limited due to extent of whole body edema and patient's BMI. Body mechanics with sit<>stand and SPT with RW improving as well as gait posture, balance and gait pattern. Patient has potential to meet remaining STGs and progress to meeting LTGs. Recommend cont inpatient rehab to maximize rehab potential       Patient returned to room at end of treatment and remained up in recliner with LEs elevated and with needs in reach.     Plan of Care: weekly assessment completed. Goals updated and revised. Please see Care Plan for updated LTGs.     Family Training: Needs to be scheduled    Katya Mcknight, PT  11/10/2021

## 2021-11-10 NOTE — PROGRESS NOTES
Team conference today with discharge deferred. IRAIDA YAO met with pt at bedside and updated pt that discharge is deferred at this time. Discussed d/c planning and pt reports sister-in-law to stay with her Monday to Friday and Daughter in the afternoons. Both sister-in-law and daughter coming down from [place], [place]. Kathleen Jaramillo, daughter, and confirmed this information. Daughter agreeable to family training. Daughter does report pt living alone prior to admission and not with her mother. Therapy aware. CM to continue to follow and monitor for any further needs.

## 2021-11-10 NOTE — PROGRESS NOTES
PHYSICAL THERAPY DAILY NOTE  Time In: 1346  Time Out: 1426  Patient Seen For: PM; Gait training; Patient education; Therapeutic exercise; Transfer training; Other (see progress notes)    Subjective: patient reporting getting in and out of bed is easier. Reports no dizziness or feeling light headed this PM         Objective:Vital Signs:  Patient Vitals for the past 12 hrs:   Temp Pulse Resp BP SpO2   11/10/21 1146    123/69    11/10/21 1145    (!) 157/87    11/10/21 1049  65  129/71 97 %   11/10/21 0730 98.4 °F (36.9 °C) 66 18 (!) 143/80 99 %     Pain level:1 to 4 out of 10  Pain location:low back Right side>Left   Pain interventions:Medication per order, rest, positioning,relaxation, body mechanics, L/S corset      Patient education:Bed mobility training,transfer training, gait training, balance training,fall precautions, body mechanics,activity pacing,spinal precautions, donning L/S corset, Patient verbalizing understanding and demonstrating  understanding of patient education. Recommend follow up education.     Interdisciplinary Communication:spoke with RN regarding functional level and pain medication schedule    Spinal, Other (comment) (Falls)  GROSS ASSESSMENT Daily Assessment   Independent removing corset  Min assist to [facility] lumbar sacral corset       COGNITION Daily Assessment    No change       BED/MAT MOBILITY Daily Assessment   Increased time and effort to complete with cues for body mechanics   Rolling Right : 4 (Minimal assistance)  Rolling Left : 4 (Minimal assistance)  Supine to Sit : 0 (Not tested)  Sit to Supine : 4 (Minimal assistance)       TRANSFERS Daily Assessment   Increased time and effort to complete with cues for body mechanics   Transfer Type: SPT with walker  Transfer Assistance : 5 (Stand-by assistance)  Sit to Stand Assistance: Stand-by assistance (with RW)  Car Transfers: Not tested       GAIT Daily Assessment    Amount of Assistance: 5 (Stand-by assistance)  Distance (ft): 100 Feet (ft)  Assistive Device: Walker, rolling; Gait belt ([place] LSO)   Gait training with one time cue for posture correction and cues for improved step length and step clearance    STEPS or STAIRS Daily Assessment    Steps/Stairs Ambulated (#): 0  Level of Assist : 0 (Not tested)       BALANCE Daily Assessment   Static standing with RW, lumbar corset and CGA to SBA with no loss of balance Sitting - Static: Good (unsupported)  Sitting - Dynamic: Fair (occasional)  Standing - Static: Fair (with RW)  Standing - Dynamic : Impaired       WHEELCHAIR MOBILITY Daily Assessment    Curbs/Ramps Assist Required (FIM Score): 0 (Not tested)  Wheelchair Setup Assist Required : 0 (Not tested)       LOWER EXTREMITY EXERCISES Daily Assessment   NA   NA            Assessment: LE strength and ROM with LE HEP improving. Patient returned to room at end of treatment. Patient supine in bed with head of bed elevated and bed rails up x 2. Needs placed in reach of patient. [place] alarm on    Plan of Care: Continue with POC and progress as tolerated.      Nahomy Hopson, PT  11/10/2021

## 2021-11-10 NOTE — PROGRESS NOTES
Vicente Beck MD  Medical Director  [address] [facility], [address] [facility]  Tel: [phone]       [facility] PROGRESS NOTE    [place] Skaggs  Admit Date: 11/3/2021  Admit Diagnosis:   Lumbar stenosis with neurogenic claudication [M48.062]; Spondylolisthesis of lumbar region [M43.16]; Spinal stenosis [M48.00]; Spinal stenosis, lumbar region with neurogenic claudication [M48.062]    Subjective     Patient seen and examined. Remains edematous. Says she has never had this problem before. rec'd 80mg po lasix yesterday and had lg UOP -3850. Denies sob, cp. Radicular pain resolved.  LD of Prednisone taper    Objective:     Current Facility-Administered Medications   Medication Dose Route Frequency    furosemide (LASIX) injection 40 mg  40 mg IntraVENous DAILY    potassium chloride (K-DUR, KLOR-CON) SR tablet 20 mEq  20 mEq Oral DAILY    midodrine (PROAMATINE) tablet 5 mg  5 mg Oral TID WITH MEALS    rOPINIRole (REQUIP) tablet 1 mg  1 mg Oral QHS PRN    gabapentin (NEURONTIN) capsule 300 mg  300 mg Oral TID    amitriptyline (ELAVIL) tablet 25 mg  25 mg Oral QHS    lidocaine 4 % patch 1 Patch  1 Patch TransDERmal Q24H    diphenhydrAMINE (BENADRYL) capsule 25 mg  25 mg Oral Q6H PRN    docusate sodium (COLACE) capsule 100 mg  100 mg Oral BID    HYDROcodone-acetaminophen (NORCO)  mg tablet 1 Tablet  1 Tablet Oral Q4H PRN    naloxone (NARCAN) injection 0.4 mg  0.4 mg IntraVENous PRN    ondansetron (ZOFRAN ODT) tablet 4 mg  4 mg Oral Q4H PRN    phenol throat spray (CHLORASEPTIC) 1 Spray  1 Spray Oral PRN    alcohol 62% (NOZIN) nasal  1 Ampule  1 Ampule Topical Q12H    acetaminophen (TYLENOL) tablet 650 mg  650 mg Oral Q6H PRN    alum-mag hydroxide-simeth (MYLANTA) oral suspension 30 mL  30 mL Oral Q4H PRN    apixaban (ELIQUIS) tablet 5 mg  5 mg Oral BID    aspirin delayed-release tablet 81 mg  81 mg Oral QHS    bisacodyL (DULCOLAX) suppository 10 mg  10 mg Rectal DAILY PRN    bisacodyL (DULCOLAX) suppository 10 mg  10 mg Rectal DAILY PRN    escitalopram oxalate (LEXAPRO) tablet 20 mg  20 mg Oral QAM    famotidine (PEPCID) tablet 20 mg  20 mg Oral QPM    levothyroxine (SYNTHROID) tablet 50 mcg  50 mcg Oral ACB    LORazepam (ATIVAN) tablet 0.5 mg  0.5 mg Oral Q6H PRN    magnesium hydroxide (MILK OF MAGNESIA) 400 mg/5 mL oral suspension 30 mL  30 mL Oral DAILY PRN    metoprolol tartrate (LOPRESSOR) tablet 25 mg  25 mg Oral BID    pantoprazole (PROTONIX) tablet 40 mg  40 mg Oral ACB    polyethylene glycol (MIRALAX) packet 17 g  17 g Oral DAILY    propafenone (RYTHMOL) tablet 150 mg  150 mg Oral BID    traMADoL (ULTRAM) tablet 50 mg  50 mg Oral Q6H PRN    zolpidem (AMBIEN) tablet 10 mg  10 mg Oral QHS PRN    influenza vaccine 2021-22 (6 mos+)(PF) (FLUARIX/FLULAVAL/FLUZONE QUAD) injection 0.5 mL  1 Each IntraMUSCular PRIOR TO DISCHARGE    nicotine (NICODERM CQ) 14 mg/24 hr patch 1 Patch  1 Patch TransDERmal Q24H       Review of Systems:   Denies chest pain, shortness of breath, cough, headache, visual problems, abdominal pain, dysuria, calf pain. Pertinent positives are as noted in the HPI, ROS unremarkable otherwise. Visit Vitals  /71 (BP 1 Location: Right upper arm)   Pulse 65   Temp 98.4 °F (36.9 °C)   Resp 18   Wt 327 lb (148.3 kg)   SpO2 97%   BMI 56.13 kg/m²        Physical Exam:   General: Alert and age appropriately oriented. No acute cardiorespiratory distress. HEENT: Normocephalic, no scleral icterus. Oral mucosa moist without cyanosis. Lungs: Clear to auscultation bilaterally. Respiration even and unlabored. Heart: Regular rate and rhythm, S1, S2. No murmurs, clicks, rub or gallops. Abdomen: Soft, non-tender, not distended. Bowel sounds normoactive. No organomegaly. Morbidly obese   Genitourinary: Deferred.    Neuromuscular:      Hip flexion 2+, sensation intact  No new neuro findings Skin/extremity: No rashes, no erythema. No calf tenderness B LE. Inc covered. 3+ pedal edema, with pitting up to upper shin bilaterally   Neg taran's                                                                              Functional Assessment:          Balance  Sitting - Static: Good (unsupported) (11/09/21 1500)  Sitting - Dynamic: Fair (occasional) (11/09/21 1500)  Standing - Static: Fair (with RW) (11/09/21 1500)  Standing - Dynamic : Impaired (11/09/21 1500)                     Amanda Zambrano Fall Risk Assessment:  Amanda Zambrano Fall Risk  Mobility: Ambulates or transfers with assist devices or assistance (11/10/21 0752)  Mobility Interventions: Patient to call before getting OOB; Utilize walker, cane, or other assistive device (11/10/21 0752)  Mentation: Alert, oriented x 3 (11/10/21 0752)  Medication: Patient receiving anticonvulsants, sedatives(tranquilizers), psychotropics or hypnotics, hypoglycemics, narcotics, sleep aids, antihypertensives, laxatives, or diuretics (11/10/21 0329)  Medication Interventions: Evaluate medications/consider consulting pharmacy; Patient to call before getting OOB (11/10/21 5787)  Elimination: Needs assistance with toileting (11/10/21 0752)  Elimination Interventions: Call light in reach; Patient to call for help with toileting needs (11/10/21 0752)  Prior Fall History: No (11/10/21 0752)  History of Falls Interventions: Bed/chair exit alarm;  Consult care management for discharge planning; Door open when patient unattended; Utilize gait belt for transfer/ambulation (11/10/21 0752)  Total Score: 3 (11/10/21 0752)  High Fall Risk: Yes (11/10/21 0752)     Speech Assessment:         Ambulation:  Gait  Distance (ft): 100 Feet (ft) (100ft x 2  30 ft x 2 ) (11/09/21 1500)  Assistive Device: Walker, rolling; Gait belt; Orthotic device ([employer]) (11/09/21 1500)     Labs/Studies:  Recent Results (from the past 72 hour(s))   METABOLIC PANEL, BASIC    Collection Time: 11/08/21  5:00 AM   Result Value Ref Range    Sodium 137 136 - 145 mmol/L    Potassium 4.2 3.5 - 5.1 mmol/L    Chloride 99 98 - 107 mmol/L    CO2 32 21 - 32 mmol/L    Anion gap 6 (L) 7 - 16 mmol/L    Glucose 94 65 - 100 mg/dL    BUN 16 8 - 23 MG/DL    Creatinine 0.56 (L) 0.6 - 1.0 MG/DL    GFR est AA >60 >60 ml/min/1.73m2    GFR est non-AA >60 >60 ml/min/1.73m2    Calcium 8.7 8.3 - 10.4 MG/DL   CBC W/O DIFF    Collection Time: 11/08/21  5:00 AM   Result Value Ref Range    WBC 16.2 (H) 4.3 - 11.1 K/uL    RBC 3.16 (L) 4.05 - 5.2 M/uL    HGB 9.4 (L) 11.7 - 15.4 g/dL    HCT 30.9 (L) 35.8 - 46.3 %    MCV 97.8 79.6 - 97.8 FL    MCH 29.7 26.1 - 32.9 PG    MCHC 30.4 (L) 31.4 - 35.0 g/dL    RDW 16.4 (H) 11.9 - 14.6 %    PLATELET 939 670 - 551 K/uL    MPV 9.7 9.4 - 12.3 FL    ABSOLUTE NRBC 0.00 0.0 - 0.2 K/uL   URINALYSIS W/ RFLX MICROSCOPIC    Collection Time: 11/09/21  3:36 PM   Result Value Ref Range    Color YELLOW      Appearance CLEAR      Specific gravity 1.012 1.001 - 1.023      pH (UA) 7.5 5.0 - 9.0      Protein Negative NEG mg/dL    Glucose Negative mg/dL    Ketone Negative NEG mg/dL    Bilirubin Negative NEG      Blood Negative NEG      Urobilinogen 1.0 0.2 - 1.0 EU/dL    Nitrites Negative NEG      Leukocyte Esterase Negative NEG     CULTURE, URINE    Collection Time: 11/09/21  3:36 PM    Specimen: Clean catch; Urine   Result Value Ref Range    Special Requests: NO SPECIAL REQUESTS      Culture result:        NO GROWTH AFTER SHORT PERIOD OF INCUBATION. FURTHER RESULTS TO FOLLOW AFTER OVERNIGHT INCUBATION.    METABOLIC PANEL, COMPREHENSIVE    Collection Time: 11/10/21  8:36 AM   Result Value Ref Range    Sodium 135 (L) 136 - 145 mmol/L    Potassium 4.1 3.5 - 5.1 mmol/L    Chloride 97 (L) 98 - 107 mmol/L    CO2 35 (H) 21 - 32 mmol/L    Anion gap 3 (L) 7 - 16 mmol/L    Glucose 116 (H) 65 - 100 mg/dL    BUN 14 8 - 23 MG/DL    Creatinine 0.67 0.6 - 1.0 MG/DL    GFR est AA >60 >60 ml/min/1.73m2    GFR est non-AA >60 >60 ml/min/1.73m2    Calcium 8.3 8.3 - 10.4 MG/DL    Bilirubin, total 0.6 0.2 - 1.1 MG/DL    ALT (SGPT) 26 12 - 65 U/L    AST (SGOT) 21 15 - 37 U/L    Alk. phosphatase 133 50 - 136 U/L    Protein, total 5.9 (L) 6.3 - 8.2 g/dL    Albumin 2.7 (L) 3.2 - 4.6 g/dL    Globulin 3.2 2.3 - 3.5 g/dL    A-G Ratio 0.8 (L) 1.2 - 3.5     CBC WITH AUTOMATED DIFF    Collection Time: 11/10/21  8:36 AM   Result Value Ref Range    WBC 13.0 (H) 4.3 - 11.1 K/uL    RBC 3.31 (L) 4.05 - 5.2 M/uL    HGB 9.7 (L) 11.7 - 15.4 g/dL    HCT 31.5 (L) 35.8 - 46.3 %    MCV 95.2 79.6 - 97.8 FL    MCH 29.3 26.1 - 32.9 PG    MCHC 30.8 (L) 31.4 - 35.0 g/dL    RDW 16.0 (H) 11.9 - 14.6 %    PLATELET 451 802 - 402 K/uL    MPV 9.4 9.4 - 12.3 FL    ABSOLUTE NRBC 0.00 0.0 - 0.2 K/uL    DF AUTOMATED      NEUTROPHILS 82 (H) 43 - 78 %    LYMPHOCYTES 10 (L) 13 - 44 %    MONOCYTES 6 4.0 - 12.0 %    EOSINOPHILS 1 0.5 - 7.8 %    BASOPHILS 1 0.0 - 2.0 %    IMMATURE GRANULOCYTES 1 0.0 - 5.0 %    ABS. NEUTROPHILS 10.6 (H) 1.7 - 8.2 K/UL    ABS. LYMPHOCYTES 1.3 0.5 - 4.6 K/UL    ABS. MONOCYTES 0.7 0.1 - 1.3 K/UL    ABS. EOSINOPHILS 0.2 0.0 - 0.8 K/UL    ABS. BASOPHILS 0.1 0.0 - 0.2 K/UL    ABS. IMM.  GRANS. 0.1 0.0 - 0.5 K/UL   NT-PRO BNP    Collection Time: 11/10/21  8:36 AM   Result Value Ref Range    NT pro-BNP 2,757 (H) 5 - 125 PG/ML       Assessment:     Problem List as of 11/10/2021 Date Reviewed: 11/5/2021          Codes Class Noted - Resolved    * (Principal) Spinal stenosis, lumbar region with neurogenic claudication ICD-10-CM: M48.062  ICD-9-CM: 724.03  11/3/2021 - Present        A-fib ([facility] ICD-10-CM: I48.91  ICD-9-CM: 427.31  10/30/2021 - Present        ÁNGEL (acute kidney injury) ([facility] ICD-10-CM: N17.9  ICD-9-CM: 584.9  10/30/2021 - Present        Acute respiratory failure with hypoxia ([facility] ICD-10-CM: J96.01  ICD-9-CM: 518.81  10/30/2021 - Present        Hypovolemic shock ([facility] ICD-10-CM: R57.1  ICD-9-CM: 785.59  10/26/2021 - Present        Hypotension ICD-10-CM: I95.9  ICD-9-CM: 458.9 10/26/2021 - Present        Spondylolisthesis of multiple sites in spine ICD-10-CM: M43.19  ICD-9-CM: 738.4  10/25/2021 - Present        Spinal stenosis ICD-10-CM: M48.00  ICD-9-CM: 724.00  10/25/2021 - Present        Obesity, morbid ([address] ICD-10-CM: E66.01  ICD-9-CM: 278.01  12/22/2017 - Present        Acquired hypothyroidism ICD-10-CM: E03.9  ICD-9-CM: 244.9  9/13/2017 - Present        Postoperative wound infection ICD-10-CM: T81.49XA  ICD-9-CM: 998.59  5/19/2016 - Present        Lumbar stenosis with neurogenic claudication ICD-10-CM: M48.062  ICD-9-CM: 724.03  3/28/2016 - Present        Essential hypertension, benign ICD-10-CM: I10  ICD-9-CM: 401.1  7/24/2013 - Present        Endometriosis ICD-10-CM: N80.9  ICD-9-CM: 617.9  7/24/2013 - Present        Colon polyp ICD-10-CM: K63.5  ICD-9-CM: 211.3  7/24/2013 - Present        Squamous cell skin cancer ICD-10-CM: C44.92  ICD-9-CM: 173.92  7/24/2013 - Present            L2 L4 lumbar direct lateral interbody fusion with anterior plating and redo of L2 L5 laminectomy (10/26/2021 // Dr Rogers Frazier) due to spinal stenosis with neurogenic claudication     Plan / Recommendations / Medical Decision Making:      Daily physician / PA medical management:     Lumbar stenosis with neurogenic claudication, spondylolisthesis of lumbar region, lumbar spinal stenosis region with neurogenic claudication - PT / OT; spinal precautions, girdle when ambulating.     Atrial fibrillation - s/p 2 failed ablations, managed with Rhythmol, metoprolol and Eliquis. Currently NSR; Eliquis restarted 10/31 in light of RUL pulmonary embolism.   -11/4 irreg, rate controlled, continue meds  -11/5 noticeably RRR to prolonged auscultation  -11/6 IIR today but rate-controlled  -11/9 nsr     Hypotension / hypertension - BP fluctuating, managed medically. Has been hypotensive requiring levophed and now midodrine (pt's home lisinopril 20mg BID and HCTZ 12.5mg BID are on hold); needs TEDs when OOB.  -11/4 hypotensive episodes improved; decrease midodrine to 5mg TID. Continue metoprolol for rate control  -11/5 sBP fluctuating from 102-163, dBP all <85, mostly in 50-60s; monitor as add back PRN furosemide for edema  -11/6 /73  -11/8 119-148/63-73 ; starr midodrine to 5mg bid and wean off; MUST wear TEDs when oob and elevate LEs whenever possible. Bilateral pitting edema; will give 40mg bid of Lasix today and reassess daily. Check renal fxn  -11/9 creat 0.56, bun 16; 11/10 creat 0.67     Pain control - ongoing significant pain in back which is stable and controlled by PRN meds. Will require regular pain assessment and comprehensive pain management. Continue PRN Norco, tramadol, APAP and gabapentin. -11/4 increase Neurontin to 300mg TID and Elavil at night 25mg due to sciatic pain; add back Requip 1mg QHS  -11/5 start steroid taper for R LE pain  -11/6 R LE radiating pain resolved, finish steroid taper  -11/8 MUCH improved with steroid dose lyudmila     Hypokalemia - monitor; currently 4.3 on supplement due to HCTZ; may be able to hold supplement. -11/4 labs pending; d/c daily labs  -11/5 K+ 4.2  -11/6 recheck BMP 11/8 since continuing daily furosemide; pending 11/8; k 4.2  -11/10 K 4.1, cont supplement while on lasix     Leukocytosis, mild - wbc 11.9 at [facility] admission (11/3). No s/sx of infxn. -11/5 WBC 11.9, afebrile, no s/sx infection; will likely increase with steroids  -11/6 recheck CBC 11/8; 16.2 from 11.9 ; likely steroid induced; will check UA; Neg; this is last day of steroids     Anemia, blood loss - Hgb 9.0, improving; had 2000ml blood loss and received blood transfusion x 3, as well as 2800ml of crystalloid. -11/6 recheck CBC 11/8; 9.4 from 8.8 11/5  -11/10 hgb 9.7, improving     Hypothyroidism - continue Synthroid.     Pneumonia prophylaxis - incentive spirometer every hour while awake.     DVT risk / DVT prophylaxis - daily physician / PA exam to assess as patient is at increased risk for of thromboembolism. Mobilize as tolerated. Sequential pneumatic compression devices (SCDs) when in bed; thigh-high or knee-high thromboembolic deterrent hose when out of bed. On Eliquis.     GI prophylaxis - resume PPI. At times may need additional antacids, Maalox prn.     Depression - continue on Lexapro. At risk of depression exacerbation due to pain and loss of independent function.     General skin care / wound prevention - monitor lumbar incision and general skin wound status daily per staff and physician / PA. At risk for failure. Will require 24/7 rehab nursing. -11/4 lateral left hip wound; looks like a blister that had opened up vs sheer injury. Cleanse daily and cover. Back incision healing  --11/8 incision c/d/i     Bladder program / urinary retention / neurogenic bladder - schedule voids q 6-8 hrs. Check post-void residual as needed; in-and-out catheter if post-void residual is more than 400ml.  -voiding continently without residuals     Bowel program - at risk for constipation as a side effect of opioids, other medications, impaired mobility, etc. MiraLAX daily for regularity, Azul-Colace for stool softener. PRN MOM, bisacodyl suppository or tablets for constipation.  -continent, regular     Edema; 11/9 ; >1800ml out; cont lasix 40mg bid for today . Pt requesting laboy at [facility]  -11/10 [address], wt is up. Will keep laboy and give 40mg IV lasix today. No hx of CHF but has afib . bnp 2757. Alb 2.7; pt had a 2d echo 10/29/21; EF>70%, severe pulm htn noted with hx of RUL pulm emb. No evidence tho of right heart strain. VSS; daily wts      Time spent was 25 minutes with over 1/2 in direct patient care/examination, consultation and coordination of care.      Signed By: Jaylen Rosario MD     November 10, 2021

## 2021-11-10 NOTE — PROGRESS NOTES
OT WEEKLY PROGRESS NOTE    Time In: 0915  Time Out: 1010    Mobility   Score Comments   Supine to Sit 4: Supervision or touching A CGA   Sit to Stand 4: Supervision or touching A CGA   Transfer Assist 4: Supervision or touching A Transfer Type: SPT   Equipment: Rolling Walker   Comments: CGA     Activities of Daily Living    Score Comments   Eating 6: Independent    Oral Hyigene 6: Independent    Bathing 4: Supervision or touching A Type of Shower: Shower  Position: Standing PRN and Unsupported Sitting   Adaptive  Equipment: Tub Transfer Bench, Grab Bars and Long Handled Sponge  Comments: CGA   Upper Body  Dressing 5: S/U or clean-up assist Items Applied: Pullover  Position: Supported Sitting  Comments:    Lower Body Dressing 4: Supervision or touching A Items Applied: Underwear and Elastic pants  Position: Standing PRN and Unsupported Sitting  Adaptive Equipment: Reacher, RW and W/C  Comments: CGA   Donning/[place] Footwear 2: Substantial/Maximal A Items Applied: Socks and compression stockings  Adaptive Equipment: Sock Aide  Comments: Toilet Transfer 4: Supervision or touching A Transfer Type: SPT   Equipment: Rolling Walker and Grab Bars   Comments: CGA   Education  Donning compression stockings       Plan of Care: Please see Care Plan for updated LTGs. Family Training: to be completed closer to pt's discharge date    Summary of Progress: Patient demonstrates good progress with ADL techniques, AE/DME training, functional mobility, bed mobility, and activity tolerance for performance of ADL and functional transfers, see above for details. Patient continues to require skilled OT services to address deficits, provide education, and progress towards goals as stated in POC. Summary of Session: S: \"I slept so much better in this new bed. \" Agreeable to therapy. Focus of session was on morning ADL routine and progress assessment. Patient was able to ambulate ~5 feet using a RW with CGA.    Pain tolerated well in lower back during adl. Collaborated with IP team and confirmed patient is on track to reach goals as documented in the care plan. Continue OT POC with focus on ADL/IADL skills, functional transfers, functional mobility, coordination, strength, static and dynamic balance, and activity tolerance to maximize safety and independence with ADLs and functional transfers. Patient ended session in recliner with call remote and phone within reach. RN completing dressing change.        Eduardo Wing, OTR/L  11/10/2021

## 2021-11-11 LAB — BNP SERPL-MCNC: 1865 PG/ML (ref 5–125)

## 2021-11-11 PROCEDURE — 97530 THERAPEUTIC ACTIVITIES: CPT

## 2021-11-11 PROCEDURE — 74011000250 HC RX REV CODE- 250: Performed by: PHYSICIAN ASSISTANT

## 2021-11-11 PROCEDURE — 74011250636 HC RX REV CODE- 250/636: Performed by: PHYSICAL MEDICINE & REHABILITATION

## 2021-11-11 PROCEDURE — 97110 THERAPEUTIC EXERCISES: CPT

## 2021-11-11 PROCEDURE — 97116 GAIT TRAINING THERAPY: CPT

## 2021-11-11 PROCEDURE — 65310000000 HC RM PRIVATE REHAB

## 2021-11-11 PROCEDURE — 74011250637 HC RX REV CODE- 250/637: Performed by: PHYSICAL MEDICINE & REHABILITATION

## 2021-11-11 PROCEDURE — 97535 SELF CARE MNGMENT TRAINING: CPT

## 2021-11-11 PROCEDURE — 83880 ASSAY OF NATRIURETIC PEPTIDE: CPT

## 2021-11-11 PROCEDURE — 99232 SBSQ HOSP IP/OBS MODERATE 35: CPT | Performed by: PHYSICAL MEDICINE & REHABILITATION

## 2021-11-11 RX ADMIN — LEVOTHYROXINE SODIUM 50 MCG: 0.05 TABLET ORAL at 05:42

## 2021-11-11 RX ADMIN — METOPROLOL TARTRATE 25 MG: 25 TABLET, FILM COATED ORAL at 17:02

## 2021-11-11 RX ADMIN — APIXABAN 5 MG: 5 TABLET, FILM COATED ORAL at 08:06

## 2021-11-11 RX ADMIN — FAMOTIDINE 20 MG: 20 TABLET ORAL at 17:02

## 2021-11-11 RX ADMIN — PROPAFENONE HYDROCHLORIDE 150 MG: 150 TABLET, FILM COATED ORAL at 08:06

## 2021-11-11 RX ADMIN — PANTOPRAZOLE SODIUM 40 MG: 40 TABLET, DELAYED RELEASE ORAL at 05:42

## 2021-11-11 RX ADMIN — Medication 1 AMPULE: at 20:18

## 2021-11-11 RX ADMIN — HYDROCODONE BITARTRATE AND ACETAMINOPHEN 1 TABLET: 10; 325 TABLET ORAL at 20:16

## 2021-11-11 RX ADMIN — HYDROCODONE BITARTRATE AND ACETAMINOPHEN 1 TABLET: 10; 325 TABLET ORAL at 13:57

## 2021-11-11 RX ADMIN — ASPIRIN 81 MG: 81 TABLET, COATED ORAL at 20:17

## 2021-11-11 RX ADMIN — POTASSIUM CHLORIDE 20 MEQ: 20 TABLET, EXTENDED RELEASE ORAL at 08:06

## 2021-11-11 RX ADMIN — MIDODRINE HYDROCHLORIDE 5 MG: 5 TABLET ORAL at 11:59

## 2021-11-11 RX ADMIN — ESCITALOPRAM OXALATE 20 MG: 10 TABLET ORAL at 08:06

## 2021-11-11 RX ADMIN — METOPROLOL TARTRATE 25 MG: 25 TABLET, FILM COATED ORAL at 08:06

## 2021-11-11 RX ADMIN — MIDODRINE HYDROCHLORIDE 5 MG: 5 TABLET ORAL at 17:02

## 2021-11-11 RX ADMIN — HYDROCODONE BITARTRATE AND ACETAMINOPHEN 1 TABLET: 10; 325 TABLET ORAL at 08:13

## 2021-11-11 RX ADMIN — PROPAFENONE HYDROCHLORIDE 150 MG: 150 TABLET, FILM COATED ORAL at 17:03

## 2021-11-11 RX ADMIN — DOCUSATE SODIUM 100 MG: 100 CAPSULE, LIQUID FILLED ORAL at 08:05

## 2021-11-11 RX ADMIN — APIXABAN 5 MG: 5 TABLET, FILM COATED ORAL at 17:01

## 2021-11-11 RX ADMIN — FUROSEMIDE 40 MG: 10 INJECTION, SOLUTION INTRAMUSCULAR; INTRAVENOUS at 08:05

## 2021-11-11 RX ADMIN — Medication 1 AMPULE: at 08:05

## 2021-11-11 RX ADMIN — GABAPENTIN 300 MG: 300 CAPSULE ORAL at 17:02

## 2021-11-11 RX ADMIN — MIDODRINE HYDROCHLORIDE 5 MG: 5 TABLET ORAL at 08:06

## 2021-11-11 RX ADMIN — GABAPENTIN 300 MG: 300 CAPSULE ORAL at 20:17

## 2021-11-11 RX ADMIN — GABAPENTIN 300 MG: 300 CAPSULE ORAL at 08:06

## 2021-11-11 RX ADMIN — AMITRIPTYLINE HYDROCHLORIDE 25 MG: 25 TABLET, FILM COATED ORAL at 20:16

## 2021-11-11 NOTE — PROGRESS NOTES
Benigno Palmer MD  Medical Director  [address] [facility], [address] [facility]  Tel: [phone]       [facility] PROGRESS NOTE    Ranjit Poon  Admit Date: 11/3/2021  Admit Diagnosis:   Lumbar stenosis with neurogenic claudication [M48.062]; Spondylolisthesis of lumbar region [M43.16]; Spinal stenosis [M48.00]; Spinal stenosis, lumbar region with neurogenic claudication [M48.062]    Subjective     Patient seen and examined. Mild LBP. No radiating pain since steroids. lg amt of uop with Lasix.  Feels she is less swollen   + BM  Objective:     Current Facility-Administered Medications   Medication Dose Route Frequency    furosemide (LASIX) injection 40 mg  40 mg IntraVENous DAILY    potassium chloride (K-DUR, KLOR-CON) SR tablet 20 mEq  20 mEq Oral DAILY    midodrine (PROAMATINE) tablet 5 mg  5 mg Oral TID WITH MEALS    rOPINIRole (REQUIP) tablet 1 mg  1 mg Oral QHS PRN    gabapentin (NEURONTIN) capsule 300 mg  300 mg Oral TID    amitriptyline (ELAVIL) tablet 25 mg  25 mg Oral QHS    lidocaine 4 % patch 1 Patch  1 Patch TransDERmal Q24H    diphenhydrAMINE (BENADRYL) capsule 25 mg  25 mg Oral Q6H PRN    docusate sodium (COLACE) capsule 100 mg  100 mg Oral BID    HYDROcodone-acetaminophen (NORCO)  mg tablet 1 Tablet  1 Tablet Oral Q4H PRN    naloxone (NARCAN) injection 0.4 mg  0.4 mg IntraVENous PRN    ondansetron (ZOFRAN ODT) tablet 4 mg  4 mg Oral Q4H PRN    phenol throat spray (CHLORASEPTIC) 1 Spray  1 Spray Oral PRN    alcohol 62% (NOZIN) nasal  1 Ampule  1 Ampule Topical Q12H    acetaminophen (TYLENOL) tablet 650 mg  650 mg Oral Q6H PRN    alum-mag hydroxide-simeth (MYLANTA) oral suspension 30 mL  30 mL Oral Q4H PRN    apixaban (ELIQUIS) tablet 5 mg  5 mg Oral BID    aspirin delayed-release tablet 81 mg  81 mg Oral QHS    bisacodyL (DULCOLAX) suppository 10 mg  10 mg Rectal DAILY PRN    escitalopram oxalate (LEXAPRO) tablet 20 mg  20 mg Oral QAM    famotidine (PEPCID) tablet 20 mg  20 mg Oral QPM    levothyroxine (SYNTHROID) tablet 50 mcg  50 mcg Oral ACB    LORazepam (ATIVAN) tablet 0.5 mg  0.5 mg Oral Q6H PRN    magnesium hydroxide (MILK OF MAGNESIA) 400 mg/5 mL oral suspension 30 mL  30 mL Oral DAILY PRN    metoprolol tartrate (LOPRESSOR) tablet 25 mg  25 mg Oral BID    pantoprazole (PROTONIX) tablet 40 mg  40 mg Oral ACB    polyethylene glycol (MIRALAX) packet 17 g  17 g Oral DAILY    propafenone (RYTHMOL) tablet 150 mg  150 mg Oral BID    traMADoL (ULTRAM) tablet 50 mg  50 mg Oral Q6H PRN    zolpidem (AMBIEN) tablet 10 mg  10 mg Oral QHS PRN    influenza vaccine 2021-22 (6 mos+)(PF) (FLUARIX/FLULAVAL/FLUZONE QUAD) injection 0.5 mL  1 Each IntraMUSCular PRIOR TO DISCHARGE    nicotine (NICODERM CQ) 14 mg/24 hr patch 1 Patch  1 Patch TransDERmal Q24H       Review of Systems:   Denies chest pain, shortness of breath, cough, headache, visual problems, abdominal pain, dysuria, calf pain. Pertinent positives are as noted in the HPI, ROS unremarkable otherwise. Visit Vitals  BP (!) 143/76 (BP 1 Location: Left upper arm, BP Patient Position: At rest)   Pulse 63   Temp 97.4 °F (36.3 °C)   Resp 16   Wt 322 lb 12.8 oz (146.4 kg)   SpO2 96%   BMI 55.41 kg/m²        Physical Exam:   General: Alert and age appropriately oriented. No acute cardiorespiratory distress. HEENT: Normocephalic, no scleral icterus. Oral mucosa moist without cyanosis. Lungs: Clear to auscultation bilaterally. Respiration even and unlabored. Heart: Regular rate and rhythm, S1, S2. No murmurs, clicks, rub or gallops. Abdomen: Soft, non-tender, not distended. Bowel sounds normoactive. No organomegaly. obese   Genitourinary: Deferred. Neuromuscular:      Hip flexion improved 3/5,distally intact  Sensation intact. No spasm. Skin/extremity: No rashes, no erythema. No calf tenderness B LE. Wound covered.   2++ edema but improving.                                                                                Functional Assessment:          Balance  Sitting - Static: Good (unsupported) (11/10/21 1400)  Sitting - Dynamic: Fair (occasional) (11/10/21 1400)  Standing - Static: Fair (with RW) (11/10/21 1400)  Standing - Dynamic : Impaired (11/10/21 1400)                     Lance Jason Fall Risk Assessment:  Lance Jason Fall Risk  Mobility: Ambulates or transfers with assist devices or assistance (11/11/21 0902)  Mobility Interventions: Patient to call before getting OOB (11/11/21 0902)  Mentation: Alert, oriented x 3 (11/11/21 0902)  Medication: Patient receiving anticonvulsants, sedatives(tranquilizers), psychotropics or hypnotics, hypoglycemics, narcotics, sleep aids, antihypertensives, laxatives, or diuretics (11/11/21 0902)  Medication Interventions: Evaluate medications/consider consulting pharmacy (11/11/21 9749)  Elimination: Needs assistance with toileting (11/11/21 0902)  Elimination Interventions: Call light in reach (11/11/21 0902)  Prior Fall History: No (11/11/21 0902)  History of Falls Interventions: Door open when patient unattended (11/11/21 0902)  Total Score: 3 (11/11/21 0902)  High Fall Risk: Yes (11/11/21 0902)     Speech Assessment:         Ambulation:  Gait  Distance (ft): 100 Feet (ft) (11/10/21 1400)  Assistive Device: Walker, rolling; Gait belt ([employer] LSO) (11/10/21 1400)     Labs/Studies:  Recent Results (from the past 72 hour(s))   URINALYSIS W/ RFLX MICROSCOPIC    Collection Time: 11/09/21  3:36 PM   Result Value Ref Range    Color YELLOW      Appearance CLEAR      Specific gravity 1.012 1.001 - 1.023      pH (UA) 7.5 5.0 - 9.0      Protein Negative NEG mg/dL    Glucose Negative mg/dL    Ketone Negative NEG mg/dL    Bilirubin Negative NEG      Blood Negative NEG      Urobilinogen 1.0 0.2 - 1.0 EU/dL    Nitrites Negative NEG      Leukocyte Esterase Negative NEG     CULTURE, URINE    Collection Time: 11/09/21  3:36 PM Specimen: Clean catch; Urine   Result Value Ref Range    Special Requests: NO SPECIAL REQUESTS      Culture result:        50,000-100,000 COLONIES/mL MIXED SKIN GLADYS ISOLATED   METABOLIC PANEL, COMPREHENSIVE    Collection Time: 11/10/21  8:36 AM   Result Value Ref Range    Sodium 135 (L) 136 - 145 mmol/L    Potassium 4.1 3.5 - 5.1 mmol/L    Chloride 97 (L) 98 - 107 mmol/L    CO2 35 (H) 21 - 32 mmol/L    Anion gap 3 (L) 7 - 16 mmol/L    Glucose 116 (H) 65 - 100 mg/dL    BUN 14 8 - 23 MG/DL    Creatinine 0.67 0.6 - 1.0 MG/DL    GFR est AA >60 >60 ml/min/1.73m2    GFR est non-AA >60 >60 ml/min/1.73m2    Calcium 8.3 8.3 - 10.4 MG/DL    Bilirubin, total 0.6 0.2 - 1.1 MG/DL    ALT (SGPT) 26 12 - 65 U/L    AST (SGOT) 21 15 - 37 U/L    Alk. phosphatase 133 50 - 136 U/L    Protein, total 5.9 (L) 6.3 - 8.2 g/dL    Albumin 2.7 (L) 3.2 - 4.6 g/dL    Globulin 3.2 2.3 - 3.5 g/dL    A-G Ratio 0.8 (L) 1.2 - 3.5     CBC WITH AUTOMATED DIFF    Collection Time: 11/10/21  8:36 AM   Result Value Ref Range    WBC 13.0 (H) 4.3 - 11.1 K/uL    RBC 3.31 (L) 4.05 - 5.2 M/uL    HGB 9.7 (L) 11.7 - 15.4 g/dL    HCT 31.5 (L) 35.8 - 46.3 %    MCV 95.2 79.6 - 97.8 FL    MCH 29.3 26.1 - 32.9 PG    MCHC 30.8 (L) 31.4 - 35.0 g/dL    RDW 16.0 (H) 11.9 - 14.6 %    PLATELET 047 415 - 548 K/uL    MPV 9.4 9.4 - 12.3 FL    ABSOLUTE NRBC 0.00 0.0 - 0.2 K/uL    DF AUTOMATED      NEUTROPHILS 82 (H) 43 - 78 %    LYMPHOCYTES 10 (L) 13 - 44 %    MONOCYTES 6 4.0 - 12.0 %    EOSINOPHILS 1 0.5 - 7.8 %    BASOPHILS 1 0.0 - 2.0 %    IMMATURE GRANULOCYTES 1 0.0 - 5.0 %    ABS. NEUTROPHILS 10.6 (H) 1.7 - 8.2 K/UL    ABS. LYMPHOCYTES 1.3 0.5 - 4.6 K/UL    ABS. MONOCYTES 0.7 0.1 - 1.3 K/UL    ABS. EOSINOPHILS 0.2 0.0 - 0.8 K/UL    ABS. BASOPHILS 0.1 0.0 - 0.2 K/UL    ABS. IMM.  GRANS. 0.1 0.0 - 0.5 K/UL   NT-PRO BNP    Collection Time: 11/10/21  8:36 AM   Result Value Ref Range    NT pro-BNP 2,757 (H) 5 - 125 PG/ML   NT-PRO BNP    Collection Time: 11/11/21  6:00 AM Result Value Ref Range    NT pro-BNP 1,865 (H) 5 - 125 PG/ML       Assessment:     Problem List as of 11/11/2021 Date Reviewed: 11/5/2021          Codes Class Noted - Resolved    * (Principal) Spinal stenosis, lumbar region with neurogenic claudication ICD-10-CM: M48.062  ICD-9-CM: 724.03  11/3/2021 - Present        A-fib ([facility] ICD-10-CM: I48.91  ICD-9-CM: 427.31  10/30/2021 - Present        ÁNGEL (acute kidney injury) ([facility] ICD-10-CM: N17.9  ICD-9-CM: 584.9  10/30/2021 - Present        Acute respiratory failure with hypoxia ([facility] ICD-10-CM: J96.01  ICD-9-CM: 518.81  10/30/2021 - Present        Hypovolemic shock ([facility] ICD-10-CM: R57.1  ICD-9-CM: 785.59  10/26/2021 - Present        Hypotension ICD-10-CM: I95.9  ICD-9-CM: 458.9  10/26/2021 - Present        Spondylolisthesis of multiple sites in spine ICD-10-CM: M43.19  ICD-9-CM: 738.4  10/25/2021 - Present        Spinal stenosis ICD-10-CM: M48.00  ICD-9-CM: 724.00  10/25/2021 - Present        Obesity, morbid ([facility] ICD-10-CM: E66.01  ICD-9-CM: 278.01  12/22/2017 - Present        Acquired hypothyroidism ICD-10-CM: E03.9  ICD-9-CM: 244.9  9/13/2017 - Present        Postoperative wound infection ICD-10-CM: T81.49XA  ICD-9-CM: 998.59  5/19/2016 - Present        Lumbar stenosis with neurogenic claudication ICD-10-CM: M48.062  ICD-9-CM: 724.03  3/28/2016 - Present        Essential hypertension, benign ICD-10-CM: I10  ICD-9-CM: 401.1  7/24/2013 - Present        Endometriosis ICD-10-CM: N80.9  ICD-9-CM: 617.9  7/24/2013 - Present        Colon polyp ICD-10-CM: K63.5  ICD-9-CM: 211.3  7/24/2013 - Present        Squamous cell skin cancer ICD-10-CM: C44.92  ICD-9-CM: 173.92  7/24/2013 - Present              L2 L4 lumbar direct lateral interbody fusion with anterior plating and redo of L2 L5 laminectomy (10/26/2021 // Dr Rogers Frazier) due to spinal stenosis with neurogenic claudication     Plan / Recommendations / Medical Decision Making:      Daily physician / PA medical management:     Lumbar stenosis with neurogenic claudication, spondylolisthesis of lumbar region, lumbar spinal stenosis region with neurogenic claudication - PT / OT; spinal precautions, girdle when ambulating.     Atrial fibrillation - s/p 2 failed ablations, managed with Rhythmol, metoprolol and Eliquis. Currently NSR; Eliquis restarted 10/31 in light of RUL pulmonary embolism. -11/4 irreg, rate controlled, continue meds  -11/5 noticeably RRR to prolonged auscultation  -11/6 IIR today but rate-controlled  -11/11 nsr     Hypotension / hypertension - BP fluctuating, managed medically. Has been hypotensive requiring levophed and now midodrine (pt's home lisinopril 20mg BID and HCTZ 12.5mg BID are on hold); needs TEDs when OOB.  -11/4 hypotensive episodes improved; decrease midodrine to 5mg TID. Continue metoprolol for rate control  -11/5 sBP fluctuating from 102-163, dBP all <85, mostly in 50-60s; monitor as add back PRN furosemide for edema  -11/6 /73  -11/8 119-148/63-73 ; starr midodrine to 5mg bid and wean off; MUST wear TEDs when oob and elevate LEs whenever possible. Bilateral pitting edema; will give 40mg bid of Lasix today and reassess daily. Check renal fxn  -11/9 creat 0.56, bun 16; 11/10 creat 0.67     Pain control - ongoing significant pain in back which is stable and controlled by PRN meds. Will require regular pain assessment and comprehensive pain management. Continue PRN Norco, tramadol, APAP and gabapentin. -11/4 increase Neurontin to 300mg TID and Elavil at night 25mg due to sciatic pain; add back Requip 1mg QHS  -11/5 start steroid taper for R LE pain  -11/6 R LE radiating pain resolved, finish steroid taper  -11/8 MUCH improved with steroid dose lyudmila  -11/11 pain controlled     Hypokalemia - monitor; currently 4.3 on supplement due to HCTZ; may be able to hold supplement.   -11/4 labs pending; d/c daily labs  -11/5 K+ 4.2  -11/6 recheck BMP 11/8 since continuing daily furosemide; pending 11/8; k 4.2  -11/10 K 4.1, cont supplement while on lasix     Leukocytosis, mild - wbc 11.9 at [facility] admission (11/3). No s/sx of infxn. -11/5 WBC 11.9, afebrile, no s/sx infection; will likely increase with steroids  -11/6 recheck CBC 11/8; 16.2 from 11.9 ; likely steroid induced; will check UA; Neg; this is last day of steroids  -11/11 labs in a.m.     Anemia, blood loss - Hgb 9.0, improving; had 2000ml blood loss and received blood transfusion x 3, as well as 2800ml of crystalloid. -11/6 recheck CBC 11/8; 9.4 from 8.8 11/5  -11/10 hgb 9.7, improving     Hypothyroidism - continue Synthroid.     Pneumonia prophylaxis - incentive spirometer every hour while awake.     DVT risk / DVT prophylaxis - daily physician / PA exam to assess as patient is at increased risk for of thromboembolism. Mobilize as tolerated. Sequential pneumatic compression devices (SCDs) when in bed; thigh-high or knee-high thromboembolic deterrent hose when out of bed. On Eliquis.     GI prophylaxis - resume PPI. At times may need additional antacids, Maalox prn.     Depression - continue on Lexapro. At risk of depression exacerbation due to pain and loss of independent function.     General skin care / wound prevention - monitor lumbar incision and general skin wound status daily per staff and physician / PA. At risk for failure. Will require 24/7 rehab nursing. -11/4 lateral left hip wound; looks like a blister that had opened up vs sheer injury. Cleanse daily and cover. Back incision healing  --11/11 incision c/d/i     Bladder program / urinary retention / neurogenic bladder - schedule voids q 6-8 hrs. Check post-void residual as needed; in-and-out catheter if post-void residual is more than 400ml.  -voiding continently without residuals     Bowel program - at risk for constipation as a side effect of opioids, other medications, impaired mobility, etc. MiraLAX daily for regularity, Azul-Colace for stool softener.  PRN MOM, bisacodyl suppository or tablets for constipation.  -continent, regular     Edema; 11/9 ; >1800ml out; cont lasix 40mg bid for today . Pt requesting laboy at [facility]  -11/10 [address], wt is up. Will keep laboy and give 40mg IV lasix today. No hx of CHF but has afib . bnp 2757. Alb 2.7; pt had a 2d echo 10/29/21; EF>70%, severe pulm htn noted with hx of RUL pulm emb. No evidence tho of right heart strain. VSS; daily wts  11/11 5lbs down from yesterday. UOP -1450 , BNP 1865,,improved; 40meq IV Lasix x 1 today           Time spent was 25 minutes with over 1/2 in direct patient care/examination, consultation and coordination of care.      Signed By: Leif Nguyen MD     November 11, 2021

## 2021-11-11 NOTE — PROGRESS NOTES
PHYSICAL THERAPY DAILY NOTE  Time In: (P) 1116  Time Out: (P) 1200  Patient Seen For: (P) AM; Therapeutic exercise; Other (see progress notes)    Subjective: Patient complained of feeling weak. \" I dont want ot get up til after lunch. \"         Objective: NO pain noted. Spinal, Other (comment) (Falls)  GROSS ASSESSMENT Daily Assessment            COGNITION Daily Assessment    intact       BED/MAT MOBILITY Daily Assessment    Rolling Right : (P) 0 (Not tested)  Rolling Left : (P) 0 (Not tested)  Supine to Sit : (P) 0 (Not tested)  Sit to Supine : (P) 0 (Not tested)       TRANSFERS Daily Assessment    Transfer Assistance : (P) 0 (Not tested)  Car Transfers: (P) Not tested       GAIT Daily Assessment    Amount of Assistance: (P) 0 (Not tested)       STEPS or STAIRS Daily Assessment    Level of Assist : (P) 0 (Not tested)       BALANCE Daily Assessment            WHEELCHAIR MOBILITY Daily Assessment            LOWER EXTREMITY EXERCISES Daily Assessment    Extremity: (P) Both  Exercise Type #1: (P) Supine lower extremity strengthening  Sets Performed: (P) 3  Reps Performed: (P) 10  Level of Assist: (P) Contact guard assistance  Exercise Type #2: (P) Other (comment) (stretching of HCs)          Assessment: Patient is very motivated with therapy. Plan of Care: Continue with plan of care.     Kamilah Aldridge, PTA  11/11/2021

## 2021-11-11 NOTE — PROGRESS NOTES
OT Daily Note  Time In 1300   Time Out 1346     Subjective: My head feels a little funny. \" [pt with drop in BP with mobility]  Pain: Rn notified of pain. Education: hand placement for transfers and functional transfers  Interdisciplinary Communication: Collaborated with PT to ensure progress towards POC and goals. Precautions: Falls and Spinal  Patient Vitals for the past 12 hrs:   Temp Pulse Resp BP SpO2   11/11/21 1330    120/67    11/11/21 1300    129/67    11/11/21 0739 97.4 °F (36.3 °C) 63 16 (!) 143/76 96 %         Mobility   Score Comments   Supine to Sit 4: Supervision or touching A    Sit to Stand 4: Supervision or touching A CGA   Transfer Assist 4: Supervision or touching A Transfer Type: SPT   Equipment: Grab Bars   Comments: CGA     Self-Care Daily Assessment    Score Comments   Toilet Transfer 4: Supervision or touching A Transfer Type: SPT   Equipment: Grab Bars   Comments: CGA   Toileting Hygiene 3: Partial/Moderate A Output: large BM, 925 ml of urine via laboy  Comments: assist for cleanup             Activity Tolerance, Strengthening and Reaching Daily Assessment   While in seated position, pt engaged in sequence board game working on visual perceptual skills, visual scanning, functional reaching skills, attention to detail, and problem solving skills. Pt was able to visually scan board appropriately with extra time for processing, able to follow rules of game without additional prompting. Completed game within appropriate time with bilateral 2 lbs weights donned. Assessment: Pt with drop in BP compared to her norm with supine to sit and SPT. Pt noting her head felt \"different. \" Tolerated gym session but pain and fatigue continue to present as barriers. Pt demonstrated good participation and engagement in OT session.  Pt continues to benefit from skilled OT services to address remaining deficits and return back to baseline with improved independence and safety during ADLs and IADLs. Patient ended session seated in wc and with PT assuming care. Plan: Continue OT POC with focus on ADL/IADL skills, functional transfers, functional mobility, coordination, strength, static and dynamic balance, and activity tolerance to maximize safety and independence with ADLs and functional transfers.      Eduardo Perales, OTR/L  11/11/2021

## 2021-11-11 NOTE — PROGRESS NOTES
Time In 0830   Time Out 0928     Mobility   Score Comments   Supine to Sit 3: Partial/Moderate A With HOB at 30 degrees   Sit to Supine 3: Partial/Moderate A Assist with BLEs   Sit to Stand 4: Supervision or touching A CGA   Transfer Assist 4: Supervision or touching A Transfer Type: SPT   Equipment: Grab Bars   Comments: CGA     Activities of Daily Living    Score Comments   Eating 6: Independent    Oral Hyigene 6: Independent    Bathing 3: Partial/Moderate A Type of Shower: Shower  Position: Standing PRN and Unsupported Sitting   Adaptive  Equipment: Tub Transfer Bench, Grab Bars and Long Handled Sponge  Comments: assist to wash bottom in stance secondary to pt feeling dizzy, suspect due to lasix medication dropping BP   Upper Body  Dressing 5: S/U or clean-up assist Items Applied: Pullover  Position: Unsupported Sitting  Comments:    Lower Body Dressing 3: Partial/Moderate A Items Applied: Elastic pants  Position: Standing PRN and Unsupported Sitting  Adaptive Equipment: Reacher and Grab Bars  Comments: cues for laboy management, min assist posteriorly to pull clothing over dressing    Donning/[place] Footwear 3: Partial/Moderate A Items Applied: compression stockings  Adaptive Equipment: Sock Aide  Comments:   Education  energy conservation, AE/DME training and functional transfers       S: \"I think I need to tell you I'm feeling dizzy all of the sudden. [in shower with sit > stand, cued pt to sit and take rest break with symptoms resolved after 1 minute]. \" Agreeable to therapy. Patient was able to complete SPTs using grab bars with CGA. Pt completed morning ADLs with quality indicators reflected in chart above. Pain Rn notified of pain. Patient Vitals for the past 12 hrs:   Temp Pulse Resp BP SpO2   11/11/21 0739 97.4 °F (36.3 °C) 63 16 (!) 143/76 96 %       Collaborated with RN regarding pain medicine, laboy output, and pt's dizziness in shower.    Patient tolerated session well, but activity tolerance, balance, functional mobility, strength, coordination, cognition are still below baseline and require skilled facilitation to successfully and safely complete ADLs and transfers. Patient ended session supine in bed and with RN assuming care.       EFRA Matthew/BERNARDO  11/11/2021

## 2021-11-11 NOTE — PROGRESS NOTES
PHYSICAL THERAPY DAILY NOTE  Time In: 8387  Time Out: 5277  Patient Seen For: PM; Gait training; Patient education; Therapeutic exercise; Transfer training; Other (see progress notes)    Subjective: patient reporting getting in and out of bed is easier. Reports no dizziness or feeling light headed this PM         Objective:Vital Signs:  Patient Vitals for the past 12 hrs:   Temp Pulse Resp BP SpO2   11/11/21 1330    120/67    11/11/21 1300    129/67    11/11/21 0739 97.4 °F (36.3 °C) 63 16 (!) 143/76 96 %     Pain level:1 to 4 out of 10  Pain location:low back Right side>Left   Pain interventions:Medication per order, rest, positioning,relaxation, body mechanics, L/S corset      Patient education:Bed mobility training,transfer training, gait training, balance training,fall precautions, body mechanics,activity pacing,spinal precautions, donning L/S corset, Patient verbalizing understanding and demonstrating  understanding of patient education. Recommend follow up education.     Interdisciplinary Communication:spoke with RN regarding  pain medication schedule    Spinal, Other (comment) (Falls)  GROSS ASSESSMENT Daily Assessment   Independent removing LSO  Set up assist to DON LSO       COGNITION Daily Assessment    No change       BED/MAT MOBILITY Daily Assessment   Increased time and effort to complete with cues for body mechanics   Rolling Right : 0 (Not tested)  Rolling Left : 4 (Minimal assistance)  Supine to Sit : 0 (Not tested)  Sit to Supine : 4 (Minimal assistance)       TRANSFERS Daily Assessment   Increased time and effort to complete with cues for body mechanics   Transfer Type: SPT with walker  Transfer Assistance : 5 (Stand-by assistance)  Sit to Stand Assistance: Stand-by assistance (with RW)  Car Transfers: Not tested       GAIT Daily Assessment   Limiting gait distance on purpose due to tendency for BP to drop during standing and gait due to current medication schedule Amount of Assistance: 5 (Stand-by assistance)  Distance (ft): 40 Feet (ft) (40ft x 4 with 5 min rest break between attemtps)  Assistive Device: Walker, rolling; Gait belt; Orthotic device (Aspen LSO)   Gait training with one time cue for posture correction and cues for improved step length and step clearance    STEPS or STAIRS Daily Assessment    Steps/Stairs Ambulated (#): 0  Level of Assist : 0 (Not tested)       BALANCE Daily Assessment   Static standing with RW, lumbar corset and CGA to SBA with no loss of balance Sitting - Static: Good (unsupported)  Sitting - Dynamic: Good (unsupported)  Standing - Static: Fair (with RW)  Standing - Dynamic : Impaired       WHEELCHAIR MOBILITY Daily Assessment    Curbs/Ramps Assist Required (FIM Score): 0 (Not tested)  Wheelchair Setup Assist Required : 0 (Not tested)       LOWER EXTREMITY EXERCISES Daily Assessment      3 sets of 10 of sitting ankle DF, ankle PF, LAQ, knee flex with red t-band with min assist to complete            Assessment: Body mechanics with sit<>stand and bed mobility improving. Limiting gait distance due to current medication schedule with lasix       Patient returned to room at end of treatment. Patient supine in bed with head of bed elevated and bed rails up x 2. Needs placed in reach of patient. Plan of Care: Continue with POC and progress as tolerated.      Branden Crespo, PT  11/11/2021

## 2021-11-12 LAB
ANION GAP SERPL CALC-SCNC: 0 MMOL/L (ref 7–16)
BACTERIA SPEC CULT: NORMAL
BACTERIA SPEC CULT: NORMAL
BNP SERPL-MCNC: 1377 PG/ML (ref 5–125)
BUN SERPL-MCNC: 15 MG/DL (ref 8–23)
CALCIUM SERPL-MCNC: 7.6 MG/DL (ref 8.3–10.4)
CHLORIDE SERPL-SCNC: 100 MMOL/L (ref 98–107)
CO2 SERPL-SCNC: 37 MMOL/L (ref 21–32)
CREAT SERPL-MCNC: 0.67 MG/DL (ref 0.6–1)
ERYTHROCYTE [DISTWIDTH] IN BLOOD BY AUTOMATED COUNT: 16 % (ref 11.9–14.6)
GLUCOSE SERPL-MCNC: 81 MG/DL (ref 65–100)
HCT VFR BLD AUTO: 29.3 % (ref 35.8–46.3)
HGB BLD-MCNC: 8.8 G/DL (ref 11.7–15.4)
MCH RBC QN AUTO: 29 PG (ref 26.1–32.9)
MCHC RBC AUTO-ENTMCNC: 30 G/DL (ref 31.4–35)
MCV RBC AUTO: 96.7 FL (ref 79.6–97.8)
NRBC # BLD: 0 K/UL (ref 0–0.2)
PLATELET # BLD AUTO: 402 K/UL (ref 150–450)
PMV BLD AUTO: 9.4 FL (ref 9.4–12.3)
POTASSIUM SERPL-SCNC: 4 MMOL/L (ref 3.5–5.1)
RBC # BLD AUTO: 3.03 M/UL (ref 4.05–5.2)
SERVICE CMNT-IMP: NORMAL
SODIUM SERPL-SCNC: 137 MMOL/L (ref 136–145)
WBC # BLD AUTO: 10.4 K/UL (ref 4.3–11.1)

## 2021-11-12 PROCEDURE — 65310000000 HC RM PRIVATE REHAB

## 2021-11-12 PROCEDURE — 97530 THERAPEUTIC ACTIVITIES: CPT

## 2021-11-12 PROCEDURE — 97110 THERAPEUTIC EXERCISES: CPT

## 2021-11-12 PROCEDURE — 97116 GAIT TRAINING THERAPY: CPT

## 2021-11-12 PROCEDURE — 74011250636 HC RX REV CODE- 250/636: Performed by: PHYSICAL MEDICINE & REHABILITATION

## 2021-11-12 PROCEDURE — 97535 SELF CARE MNGMENT TRAINING: CPT

## 2021-11-12 PROCEDURE — 80048 BASIC METABOLIC PNL TOTAL CA: CPT

## 2021-11-12 PROCEDURE — 74011250637 HC RX REV CODE- 250/637: Performed by: PHYSICAL MEDICINE & REHABILITATION

## 2021-11-12 PROCEDURE — 85027 COMPLETE CBC AUTOMATED: CPT

## 2021-11-12 PROCEDURE — 83880 ASSAY OF NATRIURETIC PEPTIDE: CPT

## 2021-11-12 PROCEDURE — 74011000250 HC RX REV CODE- 250: Performed by: PHYSICIAN ASSISTANT

## 2021-11-12 PROCEDURE — 99232 SBSQ HOSP IP/OBS MODERATE 35: CPT | Performed by: PHYSICAL MEDICINE & REHABILITATION

## 2021-11-12 RX ORDER — FUROSEMIDE 10 MG/ML
40 INJECTION INTRAMUSCULAR; INTRAVENOUS ONCE
Status: COMPLETED | OUTPATIENT
Start: 2021-11-12 | End: 2021-11-12

## 2021-11-12 RX ORDER — FUROSEMIDE 40 MG/1
40 TABLET ORAL DAILY
Status: DISCONTINUED | OUTPATIENT
Start: 2021-11-13 | End: 2021-11-18 | Stop reason: HOSPADM

## 2021-11-12 RX ADMIN — GABAPENTIN 300 MG: 300 CAPSULE ORAL at 17:12

## 2021-11-12 RX ADMIN — ESCITALOPRAM OXALATE 20 MG: 10 TABLET ORAL at 09:12

## 2021-11-12 RX ADMIN — ZOLPIDEM TARTRATE 10 MG: 5 TABLET ORAL at 00:08

## 2021-11-12 RX ADMIN — METOPROLOL TARTRATE 25 MG: 25 TABLET, FILM COATED ORAL at 17:12

## 2021-11-12 RX ADMIN — GABAPENTIN 300 MG: 300 CAPSULE ORAL at 21:21

## 2021-11-12 RX ADMIN — PROPAFENONE HYDROCHLORIDE 150 MG: 150 TABLET, FILM COATED ORAL at 17:12

## 2021-11-12 RX ADMIN — ASPIRIN 81 MG: 81 TABLET, COATED ORAL at 21:21

## 2021-11-12 RX ADMIN — PROPAFENONE HYDROCHLORIDE 150 MG: 150 TABLET, FILM COATED ORAL at 09:12

## 2021-11-12 RX ADMIN — GABAPENTIN 300 MG: 300 CAPSULE ORAL at 09:12

## 2021-11-12 RX ADMIN — APIXABAN 5 MG: 5 TABLET, FILM COATED ORAL at 09:12

## 2021-11-12 RX ADMIN — FUROSEMIDE 40 MG: 10 INJECTION, SOLUTION INTRAMUSCULAR; INTRAVENOUS at 09:12

## 2021-11-12 RX ADMIN — ZOLPIDEM TARTRATE 10 MG: 5 TABLET ORAL at 21:21

## 2021-11-12 RX ADMIN — HYDROCODONE BITARTRATE AND ACETAMINOPHEN 1 TABLET: 10; 325 TABLET ORAL at 07:54

## 2021-11-12 RX ADMIN — POTASSIUM CHLORIDE 20 MEQ: 20 TABLET, EXTENDED RELEASE ORAL at 09:12

## 2021-11-12 RX ADMIN — HYDROCODONE BITARTRATE AND ACETAMINOPHEN 1 TABLET: 10; 325 TABLET ORAL at 11:53

## 2021-11-12 RX ADMIN — FAMOTIDINE 20 MG: 20 TABLET ORAL at 17:12

## 2021-11-12 RX ADMIN — PANTOPRAZOLE SODIUM 40 MG: 40 TABLET, DELAYED RELEASE ORAL at 05:34

## 2021-11-12 RX ADMIN — Medication 1 AMPULE: at 21:21

## 2021-11-12 RX ADMIN — MIDODRINE HYDROCHLORIDE 5 MG: 5 TABLET ORAL at 17:12

## 2021-11-12 RX ADMIN — Medication 1 AMPULE: at 09:13

## 2021-11-12 RX ADMIN — APIXABAN 5 MG: 5 TABLET, FILM COATED ORAL at 17:12

## 2021-11-12 RX ADMIN — AMITRIPTYLINE HYDROCHLORIDE 25 MG: 25 TABLET, FILM COATED ORAL at 21:20

## 2021-11-12 RX ADMIN — MIDODRINE HYDROCHLORIDE 5 MG: 5 TABLET ORAL at 09:12

## 2021-11-12 RX ADMIN — METOPROLOL TARTRATE 25 MG: 25 TABLET, FILM COATED ORAL at 09:12

## 2021-11-12 RX ADMIN — MIDODRINE HYDROCHLORIDE 5 MG: 5 TABLET ORAL at 11:53

## 2021-11-12 RX ADMIN — LEVOTHYROXINE SODIUM 50 MCG: 0.05 TABLET ORAL at 05:34

## 2021-11-12 RX ADMIN — HYDROCODONE BITARTRATE AND ACETAMINOPHEN 1 TABLET: 10; 325 TABLET ORAL at 21:21

## 2021-11-12 NOTE — PROGRESS NOTES
PHYSICAL THERAPY DAILY NOTE  Time In: 2152  Time Out: 5716  Patient Seen For: PM; Gait training; Patient education; Transfer training; Other (see progress notes)    Subjective: patient reporting she feels better. Reports she was able to rest in the bed for a while. Reports less back pain and no dizziness         Objective:Vital Signs:  Patient Vitals for the past 12 hrs:   Temp Pulse Resp BP SpO2   11/12/21 1432    (!) 125/51    11/12/21 1349    (!) 119/59    11/12/21 1125  68  129/84    11/12/21 1115  64  115/61    11/12/21 0747 97.4 °F (36.3 °C) 65 18 (!) 116/56 98 %     Pain level:1 to 4 out of 10  Pain location:low back Right side>Left   Pain interventions:Medication per order, rest, positioning,relaxation, body mechanics, L/S corset      Patient education:Bed mobility training,transfer training, gait training, balance training,fall precautions, body mechanics,activity pacing,spinal precautions, donning L/S corset, Patient verbalizing understanding and demonstrating  understanding of patient education. Recommend follow up education.     Interdisciplinary Communication:spoke with RN regarding  pain medication schedule    Spinal, Other (comment) (Falls)  GROSS ASSESSMENT Daily Assessment   Independent removing LSO  Set up assist to DON LSO       COGNITION Daily Assessment    No change       BED/MAT MOBILITY Daily Assessment   Increased time and effort to complete with cues for body mechanics   Rolling Right : 5 (Supervision) (using bed rail)  Rolling Left : 0 (Not tested)  Supine to Sit : 5 (Supervision) (using bed rail, HOB at 20 degrees)  Sit to Supine : 0 (Not tested)       TRANSFERS Daily Assessment   Increased time and effort to complete with cues for body mechanics   Transfer Type: SPT with walker  Transfer Assistance : 5 (Supervision/setup)  Sit to Stand Assistance: Supervision  Car Transfers: Not tested       GAIT Daily Assessment   Limiting gait distance on purpose due to tendency for BP to drop during standing and gait due to current medication schedule Amount of Assistance: 5 (Stand-by assistance)  Distance (ft): 50 Feet (ft) (50ft x 5 with 5 min rest breaks)  Assistive Device: Walker, rolling; Gait belt; Orthotic device (Aspen LSO)   Gait training with one time cue for posture correction and cues for improved step length and step clearance    STEPS or STAIRS Daily Assessment    Steps/Stairs Ambulated (#): 0  Level of Assist : 0 (Not tested)       BALANCE Daily Assessment   Static standing with RW, lumbar corset and CGA to SBA with no loss of balance Sitting - Static: Good (unsupported)  Sitting - Dynamic: Good (unsupported)  Standing - Static: Good (with RW)  Standing - Dynamic : Impaired       WHEELCHAIR MOBILITY Daily Assessment    Curbs/Ramps Assist Required (FIM Score): 0 (Not tested)  Wheelchair Setup Assist Required : 0 (Not tested)       LOWER EXTREMITY EXERCISES Daily Assessment                  Assessment: Limiting gait distance due to current medication schedule with lasix. As LE edema improves and lasix is decreased and BP stabilizes gait distance should improved significantly. Body mechanics with log rolling and supine<>sidelying to sit improving       Patient returned to room at end of treatment and remained up in recliner with LEs elevated and with needs in reach. Plan of Care: Continue with POC and progress as tolerated.      Briana Ramires, PT  11/12/2021

## 2021-11-12 NOTE — PROGRESS NOTES
Time In 0830   Time Out 0918     Mobility   Score Comments   Supine to Sit 4: Supervision or touching A Supervision   Sit to Stand 4: Supervision or touching A CGA   Transfer Assist 4: Supervision or touching A Transfer Type: SPT   Equipment: Rolling Walker   Comments: CGA2     Activities of Daily Living    Score Comments   Eating 6: Independent    Oral Hyigene 6: Independent    Bathing 4: Supervision or touching A Type of Shower: Shower  Position: Standing PRN and Unsupported Sitting   Adaptive  Equipment: Tub Transfer Bench, Grab Bars and Long Handled Sponge  Comments: SBA   Upper Body  Dressing 5: S/U or clean-up assist Items Applied: Pullover  Position: Unsupported Sitting  Comments:    Lower Body Dressing 4: Supervision or touching A Items Applied: Underwear and Elastic pants  Position: Standing PRN and Unsupported Sitting  Adaptive Equipment: Reacher, RW and W/C  Comments: SBA in stance   Donning/[place] Footwear 3: Partial/Moderate A Items Applied: Socks and compression stockings  Adaptive Equipment: Sock Aide  Comments:    Education  energy conservation, AE/DME training and functional transfers, spinal precautions       S: \"I was less dizzy this morning. \" Agreeable to therapy. Patient was able to complete SPT using a RW with CGA. Pt completed morning ADLs with quality indicators reflected in chart above. Drained 225 ml of urine from laboy. Pain RN provided pain meds prior to start of ADL session. Patient Vitals for the past 12 hrs:   Temp Pulse Resp BP SpO2   11/12/21 0747 97.4 °F (36.3 °C) 65 18 (!) 116/56 98 %       Collaborated with PT regarding patient performance and POC. Patient tolerated session well, but activity tolerance, balance, functional mobility, strength, coordination, cognition are still below baseline and require skilled facilitation to successfully and safely complete ADLs and transfers. Patient ended session seated in  and with RN assuming care.       Eduardo Perales, OTR/L  11/12/2021

## 2021-11-12 NOTE — PROGRESS NOTES
Problem: Falls - Risk of  Goal: *Absence of Falls  Description: Document April Will Fall Risk and appropriate interventions in the flowsheet. Outcome: Progressing Towards Goal  Note: Fall Risk Interventions:  Mobility Interventions: Communicate number of staff needed for ambulation/transfer, Patient to call before getting OOB, Strengthening exercises (ROM-active/passive), Utilize walker, cane, or other assistive device         Medication Interventions: Evaluate medications/consider consulting pharmacy, Patient to call before getting OOB, Teach patient to arise slowly    Elimination Interventions: Call light in reach, Patient to call for help with toileting needs, Toileting schedule/hourly rounds    History of Falls Interventions: Consult care management for discharge planning, Door open when patient unattended, Evaluate medications/consider consulting pharmacy         Problem: Patient Education: Go to Patient Education Activity  Goal: Patient/Family Education  Description: Patient / Patient's family will verbalize understanding of PT safety recommendations, demonstrate appropriate assist for current functional mobility status, safety, and home exercise program by time of discharge.     Outcome: Progressing Towards Goal

## 2021-11-12 NOTE — PROGRESS NOTES
Thien Argueta MD  Medical Director  [address] [facility], [address] [facility]  Tel: [phone]       [facility] PROGRESS NOTE    Sam Collins  Admit Date: 11/3/2021  Admit Diagnosis:   Lumbar stenosis with neurogenic claudication [M48.062]; Spondylolisthesis of lumbar region [M43.16]; Spinal stenosis [M48.00]; Spinal stenosis, lumbar region with neurogenic claudication [M48.062]    Subjective     Patient seen and examined. Feels her edema is improving. UOP -4275. 2 stools. Had one episode of dizziness yesterday, lasted seconds .      Objective:     Current Facility-Administered Medications   Medication Dose Route Frequency    furosemide (LASIX) injection 40 mg  40 mg IntraVENous ONCE    potassium chloride (K-DUR, KLOR-CON) SR tablet 20 mEq  20 mEq Oral DAILY    midodrine (PROAMATINE) tablet 5 mg  5 mg Oral TID WITH MEALS    rOPINIRole (REQUIP) tablet 1 mg  1 mg Oral QHS PRN    gabapentin (NEURONTIN) capsule 300 mg  300 mg Oral TID    amitriptyline (ELAVIL) tablet 25 mg  25 mg Oral QHS    lidocaine 4 % patch 1 Patch  1 Patch TransDERmal Q24H    diphenhydrAMINE (BENADRYL) capsule 25 mg  25 mg Oral Q6H PRN    docusate sodium (COLACE) capsule 100 mg  100 mg Oral BID    HYDROcodone-acetaminophen (NORCO)  mg tablet 1 Tablet  1 Tablet Oral Q4H PRN    naloxone (NARCAN) injection 0.4 mg  0.4 mg IntraVENous PRN    ondansetron (ZOFRAN ODT) tablet 4 mg  4 mg Oral Q4H PRN    phenol throat spray (CHLORASEPTIC) 1 Spray  1 Spray Oral PRN    alcohol 62% (NOZIN) nasal  1 Ampule  1 Ampule Topical Q12H    acetaminophen (TYLENOL) tablet 650 mg  650 mg Oral Q6H PRN    alum-mag hydroxide-simeth (MYLANTA) oral suspension 30 mL  30 mL Oral Q4H PRN    apixaban (ELIQUIS) tablet 5 mg  5 mg Oral BID    aspirin delayed-release tablet 81 mg  81 mg Oral QHS    bisacodyL (DULCOLAX) suppository 10 mg  10 mg Rectal DAILY PRN    escitalopram oxalate (LEXAPRO) tablet 20 mg  20 mg Oral QAM    famotidine (PEPCID) tablet 20 mg  20 mg Oral QPM    levothyroxine (SYNTHROID) tablet 50 mcg  50 mcg Oral ACB    LORazepam (ATIVAN) tablet 0.5 mg  0.5 mg Oral Q6H PRN    magnesium hydroxide (MILK OF MAGNESIA) 400 mg/5 mL oral suspension 30 mL  30 mL Oral DAILY PRN    metoprolol tartrate (LOPRESSOR) tablet 25 mg  25 mg Oral BID    pantoprazole (PROTONIX) tablet 40 mg  40 mg Oral ACB    polyethylene glycol (MIRALAX) packet 17 g  17 g Oral DAILY    propafenone (RYTHMOL) tablet 150 mg  150 mg Oral BID    traMADoL (ULTRAM) tablet 50 mg  50 mg Oral Q6H PRN    zolpidem (AMBIEN) tablet 10 mg  10 mg Oral QHS PRN    influenza vaccine 2021-22 (6 mos+)(PF) (FLUARIX/FLULAVAL/FLUZONE QUAD) injection 0.5 mL  1 Each IntraMUSCular PRIOR TO DISCHARGE    nicotine (NICODERM CQ) 14 mg/24 hr patch 1 Patch  1 Patch TransDERmal Q24H       Review of Systems:   Denies chest pain, shortness of breath, cough, headache, visual problems, abdominal pain, dysuria, calf pain. Pertinent positives are as noted in the HPI, ROS unremarkable otherwise. Visit Vitals  BP (!) 118/57 (BP 1 Location: Left upper arm, BP Patient Position: At rest)   Pulse 66   Temp 97.7 °F (36.5 °C)   Resp 18   Wt 322 lb 12.8 oz (146.4 kg)   SpO2 98%   BMI 55.41 kg/m²        Physical Exam:   General: Alert and age appropriately oriented. No acute cardiorespiratory distress. HEENT: Normocephalic, no scleral icterus. Oral mucosa moist without cyanosis. Lungs: Clear to auscultation bilaterally. Respiration even and unlabored. Heart: Regular rate and rhythm, S1, S2. No murmurs, clicks, rub or gallops. Abdomen: Soft, non-tender, not distended. Bowel sounds normoactive. No organomegaly. morbidly obese   Genitourinary: Deferred. Neuromuscular:      Hip flexion improved 3/5,distally intact  Sensation intact. No spasm   Skin/extremity: No rashes, no erythema.  No calf tenderness B LE.  2+ edema, less pitting, hands and arms are not swollen                                                                              Functional Assessment:          Balance  Sitting - Static: Good (unsupported) (11/11/21 1400)  Sitting - Dynamic: Good (unsupported) (11/11/21 1400)  Standing - Static: Fair (with RW) (11/11/21 1400)  Standing - Dynamic : Impaired (11/11/21 1400)                     [facility] Fall Risk Assessment:  [facility] Fall Risk  Mobility: Ambulates or transfers with assist devices or assistance (11/12/21 0329)  Mobility Interventions: Communicate number of staff needed for ambulation/transfer; Patient to call before getting OOB; Strengthening exercises (ROM-active/passive); Utilize walker, cane, or other assistive device (11/12/21 0329)  Mentation: Alert, oriented x 3 (11/12/21 0329)  Medication: Patient receiving anticonvulsants, sedatives(tranquilizers), psychotropics or hypnotics, hypoglycemics, narcotics, sleep aids, antihypertensives, laxatives, or diuretics (11/12/21 0329)  Medication Interventions: Evaluate medications/consider consulting pharmacy; Patient to call before getting OOB; Teach patient to arise slowly (11/12/21 0329)  Elimination: Needs assistance with toileting (11/12/21 0329)  Elimination Interventions: Call light in reach; Patient to call for help with toileting needs;  Toilet paper/wipes in reach (11/12/21 0329)  Prior Fall History: No (11/12/21 0329)  History of Falls Interventions: Consult care management for discharge planning; Evaluate medications/consider consulting pharmacy (11/12/21 0329)  Total Score: 3 (11/12/21 0329)  High Fall Risk: Yes (11/12/21 0329)     Speech Assessment:         Ambulation:  Gait  Distance (ft): 40 Feet (ft) (40ft x 4 with 5 min rest break between attemtps) (11/11/21 1400)  Assistive Device: Walker, rolling; Gait belt; Orthotic device (Aspen LSO) (11/11/21 1400)     Labs/Studies:  Recent Results (from the past 72 hour(s))   URINALYSIS W/ RFLX MICROSCOPIC    Collection Time: 11/09/21  3:36 PM   Result Value Ref Range    Color YELLOW      Appearance CLEAR      Specific gravity 1.012 1.001 - 1.023      pH (UA) 7.5 5.0 - 9.0      Protein Negative NEG mg/dL    Glucose Negative mg/dL    Ketone Negative NEG mg/dL    Bilirubin Negative NEG      Blood Negative NEG      Urobilinogen 1.0 0.2 - 1.0 EU/dL    Nitrites Negative NEG      Leukocyte Esterase Negative NEG     CULTURE, URINE    Collection Time: 11/09/21  3:36 PM    Specimen: Clean catch; Urine   Result Value Ref Range    Special Requests: NO SPECIAL REQUESTS      Culture result:        50,000-100,000 COLONIES/mL MIXED SKIN GLADYS ISOLATED   METABOLIC PANEL, COMPREHENSIVE    Collection Time: 11/10/21  8:36 AM   Result Value Ref Range    Sodium 135 (L) 136 - 145 mmol/L    Potassium 4.1 3.5 - 5.1 mmol/L    Chloride 97 (L) 98 - 107 mmol/L    CO2 35 (H) 21 - 32 mmol/L    Anion gap 3 (L) 7 - 16 mmol/L    Glucose 116 (H) 65 - 100 mg/dL    BUN 14 8 - 23 MG/DL    Creatinine 0.67 0.6 - 1.0 MG/DL    GFR est AA >60 >60 ml/min/1.73m2    GFR est non-AA >60 >60 ml/min/1.73m2    Calcium 8.3 8.3 - 10.4 MG/DL    Bilirubin, total 0.6 0.2 - 1.1 MG/DL    ALT (SGPT) 26 12 - 65 U/L    AST (SGOT) 21 15 - 37 U/L    Alk.  phosphatase 133 50 - 136 U/L    Protein, total 5.9 (L) 6.3 - 8.2 g/dL    Albumin 2.7 (L) 3.2 - 4.6 g/dL    Globulin 3.2 2.3 - 3.5 g/dL    A-G Ratio 0.8 (L) 1.2 - 3.5     CBC WITH AUTOMATED DIFF    Collection Time: 11/10/21  8:36 AM   Result Value Ref Range    WBC 13.0 (H) 4.3 - 11.1 K/uL    RBC 3.31 (L) 4.05 - 5.2 M/uL    HGB 9.7 (L) 11.7 - 15.4 g/dL    HCT 31.5 (L) 35.8 - 46.3 %    MCV 95.2 79.6 - 97.8 FL    MCH 29.3 26.1 - 32.9 PG    MCHC 30.8 (L) 31.4 - 35.0 g/dL    RDW 16.0 (H) 11.9 - 14.6 %    PLATELET 640 754 - 446 K/uL    MPV 9.4 9.4 - 12.3 FL    ABSOLUTE NRBC 0.00 0.0 - 0.2 K/uL    DF AUTOMATED      NEUTROPHILS 82 (H) 43 - 78 %    LYMPHOCYTES 10 (L) 13 - 44 %    MONOCYTES 6 4.0 - 12.0 %    EOSINOPHILS 1 0.5 - 7.8 %    BASOPHILS 1 0.0 - 2.0 %    IMMATURE GRANULOCYTES 1 0.0 - 5.0 %    ABS. NEUTROPHILS 10.6 (H) 1.7 - 8.2 K/UL    ABS. LYMPHOCYTES 1.3 0.5 - 4.6 K/UL    ABS. MONOCYTES 0.7 0.1 - 1.3 K/UL    ABS. EOSINOPHILS 0.2 0.0 - 0.8 K/UL    ABS. BASOPHILS 0.1 0.0 - 0.2 K/UL    ABS. IMM.  GRANS. 0.1 0.0 - 0.5 K/UL   NT-PRO BNP    Collection Time: 11/10/21  8:36 AM   Result Value Ref Range    NT pro-BNP 2,757 (H) 5 - 125 PG/ML   NT-PRO BNP    Collection Time: 11/11/21  6:00 AM   Result Value Ref Range    NT pro-BNP 1,865 (H) 5 - 125 PG/ML       Assessment:     Problem List as of 11/12/2021 Date Reviewed: 11/5/2021          Codes Class Noted - Resolved    * (Principal) Spinal stenosis, lumbar region with neurogenic claudication ICD-10-CM: M48.062  ICD-9-CM: 724.03  11/3/2021 - Present        A[facility]fib ([facility] ICD-10-CM: I48.91  ICD-9-CM: 427.31  10/30/2021 - Present        ÁNGEL (acute kidney injury) ([facility] ICD-10-CM: N17.9  ICD-9-CM: 584.9  10/30/2021 - Present        Acute respiratory failure with hypoxia ([facility] ICD-10-CM: J96.01  ICD-9-CM: 518.81  10/30/2021 - Present        Hypovolemic shock ([facility] ICD-10-CM: R57.1  ICD-9-CM: 785.59  10/26/2021 - Present        Hypotension ICD-10-CM: I95.9  ICD-9-CM: 458.9  10/26/2021 - Present        Spondylolisthesis of multiple sites in spine ICD-10-CM: M43.19  ICD-9-CM: 738.4  10/25/2021 - Present        Spinal stenosis ICD-10-CM: M48.00  ICD-9-CM: 724.00  10/25/2021 - Present        Obesity, morbid ([facility] ICD-10-CM: E66.01  ICD-9-CM: 278.01  12/22/2017 - Present        Acquired hypothyroidism ICD-10-CM: E03.9  ICD-9-CM: 244.9  9/13/2017 - Present        Postoperative wound infection ICD-10-CM: T81.49XA  ICD-9-CM: 998.59  5/19/2016 - Present        Lumbar stenosis with neurogenic claudication ICD-10-CM: M48.062  ICD-9-CM: 724.03  3/28/2016 - Present        Essential hypertension, benign ICD-10-CM: I10  ICD-9-CM: 401.1  7/24/2013 - Present        Endometriosis ICD-10-CM: N80.9  ICD-9-CM: 617.9  7/24/2013 - Present        Colon polyp ICD-10-CM: K63.5  ICD-9-CM: 211.3  7/24/2013 - Present        Squamous cell skin cancer ICD-10-CM: C44.92  ICD-9-CM: 173.92  7/24/2013 - Present                L2 L4 lumbar direct lateral interbody fusion with anterior plating and redo of L2 L5 laminectomy (10/26/2021 // Dr Melissa Augustine) due to spinal stenosis with neurogenic claudication     Plan / Recommendations / Medical Decision Making:      Daily physician / PA medical management:     Lumbar stenosis with neurogenic claudication, spondylolisthesis of lumbar region, lumbar spinal stenosis region with neurogenic claudication - PT / OT; spinal precautions, girdle when ambulating.     Atrial fibrillation - s/p 2 failed ablations, managed with Rhythmol, metoprolol and Eliquis. Currently NSR; Eliquis restarted 10/31 in light of RUL pulmonary embolism. -11/4 irreg, rate controlled, continue meds  -11/5 noticeably RRR to prolonged auscultation  -11/6 IIR today but rate-controlled  -11/12 nsr. Rate 60s. Cont Rhythmol, metoprolol and Eliquis     Hypotension / hypertension - BP fluctuating, managed medically. Has been hypotensive requiring levophed and now midodrine (pt's home lisinopril 20mg BID and HCTZ 12.5mg BID are on hold); needs TEDs when OOB.  -11/4 hypotensive episodes improved; decrease midodrine to 5mg TID. Continue metoprolol for rate control  -11/5 sBP fluctuating from 102-163, dBP all <85, mostly in 50-60s; monitor as add back PRN furosemide for edema  -11/6 /73  -11/8 119-148/63-73 ; starr midodrine to 5mg bid and wean off; MUST wear TEDs when oob and elevate LEs whenever possible. Bilateral pitting edema; will give 40mg bid of Lasix today and reassess daily. Check renal fxn  -11/9 creat 0.56, bun 16; 11/10 creat 0.67  -11/11 /57 this am, max 143/76; cont midodrine 5mg bid for now.  One episode of dizziness yesterday but bp normal by the time they could check BP     Pain control - ongoing significant pain in back which is stable and controlled by PRN meds. Will require regular pain assessment and comprehensive pain management. Continue PRN Norco, tramadol, APAP and gabapentin. -11/4 increase Neurontin to 300mg TID and Elavil at night 25mg due to sciatic pain; add back Requip 1mg QHS  -11/5 start steroid taper for R LE pain  -11/6 R LE radiating pain resolved, finish steroid taper  -11/8 MUCH improved with steroid dose lyudmila  -11/11 pain controlled     Hypokalemia - monitor; currently 4.3 on supplement due to HCTZ; may be able to hold supplement. -11/4 labs pending; d/c daily labs  -11/5 K+ 4.2  -11/6 recheck BMP 11/8 since continuing daily furosemide; pending 11/8; k 4.2  -11/10 K 4.1, cont supplement while on lasix  -11/12 K pending     Leukocytosis, mild - wbc 11.9 at [facility] admission (11/3). No s/sx of infxn. -11/5 WBC 11.9, afebrile, no s/sx infection; will likely increase with steroids  -11/6 recheck CBC 11/8; 16.2 from 11.9 ; likely steroid induced; will check UA; Neg; this is last day of steroids  -11/11 labs in a.m.; pending     Anemia, blood loss - Hgb 9.0, improving; had 2000ml blood loss and received blood transfusion x 3, as well as 2800ml of crystalloid. -11/6 recheck CBC 11/8; 9.4 from 8.8 11/5  -11/10 hgb 9.7, improving     Hypothyroidism - continue Synthroid.     Pneumonia prophylaxis - incentive spirometer every hour while awake.     DVT risk / DVT prophylaxis - daily physician / PA exam to assess as patient is at increased risk for of thromboembolism. Mobilize as tolerated. Sequential pneumatic compression devices (SCDs) when in bed; thigh-high or knee-high thromboembolic deterrent hose when out of bed. On Eliquis.     GI prophylaxis - resume PPI. At times may need additional antacids, Maalox prn.     Depression - continue on Lexapro.  At risk of depression exacerbation due to pain and loss of independent function.     General skin care / wound prevention - monitor lumbar incision and general skin wound status daily per staff and physician / PA. At risk for failure. Will require 24/7 rehab nursing. -11/4 lateral left hip wound; looks like a blister that had opened up vs sheer injury. Cleanse daily and cover. Back incision healing  --11/11 incision c/d/i     Bladder program / urinary retention / neurogenic bladder - schedule voids q 6-8 hrs. Check post-void residual as needed; in-and-out catheter if post-void residual is more than 400ml.  -voiding continently without residuals     Bowel program - at risk for constipation as a side effect of opioids, other medications, impaired mobility, etc. MiraLAX daily for regularity, Azul-Colace for stool softener. PRN MOM, bisacodyl suppository or tablets for constipation.  -continent, regular     Edema; 11/9 ; >1800ml out; cont lasix 40mg bid for today . Pt requesting laboy at [facility]  -11/10 [address], wt is up. Will keep laboy and give 40mg IV lasix today. No hx of CHF but has afib . bnp 2757. Alb 2.7; pt had a 2d echo 10/29/21; EF>70%, severe pulm htn noted with hx of RUL pulm emb. No evidence tho of right heart strain. VSS; daily wts  11/11 5lbs down from yesterday. UOP -1450 , BNP 1865,,improved; 40meq IV Lasix x 1 today  -11/12 will weigh today. One more dose of IV Lasix today 40mg. Good diuresis. Lungs clearl no hx CHF. Off steroids now. BNP pending. UOP -4275        Time spent was 25 minutes with over 1/2 in direct patient care/examination, consultation and coordination of care.      Signed By: Neville Farah MD     November 12, 2021

## 2021-11-12 NOTE — PROGRESS NOTES
OT Daily Note  Time In 1116   Time Out 1210     Subjective: \"I'm feeling really tired right now. But I'll do whatever you want me to. \"  Pain: RN provided pain meds  Education: activity tolerance building  Interdisciplinary Communication: Collaborated with PT to ensure progress towards POC and goals. Precautions: Falls and Spinal  Patient Vitals for the past 12 hrs:   Temp Pulse Resp BP SpO2   11/12/21 1125  68  129/84    11/12/21 1115  64  115/61    11/12/21 0747 97.4 °F (36.3 °C) 65 18 (!) 116/56 98 %         Mobility   Score Comments   Supine to Sit 4: Supervision or touching A S   Sit to Supine 4: Supervision or touching A    Sit to Stand 4: Supervision or touching A CGA   Transfer Assist 4: Supervision or touching A Transfer Type: SPT   Equipment: Rolling Walker   Comments: CGA     Activity Tolerance and Coordination Daily Assessment   Pt completed a 50 piece jigsaw puzzle to increase UE strength, visual perceptual skills, coordination, and problem solving. Pt completed puzzle  Independently with bilateral 2 lb wrist weights donned. Assessment: 1800 ml of urine drained from laboy. Pt with stable BP upon bed mobility and transfer to . Pt with increased fatigue this session but completed reaching, strengthening, and cognitive task of puzzle with extra time. Pt demonstrated good participation and engagement in OT session. Pt continues to benefit from skilled OT services to address remaining deficits and return back to baseline with improved independence and safety during ADLs and IADLs. Patient ended session seated EOB and call remote, phone, all needs within reach  Plan: Continue OT POC with focus on ADL/IADL skills, functional transfers, functional mobility, coordination, strength, static and dynamic balance, and activity tolerance to maximize safety and independence with ADLs and functional transfers.      Eduardo Perales, OTR/L  11/12/2021

## 2021-11-12 NOTE — PROGRESS NOTES
PHYSICAL THERAPY DAILY NOTE  Time In: 0929  Time Out: 1001  Patient Seen For: AM; Patient education; Therapeutic exercise; Transfer training; Other (see progress notes)    Subjective: patient reporting she just had IV Lasix. Reports her BP is a little lower today. Reports no dizziness yet. Objective:Vital Signs:Sitting /51 HR 71  Patient Vitals for the past 12 hrs:   Temp Pulse Resp BP SpO2   11/12/21 0747 97.4 °F (36.3 °C) 65 18 (!) 116/56 98 %     Pain level:1 to 4 out of 10  Pain location:low back Right side>Left   Pain interventions:Medication per order, rest, positioning,relaxation, body mechanics, L/S corset      Patient education:Bed mobility training,transfer training, gait training, balance training,fall precautions, body mechanics,activity pacing,spinal precautions, donning L/S corset, Patient verbalizing understanding and demonstrating  understanding of patient education. Recommend follow up education.     Interdisciplinary Communication:spoke with RN regarding functional level and pain medication schedule    Spinal, Other (comment) (Falls)  GROSS ASSESSMENT Daily Assessment   Independent removing corset             COGNITION Daily Assessment    No change       BED/MAT MOBILITY Daily Assessment   Increased time and effort to complete with cues for body mechanics   Rolling Right : 0 (Not tested)  Rolling Left : 5 (Supervision)  Supine to Sit : 0 (Not tested)  Sit to Supine : 4 (Minimal assistance) (with LLE)       TRANSFERS Daily Assessment   Increased time and effort to complete with cues for body mechanics   Transfer Type: SPT with walker  Transfer Assistance : 5 (Supervision/setup)  Sit to Stand Assistance: Supervision  Car Transfers: Not tested       GAIT Daily Assessment    Amount of Assistance: 0 (Not tested)  Distance (ft): 0 Feet (ft)       STEPS or STAIRS Daily Assessment    Steps/Stairs Ambulated (#): 0  Level of Assist : 0 (Not tested)       BALANCE Daily Assessment   Static standing with RW, lumbar corset and  supervision with no loss of balance Sitting - Static: Good (unsupported)  Sitting - Dynamic: Good (unsupported)  Standing - Static: Fair (with RW)  Standing - Dynamic : Impaired       WHEELCHAIR MOBILITY Daily Assessment    Curbs/Ramps Assist Required (FIM Score): 0 (Not tested)  Wheelchair Setup Assist Required : 0 (Not tested)       LOWER EXTREMITY EXERCISES Daily Assessment   Increased time and effort to complete with multiple and frequent rest breaks. Cues for correct form     Extremity: Both  Exercise Type #1: supine lower extremity strengthening: ankle pumps, hip abd with skate, heel slides, SAQ  Sets Performed: 3  Reps Performed: 10  Level of Assist: min assist with cues            Assessment: LE ROM and strength improving with HEP. Unable to progress gait this AM due to lower BP and patient just taking IV lasix. Patient returned to room at end of treatment. Patient supine in bed with head of bed elevated and bed rails up x 2. Needs placed in reach of patient. [place] alarm on    Plan of Care: Continue with POC and progress as tolerated.      Ruby Barber, PT  11/12/2021

## 2021-11-13 PROCEDURE — 74011000250 HC RX REV CODE- 250: Performed by: PHYSICIAN ASSISTANT

## 2021-11-13 PROCEDURE — 74011250637 HC RX REV CODE- 250/637: Performed by: PHYSICAL MEDICINE & REHABILITATION

## 2021-11-13 PROCEDURE — 99232 SBSQ HOSP IP/OBS MODERATE 35: CPT | Performed by: PHYSICAL MEDICINE & REHABILITATION

## 2021-11-13 PROCEDURE — 65310000000 HC RM PRIVATE REHAB

## 2021-11-13 PROCEDURE — 97530 THERAPEUTIC ACTIVITIES: CPT

## 2021-11-13 PROCEDURE — 74011250636 HC RX REV CODE- 250/636: Performed by: PHYSICAL MEDICINE & REHABILITATION

## 2021-11-13 PROCEDURE — 97116 GAIT TRAINING THERAPY: CPT

## 2021-11-13 RX ORDER — FUROSEMIDE 10 MG/ML
40 INJECTION INTRAMUSCULAR; INTRAVENOUS ONCE
Status: COMPLETED | OUTPATIENT
Start: 2021-11-13 | End: 2021-11-13

## 2021-11-13 RX ORDER — MIDODRINE HYDROCHLORIDE 5 MG/1
5 TABLET ORAL 2 TIMES DAILY
Status: DISCONTINUED | OUTPATIENT
Start: 2021-11-13 | End: 2021-11-17

## 2021-11-13 RX ADMIN — GABAPENTIN 300 MG: 300 CAPSULE ORAL at 08:39

## 2021-11-13 RX ADMIN — MIDODRINE HYDROCHLORIDE 5 MG: 5 TABLET ORAL at 16:09

## 2021-11-13 RX ADMIN — FUROSEMIDE 40 MG: 40 TABLET ORAL at 08:39

## 2021-11-13 RX ADMIN — GABAPENTIN 300 MG: 300 CAPSULE ORAL at 21:06

## 2021-11-13 RX ADMIN — LORAZEPAM 0.5 MG: 0.5 TABLET ORAL at 19:29

## 2021-11-13 RX ADMIN — HYDROCODONE BITARTRATE AND ACETAMINOPHEN 1 TABLET: 10; 325 TABLET ORAL at 08:46

## 2021-11-13 RX ADMIN — FAMOTIDINE 20 MG: 20 TABLET ORAL at 17:56

## 2021-11-13 RX ADMIN — HYDROCODONE BITARTRATE AND ACETAMINOPHEN 1 TABLET: 10; 325 TABLET ORAL at 21:06

## 2021-11-13 RX ADMIN — DOCUSATE SODIUM 100 MG: 100 CAPSULE, LIQUID FILLED ORAL at 17:56

## 2021-11-13 RX ADMIN — ASPIRIN 81 MG: 81 TABLET, COATED ORAL at 21:06

## 2021-11-13 RX ADMIN — METOPROLOL TARTRATE 25 MG: 25 TABLET, FILM COATED ORAL at 17:56

## 2021-11-13 RX ADMIN — LEVOTHYROXINE SODIUM 50 MCG: 0.05 TABLET ORAL at 06:09

## 2021-11-13 RX ADMIN — PROPAFENONE HYDROCHLORIDE 150 MG: 150 TABLET, FILM COATED ORAL at 08:38

## 2021-11-13 RX ADMIN — ZOLPIDEM TARTRATE 10 MG: 5 TABLET ORAL at 21:06

## 2021-11-13 RX ADMIN — GABAPENTIN 300 MG: 300 CAPSULE ORAL at 16:09

## 2021-11-13 RX ADMIN — AMITRIPTYLINE HYDROCHLORIDE 25 MG: 25 TABLET, FILM COATED ORAL at 21:06

## 2021-11-13 RX ADMIN — METOPROLOL TARTRATE 25 MG: 25 TABLET, FILM COATED ORAL at 08:39

## 2021-11-13 RX ADMIN — ESCITALOPRAM OXALATE 20 MG: 10 TABLET ORAL at 08:38

## 2021-11-13 RX ADMIN — MIDODRINE HYDROCHLORIDE 5 MG: 5 TABLET ORAL at 08:38

## 2021-11-13 RX ADMIN — DOCUSATE SODIUM 100 MG: 100 CAPSULE, LIQUID FILLED ORAL at 08:38

## 2021-11-13 RX ADMIN — HYDROCODONE BITARTRATE AND ACETAMINOPHEN 1 TABLET: 10; 325 TABLET ORAL at 12:48

## 2021-11-13 RX ADMIN — APIXABAN 5 MG: 5 TABLET, FILM COATED ORAL at 08:39

## 2021-11-13 RX ADMIN — FUROSEMIDE 40 MG: 10 INJECTION, SOLUTION INTRAMUSCULAR; INTRAVENOUS at 16:09

## 2021-11-13 RX ADMIN — Medication 1 AMPULE: at 08:37

## 2021-11-13 RX ADMIN — POTASSIUM CHLORIDE 20 MEQ: 20 TABLET, EXTENDED RELEASE ORAL at 08:38

## 2021-11-13 RX ADMIN — PROPAFENONE HYDROCHLORIDE 150 MG: 150 TABLET, FILM COATED ORAL at 17:56

## 2021-11-13 RX ADMIN — Medication 1 AMPULE: at 21:05

## 2021-11-13 RX ADMIN — APIXABAN 5 MG: 5 TABLET, FILM COATED ORAL at 17:56

## 2021-11-13 RX ADMIN — PANTOPRAZOLE SODIUM 40 MG: 40 TABLET, DELAYED RELEASE ORAL at 06:09

## 2021-11-13 NOTE — PROGRESS NOTES
Simon Simms MD  Medical Director  [address] [facility], [address] [facility]  Tel: [phone]       [facility] PROGRESS NOTE    Katy Zambrano  Admit Date: 11/3/2021  Admit Diagnosis:   Lumbar stenosis with neurogenic claudication [M48.062]; Spondylolisthesis of lumbar region [M43.16]; Spinal stenosis [M48.00]; Spinal stenosis, lumbar region with neurogenic claudication [M48.062]    Subjective     Patient seen and examined. Feels her edema is improving. UOP -4275. 2 stools. Had one episode of dizziness yesterday, lasted seconds . Patient seen and examined. Reports LE edema. Denies SOB, lightheadedness out of bed, pain or nausea. Participating in therapy.     Objective:     Current Facility-Administered Medications   Medication Dose Route Frequency    furosemide (LASIX) tablet 40 mg  40 mg Oral DAILY    potassium chloride (K-DUR, KLOR-CON) SR tablet 20 mEq  20 mEq Oral DAILY    midodrine (PROAMATINE) tablet 5 mg  5 mg Oral TID WITH MEALS    rOPINIRole (REQUIP) tablet 1 mg  1 mg Oral QHS PRN    gabapentin (NEURONTIN) capsule 300 mg  300 mg Oral TID    amitriptyline (ELAVIL) tablet 25 mg  25 mg Oral QHS    lidocaine 4 % patch 1 Patch  1 Patch TransDERmal Q24H    diphenhydrAMINE (BENADRYL) capsule 25 mg  25 mg Oral Q6H PRN    docusate sodium (COLACE) capsule 100 mg  100 mg Oral BID    HYDROcodone-acetaminophen (NORCO)  mg tablet 1 Tablet  1 Tablet Oral Q4H PRN    naloxone (NARCAN) injection 0.4 mg  0.4 mg IntraVENous PRN    ondansetron (ZOFRAN ODT) tablet 4 mg  4 mg Oral Q4H PRN    phenol throat spray (CHLORASEPTIC) 1 Spray  1 Spray Oral PRN    alcohol 62% (NOZIN) nasal  1 Ampule  1 Ampule Topical Q12H    acetaminophen (TYLENOL) tablet 650 mg  650 mg Oral Q6H PRN    alum-mag hydroxide-simeth (MYLANTA) oral suspension 30 mL  30 mL Oral Q4H PRN    apixaban (ELIQUIS) tablet 5 mg  5 mg Oral BID    aspirin delayed-release tablet 81 mg  81 mg Oral QHS    bisacodyL (DULCOLAX) suppository 10 mg  10 mg Rectal DAILY PRN    escitalopram oxalate (LEXAPRO) tablet 20 mg  20 mg Oral QAM    famotidine (PEPCID) tablet 20 mg  20 mg Oral QPM    levothyroxine (SYNTHROID) tablet 50 mcg  50 mcg Oral ACB    LORazepam (ATIVAN) tablet 0.5 mg  0.5 mg Oral Q6H PRN    magnesium hydroxide (MILK OF MAGNESIA) 400 mg/5 mL oral suspension 30 mL  30 mL Oral DAILY PRN    metoprolol tartrate (LOPRESSOR) tablet 25 mg  25 mg Oral BID    pantoprazole (PROTONIX) tablet 40 mg  40 mg Oral ACB    polyethylene glycol (MIRALAX) packet 17 g  17 g Oral DAILY    propafenone (RYTHMOL) tablet 150 mg  150 mg Oral BID    traMADoL (ULTRAM) tablet 50 mg  50 mg Oral Q6H PRN    zolpidem (AMBIEN) tablet 10 mg  10 mg Oral QHS PRN    influenza vaccine 2021-22 (6 mos+)(PF) (FLUARIX/FLULAVAL/FLUZONE QUAD) injection 0.5 mL  1 Each IntraMUSCular PRIOR TO DISCHARGE    nicotine (NICODERM CQ) 14 mg/24 hr patch 1 Patch  1 Patch TransDERmal Q24H     Review of Systems:   Denies chest pain, shortness of breath, cough, headache, visual problems, abdominal pain, dysuria, calf pain. Pertinent positives are as noted in the HPI, ROS unremarkable otherwise. Visit Vitals  /74 (BP 1 Location: Right upper arm)   Pulse 63   Temp 98.1 °F (36.7 °C)   Resp 18   Ht 5' 4\" (1.626 m)   Wt 142.9 kg (315 lb)   SpO2 100%   BMI 54.07 kg/m²      Physical Exam:   General: Alert and age appropriately oriented. No acute cardiorespiratory distress. HEENT: Normocephalic, no scleral icterus. Oral mucosa moist without cyanosis. Lungs: Clear to auscultation bilaterally. Respiration even and unlabored. Heart: Regular rate and rhythm, S1, S2. No murmurs, clicks, rub or gallops. Abdomen: Soft, non-tender, not distended. Bowel sounds normoactive. No organomegaly. morbidly obese   Genitourinary: Deferred. Neuromuscular:      Hip flexion improved 3/5,distally intact  Sensation intact.  No spasm Skin/extremity: No rashes, no erythema. No calf tenderness B LE.  2+ edema, less pitting, hands and arms are not swollen                                                                   Functional Assessment:          Balance  Sitting - Static: Good (unsupported) (11/12/21 1400)  Sitting - Dynamic: Good (unsupported) (11/12/21 1400)  Standing - Static: Good (with RW) (11/12/21 1400)  Standing - Dynamic : Impaired (11/12/21 1400)         Renu Masterson Fall Risk Assessment:  Renu Masterson Fall Risk  Mobility: Ambulates or transfers with assist devices or assistance (11/13/21 0763)  Mobility Interventions: Communicate number of staff needed for ambulation/transfer; Patient to call before getting OOB; Strengthening exercises (ROM-active/passive); Utilize walker, cane, or other assistive device (11/12/21 1933)  Mentation: Alert, oriented x 3 (11/12/21 1933)  Medication: Patient receiving anticonvulsants, sedatives(tranquilizers), psychotropics or hypnotics, hypoglycemics, narcotics, sleep aids, antihypertensives, laxatives, or diuretics (11/12/21 1933)  Medication Interventions: Evaluate medications/consider consulting pharmacy; Patient to call before getting OOB; Teach patient to arise slowly (11/12/21 1933)  Elimination: Needs assistance with toileting (11/12/21 1933)  Elimination Interventions: Call light in reach; Patient to call for help with toileting needs;  Toilet paper/wipes in reach (11/12/21 1933)  Prior Fall History: No (11/12/21 1933)  History of Falls Interventions: Consult care management for discharge planning; Door open when patient unattended; Evaluate medications/consider consulting pharmacy (11/12/21 1933)  Total Score: 3 (11/12/21 1933)  High Fall Risk: Yes (11/12/21 1933)     Ambulation:  Gait  Distance (ft): 50 Feet (ft) (50ft x 5 with 5 min rest breaks) (11/12/21 1400)  Assistive Device: Walker, rolling; Gait belt; Orthotic device ([employer]en LSO) (11/12/21 1400)     Labs/Studies:  Recent Results (from the past 72 hour(s))   NT-PRO BNP    Collection Time: 11/11/21  6:00 AM   Result Value Ref Range    NT pro-BNP 1,865 (H) 5 - 909 PG/ML   METABOLIC PANEL, BASIC    Collection Time: 11/12/21  5:30 AM   Result Value Ref Range    Sodium 137 136 - 145 mmol/L    Potassium 4.0 3.5 - 5.1 mmol/L    Chloride 100 98 - 107 mmol/L    CO2 37 (H) 21 - 32 mmol/L    Anion gap 0 (L) 7 - 16 mmol/L    Glucose 81 65 - 100 mg/dL    BUN 15 8 - 23 MG/DL    Creatinine 0.67 0.6 - 1.0 MG/DL    GFR est AA >60 >60 ml/min/1.73m2    GFR est non-AA >60 >60 ml/min/1.73m2    Calcium 7.6 (L) 8.3 - 10.4 MG/DL   CBC W/O DIFF    Collection Time: 11/12/21  5:30 AM   Result Value Ref Range    WBC 10.4 4.3 - 11.1 K/uL    RBC 3.03 (L) 4.05 - 5.2 M/uL    HGB 8.8 (L) 11.7 - 15.4 g/dL    HCT 29.3 (L) 35.8 - 46.3 %    MCV 96.7 79.6 - 97.8 FL    MCH 29.0 26.1 - 32.9 PG    MCHC 30.0 (L) 31.4 - 35.0 g/dL    RDW 16.0 (H) 11.9 - 14.6 %    PLATELET 350 960 - 707 K/uL    MPV 9.4 9.4 - 12.3 FL    ABSOLUTE NRBC 0.00 0.0 - 0.2 K/uL   NT-PRO BNP    Collection Time: 11/12/21  5:30 AM   Result Value Ref Range    NT pro-BNP 1,377 (H) 5 - 125 PG/ML       Assessment:     Problem List as of 11/13/2021 Date Reviewed: 11/5/2021          Codes Class Noted - Resolved    * (Principal) Spinal stenosis, lumbar region with neurogenic claudication ICD-10-CM: M48.062  ICD-9-CM: 724.03  11/3/2021 - Present        A-fib ([facility] ICD-10-CM: I48.91  ICD-9-CM: 427.31  10/30/2021 - Present        ÁNGEL (acute kidney injury) ([facility] ICD-10-CM: N17.9  ICD-9-CM: 584.9  10/30/2021 - Present        Acute respiratory failure with hypoxia ([facility] ICD-10-CM: J96.01  ICD-9-CM: 518.81  10/30/2021 - Present        Hypovolemic shock ([facility] ICD-10-CM: R57.1  ICD-9-CM: 785.59  10/26/2021 - Present        Hypotension ICD-10-CM: I95.9  ICD-9-CM: 458.9  10/26/2021 - Present        Spondylolisthesis of multiple sites in spine ICD-10-CM: M43.19  ICD-9-CM: 738.4  10/25/2021 - Present        Spinal stenosis ICD-10-CM: M48.00  ICD-9-CM: 724.00  10/25/2021 - Present        Obesity, morbid ([facility] ICD-10-CM: E66.01  ICD-9-CM: 278.01  12/22/2017 - Present        Acquired hypothyroidism ICD-10-CM: E03.9  ICD-9-CM: 244.9  9/13/2017 - Present        Postoperative wound infection ICD-10-CM: T81.49XA  ICD-9-CM: 998.59  5/19/2016 - Present        Lumbar stenosis with neurogenic claudication ICD-10-CM: M48.062  ICD-9-CM: 724.03  3/28/2016 - Present        Essential hypertension, benign ICD-10-CM: I10  ICD-9-CM: 401.1  7/24/2013 - Present        Endometriosis ICD-10-CM: N80.9  ICD-9-CM: 617.9  7/24/2013 - Present        Colon polyp ICD-10-CM: K63.5  ICD-9-CM: 211.3  7/24/2013 - Present        Squamous cell skin cancer ICD-10-CM: C44.92  ICD-9-CM: 173.92  7/24/2013 - Present            L2 L4 lumbar direct lateral interbody fusion with anterior plating and redo of L2 L5 laminectomy (10/26/2021 // Dr Tenisha Morel) due to spinal stenosis with neurogenic claudication     Plan / Recommendations / Medical Decision Making:      Daily physician / PA medical management:     Lumbar stenosis with neurogenic claudication, spondylolisthesis of lumbar region, lumbar spinal stenosis region with neurogenic claudication - PT / OT; spinal precautions, girdle when ambulating.     Atrial fibrillation - s/p 2 failed ablations, managed with Rhythmol, metoprolol and Eliquis. Currently NSR; Eliquis restarted 10/31 in light of RUL pulmonary embolism. -11/4 irreg, rate controlled, continue meds  -11/5 noticeably RRR to prolonged auscultation  -11/6 IIR today but rate-controlled  -11/12 nsr. Rate 60s. Cont Rhythmol, metoprolol and Eliquis  - 11/13 HR 63 - 68 range yesterday/today     Hypotension / hypertension - BP fluctuating, managed medically. Has been hypotensive requiring levophed and now midodrine (pt's home lisinopril 20mg BID and HCTZ 12.5mg BID are on hold); needs TEDs when OOB.  -11/4 hypotensive episodes improved; decrease midodrine to 5mg TID.  Continue metoprolol for rate control  -11/5 sBP fluctuating from 102-163, dBP all <85, mostly in 50-60s; monitor as add back PRN furosemide for edema  -11/6 /73  -11/8 119-148/63-73 ; starr midodrine to 5mg bid and wean off; MUST wear TEDs when oob and elevate LEs whenever possible. Bilateral pitting edema; will give 40mg bid of Lasix today and reassess daily. Check renal fxn  -11/9 creat 0.56, bun 16; 11/10 creat 0.67  -11/11 /57 this am, max 143/76; cont midodrine 5mg bid for now. One episode of dizziness yesterday but bp normal by the time they could check BP  - 11/13 /74     Pain control - ongoing significant pain in back which is stable and controlled by PRN meds. Will require regular pain assessment and comprehensive pain management. Continue PRN Norco, tramadol, APAP and gabapentin. -11/4 increase Neurontin to 300mg TID and Elavil at night 25mg due to sciatic pain; add back Requip 1mg QHS  -11/5 start steroid taper for R LE pain  -11/6 R LE radiating pain resolved, finish steroid taper  -11/8 MUCH improved with steroid dose lyudmila  -11/11 pain controlled     Hypokalemia - monitor; currently 4.3 on supplement due to HCTZ; may be able to hold supplement. -11/4 labs pending; d/c daily labs  -11/5 K+ 4.2  -11/6 recheck BMP 11/8 since continuing daily furosemide; pending 11/8; k 4.2  -11/10 K 4.1, cont supplement while on lasix  -11/13 K 4.0 on 11/12     Leukocytosis, mild - wbc 11.9 at [facility] admission (11/3). No s/sx of infxn. -11/5 WBC 11.9, afebrile, no s/sx infection; will likely increase with steroids  -11/6 recheck CBC 11/8; 16.2 from 11.9 ; likely steroid induced; will check UA; Neg; this is last day of steroids  -11/13 wbc 13 > 10.4 on 11/12, afebrile      Anemia, blood loss - Hgb 9.0, improving; had 2000ml blood loss and received blood transfusion x 3, as well as 2800ml of crystalloid.   -11/6 recheck CBC 11/8; 9.4 from 8.8 11/5  -11/10 hgb 9.7, improving  11/13 Hgb - 9.7 > 8.8 on 11/12     Hypothyroidism - continue Synthroid.     Pneumonia prophylaxis - incentive spirometer every hour while awake.     DVT risk / DVT prophylaxis - daily physician / PA exam to assess as patient is at increased risk for of thromboembolism. Mobilize as tolerated. Sequential pneumatic compression devices (SCDs) when in bed; thigh-high or knee-high thromboembolic deterrent hose when out of bed. On Eliquis.     GI prophylaxis - resume PPI. At times may need additional antacids, Maalox prn.     Depression - continue on Lexapro. At risk of depression exacerbation due to pain and loss of independent function.     General skin care / wound prevention - monitor lumbar incision and general skin wound status daily per staff and physician / PA. At risk for failure. Will require 24/7 rehab nursing. -11/4 lateral left hip wound; looks like a blister that had opened up vs sheer injury. Cleanse daily and cover. Back incision healing  --11/11 incision c/d/i     Bladder program / urinary retention / neurogenic bladder - schedule voids q 6-8 hrs. Check post-void residual as needed; in-and-out catheter if post-void residual is more than 400ml.  -voiding continently without residuals     Bowel program - at risk for constipation as a side effect of opioids, other medications, impaired mobility, etc. MiraLAX daily for regularity, Azul-Colace for stool softener. PRN MOM, bisacodyl suppository or tablets for constipation.  -continent, regular     Edema; 11/9 ; >1800ml out; cont lasix 40mg bid for today . Pt requesting laboy at [facility]  -11/10 [address], wt is up. Will keep laboy and give 40mg IV lasix today. No hx of CHF but has afib . bnp 2757. Alb 2.7; pt had a 2d echo 10/29/21; EF>70%, severe pulm htn noted with hx of RUL pulm emb. No evidence tho of right heart strain. VSS; daily wts  11/11 5lbs down from yesterday. UOP -1450 , BNP 1865,,improved; 40meq IV Lasix x 1 today  -11/12 will weigh today. One more dose of IV Lasix today 40mg. Good diuresis.  Lungs clearl no hx CHF. Off steroids now. BNP pending. UOP -4275  - 11/13 begun on home dose po lasix 40 mg qd, Last weight - 315lbs. Today is 316lbs. Will give iv lasix x 1 after therapy.      Time spent was 25 minutes with over 1/2 in direct patient care/examination, consultation and coordination of care.      Signed By: Ally Moran MD     November 13, 2021

## 2021-11-13 NOTE — PROGRESS NOTES
PHYSICAL THERAPY DAILY NOTE  Time In: 1036  Time Out: 1290  Patient Seen For: AM; Balance activities; Gait training; Patient education; Transfer training; Other (see progress notes)    Subjective: patient reporting she feels better except for swelling in her legs. Reports her feet are still \"puffy\" and her thighs are rubbing together. Reports her clothes are still too tight. Reports she can feel the excess fluid in her thighs         Objective:Vital Signs:02 sat 97 to 98% and HR 92 after ambulating 150ft on room air. Patient Vitals for the past 12 hrs:   Temp Pulse Resp BP SpO2   11/13/21 0713 98.1 °F (36.7 °C) 63 18 126/74 100 %     Pain level:1 to 3 out of 10  Pain location:low back Right side>Left   Pain interventions:Medication per order, rest, positioning,relaxation, body mechanics, L/S corset      Patient education:Bed mobility training,transfer training, gait training, balance training,fall precautions, body mechanics,activity pacing,spinal precautions, donning L/S corset, Patient verbalizing understanding and demonstrating  understanding of patient education. Recommend follow up education.     Interdisciplinary Communication:spoke with RN regarding  pain medication schedule    Spinal, Other (comment) (Falls)  GROSS ASSESSMENT Daily Assessment   Independent DON/Doff  LSO  NA       COGNITION Daily Assessment    No change       BED/MAT MOBILITY Daily Assessment   Increased time and effort to complete with cues for body mechanics   Rolling Right : 6 (Modified independent)  Rolling Left : 6 (Modified independent)  Supine to Sit : 6 (Modified independent)  Sit to Supine : 0 (Not tested)       TRANSFERS Daily Assessment   Increased time and effort to complete with cues for body mechanics   Transfer Type: SPT with walker  Transfer Assistance : 5 (Supervision/setup)  Sit to Stand Assistance: Supervision  Car Transfers: Not tested       GAIT Daily Assessment    Amount of Assistance: 5 (Stand-by assistance)  Distance (ft): 150 Feet (ft) (150ft x 1, 100ft x 2, 75 ft x 2 with 5 min rest breaks)  Assistive Device: Walker, rolling; Gait belt; Orthotic device ([employer] LSO)   Gait training with one time cue for posture correction and cues for improved step length and step clearance    STEPS or STAIRS Daily Assessment    Steps/Stairs Ambulated (#): 0  Level of Assist : 0 (Not tested)       BALANCE Daily Assessment   Static standing with RW, lumbar corset and CGA to SBA with no loss of balance Sitting - Static: Good (unsupported)  Sitting - Dynamic: Good (unsupported)  Standing - Static: Good (with RW)  Standing - Dynamic : Impaired       WHEELCHAIR MOBILITY Daily Assessment    Curbs/Ramps Assist Required (FIM Score): 0 (Not tested)  Wheelchair Setup Assist Required : 0 (Not tested)       LOWER EXTREMITY EXERCISES Daily Assessment      NA            Assessment: Gait distance improved due to patient not having IV lasix prior to PT treatment. Progressing to modified independence with transfers and gait with RW. Ongoing problem with LE ROM due to edema       Patient returned to room at end of treatment and remained up in recliner with LEs elevated and with needs in reach. Plan of Care: Continue with POC and progress as tolerated.      Malissa Sparks, PT  11/13/2021

## 2021-11-14 PROCEDURE — 74011000250 HC RX REV CODE- 250: Performed by: PHYSICIAN ASSISTANT

## 2021-11-14 PROCEDURE — 74011250637 HC RX REV CODE- 250/637: Performed by: PHYSICAL MEDICINE & REHABILITATION

## 2021-11-14 PROCEDURE — 99232 SBSQ HOSP IP/OBS MODERATE 35: CPT | Performed by: PHYSICAL MEDICINE & REHABILITATION

## 2021-11-14 PROCEDURE — 65310000000 HC RM PRIVATE REHAB

## 2021-11-14 RX ADMIN — HYDROCODONE BITARTRATE AND ACETAMINOPHEN 1 TABLET: 10; 325 TABLET ORAL at 05:54

## 2021-11-14 RX ADMIN — ESCITALOPRAM OXALATE 20 MG: 10 TABLET ORAL at 08:32

## 2021-11-14 RX ADMIN — HYDROCODONE BITARTRATE AND ACETAMINOPHEN 1 TABLET: 10; 325 TABLET ORAL at 18:51

## 2021-11-14 RX ADMIN — MIDODRINE HYDROCHLORIDE 5 MG: 5 TABLET ORAL at 17:11

## 2021-11-14 RX ADMIN — APIXABAN 5 MG: 5 TABLET, FILM COATED ORAL at 08:32

## 2021-11-14 RX ADMIN — GABAPENTIN 300 MG: 300 CAPSULE ORAL at 17:11

## 2021-11-14 RX ADMIN — ASPIRIN 81 MG: 81 TABLET, COATED ORAL at 20:48

## 2021-11-14 RX ADMIN — PROPAFENONE HYDROCHLORIDE 150 MG: 150 TABLET, FILM COATED ORAL at 17:11

## 2021-11-14 RX ADMIN — PANTOPRAZOLE SODIUM 40 MG: 40 TABLET, DELAYED RELEASE ORAL at 05:55

## 2021-11-14 RX ADMIN — METOPROLOL TARTRATE 25 MG: 25 TABLET, FILM COATED ORAL at 08:32

## 2021-11-14 RX ADMIN — FUROSEMIDE 40 MG: 40 TABLET ORAL at 09:33

## 2021-11-14 RX ADMIN — ZOLPIDEM TARTRATE 10 MG: 5 TABLET ORAL at 20:48

## 2021-11-14 RX ADMIN — GABAPENTIN 300 MG: 300 CAPSULE ORAL at 20:48

## 2021-11-14 RX ADMIN — PROPAFENONE HYDROCHLORIDE 150 MG: 150 TABLET, FILM COATED ORAL at 08:31

## 2021-11-14 RX ADMIN — METOPROLOL TARTRATE 25 MG: 25 TABLET, FILM COATED ORAL at 17:11

## 2021-11-14 RX ADMIN — DOCUSATE SODIUM 100 MG: 100 CAPSULE, LIQUID FILLED ORAL at 08:32

## 2021-11-14 RX ADMIN — HYDROCODONE BITARTRATE AND ACETAMINOPHEN 1 TABLET: 10; 325 TABLET ORAL at 14:04

## 2021-11-14 RX ADMIN — FAMOTIDINE 20 MG: 20 TABLET ORAL at 17:11

## 2021-11-14 RX ADMIN — POTASSIUM CHLORIDE 20 MEQ: 20 TABLET, EXTENDED RELEASE ORAL at 08:32

## 2021-11-14 RX ADMIN — AMITRIPTYLINE HYDROCHLORIDE 25 MG: 25 TABLET, FILM COATED ORAL at 20:47

## 2021-11-14 RX ADMIN — Medication 1 AMPULE: at 20:47

## 2021-11-14 RX ADMIN — GABAPENTIN 300 MG: 300 CAPSULE ORAL at 08:31

## 2021-11-14 RX ADMIN — DOCUSATE SODIUM 100 MG: 100 CAPSULE, LIQUID FILLED ORAL at 17:11

## 2021-11-14 RX ADMIN — LEVOTHYROXINE SODIUM 50 MCG: 0.05 TABLET ORAL at 05:55

## 2021-11-14 RX ADMIN — MIDODRINE HYDROCHLORIDE 5 MG: 5 TABLET ORAL at 05:55

## 2021-11-14 RX ADMIN — Medication 1 AMPULE: at 08:28

## 2021-11-14 RX ADMIN — APIXABAN 5 MG: 5 TABLET, FILM COATED ORAL at 17:11

## 2021-11-14 NOTE — PROGRESS NOTES
Betty Garrido MD  Medical Director  [address] [facility], [address] [facility]  Tel: [phone]       [facility] PROGRESS NOTE    Rayna Loss  Admit Date: 11/3/2021  Admit Diagnosis:   Lumbar stenosis with neurogenic claudication [M48.062]; Spondylolisthesis of lumbar region [M43.16]; Spinal stenosis [M48.00]; Spinal stenosis, lumbar region with neurogenic claudication [M48.062]    Subjective     Patient seen and examined. Feels her edema is improving. UOP -4275. 2 stools. Had one episode of dizziness yesterday, lasted seconds . Patient seen and examined. Reports brief arrhythmia yesterday, resolved spontaneously. Denies SOB at rest, lightheadedness out of bed or nausea. On gabapentin 1 month PTA for R LE neuropathy. Participating in therapy.     Objective:     Current Facility-Administered Medications   Medication Dose Route Frequency    midodrine (PROAMATINE) tablet 5 mg  5 mg Oral BID    furosemide (LASIX) tablet 40 mg  40 mg Oral DAILY    potassium chloride (K-DUR, KLOR-CON) SR tablet 20 mEq  20 mEq Oral DAILY    rOPINIRole (REQUIP) tablet 1 mg  1 mg Oral QHS PRN    gabapentin (NEURONTIN) capsule 300 mg  300 mg Oral TID    amitriptyline (ELAVIL) tablet 25 mg  25 mg Oral QHS    lidocaine 4 % patch 1 Patch  1 Patch TransDERmal Q24H    diphenhydrAMINE (BENADRYL) capsule 25 mg  25 mg Oral Q6H PRN    docusate sodium (COLACE) capsule 100 mg  100 mg Oral BID    HYDROcodone-acetaminophen (NORCO)  mg tablet 1 Tablet  1 Tablet Oral Q4H PRN    naloxone (NARCAN) injection 0.4 mg  0.4 mg IntraVENous PRN    ondansetron (ZOFRAN ODT) tablet 4 mg  4 mg Oral Q4H PRN    phenol throat spray (CHLORASEPTIC) 1 Spray  1 Spray Oral PRN    alcohol 62% (NOZIN) nasal  1 Ampule  1 Ampule Topical Q12H    acetaminophen (TYLENOL) tablet 650 mg  650 mg Oral Q6H PRN    alum-mag hydroxide-simeth (MYLANTA) oral suspension 30 mL  30 mL Oral Q4H PRN    apixaban (ELIQUIS) tablet 5 mg  5 mg Oral BID    aspirin delayed-release tablet 81 mg  81 mg Oral QHS    bisacodyL (DULCOLAX) suppository 10 mg  10 mg Rectal DAILY PRN    escitalopram oxalate (LEXAPRO) tablet 20 mg  20 mg Oral QAM    famotidine (PEPCID) tablet 20 mg  20 mg Oral QPM    levothyroxine (SYNTHROID) tablet 50 mcg  50 mcg Oral ACB    LORazepam (ATIVAN) tablet 0.5 mg  0.5 mg Oral Q6H PRN    magnesium hydroxide (MILK OF MAGNESIA) 400 mg/5 mL oral suspension 30 mL  30 mL Oral DAILY PRN    metoprolol tartrate (LOPRESSOR) tablet 25 mg  25 mg Oral BID    pantoprazole (PROTONIX) tablet 40 mg  40 mg Oral ACB    polyethylene glycol (MIRALAX) packet 17 g  17 g Oral DAILY    propafenone (RYTHMOL) tablet 150 mg  150 mg Oral BID    traMADoL (ULTRAM) tablet 50 mg  50 mg Oral Q6H PRN    zolpidem (AMBIEN) tablet 10 mg  10 mg Oral QHS PRN    influenza vaccine 2021-22 (6 mos+)(PF) (FLUARIX/FLULAVAL/FLUZONE QUAD) injection 0.5 mL  1 Each IntraMUSCular PRIOR TO DISCHARGE    nicotine (NICODERM CQ) 14 mg/24 hr patch 1 Patch  1 Patch TransDERmal Q24H     Review of Systems:   Denies chest pain, shortness of breath, cough, headache, visual problems, abdominal pain, dysuria, calf pain. Pertinent positives are as noted in the HPI, ROS unremarkable otherwise. Visit Vitals  BP (!) 106/53 (BP 1 Location: Left upper arm)   Pulse 65   Temp 97.6 °F (36.4 °C)   Resp 18   Ht 5' 4\" (1.626 m)   Wt 141.5 kg (312 lb)   SpO2 99%   BMI 53.55 kg/m²      Physical Exam:   General: Alert and age appropriately oriented. No acute cardiorespiratory distress. HEENT: Normocephalic, no scleral icterus. Oral mucosa moist without cyanosis. Lungs: Clear to auscultation bilaterally. Respiration even and unlabored. Heart: Regular rate and rhythm, S1, S2. No murmurs, clicks, rub or gallops. Abdomen: Soft, non-tender, not distended. Bowel sounds normoactive. No organomegaly. morbidly obese   Genitourinary:  Eugene - clear Neuromuscular:      Hip flexion improved 3/5,distally intact  Sensation intact. No spasm   Skin/extremity: No rashes, no erythema. No calf tenderness B LE.  2+ edema, less pitting, hands and arms are not swollen                                                                   Functional Assessment:          Balance  Sitting - Static: Good (unsupported) (11/13/21 1100)  Sitting - Dynamic: Good (unsupported) (11/13/21 1100)  Standing - Static: Good (with RW) (11/13/21 1100)  Standing - Dynamic : Impaired (11/13/21 1100)         Supa Samburg Fall Risk Assessment:  Supa Greenberg Fall Risk  Mobility: Ambulates or transfers with assist devices or assistance (11/14/21 0715)  Mobility Interventions: Communicate number of staff needed for ambulation/transfer; Patient to call before getting OOB; Utilize walker, cane, or other assistive device (11/14/21 0715)  Mentation: Alert, oriented x 3 (11/14/21 0715)  Medication: Patient receiving anticonvulsants, sedatives(tranquilizers), psychotropics or hypnotics, hypoglycemics, narcotics, sleep aids, antihypertensives, laxatives, or diuretics (11/14/21 0715)  Medication Interventions: Assess postural VS orthostatic hypotension; Patient to call before getting OOB (11/14/21 0715)  Elimination: Needs assistance with toileting (11/14/21 0715)  Elimination Interventions: Call light in reach;  Patient to call for help with toileting needs (11/14/21 0715)  Prior Fall History: No (11/14/21 0715)  History of Falls Interventions: Consult care management for discharge planning (11/14/21 0715)  Total Score: 3 (11/14/21 0715)  Standard Fall Precautions: Yes (11/13/21 2244)  High Fall Risk: Yes (11/14/21 0715)     Ambulation:  Gait  Distance (ft): 150 Feet (ft) (150ft x 1, 100ft x 2, 75 ft x 2 with 5 min rest breaks) (11/13/21 1100)  Assistive Device: Walker, rolling; Gait belt; Orthotic device ([employer]en LSO) (11/13/21 1100)     Labs/Studies:  Recent Results (from the past 72 hour(s))   METABOLIC PANEL, BASIC Collection Time: 11/12/21  5:30 AM   Result Value Ref Range    Sodium 137 136 - 145 mmol/L    Potassium 4.0 3.5 - 5.1 mmol/L    Chloride 100 98 - 107 mmol/L    CO2 37 (H) 21 - 32 mmol/L    Anion gap 0 (L) 7 - 16 mmol/L    Glucose 81 65 - 100 mg/dL    BUN 15 8 - 23 MG/DL    Creatinine 0.67 0.6 - 1.0 MG/DL    GFR est AA >60 >60 ml/min/1.73m2    GFR est non-AA >60 >60 ml/min/1.73m2    Calcium 7.6 (L) 8.3 - 10.4 MG/DL   CBC W/O DIFF    Collection Time: 11/12/21  5:30 AM   Result Value Ref Range    WBC 10.4 4.3 - 11.1 K/uL    RBC 3.03 (L) 4.05 - 5.2 M/uL    HGB 8.8 (L) 11.7 - 15.4 g/dL    HCT 29.3 (L) 35.8 - 46.3 %    MCV 96.7 79.6 - 97.8 FL    MCH 29.0 26.1 - 32.9 PG    MCHC 30.0 (L) 31.4 - 35.0 g/dL    RDW 16.0 (H) 11.9 - 14.6 %    PLATELET 906 863 - 421 K/uL    MPV 9.4 9.4 - 12.3 FL    ABSOLUTE NRBC 0.00 0.0 - 0.2 K/uL   NT-PRO BNP    Collection Time: 11/12/21  5:30 AM   Result Value Ref Range    NT pro-BNP 1,377 (H) 5 - 125 PG/ML       Assessment:     Problem List as of 11/14/2021 Date Reviewed: 11/5/2021          Codes Class Noted - Resolved    * (Principal) Spinal stenosis, lumbar region with neurogenic claudication ICD-10-CM: M48.062  ICD-9-CM: 724.03  11/3/2021 - Present        A-fib ([facility] ICD-10-CM: I48.91  ICD-9-CM: 427.31  10/30/2021 - Present        ÁNGEL (acute kidney injury) ([facility] ICD-10-CM: N17.9  ICD-9-CM: 584.9  10/30/2021 - Present        Acute respiratory failure with hypoxia ([facility] ICD-10-CM: J96.01  ICD-9-CM: 518.81  10/30/2021 - Present        Hypovolemic shock ([facility] ICD-10-CM: R57.1  ICD-9-CM: 785.59  10/26/2021 - Present        Hypotension ICD-10-CM: I95.9  ICD-9-CM: 458.9  10/26/2021 - Present        Spondylolisthesis of multiple sites in spine ICD-10-CM: M43.19  ICD-9-CM: 738.4  10/25/2021 - Present        Spinal stenosis ICD-10-CM: M48.00  ICD-9-CM: 724.00  10/25/2021 - Present        Obesity, morbid ([facility] ICD-10-CM: E66.01  ICD-9-CM: 278.01  12/22/2017 - Present        Acquired hypothyroidism ICD-10-CM: E03.9  ICD-9-CM: 244.9  9/13/2017 - Present        Postoperative wound infection ICD-10-CM: T81.49XA  ICD-9-CM: 998.59  5/19/2016 - Present        Lumbar stenosis with neurogenic claudication ICD-10-CM: M48.062  ICD-9-CM: 724.03  3/28/2016 - Present        Essential hypertension, benign ICD-10-CM: I10  ICD-9-CM: 401.1  7/24/2013 - Present        Endometriosis ICD-10-CM: N80.9  ICD-9-CM: 617.9  7/24/2013 - Present        Colon polyp ICD-10-CM: K63.5  ICD-9-CM: 211.3  7/24/2013 - Present        Squamous cell skin cancer ICD-10-CM: C44.92  ICD-9-CM: 173.92  7/24/2013 - Present            L2 L4 lumbar direct lateral interbody fusion with anterior plating and redo of L2 L5 laminectomy (10/26/2021 // Dr Maximilian Chirinos) due to spinal stenosis with neurogenic claudication     Plan / Recommendations / Medical Decision Making:      Daily physician / PA medical management:     Lumbar stenosis with neurogenic claudication, spondylolisthesis of lumbar region, lumbar spinal stenosis region with neurogenic claudication - PT / OT; spinal precautions, girdle when ambulating.     Atrial fibrillation - s/p 2 failed ablations, managed with Rhythmol, metoprolol and Eliquis. Currently NSR; Eliquis restarted 10/31 in light of RUL pulmonary embolism. -11/4 irreg, rate controlled, continue meds  -11/5 noticeably RRR to prolonged auscultation  -11/6 IIR today but rate-controlled  -11/12 nsr. Rate 60s. Cont Rhythmol, metoprolol and Eliquis  - 11/14 HR 63 - 72 range last 24 hours     Hypotension / hypertension - BP fluctuating, managed medically. Has been hypotensive requiring levophed and now midodrine (pt's home lisinopril 20mg BID and HCTZ 12.5mg BID are on hold); needs TEDs when OOB.  -11/4 hypotensive episodes improved; decrease midodrine to 5mg TID.  Continue metoprolol for rate control  -11/5 sBP fluctuating from 102-163, dBP all <85, mostly in 50-60s; monitor as add back PRN furosemide for edema  -11/6 /73  -11/8 119-148/63-73 ; starr midodrine to 5mg bid and wean off; MUST wear TEDs when oob and elevate LEs whenever possible. Bilateral pitting edema; will give 40mg bid of Lasix today and reassess daily. Check renal fxn  -11/9 creat 0.56, bun 16; 11/10 creat 0.67  -11/11 /57 this am, max 143/76; cont midodrine 5mg bid for now. One episode of dizziness yesterday but bp normal by the time they could check BP  - 11/14 /53     Pain control - ongoing significant pain in back which is stable and controlled by PRN meds. Will require regular pain assessment and comprehensive pain management. Continue PRN Norco, tramadol, APAP and gabapentin. -11/4 increase Neurontin to 300mg TID and Elavil at night 25mg due to sciatic pain; add back Requip 1mg QHS  -11/5 start steroid taper for R LE pain  -11/6 R LE radiating pain resolved, finish steroid taper  -11/8 MUCH improved with steroid dose lyudmila  -11/11 pain controlled     Hypokalemia - monitor; currently 4.3 on supplement due to HCTZ; may be able to hold supplement. -11/4 labs pending; d/c daily labs  -11/5 K+ 4.2  -11/6 recheck BMP 11/8 since continuing daily furosemide; pending 11/8; k 4.2  -11/10 K 4.1, cont supplement while on lasix  -11/13 K 4.0 on 11/12     Leukocytosis, mild - wbc 11.9 at [facility] admission (11/3). No s/sx of infxn. -11/5 WBC 11.9, afebrile, no s/sx infection; will likely increase with steroids  -11/6 recheck CBC 11/8; 16.2 from 11.9 ; likely steroid induced; will check UA; Neg; this is last day of steroids  -11/13 wbc 13 > 10.4 on 11/12, afebrile      Anemia, blood loss - Hgb 9.0, improving; had 2000ml blood loss and received blood transfusion x 3, as well as 2800ml of crystalloid.   -11/6 recheck CBC 11/8; 9.4 from 8.8 11/5  -11/10 hgb 9.7, improving  11/13 Hgb - 9.7 > 8.8 on 11/12     Hypothyroidism - continue Synthroid.     Pneumonia prophylaxis - incentive spirometer every hour while awake.     DVT risk / DVT prophylaxis - daily physician / PA exam to assess as patient is at increased risk for of thromboembolism. Mobilize as tolerated. Sequential pneumatic compression devices (SCDs) when in bed; thigh-high or knee-high thromboembolic deterrent hose when out of bed. On Eliquis.     GI prophylaxis - resume PPI. At times may need additional antacids, Maalox prn.     Depression - continue on Lexapro. At risk of depression exacerbation due to pain and loss of independent function.     General skin care / wound prevention - monitor lumbar incision and general skin wound status daily per staff and physician / PA. At risk for failure. Will require 24/7 rehab nursing. -11/4 lateral left hip wound; looks like a blister that had opened up vs sheer injury. Cleanse daily and cover. Back incision healing  --11/11 incision c/d/i     Bladder program / urinary retention / neurogenic bladder - schedule voids q 6-8 hrs. Check post-void residual as needed; in-and-out catheter if post-void residual is more than 400ml.  -voiding continently without residuals     Bowel program - at risk for constipation as a side effect of opioids, other medications, impaired mobility, etc. MiraLAX daily for regularity, Azul-Colace for stool softener. PRN MOM, bisacodyl suppository or tablets for constipation.  -continent, regular     Edema; 11/9 ; >1800ml out; cont lasix 40mg bid for today . Pt requesting laboy at [facility]  -11/10 [address], wt is up. Will keep laboy and give 40mg IV lasix today. No hx of CHF but has afib . bnp 2757. Alb 2.7; pt had a 2d echo 10/29/21; EF>70%, severe pulm htn noted with hx of RUL pulm emb. No evidence tho of right heart strain. VSS; daily wts  11/11 5lbs down from yesterday. UOP -1450 , BNP 1865,,improved; 40meq IV Lasix x 1 today  -11/12 will weigh today. One more dose of IV Lasix today 40mg. Good diuresis. Lungs clearl no hx CHF. Off steroids now. BNP pending.  UOP -6328  - 11/14 continue on home dose po lasix 40 mg qd, Last weights- 315lbs > 316lbs with iv lasix x 1 given yesterday, today 312lbs. Continue with 1800ml fluid restriction. Continue daily weights. BMP ordered fpr thalia.      Time spent was 25 minutes with over 1/2 in direct patient care/examination, consultation and coordination of care.      Signed By: Yessica Rees MD     November 14, 2021

## 2021-11-14 NOTE — PROGRESS NOTES
Problem: Falls - Risk of  Goal: *Absence of Falls  Description: Document Mackenzie Dao Fall Risk and appropriate interventions in the flowsheet. Outcome: Progressing Towards Goal  Note: Fall Risk Interventions:  Mobility Interventions: Communicate number of staff needed for ambulation/transfer, OT consult for ADLs, Patient to call before getting OOB, PT Consult for mobility concerns, PT Consult for assist device competence, Strengthening exercises (ROM-active/passive), Utilize walker, cane, or other assistive device         Medication Interventions: Assess postural VS orthostatic hypotension, Evaluate medications/consider consulting pharmacy, Patient to call before getting OOB, Utilize gait belt for transfers/ambulation, Teach patient to arise slowly    Elimination Interventions: Call light in reach, Elevated toilet seat, Patient to call for help with toileting needs, Stay With Me (per policy)    History of Falls Interventions: Consult care management for discharge planning, Door open when patient unattended         Problem: Pressure Injury - Risk of  Goal: *Prevention of pressure injury  Description: Document Trung Scale and appropriate interventions in the flowsheet. Outcome: Progressing Towards Goal  Note: Pressure Injury Interventions:  Sensory Interventions: Assess changes in LOC, Assess need for specialty bed, Avoid rigorous massage over bony prominences, Chair cushion, Check visual cues for pain, Discuss PT/OT consult with provider, Float heels, Keep linens dry and wrinkle-free, Maintain/enhance activity level, Minimize linen layers, Monitor skin under medical devices, Pad between skin to skin, Pressure redistribution bed/mattress (bed type), Turn and reposition approx.  every two hours (pillows and wedges if needed)    Moisture Interventions: Absorbent underpads, Apply protective barrier, creams and emollients, Check for incontinence Q2 hours and as needed    Activity Interventions: Assess need for specialty bed, Chair cushion, Increase time out of bed, Pressure redistribution bed/mattress(bed type), PT/OT evaluation    Mobility Interventions: Assess need for specialty bed, Chair cushion, Float heels, HOB 30 degrees or less, Pressure redistribution bed/mattress (bed type), PT/OT evaluation, Turn and reposition approx.  every two hours(pillow and wedges)    Nutrition Interventions: Document food/fluid/supplement intake, Discuss nutritional consult with provider, Offer support with meals,snacks and hydration    Friction and Shear Interventions: Apply protective barrier, creams and emollients, Feet elevated on foot rest, Foam dressings/transparent film/skin sealants, HOB 30 degrees or less, Lift sheet, Lift team/patient mobility team

## 2021-11-15 LAB
ANION GAP SERPL CALC-SCNC: 0 MMOL/L (ref 7–16)
BUN SERPL-MCNC: 19 MG/DL (ref 8–23)
CALCIUM SERPL-MCNC: 8.4 MG/DL (ref 8.3–10.4)
CHLORIDE SERPL-SCNC: 101 MMOL/L (ref 98–107)
CO2 SERPL-SCNC: 37 MMOL/L (ref 21–32)
CREAT SERPL-MCNC: 0.72 MG/DL (ref 0.6–1)
GLUCOSE SERPL-MCNC: 95 MG/DL (ref 65–100)
POTASSIUM SERPL-SCNC: 4 MMOL/L (ref 3.5–5.1)
SODIUM SERPL-SCNC: 138 MMOL/L (ref 136–145)

## 2021-11-15 PROCEDURE — 97110 THERAPEUTIC EXERCISES: CPT

## 2021-11-15 PROCEDURE — 97530 THERAPEUTIC ACTIVITIES: CPT

## 2021-11-15 PROCEDURE — 80048 BASIC METABOLIC PNL TOTAL CA: CPT

## 2021-11-15 PROCEDURE — 97116 GAIT TRAINING THERAPY: CPT

## 2021-11-15 PROCEDURE — 74011250637 HC RX REV CODE- 250/637: Performed by: PHYSICAL MEDICINE & REHABILITATION

## 2021-11-15 PROCEDURE — 74011000250 HC RX REV CODE- 250: Performed by: PHYSICIAN ASSISTANT

## 2021-11-15 PROCEDURE — 65310000000 HC RM PRIVATE REHAB

## 2021-11-15 PROCEDURE — 97535 SELF CARE MNGMENT TRAINING: CPT

## 2021-11-15 PROCEDURE — 99232 SBSQ HOSP IP/OBS MODERATE 35: CPT | Performed by: PHYSICAL MEDICINE & REHABILITATION

## 2021-11-15 RX ADMIN — HYDROCODONE BITARTRATE AND ACETAMINOPHEN 1 TABLET: 10; 325 TABLET ORAL at 15:17

## 2021-11-15 RX ADMIN — LEVOTHYROXINE SODIUM 50 MCG: 0.05 TABLET ORAL at 05:47

## 2021-11-15 RX ADMIN — Medication 1 AMPULE: at 21:20

## 2021-11-15 RX ADMIN — MIDODRINE HYDROCHLORIDE 5 MG: 5 TABLET ORAL at 05:47

## 2021-11-15 RX ADMIN — HYDROCODONE BITARTRATE AND ACETAMINOPHEN 1 TABLET: 10; 325 TABLET ORAL at 05:46

## 2021-11-15 RX ADMIN — MIDODRINE HYDROCHLORIDE 5 MG: 5 TABLET ORAL at 15:17

## 2021-11-15 RX ADMIN — ESCITALOPRAM OXALATE 20 MG: 10 TABLET ORAL at 08:28

## 2021-11-15 RX ADMIN — APIXABAN 5 MG: 5 TABLET, FILM COATED ORAL at 17:18

## 2021-11-15 RX ADMIN — APIXABAN 5 MG: 5 TABLET, FILM COATED ORAL at 08:28

## 2021-11-15 RX ADMIN — PROPAFENONE HYDROCHLORIDE 150 MG: 150 TABLET, FILM COATED ORAL at 08:27

## 2021-11-15 RX ADMIN — FAMOTIDINE 20 MG: 20 TABLET ORAL at 17:18

## 2021-11-15 RX ADMIN — FUROSEMIDE 40 MG: 40 TABLET ORAL at 08:28

## 2021-11-15 RX ADMIN — GABAPENTIN 300 MG: 300 CAPSULE ORAL at 15:17

## 2021-11-15 RX ADMIN — POTASSIUM CHLORIDE 20 MEQ: 20 TABLET, EXTENDED RELEASE ORAL at 08:28

## 2021-11-15 RX ADMIN — METOPROLOL TARTRATE 25 MG: 25 TABLET, FILM COATED ORAL at 17:18

## 2021-11-15 RX ADMIN — GABAPENTIN 300 MG: 300 CAPSULE ORAL at 08:28

## 2021-11-15 RX ADMIN — GABAPENTIN 300 MG: 300 CAPSULE ORAL at 21:18

## 2021-11-15 RX ADMIN — ZOLPIDEM TARTRATE 10 MG: 5 TABLET ORAL at 21:28

## 2021-11-15 RX ADMIN — PROPAFENONE HYDROCHLORIDE 150 MG: 150 TABLET, FILM COATED ORAL at 17:18

## 2021-11-15 RX ADMIN — HYDROCODONE BITARTRATE AND ACETAMINOPHEN 1 TABLET: 10; 325 TABLET ORAL at 10:47

## 2021-11-15 RX ADMIN — PANTOPRAZOLE SODIUM 40 MG: 40 TABLET, DELAYED RELEASE ORAL at 05:47

## 2021-11-15 RX ADMIN — METOPROLOL TARTRATE 25 MG: 25 TABLET, FILM COATED ORAL at 08:28

## 2021-11-15 RX ADMIN — AMITRIPTYLINE HYDROCHLORIDE 25 MG: 25 TABLET, FILM COATED ORAL at 21:19

## 2021-11-15 RX ADMIN — ASPIRIN 81 MG: 81 TABLET, COATED ORAL at 21:19

## 2021-11-15 RX ADMIN — HYDROCODONE BITARTRATE AND ACETAMINOPHEN 1 TABLET: 10; 325 TABLET ORAL at 21:27

## 2021-11-15 RX ADMIN — Medication 1 AMPULE: at 08:27

## 2021-11-15 NOTE — PROGRESS NOTES
PHYSICAL THERAPY DAILY NOTE  Time In: 1512  Time Out: 2414  Patient Seen For: PM; Balance activities; Gait training; Patient education; Transfer training; Other (see progress notes)    Subjective: patient reporting the pain on the left side of her low back is yuen this PM. Requesting pain medication. Reports resting in the bed and taking pain medication helps decrease the pain. Reports no increase in pain during ambulation. Reports increased pain during sit to supine when lifting up LLE>         Objective:Vital Signs:  Patient Vitals for the past 12 hrs:   Temp Pulse Resp BP SpO2   11/15/21 1337    (!) 154/69    11/15/21 0739 98 °F (36.7 °C) 70 16 (!) 116/52 98 %     Pain level:3 to 6 out of 10  Pain location:low back left > right side  Pain interventions:Medication per order, rest, positioning,relaxation, body mechanics, L/S corset      Patient education:Bed mobility training,transfer training, gait training, balance training,fall precautions, body mechanics,activity pacing,spinal precautions, donning L/S corset, Patient verbalizing understanding and demonstrating  understanding of patient education. Recommend follow up education.     Interdisciplinary Communication:spoke with RN regarding  pain medication schedule    Spinal, Other (comment) (Falls)  GROSS ASSESSMENT Daily Assessment   Independent DON/Doff  LSO  NA       COGNITION Daily Assessment    No change       BED/MAT MOBILITY Daily Assessment   Increased time and effort to complete with cues for body mechanics   Rolling Right : 6 (Modified independent)  Rolling Left : 6 (Modified independent)  Supine to Sit : 6 (Modified independent)  Sit to Supine : 4 (Minimal assistance) (with LLE)       TRANSFERS Daily Assessment   Increased time and effort to complete with cues for body mechanics   Transfer Type: SPT with walker  Transfer Assistance : 5 (Supervision/setup)  Sit to Stand Assistance: Supervision  Car Transfers: Not tested       GAIT Daily Assessment Amount of Assistance: 5 (Stand-by assistance)  Distance (ft): 150 Feet (ft) (150ft x 2, 100ft x 1, 70 ft x 1)  Assistive Device: Walker, rolling; Gait belt; Orthotic device ([employer]en LSO)   Gait training with one time cue for posture correction and cues for improved step length and step clearance    STEPS or STAIRS Daily Assessment    Steps/Stairs Ambulated (#): 0  Level of Assist : 0 (Not tested)       BALANCE Daily Assessment   Static standing with RW, lumbar corset and Supervision SBA with no loss of balance    No loss of balance during gait training    Static standing without UE support on RW while performing upper and lower body dressing activities. No loss of balance Sitting - Static: Good (unsupported)  Sitting - Dynamic: Good (unsupported)  Standing - Static: Good (with RW)  Standing - Dynamic : Impaired       WHEELCHAIR MOBILITY Daily Assessment    Curbs/Ramps Assist Required (FIM Score): 0 (Not tested)  Wheelchair Setup Assist Required : 0 (Not tested)       LOWER EXTREMITY EXERCISES Daily Assessment      NA            Assessment: Overall gait distance improving daily. Left sided low back pain increased with prolonged sitting and forward flexion       Patient returned to room at end of treatment. Patient supine in bed with head of bed elevated and bed rails up x 2. Needs placed in reach of patient. Plan of Care: Continue with POC and progress as tolerated.      Riccardo Nice, PT  11/15/2021

## 2021-11-15 NOTE — PROGRESS NOTES
Nabila Bone MD  Medical Director  [address] [facility], [address] [facility]  Tel: [phone]       [facility] PROGRESS NOTE    Erendira Ashraf  Admit Date: 11/3/2021  Admit Diagnosis:   Lumbar stenosis with neurogenic claudication [M48.062]; Spondylolisthesis of lumbar region [M43.16]; Spinal stenosis [M48.00]; Spinal stenosis, lumbar region with neurogenic claudication [M48.062]    Subjective     Patient seen and examined. Feels like her swelling is MUCH improved. No sob. Anxious to go home. UOP about -2000.     Objective:     Current Facility-Administered Medications   Medication Dose Route Frequency    midodrine (PROAMATINE) tablet 5 mg  5 mg Oral BID    furosemide (LASIX) tablet 40 mg  40 mg Oral DAILY    potassium chloride (K-DUR, KLOR-CON) SR tablet 20 mEq  20 mEq Oral DAILY    rOPINIRole (REQUIP) tablet 1 mg  1 mg Oral QHS PRN    gabapentin (NEURONTIN) capsule 300 mg  300 mg Oral TID    amitriptyline (ELAVIL) tablet 25 mg  25 mg Oral QHS    lidocaine 4 % patch 1 Patch  1 Patch TransDERmal Q24H    diphenhydrAMINE (BENADRYL) capsule 25 mg  25 mg Oral Q6H PRN    docusate sodium (COLACE) capsule 100 mg  100 mg Oral BID    HYDROcodone-acetaminophen (NORCO)  mg tablet 1 Tablet  1 Tablet Oral Q4H PRN    naloxone (NARCAN) injection 0.4 mg  0.4 mg IntraVENous PRN    ondansetron (ZOFRAN ODT) tablet 4 mg  4 mg Oral Q4H PRN    phenol throat spray (CHLORASEPTIC) 1 Spray  1 Spray Oral PRN    alcohol 62% (NOZIN) nasal  1 Ampule  1 Ampule Topical Q12H    acetaminophen (TYLENOL) tablet 650 mg  650 mg Oral Q6H PRN    alum-mag hydroxide-simeth (MYLANTA) oral suspension 30 mL  30 mL Oral Q4H PRN    apixaban (ELIQUIS) tablet 5 mg  5 mg Oral BID    aspirin delayed-release tablet 81 mg  81 mg Oral QHS    bisacodyL (DULCOLAX) suppository 10 mg  10 mg Rectal DAILY PRN    escitalopram oxalate (LEXAPRO) tablet 20 mg  20 mg Oral QAM  famotidine (PEPCID) tablet 20 mg  20 mg Oral QPM    levothyroxine (SYNTHROID) tablet 50 mcg  50 mcg Oral ACB    LORazepam (ATIVAN) tablet 0.5 mg  0.5 mg Oral Q6H PRN    magnesium hydroxide (MILK OF MAGNESIA) 400 mg/5 mL oral suspension 30 mL  30 mL Oral DAILY PRN    metoprolol tartrate (LOPRESSOR) tablet 25 mg  25 mg Oral BID    pantoprazole (PROTONIX) tablet 40 mg  40 mg Oral ACB    polyethylene glycol (MIRALAX) packet 17 g  17 g Oral DAILY    propafenone (RYTHMOL) tablet 150 mg  150 mg Oral BID    traMADoL (ULTRAM) tablet 50 mg  50 mg Oral Q6H PRN    zolpidem (AMBIEN) tablet 10 mg  10 mg Oral QHS PRN    influenza vaccine 2021-22 (6 mos+)(PF) (FLUARIX/FLULAVAL/FLUZONE QUAD) injection 0.5 mL  1 Each IntraMUSCular PRIOR TO DISCHARGE    nicotine (NICODERM CQ) 14 mg/24 hr patch 1 Patch  1 Patch TransDERmal Q24H       Review of Systems:   Denies chest pain, shortness of breath, cough, headache, visual problems, abdominal pain, dysuria, calf pain. Pertinent positives are as noted in the HPI, ROS unremarkable otherwise. Visit Vitals  BP (!) 116/52 (BP 1 Location: Left upper arm, BP Patient Position: At rest)   Pulse 70   Temp 98 °F (36.7 °C)   Resp 16   Ht 5' 4\" (1.626 m)   Wt 312 lb (141.5 kg)   SpO2 98%   BMI 53.55 kg/m²        Physical Exam:   General: Alert and age appropriately oriented. No acute cardiorespiratory distress. HEENT: Normocephalic, no scleral icterus. Oral mucosa moist without cyanosis. Lungs: Clear to auscultation bilaterally. Respiration even and unlabored. Heart: Regular rate and rhythm, S1, S2. No murmurs, clicks, rub or gallops. Abdomen: Soft, non-tender, not distended. Bowel sounds normoactive. No organomegaly. obese   Genitourinary: Deferred. Neuromuscular:      Hip flexion improved 3/5,distally intact  Sensation intact. No spasm   Skin/extremity: No rashes, no erythema. No calf tenderness B LE. Wound covered.  2 edema R>L Functional Assessment:          Balance  Sitting - Static: Good (unsupported) (11/13/21 1100)  Sitting - Dynamic: Good (unsupported) (11/13/21 1100)  Standing - Static: Good (with RW) (11/13/21 1100)  Standing - Dynamic : Impaired (11/13/21 1100)                     Danne Lobe Fall Risk Assessment:  Danne Lobe Fall Risk  Mobility: Ambulates or transfers with assist devices or assistance (11/15/21 0912)  Mobility Interventions: Communicate number of staff needed for ambulation/transfer (11/15/21 0912)  Mentation: Alert, oriented x 3 (11/15/21 0912)  Medication: Patient receiving anticonvulsants, sedatives(tranquilizers), psychotropics or hypnotics, hypoglycemics, narcotics, sleep aids, antihypertensives, laxatives, or diuretics (11/15/21 0912)  Medication Interventions: Assess postural VS orthostatic hypotension (11/15/21 0912)  Elimination: Needs assistance with toileting (11/15/21 0912)  Elimination Interventions: Elevated toilet seat (11/15/21 0912)  Prior Fall History: No (11/15/21 0912)  History of Falls Interventions: Consult care management for discharge planning (11/14/21 0715)  Total Score: 3 (11/15/21 0912)  Standard Fall Precautions: Yes (11/15/21 0912)  High Fall Risk: Yes (11/15/21 0912)     Speech Assessment:         Ambulation:  Gait  Distance (ft): 150 Feet (ft) (150ft x 1, 100ft x 2, 75 ft x 2 with 5 min rest breaks) (11/13/21 1100)  Assistive Device: Walker, rolling; Gait belt; Orthotic device (Aspen LSO) (11/13/21 1100)     Labs/Studies:  Recent Results (from the past 72 hour(s))   METABOLIC PANEL, BASIC    Collection Time: 11/15/21  5:55 AM   Result Value Ref Range    Sodium 138 136 - 145 mmol/L    Potassium 4.0 3.5 - 5.1 mmol/L    Chloride 101 98 - 107 mmol/L    CO2 37 (H) 21 - 32 mmol/L    Anion gap 0 (L) 7 - 16 mmol/L    Glucose 95 65 - 100 mg/dL    BUN 19 8 - 23 MG/DL    Creatinine 0.72 0.6 - 1.0 MG/DL    GFR est AA >60 >60 ml/min/1.73m2    GFR est non-AA >60 >60 ml/min/1.73m2    Calcium 8.4 8.3 - 10.4 MG/DL       Assessment:     Problem List as of 11/15/2021 Date Reviewed: 11/5/2021          Codes Class Noted - Resolved    * (Principal) Spinal stenosis, lumbar region with neurogenic claudication ICD-10-CM: M48.062  ICD-9-CM: 724.03  11/3/2021 - Present        A-fib ([facility] ICD-10-CM: I48.91  ICD-9-CM: 427.31  10/30/2021 - Present        ÁNGEL (acute kidney injury) ([facility] ICD-10-CM: N17.9  ICD-9-CM: 584.9  10/30/2021 - Present        Acute respiratory failure with hypoxia ([facility] ICD-10-CM: J96.01  ICD-9-CM: 518.81  10/30/2021 - Present        Hypovolemic shock ([facility] ICD-10-CM: R57.1  ICD-9-CM: 785.59  10/26/2021 - Present        Hypotension ICD-10-CM: I95.9  ICD-9-CM: 458.9  10/26/2021 - Present        Spondylolisthesis of multiple sites in spine ICD-10-CM: M43.19  ICD-9-CM: 738.4  10/25/2021 - Present        Spinal stenosis ICD-10-CM: M48.00  ICD-9-CM: 724.00  10/25/2021 - Present        Obesity, morbid ([facility] ICD-10-CM: E66.01  ICD-9-CM: 278.01  12/22/2017 - Present        Acquired hypothyroidism ICD-10-CM: E03.9  ICD-9-CM: 244.9  9/13/2017 - Present        Postoperative wound infection ICD-10-CM: T81.49XA  ICD-9-CM: 998.59  5/19/2016 - Present        Lumbar stenosis with neurogenic claudication ICD-10-CM: M48.062  ICD-9-CM: 724.03  3/28/2016 - Present        Essential hypertension, benign ICD-10-CM: I10  ICD-9-CM: 401.1  7/24/2013 - Present        Endometriosis ICD-10-CM: N80.9  ICD-9-CM: 617.9  7/24/2013 - Present        Colon polyp ICD-10-CM: K63.5  ICD-9-CM: 211.3  7/24/2013 - Present        Squamous cell skin cancer ICD-10-CM: C44.92  ICD-9-CM: 173.92  7/24/2013 - Present            L2 L4 lumbar direct lateral interbody fusion with anterior plating and redo of L2 L5 laminectomy (10/26/2021 // Dr Cristiano Arzola) due to spinal stenosis with neurogenic claudication     Plan / Recommendations / Medical Decision Making:      Daily physician / PA medical management:     Lumbar stenosis with neurogenic claudication, spondylolisthesis of lumbar region, lumbar spinal stenosis region with neurogenic claudication - PT / OT; spinal precautions, girdle when ambulating.     Atrial fibrillation - s/p 2 failed ablations, managed with Rhythmol, metoprolol and Eliquis. Currently NSR; Eliquis restarted 10/31 in light of RUL pulmonary embolism. -11/4 irreg, rate controlled, continue meds  -11/5 noticeably RRR to prolonged auscultation  -11/6 IIR today but rate-controlled  -11/12 nsr. Rate 60s. Cont Rhythmol, metoprolol and Eliquis  - 11/14 HR 63 - 72 range last 24 hours  -11/15 HR 65-71 denies palpitations ;cont rhythmol     Hypotension / hypertension - BP fluctuating, managed medically. Has been hypotensive requiring levophed and now midodrine (pt's home lisinopril 20mg BID and HCTZ 12.5mg BID are on hold); needs TEDs when OOB.  -11/4 hypotensive episodes improved; decrease midodrine to 5mg TID. Continue metoprolol for rate control  -11/5 sBP fluctuating from 102-163, dBP all <85, mostly in 50-60s; monitor as add back PRN furosemide for edema  -11/6 /73  -11/8 119-148/63-73 ; starr midodrine to 5mg bid and wean off; MUST wear TEDs when oob and elevate LEs whenever possible. Bilateral pitting edema; will give 40mg bid of Lasix today and reassess daily. Check renal fxn  -11/9 creat 0.56, bun 16; 11/10 creat 0.67  -11/11 /57 this am, max 143/76; cont midodrine 5mg bid for now. One episode of dizziness yesterday but bp normal by the time they could check BP  - 11/14 /53; 11/15 116/52 controlled     Pain control - ongoing significant pain in back which is stable and controlled by PRN meds. Will require regular pain assessment and comprehensive pain management. Continue PRN Norco, tramadol, APAP and gabapentin.   -11/4 increase Neurontin to 300mg TID and Elavil at night 25mg due to sciatic pain; add back Requip 1mg QHS  -11/5 start steroid taper for R LE pain  -11/6 R LE radiating pain resolved, finish steroid taper  -11/8 MUCH improved with steroid dose lyudmila  -11/11 pain controlled     Hypokalemia - monitor; currently 4.3 on supplement due to HCTZ; may be able to hold supplement. -11/4 labs pending; d/c daily labs  -11/5 K+ 4.2  -11/6 recheck BMP 11/8 since continuing daily furosemide; pending 11/8; k 4.2  -11/10 K 4.1, cont supplement while on lasix  -11/13 K 4.0 on 11/12; 11/15 4.0     Leukocytosis, mild - wbc 11.9 at [facility] admission (11/3). No s/sx of infxn. -11/5 WBC 11.9, afebrile, no s/sx infection; will likely increase with steroids  -11/6 recheck CBC 11/8; 16.2 from 11.9 ; likely steroid induced; will check UA; Neg; this is last day of steroids  -11/13 wbc 13 > 10.4 on 11/12, afebrile      Anemia, blood loss - Hgb 9.0, improving; had 2000ml blood loss and received blood transfusion x 3, as well as 2800ml of crystalloid. -11/6 recheck CBC 11/8; 9.4 from 8.8 11/5  -11/10 hgb 9.7, improving  11/13 Hgb - 9.7 > 8.8 on 11/12     Hypothyroidism - continue Synthroid.     Pneumonia prophylaxis - incentive spirometer every hour while awake.     DVT risk / DVT prophylaxis - daily physician / PA exam to assess as patient is at increased risk for of thromboembolism. Mobilize as tolerated. Sequential pneumatic compression devices (SCDs) when in bed; thigh-high or knee-high thromboembolic deterrent hose when out of bed. On Eliquis.     GI prophylaxis - resume PPI. At times may need additional antacids, Maalox prn.     Depression - continue on Lexapro. At risk of depression exacerbation due to pain and loss of independent function.     General skin care / wound prevention - monitor lumbar incision and general skin wound status daily per staff and physician / PA. At risk for failure. Will require 24/7 rehab nursing. -11/4 lateral left hip wound; looks like a blister that had opened up vs sheer injury. Cleanse daily and cover.  Back incision healing  --11/15 incision c/d/i     Bladder program / urinary retention / neurogenic bladder - schedule voids q 6-8 hrs. Check post-void residual as needed; in-and-out catheter if post-void residual is more than 400ml.  -voiding continently without residuals  -11/15 remove laboy in day or two. IV lasix was causing very frequent urination     Bowel program - at risk for constipation as a side effect of opioids, other medications, impaired mobility, etc. MiraLAX daily for regularity, Azul-Colace for stool softener. PRN MOM, bisacodyl suppository or tablets for constipation.  -continent, regular     Edema; 11/9 ; >1800ml out; cont lasix 40mg bid for today . Pt requesting laboy at [facility]  -11/10 [address], wt is up. Will keep laboy and give 40mg IV lasix today. No hx of CHF but has afib . bnp 2757. Alb 2.7; pt had a 2d echo 10/29/21; EF>70%, severe pulm htn noted with hx of RUL pulm emb. No evidence tho of right heart strain. VSS; daily wts  11/11 5lbs down from yesterday. UOP -1450 , BNP 1865,,improved; 40meq IV Lasix x 1 today  -11/12 will weigh today. One more dose of IV Lasix today 40mg. Good diuresis. Lungs clearl no hx CHF. Off steroids now. BNP pending. UOP -1718  - 11/14 continue on home dose po lasix 40 mg qd, Last weights- 315lbs > 316lbs with iv lasix x 1 given yesterday, today 312lbs. Continue with 1800ml fluid restriction. Continue daily weights. BMP ordered fpr thalia. -11/15 need f/u wt. Significant improvement in edema. Cont oral lasix 40mg daily. K 4, creat 0.72          Time spent was 25 minutes with over 1/2 in direct patient care/examination, consultation and coordination of care.      Signed By: Heather Groves MD     November 15, 2021

## 2021-11-15 NOTE — PROGRESS NOTES
Problem: Falls - Risk of  Goal: *Absence of Falls  Description: Document Starleen Freeze Fall Risk and appropriate interventions in the flowsheet. Outcome: Progressing Towards Goal  Note: Fall Risk Interventions:  Mobility Interventions: Communicate number of staff needed for ambulation/transfer, OT consult for ADLs, Patient to call before getting OOB, PT Consult for mobility concerns, PT Consult for assist device competence, Strengthening exercises (ROM-active/passive), Utilize walker, cane, or other assistive device         Medication Interventions: Assess postural VS orthostatic hypotension, Evaluate medications/consider consulting pharmacy, Patient to call before getting OOB, Teach patient to arise slowly    Elimination Interventions: Elevated toilet seat, Stay With Me (per policy), Call light in reach, Patient to call for help with toileting needs    History of Falls Interventions: Consult care management for discharge planning         Problem: Pressure Injury - Risk of  Goal: *Prevention of pressure injury  Description: Document Trung Scale and appropriate interventions in the flowsheet. Outcome: Progressing Towards Goal  Note: Pressure Injury Interventions:  Sensory Interventions: Assess changes in LOC, Assess need for specialty bed, Avoid rigorous massage over bony prominences, Chair cushion, Check visual cues for pain, Discuss PT/OT consult with provider, Float heels, Keep linens dry and wrinkle-free, Maintain/enhance activity level, Minimize linen layers, Monitor skin under medical devices, Pressure redistribution bed/mattress (bed type), Turn and reposition approx.  every two hours (pillows and wedges if needed)    Moisture Interventions: Absorbent underpads, Apply protective barrier, creams and emollients    Activity Interventions: Assess need for specialty bed, Chair cushion, Increase time out of bed, Pressure redistribution bed/mattress(bed type), PT/OT evaluation    Mobility Interventions: Assess need for specialty bed, Chair cushion, Float heels, HOB 30 degrees or less, Pressure redistribution bed/mattress (bed type), PT/OT evaluation, Turn and reposition approx.  every two hours(pillow and wedges)    Nutrition Interventions: Document food/fluid/supplement intake, Discuss nutritional consult with provider, Offer support with meals,snacks and hydration    Friction and Shear Interventions: Apply protective barrier, creams and emollients, Feet elevated on foot rest, Foam dressings/transparent film/skin sealants, HOB 30 degrees or less, Lift sheet

## 2021-11-15 NOTE — PROGRESS NOTES
Time In 0830   Time Out 0919     Mobility   Score Comments                  Transfer Assist 4: Supervision or touching A Transfer Type: SPT   Equipment: Rolling Walker and Grab Bars   Comments: CGA for safety     Activities of Daily Living    Score Comments   Eating 6: Independent    Oral Hygiene 6: Independent Seated in WC at sink    Bathing 4: Supervision or touching A Type of Shower: Shower  Position: Standing PRN and Unsupported Sitting   Adaptive  Equipment: Tub Transfer Bench, Grab Bars, and Long Handled Sponge  Comments: verbal cueing for spinal precautions when drying feet; educated patient on wrapping towel around long handled sponge with demonstrated understanding    Upper Body  Dressing 6: Independent Items Applied: Pullover  Position: Unsupported Sitting  Comments: patient completed setup prior to OT entry to room   Lower Body Dressing 4: Supervision or touching A Items Applied: Elastic pants  Adaptive Equipment: Reacher  Comments: assist managing catheter into pants    Donning/Coleridge Footwear 1: Dependent Items Applied: Socks  Adaptive Equipment:  NA due to time constraints  Comments: donned  socks only due to edema BLE and time constraints    Toilet Transfer 4: Supervision or touching A Transfer Type: SPT   Equipment: Rolling Walker and Grab Bars   Comments: CGA for safety    Toileting Hygiene 3: Partial/Moderate A Output: stool  Comments: assist with farideh area for thoroughness   Education  Spinal precautions, technique of wrapping towel around long handled sponge to dry BLE; transfer techniques      Pt was seated up in [facility] upon arrival to room and agreeable to OT. Completed ADL; see above for details. Patient tolerated session well with no complaint of pain and was left seated up in recliner in room with call light/all needs in reach. Continue with OT POC. Interdisciplinary communication: Collaborated with PT and confirmed patient is on track to meet goals.      Gayle Escoto MS, OTR/L  11/15/2021        Note may contain the following abbreviations:   Abbreviation Explanation   AROM Active range of motion   PROM Passive range of motion   SPT Stand pivot transfer   LPT Lateral pivot transfer   WC wheelchair   RW Rolling walker    BUE Bilateral upper extremities   BLE Bilateral lower extremities    WBAT Weight bearing as tolerated    TTWB Toe-touch weight bearing    AD Adaptive device   AE Adaptive equipment    NMES Neuro muscular electrical stimulation   UBE Upper body ergometer

## 2021-11-15 NOTE — PROGRESS NOTES
PHYSICAL THERAPY DAILY NOTE  Time In: 1030  Time Out: 1116  Patient Seen For: AM; Gait training; Patient education; Transfer training; Other (see progress notes)    Subjective: patient reporting the swelling in her legs is a little better. Reports she still cannot get her shoes on. Reports increase in SOB with gait today. Reports difficulty breathing with mask on. Reports an increase in back pain due to trying to bend over to  catheter while sitting in recliner         Objective:Vital Signs:02 sat 97 to 98% and HR 99 after ambulating 100ft on room air. Patient Vitals for the past 12 hrs:   Temp Pulse Resp BP SpO2   11/15/21 0739 98 °F (36.7 °C) 70 16 (!) 116/52 98 %     Pain level:1 to 5 out of 10  Pain location:low back Right side>Left   Pain interventions:Medication per order, rest, positioning,relaxation, body mechanics, L/S corset      Patient education:Bed mobility training,transfer training, gait training, balance training,fall precautions, body mechanics,activity pacing,spinal precautions, donning L/S corset, Patient verbalizing understanding and demonstrating  understanding of patient education. Recommend follow up education.     Interdisciplinary Communication:spoke with RN regarding  pain medication schedule    Spinal, Other (comment) (Falls)  GROSS ASSESSMENT Daily Assessment   Independent DON/Doff  LSO  NA       COGNITION Daily Assessment    No change       BED/MAT MOBILITY Daily Assessment   Increased time and effort to complete with cues for body mechanics   Rolling Right : 0 (Not tested)  Rolling Left : 6 (Modified independent)  Supine to Sit : 0 (Not tested)  Sit to Supine : 4 (Minimal assistance) (with LLE)       TRANSFERS Daily Assessment   Increased time and effort to complete with cues for body mechanics   Transfer Type: SPT with walker  Transfer Assistance : 5 (Supervision/setup)  Sit to Stand Assistance: Supervision  Car Transfers: Not tested       GAIT Daily Assessment    Amount of Assistance: 5 (Stand-by assistance)  Distance (ft): 100 Feet (ft) (100ft x 4   50 ft x 1 with 3 min ret break between)  Assistive Device: Walker, rolling; Gait belt; Orthotic device ([employer] LSO)   Gait training with one time cue for posture correction and cues for improved step length and step clearance    STEPS or STAIRS Daily Assessment    Steps/Stairs Ambulated (#): 0  Level of Assist : 0 (Not tested)       BALANCE Daily Assessment   Static standing with RW, lumbar corset and CGA to SBA with no loss of balance Sitting - Static: Good (unsupported)  Sitting - Dynamic: Good (unsupported)  Standing - Static: Good (with RW)  Standing - Dynamic : Impaired       WHEELCHAIR MOBILITY Daily Assessment    Curbs/Ramps Assist Required (FIM Score): 0 (Not tested)  Wheelchair Setup Assist Required : 0 (Not tested)       LOWER EXTREMITY EXERCISES Daily Assessment      NA            Assessment: Decrease in gait distance this AM due to increase c/o SOB during gait training. 02 sat stable on room air. Cont to require occasional cues tor recall spinal precautions       Patient returned to room at end of treatment. Patient supine in bed with head of bed elevated and bed rails up x 2. Needs placed in reach of patient. Plan of Care: Continue with POC and progress as tolerated.      Alejandro Anand, PT  11/15/2021

## 2021-11-15 NOTE — PROGRESS NOTES
Chart reviewed. No new needs noted. CM to continue to follow and monitor for any further needs.     Update 1352: Contacted Dr. Shanda Fried office to cancelled appointment for this week as pt requested, no answer after multiple attempts, v/m left. Will reschedule at d/c.

## 2021-11-15 NOTE — PROGRESS NOTES
PHYSICAL THERAPY DAILY NOTE  Time In: 0883  Time Out: 5644  Patient Seen For: PM; Gait training; Patient education; Therapeutic exercise; Transfer training; Other (see progress notes)    Subjective: patient reporting her pain is a little better since taking pain medication and rest in the bed. Reports the left side of her low back is hurting more today         Objective:Vital Signs:02 sat 97 to 98% and HR 99 after ambulating 100ft on room air. Patient Vitals for the past 12 hrs:   Temp Pulse Resp BP SpO2   11/15/21 1337    (!) 154/69    11/15/21 0739 98 °F (36.7 °C) 70 16 (!) 116/52 98 %     Pain level:1 to 5 out of 10  Pain location:low back Right side>Left   Pain interventions:Medication per order, rest, positioning,relaxation, body mechanics, L/S corset      Patient education:Bed mobility training,transfer training, gait training, balance training,fall precautions, body mechanics,activity pacing,spinal precautions, donning L/S corset, Patient verbalizing understanding and demonstrating  understanding of patient education. Recommend follow up education.     Interdisciplinary Communication:spoke with RN regarding  pain medication schedule    Spinal, Other (comment) (Falls)  GROSS ASSESSMENT Daily Assessment   Independent DON/Doff  LSO  NA       COGNITION Daily Assessment    No change       BED/MAT MOBILITY Daily Assessment   Increased time and effort to complete with cues for body mechanics, using bed rail   Rolling Right : 6 (Modified independent)  Rolling Left : 6 (Modified independent)  Supine to Sit : 6 (Modified independent)  Sit to Supine : 0 (Not tested)       TRANSFERS Daily Assessment   Increased time and effort to complete with cues for body mechanics   Transfer Type: SPT with walker  Transfer Assistance : 5 (Supervision/setup)  Sit to Stand Assistance: Supervision  Car Transfers: Not tested       GAIT Daily Assessment    Amount of Assistance: 5 (Stand-by assistance)  Distance (ft): 150 Feet (ft) (150ft x 1  75 ft x 1)  Assistive Device: Walker, rolling; Gait belt; Orthotic device (Aspen LSO)   Gait training with one time cue for posture correction and cues for improved step length and step clearance    STEPS or STAIRS Daily Assessment    Steps/Stairs Ambulated (#): 0  Level of Assist : 0 (Not tested)       BALANCE Daily Assessment   Static standing with RW, lumbar corset and supervision with no loss of balance    No loss of balance during gait training Sitting - Static: Good (unsupported)  Sitting - Dynamic: Good (unsupported)  Standing - Static: Good (with RW)  Standing - Dynamic : Impaired       WHEELCHAIR MOBILITY Daily Assessment    Curbs/Ramps Assist Required (FIM Score): 0 (Not tested)  Wheelchair Setup Assist Required : 0 (Not tested)       LOWER EXTREMITY EXERCISES Daily Assessment      NA            Assessment: Improved gait distance with no increase in c/o SOB. Body mechanics with bed mobility improving. Patient returned to room at end of treatment and remained up in recliner with LEs elevated and with needs in reach. Plan of Care: Continue with POC and progress as tolerated.      John Pillai, PT  11/15/2021

## 2021-11-16 PROCEDURE — 65310000000 HC RM PRIVATE REHAB

## 2021-11-16 PROCEDURE — 97530 THERAPEUTIC ACTIVITIES: CPT

## 2021-11-16 PROCEDURE — 97116 GAIT TRAINING THERAPY: CPT

## 2021-11-16 PROCEDURE — 97110 THERAPEUTIC EXERCISES: CPT

## 2021-11-16 PROCEDURE — 97535 SELF CARE MNGMENT TRAINING: CPT

## 2021-11-16 PROCEDURE — 74011250637 HC RX REV CODE- 250/637: Performed by: PHYSICAL MEDICINE & REHABILITATION

## 2021-11-16 PROCEDURE — 99232 SBSQ HOSP IP/OBS MODERATE 35: CPT | Performed by: PHYSICAL MEDICINE & REHABILITATION

## 2021-11-16 PROCEDURE — 74011000250 HC RX REV CODE- 250: Performed by: PHYSICIAN ASSISTANT

## 2021-11-16 RX ORDER — DOXYCYCLINE 100 MG/1
100 CAPSULE ORAL EVERY 12 HOURS
Status: DISCONTINUED | OUTPATIENT
Start: 2021-11-16 | End: 2021-11-18 | Stop reason: HOSPADM

## 2021-11-16 RX ADMIN — DOXYCYCLINE HYCLATE 100 MG: 100 CAPSULE ORAL at 21:04

## 2021-11-16 RX ADMIN — Medication 1 AMPULE: at 21:02

## 2021-11-16 RX ADMIN — GABAPENTIN 300 MG: 300 CAPSULE ORAL at 08:24

## 2021-11-16 RX ADMIN — PANTOPRAZOLE SODIUM 40 MG: 40 TABLET, DELAYED RELEASE ORAL at 05:46

## 2021-11-16 RX ADMIN — LEVOTHYROXINE SODIUM 50 MCG: 0.05 TABLET ORAL at 05:46

## 2021-11-16 RX ADMIN — ASPIRIN 81 MG: 81 TABLET, COATED ORAL at 21:03

## 2021-11-16 RX ADMIN — FUROSEMIDE 40 MG: 40 TABLET ORAL at 08:24

## 2021-11-16 RX ADMIN — ESCITALOPRAM OXALATE 20 MG: 10 TABLET ORAL at 08:23

## 2021-11-16 RX ADMIN — FAMOTIDINE 20 MG: 20 TABLET ORAL at 17:24

## 2021-11-16 RX ADMIN — GABAPENTIN 300 MG: 300 CAPSULE ORAL at 21:03

## 2021-11-16 RX ADMIN — METOPROLOL TARTRATE 25 MG: 25 TABLET, FILM COATED ORAL at 17:24

## 2021-11-16 RX ADMIN — APIXABAN 5 MG: 5 TABLET, FILM COATED ORAL at 17:24

## 2021-11-16 RX ADMIN — GABAPENTIN 300 MG: 300 CAPSULE ORAL at 17:25

## 2021-11-16 RX ADMIN — ZOLPIDEM TARTRATE 10 MG: 5 TABLET ORAL at 21:03

## 2021-11-16 RX ADMIN — METOPROLOL TARTRATE 25 MG: 25 TABLET, FILM COATED ORAL at 08:24

## 2021-11-16 RX ADMIN — AMITRIPTYLINE HYDROCHLORIDE 25 MG: 25 TABLET, FILM COATED ORAL at 21:04

## 2021-11-16 RX ADMIN — DOCUSATE SODIUM 100 MG: 100 CAPSULE, LIQUID FILLED ORAL at 17:25

## 2021-11-16 RX ADMIN — HYDROCODONE BITARTRATE AND ACETAMINOPHEN 1 TABLET: 10; 325 TABLET ORAL at 08:24

## 2021-11-16 RX ADMIN — POTASSIUM CHLORIDE 20 MEQ: 20 TABLET, EXTENDED RELEASE ORAL at 08:24

## 2021-11-16 RX ADMIN — DOCUSATE SODIUM 100 MG: 100 CAPSULE, LIQUID FILLED ORAL at 08:24

## 2021-11-16 RX ADMIN — Medication 1 AMPULE: at 08:22

## 2021-11-16 RX ADMIN — APIXABAN 5 MG: 5 TABLET, FILM COATED ORAL at 08:24

## 2021-11-16 RX ADMIN — PROPAFENONE HYDROCHLORIDE 150 MG: 150 TABLET, FILM COATED ORAL at 08:23

## 2021-11-16 RX ADMIN — HYDROCODONE BITARTRATE AND ACETAMINOPHEN 1 TABLET: 10; 325 TABLET ORAL at 13:08

## 2021-11-16 RX ADMIN — MIDODRINE HYDROCHLORIDE 5 MG: 5 TABLET ORAL at 16:00

## 2021-11-16 RX ADMIN — DOXYCYCLINE HYCLATE 100 MG: 100 CAPSULE ORAL at 08:30

## 2021-11-16 RX ADMIN — PROPAFENONE HYDROCHLORIDE 150 MG: 150 TABLET, FILM COATED ORAL at 17:24

## 2021-11-16 RX ADMIN — MIDODRINE HYDROCHLORIDE 5 MG: 5 TABLET ORAL at 05:48

## 2021-11-16 NOTE — PROGRESS NOTES
PHYSICAL THERAPY DAILY NOTE  Time In: 1030  Time Out: 1113  Patient Seen For: AM; Balance activities; Gait training; Patient education; Transfer training; Other (see progress notes)    Subjective: patient reporting the swelling in her legs is better. Reports she thinks she can get her shoes on today. Reports the catheter was removed. Reports she hopes to go home end of the week. Objective:Vital Signs:02 sat 97 to 99% and HR 92 after ambulating 150ft on room air. Patient Vitals for the past 12 hrs:   Temp Pulse Resp BP SpO2   11/16/21 0820 98.2 °F (36.8 °C) 70 18 (!) 113/55 100 %     Pain level:1 to 4 out of 10  Pain location:low back Right side>Left   Pain interventions:Medication per order, rest, positioning,relaxation, body mechanics, L/S corset      Patient education:Bed mobility training,transfer training, gait training, balance training,fall precautions, body mechanics,activity pacing,spinal precautions, donning L/S corset, Patient verbalizing understanding and demonstrating  understanding of patient education. Recommend follow up education.     Interdisciplinary Communication:spoke with RN regarding  pain medication schedule    Spinal, Other (comment) (Falls)  GROSS ASSESSMENT Daily Assessment   Independent DON/Doff  LSO  NA       COGNITION Daily Assessment    No change       BED/MAT MOBILITY Daily Assessment   Increased time and effort to complete with cues for body mechanics   Rolling Right : 6 (Modified independent)  Rolling Left : 6 (Modified independent)  Supine to Sit : 6 (Modified independent)  Sit to Supine : 6 (Modified independent)       TRANSFERS Daily Assessment   Increased time and effort to complete with cues for body mechanics   Transfer Type: SPT with walker  Other: MOdified independent with commode transfers using grab bar and RW  Transfer Assistance : 6 (Modified independent)  Sit to Stand Assistance: Modified independent  Car Transfers: Not tested       GAIT Daily Assessment Amount of Assistance: 5 (Supervision/setup)  Distance (ft): 150 Feet (ft) (150ft x 2, 100ft x 1,75 ft x 2, 20 ft x 2)  Assistive Device: Walker, rolling; Orthotic device (aspen LSO)   Gait training with one time cue for posture correction and cues for improved step length and step clearance    Gait training through 24 inch wide simulated doorway turning RW sideways with side stepping pattern to step through doorway. Able to complete with RW and supervision x 2 attempts    STEPS or STAIRS Daily Assessment    Steps/Stairs Ambulated (#): 0  Level of Assist : 0 (Not tested)       BALANCE Daily Assessment   Static standing with RW, lumbar corset and supervision with no loss of balance Sitting - Static: Good (unsupported)  Sitting - Dynamic: Good (unsupported)  Standing - Static: Good (with RW)  Standing - Dynamic : Impaired       WHEELCHAIR MOBILITY Daily Assessment    Curbs/Ramps Assist Required (FIM Score): 0 (Not tested)  Wheelchair Setup Assist Required : 0 (Not tested)       LOWER EXTREMITY EXERCISES Daily Assessment      NA            Assessment: Gait distance improving. 02 sat stable on room air despite c/o SOB with long distance walking. Improved body mechanics during functional mobility with patient able to recall spinal precautions today. Patient returned to room at end of treatment. Patient supine in bed with head of bed elevated and bed rails up x 2. Needs placed in reach of patient. Plan of Care: Continue with POC and progress as tolerated.      Kenton Perez, PT  11/16/2021

## 2021-11-16 NOTE — PROGRESS NOTES
Nutrition LOS Note:  This assessment was completed remotely. Patient consent was obtained for remote assessment. Assessment  ADULT DIET Regular; 1800 ml    Food,Nutrition, and Pertinent History: Pt reports good appetite and eating % of most meals  Average intake for past 12 day(s)/10 recorded meal(s): %. Anthropometrics: Height: 5' 4\" (162.6 cm), Weight Source: Standing scale, Weight: 142.4 kg (314 lb), Body mass index is 53.9 kg/m². BMI class of morbid obesity class III. Macronutrient Needs:  · EER:  9146-0025 kcal /day (11-14 kcal/kg ABW)  · EPR:   grams protein/day (20% of kcal)       Nutrition Diagnosis: No (acute) nutrition diagnosis at this time    Intervention:   Meals and snacks: Continue current diet. Discharge nutrition plan: Continue current diet.     Alicia Casper, [address] [place] [address], [address] [place] [address]

## 2021-11-16 NOTE — PROGRESS NOTES
John Thompson MD  Medical Director  [address] [facility], [address] [facility]  Tel: [phone]       [facility] PROGRESS NOTE    Conchis Alan  Admit Date: 11/3/2021  Admit Diagnosis:   Lumbar stenosis with neurogenic claudication [M48.062]; Spondylolisthesis of lumbar region [M43.16]; Spinal stenosis [M48.00]; Spinal stenosis, lumbar region with neurogenic claudication [M48.062]    Subjective     Patient seen and examined. Doing much better. Swelling down significantly. No sob. States that her rosacea is acting up and usually is on doxycycline 100mg daily. Has not been on that here.  Denies any pain    Objective:     Current Facility-Administered Medications   Medication Dose Route Frequency    doxycycline (VIBRAMYCIN) capsule 100 mg  100 mg Oral Q12H    midodrine (PROAMATINE) tablet 5 mg  5 mg Oral BID    furosemide (LASIX) tablet 40 mg  40 mg Oral DAILY    potassium chloride (K-DUR, KLOR-CON) SR tablet 20 mEq  20 mEq Oral DAILY    rOPINIRole (REQUIP) tablet 1 mg  1 mg Oral QHS PRN    gabapentin (NEURONTIN) capsule 300 mg  300 mg Oral TID    amitriptyline (ELAVIL) tablet 25 mg  25 mg Oral QHS    lidocaine 4 % patch 1 Patch  1 Patch TransDERmal Q24H    diphenhydrAMINE (BENADRYL) capsule 25 mg  25 mg Oral Q6H PRN    docusate sodium (COLACE) capsule 100 mg  100 mg Oral BID    HYDROcodone-acetaminophen (NORCO)  mg tablet 1 Tablet  1 Tablet Oral Q4H PRN    naloxone (NARCAN) injection 0.4 mg  0.4 mg IntraVENous PRN    ondansetron (ZOFRAN ODT) tablet 4 mg  4 mg Oral Q4H PRN    phenol throat spray (CHLORASEPTIC) 1 Spray  1 Spray Oral PRN    alcohol 62% (NOZIN) nasal  1 Ampule  1 Ampule Topical Q12H    acetaminophen (TYLENOL) tablet 650 mg  650 mg Oral Q6H PRN    alum-mag hydroxide-simeth (MYLANTA) oral suspension 30 mL  30 mL Oral Q4H PRN    apixaban (ELIQUIS) tablet 5 mg  5 mg Oral BID    aspirin delayed-release tablet 81 mg  81 mg Oral QHS    bisacodyL (DULCOLAX) suppository 10 mg  10 mg Rectal DAILY PRN    escitalopram oxalate (LEXAPRO) tablet 20 mg  20 mg Oral QAM    famotidine (PEPCID) tablet 20 mg  20 mg Oral QPM    levothyroxine (SYNTHROID) tablet 50 mcg  50 mcg Oral ACB    LORazepam (ATIVAN) tablet 0.5 mg  0.5 mg Oral Q6H PRN    magnesium hydroxide (MILK OF MAGNESIA) 400 mg/5 mL oral suspension 30 mL  30 mL Oral DAILY PRN    metoprolol tartrate (LOPRESSOR) tablet 25 mg  25 mg Oral BID    pantoprazole (PROTONIX) tablet 40 mg  40 mg Oral ACB    polyethylene glycol (MIRALAX) packet 17 g  17 g Oral DAILY    propafenone (RYTHMOL) tablet 150 mg  150 mg Oral BID    traMADoL (ULTRAM) tablet 50 mg  50 mg Oral Q6H PRN    zolpidem (AMBIEN) tablet 10 mg  10 mg Oral QHS PRN    influenza vaccine 2021-22 (6 mos+)(PF) (FLUARIX/FLULAVAL/FLUZONE QUAD) injection 0.5 mL  1 Each IntraMUSCular PRIOR TO DISCHARGE    nicotine (NICODERM CQ) 14 mg/24 hr patch 1 Patch  1 Patch TransDERmal Q24H       Review of Systems:   Denies chest pain, shortness of breath, cough, headache, visual problems, abdominal pain, dysuria, calf pain. Pertinent positives are as noted in the HPI, ROS unremarkable otherwise. Visit Vitals  BP (!) 113/55 (BP 1 Location: Right arm)   Pulse 70   Temp 98.2 °F (36.8 °C)   Resp 18   Ht 5' 4\" (1.626 m)   Wt 312 lb (141.5 kg)   SpO2 100%   BMI 53.55 kg/m²        Physical Exam:   General: Alert and age appropriately oriented. No acute cardiorespiratory distress. HEENT: Normocephalic, no scleral icterus. Oral mucosa moist without cyanosis. Lungs: Clear to auscultation bilaterally. Respiration even and unlabored. Heart: Regular rate and rhythm, S1, S2. No murmurs, clicks, rub or gallops. Abdomen: Soft, non-tender, not distended. Bowel sounds normoactive. No organomegaly. obese   Genitourinary: Deferred. Neuromuscular:      Hip flexion improved 3/5,distally intact  Sensation intact.  No spasm Skin/extremity:  erythematous scaly rash on face. No calf tenderness B LE.  1+ edema, much improved . .slight pitting at ankles only r>l                                                                              Functional Assessment:          Balance  Sitting - Static: Good (unsupported) (11/15/21 1600)  Sitting - Dynamic: Good (unsupported) (11/15/21 1600)  Standing - Static: Good (with RW) (11/15/21 1600)  Standing - Dynamic : Impaired (11/15/21 1600)                     Danne Lobe Fall Risk Assessment:  Danne Lobe Fall Risk  Mobility: Ambulates or transfers with assist devices or assistance (11/16/21 0716)  Mobility Interventions: Patient to call before getting OOB; Utilize walker, cane, or other assistive device (11/16/21 0716)  Mentation: Alert, oriented x 3 (11/16/21 0716)  Medication: Patient receiving anticonvulsants, sedatives(tranquilizers), psychotropics or hypnotics, hypoglycemics, narcotics, sleep aids, antihypertensives, laxatives, or diuretics (11/16/21 0716)  Medication Interventions: Patient to call before getting OOB (11/16/21 0716)  Elimination: Needs assistance with toileting (11/16/21 0716)  Elimination Interventions: Call light in reach;  Patient to call for help with toileting needs (11/16/21 0716)  Prior Fall History: No (11/16/21 0716)  History of Falls Interventions: Consult care management for discharge planning (11/16/21 0716)  Total Score: 3 (11/16/21 0716)  Standard Fall Precautions: Yes (11/16/21 0315)  High Fall Risk: Yes (11/16/21 0716)     Speech Assessment:         Ambulation:  Gait  Distance (ft): 150 Feet (ft) (150ft x 2, 100ft x 1, 70 ft x 1) (11/15/21 1600)  Assistive Device: Walker, rolling; Gait belt; Orthotic device (Aspen LSO) (11/15/21 1600)     Labs/Studies:  Recent Results (from the past 72 hour(s))   METABOLIC PANEL, BASIC    Collection Time: 11/15/21  5:55 AM   Result Value Ref Range    Sodium 138 136 - 145 mmol/L    Potassium 4.0 3.5 - 5.1 mmol/L    Chloride 101 98 - 107 mmol/L    CO2 37 (H) 21 - 32 mmol/L    Anion gap 0 (L) 7 - 16 mmol/L    Glucose 95 65 - 100 mg/dL    BUN 19 8 - 23 MG/DL    Creatinine 0.72 0.6 - 1.0 MG/DL    GFR est AA >60 >60 ml/min/1.73m2    GFR est non-AA >60 >60 ml/min/1.73m2    Calcium 8.4 8.3 - 10.4 MG/DL       Assessment:     Problem List as of 11/16/2021 Date Reviewed: 11/5/2021          Codes Class Noted - Resolved    * (Principal) Spinal stenosis, lumbar region with neurogenic claudication ICD-10-CM: M48.062  ICD-9-CM: 724.03  11/3/2021 - Present        A-fib ([facility] ICD-10-CM: I48.91  ICD-9-CM: 427.31  10/30/2021 - Present        ÁNGEL (acute kidney injury) ([facility] ICD-10-CM: N17.9  ICD-9-CM: 584.9  10/30/2021 - Present        Acute respiratory failure with hypoxia ([facility] ICD-10-CM: J96.01  ICD-9-CM: 518.81  10/30/2021 - Present        Hypovolemic shock ([facility] ICD-10-CM: R57.1  ICD-9-CM: 785.59  10/26/2021 - Present        Hypotension ICD-10-CM: I95.9  ICD-9-CM: 458.9  10/26/2021 - Present        Spondylolisthesis of multiple sites in spine ICD-10-CM: M43.19  ICD-9-CM: 738.4  10/25/2021 - Present        Spinal stenosis ICD-10-CM: M48.00  ICD-9-CM: 724.00  10/25/2021 - Present        Obesity, morbid ([facility] ICD-10-CM: E66.01  ICD-9-CM: 278.01  12/22/2017 - Present        Acquired hypothyroidism ICD-10-CM: E03.9  ICD-9-CM: 244.9  9/13/2017 - Present        Postoperative wound infection ICD-10-CM: T81.49XA  ICD-9-CM: 998.59  5/19/2016 - Present        Lumbar stenosis with neurogenic claudication ICD-10-CM: M48.062  ICD-9-CM: 724.03  3/28/2016 - Present        Essential hypertension, benign ICD-10-CM: I10  ICD-9-CM: 401.1  7/24/2013 - Present        Endometriosis ICD-10-CM: N80.9  ICD-9-CM: 617.9  7/24/2013 - Present        Colon polyp ICD-10-CM: K63.5  ICD-9-CM: 211.3  7/24/2013 - Present        Squamous cell skin cancer ICD-10-CM: C44.92  ICD-9-CM: 173.92  7/24/2013 - Present              L2 L4 lumbar direct lateral interbody fusion with anterior plating and redo of L2 L5 laminectomy (10/26/2021 //  [facility]) due to spinal stenosis with neurogenic claudication     Plan / Recommendations / Medical Decision Making:      Daily physician / PA medical management:     Lumbar stenosis with neurogenic claudication, spondylolisthesis of lumbar region, lumbar spinal stenosis region with neurogenic claudication - PT / OT; spinal precautions, girdle when ambulating.     Atrial fibrillation - s/p 2 failed ablations, managed with Rhythmol, metoprolol and Eliquis. Currently NSR; Eliquis restarted 10/31 in light of RUL pulmonary embolism. -11/4 irreg, rate controlled, continue meds  -11/5 noticeably RRR to prolonged auscultation  -11/6 IIR today but rate-controlled  -11/12 nsr. Rate 60s. Cont Rhythmol, metoprolol and Eliquis  - 11/14 HR 63 - 72 range last 24 hours  -11/15 HR 65-71 denies palpitations ;cont rhythmol  -11/16 rate controlled     Hypotension / hypertension - BP fluctuating, managed medically. Has been hypotensive requiring levophed and now midodrine (pt's home lisinopril 20mg BID and HCTZ 12.5mg BID are on hold); needs TEDs when OOB.  -11/4 hypotensive episodes improved; decrease midodrine to 5mg TID. Continue metoprolol for rate control  -11/5 sBP fluctuating from 102-163, dBP all <85, mostly in 50-60s; monitor as add back PRN furosemide for edema  -11/6 /73  -11/8 119-148/63-73 ; starr midodrine to 5mg bid and wean off; MUST wear TEDs when oob and elevate LEs whenever possible. Bilateral pitting edema; will give 40mg bid of Lasix today and reassess daily. Check renal fxn  -11/9 creat 0.56, bun 16; 11/10 creat 0.67  -11/11 /57 this am, max 143/76; cont midodrine 5mg bid for now. One episode of dizziness yesterday but bp normal by the time they could check BP  - 11/14 /53; 11/15 116/52 controlled  -VSS; cont midodrine bid     Pain control - ongoing significant pain in back which is stable and controlled by PRN meds.  Will require regular pain assessment and comprehensive pain management. Continue PRN Norco, tramadol, APAP and gabapentin. -11/4 increase Neurontin to 300mg TID and Elavil at night 25mg due to sciatic pain; add back Requip 1mg QHS  -11/5 start steroid taper for R LE pain  -11/6 R LE radiating pain resolved, finish steroid taper  -11/8 MUCH improved with steroid dose lyudmila  -11/16 pain controlled     Hypokalemia - monitor; currently 4.3 on supplement due to HCTZ; may be able to hold supplement. -11/4 labs pending; d/c daily labs  -11/5 K+ 4.2  -11/6 recheck BMP 11/8 since continuing daily furosemide; pending 11/8; k 4.2  -11/10 K 4.1, cont supplement while on lasix  -11/13 K 4.0 on 11/12; 11/15 4.0     Leukocytosis, mild - wbc 11.9 at [facility] admission (11/3). No s/sx of infxn. -11/5 WBC 11.9, afebrile, no s/sx infection; will likely increase with steroids  -11/6 recheck CBC 11/8; 16.2 from 11.9 ; likely steroid induced; will check UA; Neg; this is last day of steroids  -11/13 wbc 13 > 10.4 on 11/12, afebrile      Anemia, blood loss - Hgb 9.0, improving; had 2000ml blood loss and received blood transfusion x 3, as well as 2800ml of crystalloid. -11/6 recheck CBC 11/8; 9.4 from 8.8 11/5  -11/10 hgb 9.7, improving  11/13 Hgb - 9.7 > 8.8 on 11/12     Hypothyroidism - continue Synthroid.     Pneumonia prophylaxis - incentive spirometer every hour while awake. Rosacea; 11/16 start doxycycline 100mg bid x 3 d and then daily per home regimen     DVT risk / DVT prophylaxis - daily physician / PA exam to assess as patient is at increased risk for of thromboembolism. Mobilize as tolerated. Sequential pneumatic compression devices (SCDs) when in bed; thigh-high or knee-high thromboembolic deterrent hose when out of bed. On Eliquis.     GI prophylaxis - resume PPI. At times may need additional antacids, Maalox prn.     Depression - continue on Lexapro.  At risk of depression exacerbation due to pain and loss of independent function.     General skin care / wound prevention - monitor lumbar incision and general skin wound status daily per staff and physician / PA. At risk for failure. Will require 24/7 rehab nursing. -11/4 lateral left hip wound; looks like a blister that had opened up vs sheer injury. Cleanse daily and cover. Back incision healing  --11/15 incision c/d/i     Bladder program / urinary retention / neurogenic bladder - schedule voids q 6-8 hrs. Check post-void residual as needed; in-and-out catheter if post-void residual is more than 400ml.  -voiding continently without residuals  -11/15 remove laboy in day or two. IV lasix was causing very frequent urination  -11/16 dc laboy     Bowel program - at risk for constipation as a side effect of opioids, other medications, impaired mobility, etc. MiraLAX daily for regularity, Azul-Colace for stool softener. PRN MOM, bisacodyl suppository or tablets for constipation.  -continent, regular     Edema; 11/9 ; >1800ml out; cont lasix 40mg bid for today . Pt requesting laboy at [facility]  -11/10 [address], wt is up. Will keep laboy and give 40mg IV lasix today. No hx of CHF but has afib . bnp 2757. Alb 2.7; pt had a 2d echo 10/29/21; EF>70%, severe pulm htn noted with hx of RUL pulm emb. No evidence tho of right heart strain. VSS; daily wts  11/11 5lbs down from yesterday. UOP -1450 , BNP 1865,,improved; 40meq IV Lasix x 1 today  -11/12 will weigh today. One more dose of IV Lasix today 40mg. Good diuresis. Lungs clearl no hx CHF. Off steroids now. BNP pending. UOP -4275  - 11/14 continue on home dose po lasix 40 mg qd, Last weights- 315lbs > 316lbs with iv lasix x 1 given yesterday, today 312lbs. Continue with 1800ml fluid restriction. Continue daily weights. BMP ordered fpr thalia. -11/15 need f/u wt. Significant improvement in edema. Cont oral lasix 40mg daily. K 4, creat 0.72  -11/16 cont with oral lasix, home dose of 40mg daily. Renal fxn normal, K nl. Dc laboy cath now.  Wt is down 15lbs       Time spent was 25 minutes with over 1/2 in direct patient care/examination, consultation and coordination of care.      Signed By: Doretha Mar MD     November 16, 2021

## 2021-11-16 NOTE — ROUTINE PROCESS
While walking with physical therapy, pt c/o tunneled vision, feeling like she is about to pass out, loss of hearing, and becomes very pale. Pt seated to wheelchair immediately. Pt recovers quickly with /63 and HR 75. MD notified.  Orders received to check BMP in the AM.

## 2021-11-16 NOTE — PROGRESS NOTES
PHYSICAL THERAPY DAILY NOTE  Time In: 4480  Time Out: 9066  Patient Seen For: PM; Gait training; Patient education; Therapeutic exercise; Transfer training; Other (see progress notes)    Subjective: patient reporting she has to walk up a ramp to get into ehr home         Objective:Vital Signs  Patient Vitals for the past 12 hrs:   Temp Pulse Resp BP SpO2   11/16/21 0820 98.2 °F (36.8 °C) 70 18 (!) 113/55 100 %     Pain level:3 to 7 out of 10  Pain location:low back left>right side  Pain interventions:Medication per order, rest, positioning,relaxation, body mechanics, L/S corset      Patient education:Bed mobility training,transfer training, gait training, balance training,fall precautions, body mechanics,activity pacing,spinal precautions, donning L/S corset, Patient verbalizing understanding and demonstrating  understanding of patient education. Recommend follow up education.     Interdisciplinary Communication:spoke with RN regarding functional level and pain medication schedule    Spinal, Other (comment) (Falls)  GROSS ASSESSMENT Daily Assessment   Independent donning/doffing LSO             COGNITION Daily Assessment    No change       BED/MAT MOBILITY Daily Assessment   Increased time and effort to complete with cues for body mechanics  Using bed rail  Rolling Right : 6 (Modified independent)  Rolling Left : 6 (Modified independent)  Supine to Sit : 6 (Modified independent)  Sit to Supine : 6 (Modified independent)       TRANSFERS Daily Assessment   Increased time and effort to complete with cues for body mechanics   Transfer Type: SPT with walker  Transfer Assistance : 6 (Modified independent)  Sit to Stand Assistance: Modified independent  Car Transfers: Not tested       GAIT Daily Assessment    Amount of Assistance: 5 (Supervision/setup)  Distance (ft): 150 Feet (ft)  Assistive Device: Walker, rolling; Orthotic device (aspen LSO)   Gait training up/down 20 ft ramp with RW and SBA     STEPS or STAIRS Daily Assessment    Steps/Stairs Ambulated (#): 0  Level of Assist : 0 (Not tested)       BALANCE Daily Assessment   Static standing with RW, lumbar corset and  supervision with no loss of balance Sitting - Static: Good (unsupported)  Sitting - Dynamic: Good (unsupported)  Standing - Static: Good  Standing - Dynamic : Impaired       WHEELCHAIR MOBILITY Daily Assessment    Curbs/Ramps Assist Required (FIM Score): 0 (Not tested)  Wheelchair Setup Assist Required : 0 (Not tested)       LOWER EXTREMITY EXERCISES Daily Assessment   Increased time and effort to complete with multiple and frequent rest breaks. Cues for correct form     Extremity: Both  Exercise Type #1: supine lower extremity strengthening: ankle pumps, hip abd with skate, heel slides, SAQ  Sets Performed: 3  Reps Performed: 10  Level of Assist:supervision with set up assist and cues            Assessment: LE ROM and strength improving with HEP. Able to progress to gait up/down ramp with RW       Patient returned to room at end of treatment. Patient supine in bed with head of bed elevated and bed rails up x 2. Needs placed in reach of patient. [place] alarm on    Plan of Care: Continue with POC and progress as tolerated.      Mika Gonzalez, PT  11/16/2021

## 2021-11-16 NOTE — PROGRESS NOTES
OT Daily Note    Time In 0831   Time Out 0917     Subjective: \"I'm tired. \" Pt was agreeable to tx. Pain: Pt reported no pain. Patient Vitals for the past 8 hrs:   Temp Pulse Resp BP SpO2   11/16/21 0820 98.2 °F (36.8 °C) 70 18 (!) 113/55 100 %         Mobility   Score Comments   Rolling 5: S/U or clean-up assist Side: Right  Log roll with supervision   Supine to Sit 5: S/U or clean-up assist Log roll with supervision    Sit to Supine 4: Supervision or touching A Reverse log roll with SBA   Sit to Stand 4: Supervision or touching A SBA to supervision    Transfer Assist 4: Supervision or touching A Transfer Type: SPT   Equipment: Rolling Walker   Comments: SBA to supervision      Activities of Daily Living    Score Comments   Grooming 6: Independent Seated at sink   Bathing 4: Supervision or touching A Type of Shower: Bath Pack  Position: Supported sitting and Standing PRN   Adaptive  Equipment: W/C  Comments: supervision at sink   Upper Body  Dressing 5: S/U or clean-up assist Items Applied: Pullover  Position: Unsupported Sitting  Comments: set up   Lower Body Dressing 4: Supervision or touching A Items Applied: Underwear and Elastic pants  Position: Supported sitting and Standing PRN  Adaptive Equipment: Reacher and W/C  Comments: Cues to utilize reacher to maintain spinal precautions   [place] Footwear 3: Partial/Moderate A Items Applied: Socks, Shoes with fasteners  and compression stockings  Adaptive Equipment: Sock Aide and Long Handeled Shoe Horn  Comments: Ray, pt able to load sock aid, assist to pull up compression stockings    Education  AE/DME training and spinal precautions     Interdisciplinary Communication: Updated communication board with most up to date information regarding Pt's current level of function. Summary of Session: Pt demonstrated good participation in OT tasks during this session. Pt's performance with ADL is reflected in above chart.  Pt was left supine in bed with call bell within reach and all needs met. Plan: Continue per OT POC.      Connie Webster, OTR/L   11/16/2021

## 2021-11-16 NOTE — PROGRESS NOTES
PHYSICAL THERAPY DAILY NOTE  Time In: 1526  Time Out: 6504  Patient Seen For: PM; Gait training; Patient education; Transfer training; Other (see progress notes)    Subjective: patient reporting she does not feel as good this PM. Reports dizziness and feeling like she is going to pass out with loss of hearing during both gait attempts this PM. Patient reporting symptoms slowly resolved after sitting down. Reports she is still getting SOB when walking longer distances. Reports pain is better since taking pain medication earlier this PM         Objective:Vital Signs:02 sat 96 to 99% and HR 88 after ambulating 70ft on room air. Patient Vitals for the past 12 hrs:   Temp Pulse Resp BP SpO2   11/16/21 1539 98.2 °F (36.8 °C) 75 18 (!) 155/63 100 %   11/16/21 0820 98.2 °F (36.8 °C) 70 18 (!) 113/55 100 %     Pain level:2 to 5 out of 10  Pain location:low back Right side>Left   Pain interventions:Medication per order, rest, positioning,relaxation, body mechanics, L/S corset      Patient education:Bed mobility training,transfer training, gait training, balance training,fall precautions, body mechanics,activity pacing,spinal precautions, donning L/S corset, Patient verbalizing understanding and demonstrating  understanding of patient education. Recommend follow up education. Interdisciplinary Communication:spoke with RN regarding  pain medication schedule.  RN in to assess patient after c/o above symptoms     Spinal, Other (comment) (Falls)  GROSS ASSESSMENT Daily Assessment   Independent DON/Doff  LSO  NA       COGNITION Daily Assessment    No change       BED/MAT MOBILITY Daily Assessment   Increased time and effort to complete with cues for body mechanics   Rolling Right : 6 (Modified independent)  Rolling Left : 6 (Modified independent)  Supine to Sit : 6 (Modified independent)  Sit to Supine : 6 (Modified independent)       TRANSFERS Daily Assessment   Increased time and effort to complete with cues for body mechanics   Transfer Type: SPT with walker  Other: commode transfers using grab bar with SBA. Independent with hygiene  Transfer Assistance : 5 (Stand-by assistance)  Sit to Stand Assistance: Stand-by assistance  Car Transfers: Not tested       GAIT Daily Assessment    Amount of Assistance: 5 (Supervision/setup)  Distance (ft): 70 Feet (ft) (70ft x 1  50ft x 1)  Assistive Device: Walker, rolling; Orthotic device (aspen LSO)   Gait training with one time cue for posture correction and cues for improved step length and step clearance        STEPS or STAIRS Daily Assessment    Steps/Stairs Ambulated (#): 0  Level of Assist : 0 (Not tested)       BALANCE Daily Assessment   Static standing with RW, lumbar corset and supervision with no loss of balance Sitting - Static: Good (unsupported)  Sitting - Dynamic: Good (unsupported)  Standing - Static: Good (with RW)  Standing - Dynamic : Impaired       WHEELCHAIR MOBILITY Daily Assessment    Curbs/Ramps Assist Required (FIM Score): 0 (Not tested)  Wheelchair Setup Assist Required : 0 (Not tested)       LOWER EXTREMITY EXERCISES Daily Assessment      NA            Assessment:Decreased tolerance to treatment due to symptoms noted above. Orthostatic hypotension symptoms without low BP. Symptoms occur in standing or when walking and resolve in sitting       Patient returned to room at end of treatment. Patient supine in bed with head of bed elevated and bed rails up x 2. Needs placed in reach of patient. Plan of Care: Continue with POC and progress as tolerated.      Constantino Carbajal, PT  11/16/2021

## 2021-11-17 LAB
ANION GAP SERPL CALC-SCNC: 4 MMOL/L (ref 7–16)
BNP SERPL-MCNC: 1139 PG/ML (ref 5–125)
BUN SERPL-MCNC: 15 MG/DL (ref 8–23)
CALCIUM SERPL-MCNC: 8.4 MG/DL (ref 8.3–10.4)
CHLORIDE SERPL-SCNC: 103 MMOL/L (ref 98–107)
CO2 SERPL-SCNC: 35 MMOL/L (ref 21–32)
CREAT SERPL-MCNC: 0.85 MG/DL (ref 0.6–1)
GLUCOSE SERPL-MCNC: 129 MG/DL (ref 65–100)
HCT VFR BLD AUTO: 27.6 % (ref 35.8–46.3)
HGB BLD-MCNC: 8.1 G/DL (ref 11.7–15.4)
POTASSIUM SERPL-SCNC: 3.9 MMOL/L (ref 3.5–5.1)
SODIUM SERPL-SCNC: 142 MMOL/L (ref 136–145)

## 2021-11-17 PROCEDURE — 83880 ASSAY OF NATRIURETIC PEPTIDE: CPT

## 2021-11-17 PROCEDURE — 80048 BASIC METABOLIC PNL TOTAL CA: CPT

## 2021-11-17 PROCEDURE — 97530 THERAPEUTIC ACTIVITIES: CPT

## 2021-11-17 PROCEDURE — 97150 GROUP THERAPEUTIC PROCEDURES: CPT

## 2021-11-17 PROCEDURE — 74011250637 HC RX REV CODE- 250/637: Performed by: PHYSICAL MEDICINE & REHABILITATION

## 2021-11-17 PROCEDURE — 97110 THERAPEUTIC EXERCISES: CPT

## 2021-11-17 PROCEDURE — 85018 HEMOGLOBIN: CPT

## 2021-11-17 PROCEDURE — 65310000000 HC RM PRIVATE REHAB

## 2021-11-17 PROCEDURE — 97535 SELF CARE MNGMENT TRAINING: CPT

## 2021-11-17 PROCEDURE — 99232 SBSQ HOSP IP/OBS MODERATE 35: CPT | Performed by: PHYSICAL MEDICINE & REHABILITATION

## 2021-11-17 PROCEDURE — 97116 GAIT TRAINING THERAPY: CPT

## 2021-11-17 PROCEDURE — 74011250637 HC RX REV CODE- 250/637: Performed by: PHYSICIAN ASSISTANT

## 2021-11-17 PROCEDURE — 74011000250 HC RX REV CODE- 250: Performed by: PHYSICIAN ASSISTANT

## 2021-11-17 RX ORDER — MIDODRINE HYDROCHLORIDE 5 MG/1
5 TABLET ORAL
Status: DISCONTINUED | OUTPATIENT
Start: 2021-11-17 | End: 2021-11-18 | Stop reason: HOSPADM

## 2021-11-17 RX ADMIN — FAMOTIDINE 20 MG: 20 TABLET ORAL at 17:15

## 2021-11-17 RX ADMIN — DOCUSATE SODIUM 100 MG: 100 CAPSULE, LIQUID FILLED ORAL at 08:19

## 2021-11-17 RX ADMIN — DOXYCYCLINE HYCLATE 100 MG: 100 CAPSULE ORAL at 08:19

## 2021-11-17 RX ADMIN — AMITRIPTYLINE HYDROCHLORIDE 25 MG: 25 TABLET, FILM COATED ORAL at 22:36

## 2021-11-17 RX ADMIN — ESCITALOPRAM OXALATE 20 MG: 10 TABLET ORAL at 08:19

## 2021-11-17 RX ADMIN — Medication 1 AMPULE: at 22:01

## 2021-11-17 RX ADMIN — GABAPENTIN 300 MG: 300 CAPSULE ORAL at 08:18

## 2021-11-17 RX ADMIN — METOPROLOL TARTRATE 25 MG: 25 TABLET, FILM COATED ORAL at 08:19

## 2021-11-17 RX ADMIN — PANTOPRAZOLE SODIUM 40 MG: 40 TABLET, DELAYED RELEASE ORAL at 05:12

## 2021-11-17 RX ADMIN — POTASSIUM CHLORIDE 20 MEQ: 20 TABLET, EXTENDED RELEASE ORAL at 08:19

## 2021-11-17 RX ADMIN — Medication 1 AMPULE: at 08:19

## 2021-11-17 RX ADMIN — HYDROCODONE BITARTRATE AND ACETAMINOPHEN 1 TABLET: 10; 325 TABLET ORAL at 22:01

## 2021-11-17 RX ADMIN — DOCUSATE SODIUM 100 MG: 100 CAPSULE, LIQUID FILLED ORAL at 17:15

## 2021-11-17 RX ADMIN — GABAPENTIN 300 MG: 300 CAPSULE ORAL at 22:01

## 2021-11-17 RX ADMIN — HYDROCODONE BITARTRATE AND ACETAMINOPHEN 1 TABLET: 10; 325 TABLET ORAL at 15:25

## 2021-11-17 RX ADMIN — LEVOTHYROXINE SODIUM 50 MCG: 0.05 TABLET ORAL at 05:12

## 2021-11-17 RX ADMIN — METOPROLOL TARTRATE 25 MG: 25 TABLET, FILM COATED ORAL at 17:15

## 2021-11-17 RX ADMIN — FUROSEMIDE 40 MG: 40 TABLET ORAL at 08:19

## 2021-11-17 RX ADMIN — PROPAFENONE HYDROCHLORIDE 150 MG: 150 TABLET, FILM COATED ORAL at 17:15

## 2021-11-17 RX ADMIN — APIXABAN 5 MG: 5 TABLET, FILM COATED ORAL at 17:15

## 2021-11-17 RX ADMIN — ZOLPIDEM TARTRATE 10 MG: 5 TABLET ORAL at 22:01

## 2021-11-17 RX ADMIN — MIDODRINE HYDROCHLORIDE 5 MG: 5 TABLET ORAL at 17:26

## 2021-11-17 RX ADMIN — ASPIRIN 81 MG: 81 TABLET, COATED ORAL at 22:01

## 2021-11-17 RX ADMIN — PROPAFENONE HYDROCHLORIDE 150 MG: 150 TABLET, FILM COATED ORAL at 08:19

## 2021-11-17 RX ADMIN — APIXABAN 5 MG: 5 TABLET, FILM COATED ORAL at 08:19

## 2021-11-17 RX ADMIN — MIDODRINE HYDROCHLORIDE 5 MG: 5 TABLET ORAL at 06:00

## 2021-11-17 RX ADMIN — DOXYCYCLINE HYCLATE 100 MG: 100 CAPSULE ORAL at 22:01

## 2021-11-17 RX ADMIN — GABAPENTIN 300 MG: 300 CAPSULE ORAL at 15:25

## 2021-11-17 NOTE — PROGRESS NOTES
Problem: Falls - Risk of  Goal: *Absence of Falls  Description: Document Mackenzie Dao Fall Risk and appropriate interventions in the flowsheet. Outcome: Progressing Towards Goal  Note: Fall Risk Interventions:  Mobility Interventions: Assess mobility with egress test, Bed/chair exit alarm, Communicate number of staff needed for ambulation/transfer, OT consult for ADLs, Patient to call before getting OOB, PT Consult for mobility concerns, PT Consult for assist device competence, Strengthening exercises (ROM-active/passive), Utilize walker, cane, or other assistive device         Medication Interventions: Assess postural VS orthostatic hypotension, Bed/chair exit alarm, Evaluate medications/consider consulting pharmacy, Patient to call before getting OOB, Teach patient to arise slowly    Elimination Interventions: Bed/chair exit alarm, Call light in reach, Patient to call for help with toileting needs, Toilet paper/wipes in reach    History of Falls Interventions: Bed/chair exit alarm, Consult care management for discharge planning, Door open when patient unattended, Room close to nurse's station         Problem: Pressure Injury - Risk of  Goal: *Prevention of pressure injury  Description: Document Trung Scale and appropriate interventions in the flowsheet. Outcome: Progressing Towards Goal  Note: Pressure Injury Interventions:  Sensory Interventions: Assess changes in LOC, Discuss PT/OT consult with provider, Keep linens dry and wrinkle-free, Minimize linen layers, Turn and reposition approx.  every two hours (pillows and wedges if needed)    Moisture Interventions: Absorbent underpads, Minimize layers    Activity Interventions: Assess need for specialty bed, Increase time out of bed, Pressure redistribution bed/mattress(bed type), PT/OT evaluation    Mobility Interventions: Assess need for specialty bed, Float heels, HOB 30 degrees or less, PT/OT evaluation, Pressure redistribution bed/mattress (bed type), Turn and reposition approx.  every two hours(pillow and wedges)    Nutrition Interventions: Document food/fluid/supplement intake, Discuss nutritional consult with provider, Offer support with meals,snacks and hydration    Friction and Shear Interventions: Apply protective barrier, creams and emollients, HOB 30 degrees or less, Minimize layers, Transferring/repositioning devices

## 2021-11-17 NOTE — PROGRESS NOTES
PT WEEKLY PROGRESS NOTE   Time In: 5348   Time Out: 1201    Subjective: patient reporting she feels weaker today. Reports she is having vertigo type symptoms. Reports she is going to the bathroom more and her mouth is dry all the time. Reports she was not able to control her urine this AM when walking to the bathroom. Objective: Vital Signs:Standing /59 HR 76  Patient Vitals for the past 12 hrs:   Temp Pulse Resp BP SpO2   11/10/21 1146    123/69 standing    11/10/21 1145    (!) 157/87 sitting    11/10/21 1049  65  129/71 97 %   11/10/21 0730 98.4 °F (36.9 °C) 66 18 (!) 143/80 99 %     Pain level:2 to 5 out of 10  Pain location:low back   Pain interventions:Medication per order, rest, positioning,relaxation, LSO, body mechanics      Patient education:Bed mobility training,transfer training, gait training, balance training,fall precautions, body mechanics,activity pacing, spinal precautions,don/doff LSO, Patient verbalizing understanding and demonstrating understanding of patient education. Recommend follow up education.       Interdisciplinary Communication:spoke with OT regarding progress towards goals and orthostatic hypotension symptoms    Spinal, Other (comment) (Falls)    Outcome Measures:     Cognition: Orientation:A+O x 4  Communication:able to express needs verbally, able to follow multi step commands  Social Interaction:cooperating, appropriate with PT, participating, motivated to improve  Problem Solving:difficulty with managing body mechanics during functional mobility  Memory:occasional cues to recall spinal precautions and body mechanics           BED/CHAIR/WHEELCHAIR TRANSFERS Initial Assessment Weekly Progress Assessment 11/17/2021   Rolling Right 3 (Moderate assistance ) 6 (Modified independent)   Rolling Left 3 (Moderate assistance ) 6 (Modified independent)   Supine to Sit 1 (Total assistance) (Min to mod assist x 2) 6 (Modified independent)   Sit to Stand Moderate assistance (with RW) Modified independent   Sit to Supine 0 (Not tested) 6 (Modified independent)   Transfer Type SPT with walker SPT with walker   Comments Increased time and effort to complete with verbal cues for log rolling and supine to sidelying to sit to stand. Patient using bed rail and HOB at 20 degrees   Increased time and effort to complete with cues for body mechanics    Modified independent with commode transfers using grab bar and RW. Independent with clothing management and hygiene     Car Transfer Not tested Not tested   Car Type         GROSS ASSESSMENT Weekly Progress Assessment 11/17/2021   AROM  bilateral hip decreased 40%, knee 10 to 75, ankle DF 5 degrees   Strength  Bilateral hip +2/5 to -3/5, knee 4/5 to 5/5, ankle 4/5 to 5/5   Coordination  Bilateral LE gross motor intact, fine motor impaired   Tone  Normal for LEs   Sensation  LEs intact   PROM  Limited as noted above under AROM     POSTURE Weekly Progress Assessment 11/17/2021   Posture (WDL) Exceptions to WDL   Posture Assessment Forward head; Trunk flexion; Rounded shoulders; Lordosis, lumbar     WHEELCHAIR MOBILITY/MANAGEMENT Initial Assessment Weekly Progress Assessment 11/17/2021   Able to Propel    NA   Curbs/ramps assistance required 0 (Not tested) 0 (Not tested)   Wheelchair set up assistance required 0 (Not tested)  NA   Wheelchair management    NA     WALKING INDEPENDENCE Initial Assessment Weekly Progress Assessment 11/17/2021   Assistive device Walker, rolling, Gait belt, Orthotic device ([place] lumbar sacral corset) Walker, rolling; Orthotic device (aspen LSO)RW, gait belt, [place] LSO   Ambulation assistance - level surface 4 (Contact guard assistance)  SBA with cues for posture correction   Distance 30 Feet (ft) 50 Feet (ft) x 5 with 5 min sitting rest breaks between attempts   Comments slow start stop step to gait pattern leading with RLE and stepping to with LLE. Decreased step length and step clearance with flexed posture.  Foot flat initial contact Gait distance limited due to onset of orthostatic hypotension symptoms with longer gait distances     GAIT Weekly Progress Assessment 11/17/2021   Gait Description (WDL)  slow cont step through gait with flexed posture and increased width of base of support   Gait Abnormalities  decreased ankle PF and knee flex at terminal stance, decreased knee ext and ankle DF      STEPS/STAIRS Initial Assessment Weekly Progress Assessment 11/17/2021   Steps/Stairs ambulated 0 0   Rail Use       Comments Unable to ambulate up/down steps due to LE weakness, impaired balance and pain level   Unable to ambulate up/down steps due to LE weakness,limited ROM due to edema and impaired balance   Curbs/Ramps 0 (Not tested)            Assessment: patient progressing towards goals. Patient has met LTGs for bed mobility and transfers and progressing towards LTGs for gait and ramp. Patient has not been able to progress to gait training up/down ramps for reasons as noted above. Patient with ongoing problems with edema c/o SOB, weakness and vertigo type symptoms  During functional mobility limiting progress with gait with RW. Patient has had 3 episodes of orthostatic hypotension symptoms over the past week limiting her ability to progress ambulation distance and independence. BP and HR have been stable when checked during patient c/o symptoms. Patient improving with ability to recall spinal precautions and  body mechanics during functional mobility and ADLs but requires occasional cues to recall spinal precautions and body mechanics due to tendency to demonstrate excess trunk flexion during ADL activities. Recommend cont inpatient rehab in order to maximize rehab potential. As medical issues resolve progress should improve. Patient returned sitting on bed with lunch tray in place. Plan of Care: weekly assessment completed. Goals updated and revised. Please see Care Plan for updated LTGs.     Family Training: Needs to be scheduled    John Pillai, PT  11/17/2021

## 2021-11-17 NOTE — PROGRESS NOTES
Linda Pacheco MD  Medical Director  [address] [facility], [address] [facility]  Tel: [phone]       [facility] PROGRESS NOTE    Kalani Mart  Admit Date: 11/3/2021  Admit Diagnosis:   Lumbar stenosis with neurogenic claudication [M48.062]; Spondylolisthesis of lumbar region [M43.16]; Spinal stenosis [M48.00]; Spinal stenosis, lumbar region with neurogenic claudication [M48.062]    Subjective     Patient seen and examined between therapies, states she feels \"a little under the weather\" today. Describes a dizzy or \"woozy\" with elements of slight room spinning, no N/V. Has brief, rapid-onset, unpredictable episodes of dyspnea and pre-syncope without LOC, normal vitals when checked immediately by staff. Good UOP on furosemide but has hit plateau re: edema improvement, weight loss (314lbs x 2d).     Objective:     Current Facility-Administered Medications   Medication Dose Route Frequency    doxycycline (VIBRAMYCIN) capsule 100 mg  100 mg Oral Q12H    midodrine (PROAMATINE) tablet 5 mg  5 mg Oral BID    furosemide (LASIX) tablet 40 mg  40 mg Oral DAILY    potassium chloride (K-DUR, KLOR-CON) SR tablet 20 mEq  20 mEq Oral DAILY    rOPINIRole (REQUIP) tablet 1 mg  1 mg Oral QHS PRN    gabapentin (NEURONTIN) capsule 300 mg  300 mg Oral TID    amitriptyline (ELAVIL) tablet 25 mg  25 mg Oral QHS    lidocaine 4 % patch 1 Patch  1 Patch TransDERmal Q24H    diphenhydrAMINE (BENADRYL) capsule 25 mg  25 mg Oral Q6H PRN    docusate sodium (COLACE) capsule 100 mg  100 mg Oral BID    HYDROcodone-acetaminophen (NORCO)  mg tablet 1 Tablet  1 Tablet Oral Q4H PRN    naloxone (NARCAN) injection 0.4 mg  0.4 mg IntraVENous PRN    ondansetron (ZOFRAN ODT) tablet 4 mg  4 mg Oral Q4H PRN    phenol throat spray (CHLORASEPTIC) 1 Spray  1 Spray Oral PRN    alcohol 62% (NOZIN) nasal  1 Ampule  1 Ampule Topical Q12H    acetaminophen (TYLENOL) tablet 650 mg  650 mg Oral Q6H PRN    alum-mag hydroxide-simeth (MYLANTA) oral suspension 30 mL  30 mL Oral Q4H PRN    apixaban (ELIQUIS) tablet 5 mg  5 mg Oral BID    aspirin delayed-release tablet 81 mg  81 mg Oral QHS    bisacodyL (DULCOLAX) suppository 10 mg  10 mg Rectal DAILY PRN    escitalopram oxalate (LEXAPRO) tablet 20 mg  20 mg Oral QAM    famotidine (PEPCID) tablet 20 mg  20 mg Oral QPM    levothyroxine (SYNTHROID) tablet 50 mcg  50 mcg Oral ACB    LORazepam (ATIVAN) tablet 0.5 mg  0.5 mg Oral Q6H PRN    magnesium hydroxide (MILK OF MAGNESIA) 400 mg/5 mL oral suspension 30 mL  30 mL Oral DAILY PRN    metoprolol tartrate (LOPRESSOR) tablet 25 mg  25 mg Oral BID    pantoprazole (PROTONIX) tablet 40 mg  40 mg Oral ACB    polyethylene glycol (MIRALAX) packet 17 g  17 g Oral DAILY    propafenone (RYTHMOL) tablet 150 mg  150 mg Oral BID    traMADoL (ULTRAM) tablet 50 mg  50 mg Oral Q6H PRN    zolpidem (AMBIEN) tablet 10 mg  10 mg Oral QHS PRN    influenza vaccine 2021-22 (6 mos+)(PF) (FLUARIX/FLULAVAL/FLUZONE QUAD) injection 0.5 mL  1 Each IntraMUSCular PRIOR TO DISCHARGE    nicotine (NICODERM CQ) 14 mg/24 hr patch 1 Patch  1 Patch TransDERmal Q24H       Review of Systems:   Denies chest pain, palpitations, cough, headache, visual problems, abdominal pain, dysuria, calf pain. Pertinent positives are as noted in the HPI, ROS unremarkable otherwise. Visit Vitals  /71 (BP 1 Location: Right upper arm)   Pulse 64   Temp 98.3 °F (36.8 °C)   Resp 18   Ht 5' 4\" (1.626 m)   Wt 314 lb (142.4 kg)   SpO2 96%   BMI 53.90 kg/m²        Physical Exam:   General: Alert, appropriately oriented. Ambulated with RW and CGA from bathroom to bed. HEENT: Normocephalic, EOM intact. Oral mucosa moist.   Lungs: Clear to auscultation bilaterally, no crackles, rhonchi, rales. Respirations even and unlabored. Heart: Regular rate, mostly regular underlying rhythm today. No murmurs.    Abdomen: Soft, non-tender, obese and protuberant but not distended. Bowel sounds normoactive, no organomegaly. Genitourinary: Deferred. Neuromuscular:      UE strength and ROM full and symmetric. LE strength at HF 3+/5 (R), 3-/5 (L), KF 4/5 (B). Sensation intact. Skin/extremity: Flaky rash on face less erythematous. Calves soft, non-tender B LE.  LE edema 1+, pitting at ankles; dry, scaly skin intact. Functional Assessment:  Balance  Sitting - Static: Good (unsupported) (11/16/21 1600)  Sitting - Dynamic: Good (unsupported) (11/16/21 1600)  Standing - Static: Good (with RW) (11/16/21 1600)  Standing - Dynamic : Impaired (11/16/21 1600)       Ramiro Hensley Fall Risk Assessment:  [employer] Ayden Fall Risk  Mobility: Ambulates or transfers with assist devices or assistance (11/16/21 1900)  Mobility Interventions: Assess mobility with egress test; Bed/chair exit alarm; Communicate number of staff needed for ambulation/transfer; OT consult for ADLs; Patient to call before getting OOB; PT Consult for mobility concerns; PT Consult for assist device competence; Strengthening exercises (ROM-active/passive); Utilize walker, cane, or other assistive device (11/16/21 1900)  Mentation: Alert, oriented x 3 (11/16/21 1900)  Medication: Patient receiving anticonvulsants, sedatives(tranquilizers), psychotropics or hypnotics, hypoglycemics, narcotics, sleep aids, antihypertensives, laxatives, or diuretics (11/16/21 1900)  Medication Interventions: Assess postural VS orthostatic hypotension; Bed/chair exit alarm; Evaluate medications/consider consulting pharmacy; Patient to call before getting OOB; Teach patient to arise slowly (11/16/21 1900)  Elimination: Needs assistance with toileting (11/16/21 1900)  Elimination Interventions: Bed/chair exit alarm; Call light in reach; Patient to call for help with toileting needs;  Toilet paper/wipes in reach (11/16/21 1900)  Prior Fall History: No (11/16/21 1900)  History of Falls Interventions: Bed/chair exit alarm; Consult care management for discharge planning; Door open when patient unattended; Room close to nurse's station (11/16/21 1900)  Total Score: 3 (11/16/21 1900)  Standard Fall Precautions: Yes (11/16/21 1900)  High Fall Risk: Yes (11/16/21 1900)     Ambulation:  Gait  Distance (ft): 70 Feet (ft) (70ft x 1  50ft x 1) (11/16/21 1600)  Assistive Device: Walker, rolling; Orthotic device (aspen LSO) (11/16/21 1600)     Labs/Studies:  Recent Results (from the past 72 hour(s))   METABOLIC PANEL, BASIC    Collection Time: 11/15/21  5:55 AM   Result Value Ref Range    Sodium 138 136 - 145 mmol/L    Potassium 4.0 3.5 - 5.1 mmol/L    Chloride 101 98 - 107 mmol/L    CO2 37 (H) 21 - 32 mmol/L    Anion gap 0 (L) 7 - 16 mmol/L    Glucose 95 65 - 100 mg/dL    BUN 19 8 - 23 MG/DL    Creatinine 0.72 0.6 - 1.0 MG/DL    GFR est AA >60 >60 ml/min/1.73m2    GFR est non-AA >60 >60 ml/min/1.73m2    Calcium 8.4 8.3 - 10.4 MG/DL       Assessment:     Problem List as of 11/17/2021 Date Reviewed: 11/5/2021          Codes Class Noted - Resolved    * (Principal) Spinal stenosis, lumbar region with neurogenic claudication ICD-10-CM: M48.062  ICD-9-CM: 724.03  11/3/2021 - Present        A-fib ([facility] ICD-10-CM: I48.91  ICD-9-CM: 427.31  10/30/2021 - Present        ÁNGEL (acute kidney injury) ([facility] ICD-10-CM: N17.9  ICD-9-CM: 584.9  10/30/2021 - Present        Acute respiratory failure with hypoxia ([facility] ICD-10-CM: J96.01  ICD-9-CM: 518.81  10/30/2021 - Present        Hypovolemic shock ([facility] ICD-10-CM: R57.1  ICD-9-CM: 785.59  10/26/2021 - Present        Hypotension ICD-10-CM: I95.9  ICD-9-CM: 458.9  10/26/2021 - Present        Spondylolisthesis of multiple sites in spine ICD-10-CM: M43.19  ICD-9-CM: 738.4  10/25/2021 - Present        Spinal stenosis ICD-10-CM: M48.00  ICD-9-CM: 724.00  10/25/2021 - Present        Obesity, morbid ([facility] ICD-10-CM: E66.01  ICD-9-CM: 278.01  12/22/2017 - Present        Acquired hypothyroidism ICD-10-CM: E03.9  ICD-9-CM: 244.9  9/13/2017 - Present        Postoperative wound infection ICD-10-CM: T81.49XA  ICD-9-CM: 998.59  5/19/2016 - Present        Lumbar stenosis with neurogenic claudication ICD-10-CM: M48.062  ICD-9-CM: 724.03  3/28/2016 - Present        Essential hypertension, benign ICD-10-CM: I10  ICD-9-CM: 401.1  7/24/2013 - Present        Endometriosis ICD-10-CM: N80.9  ICD-9-CM: 617.9  7/24/2013 - Present        Colon polyp ICD-10-CM: K63.5  ICD-9-CM: 211.3  7/24/2013 - Present        Squamous cell skin cancer ICD-10-CM: C44.92  ICD-9-CM: 173.92  7/24/2013 - Present              L2 L4 lumbar direct lateral interbody fusion with anterior plating and redo of L2 L5 laminectomy (10/26/2021 // Dr Zoey Richard) due to spinal stenosis with neurogenic claudication     Plan / Recommendations / Medical Decision Making:      Daily physician / PA medical management:     Lumbar stenosis with neurogenic claudication, spondylolisthesis of lumbar region, lumbar spinal stenosis region with neurogenic claudication - PT / OT; spinal precautions, girdle when ambulating.     Atrial fibrillation - s/p 2 failed ablations, managed with Rhythmol, metoprolol and Eliquis. Currently NSR; Eliquis restarted 10/31 in light of RUL pulmonary embolism. -11/4 irreg, rate controlled, continue meds  -11/5 noticeably RRR to prolonged auscultation  -11/6 IIR today but rate-controlled  -11/12 NSR, rate 60s; continue Rhythmol, metoprolol and Eliquis  -11/14 HR 63 - 72 range last 24 hours  -11/15 HR 65-71 denies palpitations; continue Rhythmol  -11/16 rate controlled  -11/17 more RRR today, rate controlled     Hypotension / hypertension - BP fluctuating, managed medically. Has been hypotensive requiring levophed and now midodrine (pt's home lisinopril 20mg BID and HCTZ 12.5mg BID are on hold); needs TEDs when OOB.  -11/4 hypotensive episodes improved; decrease midodrine to 5mg TID.  Continue metoprolol for rate control  -11/5 sBP fluctuating from 102-163, dBP all <85, mostly in 50-60s; monitor as add back PRN furosemide for edema  -11/6 /73  -11/8 119-148/63-73 ; starr midodrine to 5mg bid and wean off; MUST wear TEDs when OOB and elevate LEs whenever possible. Bilateral pitting edema; will give 40mg bid of Lasix today and reassess daily. Check renal fxn  -11/9 creat 0.56, bun 16; 11/10 creat 0.67  -11/11 /57 this am, max 143/76; continue midodrine 5mg BID for now. One episode of dizziness yesterday but BP normal by the time they could check BP  -11/14 /53; 11/15 116/52 controlled  -11/16 VSS; continue midodrine BID  -111/ will change midodrine to TID; ? labile BP needs more consistent dosing?     Pain control - ongoing significant pain in back which is stable and controlled by PRN meds. Will require regular pain assessment and comprehensive pain management. Continue PRN Norco, tramadol, APAP and gabapentin. -11/4 increase Neurontin to 300mg TID and Elavil at night 25mg due to sciatic pain; add back Requip 1mg QHS  -11/5 start steroid taper for R LE pain  -11/6 R LE radiating pain resolved, finish steroid taper  -11/8 MUCH improved with steroid dose lyudmila  -11/16 pain controlled     Hypokalemia - monitor; currently 4.3 on supplement due to HCTZ; may be able to hold supplement. -11/4 labs pending; d/c daily labs  -11/5 K+ 4.2  -11/6 recheck BMP 11/8 since continuing daily furosemide; pending 11/8; k 4.2  -11/10 K 4.1, cont supplement while on lasix  -11/12 K 4.0  -11/15 K 4.0     Leukocytosis, mild - wbc 11.9 at [facility] admission (11/3). No s/sx of infxn. -11/5 WBC 11.9, afebrile, no s/sx infection; will likely increase with steroids  -11/6 recheck CBC 11/8  -11/8 QOP 53.0 (from 11.9); likely steroid-induced; will check UA (negative); this is last day of steroids  -11/12 WBC 10.4, afebrile      Anemia, blood loss - Hgb 9.0, improving; had 2000ml blood loss and received blood transfusion x 3, as well as 2800ml of crystalloid.   -11/6 recheck CBC 11/8; 9.4 from 8.8 11/5  -11/10 Hgb 9.7, improving  -11/12 Hgb 8.8     Hypothyroidism - continue Synthroid.     Pneumonia prophylaxis - incentive spirometer every hour while awake. Rosacea, 11/16 - start doxycycline 100mg BID x 3 d and then daily per home regimen.     DVT risk / DVT prophylaxis - daily physician / PA exam to assess as patient is at increased risk for of thromboembolism. Mobilize as tolerated. Sequential pneumatic compression devices (SCDs) when in bed; thigh-high or knee-high thromboembolic deterrent hose when out of bed. On Eliquis.     GI prophylaxis - resume PPI. At times may need additional antacids, Maalox prn.     Depression - continue on Lexapro. At risk of depression exacerbation due to pain and loss of independent function.     General skin care / wound prevention - monitor lumbar incision and general skin wound status daily per staff and physician / PA. At risk for failure. Will require 24/7 rehab nursing. -11/4 lateral left hip wound; looks like a blister that had opened up vs sheer injury. Cleanse daily and cover. Back incision healing  -11/15 incision c/d/i     Bladder program / urinary retention / neurogenic bladder - schedule voids q 6-8 hrs. Check post-void residual as needed; in-and-out catheter if post-void residual is more than 400ml.  -voiding continently without residuals  -11/15 remove laboy in day or two. IV lasix was causing very frequent urination  -11/16 d/c laboy     Bowel program - at risk for constipation as a side effect of opioids, other medications, impaired mobility, etc. MiraLAX daily for regularity, Azul-Colace for stool softener. PRN MOM, bisacodyl suppository or tablets for constipation.  -continent, regular     Edema, 11/9 - >1800ml out; continue Lasix 40mg BID for today . Pt requesting laboy at [facility]  -11/10 [address], wt is up. Will keep laboy and give 40mg IV lasix today. No hx of CHF but has Afib. BNP 2757.  Alb 2.7; pt had a 2d echo 10/29/21; EF>70%, severe pulm HTN noted with hx of RUL pulm emb. No evidence tho of right heart strain. VSS; daily wts  11/11 5lbs down from yesterday. UOP -1450 , BNP 1865,,improved; 40mg IV Lasix x 1 today  -11/12 will weigh today. One more dose of IV Lasix today 40mg. Good diuresis. Lungs clear no hx CHF. Off steroids now. BNP pending. UOP -4275  - 11/14 continue on home dose po lasix 40 mg qd, Last weights- 315lbs > 316lbs with iv lasix x 1 given yesterday, today 312lbs. Continue with 1800ml fluid restriction. Continue daily weights. BMP ordered for thalia. -11/15 need f/u wt. Significant improvement in edema. Continue oral Lasix 40mg daily. K 4, creat 0.72  -11/16 continue with oral Lasix, home dose of 40mg daily. Renal fxn normal, K nl. Dc laboy cath now. Wt is down 15lbs           Time spent was 25 minutes with over 1/2 in direct patient care/examination, consultation and coordination of care. Signed By: Bj Vaughn PA-C    November 17, 2021      Physician Assistant with [facility]  Margret Martínez MD, Medical Director

## 2021-11-17 NOTE — PROGRESS NOTES
New order received from [place], H/H today. Pt having near syncopal episodes, reports, blood when urinating and blood when blowing her nose. General OB Triage Discharge Instructions

## 2021-11-17 NOTE — PROGRESS NOTES
Team conference today with discharge set for Monday, 11/22 pending medical needs. Pt having some issues with dizziness. BSN, CM met with pt at bedside and updated on d/c date. HH needs include RN/PT/OT (offered aide but pt declined) and pt would like to use [facility] after reviewing choices. Referral placed to [facility]. DME needs include bariatric RW and order with [facility] and to be delivered Friday, 11/19. Offered family training but pt does not want as sister-in-law has extensive caregiver experience. Contacted daughter, Michelle Potter, and made aware of d/c date. Daughter has some concern about d/c with nose bleed and pt near syncope episode. Daughter aware pt will only be d/c if these medical concerns improve. Daughter agreeable and confirms herself or pt sister-in-law will transport home. CM to continue to follow and monitor for any further needs.

## 2021-11-17 NOTE — PROGRESS NOTES
Call to [facility] lab, spoke with Tomajonathan Montoya and transferred to Milena Calderon, informed of call to lab this am by charge nurse Milena Calderon to Mandeep for assist with pt that is difficult stick. Per iftikhar Smith, message was not relayed to her, she will arrange for phlebotomist to come to [facility] for assist with difficult stick pt.

## 2021-11-17 NOTE — PROGRESS NOTES
Lab here to draw H/H and BMP, new order received form Marylee, PA, may change BNP for tomorrow am till today.

## 2021-11-17 NOTE — PROGRESS NOTES
..Bedside and Verbal shift change report given to Ann Meza RN (oncoming nurse) by Gisella Lyman RN (offgoing nurse). Report included the following information SBAR, Kardex, OR Summary, Procedure Summary, Intake/Output, MAR, Recent Results, Med Rec Status, Cardiac Rhythm NSR, Alarm Parameters , Quality Measures and Dual Neuro Assessment.

## 2021-11-17 NOTE — PROGRESS NOTES
Call to [facility]. Spoke to Mandeep and informed patient is a difficult stick.  Mandeep to inform first

## 2021-11-17 NOTE — PROGRESS NOTES
PHYSICAL THERAPY DAILY NOTE  Time In: 2056  Time Out: 3206  Patient Seen For: PM; Gait training; Patient education; Therapeutic exercise; Transfer training; Other (see progress notes)    Subjective: patient reporting she feels worse this PM. Reports she feels very weak and SOB just doing leg exercises in the bed. Reports she has been up several times this afternoon going to the bathroom. Reports she is tired just from going to the bathroom         Objective:Vital Signs  Patient Vitals for the past 12 hrs:   Temp Pulse Resp BP SpO2   11/17/21 1120    125/60    11/17/21 0715 98.3 °F (36.8 °C) 64 18 128/71 96 %     Pain level:0 to 4 out of 10  Pain location:low back left>right side  Pain interventions:Medication per order, rest, positioning,relaxation, body mechanics, L/S corset      Patient education:Bed mobility training, body mechanics,LE HEP,activity pacing,spinal precautions,Patient verbalizing understanding and demonstrating  understanding of patient education. Recommend follow up education.     Interdisciplinary Communication:spoke with RN regarding functional level and pain medication schedule    Spinal, Other (comment) (Falls)  GROSS ASSESSMENT Daily Assessment                COGNITION Daily Assessment    No change       BED/MAT MOBILITY Daily Assessment   Increased time and effort to complete with cues for body mechanics  Using bed rail  Rolling Right : 6 (Modified independent)  Rolling Left : 6 (Modified independent)  Supine to Sit : 0 (Not tested)  Sit to Supine : 0 (Not tested)       TRANSFERS Daily Assessment   Increased time and effort to complete with cues for body mechanics   Transfer Type: SPT with walker  Transfer Assistance : 6 (Modified independent)  Sit to Stand Assistance: Modified independent  Car Transfers: Not tested       GAIT Daily Assessment    NT       STEPS or STAIRS Daily Assessment    Steps/Stairs Ambulated (#): 0  Level of Assist : 0 (Not tested)       BALANCE Daily Assessment    NA WHEELCHAIR MOBILITY Daily Assessment    Curbs/Ramps Assist Required (FIM Score): 0 (Not tested)  Wheelchair Setup Assist Required : 0 (Not tested)       LOWER EXTREMITY EXERCISES Daily Assessment   Increased time and effort to complete with multiple and frequent rest breaks. Cues for correct form     Extremity: Both  Exercise Type #1: supine lower extremity strengthening: ankle pumps, hip abd with skate, heel slides, SAQ  Sets Performed: 3  Reps Performed: 10  Level of Assist:supervision with set up assist and cues            Assessment: LE ROM and strength improving with HEP. Unable to progress functional mobility this PM due to fatigue       Patient remained in room at end of treatment. Patient supine in bed with head of bed elevated and bed rails up x 2. Needs placed in reach of patient. Plan of Care: Continue with POC and progress as tolerated.      Biju Theodore, PT  11/17/2021

## 2021-11-17 NOTE — PROGRESS NOTES
Time In 0923   Time Out 1039     Mobility   Score Comments   Supine to Sit 6: Independent I   Sit to Stand 4: Supervision or touching A S   Transfer Assist 4: Supervision or touching A Transfer Type: SPT   Equipment: Rolling Walker   Comments: S     Activities of Daily Living    Score Comments   Oral Hygiene 6: Independent I   Bathing 3: Partial/Moderate A Type of Shower: Bath Pack  Position: Standing PRN and Unsupported Sitting   Adaptive  Equipment: N/A  Comments: A for buttocks   Upper Body  Dressing 5: S/U or clean-up assist Items Applied: Pullover  Position: Unsupported Sitting  Comments: S/U   Lower Body Dressing 4: Supervision or touching A Items Applied: Underwear and Elastic pants  Position: Standing PRN and Unsupported Sitting  Adaptive Equipment: Reacher and RW  Comments: Cueing for techniques   Donning/[place] Footwear 3: Partial/Moderate A Items Applied: Socks and Shoes with fasteners   Adaptive Equipment: Reacher, Sock Aide, Long Handeled Shoe Horn and Elastic Shoelaces  Comments: Given elastic shoelaces; A for one shoe   Toilet Transfer 4: Supervision or touching A Transfer Type: SPT   Equipment: Rolling Walker   Comments: [facility] 4: Supervision or touching A Output: Urine x2  Comments: S   Education  Using the walker for safety     Pt was in bed and agreeable to tx. Pt's performance with ADL is reflected in above chart. Pt toileted x2. Pt engaged in functional reach task while wearing 1 lb B distal wrist weight with good quality. Pt was left on toilet with call button.      Chandra Ambrocio OT   11/17/2021

## 2021-11-18 ENCOUNTER — HOSPITAL ENCOUNTER (EMERGENCY)
Age: 66
Discharge: HOME | End: 2021-11-18
Attending: EMERGENCY MEDICINE
Payer: MEDICARE

## 2021-11-18 ENCOUNTER — APPOINTMENT (OUTPATIENT)
Dept: GENERAL RADIOLOGY | Age: 66
DRG: 560 | End: 2021-11-18
Attending: EMERGENCY MEDICINE
Payer: MEDICARE

## 2021-11-18 ENCOUNTER — APPOINTMENT (OUTPATIENT)
Dept: GENERAL RADIOLOGY | Age: 66
End: 2021-11-18
Attending: EMERGENCY MEDICINE
Payer: MEDICARE

## 2021-11-18 VITALS
HEART RATE: 67 BPM | RESPIRATION RATE: 16 BRPM | OXYGEN SATURATION: 97 % | DIASTOLIC BLOOD PRESSURE: 56 MMHG | SYSTOLIC BLOOD PRESSURE: 121 MMHG | BODY MASS INDEX: 50.02 KG/M2 | TEMPERATURE: 97.4 F | WEIGHT: 293 LBS | HEIGHT: 64 IN

## 2021-11-18 DIAGNOSIS — R07.9 CHEST PAIN, UNSPECIFIED TYPE: Primary | ICD-10-CM

## 2021-11-18 LAB
ALBUMIN SERPL-MCNC: 2.7 G/DL (ref 3.2–4.6)
ALBUMIN/GLOB SERPL: 0.8 {RATIO} (ref 1.2–3.5)
ALP SERPL-CCNC: 153 U/L (ref 50–136)
ALT SERPL-CCNC: 20 U/L (ref 12–65)
ANION GAP SERPL CALC-SCNC: 5 MMOL/L (ref 7–16)
APPEARANCE UR: NORMAL
AST SERPL-CCNC: 22 U/L (ref 15–37)
ATRIAL RATE: 64 BPM
BASOPHILS # BLD: 0.1 K/UL (ref 0–0.2)
BASOPHILS NFR BLD: 1 % (ref 0–2)
BILIRUB SERPL-MCNC: 0.6 MG/DL (ref 0.2–1.1)
BILIRUB UR QL: NEGATIVE
BUN SERPL-MCNC: 15 MG/DL (ref 8–23)
CALCIUM SERPL-MCNC: 8.4 MG/DL (ref 8.3–10.4)
CALCULATED P AXIS, ECG09: 17 DEGREES
CALCULATED R AXIS, ECG10: 78 DEGREES
CALCULATED T AXIS, ECG11: 9 DEGREES
CHLORIDE SERPL-SCNC: 100 MMOL/L (ref 98–107)
CO2 SERPL-SCNC: 34 MMOL/L (ref 21–32)
COLOR UR: YELLOW
CREAT SERPL-MCNC: 0.82 MG/DL (ref 0.6–1)
DIAGNOSIS, 93000: NORMAL
DIFFERENTIAL METHOD BLD: ABNORMAL
EOSINOPHIL # BLD: 0.4 K/UL (ref 0–0.8)
EOSINOPHIL NFR BLD: 4 % (ref 0.5–7.8)
ERYTHROCYTE [DISTWIDTH] IN BLOOD BY AUTOMATED COUNT: 15.9 % (ref 11.9–14.6)
GLOBULIN SER CALC-MCNC: 3.6 G/DL (ref 2.3–3.5)
GLUCOSE SERPL-MCNC: 93 MG/DL (ref 65–100)
GLUCOSE UR STRIP.AUTO-MCNC: NEGATIVE MG/DL
HCT VFR BLD AUTO: 29.3 % (ref 35.8–46.3)
HEMOCCULT STL QL: POSITIVE
HGB BLD-MCNC: 8.6 G/DL (ref 11.7–15.4)
HGB UR QL STRIP: NEGATIVE
IMM GRANULOCYTES # BLD AUTO: 0 K/UL (ref 0–0.5)
IMM GRANULOCYTES NFR BLD AUTO: 0 % (ref 0–5)
KETONES UR QL STRIP.AUTO: NEGATIVE MG/DL
LEUKOCYTE ESTERASE UR QL STRIP.AUTO: NEGATIVE
LIPASE SERPL-CCNC: 80 U/L (ref 73–393)
LYMPHOCYTES # BLD: 2.1 K/UL (ref 0.5–4.6)
LYMPHOCYTES NFR BLD: 20 % (ref 13–44)
MAGNESIUM SERPL-MCNC: 2 MG/DL (ref 1.8–2.4)
MCH RBC QN AUTO: 27.2 PG (ref 26.1–32.9)
MCHC RBC AUTO-ENTMCNC: 29.4 G/DL (ref 31.4–35)
MCV RBC AUTO: 92.7 FL (ref 79.6–97.8)
MONOCYTES # BLD: 1 K/UL (ref 0.1–1.3)
MONOCYTES NFR BLD: 9 % (ref 4–12)
NEUTS SEG # BLD: 6.7 K/UL (ref 1.7–8.2)
NEUTS SEG NFR BLD: 65 % (ref 43–78)
NITRITE UR QL STRIP.AUTO: NEGATIVE
NRBC # BLD: 0 K/UL (ref 0–0.2)
P-R INTERVAL, ECG05: 150 MS
PH UR STRIP: 7 [PH] (ref 5–9)
PLATELET # BLD AUTO: 407 K/UL (ref 150–450)
PMV BLD AUTO: 9.2 FL (ref 9.4–12.3)
POTASSIUM SERPL-SCNC: 4 MMOL/L (ref 3.5–5.1)
PROT SERPL-MCNC: 6.3 G/DL (ref 6.3–8.2)
PROT UR STRIP-MCNC: NEGATIVE MG/DL
Q-T INTERVAL, ECG07: 426 MS
QRS DURATION, ECG06: 82 MS
QTC CALCULATION (BEZET), ECG08: 439 MS
RBC # BLD AUTO: 3.16 M/UL (ref 4.05–5.2)
SODIUM SERPL-SCNC: 139 MMOL/L (ref 136–145)
SP GR UR REFRACTOMETRY: 1.01 (ref 1–1.02)
TROPONIN-HIGH SENSITIVITY: 13.8 PG/ML (ref 0–14)
TROPONIN-HIGH SENSITIVITY: 15.1 PG/ML (ref 0–14)
TROPONIN-HIGH SENSITIVITY: 16.3 PG/ML (ref 0–14)
UROBILINOGEN UR QL STRIP.AUTO: 0.2 EU/DL (ref 0.2–1)
VENTRICULAR RATE, ECG03: 64 BPM
WBC # BLD AUTO: 10.3 K/UL (ref 4.3–11.1)

## 2021-11-18 PROCEDURE — 80053 COMPREHEN METABOLIC PANEL: CPT

## 2021-11-18 PROCEDURE — 93005 ELECTROCARDIOGRAM TRACING: CPT | Performed by: EMERGENCY MEDICINE

## 2021-11-18 PROCEDURE — 99284 EMERGENCY DEPT VISIT MOD MDM: CPT

## 2021-11-18 PROCEDURE — 65310000000 HC RM PRIVATE REHAB

## 2021-11-18 PROCEDURE — 97530 THERAPEUTIC ACTIVITIES: CPT

## 2021-11-18 PROCEDURE — 74011000250 HC RX REV CODE- 250: Performed by: PHYSICIAN ASSISTANT

## 2021-11-18 PROCEDURE — 74011250637 HC RX REV CODE- 250/637: Performed by: PHYSICAL MEDICINE & REHABILITATION

## 2021-11-18 PROCEDURE — 83735 ASSAY OF MAGNESIUM: CPT

## 2021-11-18 PROCEDURE — 74011250637 HC RX REV CODE- 250/637: Performed by: PHYSICIAN ASSISTANT

## 2021-11-18 PROCEDURE — 83690 ASSAY OF LIPASE: CPT

## 2021-11-18 PROCEDURE — 81003 URINALYSIS AUTO W/O SCOPE: CPT

## 2021-11-18 PROCEDURE — 99232 SBSQ HOSP IP/OBS MODERATE 35: CPT | Performed by: PHYSICAL MEDICINE & REHABILITATION

## 2021-11-18 PROCEDURE — 71046 X-RAY EXAM CHEST 2 VIEWS: CPT

## 2021-11-18 PROCEDURE — 97116 GAIT TRAINING THERAPY: CPT

## 2021-11-18 PROCEDURE — 84484 ASSAY OF TROPONIN QUANT: CPT

## 2021-11-18 PROCEDURE — 82272 OCCULT BLD FECES 1-3 TESTS: CPT

## 2021-11-18 PROCEDURE — 87086 URINE CULTURE/COLONY COUNT: CPT

## 2021-11-18 PROCEDURE — 97150 GROUP THERAPEUTIC PROCEDURES: CPT

## 2021-11-18 PROCEDURE — 85025 COMPLETE CBC W/AUTO DIFF WBC: CPT

## 2021-11-18 PROCEDURE — 74011250637 HC RX REV CODE- 250/637: Performed by: EMERGENCY MEDICINE

## 2021-11-18 PROCEDURE — 97110 THERAPEUTIC EXERCISES: CPT

## 2021-11-18 RX ORDER — SODIUM CHLORIDE 0.9 % (FLUSH) 0.9 %
5-10 SYRINGE (ML) INJECTION EVERY 8 HOURS
Status: DISCONTINUED | OUTPATIENT
Start: 2021-11-18 | End: 2021-11-19 | Stop reason: HOSPADM

## 2021-11-18 RX ORDER — SODIUM CHLORIDE 0.9 % (FLUSH) 0.9 %
5-10 SYRINGE (ML) INJECTION AS NEEDED
Status: DISCONTINUED | OUTPATIENT
Start: 2021-11-18 | End: 2021-11-19 | Stop reason: HOSPADM

## 2021-11-18 RX ORDER — HYDROCODONE BITARTRATE AND ACETAMINOPHEN 5; 325 MG/1; MG/1
1 TABLET ORAL ONCE
Status: COMPLETED | OUTPATIENT
Start: 2021-11-18 | End: 2021-11-18

## 2021-11-18 RX ADMIN — HYDROCODONE BITARTRATE AND ACETAMINOPHEN 1 TABLET: 10; 325 TABLET ORAL at 08:41

## 2021-11-18 RX ADMIN — Medication 1 AMPULE: at 08:40

## 2021-11-18 RX ADMIN — APIXABAN 5 MG: 5 TABLET, FILM COATED ORAL at 08:41

## 2021-11-18 RX ADMIN — ESCITALOPRAM OXALATE 20 MG: 10 TABLET ORAL at 08:40

## 2021-11-18 RX ADMIN — DOCUSATE SODIUM 100 MG: 100 CAPSULE, LIQUID FILLED ORAL at 08:42

## 2021-11-18 RX ADMIN — GABAPENTIN 300 MG: 300 CAPSULE ORAL at 08:42

## 2021-11-18 RX ADMIN — LEVOTHYROXINE SODIUM 50 MCG: 0.05 TABLET ORAL at 05:42

## 2021-11-18 RX ADMIN — PROPAFENONE HYDROCHLORIDE 150 MG: 150 TABLET, FILM COATED ORAL at 08:41

## 2021-11-18 RX ADMIN — METOPROLOL TARTRATE 25 MG: 25 TABLET, FILM COATED ORAL at 08:41

## 2021-11-18 RX ADMIN — MIDODRINE HYDROCHLORIDE 5 MG: 5 TABLET ORAL at 08:41

## 2021-11-18 RX ADMIN — POTASSIUM CHLORIDE 20 MEQ: 20 TABLET, EXTENDED RELEASE ORAL at 08:42

## 2021-11-18 RX ADMIN — HYDROCODONE BITARTRATE AND ACETAMINOPHEN 1 TABLET: 10; 325 TABLET ORAL at 12:42

## 2021-11-18 RX ADMIN — DOXYCYCLINE HYCLATE 100 MG: 100 CAPSULE ORAL at 08:41

## 2021-11-18 RX ADMIN — FUROSEMIDE 40 MG: 40 TABLET ORAL at 08:42

## 2021-11-18 RX ADMIN — PANTOPRAZOLE SODIUM 40 MG: 40 TABLET, DELAYED RELEASE ORAL at 05:42

## 2021-11-18 RX ADMIN — MIDODRINE HYDROCHLORIDE 5 MG: 5 TABLET ORAL at 12:42

## 2021-11-18 RX ADMIN — HYDROCODONE BITARTRATE AND ACETAMINOPHEN 1 TABLET: 5; 325 TABLET ORAL at 21:35

## 2021-11-18 NOTE — PROGRESS NOTES
Was notified by staff that patient continuing with pre-syncopal episodes but now with new left-sided chest pain. Patient evaluated, describes constant \"squeezing\" sharp left CP above pendulous breast that does not radiate and does not change with deep inspiration, palpation, exertion/rest. Has had increasing frequency of pre-syncopal episodes over last 4-5d, with sudden-onset dyspnea, facial pallor, loss of hearing and loss of vision. Staff present during these events note BP and O2 sats are always normal. At [facility], we are severely limited in evaluation, unable to do ECG, etc. Patient was sent urgently by EMS to [facility] ED; Gina Palomo MD in ED notified. Dr. Chelsea Angeles made aware. Vandana Munguia PA-C  Physician Assistant with [facility]  Margret Glasgow MD, Medical Director

## 2021-11-18 NOTE — PROGRESS NOTES
PHYSICAL THERAPY DAILY NOTE  Time In: 9373  Time Out: 4747  Patient Seen For: AM; Gait training; Patient education; Transfer training; Other (see progress notes)    Subjective: patient reporting she does not feel as good this AM. Reports no dizziness or loss of hearing walking short distances. Reports her low back is hurting more with some radiating pain into her left lateral buttock area. Reports no radiating pain down the leg. Reports overall she feels weaker with less energy and still gets short of breath with minimal activity. Reports room spinning type symptoms when lying down that resolves in about 1 to 2 minutes. Objective:Vital Signs:Sitting /59 HR 76 at rest at beginning of treatment  Patient Vitals for the past 12 hrs:   Temp Pulse Resp BP SpO2   11/18/21 0730 97.9 °F (36.6 °C) 67 16 124/70 97 %     Pain level:2 to 7 out of 10  Pain location:low back left side >right side with radiating pain into left lateral buttock  Pain interventions:Medication per order, rest, positioning,relaxation, body mechanics, L/S corset      Patient education:Bed mobility training,transfer training, gait training, balance training,fall precautions, body mechanics,activity pacing,spinal precautions, donning L/S corset, Patient verbalizing understanding and demonstrating  understanding of patient education. Recommend follow up education.     Interdisciplinary Communication:spoke with RN, MA and PA regarding patient's current symptoms noted above    Spinal, Other (comment) (Falls)  GROSS ASSESSMENT Daily Assessment   Independent DON/Doff  LSO  NA       COGNITION Daily Assessment    No change       BED/MAT MOBILITY Daily Assessment   Increased time and effort to complete with cues for body mechanics  Using bed rail   Rolling Right : 6 (Modified independent)  Rolling Left : 6 (Modified independent)  Supine to Sit : 6 (Modified independent)  Sit to Supine : 6 (Modified independent)       TRANSFERS Daily Assessment Increased time and effort to complete with cues for body mechanics   Transfer Type: SPT with walker  Transfer Assistance : 5 (Supervision/setup)  Sit to Stand Assistance: Supervision  Car Transfers: Not tested       GAIT Daily Assessment    Amount of Assistance: 5 (Stand-by assistance)  Distance (ft): 50 Feet (ft) (50ft x 5, 10 ft x 1)  Assistive Device: Walker, rolling; Gait belt; Orthotic device (Aspen LSO)   Gait training with one time cue for posture correction and cues for improved step length and step clearance        STEPS or STAIRS Daily Assessment    Steps/Stairs Ambulated (#): 0  Level of Assist : 0 (Not tested)       BALANCE Daily Assessment   Static standing with RW, lumbar corset and supervision with no loss of balance Sitting - Static: Good (unsupported)  Sitting - Dynamic: Good (unsupported)  Standing - Static: Fair (with RW)  Standing - Dynamic : Impaired       WHEELCHAIR MOBILITY Daily Assessment    Curbs/Ramps Assist Required (FIM Score): 0 (Not tested)  Wheelchair Setup Assist Required : 0 (Not tested)       LOWER EXTREMITY EXERCISES Daily Assessment      NA            Assessment:Decreased tolerance to treatment due to symptoms noted above. No Orthostatic hypotension symptoms today with short distance ambulation. New vertigo type symptoms when transitioning sit to supine       Patient returned to room at end of treatment. Patient supine in bed with head of bed elevated and bed rails up x 2. Needs placed in reach of patient. Plan of Care: Continue with POC and progress as tolerated.      Demar Rodriguez, PT  11/18/2021

## 2021-11-18 NOTE — PROGRESS NOTES
OT Daily Note  Time In 1033   Time Out 1119     Subjective: No complaints. Pleasant and hard working. Pain: none reported  Education: transfer training, spinal precautions  Interdisciplinary Communication: nursing aware patient out to therapy  Precautions: Spinal, Other (comment) (Falls)  Location on arrival: semi-johnson's in bed    Transfers Daily Assessment   Patient transferred semi-johnson's to edge of bed with modified independence. Patient donned LSO independently. Transferred edge of bed to [facility] using SPT with RW with supervision. Making steady gains. Activity Tolerance Daily Assessment   Patient completed UBE x 10 minutes x moderate resistance, going in 2 direction(s) with 0 rest break(s), working on BUE strengthening and functional activity tolerance. Patient completed BUE coordination task, 100 piece puzzle, with 1 lb weights donned on BUE. Completed approximately 1/5 of puzzle in approximately 15 minutes. Good response to activity. Would benefit from further activity tolerance tasks. Patient was left seated up at edge of bed with call light/all needs in reach and in no distress. Assessment: Making steady progress. On track. Plan: Continue OT POC with focus on ADL/IADL skills, functional transfers, functional mobility, coordination, strength, static and dynamic balance, and activity tolerance to maximize safety and independence with ADLs and functional transfers.      Waymon Essex, MS, OTR/L  11/18/2021        Note may contain the following abbreviations:   Abbreviation Explanation   AROM Active range of motion   PROM Passive range of motion   SPT Stand pivot transfer   LPT Lateral pivot transfer   WC wheelchair   RW Rolling walker    BUE Bilateral upper extremities   BLE Bilateral lower extremities    WBAT Weight bearing as tolerated    TTWB Toe-touch weight bearing    AD Adaptive device   AE Adaptive equipment    NMES Neuro muscular electrical stimulation   UBE Upper body ergometer

## 2021-11-18 NOTE — PROGRESS NOTES
PHYSICAL THERAPY DAILY NOTE  Time In: 1180  Time Out: 5488  Patient Seen For: PM; Gait training; Patient education; Therapeutic exercise; Transfer training; Other (see progress notes)    Subjective: patient reporting she feels worse this PM. Reports she is having left sided upper chest pain. Reports as dull ache with pressure. Reports increased SOB with exercise and ambulation. Reports she is having same symptoms again when walking of feeling light headed with tunnel vision and loosing her hearing. Reports symptoms resolve in sitting after 1 to 2 mins of rest         Objective:Vital Signs: Sitting /59 HR 76 02 sat 99% at rest. 02 sat 97% and HR 80 after ambulating 30 ft. Patient Vitals for the past 12 hrs:   Temp Pulse Resp BP SpO2   11/18/21 0835  76  (!) 112/59    11/18/21 0730 97.9 °F (36.6 °C) 67 16 124/70 97 %     Pain level:0 to 6 out of 10  Pain location:low back left>right side, left upper chest area  Pain interventions:Medication per order, rest, positioning,relaxation, body mechanics, L/S corset      Patient education:Bed mobility training, transfer and gait training, body mechanics,LE HEP,activity pacing,spinal precautions,Patient verbalizing understanding and demonstrating  understanding of patient education. Recommend follow up education. Interdisciplinary Communication:spoke with RN and PA regarding patient's current symptoms as noted above and vital signs.     Spinal, Other (comment) (Falls)  GROSS ASSESSMENT Daily Assessment         NA       COGNITION Daily Assessment    No change       BED/MAT MOBILITY Daily Assessment   Increased time and effort to complete with cues for body mechanics  Using bed rail  Rolling Right : 6 (Modified independent)  Rolling Left : 6 (Modified independent)  Supine to Sit : 6 (Modified independent)  Sit to Supine : 6 (Modified independent)       TRANSFERS Daily Assessment   Increased time and effort to complete with cues for body mechanics   Transfer Type: SPT with walker  Transfer Assistance : 6 (Modified independent)  Sit to Stand Assistance: Modified independent  Car Transfers: Not tested       GAIT Daily Assessment    Gait training with RW, gait belt and Carey LSO and SBA. 30 ft x 1 . Stopped gait training after 30 ft due to patient becoming pale with SOB and c/o feeling dizzy with ability to hear  worsening. Patient sat down in w/c. After 2 mins symptoms resolved. STEPS or STAIRS Daily Assessment    Steps/Stairs Ambulated (#): 0  Level of Assist : 0 (Not tested)       BALANCE Daily Assessment    Good static and dynamic sitting balance  Good standing balance with RW. Impaired dynamic standing balance       WHEELCHAIR MOBILITY Daily Assessment    Curbs/Ramps Assist Required (FIM Score): 0 (Not tested)  Wheelchair Setup Assist Required : 0 (Not tested)       LOWER EXTREMITY EXERCISES Daily Assessment   Increased time and effort to complete with multiple and frequent rest breaks. Cues for correct form     Extremity: Both  Exercise Type #1: supine lower extremity strengthening: ankle pumps, hip abd with skate, heel slides, SAQ  Sets Performed: 3  Reps Performed: 10  Level of Assist:supervision with set up assist and cues            Assessment: patient's ongoing symptoms over the last 3 days worsening. Ambulation distance and SOB during functional activity worsening but 02 sat stable. Unable to progress functional status this week due to worsening medical symptoms. Patient remained in room at end of treatment. Patient supine in bed with head of bed elevated and bed rails up x 2. Needs placed in reach of patient. RN and PA in to assess patient.     Plan of Care: Check status in AM tomorrow    Sheyla Hercules, PT  11/18/2021

## 2021-11-18 NOTE — ED PROVIDER NOTES
Joey Long is a 73 y.o. female seen on 11/18/2021 in the [facility] EMERGENCY DEPT in room Karen Almanzar. Chief Complaint   Patient presents with    Chest Pain     HPI: 46-year-old  female presented to the emergency department via EMS with complaints of chest pain. 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 Recent surgery on her lumbar spine and she is currently at rehab for the surgery. Patient has been taking her Eliquis. Patient states that she got up for PT/OT today and was walking down the [place] when she had sudden onset left sided chest pain with associated lightheadedness and mild shortness of breath. Patient told the physical therapist that she needed to sit down because of the symptoms. Patient states that she has never had pain like this before and she is aware of. She has no history of CAD but does have a history of paroxysmal A. fib. Patient was given 2 nitroglycerin by EMS with seemed to relieve her pain. Patient states that while here in the emergency department she did have recurrence of her pain and it resolved on its own. She is not having any pain at all currently. She does state that she is retaining some fluid from surgery but has no leg pain. She denies any fevers, chills, cough, nausea, vomiting, diarrhea or any other concerns. Historian: Patient    REVIEW OF SYSTEMS     Review of Systems   Constitutional: Negative. HENT: Negative. Respiratory: Positive for chest tightness. Cardiovascular: Positive for chest pain and leg swelling. Gastrointestinal: Negative. Genitourinary: Negative. Musculoskeletal: Positive for back pain. Skin: Negative. Neurological: Negative. Psychiatric/Behavioral: Negative. All other systems reviewed and are negative.       PAST MEDICAL HISTORY     Past Medical History:   Diagnosis Date    A-fib [facility])     Acquired hypothyroidism 9/13/2017    Anxiety and depression     Arthritis     osteo    Chronic pain     back and R leg    GERD (gastroesophageal reflux disease)     well controlled with med    H/O bone density study 10/2011    normal    Hypertension     controlled by med    Low back pain     Morbid obesity ([address]     bmi 48.63    Pneumonia     1/2020    Rectal bleeding onset x 2 months    Restless legs     Rosacea     Skin cancer     squamous- removed    Smoker     current 10/18/21 5-10 daily \"trying to quit\"-- previously 1 ppd x since age 22    Spinal stenosis     Staph infection 03/2016    from back surgery----- NOT mrsa    Suspected sleep apnea      test not completed 11/2018     Past Surgical History:   Procedure Laterality Date    COLONOSCOPY N/A 10/8/2018    COLONOSCOPY / BMI=50  performed by Vikash Mandujano MD at [place] [address] HX AFIB ABLATION  2017 & 2018    HX APPENDECTOMY  age 13    HX BACK SURGERY  2016    spine I&D post op infection    HX BREAST BIOPSY Right 2011    benign    HX CARPAL TUNNEL RELEASE Left     HX CHOLECYSTECTOMY  2014    HX COLONOSCOPY  2014         HX CYST REMOVAL      foot    HX KNEE ARTHROSCOPY Right     HX KNEE ARTHROSCOPY Left     HX LUMBAR LAMINECTOMY  2016    L2-S1    HX MALIGNANT SKIN LESION EXCISION      x 3     HX MALIGNANT SKIN LESION EXCISION      Jan 29,2019    HX MALIGNANT SKIN LESION EXCISION  06/16/2020    Nasal    HX MALIGNANT SKIN LESION EXCISION  06/30/2021    face    HX ORTHOPAEDIC      heel spur    HX ORTHOPAEDIC Left 2000's    achilles tendon    HX PELVIC LAPAROSCOPY      x 2    HX PREMALIG/BENIGN SKIN LESION EXCISION      HX GUSTAVO AND BSO  2004    HX TONSIL AND ADENOIDECTOMY  age 12   [de-identified] TUBAL LIGATION      SC ELBOW ARTHROSCOP,DIAGNOSTIC Left 2013    along with carpal tunnel     Social History     Socioeconomic History    Marital status:    Tobacco Use    Smoking status: Current Every Day Smoker     Packs/day: 0.75     Years: 25.00     Pack years: 18.75     Types: Cigarettes    Smokeless tobacco: Never Used    Tobacco comment: 05-1 ppd since age 22 (quit briefly   Vaping Use    Vaping Use: Never used   Substance and Sexual Activity    Alcohol use: No    Drug use: No    Sexual activity: Not Currently   Other Topics Concern    Caffeine Concern No    Exercise Yes    Seat Belt Yes    Self-Exams Yes   Social History Narrative    No history of physical or sexual abuse feels safe at home     Prior to Admission Medications   Prescriptions Last Dose Informant Patient Reported? Taking? LORazepam (ATIVAN) 0.5 mg tablet   No No   Sig: TAKE 1 TAB BY MOUTH EVERY EIGHT HOURS AS NEEDED FOR ANXIETY. OTHER   No No   Sig: Nebulizer (J11.00) Influenza and pneumonia  (primary encounter diagnosis)  (J20.9) Acute bronchitis, unspecified organism  (R06.2) Wheezing   apixaban (ELIQUIS) 5 mg tablet   No No   Sig: Take 1 Tablet by mouth two (2) times a day. Patient taking differently: Take 5 mg by mouth two (2) times a day. Cardiology instructions- hold 3 days prior to surgery   aspirin delayed-release 81 mg tablet   Yes No   Sig: Take 81 mg by mouth nightly. Take only while holding Eliquis for surgery per anesthesia protocol   benazepril-hydroCHLOROthiazide (LOTENSIN HCT) 20-12.5 mg per tablet   No No   Sig: Take 1 Tablet by mouth two (2) times a day. cholecalciferol (VITAMIN D3) 1,000 unit tablet   Yes No   Sig: Take 1,000 Units by mouth every morning. doxycycline (MONODOX) 100 mg capsule   No No   Sig: TAKE 1 CAPSULE BY MOUTH EVERY DAY   escitalopram oxalate (LEXAPRO) 20 mg tablet   No No   Sig: TAKE 1 TABLET BY MOUTH EVERY DAY   Patient taking differently: Take 20 mg by mouth Every morning. furosemide (LASIX) 40 mg tablet   No No   Sig: Take 1 Tablet by mouth daily. Patient taking differently: Take 40 mg by mouth daily as needed. gabapentin (NEURONTIN) 300 mg capsule   No No   Sig: Take 2 Capsules by mouth nightly.    levothyroxine (SYNTHROID) 50 mcg tablet   No No   Sig: Take 1 Tablet by mouth Daily (before breakfast). metoprolol tartrate (LOPRESSOR) 25 mg tablet   Yes No   Sig: TAKE 1 TABLET (25 MG) BY MOUTH 2 (TWO) TIMES A DAY   omeprazole (PRILOSEC) 40 mg capsule   No No   Sig: TAKE 1 CAPSULE BY MOUTH EVERY DAY   potassium chloride SR (KLOR-CON 10) 10 mEq tablet   No No   Sig: Take 1 Tablet by mouth daily. Patient taking differently: Take 10 mEq by mouth daily as needed. With Lasix prn   propafenone (RYTHMOL) 150 mg tablet   Yes No   Sig: Take 150 mg by mouth two (2) times a day. rOPINIRole (REQUIP) 1 mg tablet   No No   Sig: TAKE 1/2 TABLET BY MOUTH NIGHTLY   Patient taking differently: TAKE 1/2 TABLET BY MOUTH NIGHTLY  Indications: restless legs syndrome, an extreme discomfort in the calf muscles when sitting or lying down   traMADoL (ULTRAM) 50 mg tablet   Yes No   Sig: TAKE 1 TAB BY MOUTH EVERY 6 HRS AS NEEDED FOR PAIN FOR UP TO 3 DAYS. MAX DAILY AMT  200 MG   zolpidem (AMBIEN) 10 mg tablet   No No   Sig: TAKE 1 TABLET BY MOUTH AT BEDTIME AS NEEDED FOR SLEEP      Facility-Administered Medications: None     Allergies   Allergen Reactions    Blue Dye Anaphylaxis     BLUE INDIGO DYE     Pcn [Penicillins] Hives    Adhesive Other (comments)     Skin irritation     Bee Venom Protein (Honey Bee) Swelling    Cefdinir Other (comments)     Tremor, can tolerate Ceftin         PHYSICAL EXAM       Vitals:    11/18/21 1542 11/18/21 1802   BP: (!) 199/93 135/63   Pulse: 64 65   Resp: 16    Temp: 97.4 °F (36.3 °C)    SpO2: 99% 100%    Vital signs were reviewed. Physical Exam  Vitals and nursing note reviewed. Constitutional:       General: She is not in acute distress. Appearance: She is well-developed. She is not ill-appearing or toxic-appearing. HENT:      Head: Normocephalic and atraumatic. Cardiovascular:      Rate and Rhythm: Normal rate and regular rhythm. Heart sounds: Normal heart sounds.    Pulmonary:      Effort: Pulmonary effort is normal.      Breath sounds: Normal breath sounds. Chest:      Chest wall: Tenderness (Reproducible chest wall tenderness over the left lower sternal costal border) present. Abdominal:      Palpations: Abdomen is soft. Tenderness: There is no abdominal tenderness. Musculoskeletal:         General: Normal range of motion. Right lower leg: No tenderness. Edema present. Left lower leg: No tenderness. Edema present. Skin:     General: Skin is warm and dry. Neurological:      General: No focal deficit present. Mental Status: She is alert and oriented to person, place, and time.    Psychiatric:         Mood and Affect: Mood normal.         Behavior: Behavior normal.          MEDICAL DECISION MAKING     ED Course:    Orders Placed This Encounter    XR Chest PA LAT (check if patient is in hallway or waiting room)    Troponin - High Sensitivity    Troponin 2 Hour Repeat    CBC    CMP    LIPASE    Magnesium    TROPONIN-HIGH SENSITIVITY    Cardiac Monitoring    PULSE OXIMETRY CONTINUOUS    NURSING-MISCELLANEOUS: Please draw blue top tube and send to lab ONE TIME    EKG    SALINE LOCK IV ONE TIME Routine    INSERT PERIPHERAL IV ONE TIME STAT    sodium chloride (NS) flush 5-10 mL    sodium chloride (NS) flush 5-10 mL     Recent Results (from the past 8 hour(s))   TROPONIN-HIGH SENSITIVITY    Collection Time: 11/18/21  3:48 PM   Result Value Ref Range    Troponin-High Sensitivity 13.8 0 - 14 pg/mL   CBC WITH AUTOMATED DIFF    Collection Time: 11/18/21  3:48 PM   Result Value Ref Range    WBC 10.3 4.3 - 11.1 K/uL    RBC 3.16 (L) 4.05 - 5.2 M/uL    HGB 8.6 (L) 11.7 - 15.4 g/dL    HCT 29.3 (L) 35.8 - 46.3 %    MCV 92.7 79.6 - 97.8 FL    MCH 27.2 26.1 - 32.9 PG    MCHC 29.4 (L) 31.4 - 35.0 g/dL    RDW 15.9 (H) 11.9 - 14.6 %    PLATELET 253 112 - 943 K/uL    MPV 9.2 (L) 9.4 - 12.3 FL    ABSOLUTE NRBC 0.00 0.0 - 0.2 K/uL    DF AUTOMATED      NEUTROPHILS 65 43 - 78 %    LYMPHOCYTES 20 13 - 44 %    MONOCYTES 9 4.0 - 12.0 %    EOSINOPHILS 4 0.5 - 7.8 %    BASOPHILS 1 0.0 - 2.0 %    IMMATURE GRANULOCYTES 0 0.0 - 5.0 %    ABS. NEUTROPHILS 6.7 1.7 - 8.2 K/UL    ABS. LYMPHOCYTES 2.1 0.5 - 4.6 K/UL    ABS. MONOCYTES 1.0 0.1 - 1.3 K/UL    ABS. EOSINOPHILS 0.4 0.0 - 0.8 K/UL    ABS. BASOPHILS 0.1 0.0 - 0.2 K/UL    ABS. IMM. GRANS. 0.0 0.0 - 0.5 K/UL   METABOLIC PANEL, COMPREHENSIVE    Collection Time: 11/18/21  3:48 PM   Result Value Ref Range    Sodium 139 136 - 145 mmol/L    Potassium 4.0 3.5 - 5.1 mmol/L    Chloride 100 98 - 107 mmol/L    CO2 34 (H) 21 - 32 mmol/L    Anion gap 5 (L) 7 - 16 mmol/L    Glucose 93 65 - 100 mg/dL    BUN 15 8 - 23 MG/DL    Creatinine 0.82 0.6 - 1.0 MG/DL    GFR est AA >60 >60 ml/min/1.73m2    GFR est non-AA >60 >60 ml/min/1.73m2    Calcium 8.4 8.3 - 10.4 MG/DL    Bilirubin, total 0.6 0.2 - 1.1 MG/DL    ALT (SGPT) 20 12 - 65 U/L    AST (SGOT) 22 15 - 37 U/L    Alk.  phosphatase 153 (H) 50 - 136 U/L    Protein, total 6.3 6.3 - 8.2 g/dL    Albumin 2.7 (L) 3.2 - 4.6 g/dL    Globulin 3.6 (H) 2.3 - 3.5 g/dL    A-G Ratio 0.8 (L) 1.2 - 3.5     LIPASE    Collection Time: 11/18/21  3:48 PM   Result Value Ref Range    Lipase 80 73 - 393 U/L   MAGNESIUM    Collection Time: 11/18/21  3:48 PM   Result Value Ref Range    Magnesium 2.0 1.8 - 2.4 mg/dL   EKG, 12 LEAD, INITIAL    Collection Time: 11/18/21  3:48 PM   Result Value Ref Range    Ventricular Rate 64 BPM    Atrial Rate 64 BPM    P-R Interval 150 ms    QRS Duration 82 ms    Q-T Interval 426 ms    QTC Calculation (Bezet) 439 ms    Calculated P Axis 17 degrees    Calculated R Axis 78 degrees    Calculated T Axis 9 degrees    Diagnosis       Normal sinus rhythm  Normal ECG  When compared with ECG of 30-OCT-2021 15:34,  Premature supraventricular complexes are no longer Present  Nonspecific T wave abnormality no longer evident in Lateral leads  Confirmed by Judithann Conception ([id number]) on 11/18/2021 6:57:01 PM     TROPONIN-HIGH SENSITIVITY    Collection Time: 11/18/21 6:05 PM   Result Value Ref Range    Troponin-High Sensitivity 16.3 (H) 0 - 14 pg/mL   TROPONIN-HIGH SENSITIVITY    Collection Time: 11/18/21  7:53 PM   Result Value Ref Range    Troponin-High Sensitivity 15.1 (H) 0 - 14 pg/mL     XR CHEST PA LAT    Result Date: 11/18/2021  CHEST X-RAY, 2 views 11/18/2021 History: Chest pain. Technique: PA and lateral views of the chest. Comparison: Chest x-ray 10/30/2021 Findings: The cardiac silhouette is normal in respect to size. The lungs are expanded without evidence for pneumothorax. No evolving consolidation, or evidence of pleural effusion is seen. A stable large right diaphragmatic eventration is seen. The bony thorax demonstrates no acute changes. The upper abdomen is unremarkable in appearance. 1.  No acute cardiopulmonary process evident by plain film imaging. This report was made using voice transcription. Despite my best efforts to avoid any, transcription errors may persist. If there is any question about the accuracy of the report or need for clarification, then please call [phone], or text me through [employer] for clarification or correction. EKG interpretation personally: Rate 64. Normal sinus rhythm. Normal DE and QT intervals. No ischemic changes. ED Course as of 11/18/21 2053   Thu Nov 18, 2021 2052 Patient with no more recurrences of her chest pain. Her blood pressure is better. Her troponins are negative x3. Patient will be discharged home. [JL]      ED Course User Index  [JL] Merry Plants, DO     MDM  Number of Diagnoses or Management Options  Chest pain, unspecified type  Diagnosis management comments: 52-year-old  female with no history of CAD presented the emergency department with chest pain that occurred while doing rehab earlier today. Patient pain resolved with nitroglycerin per EMS. She had recurrence of pain in the emergency department and it resolved on its own. Patient's troponins are negative x3.  Patient with no recurrence of her chest pain. She has reproducible chest wall tenderness to palpation that does recreate her pain. Patient will be discharged back to the skilled nursing facility. She'll return the emergency department for any concerns. Amount and/or Complexity of Data Reviewed  Clinical lab tests: ordered and reviewed  Tests in the radiology section of CPT®: ordered and reviewed  Decide to obtain previous medical records or to obtain history from someone other than the patient: yes  Review and summarize past medical records: yes  Independent visualization of images, tracings, or specimens: yes    Patient Progress  Patient progress: improved        Disposition: Discharged  Diagnosis:     ICD-10-CM ICD-9-CM   1. Chest pain, unspecified type  R07.9 786.50     ____________________________________________________________________  A portion of this note was generated using voice recognition dictation software. While the note has been reviewed for accuracy, please note certain words and phrases may not be transcribed as intended and some grammatical and/or typographical errors may be present.

## 2021-11-18 NOTE — PROGRESS NOTES
While working with physical therapy, pt started to c/o left chest pain. PT brought pt back to room for RN to assess. While sitting on edge of bed BP revealed to be 129/62, 02 100%, and HR 66. Pt states while working with PT, a reoccurring episode happened where her face becomes pale, develops tunneled vision, SOB, and looses her hearing. Heart and lungs appear to sound clear. Marylee, PA, notified to assess pt. Orders received from PA to call 911 and send pt to [address] [place] [address] for a cardiac eval. Pt states the pain in her left chest started around 12:30 but did not notify staff until 1400. She describes the pain as a 7/10 dull knife constant pain. She said nothing makes the pain or worse and does not radiate. EMS arrived to transport pt by ambulance to [facility].

## 2021-11-18 NOTE — PROGRESS NOTES
Yudy Alvarez MD  Medical Director  [address] [facility], [address] [facility]  Tel: [phone]       [facility] PROGRESS NOTE    [place]  Admit Date: 11/3/2021  Admit Diagnosis:   Lumbar stenosis with neurogenic claudication [M48.062]; Spondylolisthesis of lumbar region [M43.16]; Spinal stenosis [M48.00]; Spinal stenosis, lumbar region with neurogenic claudication [M48.062]    Subjective     Patient seen and examined. Doing ok. Says she felt awful all day yesterday. Just felt \"blah\". Getting light headed, hearing diminished. bp is stable. Pt reports seeing some blood on her panties yesterday. No hemorrhoids.      Objective:     Current Facility-Administered Medications   Medication Dose Route Frequency    midodrine (PROAMATINE) tablet 5 mg  5 mg Oral TID WITH MEALS    doxycycline (VIBRAMYCIN) capsule 100 mg  100 mg Oral Q12H    furosemide (LASIX) tablet 40 mg  40 mg Oral DAILY    potassium chloride (K-DUR, KLOR-CON) SR tablet 20 mEq  20 mEq Oral DAILY    rOPINIRole (REQUIP) tablet 1 mg  1 mg Oral QHS PRN    gabapentin (NEURONTIN) capsule 300 mg  300 mg Oral TID    amitriptyline (ELAVIL) tablet 25 mg  25 mg Oral QHS    lidocaine 4 % patch 1 Patch  1 Patch TransDERmal Q24H    diphenhydrAMINE (BENADRYL) capsule 25 mg  25 mg Oral Q6H PRN    docusate sodium (COLACE) capsule 100 mg  100 mg Oral BID    HYDROcodone-acetaminophen (NORCO)  mg tablet 1 Tablet  1 Tablet Oral Q4H PRN    naloxone (NARCAN) injection 0.4 mg  0.4 mg IntraVENous PRN    ondansetron (ZOFRAN ODT) tablet 4 mg  4 mg Oral Q4H PRN    phenol throat spray (CHLORASEPTIC) 1 Spray  1 Spray Oral PRN    alcohol 62% (NOZIN) nasal  1 Ampule  1 Ampule Topical Q12H    acetaminophen (TYLENOL) tablet 650 mg  650 mg Oral Q6H PRN    alum-mag hydroxide-simeth (MYLANTA) oral suspension 30 mL  30 mL Oral Q4H PRN    apixaban (ELIQUIS) tablet 5 mg  5 mg Oral BID    aspirin delayed-release tablet 81 mg  81 mg Oral QHS    bisacodyL (DULCOLAX) suppository 10 mg  10 mg Rectal DAILY PRN    escitalopram oxalate (LEXAPRO) tablet 20 mg  20 mg Oral QAM    famotidine (PEPCID) tablet 20 mg  20 mg Oral QPM    levothyroxine (SYNTHROID) tablet 50 mcg  50 mcg Oral ACB    LORazepam (ATIVAN) tablet 0.5 mg  0.5 mg Oral Q6H PRN    magnesium hydroxide (MILK OF MAGNESIA) 400 mg/5 mL oral suspension 30 mL  30 mL Oral DAILY PRN    metoprolol tartrate (LOPRESSOR) tablet 25 mg  25 mg Oral BID    pantoprazole (PROTONIX) tablet 40 mg  40 mg Oral ACB    polyethylene glycol (MIRALAX) packet 17 g  17 g Oral DAILY    propafenone (RYTHMOL) tablet 150 mg  150 mg Oral BID    traMADoL (ULTRAM) tablet 50 mg  50 mg Oral Q6H PRN    zolpidem (AMBIEN) tablet 10 mg  10 mg Oral QHS PRN    influenza vaccine 2021-22 (6 mos+)(PF) (FLUARIX/FLULAVAL/FLUZONE QUAD) injection 0.5 mL  1 Each IntraMUSCular PRIOR TO DISCHARGE    nicotine (NICODERM CQ) 14 mg/24 hr patch 1 Patch  1 Patch TransDERmal Q24H       Review of Systems:   Denies chest pain, shortness of breath, cough, headache, visual problems, abdominal pain, dysuria, calf pain. Pertinent positives are as noted in the HPI, ROS unremarkable otherwise. Visit Vitals  /70   Pulse 67   Temp 97.9 °F (36.6 °C)   Resp 16   Ht 5' 4\" (1.626 m)   Wt 314 lb (142.4 kg)   SpO2 97%   BMI 53.90 kg/m²        Physical Exam:   General: Alert and age appropriately oriented. No acute cardiorespiratory distress. HEENT: Normocephalic, no scleral icterus. Oral mucosa moist without cyanosis. Lungs: Clear to auscultation bilaterally. Diminished at the bases  Respiration even and unlabored. Heart: Regular rate and rhythm, S1, S2. No murmurs, clicks, rub or gallops. Abdomen: Soft, non-tender, not distended. Bowel sounds normoactive. No organomegaly. obese   Genitourinary: Deferred. Neuromuscular:      LE strength at HF 3+/5 (R), 3-/5 (L), KF 4/5 (B).   Sensation intact. Skin/extremity: No rashes, no erythema. No calf tenderness B LE.  RLE distal edema> left. Pitting at ankles. Varicosities unchanged. Functional Assessment:          Balance  Sitting - Static: Not tested (11/17/21 1600)  Sitting - Dynamic: Not tested (11/17/21 1600)  Standing - Static: Not tested (11/17/21 1600)  Standing - Dynamic : Impaired (11/17/21 1200)                     Milena Sun Fall Risk Assessment:  Milena Sun Fall Risk  Mobility: Ambulates or transfers with assist devices or assistance (11/18/21 0705)  Mobility Interventions: Patient to call before getting OOB; Utilize walker, cane, or other assistive device (11/18/21 0705)  Mentation: Alert, oriented x 3 (11/18/21 0705)  Medication: Patient receiving anticonvulsants, sedatives(tranquilizers), psychotropics or hypnotics, hypoglycemics, narcotics, sleep aids, antihypertensives, laxatives, or diuretics (11/18/21 0705)  Medication Interventions: Assess postural VS orthostatic hypotension; Evaluate medications/consider consulting pharmacy; Patient to call before getting OOB (11/18/21 3150)  Elimination: Needs assistance with toileting (11/18/21 0705)  Elimination Interventions: Call light in reach;  Patient to call for help with toileting needs (11/18/21 0705)  Prior Fall History: No (11/18/21 0705)  History of Falls Interventions: Consult care management for discharge planning; Door open when patient unattended (11/18/21 0705)  Total Score: 3 (11/18/21 0705)  Standard Fall Precautions: Yes (11/16/21 1900)  High Fall Risk: Yes (11/18/21 0705)     Speech Assessment:         Ambulation:  Gait  Distance (ft): 0 Feet (ft) (11/17/21 1600)  Assistive Device: Walker, rolling; Orthotic device (aspen LSO) (11/17/21 1200)     Labs/Studies:  Recent Results (from the past 72 hour(s))   METABOLIC PANEL, BASIC    Collection Time: 11/17/21  4:00 AM   Result Value Ref Range    Sodium 142 136 - 145 mmol/L    Potassium 3.9 3.5 - 5.1 mmol/L    Chloride 103 98 - 107 mmol/L    CO2 35 (H) 21 - 32 mmol/L    Anion gap 4 (L) 7 - 16 mmol/L    Glucose 129 (H) 65 - 100 mg/dL    BUN 15 8 - 23 MG/DL    Creatinine 0.85 0.6 - 1.0 MG/DL    GFR est AA >60 >60 ml/min/1.73m2    GFR est non-AA >60 >60 ml/min/1.73m2    Calcium 8.4 8.3 - 10.4 MG/DL   HGB & HCT    Collection Time: 11/17/21  1:00 PM   Result Value Ref Range    HGB 8.1 (L) 11.7 - 15.4 g/dL    HCT 27.6 (L) 35.8 - 46.3 %   NT-PRO BNP    Collection Time: 11/17/21  3:00 PM   Result Value Ref Range    NT pro-BNP 1,139 (H) 5 - 125 PG/ML       Assessment:     Problem List as of 11/18/2021 Date Reviewed: 11/5/2021          Codes Class Noted - Resolved    * (Principal) Spinal stenosis, lumbar region with neurogenic claudication ICD-10-CM: M48.062  ICD-9-CM: 724.03  11/3/2021 - Present        A-fib ([facility] ICD-10-CM: I48.91  ICD-9-CM: 427.31  10/30/2021 - Present        ÁNGEL (acute kidney injury) ([facility] ICD-10-CM: N17.9  ICD-9-CM: 584.9  10/30/2021 - Present        Acute respiratory failure with hypoxia (HCC) ICD-10-CM: J96.01  ICD-9-CM: 518.81  10/30/2021 - Present        Hypovolemic shock ([facility] ICD-10-CM: R57.1  ICD-9-CM: 785.59  10/26/2021 - Present        Hypotension ICD-10-CM: I95.9  ICD-9-CM: 458.9  10/26/2021 - Present        Spondylolisthesis of multiple sites in spine ICD-10-CM: M43.19  ICD-9-CM: 738.4  10/25/2021 - Present        Spinal stenosis ICD-10-CM: M48.00  ICD-9-CM: 724.00  10/25/2021 - Present        Obesity, morbid ([address] ICD-10-CM: E66.01  ICD-9-CM: 278.01  12/22/2017 - Present        Acquired hypothyroidism ICD-10-CM: E03.9  ICD-9-CM: 244.9  9/13/2017 - Present        Postoperative wound infection ICD-10-CM: T81.49XA  ICD-9-CM: 998.59  5/19/2016 - Present        Lumbar stenosis with neurogenic claudication ICD-10-CM: M48.062  ICD-9-CM: 724.03  3/28/2016 - Present        Essential hypertension, benign ICD-10-CM: I10  ICD-9-CM: 401.1  7/24/2013 - Present Endometriosis ICD-10-CM: N80.9  ICD-9-CM: 617.9  7/24/2013 - Present        Colon polyp ICD-10-CM: K63.5  ICD-9-CM: 211.3  7/24/2013 - Present        Squamous cell skin cancer ICD-10-CM: C44.92  ICD-9-CM: 173.92  7/24/2013 - Present              L2 L4 lumbar direct lateral interbody fusion with anterior plating and redo of L2 L5 laminectomy (10/26/2021 // Dr Bianca Feliciano) due to spinal stenosis with neurogenic claudication     Plan / Recommendations / Medical Decision Making:      Daily physician / PA medical management:     Lumbar stenosis with neurogenic claudication, spondylolisthesis of lumbar region, lumbar spinal stenosis region with neurogenic claudication - PT / OT; spinal precautions, girdle when ambulating.     Atrial fibrillation - s/p 2 failed ablations, managed with Rhythmol, metoprolol and Eliquis. Currently NSR; Eliquis restarted 10/31 in light of RUL pulmonary embolism. -11/4 irreg, rate controlled, continue meds  -11/5 noticeably RRR to prolonged auscultation  -11/6 IIR today but rate-controlled  -11/12 NSR, rate 60s; continue Rhythmol, metoprolol and Eliquis  -11/14 HR 63 - 72 range last 24 hours  -11/15 HR 65-71 denies palpitations; continue Rhythmol  -11/16 rate controlled  -11/18 more RRR today, rate controlled     Hypotension / hypertension - BP fluctuating, managed medically. Has been hypotensive requiring levophed and now midodrine (pt's home lisinopril 20mg BID and HCTZ 12.5mg BID are on hold); needs TEDs when OOB.  -11/4 hypotensive episodes improved; decrease midodrine to 5mg TID. Continue metoprolol for rate control  -11/5 sBP fluctuating from 102-163, dBP all <85, mostly in 50-60s; monitor as add back PRN furosemide for edema  -11/6 /73  -11/8 119-148/63-73 ; starr midodrine to 5mg bid and wean off; MUST wear TEDs when OOB and elevate LEs whenever possible. Bilateral pitting edema; will give 40mg bid of Lasix today and reassess daily.  Check renal fxn  -11/9 creat 0.56, bun 16; 11/10 creat 0.67  -11/11 /57 this am, max 143/76; continue midodrine 5mg BID for now. One episode of dizziness yesterday but BP normal by the time they could check BP  -11/14 /53; 11/15 116/52 controlled  -11/16 VSS; continue midodrine BID  -11/17 will change midodrine to TID; ? labile BP needs more consistent dosing?  -11/18 VSS     Pain control - ongoing significant pain in back which is stable and controlled by PRN meds. Will require regular pain assessment and comprehensive pain management. Continue PRN Norco, tramadol, APAP and gabapentin. -11/4 increase Neurontin to 300mg TID and Elavil at night 25mg due to sciatic pain; add back Requip 1mg QHS  -11/5 start steroid taper for R LE pain  -11/6 R LE radiating pain resolved, finish steroid taper  -11/8 MUCH improved with steroid dose lyudmila  -11/16 pain controlled     Hypokalemia - monitor; currently 4.3 on supplement due to HCTZ; may be able to hold supplement. -11/4 labs pending; d/c daily labs  -11/5 K+ 4.2  -11/6 recheck BMP 11/8 since continuing daily furosemide; pending 11/8; k 4.2  -11/10 K 4.1, cont supplement while on lasix  -11/12 K 4.0  -11/15 K 4.0; 11/17 K 3.9     Leukocytosis, mild - wbc 11.9 at [facility] admission (11/3). No s/sx of infxn. -11/5 WBC 11.9, afebrile, no s/sx infection; will likely increase with steroids  -11/6 recheck CBC 11/8  -11/8 SGF 54.0 (from 11.9); likely steroid-induced; will check UA (negative); this is last day of steroids  -11/12 WBC 10.4, afebrile      Anemia, blood loss - Hgb 9.0, improving; had 2000ml blood loss and received blood transfusion x 3, as well as 2800ml of crystalloid. -11/6 recheck CBC 11/8; 9.4 from 8.8 11/5  -11/10 Hgb 9.7, improving  -11/12 Hgb 8.8  -11/17 hgb 8.1; trending down. Check stool.  Check iron studies.      Hypothyroidism - continue Synthroid.     Pneumonia prophylaxis - incentive spirometer every hour while awake.     Rosacea, 11/16 - start doxycycline 100mg BID x 3 d and then daily per home regimen.     DVT risk / DVT prophylaxis - daily physician / PA exam to assess as patient is at increased risk for of thromboembolism. Mobilize as tolerated. Sequential pneumatic compression devices (SCDs) when in bed; thigh-high or knee-high thromboembolic deterrent hose when out of bed. On Eliquis.     GI prophylaxis - resume PPI. At times may need additional antacids, Maalox prn.     Depression - continue on Lexapro. At risk of depression exacerbation due to pain and loss of independent function.     General skin care / wound prevention - monitor lumbar incision and general skin wound status daily per staff and physician / PA. At risk for failure. Will require 24/7 rehab nursing. -11/4 lateral left hip wound; looks like a blister that had opened up vs sheer injury. Cleanse daily and cover. Back incision healing  -11/15 incision c/d/i     Bladder program / urinary retention / neurogenic bladder - schedule voids q 6-8 hrs. Check post-void residual as needed; in-and-out catheter if post-void residual is more than 400ml.  -voiding continently without residuals  -11/15 remove laboy in day or two. IV lasix was causing very frequent urination  -11/16 d/c laboy     Bowel program - at risk for constipation as a side effect of opioids, other medications, impaired mobility, etc. MiraLAX daily for regularity, Azul-Colace for stool softener. PRN MOM, bisacodyl suppository or tablets for constipation.  -continent, regular     Edema, 11/9 - >1800ml out; continue Lasix 40mg BID for today . [facility] laboy [facility]  -11/10 [address], wt is up. Will keep laboy and give 40mg IV lasix today. No hx of CHF but has Afib. BNP 2757. Alb 2.7; pt had a 2d echo 10/29/21; EF>70%, severe pulm HTN noted with hx of RUL pulm emb. No evidence tho of right heart strain. VSS; daily wts  11/11 5lbs down from yesterday. UOP -1450 , BNP 1865,,improved; 40mg IV Lasix x 1 today  -11/12 will weigh today. One more dose of IV Lasix today 40mg.  Good diuresis. Lungs clear no hx CHF. Off steroids now. BNP pending. UOP -4275  - 11/14 continue on home dose po lasix 40 mg qd, Last weights- 315lbs > 316lbs with iv lasix x 1 given yesterday, today 312lbs. Continue with 1800ml fluid restriction. Continue daily weights. BMP ordered for thalia. -11/15 need f/u wt. Significant improvement in edema. Continue oral Lasix 40mg daily. K 4, creat 0.72  -11/16 continue with oral Lasix, home dose of 40mg daily. Renal fxn normal, K nl. Dc laboy cath now. Wt is down 15lbs  -11/18 bp 124/70. Stable; HR 67.     11/18 reports not feeling well. No specific symptom or complaints. Noted blood in underwear. Denies vaginal bleeding or hemorrhoids. Check UA for UTI      Time spent was 25 minutes with over 1/2 in direct patient care/examination, consultation and coordination of care.      Signed By: Christelle Meyer MD     November 18, 2021

## 2021-11-18 NOTE — ED TRIAGE NOTES
Pt arrives via ems from rehab as she was walking 7/10 stabbing cp. Does not radiate. Hx gi bleed. 2SL NTG now pain 0/10. A/o x 4. Lives at SNF for post back fusion.  EMS /80 hr 71 98% RA

## 2021-11-19 LAB
BASOPHILS # BLD: 0.1 K/UL (ref 0–0.2)
BASOPHILS NFR BLD: 1 % (ref 0–2)
DIFFERENTIAL METHOD BLD: ABNORMAL
EOSINOPHIL # BLD: 0.4 K/UL (ref 0–0.8)
EOSINOPHIL NFR BLD: 5 % (ref 0.5–7.8)
ERYTHROCYTE [DISTWIDTH] IN BLOOD BY AUTOMATED COUNT: 15.8 % (ref 11.9–14.6)
FERRITIN SERPL-MCNC: 74 NG/ML (ref 8–388)
HCT VFR BLD AUTO: 30 % (ref 35.8–46.3)
HGB BLD-MCNC: 9 G/DL (ref 11.7–15.4)
IMM GRANULOCYTES # BLD AUTO: 0.1 K/UL (ref 0–0.5)
IMM GRANULOCYTES NFR BLD AUTO: 1 % (ref 0–5)
IRON SATN MFR SERPL: 9 %
IRON SERPL-MCNC: 31 UG/DL (ref 35–150)
LYMPHOCYTES # BLD: 1.7 K/UL (ref 0.5–4.6)
LYMPHOCYTES NFR BLD: 17 % (ref 13–44)
MCH RBC QN AUTO: 28.2 PG (ref 26.1–32.9)
MCHC RBC AUTO-ENTMCNC: 30 G/DL (ref 31.4–35)
MCV RBC AUTO: 94 FL (ref 79.6–97.8)
MONOCYTES # BLD: 0.8 K/UL (ref 0.1–1.3)
MONOCYTES NFR BLD: 8 % (ref 4–12)
NEUTS SEG # BLD: 6.7 K/UL (ref 1.7–8.2)
NEUTS SEG NFR BLD: 68 % (ref 43–78)
NRBC # BLD: 0 K/UL (ref 0–0.2)
PLATELET # BLD AUTO: 363 K/UL (ref 150–450)
PMV BLD AUTO: 9.6 FL (ref 9.4–12.3)
RBC # BLD AUTO: 3.19 M/UL (ref 4.05–5.2)
TIBC SERPL-MCNC: 356 UG/DL (ref 250–450)
TRANSFERRIN SERPL-MCNC: 299 MG/DL (ref 202–364)
WBC # BLD AUTO: 9.8 K/UL (ref 4.3–11.1)

## 2021-11-19 PROCEDURE — 74011000250 HC RX REV CODE- 250: Performed by: PHYSICAL MEDICINE & REHABILITATION

## 2021-11-19 PROCEDURE — 83540 ASSAY OF IRON: CPT

## 2021-11-19 PROCEDURE — 74011250637 HC RX REV CODE- 250/637: Performed by: PHYSICAL MEDICINE & REHABILITATION

## 2021-11-19 PROCEDURE — 97535 SELF CARE MNGMENT TRAINING: CPT

## 2021-11-19 PROCEDURE — 65310000000 HC RM PRIVATE REHAB

## 2021-11-19 PROCEDURE — 82728 ASSAY OF FERRITIN: CPT

## 2021-11-19 PROCEDURE — 85025 COMPLETE CBC W/AUTO DIFF WBC: CPT

## 2021-11-19 PROCEDURE — 97110 THERAPEUTIC EXERCISES: CPT

## 2021-11-19 PROCEDURE — 97530 THERAPEUTIC ACTIVITIES: CPT

## 2021-11-19 PROCEDURE — 99232 SBSQ HOSP IP/OBS MODERATE 35: CPT | Performed by: PHYSICAL MEDICINE & REHABILITATION

## 2021-11-19 PROCEDURE — 97116 GAIT TRAINING THERAPY: CPT

## 2021-11-19 PROCEDURE — 74011250637 HC RX REV CODE- 250/637: Performed by: PHYSICIAN ASSISTANT

## 2021-11-19 RX ORDER — LEVOTHYROXINE SODIUM 50 UG/1
50 TABLET ORAL
Status: DISCONTINUED | OUTPATIENT
Start: 2021-11-19 | End: 2021-11-23 | Stop reason: HOSPADM

## 2021-11-19 RX ORDER — ZOLPIDEM TARTRATE 5 MG/1
10 TABLET ORAL
Status: DISCONTINUED | OUTPATIENT
Start: 2021-11-19 | End: 2021-11-23 | Stop reason: HOSPADM

## 2021-11-19 RX ORDER — DOXYCYCLINE 100 MG/1
100 CAPSULE ORAL EVERY 12 HOURS
Status: DISCONTINUED | OUTPATIENT
Start: 2021-11-19 | End: 2021-11-23 | Stop reason: HOSPADM

## 2021-11-19 RX ORDER — POLYETHYLENE GLYCOL 3350 17 G/17G
17 POWDER, FOR SOLUTION ORAL DAILY
Status: DISCONTINUED | OUTPATIENT
Start: 2021-11-19 | End: 2021-11-23 | Stop reason: HOSPADM

## 2021-11-19 RX ORDER — TRAMADOL HYDROCHLORIDE 50 MG/1
50 TABLET ORAL
Status: DISCONTINUED | OUTPATIENT
Start: 2021-11-19 | End: 2021-11-23 | Stop reason: HOSPADM

## 2021-11-19 RX ORDER — PROPAFENONE HYDROCHLORIDE 150 MG/1
150 TABLET, FILM COATED ORAL 2 TIMES DAILY
Status: DISCONTINUED | OUTPATIENT
Start: 2021-11-19 | End: 2021-11-23 | Stop reason: HOSPADM

## 2021-11-19 RX ORDER — FUROSEMIDE 40 MG/1
40 TABLET ORAL DAILY
Status: DISCONTINUED | OUTPATIENT
Start: 2021-11-19 | End: 2021-11-19

## 2021-11-19 RX ORDER — ROPINIROLE 1 MG/1
1 TABLET, FILM COATED ORAL
Status: DISCONTINUED | OUTPATIENT
Start: 2021-11-19 | End: 2021-11-23 | Stop reason: HOSPADM

## 2021-11-19 RX ORDER — DOCUSATE SODIUM 100 MG/1
100 CAPSULE, LIQUID FILLED ORAL 2 TIMES DAILY
Status: DISCONTINUED | OUTPATIENT
Start: 2021-11-19 | End: 2021-11-23 | Stop reason: HOSPADM

## 2021-11-19 RX ORDER — PANTOPRAZOLE SODIUM 40 MG/1
40 TABLET, DELAYED RELEASE ORAL
Status: DISCONTINUED | OUTPATIENT
Start: 2021-11-19 | End: 2021-11-23 | Stop reason: HOSPADM

## 2021-11-19 RX ORDER — ADHESIVE BANDAGE
30 BANDAGE TOPICAL DAILY PRN
Status: DISCONTINUED | OUTPATIENT
Start: 2021-11-19 | End: 2021-11-23 | Stop reason: HOSPADM

## 2021-11-19 RX ORDER — POTASSIUM CHLORIDE 20 MEQ/1
20 TABLET, EXTENDED RELEASE ORAL DAILY
Status: DISCONTINUED | OUTPATIENT
Start: 2021-11-19 | End: 2021-11-23 | Stop reason: HOSPADM

## 2021-11-19 RX ORDER — ESCITALOPRAM OXALATE 10 MG/1
20 TABLET ORAL EVERY MORNING
Status: DISCONTINUED | OUTPATIENT
Start: 2021-11-19 | End: 2021-11-23 | Stop reason: HOSPADM

## 2021-11-19 RX ORDER — ASPIRIN 81 MG/1
81 TABLET ORAL
Status: DISCONTINUED | OUTPATIENT
Start: 2021-11-19 | End: 2021-11-23 | Stop reason: HOSPADM

## 2021-11-19 RX ORDER — METOPROLOL TARTRATE 25 MG/1
25 TABLET, FILM COATED ORAL 2 TIMES DAILY
Status: DISCONTINUED | OUTPATIENT
Start: 2021-11-19 | End: 2021-11-23 | Stop reason: HOSPADM

## 2021-11-19 RX ORDER — LORAZEPAM 0.5 MG/1
0.5 TABLET ORAL
Status: DISCONTINUED | OUTPATIENT
Start: 2021-11-19 | End: 2021-11-23 | Stop reason: HOSPADM

## 2021-11-19 RX ORDER — TORSEMIDE 20 MG/1
20 TABLET ORAL DAILY
Status: DISCONTINUED | OUTPATIENT
Start: 2021-11-19 | End: 2021-11-23 | Stop reason: HOSPADM

## 2021-11-19 RX ORDER — FACIAL-BODY WIPES
10 EACH TOPICAL DAILY PRN
Status: DISCONTINUED | OUTPATIENT
Start: 2021-11-19 | End: 2021-11-23 | Stop reason: HOSPADM

## 2021-11-19 RX ORDER — MIDODRINE HYDROCHLORIDE 5 MG/1
5 TABLET ORAL
Status: DISCONTINUED | OUTPATIENT
Start: 2021-11-19 | End: 2021-11-23 | Stop reason: HOSPADM

## 2021-11-19 RX ORDER — MAG HYDROX/ALUMINUM HYD/SIMETH 200-200-20
30 SUSPENSION, ORAL (FINAL DOSE FORM) ORAL
Status: DISCONTINUED | OUTPATIENT
Start: 2021-11-19 | End: 2021-11-23 | Stop reason: HOSPADM

## 2021-11-19 RX ORDER — AMITRIPTYLINE HYDROCHLORIDE 25 MG/1
25 TABLET, FILM COATED ORAL
Status: DISCONTINUED | OUTPATIENT
Start: 2021-11-19 | End: 2021-11-23 | Stop reason: HOSPADM

## 2021-11-19 RX ORDER — FAMOTIDINE 20 MG/1
20 TABLET, FILM COATED ORAL EVERY EVENING
Status: DISCONTINUED | OUTPATIENT
Start: 2021-11-19 | End: 2021-11-23 | Stop reason: HOSPADM

## 2021-11-19 RX ORDER — ACETAMINOPHEN 325 MG/1
650 TABLET ORAL
Status: DISCONTINUED | OUTPATIENT
Start: 2021-11-19 | End: 2021-11-23 | Stop reason: HOSPADM

## 2021-11-19 RX ORDER — IBUPROFEN 200 MG
1 TABLET ORAL EVERY 24 HOURS
Status: DISCONTINUED | OUTPATIENT
Start: 2021-11-19 | End: 2021-11-23 | Stop reason: HOSPADM

## 2021-11-19 RX ORDER — NALOXONE HYDROCHLORIDE 0.4 MG/ML
0.4 INJECTION, SOLUTION INTRAMUSCULAR; INTRAVENOUS; SUBCUTANEOUS AS NEEDED
Status: DISCONTINUED | OUTPATIENT
Start: 2021-11-19 | End: 2021-11-23 | Stop reason: HOSPADM

## 2021-11-19 RX ORDER — ONDANSETRON 4 MG/1
4 TABLET, ORALLY DISINTEGRATING ORAL
Status: DISCONTINUED | OUTPATIENT
Start: 2021-11-19 | End: 2021-11-23 | Stop reason: HOSPADM

## 2021-11-19 RX ORDER — HYDROCODONE BITARTRATE AND ACETAMINOPHEN 10; 325 MG/1; MG/1
1 TABLET ORAL
Status: DISCONTINUED | OUTPATIENT
Start: 2021-11-19 | End: 2021-11-23 | Stop reason: HOSPADM

## 2021-11-19 RX ORDER — GABAPENTIN 300 MG/1
300 CAPSULE ORAL 3 TIMES DAILY
Status: DISCONTINUED | OUTPATIENT
Start: 2021-11-19 | End: 2021-11-23 | Stop reason: HOSPADM

## 2021-11-19 RX ORDER — DIPHENHYDRAMINE HCL 25 MG
25 CAPSULE ORAL
Status: DISCONTINUED | OUTPATIENT
Start: 2021-11-19 | End: 2021-11-23 | Stop reason: HOSPADM

## 2021-11-19 RX ORDER — LIDOCAINE 4 G/100G
1 PATCH TOPICAL EVERY 24 HOURS
Status: DISCONTINUED | OUTPATIENT
Start: 2021-11-19 | End: 2021-11-21

## 2021-11-19 RX ADMIN — MIDODRINE HYDROCHLORIDE 5 MG: 5 TABLET ORAL at 09:17

## 2021-11-19 RX ADMIN — MIDODRINE HYDROCHLORIDE 5 MG: 5 TABLET ORAL at 12:15

## 2021-11-19 RX ADMIN — HYDROCODONE BITARTRATE AND ACETAMINOPHEN 1 TABLET: 10; 325 TABLET ORAL at 09:37

## 2021-11-19 RX ADMIN — MIDODRINE HYDROCHLORIDE 5 MG: 5 TABLET ORAL at 17:43

## 2021-11-19 RX ADMIN — GABAPENTIN 300 MG: 300 CAPSULE ORAL at 17:43

## 2021-11-19 RX ADMIN — FUROSEMIDE 40 MG: 40 TABLET ORAL at 09:17

## 2021-11-19 RX ADMIN — TORSEMIDE 20 MG: 20 TABLET ORAL at 17:43

## 2021-11-19 RX ADMIN — ASPIRIN 81 MG: 81 TABLET ORAL at 01:27

## 2021-11-19 RX ADMIN — AMITRIPTYLINE HYDROCHLORIDE 25 MG: 25 TABLET, FILM COATED ORAL at 01:27

## 2021-11-19 RX ADMIN — GABAPENTIN 300 MG: 300 CAPSULE ORAL at 20:51

## 2021-11-19 RX ADMIN — METOPROLOL TARTRATE 25 MG: 25 TABLET, FILM COATED ORAL at 17:43

## 2021-11-19 RX ADMIN — DOCUSATE SODIUM 100 MG: 100 CAPSULE ORAL at 09:17

## 2021-11-19 RX ADMIN — ZOLPIDEM TARTRATE 10 MG: 5 TABLET ORAL at 01:27

## 2021-11-19 RX ADMIN — METOPROLOL TARTRATE 25 MG: 25 TABLET, FILM COATED ORAL at 09:17

## 2021-11-19 RX ADMIN — GABAPENTIN 300 MG: 300 CAPSULE ORAL at 01:27

## 2021-11-19 RX ADMIN — ASPIRIN 81 MG: 81 TABLET ORAL at 20:51

## 2021-11-19 RX ADMIN — PANTOPRAZOLE SODIUM 40 MG: 40 TABLET, DELAYED RELEASE ORAL at 05:46

## 2021-11-19 RX ADMIN — HYDROCODONE BITARTRATE AND ACETAMINOPHEN 1 TABLET: 10; 325 TABLET ORAL at 21:00

## 2021-11-19 RX ADMIN — LEVOTHYROXINE SODIUM 50 MCG: 0.05 TABLET ORAL at 05:46

## 2021-11-19 RX ADMIN — APIXABAN 5 MG: 5 TABLET, FILM COATED ORAL at 17:43

## 2021-11-19 RX ADMIN — ESCITALOPRAM OXALATE 20 MG: 10 TABLET ORAL at 09:17

## 2021-11-19 RX ADMIN — FAMOTIDINE 20 MG: 20 TABLET ORAL at 17:43

## 2021-11-19 RX ADMIN — HYDROCODONE BITARTRATE AND ACETAMINOPHEN 1 TABLET: 10; 325 TABLET ORAL at 13:23

## 2021-11-19 RX ADMIN — APIXABAN 5 MG: 5 TABLET, FILM COATED ORAL at 09:16

## 2021-11-19 RX ADMIN — DOXYCYCLINE HYCLATE 100 MG: 100 CAPSULE ORAL at 09:17

## 2021-11-19 RX ADMIN — Medication 1 AMPULE: at 01:27

## 2021-11-19 RX ADMIN — DOXYCYCLINE HYCLATE 100 MG: 100 CAPSULE ORAL at 20:51

## 2021-11-19 RX ADMIN — APIXABAN 5 MG: 5 TABLET, FILM COATED ORAL at 01:27

## 2021-11-19 RX ADMIN — POTASSIUM CHLORIDE 20 MEQ: 20 TABLET, EXTENDED RELEASE ORAL at 09:17

## 2021-11-19 RX ADMIN — DOCUSATE SODIUM 100 MG: 100 CAPSULE ORAL at 17:42

## 2021-11-19 RX ADMIN — Medication 1 AMPULE: at 20:51

## 2021-11-19 RX ADMIN — PROPAFENONE HYDROCHLORIDE 150 MG: 150 TABLET, FILM COATED ORAL at 17:43

## 2021-11-19 RX ADMIN — GABAPENTIN 300 MG: 300 CAPSULE ORAL at 09:17

## 2021-11-19 RX ADMIN — AMITRIPTYLINE HYDROCHLORIDE 25 MG: 25 TABLET, FILM COATED ORAL at 20:51

## 2021-11-19 RX ADMIN — PROPAFENONE HYDROCHLORIDE 150 MG: 150 TABLET, FILM COATED ORAL at 09:16

## 2021-11-19 RX ADMIN — Medication 1 AMPULE: at 09:16

## 2021-11-19 RX ADMIN — ZOLPIDEM TARTRATE 10 MG: 5 TABLET ORAL at 21:00

## 2021-11-19 NOTE — ED NOTES
I have reviewed discharge instructions with the patient. The patient verbalized understanding. Patient left ED via Discharge Method: stretcher to SNF with yasmine. Opportunity for questions and clarification provided. Patient given 0 scripts. To continue your aftercare when you leave the hospital, you may receive an automated call from our care team to check in on how you are doing. This is a free service and part of our promise to provide the best care and service to meet your aftercare needs.  If you have questions, or wish to unsubscribe from this service please call [phone]. Thank you for Choosing our [facility] Emergency Department.

## 2021-11-19 NOTE — ED NOTES
RN at bedside; pt alert and oriented x4; breathing even and unlabored; pt has complaints of back pain from her disc fusion; pt medicated with 5mg Norco; discharge reviewed with patient; discharge papers placed with Naina paperwork per patient's request; [employer] [phone]

## 2021-11-19 NOTE — PROGRESS NOTES
TRANSFER - IN REPORT:  Pt received from ER via [address] transport for readmission to [address] [place] [address] at 2330. Assessment completed upon patients arrival to unit and care assumed. Vitals are stable. Pt denies any chest discomfort. Pt given a light snack/supper. Call bell within reach. Will call if needing help to the bathroom.

## 2021-11-19 NOTE — PROGRESS NOTES
Time In 0822   Time Out 0912     Mobility   Score Comments   Supine to Sit 6: Independent I   Sit to Stand 6: Independent I   Transfer Assist 6: Independent Transfer Type: SPT   Equipment: Rolling Walker   Comments: I     Activities of Daily Living    Score Comments   Oral Hygiene 6: Independent I   Bathing 6: Independent Type of Shower: Shower  Position: Supported sitting   Adaptive  Equipment: Tub Transfer Bench, Grab Bars and Walker  Comments: I   Upper Body  Dressing 5: S/U or clean-up assist Items Applied: Pullover  Position: Unsupported Sitting  Comments: I   Lower Body Dressing 5: S/U or clean-up assist Items Applied: Underwear and Elastic pants  Position: Supported sitting and Standing PRN  Adaptive Equipment: Reacher and RW  Comments: S/U   Donning/Sunshine Footwear 3: Partial/Moderate A Items Applied: Socks and Slip-on shoes  Adaptive Equipment: Reacher, Sock Aide, Long Handeled Shoe Horn and Elastic Shoelaces  Comments: A for R shoe   Toilet Transfer 6: Independent Transfer Type: SPT   Equipment: Rolling Walker   Comments: I   Toileting Hygiene 6: Independent Output: Urine  Comments: I   Education  Improvements made     Pt was in bed and agreeable to tx. Pt's performance with ADL is reflected in above chart. Pt was left on toilet with RN Lavonne López.      Nicholas Reed OT   11/19/2021

## 2021-11-19 NOTE — PROGRESS NOTES
PHYSICAL THERAPY DAILY NOTE  Time In: 1144  Time Out: 5452  Patient Seen For: PM; Gait training; Patient education; Therapeutic exercise; Transfer training; Other (see progress notes)    Subjective: patient reporting increase in SOB during exercise and gait. Reports loss of hearing when walking. Reports no chest pain Reports she is concerned about ongoing problem with fluid retention, SOB and hearing changes when walking         Objective:Vital Signs: HR 90 and O2 sat 99% after ambulating 70 ft on room air  Patient Vitals for the past 12 hrs:   Temp Pulse Resp BP SpO2   11/19/21 1539 97.2 °F (36.2 °C) 68 16 126/66 100 %   11/19/21 0717 98 °F (36.7 °C) 72 18 (!) 114/59 100 %     Pain level:1 to 5 out of 10  Pain location:low back left>right side,   Pain interventions:Medication per order, rest, positioning,relaxation, body mechanics, L/S corset      Patient education:Bed mobility training, transfer and gait training, body mechanics,LE HEP,activity pacing,spinal precautions,Patient verbalizing understanding and demonstrating  understanding of patient education. Recommend follow up education. Interdisciplinary Communication:spoke with RN and PA regarding patient's current symptoms as noted above and vital signs. Spinal, Other (comment) (Falls)  GROSS ASSESSMENT Daily Assessment   Independent donning and doffing [place] LSO      NA       COGNITION Daily Assessment    No change       BED/MAT MOBILITY Daily Assessment   Increased time and effort to complete with cues for body mechanics  Using bed rail  Rolling Right : 6 (Modified independent)  Rolling Left : 6 (Modified independent)  Supine to Sit : 6 (Modified independent)  Sit to Supine : 6 (Modified independent)       TRANSFERS Daily Assessment   Increased time and effort to complete with cues for body mechanics    Supervision with commode transfers using grab bar.  Independent with clothing management and hygiene   Transfer Type: SPT with walker  Transfer Assistance : 5 (Supervision)  Sit to Stand Assistance: Modified independent  Car Transfers: Not tested       GAIT Daily Assessment    Gait training x 70 ft x 1  50 ft x 1 with RW, [place] LSO and SBA. 5 minute rest break between attempts       STEPS or STAIRS Daily Assessment    Steps/Stairs Ambulated (#): 0  Level of Assist : 0 (Not tested)       BALANCE Daily Assessment   Static standing with RW and/or grab bar with no loss of balance  No loss of balance ambulating with RW Good static and dynamic sitting balance  Good standing balance with RW. Impaired dynamic standing balance       WHEELCHAIR MOBILITY Daily Assessment    Curbs/Ramps Assist Required (FIM Score): 0 (Not tested)  Wheelchair Setup Assist Required : 0 (Not tested)       LOWER EXTREMITY EXERCISES Daily Assessment   Increased time and effort to complete with multiple and frequent rest breaks. Cues for correct form     Extremity: Both  Exercise Type #1: supine lower extremity strengthening: ankle pumps, hip abd with skate, heel slides, SAQ  Sets Performed: 3  Reps Performed: 10  Level of Assist:supervision with set up assist and cues            Assessment: Unable to progress gait distance significantly due to recurring symptoms of feeling dizzy with hearing loss and becoming pale. Symptoms resolve after sitting x 1 to 2 minutes. Patient returned to room at end of treatment. Patient supine in bed with head of bed elevated and bed rails up x 2. Needs placed in reach of patient    Plan of Care: Continue with POC and progress as tolerated.     Elyse Reddy, PT  11/19/2021

## 2021-11-19 NOTE — PROGRESS NOTES
Heather Groves MD  Medical Director  [address] [facility], [address] [facility]  Tel: [phone]       [facility] PROGRESS NOTE    Rayna Loss  Admit Date: 11/3/2021  Admit Diagnosis:   Lumbar stenosis with neurogenic claudication [M48.062]; Spondylolisthesis of lumbar region [M43.16]; Spinal stenosis [M48.00]; Spinal stenosis, lumbar region with neurogenic claudication [M48.062]    Subjective     Patient seen and examined. ED eval discussed with pt. She says she has not yet had any further episodes since yesterday.  Troponins, CXR were fine  Denies cough,, cp, sob, + LBP    Objective:     Current Facility-Administered Medications   Medication Dose Route Frequency    zolpidem (AMBIEN) tablet 10 mg  10 mg Oral QHS PRN    traMADoL (ULTRAM) tablet 50 mg  50 mg Oral Q6H PRN    rOPINIRole (REQUIP) tablet 1 mg  1 mg Oral QHS PRN    pantoprazole (PROTONIX) tablet 40 mg  40 mg Oral ACB    nicotine (NICODERM CQ) 14 mg/24 hr patch 1 Patch  1 Patch TransDERmal Q24H    midodrine (PROAMATINE) tablet 5 mg  5 mg Oral TID WITH MEALS    metoprolol tartrate (LOPRESSOR) tablet 25 mg  25 mg Oral BID    lidocaine 4 % patch 1 Patch  1 Patch TransDERmal Q24H    levothyroxine (SYNTHROID) tablet 50 mcg  50 mcg Oral ACB    influenza vaccine 2021-22 (6 mos+)(PF) (FLUARIX/FLULAVAL/FLUZONE QUAD) injection 0.5 mL  1 Each IntraMUSCular PRIOR TO DISCHARGE    gabapentin (NEURONTIN) capsule 300 mg  300 mg Oral TID    famotidine (PEPCID) tablet 20 mg  20 mg Oral QPM    furosemide (LASIX) tablet 40 mg  40 mg Oral DAILY    doxycycline (VIBRAMYCIN) capsule 100 mg  100 mg Oral Q12H    escitalopram oxalate (LEXAPRO) tablet 20 mg  20 mg Oral QAM    aspirin delayed-release tablet 81 mg  81 mg Oral QHS    docusate sodium (COLACE) capsule 100 mg  100 mg Oral BID    apixaban (ELIQUIS) tablet 5 mg  5 mg Oral BID    amitriptyline (ELAVIL) tablet 25 mg  25 mg Oral QHS    alcohol 62% (NOZIN) nasal  1 Ampule  1 Ampule Topical Q12H    polyethylene glycol (MIRALAX) packet 17 g  17 g Oral DAILY    potassium chloride (K-DUR, KLOR-CON M20) SR tablet 20 mEq  20 mEq Oral DAILY    propafenone (RYTHMOL) tablet 150 mg  150 mg Oral BID    acetaminophen (TYLENOL) tablet 650 mg  650 mg Oral Q6H PRN    alum-mag hydroxide-simeth (MYLANTA) oral suspension 30 mL  30 mL Oral Q4H PRN    bisacodyL (DULCOLAX) suppository 10 mg  10 mg Rectal DAILY PRN    HYDROcodone-acetaminophen (NORCO)  mg tablet 1 Tablet  1 Tablet Oral Q4H PRN    LORazepam (ATIVAN) tablet 0.5 mg  0.5 mg Oral Q6H PRN    diphenhydrAMINE (BENADRYL) capsule 25 mg  25 mg Oral Q6H PRN    magnesium hydroxide (MILK OF MAGNESIA) 400 mg/5 mL oral suspension 30 mL  30 mL Oral DAILY PRN    naloxone (NARCAN) injection 0.4 mg  0.4 mg IntraVENous PRN    ondansetron (ZOFRAN ODT) tablet 4 mg  4 mg Oral Q4H PRN    phenol throat spray (CHLORASEPTIC) 1 Spray  1 Spray Oral PRN       Review of Systems:   Denies chest pain, shortness of breath, cough, headache, visual problems, abdominal pain, dysuria, calf pain. Pertinent positives are as noted in the HPI, ROS unremarkable otherwise. Visit Vitals  BP (!) 114/59 (BP 1 Location: Left upper arm, BP Patient Position: At rest)   Pulse 72   Temp 98 °F (36.7 °C)   Resp 18   Ht 5' 4\" (1.626 m)   Wt 314 lb (142.4 kg)   SpO2 100%   BMI 53.90 kg/m²        Physical Exam:   General: Alert and age appropriately oriented. No acute cardiorespiratory distress. HEENT: Normocephalic, no scleral icterus. Oral mucosa moist without cyanosis. Lungs: Clear to auscultation bilaterally. Respiration even and unlabored. Heart: Regular rate and rhythm, S1, S2. No murmurs, clicks, rub or gallops. Abdomen: Soft, non-tender, not distended. Bowel sounds normoactive. No organomegaly. obese   Genitourinary: Deferred. Neuromuscular:      LE strength at HF 3/5 (R), 3-/5 (L), KF 4/5 (B).   Sensation intact /no pathological reflexes   Skin/extremity: No rashes, no erythema. No calf tenderness B LE. Improved edema LEs, pitting at ankles                                                                              Functional Assessment:          Balance  Sitting - Static: Good (unsupported) (11/18/21 1500)  Sitting - Dynamic: Good (unsupported) (11/18/21 1500)  Standing - Static: Fair (11/18/21 1500)  Standing - Dynamic : Impaired (11/18/21 1500)                       Fall Risk Assessment:  Bard Santosumber Fall Risk  Mobility: Ambulates or transfers with assist devices or assistance (11/19/21 0024)  Mobility Interventions: Patient to call before getting OOB; Utilize walker, cane, or other assistive device (11/18/21 0705)  Mentation: Alert, oriented x 3 (11/19/21 0024)  Medication: No anticonvulsants, sedatives(tranquilizers), psychotropics or hypnotics, hypoglycemics, narcotics, sleep aids, antihypertensives, laxatives, or diuretics (11/19/21 0024)  Medication Interventions: Assess postural VS orthostatic hypotension; Evaluate medications/consider consulting pharmacy; Patient to call before getting OOB (11/18/21 7816)  Elimination: Independent in elimination (11/19/21 0024)  Elimination Interventions: Call light in reach;  Patient to call for help with toileting needs (11/18/21 0705)  Prior Fall History: No (11/19/21 0024)  History of Falls Interventions: Consult care management for discharge planning; Door open when patient unattended (11/18/21 0705)  Total Score: 1 (11/19/21 0024)  Standard Fall Precautions: Yes (11/16/21 1900)  High Fall Risk: Yes (11/19/21 0024)     Speech Assessment:         Ambulation:  Gait  Distance (ft): 30 Feet (ft) (11/18/21 1500)  Assistive Device: Walker, rolling; Gait belt; Orthotic device (aspen LSO) (11/18/21 1500)     Labs/Studies:  Recent Results (from the past 72 hour(s))   METABOLIC PANEL, BASIC    Collection Time: 11/17/21  4:00 AM   Result Value Ref Range    Sodium 142 136 - 145 mmol/L    Potassium 3.9 3.5 - 5.1 mmol/L    Chloride 103 98 - 107 mmol/L    CO2 35 (H) 21 - 32 mmol/L    Anion gap 4 (L) 7 - 16 mmol/L    Glucose 129 (H) 65 - 100 mg/dL    BUN 15 8 - 23 MG/DL    Creatinine 0.85 0.6 - 1.0 MG/DL    GFR est AA >60 >60 ml/min/1.73m2    GFR est non-AA >60 >60 ml/min/1.73m2    Calcium 8.4 8.3 - 10.4 MG/DL   HGB & HCT    Collection Time: 11/17/21  1:00 PM   Result Value Ref Range    HGB 8.1 (L) 11.7 - 15.4 g/dL    HCT 27.6 (L) 35.8 - 46.3 %   NT-PRO BNP    Collection Time: 11/17/21  3:00 PM   Result Value Ref Range    NT pro-BNP 1,139 (H) 5 - 125 PG/ML   OCCULT BLOOD, STOOL    Collection Time: 11/18/21  9:40 AM   Result Value Ref Range    Occult blood, stool Positive (A) NEG     URINALYSIS W/ RFLX MICROSCOPIC    Collection Time: 11/18/21 11:39 AM   Result Value Ref Range    Color YELLOW      Appearance CLOUDY      Specific gravity 1.010 1.001 - 1.023      pH (UA) 7.0 5.0 - 9.0      Protein Negative NEG mg/dL    Glucose Negative mg/dL    Ketone Negative NEG mg/dL    Bilirubin Negative NEG      Blood Negative NEG      Urobilinogen 0.2 0.2 - 1.0 EU/dL    Nitrites Negative NEG      Leukocyte Esterase Negative NEG     TROPONIN-HIGH SENSITIVITY    Collection Time: 11/18/21  3:48 PM   Result Value Ref Range    Troponin-High Sensitivity 13.8 0 - 14 pg/mL   CBC WITH AUTOMATED DIFF    Collection Time: 11/18/21  3:48 PM   Result Value Ref Range    WBC 10.3 4.3 - 11.1 K/uL    RBC 3.16 (L) 4.05 - 5.2 M/uL    HGB 8.6 (L) 11.7 - 15.4 g/dL    HCT 29.3 (L) 35.8 - 46.3 %    MCV 92.7 79.6 - 97.8 FL    MCH 27.2 26.1 - 32.9 PG    MCHC 29.4 (L) 31.4 - 35.0 g/dL    RDW 15.9 (H) 11.9 - 14.6 %    PLATELET 376 339 - 692 K/uL    MPV 9.2 (L) 9.4 - 12.3 FL    ABSOLUTE NRBC 0.00 0.0 - 0.2 K/uL    DF AUTOMATED      NEUTROPHILS 65 43 - 78 %    LYMPHOCYTES 20 13 - 44 %    MONOCYTES 9 4.0 - 12.0 %    EOSINOPHILS 4 0.5 - 7.8 %    BASOPHILS 1 0.0 - 2.0 %    IMMATURE GRANULOCYTES 0 0.0 - 5.0 %    ABS.  NEUTROPHILS 6.7 1.7 - 8.2 K/UL    ABS. LYMPHOCYTES 2.1 0.5 - 4.6 K/UL    ABS. MONOCYTES 1.0 0.1 - 1.3 K/UL    ABS. EOSINOPHILS 0.4 0.0 - 0.8 K/UL    ABS. BASOPHILS 0.1 0.0 - 0.2 K/UL    ABS. IMM. GRANS. 0.0 0.0 - 0.5 K/UL   METABOLIC PANEL, COMPREHENSIVE    Collection Time: 11/18/21  3:48 PM   Result Value Ref Range    Sodium 139 136 - 145 mmol/L    Potassium 4.0 3.5 - 5.1 mmol/L    Chloride 100 98 - 107 mmol/L    CO2 34 (H) 21 - 32 mmol/L    Anion gap 5 (L) 7 - 16 mmol/L    Glucose 93 65 - 100 mg/dL    BUN 15 8 - 23 MG/DL    Creatinine 0.82 0.6 - 1.0 MG/DL    GFR est AA >60 >60 ml/min/1.73m2    GFR est non-AA >60 >60 ml/min/1.73m2    Calcium 8.4 8.3 - 10.4 MG/DL    Bilirubin, total 0.6 0.2 - 1.1 MG/DL    ALT (SGPT) 20 12 - 65 U/L    AST (SGOT) 22 15 - 37 U/L    Alk.  phosphatase 153 (H) 50 - 136 U/L    Protein, total 6.3 6.3 - 8.2 g/dL    Albumin 2.7 (L) 3.2 - 4.6 g/dL    Globulin 3.6 (H) 2.3 - 3.5 g/dL    A-G Ratio 0.8 (L) 1.2 - 3.5     LIPASE    Collection Time: 11/18/21  3:48 PM   Result Value Ref Range    Lipase 80 73 - 393 U/L   MAGNESIUM    Collection Time: 11/18/21  3:48 PM   Result Value Ref Range    Magnesium 2.0 1.8 - 2.4 mg/dL   EKG, 12 LEAD, INITIAL    Collection Time: 11/18/21  3:48 PM   Result Value Ref Range    Ventricular Rate 64 BPM    Atrial Rate 64 BPM    P-R Interval 150 ms    QRS Duration 82 ms    Q-T Interval 426 ms    QTC Calculation (Bezet) 439 ms    Calculated P Axis 17 degrees    Calculated R Axis 78 degrees    Calculated T Axis 9 degrees    Diagnosis       Normal sinus rhythm  Normal ECG  When compared with ECG of 30-OCT-2021 15:34,  Premature supraventricular complexes are no longer Present  Nonspecific T wave abnormality no longer evident in Lateral leads  Confirmed by Remington Benjamin ([id number]) on 11/18/2021 6:57:01 PM     TROPONIN-HIGH SENSITIVITY    Collection Time: 11/18/21  6:05 PM   Result Value Ref Range    Troponin-High Sensitivity 16.3 (H) 0 - 14 pg/mL   TROPONIN-HIGH SENSITIVITY    Collection Time: 11/18/21  7:53 PM   Result Value Ref Range    Troponin-High Sensitivity 15.1 (H) 0 - 14 pg/mL   CBC WITH AUTOMATED DIFF    Collection Time: 11/19/21  5:45 AM   Result Value Ref Range    WBC 9.8 4.3 - 11.1 K/uL    RBC 3.19 (L) 4.05 - 5.2 M/uL    HGB 9.0 (L) 11.7 - 15.4 g/dL    HCT 30.0 (L) 35.8 - 46.3 %    MCV 94.0 79.6 - 97.8 FL    MCH 28.2 26.1 - 32.9 PG    MCHC 30.0 (L) 31.4 - 35.0 g/dL    RDW 15.8 (H) 11.9 - 14.6 %    PLATELET 278 878 - 779 K/uL    MPV 9.6 9.4 - 12.3 FL    ABSOLUTE NRBC 0.00 0.0 - 0.2 K/uL    DF AUTOMATED      NEUTROPHILS 68 43 - 78 %    LYMPHOCYTES 17 13 - 44 %    MONOCYTES 8 4.0 - 12.0 %    EOSINOPHILS 5 0.5 - 7.8 %    BASOPHILS 1 0.0 - 2.0 %    IMMATURE GRANULOCYTES 1 0.0 - 5.0 %    ABS. NEUTROPHILS 6.7 1.7 - 8.2 K/UL    ABS. LYMPHOCYTES 1.7 0.5 - 4.6 K/UL    ABS. MONOCYTES 0.8 0.1 - 1.3 K/UL    ABS. EOSINOPHILS 0.4 0.0 - 0.8 K/UL    ABS. BASOPHILS 0.1 0.0 - 0.2 K/UL    ABS. IMM.  GRANS. 0.1 0.0 - 0.5 K/UL       Assessment:     Problem List as of 11/19/2021 Date Reviewed: 11/5/2021          Codes Class Noted - Resolved    * (Principal) Spinal stenosis, lumbar region with neurogenic claudication ICD-10-CM: M48.062  ICD-9-CM: 724.03  11/3/2021 - Present        A-fib ([facility] ICD-10-CM: I48.91  ICD-9-CM: 427.31  10/30/2021 - Present        ÁNGEL (acute kidney injury) ([facility] ICD-10-CM: N17.9  ICD-9-CM: 584.9  10/30/2021 - Present        Acute respiratory failure with hypoxia ([facility] ICD-10-CM: J96.01  ICD-9-CM: 518.81  10/30/2021 - Present        Hypovolemic shock ([facility] ICD-10-CM: R57.1  ICD-9-CM: 785.59  10/26/2021 - Present        Hypotension ICD-10-CM: I95.9  ICD-9-CM: 458.9  10/26/2021 - Present        Spondylolisthesis of multiple sites in spine ICD-10-CM: M43.19  ICD-9-CM: 738.4  10/25/2021 - Present        Spinal stenosis ICD-10-CM: M48.00  ICD-9-CM: 724.00  10/25/2021 - Present        Obesity, morbid ([facility] ICD-10-CM: E66.01  ICD-9-CM: 278.01  12/22/2017 - Present        Acquired hypothyroidism ICD-10-CM: E03.9  ICD-9-CM: 244.9  9/13/2017 - Present        Postoperative wound infection ICD-10-CM: T81.49XA  ICD-9-CM: 998.59  5/19/2016 - Present        Lumbar stenosis with neurogenic claudication ICD-10-CM: M48.062  ICD-9-CM: 724.03  3/28/2016 - Present        Essential hypertension, benign ICD-10-CM: I10  ICD-9-CM: 401.1  7/24/2013 - Present        Endometriosis ICD-10-CM: N80.9  ICD-9-CM: 617.9  7/24/2013 - Present        Colon polyp ICD-10-CM: K63.5  ICD-9-CM: 211.3  7/24/2013 - Present        Squamous cell skin cancer ICD-10-CM: C44.92  ICD-9-CM: 173.92  7/24/2013 - Present              L2 L4 lumbar direct lateral interbody fusion with anterior plating and redo of L2 L5 laminectomy (10/26/2021 // Dr Danis Bacon) due to spinal stenosis with neurogenic claudication     Plan / Recommendations / Medical Decision Making:      Daily physician / PA medical management:     Lumbar stenosis with neurogenic claudication, spondylolisthesis of lumbar region, lumbar spinal stenosis region with neurogenic claudication - PT / OT; spinal precautions, girdle when ambulating.     Atrial fibrillation - s/p 2 failed ablations, managed with Rhythmol, metoprolol and Eliquis. Currently NSR; Eliquis restarted 10/31 in light of RUL pulmonary embolism. -11/4 irreg, rate controlled, continue meds  -11/5 noticeably RRR to prolonged auscultation  -11/6 IIR today but rate-controlled  -11/12 NSR, rate 60s; continue Rhythmol, metoprolol and Eliquis  -11/14 HR 63 - 72 range last 24 hours  -11/15 HR 65-71 denies palpitations; continue Rhythmol  -11/16 rate controlled  -11/18 more RRR today, rate controlled  -rrr nl rhythm       Hypotension / hypertension - BP fluctuating, managed medically. Has been hypotensive requiring levophed and now midodrine (pt's home lisinopril 20mg BID and HCTZ 12.5mg BID are on hold); needs TEDs when OOB.  -11/4 hypotensive episodes improved; decrease midodrine to 5mg TID.  Continue metoprolol for rate control  -11/5 sBP fluctuating from 102-163, dBP all <85, mostly in 50-60s; monitor as add back PRN furosemide for edema  -11/6 /73  -11/8 119-148/63-73 ; starr midodrine to 5mg bid and wean off; MUST wear TEDs when OOB and elevate LEs whenever possible. Bilateral pitting edema; will give 40mg bid of Lasix today and reassess daily. Check renal fxn  -11/9 creat 0.56, bun 16; 11/10 creat 0.67  -11/11 /57 this am, max 143/76; continue midodrine 5mg BID for now. One episode of dizziness yesterday but BP normal by the time they could check BP  -11/14 /53; 11/15 116/52 controlled  -11/16 VSS; continue midodrine BID  -11/17 will change midodrine to TID; ? labile BP needs more consistent dosing?  -11/19 VSS however quite HTN in /93; cont current meds for now, may need to dec midodrine if she is having supine hypertension     Pain control - ongoing significant pain in back which is stable and controlled by PRN meds. Will require regular pain assessment and comprehensive pain management. Continue PRN Norco, tramadol, APAP and gabapentin. -11/4 increase Neurontin to 300mg TID and Elavil at night 25mg due to sciatic pain; add back Requip 1mg QHS  -11/5 start steroid taper for R LE pain  -11/6 R LE radiating pain resolved, finish steroid taper  -11/8 MUCH improved with steroid dose lyudmila  -11/16 pain controlled     Hypokalemia - monitor; currently 4.3 on supplement due to HCTZ; may be able to hold supplement. -11/4 labs pending; d/c daily labs  -11/5 K+ 4.2  -11/6 recheck BMP 11/8 since continuing daily furosemide; pending 11/8; k 4.2  -11/10 K 4.1, cont supplement while on lasix  -11/12 K 4.0  -11/15 K 4.0; 11/17 K 3.9     Leukocytosis, mild - wbc 11.9 at [facility] admission (11/3). No s/sx of infxn.   -11/5 WBC 11.9, afebrile, no s/sx infection; will likely increase with steroids  -11/6 recheck CBC 11/8  -11/8 WBC 16.2 (from 11.9); likely steroid-induced; will check UA (negative); this is last day of steroids  -11/12 Anabaptism 84.5, afebrile   -resolved     Anemia, blood loss - Hgb 9.0, improving; had 2000ml blood loss and received blood transfusion x 3, as well as 2800ml of crystalloid. -11/6 recheck CBC 11/8; 9.4 from 8.8 11/5  -11/10 Hgb 9.7, improving  -11/12 Hgb 8.8  -11/17 hgb 8.1; trending down. Check stool. Check iron studies. -11/19 hgb 9.0; stool positive, likely hemorrhoid (bright red).    Hypothyroidism - continue Synthroid.     Pneumonia prophylaxis - incentive spirometer every hour while awake.     Rosacea, 11/16 - start doxycycline 100mg BID x 3 d and then daily per home regimen.     DVT risk / DVT prophylaxis - daily physician / PA exam to assess as patient is at increased risk for of thromboembolism. Mobilize as tolerated. Sequential pneumatic compression devices (SCDs) when in bed; thigh-high or knee-high thromboembolic deterrent hose when out of bed. On Eliquis.     GI prophylaxis - resume PPI. At times may need additional antacids, Maalox prn.     Depression - continue on Lexapro. At risk of depression exacerbation due to pain and loss of independent function.     General skin care / wound prevention - monitor lumbar incision and general skin wound status daily per staff and physician / PA. At risk for failure. Will require 24/7 rehab nursing. -11/4 lateral left hip wound; looks like a blister that had opened up vs sheer injury. Cleanse daily and cover. Back incision healing  -11/15 incision c/d/i     Bladder program / urinary retention / neurogenic bladder - schedule voids q 6-8 hrs. Check post-void residual as needed; in-and-out catheter if post-void residual is more than 400ml.  -voiding continently without residuals  -11/15 remove laboy in day or two. IV lasix was causing very frequent urination  -11/16 d/c laboy     Bowel program - at risk for constipation as a side effect of opioids, other medications, impaired mobility, etc. MiraLAX daily for regularity, Azul-Colace for stool softener.  PRN MOM, bisacodyl suppository or tablets for constipation.  -continent, regular     Edema, 11/9 - >1800ml out; continue Lasix 40mg BID for today . Pt requesting laboy at [facility]  -11/10 [address], wt is up. Will keep laboy and give 40mg IV lasix today. No hx of CHF but has Afib. BNP 2757. Alb 2.7; pt had a 2d echo 10/29/21; EF>70%, severe pulm HTN noted with hx of RUL pulm emb. No evidence tho of right heart strain. VSS; daily wts  11/11 5lbs down from yesterday. UOP -1450 , BNP 1865,,improved; 40mg IV Lasix x 1 today  -11/12 will weigh today. One more dose of IV Lasix today 40mg. Good diuresis. Lungs clear no hx CHF. Off steroids now. BNP pending. UOP -4275  - 11/14 continue on home dose po lasix 40 mg qd, Last weights- 315lbs > 316lbs with iv lasix x 1 given yesterday, today 312lbs. Continue with 1800ml fluid restriction. Continue daily weights. BMP ordered for thalia. -11/15 need f/u wt. Significant improvement in edema. Continue oral Lasix 40mg daily. K 4, creat 0.72  -11/16 continue with oral Lasix, home dose of 40mg daily. Renal fxn normal, K nl. Dc laboy cath now. Wt is down 15lbs  -11/18 bp 124/70. Stable; HR 67.   -11/19 UOP now +480; cont daily lasix. BNP improving. Must check if she is retaining     11/18 reports not feeling well. No specific symptom or complaints. Noted blood in underwear. Denies vaginal bleeding or hemorrhoids. Check UA for UTI  -UA neg  -episode of chest pain yesterday; taken to ED where tropo                     Time spent was 25 minutes with over 1/2 in direct patient care/examination, consultation and coordination of care.      Signed By: Angelika Velasquez MD     November 19, 2021

## 2021-11-19 NOTE — PROGRESS NOTES
PHYSICAL THERAPY DAILY NOTE  Time In: 4516  Time Out: 1004  Patient Seen For: PM; Balance activities; Gait training; Patient education; Transfer training; Other (see progress notes)    Subjective: patient reporting she feels better today but is still concerned about the fluid retention. Reports no dizziness, loss of hearing or chest pain today during treatment. Objective:Vital Signs:Sitting /60 HR 80 in standing. 02 sat 99% and HR 90 after ambulating 50ft  Patient Vitals for the past 12 hrs:   Temp Pulse Resp BP SpO2   11/19/21 0717 98 °F (36.7 °C) 72 18 (!) 114/59 100 %     Pain level:2 to 7 out of 10  Pain location:low back left side >right side   Pain interventions:Medication per order, rest, positioning,relaxation, body mechanics, L/S corset      Patient education:Bed mobility training,transfer training, gait training, balance training,fall precautions, body mechanics,activity pacing,spinal precautions, donning L/S corset, Patient verbalizing understanding and demonstrating  understanding of patient education. Recommend follow up education. Interdisciplinary Communication:spoke with RN, MA and PA regarding patient's current symptoms noted above    Spinal, Other (comment) (Falls)  GROSS ASSESSMENT Daily Assessment   Independent DON/Doff  LSO  NA       COGNITION Daily Assessment    No change       BED/MAT MOBILITY Daily Assessment   Increased time and effort to complete with cues for body mechanics  Using bed rail   Rolling Right : 6 (Modified independent)  Rolling Left : 6 (Modified independent)  Supine to Sit : 6 (Modified independent)  Sit to Supine : 6 (Modified independent)       TRANSFERS Daily Assessment   Increased time and effort to complete with cues for body mechanics   Transfer Type: SPT with walker  Other: commode transfers using grab bar with supervision.  Independent with hygiene and clothing management  Transfer Assistance : 5 (Supervision/setup)  Sit to Stand Assistance: Supervision  Car Transfers: Not tested       GAIT Daily Assessment   5 min rest breaks between each attempt Amount of Assistance: 5 (Stand-by assistance)  Distance (ft): 50 Feet (ft) (50ft x 5, 20 ft x 1)  Assistive Device: Walker, rolling; Orthotic device; Gait belt (Aspen LSO)   Gait training with one time cue for posture correction and cues for improved step length and step clearance        STEPS or STAIRS Daily Assessment    Steps/Stairs Ambulated (#): 0  Level of Assist : 0 (Not tested)       BALANCE Daily Assessment   Static standing with RW, lumbar corset and supervision with no loss of balance    No loss of balance during gait training or toileting activity Sitting - Static: Good (unsupported)  Sitting - Dynamic: Good (unsupported)  Standing - Static: Fair  Standing - Dynamic : Impaired       WHEELCHAIR MOBILITY Daily Assessment    Curbs/Ramps Assist Required (FIM Score): 0 (Not tested)  Wheelchair Setup Assist Required : 0 (Not tested)       LOWER EXTREMITY EXERCISES Daily Assessment      NA             Assessment:Improved tolerance to treatment today with No symptoms during treatment. Will progress gait distance and independence as patient remains asymptomatic. Patient returned to room at end of treatment. Patient supine in bed with head of bed elevated and bed rails up x 2. Needs placed in reach of patient. Plan of Care: Continue with POC and progress as tolerated.      Sergey Bryant, PT  11/19/2021

## 2021-11-20 PROCEDURE — 74011250637 HC RX REV CODE- 250/637: Performed by: PHYSICIAN ASSISTANT

## 2021-11-20 PROCEDURE — 99232 SBSQ HOSP IP/OBS MODERATE 35: CPT | Performed by: PHYSICIAN ASSISTANT

## 2021-11-20 PROCEDURE — 65310000000 HC RM PRIVATE REHAB

## 2021-11-20 PROCEDURE — 97530 THERAPEUTIC ACTIVITIES: CPT

## 2021-11-20 PROCEDURE — 74011250637 HC RX REV CODE- 250/637: Performed by: PHYSICAL MEDICINE & REHABILITATION

## 2021-11-20 PROCEDURE — 97116 GAIT TRAINING THERAPY: CPT

## 2021-11-20 RX ORDER — FUROSEMIDE 40 MG/1
40 TABLET ORAL DAILY
Status: DISCONTINUED | OUTPATIENT
Start: 2021-11-20 | End: 2021-11-20

## 2021-11-20 RX ORDER — SPIRONOLACTONE 25 MG/1
12.5 TABLET ORAL DAILY
Status: DISCONTINUED | OUTPATIENT
Start: 2021-11-20 | End: 2021-11-23 | Stop reason: HOSPADM

## 2021-11-20 RX ADMIN — ZOLPIDEM TARTRATE 10 MG: 5 TABLET ORAL at 21:28

## 2021-11-20 RX ADMIN — PANTOPRAZOLE SODIUM 40 MG: 40 TABLET, DELAYED RELEASE ORAL at 05:22

## 2021-11-20 RX ADMIN — POTASSIUM CHLORIDE 20 MEQ: 20 TABLET, EXTENDED RELEASE ORAL at 08:39

## 2021-11-20 RX ADMIN — METOPROLOL TARTRATE 25 MG: 25 TABLET, FILM COATED ORAL at 08:39

## 2021-11-20 RX ADMIN — AMITRIPTYLINE HYDROCHLORIDE 25 MG: 25 TABLET, FILM COATED ORAL at 21:28

## 2021-11-20 RX ADMIN — SPIRONOLACTONE 12.5 MG: 25 TABLET ORAL at 14:52

## 2021-11-20 RX ADMIN — APIXABAN 5 MG: 5 TABLET, FILM COATED ORAL at 08:39

## 2021-11-20 RX ADMIN — HYDROCODONE BITARTRATE AND ACETAMINOPHEN 1 TABLET: 10; 325 TABLET ORAL at 14:59

## 2021-11-20 RX ADMIN — FAMOTIDINE 20 MG: 20 TABLET ORAL at 17:45

## 2021-11-20 RX ADMIN — HYDROCODONE BITARTRATE AND ACETAMINOPHEN 1 TABLET: 10; 325 TABLET ORAL at 08:39

## 2021-11-20 RX ADMIN — GABAPENTIN 300 MG: 300 CAPSULE ORAL at 08:39

## 2021-11-20 RX ADMIN — ESCITALOPRAM OXALATE 20 MG: 10 TABLET ORAL at 08:39

## 2021-11-20 RX ADMIN — GABAPENTIN 300 MG: 300 CAPSULE ORAL at 15:00

## 2021-11-20 RX ADMIN — METOPROLOL TARTRATE 25 MG: 25 TABLET, FILM COATED ORAL at 17:45

## 2021-11-20 RX ADMIN — DOCUSATE SODIUM 100 MG: 100 CAPSULE ORAL at 17:45

## 2021-11-20 RX ADMIN — Medication 1 AMPULE: at 21:27

## 2021-11-20 RX ADMIN — DOXYCYCLINE HYCLATE 100 MG: 100 CAPSULE ORAL at 21:28

## 2021-11-20 RX ADMIN — Medication 1 AMPULE: at 08:37

## 2021-11-20 RX ADMIN — ASPIRIN 81 MG: 81 TABLET ORAL at 21:28

## 2021-11-20 RX ADMIN — APIXABAN 5 MG: 5 TABLET, FILM COATED ORAL at 17:46

## 2021-11-20 RX ADMIN — TORSEMIDE 20 MG: 20 TABLET ORAL at 08:39

## 2021-11-20 RX ADMIN — PROPAFENONE HYDROCHLORIDE 150 MG: 150 TABLET, FILM COATED ORAL at 17:46

## 2021-11-20 RX ADMIN — PROPAFENONE HYDROCHLORIDE 150 MG: 150 TABLET, FILM COATED ORAL at 08:38

## 2021-11-20 RX ADMIN — DOCUSATE SODIUM 100 MG: 100 CAPSULE ORAL at 08:38

## 2021-11-20 RX ADMIN — MIDODRINE HYDROCHLORIDE 5 MG: 5 TABLET ORAL at 16:50

## 2021-11-20 RX ADMIN — LEVOTHYROXINE SODIUM 50 MCG: 0.05 TABLET ORAL at 05:22

## 2021-11-20 RX ADMIN — DOXYCYCLINE HYCLATE 100 MG: 100 CAPSULE ORAL at 08:39

## 2021-11-20 RX ADMIN — GABAPENTIN 300 MG: 300 CAPSULE ORAL at 21:28

## 2021-11-20 NOTE — PROGRESS NOTES
OT Daily Note  Time In 0933   Time Out 1012     Subjective: \"My legs are more swollen today\"  Pain: Patient had no complaint of pain. Patient Vitals for the past 8 hrs:   Temp Pulse Resp BP SpO2   11/20/21 0807 97.5 °F (36.4 °C) 68 16 139/70 97 %         Functional Mobility      Score Comments   Sit to Stand 6: Independent Yanira w/ FWW   Transfer Assist 6: Independent Transfer Type: SPT   Equipment: Rolling Walker   Comments: SPT from [facility] to [facility]        Activity Tolerance   Pt completed x12 min on UBE w/ moderate resistance, 6min forward, 6 min backwards while seated in WC in order to increase UB strength/activity tolerance to promote increased independence w/ ADLs and IADLs. Slight decrease in resistance required for backward pedaling UBE. No rest breaks required during activity      Strengthening   Pt engaged in a game of [employer] w/ yellow bilat. Wrist weights donned while seated in the WC to address UB strength and coordination      Education   Pt edu on benefits of each intervention in order to reach OT goals      Interdisciplinary Communication: Communicated w/ PT that pt c/o increased edema BLE today  Summary of session: Summary of interventions noted above. Pt continues to be motivated to reach goals, OT session focused on UB strength, activity tolerance and coordination in order to facilitate return to PLOF. Plan: Continue with POC.      Yvette Valdez OTR/L  11/20/2021

## 2021-11-20 NOTE — PROGRESS NOTES
Enzo Montemayor MD  Medical Director  [address] [facility], [address] [facility]  Tel: [phone]       [facility] PROGRESS NOTE    Katherine Mendez  Admit Date: 11/3/2021  Admit Diagnosis:   Lumbar stenosis with neurogenic claudication [M48.062]; Spondylolisthesis of lumbar region [M43.16]; Spinal stenosis [M48.00]; Spinal stenosis, lumbar region with neurogenic claudication [M48.062]    Subjective     Patient seen and examined after AM therapies. Denies further chest pain but had pre-syncopal episode yesterday after ambulating 70'. After recovery, PT walked patient again 39' without incident. Patient reports dyspnea when lying flat but worse with prolonged sitting up straight. Denies cough, palpitations. Feels increased heaviness in legs with ambulation and notes difficulty lifting them back into bed. TTE 10/29/2021 with EF >70%, severe pulmonary HTN.     Objective:     Current Facility-Administered Medications   Medication Dose Route Frequency    furosemide (LASIX) tablet 40 mg  40 mg Oral DAILY    zolpidem (AMBIEN) tablet 10 mg  10 mg Oral QHS PRN    traMADoL (ULTRAM) tablet 50 mg  50 mg Oral Q6H PRN    rOPINIRole (REQUIP) tablet 1 mg  1 mg Oral QHS PRN    pantoprazole (PROTONIX) tablet 40 mg  40 mg Oral ACB    nicotine (NICODERM CQ) 14 mg/24 hr patch 1 Patch  1 Patch TransDERmal Q24H    midodrine (PROAMATINE) tablet 5 mg  5 mg Oral TID WITH MEALS    metoprolol tartrate (LOPRESSOR) tablet 25 mg  25 mg Oral BID    lidocaine 4 % patch 1 Patch  1 Patch TransDERmal Q24H    levothyroxine (SYNTHROID) tablet 50 mcg  50 mcg Oral ACB    influenza vaccine 2021-22 (6 mos+)(PF) (FLUARIX/FLULAVAL/FLUZONE QUAD) injection 0.5 mL  1 Each IntraMUSCular PRIOR TO DISCHARGE    gabapentin (NEURONTIN) capsule 300 mg  300 mg Oral TID    famotidine (PEPCID) tablet 20 mg  20 mg Oral QPM    doxycycline (VIBRAMYCIN) capsule 100 mg  100 mg Oral Q12H    escitalopram oxalate (LEXAPRO) tablet 20 mg  20 mg Oral QAM    aspirin delayed-release tablet 81 mg  81 mg Oral QHS    docusate sodium (COLACE) capsule 100 mg  100 mg Oral BID    apixaban (ELIQUIS) tablet 5 mg  5 mg Oral BID    amitriptyline (ELAVIL) tablet 25 mg  25 mg Oral QHS    alcohol 62% (NOZIN) nasal  1 Ampule  1 Ampule Topical Q12H    polyethylene glycol (MIRALAX) packet 17 g  17 g Oral DAILY    potassium chloride (K-DUR, KLOR-CON M20) SR tablet 20 mEq  20 mEq Oral DAILY    propafenone (RYTHMOL) tablet 150 mg  150 mg Oral BID    acetaminophen (TYLENOL) tablet 650 mg  650 mg Oral Q6H PRN    alum-mag hydroxide-simeth (MYLANTA) oral suspension 30 mL  30 mL Oral Q4H PRN    bisacodyL (DULCOLAX) suppository 10 mg  10 mg Rectal DAILY PRN    HYDROcodone-acetaminophen (NORCO)  mg tablet 1 Tablet  1 Tablet Oral Q4H PRN    LORazepam (ATIVAN) tablet 0.5 mg  0.5 mg Oral Q6H PRN    diphenhydrAMINE (BENADRYL) capsule 25 mg  25 mg Oral Q6H PRN    magnesium hydroxide (MILK OF MAGNESIA) 400 mg/5 mL oral suspension 30 mL  30 mL Oral DAILY PRN    naloxone (NARCAN) injection 0.4 mg  0.4 mg IntraVENous PRN    ondansetron (ZOFRAN ODT) tablet 4 mg  4 mg Oral Q4H PRN    phenol throat spray (CHLORASEPTIC) 1 Spray  1 Spray Oral PRN    torsemide (DEMADEX) tablet 20 mg  20 mg Oral DAILY       Review of Systems:   Denies headache, visual problems, abdominal pain, dysuria, calf pain. Pertinent positives are as noted in the HPI, ROS unremarkable otherwise. Visit Vitals  /70   Pulse 68   Temp 97.5 °F (36.4 °C)   Resp 16   Ht 5' 4\" (1.626 m)   Wt 306 lb 9.6 oz (139.1 kg)   SpO2 97%   BMI 52.63 kg/m²        Physical Exam:   General: Alert, appropriately oriented. Lying in bed with HOB elevated ~30 deg. HEENT: Normocephalic, EOM intact. Oral mucosa moist.   Lungs: Clear to auscultation bilaterally, no crackles / rhonchi / wheeze. Respirations even and unlabored.    Heart: Regular rate, mostly irregular underlying rhythm. No appreciable murmur. Abdomen: Soft, non-tender, obese and protuberant but not distended. Bowel sounds normoactive, no organomegaly but proper exam precluded by obesity. Genitourinary: Deferred. Neuromuscular:      UE strength and ROM full and symmetric. LE strength at HF 3/5 (R), 3-/5 (L), KF 4/5 (B). Sensation intact. Skin/extremity: Erythematous rash to face c/w rosacea. B LE 2+ pitting edema to mid-shaft leg. Functional Assessment:  Balance  Sitting - Static: Good (unsupported) (11/20/21 1000)  Sitting - Dynamic: Good (unsupported) (11/20/21 1000)  Standing - Static: Fair (11/20/21 1000)  Standing - Dynamic : Impaired (11/20/21 1000)       Margot Rivas Fall Risk Assessment:  Margot Rivas Fall Risk  Mobility: Ambulates or transfers with assist devices or assistance (11/20/21 0732)  Mobility Interventions: Patient to call before getting OOB; Utilize walker, cane, or other assistive device (11/20/21 0732)  Mentation: Alert, oriented x 3 (11/20/21 0732)  Medication: Patient receiving anticonvulsants, sedatives(tranquilizers), psychotropics or hypnotics, hypoglycemics, narcotics, sleep aids, antihypertensives, laxatives, or diuretics (11/20/21 0732)  Medication Interventions: Patient to call before getting OOB; Teach patient to arise slowly (11/20/21 0732)  Elimination: Needs assistance with toileting (11/20/21 0732)  Elimination Interventions: Call light in reach; Patient to call for help with toileting needs;  Toilet paper/wipes in reach (11/20/21 0732)  Prior Fall History: No (11/20/21 0732)  History of Falls Interventions: Consult care management for discharge planning (11/20/21 0732)  Total Score: 3 (11/20/21 0732)  Standard Fall Precautions: Yes (11/16/21 1900)  High Fall Risk: Yes (11/20/21 0732)     Ambulation:  Gait  Distance (ft): 50 Feet (ft) (50ft x 4, 20 ft x 1) (11/20/21 1000)  Assistive Device: Walker, rolling; Orthotic device; Gait belt ([employer]) (11/20/21 1000) Labs/Studies:  Recent Results (from the past 72 hour(s))   NT-PRO BNP    Collection Time: 11/17/21  3:00 PM   Result Value Ref Range    NT pro-BNP 1,139 (H) 5 - 125 PG/ML   OCCULT BLOOD, STOOL    Collection Time: 11/18/21  9:40 AM   Result Value Ref Range    Occult blood, stool Positive (A) NEG     URINALYSIS W/ RFLX MICROSCOPIC    Collection Time: 11/18/21 11:39 AM   Result Value Ref Range    Color YELLOW      Appearance CLOUDY      Specific gravity 1.010 1.001 - 1.023      pH (UA) 7.0 5.0 - 9.0      Protein Negative NEG mg/dL    Glucose Negative mg/dL    Ketone Negative NEG mg/dL    Bilirubin Negative NEG      Blood Negative NEG      Urobilinogen 0.2 0.2 - 1.0 EU/dL    Nitrites Negative NEG      Leukocyte Esterase Negative NEG     CULTURE, URINE    Collection Time: 11/18/21 11:39 AM    Specimen: Clean catch; Urine   Result Value Ref Range    Special Requests: NO SPECIAL REQUESTS      Culture result:        50,000-100,000 COLONIES/mL MIXED SKIN GLADYS ISOLATED   TROPONIN-HIGH SENSITIVITY    Collection Time: 11/18/21  3:48 PM   Result Value Ref Range    Troponin-High Sensitivity 13.8 0 - 14 pg/mL   CBC WITH AUTOMATED DIFF    Collection Time: 11/18/21  3:48 PM   Result Value Ref Range    WBC 10.3 4.3 - 11.1 K/uL    RBC 3.16 (L) 4.05 - 5.2 M/uL    HGB 8.6 (L) 11.7 - 15.4 g/dL    HCT 29.3 (L) 35.8 - 46.3 %    MCV 92.7 79.6 - 97.8 FL    MCH 27.2 26.1 - 32.9 PG    MCHC 29.4 (L) 31.4 - 35.0 g/dL    RDW 15.9 (H) 11.9 - 14.6 %    PLATELET 552 709 - 736 K/uL    MPV 9.2 (L) 9.4 - 12.3 FL    ABSOLUTE NRBC 0.00 0.0 - 0.2 K/uL    DF AUTOMATED      NEUTROPHILS 65 43 - 78 %    LYMPHOCYTES 20 13 - 44 %    MONOCYTES 9 4.0 - 12.0 %    EOSINOPHILS 4 0.5 - 7.8 %    BASOPHILS 1 0.0 - 2.0 %    IMMATURE GRANULOCYTES 0 0.0 - 5.0 %    ABS. NEUTROPHILS 6.7 1.7 - 8.2 K/UL    ABS. LYMPHOCYTES 2.1 0.5 - 4.6 K/UL    ABS. MONOCYTES 1.0 0.1 - 1.3 K/UL    ABS. EOSINOPHILS 0.4 0.0 - 0.8 K/UL    ABS. BASOPHILS 0.1 0.0 - 0.2 K/UL    ABS. IMM. GRANS. 0.0 0.0 - 0.5 K/UL   METABOLIC PANEL, COMPREHENSIVE    Collection Time: 11/18/21  3:48 PM   Result Value Ref Range    Sodium 139 136 - 145 mmol/L    Potassium 4.0 3.5 - 5.1 mmol/L    Chloride 100 98 - 107 mmol/L    CO2 34 (H) 21 - 32 mmol/L    Anion gap 5 (L) 7 - 16 mmol/L    Glucose 93 65 - 100 mg/dL    BUN 15 8 - 23 MG/DL    Creatinine 0.82 0.6 - 1.0 MG/DL    GFR est AA >60 >60 ml/min/1.73m2    GFR est non-AA >60 >60 ml/min/1.73m2    Calcium 8.4 8.3 - 10.4 MG/DL    Bilirubin, total 0.6 0.2 - 1.1 MG/DL    ALT (SGPT) 20 12 - 65 U/L    AST (SGOT) 22 15 - 37 U/L    Alk.  phosphatase 153 (H) 50 - 136 U/L    Protein, total 6.3 6.3 - 8.2 g/dL    Albumin 2.7 (L) 3.2 - 4.6 g/dL    Globulin 3.6 (H) 2.3 - 3.5 g/dL    A-G Ratio 0.8 (L) 1.2 - 3.5     LIPASE    Collection Time: 11/18/21  3:48 PM   Result Value Ref Range    Lipase 80 73 - 393 U/L   MAGNESIUM    Collection Time: 11/18/21  3:48 PM   Result Value Ref Range    Magnesium 2.0 1.8 - 2.4 mg/dL   EKG, 12 LEAD, INITIAL    Collection Time: 11/18/21  3:48 PM   Result Value Ref Range    Ventricular Rate 64 BPM    Atrial Rate 64 BPM    P-R Interval 150 ms    QRS Duration 82 ms    Q-T Interval 426 ms    QTC Calculation (Bezet) 439 ms    Calculated P Axis 17 degrees    Calculated R Axis 78 degrees    Calculated T Axis 9 degrees    Diagnosis       Normal sinus rhythm  Normal ECG  When compared with ECG of 30-OCT-2021 15:34,  Premature supraventricular complexes are no longer Present  Nonspecific T wave abnormality no longer evident in Lateral leads  Confirmed by Satish Penaloza ([id number]) on 11/18/2021 6:57:01 PM     TROPONIN-HIGH SENSITIVITY    Collection Time: 11/18/21  6:05 PM   Result Value Ref Range    Troponin-High Sensitivity 16.3 (H) 0 - 14 pg/mL   TROPONIN-HIGH SENSITIVITY    Collection Time: 11/18/21  7:53 PM   Result Value Ref Range    Troponin-High Sensitivity 15.1 (H) 0 - 14 pg/mL   TRANSFERRIN SATURATION    Collection Time: 11/19/21  5:45 AM   Result Value Ref Range    Iron 31 (L) 35 - 150 ug/dL    TIBC 356 250 - 450 ug/dL    Transferrin Saturation 9 %   TRANSFERRIN    Collection Time: 11/19/21  5:45 AM   Result Value Ref Range    Transferrin 299 202 - 364 mg/dL   FERRITIN    Collection Time: 11/19/21  5:45 AM   Result Value Ref Range    Ferritin 74 8 - 388 NG/ML   CBC WITH AUTOMATED DIFF    Collection Time: 11/19/21  5:45 AM   Result Value Ref Range    WBC 9.8 4.3 - 11.1 K/uL    RBC 3.19 (L) 4.05 - 5.2 M/uL    HGB 9.0 (L) 11.7 - 15.4 g/dL    HCT 30.0 (L) 35.8 - 46.3 %    MCV 94.0 79.6 - 97.8 FL    MCH 28.2 26.1 - 32.9 PG    MCHC 30.0 (L) 31.4 - 35.0 g/dL    RDW 15.8 (H) 11.9 - 14.6 %    PLATELET 768 791 - 849 K/uL    MPV 9.6 9.4 - 12.3 FL    ABSOLUTE NRBC 0.00 0.0 - 0.2 K/uL    DF AUTOMATED      NEUTROPHILS 68 43 - 78 %    LYMPHOCYTES 17 13 - 44 %    MONOCYTES 8 4.0 - 12.0 %    EOSINOPHILS 5 0.5 - 7.8 %    BASOPHILS 1 0.0 - 2.0 %    IMMATURE GRANULOCYTES 1 0.0 - 5.0 %    ABS. NEUTROPHILS 6.7 1.7 - 8.2 K/UL    ABS. LYMPHOCYTES 1.7 0.5 - 4.6 K/UL    ABS. MONOCYTES 0.8 0.1 - 1.3 K/UL    ABS. EOSINOPHILS 0.4 0.0 - 0.8 K/UL    ABS. BASOPHILS 0.1 0.0 - 0.2 K/UL    ABS. IMM.  GRANS. 0.1 0.0 - 0.5 K/UL       Assessment:     Problem List as of 11/20/2021 Date Reviewed: 11/5/2021          Codes Class Noted - Resolved    * (Principal) Spinal stenosis, lumbar region with neurogenic claudication ICD-10-CM: M48.062  ICD-9-CM: 724.03  11/3/2021 - Present        A-fib ([facility] [address] ICD-10-CM: I48.91  ICD-9-CM: 427.31  10/30/2021 - Present        ÁNGEL (acute kidney injury) ([facility] ICD-10-CM: N17.9  ICD-9-CM: 584.9  10/30/2021 - Present        Acute respiratory failure with hypoxia ([facility] ICD-10-CM: J96.01  ICD-9-CM: 518.81  10/30/2021 - Present        Hypovolemic shock ([facility] ICD-10-CM: R57.1  ICD-9-CM: 785.59  10/26/2021 - Present        Hypotension ICD-10-CM: I95.9  ICD-9-CM: 458.9  10/26/2021 - Present        Spondylolisthesis of multiple sites in spine ICD-10-CM: M43.19  ICD-9-CM: 738.4 10/25/2021 - Present        Spinal stenosis ICD-10-CM: M48.00  ICD-9-CM: 724.00  10/25/2021 - Present        Obesity, morbid ([address] ICD-10-CM: E66.01  ICD-9-CM: 278.01  12/22/2017 - Present        Acquired hypothyroidism ICD-10-CM: E03.9  ICD-9-CM: 244.9  9/13/2017 - Present        Postoperative wound infection ICD-10-CM: T81.49XA  ICD-9-CM: 998.59  5/19/2016 - Present        Lumbar stenosis with neurogenic claudication ICD-10-CM: M48.062  ICD-9-CM: 724.03  3/28/2016 - Present        Essential hypertension, benign ICD-10-CM: I10  ICD-9-CM: 401.1  7/24/2013 - Present        Endometriosis ICD-10-CM: N80.9  ICD-9-CM: 617.9  7/24/2013 - Present        Colon polyp ICD-10-CM: K63.5  ICD-9-CM: 211.3  7/24/2013 - Present        Squamous cell skin cancer ICD-10-CM: C44.92  ICD-9-CM: 173.92  7/24/2013 - Present          L2-L4 lumbar direct lateral interbody fusion with anterior plating and redo of L2 L5 laminectomy (10/26/2021 // Dr Greer Carrel) due to spinal stenosis with neurogenic claudication     Plan / Recommendations / Medical Decision Making:      Daily physician / PA medical management:     Lumbar stenosis with neurogenic claudication, spondylolisthesis of lumbar region, lumbar spinal stenosis region with neurogenic claudication - PT / OT; spinal precautions, girdle when ambulating.     Atrial fibrillation - s/p 2 failed ablations, managed with Rhythmol, metoprolol and Eliquis. Currently NSR; Eliquis restarted 10/31 in light of RUL pulmonary embolism.   -11/4 irreg, rate controlled, continue meds  -11/5 noticeably RRR to prolonged auscultation  -11/6 IIR today but rate-controlled  -11/12 NSR, rate 60s; continue Rhythmol, metoprolol and Eliquis  -11/14 HR 63-72 range last 24 hours  -11/15 HR 65-71 denies palpitations; continue Rhythmol  -11/16 rate controlled  -11/18 more RRR today, rate controlled  -11/19 RRR nl rhythm  -11/20 more irregular today, rate controlled     Hypotension / hypertension - BP fluctuating, managed medically. Has been hypotensive requiring levophed and now midodrine (pt's home lisinopril 20mg BID and HCTZ 12.5mg BID are on hold); needs TEDs when OOB.  -11/4 hypotensive episodes improved; decrease midodrine to 5mg TID. Continue metoprolol for rate control  -11/5 sBP fluctuating from 102-163, dBP all <85, mostly in 50-60s; monitor as add back PRN furosemide for edema  -11/6 /73  -11/8 -148/63-73; change midodrine to 5mg BID and wean off; MUST wear TEDs when OOB and elevate LEs whenever possible. Bilateral pitting edema; will give 40mg bid of Lasix today and reassess daily. Check renal fxn  -11/9 creat 0.56, bun 16  -11/10 creat 0.67  -11/11 /57 this am, max 143/76; continue midodrine 5mg BID for now. One episode of dizziness yesterday but BP normal by the time they could check BP  -11/14 /53  -11/15 116/52 controlled  -11/16 VSS; continue midodrine BID  -11/17 will change midodrine to TID; ? labile BP needs more consistent dosing?  -11/19 VSS however quite HTN in /93; continue current meds for now, may need to decrease midodrine if she is having supine hypertension  -11/20 /70, AM midodrine held     Pain control - ongoing significant pain in back which is stable and controlled by PRN meds. Will require regular pain assessment and comprehensive pain management. Continue PRN Norco, tramadol, APAP and gabapentin. -11/4 increase Neurontin to 300mg TID and Elavil at night 25mg due to sciatic pain; add back Requip 1mg QHS  -11/5 start steroid taper for R LE pain  -11/6 R LE radiating pain resolved, finish steroid taper  -11/8 MUCH improved with steroid dose lyudmila  -11/16 pain controlled     Hypokalemia - monitor; currently 4.3 on supplement due to HCTZ; may be able to hold supplement.   -11/4 labs pending; d/c daily labs  -11/5 K+ 4.2  -11/6 recheck BMP 11/8 since continuing daily furosemide  -11/8 K 4.2  -11/10 K 4.1, continue supplement while on lasix  -11/12 K 4.0  -11/15 K 4.0  -11/17 K 3.9     Leukocytosis, mild - wbc 11.9 at [facility] admission (11/3). No s/sx of infxn. -11/5 WBC 11.9, afebrile, no s/sx infection; will likely increase with steroids  -11/6 recheck CBC 11/8  -11/8 WBC 16.2 (from 11.9); likely steroid-induced; will check UA (negative); this is last day of steroids  -11/12 WBC 10.4, afebrile, resolved     Anemia, blood loss - Hgb 9.0, improving; had 2000ml blood loss and received blood transfusion x 3, as well as 2800ml of crystalloid. -11/6 recheck CBC 11/8; 9.4 from 8.8 11/5  -11/10 Hgb 9.7, improving  -11/12 Hgb 8.8  -11/17 Hgb 8.1; trending down. Check stool. Check iron studies. -11/19 Hgb 9.0; stool positive, likely hemorrhoid (bright red).    Hypothyroidism - continue Synthroid.     Pneumonia prophylaxis - incentive spirometer every hour while awake.     Rosacea, 11/16 - start doxycycline 100mg BID x 3 d and then daily per home regimen.     DVT risk / DVT prophylaxis - daily physician / PA exam to assess as patient is at increased risk for of thromboembolism. Mobilize as tolerated. Sequential pneumatic compression devices (SCDs) when in bed; thigh-high or knee-high thromboembolic deterrent hose when out of bed. On Eliquis.     GI prophylaxis - resume PPI. At times may need additional antacids, Maalox prn.     Depression - continue on Lexapro. At risk of depression exacerbation due to pain and loss of independent function.     General skin care / wound prevention - monitor lumbar incision and general skin wound status daily per staff and physician / PA. At risk for failure. Will require 24/7 rehab nursing. -11/4 lateral left hip wound; looks like a blister that had opened up vs sheer injury. Cleanse daily and cover. Back incision healing  -11/15 incision c/d/i     Bladder program / urinary retention / neurogenic bladder - schedule voids q 6-8 hrs.  Check post-void residual as needed; in-and-out catheter if post-void residual is more than 400ml.  -voiding continently without residuals  -11/15 remove laboy in day or two. IV lasix was causing very frequent urination  -11/16 d/c laboy     Bowel program - at risk for constipation as a side effect of opioids, other medications, impaired mobility, etc. MiraLAX daily for regularity, Azul-Colace for stool softener. PRN MOM, bisacodyl suppository or tablets for constipation.  -continent, regular     Edema, 11/9 - >1800ml out; continue Lasix 40mg BID for today . Pt requesting laboy at [facility]  -11/10 [address], wt is up. Will keep laboy and give 40mg IV lasix today. No hx of CHF but has Afib. BNP 2757. Alb 2.7; pt had a 2d echo 10/29/21; EF>70%, severe pulmonary HTN noted with hx of RUL pulmonary embolism. No evidence though of right heart strain. VSS; daily wts  -11/11 5lbs down from yesterday. UOP 1450, BNP 1865, improved; 40mg IV Lasix x 1 today  -11/12 will weigh today. One more dose of IV Lasix today 40mg. Good diuresis. Lungs clear no hx CHF. Off steroids now. BNP pending. UOP -4275  - 11/14 continue on home dose po lasix 40 mg qd, Last weights- 315lbs > 316lbs with iv lasix x 1 given yesterday, today 312lbs. Continue with 1800ml fluid restriction. Continue daily weights. BMP ordered for tomorrow  -11/15 need f/u wt. Significant improvement in edema. Continue oral Lasix 40mg daily. K 4, creat 0.72  -11/16 continue with oral Lasix, home dose of 40mg daily. Renal fxn normal, K nl. Dc laboy cath now. Wt is down 15lbs  -11/18 /70, stable, HR 67  -11/19 UOP now +480; continue daily lasix; BNP improving, must check if she is retaining  -11/20 weight plateau at [id number]; changed furosemide to torsemide with little change; ?? HFpEF? TTE 10/29/2021 with EF >70%, severe pulmonary HTN; add spironolactone and place laboy x 24h while diuresing     11/18 reports not feeling well. No specific symptom or complaints. Noted blood in underwear. Denies vaginal bleeding or hemorrhoids.  Check UA for UTI  -UA neg  -episode of chest pain yesterday; taken to ED where tropo          Time spent was 25 minutes with over 1/2 in direct patient care/examination, consultation and coordination of care. Signed By: Hermann Kenny PA-C    November 20, 2021      Physician Assistant with [facility]  Margret Medel MD, Medical Director

## 2021-11-20 NOTE — PROGRESS NOTES
PHYSICAL THERAPY DAILY NOTE  Time In: 1013  Time Out: 1051  Patient Seen For: AM; Gait training; Patient education; Transfer training; Other (see progress notes)    Subjective: patient reporting she does not feel as good today. Reports her legs and feet are more swollen and she cannot get her shoes on today. Reports they weighed her early this AM and she was 309. Reports she feels more swollen know         Objective:Vital Signs:02 sat 98% and HR 84 after ambulating 50 ft on room air. Weight 311 on standing scale  Patient Vitals for the past 12 hrs:   Temp Pulse Resp BP SpO2   11/20/21 0807 97.5 °F (36.4 °C) 68 16 139/70 97 %     Pain level:2 to 5 out of 10  Pain location:low back left side >right side   Pain interventions:Medication per order, rest, positioning,relaxation, body mechanics, L/S corset      Patient education:Bed mobility training,transfer training, gait training, balance training,fall precautions, body mechanics,activity pacing,spinal precautions, donning L/S corset, Patient verbalizing understanding and demonstrating  understanding of patient education. Recommend follow up education. Interdisciplinary Communication:spoke with RN and PA regarding patient's current symptoms noted above. Reported weight to RN and PA    Spinal, Other (comment) (Falls)  GROSS ASSESSMENT Daily Assessment   Independent DON/Doff  LSO  NA       COGNITION Daily Assessment    No change       BED/MAT MOBILITY Daily Assessment   Increased time and effort to complete with cues for body mechanics  Using bed rail   Rolling Right : 6 (Modified independent)  Rolling Left : 6 (Modified independent)  Supine to Sit : 6 (Modified independent)  Sit to Supine : 4 (Minimal assistance) (with LEs due to fluid wt in LEs)       TRANSFERS Daily Assessment   Increased time and effort to complete with cues for body mechanics   Transfer Type: SPT with walker  Other: commode transfers using grab bar Modified independent.  Independent with hygiene and clothing management   Transfer Assistance : 5 (Supervision/setup)  Sit to Stand Assistance: Supervision  Car Transfers: Not tested       GAIT Daily Assessment   5 min rest breaks between each attempt Amount of Assistance: 5 (Supervision/setup)  Distance (ft): 50 Feet (ft) (50ft x 4, 20 ft x 1)  Assistive Device: Walker, rolling; Orthotic device; Gait belt ([place] LSO)   Gait training with one time cue for posture correction and cues for improved step length and step clearance        STEPS or STAIRS Daily Assessment    Steps/Stairs Ambulated (#): 0  Level of Assist : 0 (Not tested)       BALANCE Daily Assessment   Static standing with RW, lumbar corset and supervision with no loss of balance    No loss of balance during gait training or toileting activity Sitting - Static: Good (unsupported)  Sitting - Dynamic: Good (unsupported)  Standing - Static: Fair  Standing - Dynamic : Impaired       WHEELCHAIR MOBILITY Daily Assessment    Curbs/Ramps Assist Required (FIM Score): 0 (Not tested)  Wheelchair Setup Assist Required : 0 (Not tested)       LOWER EXTREMITY EXERCISES Daily Assessment      NA             Assessment:Unable to progress gait distance due to fatigue and SOB after ambulating 50ft. 02 sat stable on room air and no chest pain or hearing loss. No significant change in weight this week. LEs more edematous with increased difficulty getting LEs into bed       Patient returned to room at end of treatment. Patient supine in bed with head of bed elevated and bed rails up x 2. Needs placed in reach of patient. Plan of Care: Continue with POC and progress as tolerated.      Alejandro Anand, PT  11/20/2021

## 2021-11-20 NOTE — PROGRESS NOTES
Purposeful hourly rounds completed this shift, needs met. Continent voids this am on toilet, laboy placed this afternoon. Calls appropriately for assistance. Pain controlled with prn medications.

## 2021-11-21 LAB
BACTERIA SPEC CULT: NORMAL
SERVICE CMNT-IMP: NORMAL

## 2021-11-21 PROCEDURE — 74011250637 HC RX REV CODE- 250/637: Performed by: PHYSICAL MEDICINE & REHABILITATION

## 2021-11-21 PROCEDURE — 74011250637 HC RX REV CODE- 250/637: Performed by: PHYSICIAN ASSISTANT

## 2021-11-21 PROCEDURE — 65310000000 HC RM PRIVATE REHAB

## 2021-11-21 RX ORDER — LIDOCAINE 4 G/100G
1 PATCH TOPICAL DAILY PRN
Status: DISCONTINUED | OUTPATIENT
Start: 2021-11-21 | End: 2021-11-23 | Stop reason: HOSPADM

## 2021-11-21 RX ADMIN — SPIRONOLACTONE 12.5 MG: 25 TABLET ORAL at 09:09

## 2021-11-21 RX ADMIN — PANTOPRAZOLE SODIUM 40 MG: 40 TABLET, DELAYED RELEASE ORAL at 05:41

## 2021-11-21 RX ADMIN — LORAZEPAM 0.5 MG: 0.5 TABLET ORAL at 22:33

## 2021-11-21 RX ADMIN — ZOLPIDEM TARTRATE 10 MG: 5 TABLET ORAL at 21:19

## 2021-11-21 RX ADMIN — DOXYCYCLINE HYCLATE 100 MG: 100 CAPSULE ORAL at 09:08

## 2021-11-21 RX ADMIN — MIDODRINE HYDROCHLORIDE 5 MG: 5 TABLET ORAL at 17:44

## 2021-11-21 RX ADMIN — GABAPENTIN 300 MG: 300 CAPSULE ORAL at 17:44

## 2021-11-21 RX ADMIN — HYDROCODONE BITARTRATE AND ACETAMINOPHEN 1 TABLET: 10; 325 TABLET ORAL at 12:32

## 2021-11-21 RX ADMIN — GABAPENTIN 300 MG: 300 CAPSULE ORAL at 09:10

## 2021-11-21 RX ADMIN — LEVOTHYROXINE SODIUM 50 MCG: 0.05 TABLET ORAL at 05:41

## 2021-11-21 RX ADMIN — GABAPENTIN 300 MG: 300 CAPSULE ORAL at 21:19

## 2021-11-21 RX ADMIN — Medication 1 AMPULE: at 21:19

## 2021-11-21 RX ADMIN — MIDODRINE HYDROCHLORIDE 5 MG: 5 TABLET ORAL at 09:10

## 2021-11-21 RX ADMIN — ASPIRIN 81 MG: 81 TABLET ORAL at 21:19

## 2021-11-21 RX ADMIN — FAMOTIDINE 20 MG: 20 TABLET ORAL at 17:45

## 2021-11-21 RX ADMIN — PROPAFENONE HYDROCHLORIDE 150 MG: 150 TABLET, FILM COATED ORAL at 09:08

## 2021-11-21 RX ADMIN — DOXYCYCLINE HYCLATE 100 MG: 100 CAPSULE ORAL at 21:19

## 2021-11-21 RX ADMIN — TORSEMIDE 20 MG: 20 TABLET ORAL at 09:08

## 2021-11-21 RX ADMIN — HYDROCODONE BITARTRATE AND ACETAMINOPHEN 1 TABLET: 10; 325 TABLET ORAL at 17:50

## 2021-11-21 RX ADMIN — APIXABAN 5 MG: 5 TABLET, FILM COATED ORAL at 17:44

## 2021-11-21 RX ADMIN — PROPAFENONE HYDROCHLORIDE 150 MG: 150 TABLET, FILM COATED ORAL at 17:45

## 2021-11-21 RX ADMIN — POTASSIUM CHLORIDE 20 MEQ: 20 TABLET, EXTENDED RELEASE ORAL at 09:08

## 2021-11-21 RX ADMIN — ESCITALOPRAM OXALATE 20 MG: 10 TABLET ORAL at 09:10

## 2021-11-21 RX ADMIN — Medication 1 AMPULE: at 09:07

## 2021-11-21 RX ADMIN — DOCUSATE SODIUM 100 MG: 100 CAPSULE ORAL at 09:08

## 2021-11-21 RX ADMIN — METOPROLOL TARTRATE 25 MG: 25 TABLET, FILM COATED ORAL at 17:45

## 2021-11-21 RX ADMIN — AMITRIPTYLINE HYDROCHLORIDE 25 MG: 25 TABLET, FILM COATED ORAL at 21:19

## 2021-11-21 RX ADMIN — DOCUSATE SODIUM 100 MG: 100 CAPSULE ORAL at 17:44

## 2021-11-21 RX ADMIN — APIXABAN 5 MG: 5 TABLET, FILM COATED ORAL at 09:08

## 2021-11-21 NOTE — PROGRESS NOTES
Needs met during hourly rounds this shift. Walker to bathroom at noon for continent bm. Eugene patent with clear yellow urine in drainage bag. No attempts to get up unassisted. Denied dizziness when up to bathroom. Required assist with hygiene. Pleasant, cooperative, motivated.

## 2021-11-22 PROCEDURE — 97530 THERAPEUTIC ACTIVITIES: CPT

## 2021-11-22 PROCEDURE — 65310000000 HC RM PRIVATE REHAB

## 2021-11-22 PROCEDURE — 97535 SELF CARE MNGMENT TRAINING: CPT

## 2021-11-22 PROCEDURE — 74011250637 HC RX REV CODE- 250/637: Performed by: PHYSICAL MEDICINE & REHABILITATION

## 2021-11-22 PROCEDURE — 97110 THERAPEUTIC EXERCISES: CPT

## 2021-11-22 PROCEDURE — 97116 GAIT TRAINING THERAPY: CPT

## 2021-11-22 PROCEDURE — 74011250637 HC RX REV CODE- 250/637: Performed by: PHYSICIAN ASSISTANT

## 2021-11-22 PROCEDURE — 99232 SBSQ HOSP IP/OBS MODERATE 35: CPT | Performed by: PHYSICAL MEDICINE & REHABILITATION

## 2021-11-22 RX ADMIN — MIDODRINE HYDROCHLORIDE 5 MG: 5 TABLET ORAL at 12:02

## 2021-11-22 RX ADMIN — FAMOTIDINE 20 MG: 20 TABLET ORAL at 17:43

## 2021-11-22 RX ADMIN — Medication 1 AMPULE: at 20:29

## 2021-11-22 RX ADMIN — HYDROCODONE BITARTRATE AND ACETAMINOPHEN 1 TABLET: 10; 325 TABLET ORAL at 05:47

## 2021-11-22 RX ADMIN — GABAPENTIN 300 MG: 300 CAPSULE ORAL at 08:20

## 2021-11-22 RX ADMIN — ESCITALOPRAM OXALATE 20 MG: 10 TABLET ORAL at 08:20

## 2021-11-22 RX ADMIN — SPIRONOLACTONE 12.5 MG: 25 TABLET ORAL at 08:20

## 2021-11-22 RX ADMIN — DOCUSATE SODIUM 100 MG: 100 CAPSULE ORAL at 17:43

## 2021-11-22 RX ADMIN — ASPIRIN 81 MG: 81 TABLET ORAL at 20:28

## 2021-11-22 RX ADMIN — Medication 1 AMPULE: at 08:19

## 2021-11-22 RX ADMIN — AMITRIPTYLINE HYDROCHLORIDE 25 MG: 25 TABLET, FILM COATED ORAL at 20:28

## 2021-11-22 RX ADMIN — LEVOTHYROXINE SODIUM 50 MCG: 0.05 TABLET ORAL at 05:39

## 2021-11-22 RX ADMIN — PROPAFENONE HYDROCHLORIDE 150 MG: 150 TABLET, FILM COATED ORAL at 08:20

## 2021-11-22 RX ADMIN — METOPROLOL TARTRATE 25 MG: 25 TABLET, FILM COATED ORAL at 17:43

## 2021-11-22 RX ADMIN — PROPAFENONE HYDROCHLORIDE 150 MG: 150 TABLET, FILM COATED ORAL at 17:43

## 2021-11-22 RX ADMIN — METOPROLOL TARTRATE 25 MG: 25 TABLET, FILM COATED ORAL at 08:21

## 2021-11-22 RX ADMIN — DOCUSATE SODIUM 100 MG: 100 CAPSULE ORAL at 08:20

## 2021-11-22 RX ADMIN — GABAPENTIN 300 MG: 300 CAPSULE ORAL at 20:29

## 2021-11-22 RX ADMIN — MIDODRINE HYDROCHLORIDE 5 MG: 5 TABLET ORAL at 08:20

## 2021-11-22 RX ADMIN — DOXYCYCLINE HYCLATE 100 MG: 100 CAPSULE ORAL at 20:26

## 2021-11-22 RX ADMIN — GABAPENTIN 300 MG: 300 CAPSULE ORAL at 16:43

## 2021-11-22 RX ADMIN — LORAZEPAM 0.5 MG: 0.5 TABLET ORAL at 20:26

## 2021-11-22 RX ADMIN — TORSEMIDE 20 MG: 20 TABLET ORAL at 08:20

## 2021-11-22 RX ADMIN — DOXYCYCLINE HYCLATE 100 MG: 100 CAPSULE ORAL at 08:20

## 2021-11-22 RX ADMIN — POTASSIUM CHLORIDE 20 MEQ: 20 TABLET, EXTENDED RELEASE ORAL at 08:21

## 2021-11-22 RX ADMIN — PANTOPRAZOLE SODIUM 40 MG: 40 TABLET, DELAYED RELEASE ORAL at 05:39

## 2021-11-22 RX ADMIN — HYDROCODONE BITARTRATE AND ACETAMINOPHEN 1 TABLET: 10; 325 TABLET ORAL at 14:27

## 2021-11-22 RX ADMIN — ZOLPIDEM TARTRATE 10 MG: 5 TABLET ORAL at 23:14

## 2021-11-22 RX ADMIN — APIXABAN 5 MG: 5 TABLET, FILM COATED ORAL at 17:43

## 2021-11-22 RX ADMIN — APIXABAN 5 MG: 5 TABLET, FILM COATED ORAL at 08:20

## 2021-11-22 RX ADMIN — HYDROCODONE BITARTRATE AND ACETAMINOPHEN 1 TABLET: 10; 325 TABLET ORAL at 20:26

## 2021-11-22 NOTE — PROGRESS NOTES
PHYSICAL THERAPY DAILY NOTE  Time In: 6447  Time Out: 6024  Patient Seen For: PM; Gait training; Patient education; Transfer training; Other (see progress notes)    Subjective: Patient reporting she felt more SOB and started to feel light headed after ambulating 70 ft the second time         Objective:Vital Signs:02 sat 96 to 98% and HR 84 after ambulating 70 ft  Patient Vitals for the past 12 hrs:   Temp Pulse Resp BP SpO2   11/22/21 1202  64  126/66    11/22/21 0830  82  (!) 110/55    11/22/21 0823  77  (!) 90/49    11/22/21 0715 98.4 °F (36.9 °C) 74 16 (!) 107/54 97 %     Pain level:0 to 4 out of 10  Pain location:low back  Left side>right side  Pain interventions:Medication per order, rest, positioning,relaxation, LSO, body mechanics      Patient education:Bed mobility training,transfer training, gait training, balance training,fall precautions, body mechanics,activity pacing,spinal  Precautions,Patient verbalizing understanding and demonstrating understanding of patient education. Recommend follow up education. Interdisciplinary Communication:spoke with RN regarding BP readings    Spinal, Other (comment) (Falls)  GROSS ASSESSMENT Daily Assessment     Independent with donning/doffing LSO       COGNITION Daily Assessment    No change       BED/MAT MOBILITY Daily Assessment   Increased time and effort to complete    Rolling Right : 6 (Modified independent)  Rolling Left : 6 (Modified independent)  Supine to Sit : 6 (Modified independent)  Sit to Supine : 6 (Modified independent)       TRANSFERS Daily Assessment   Increased time and effort to complete   Transfer Type: SPT with walker  Transfer Assistance : 5 (Supervision/setup)  Sit to Stand Assistance: Supervision  Car Transfers: Not tested       GAIT Daily Assessment   C/O dizziness after ambulating 70 ft second time.  Amount of Assistance: 5 (Supervision/setup)  Distance (ft): 70 Feet (ft) (70ft x 2  50 ft x 2  40ft x 1)  Assistive Device: Bk Chirinos rolling; Orthotic device ([place] LSO)   Gait training with one time cue for posture correction    STEPS or STAIRS Daily Assessment    Steps/Stairs Ambulated (#): 0  Level of Assist : 0 (Not tested)       BALANCE Daily Assessment   Static standing with RW and without a device and no loss of balance    No loss of balance during gait training Sitting - Static: Good (unsupported)  Sitting - Dynamic: Good (unsupported)  Standing - Static: Fair  Standing - Dynamic : Impaired       WHEELCHAIR MOBILITY Daily Assessment    Curbs/Ramps Assist Required (FIM Score): 0 (Not tested)  Wheelchair Setup Assist Required : 0 (Not tested)       LOWER EXTREMITY EXERCISES Daily Assessment      NA          Assessment: Overall functional endurance improving. Gait distance remains limited due to SOB and fatigue after 70 ft. Mildly symptomatic during this PM treatment. Difficulty progressing patient beyond current functional level       Patient returned to room at end of treatment. Patient supine in bed with head of bed elevated and bed rails up x 2. Needs placed in reach of patient    Plan of Care: Continue with POC and progress as tolerated.      Dari Umanzor, PT  11/22/2021

## 2021-11-22 NOTE — PROGRESS NOTES
OT Daily Note  Time In 1346   Time Out 1431     Subjective: \"My back is starting to hurt. Yea it might be good to have some medicine before PT. \"  Pain: Rn notified of pain. Education: spinal precautions  Interdisciplinary Communication: Collaborated with PT to ensure progress towards POC and goals. Precautions: Falls  Patient Vitals for the past 12 hrs:   Temp Pulse Resp BP SpO2   11/22/21 1202  64  126/66    11/22/21 0830  82  (!) 110/55    11/22/21 0823  77  (!) 90/49    11/22/21 0715 98.4 °F (36.9 °C) 74 16 (!) 107/54 97 %         Mobility   Score Comments   Supine to Sit 4: Supervision or touching A    Sit to Stand 4: Supervision or touching A S   Transfer Assist 4: Supervision or touching A Transfer Type: SPT   Equipment: Rolling Walker   Comments: SBA     Activity Tolerance, Cognition and Reaching Daily Assessment   While in seated position, pt engaged in sequence board game working on visual perceptual skills, visual scanning, functional reaching skills, attention to detail, and problem solving skills. Pt was able to follow rules of game 1 to 2 cues throughout. Pt was able to appropriately problem solve while dual tasking throughout activity, provided extra time. Assessment: Pt noting increasing back pain towards end of session. Pt reminded to maintain spinal precautions at table while twisting towards cards. Pt demonstrated good participation and engagement in OT session. Pt continues to benefit from skilled OT services to address remaining deficits and return back to baseline with improved independence and safety during ADLs and IADLs. Patient ended session seated in wc and with PT assuming care  Plan: Continue OT POC with focus on ADL/IADL skills, functional transfers, functional mobility, coordination, strength, static and dynamic balance, and activity tolerance to maximize safety and independence with ADLs and functional transfers.      Eduardo Perales, OTR/L  11/22/2021

## 2021-11-22 NOTE — PROGRESS NOTES
PHYSICAL THERAPY DAILY NOTE  Time In: 0313  Patient Seen For: AM; Gait training; Patient education; Therapeutic exercise; Transfer training; Other (see progress notes)    Subjective: Patient reporting she feels better today. Reports no SOB during walking and the swelling in her legs has gone down         Objective:Vital Signs:  Patient Vitals for the past 12 hrs:   Temp Pulse Resp BP SpO2   11/22/21 0830  82  (!) 110/55    11/22/21 0823  77  (!) 90/49    11/22/21 0715 98.4 °F (36.9 °C) 74 16 (!) 107/54 97 %     Pain level:0 to 4 out of 10  Pain location:low back  Left side>right side  Pain interventions:Medication per order, rest, positioning,relaxation, LSO, body mechanics      Patient education:Bed mobility training,transfer training, gait training, balance training,fall precautions, body mechanics,activity pacing,spinal  Precautions,Patient verbalizing understanding and demonstrating understanding of patient education. Recommend follow up education.     Interdisciplinary Communication:spoke with RN regarding BP readings    Spinal, Other (comment) (Falls)  GROSS ASSESSMENT Daily Assessment     Independent with donning/doffing LSO       COGNITION Daily Assessment    No change       BED/MAT MOBILITY Daily Assessment   Increased time and effort to complete    Rolling Right : 6 (Modified independent)  Rolling Left : 6 (Modified independent)  Supine to Sit : 6 (Modified independent)  Sit to Supine : 6 (Modified independent)       TRANSFERS Daily Assessment   Increased time and effort to complete   Transfer Type: SPT with walker  Transfer Assistance : 5 (Supervision/setup)  Sit to Stand Assistance: Supervision  Car Transfers: Not tested       GAIT Daily Assessment   No c/o SOB, dizziness or loss of hearing during gait training Amount of Assistance: 5 (Supervision/setup)  Distance (ft): 70 Feet (ft) (70ft x 1 60ft x 1 50 ft x 1 30 ft x 1 20 ft x 2)  Assistive Device: Walker, rolling; Orthotic device ([place] LSO) Gait training with one time cue for posture correction    STEPS or STAIRS Daily Assessment    Steps/Stairs Ambulated (#): 0  Level of Assist : 0 (Not tested)       BALANCE Daily Assessment   Static standing with RW and without a device and no loss of balance    No loss of balance during gait training Sitting - Static: Good (unsupported)  Sitting - Dynamic: Good (unsupported)  Standing - Static: Fair  Standing - Dynamic : Impaired       WHEELCHAIR MOBILITY Daily Assessment    Curbs/Ramps Assist Required (FIM Score): 0 (Not tested)  Wheelchair Setup Assist Required : 0 (Not tested)       LOWER EXTREMITY EXERCISES Daily Assessment   Increased time and effort to complete with multiple and frequent rest breaks. Cues for correct form  Ankle pumps, hip ABD with skate, heel slides, SAQ   Extremity: Both  Exercise Type #1: Supine lower extremity strengthening  Sets Performed: 1  Reps Performed: 30  Level of Assist: Supervision (set up assist )          Assessment: Able to tolerate increase in gait this AM and asymptomatic. BP lower this AM but no symptoms during treatment. Requires supervision with functional mobility due to potential for BP to drop during mobility. Overall gait distance improving. Plan to progress gait distance and independence as long as patient remains asymptomatic       Patient returned to room at end of treatment. Patient supine in bed with head of bed elevated and bed rails up x 2. Needs placed in reach of patient    Plan of Care: Continue with POC and progress as tolerated.      Smooth Brito, PT  11/22/2021

## 2021-11-22 NOTE — PROGRESS NOTES
Time In 0700   Time Out 0746     Mobility   Score Comments   Sit to Stand 4: Supervision or touching A SBA   Transfer Assist 4: Supervision or touching A Transfer Type: SPT   Equipment: Rolling Walker   Comments: SBA     Activities of Daily Living    Score Comments   Eating 6: Independent    Bathing 5: S/U or clean-up assist Type of Shower: Bath Pack  Position: Supported sitting and Standing PRN   Adaptive  Equipment: W/C  Comments:    Upper Body  Dressing 6: Independent Items Applied: Pullover and Bra  Position: Supported Sitting  Comments:    Lower Body Dressing 4: Supervision or touching A Items Applied: Elastic pants  Position: Supported sitting and Standing PRN  Adaptive Equipment: RW and W/C  Comments: cues for how to thread laboy through pants   Donning/[place] Footwear 3: Partial/Moderate A Items Applied: Shoes with fasteners   Adaptive Equipment: Elastic Shoelaces  Comments:    Education  functional transfers       S: \"Did you hear I went to the ER over the weekend? It's my heart but they don't know exactly what's wrong yet. \" Agreeable to therapy. Patient was able to ambulate ~10 feet using a RW with SBA. Pt completed morning ADLs with quality indicators reflected in chart above. Pt stood for 6 minutes at raised table completing x1 complex rubber band design with 2 errors and SBA. Pain indicated after standing for 6 minutes, pt required seated rest break. Patient Vitals for the past 12 hrs:   Temp Pulse Resp BP SpO2   11/22/21 1202  64  126/66    11/22/21 0830  82  (!) 110/55    11/22/21 0823  77  (!) 90/49    11/22/21 0715 98.4 °F (36.9 °C) 74 16 (!) 107/54 97 %       Collaborated with PT regarding patient performance and POC. Patient tolerated session well, but activity tolerance, balance, functional mobility, strength, coordination, cognition are still below baseline and require skilled facilitation to successfully and safely complete ADLs and transfers.    Patient ended session seated in wc and call remote, phone, all needs within reach.       Eduardo Perales, OTR/L  11/22/2021

## 2021-11-22 NOTE — PROGRESS NOTES
Chart reviewed. Confirmed with Dr. Puma Gordillo pt will not discharge today. Pt still having issues medical concerns with syncope. Will follow and assist with d/c when pt medically ready. Contacted daughter, Abida Mena, and she is aware and agreeable with plan. CM to continue to follow and monitor for any further needs.

## 2021-11-22 NOTE — PROGRESS NOTES
Benigno Palmer MD  Medical Director  [address] [facility], [address]  Tel: [phone]       [facility] PROGRESS NOTE    Ranjit Poon  Admit Date: 11/3/2021  Admit Diagnosis:   Lumbar stenosis with neurogenic claudication [M48.062]; Spondylolisthesis of lumbar region [M43.16]; Spinal stenosis [M48.00]; Spinal stenosis, lumbar region with neurogenic claudication [M48.062]    Subjective     Patient seen and examined. Feeling ok. No cp, heart palpitations. SOB seems improved. Had episode of presyncope once again on Friday. + dyspnea when flat, fatigue in the LEs.      Objective:     Current Facility-Administered Medications   Medication Dose Route Frequency    lidocaine 4 % patch 1 Patch  1 Patch TransDERmal DAILY PRN    spironolactone (ALDACTONE) tablet 12.5 mg  12.5 mg Oral DAILY    zolpidem (AMBIEN) tablet 10 mg  10 mg Oral QHS PRN    traMADoL (ULTRAM) tablet 50 mg  50 mg Oral Q6H PRN    rOPINIRole (REQUIP) tablet 1 mg  1 mg Oral QHS PRN    pantoprazole (PROTONIX) tablet 40 mg  40 mg Oral ACB    nicotine (NICODERM CQ) 14 mg/24 hr patch 1 Patch  1 Patch TransDERmal Q24H    midodrine (PROAMATINE) tablet 5 mg  5 mg Oral TID WITH MEALS    metoprolol tartrate (LOPRESSOR) tablet 25 mg  25 mg Oral BID    levothyroxine (SYNTHROID) tablet 50 mcg  50 mcg Oral ACB    influenza vaccine 2021-22 (6 mos+)(PF) (FLUARIX/FLULAVAL/FLUZONE QUAD) injection 0.5 mL  1 Each IntraMUSCular PRIOR TO DISCHARGE    gabapentin (NEURONTIN) capsule 300 mg  300 mg Oral TID    famotidine (PEPCID) tablet 20 mg  20 mg Oral QPM    doxycycline (VIBRAMYCIN) capsule 100 mg  100 mg Oral Q12H    escitalopram oxalate (LEXAPRO) tablet 20 mg  20 mg Oral QAM    aspirin delayed-release tablet 81 mg  81 mg Oral QHS    docusate sodium (COLACE) capsule 100 mg  100 mg Oral BID    apixaban (ELIQUIS) tablet 5 mg  5 mg Oral BID    amitriptyline (ELAVIL) tablet 25 mg  25 mg Oral QHS    alcohol 62% (NOZIN) nasal  1 Ampule  1 Ampule Topical Q12H    polyethylene glycol (MIRALAX) packet 17 g  17 g Oral DAILY    potassium chloride (K-DUR, KLOR-CON M20) SR tablet 20 mEq  20 mEq Oral DAILY    propafenone (RYTHMOL) tablet 150 mg  150 mg Oral BID    acetaminophen (TYLENOL) tablet 650 mg  650 mg Oral Q6H PRN    alum-mag hydroxide-simeth (MYLANTA) oral suspension 30 mL  30 mL Oral Q4H PRN    bisacodyL (DULCOLAX) suppository 10 mg  10 mg Rectal DAILY PRN    HYDROcodone-acetaminophen (NORCO)  mg tablet 1 Tablet  1 Tablet Oral Q4H PRN    LORazepam (ATIVAN) tablet 0.5 mg  0.5 mg Oral Q6H PRN    diphenhydrAMINE (BENADRYL) capsule 25 mg  25 mg Oral Q6H PRN    magnesium hydroxide (MILK OF MAGNESIA) 400 mg/5 mL oral suspension 30 mL  30 mL Oral DAILY PRN    naloxone (NARCAN) injection 0.4 mg  0.4 mg IntraVENous PRN    ondansetron (ZOFRAN ODT) tablet 4 mg  4 mg Oral Q4H PRN    phenol throat spray (CHLORASEPTIC) 1 Spray  1 Spray Oral PRN    torsemide (DEMADEX) tablet 20 mg  20 mg Oral DAILY       Review of Systems:   Denies chest pain, shortness of breath, cough, headache, visual problems, abdominal pain, dysuria, calf pain. Pertinent positives are as noted in the HPI, ROS unremarkable otherwise. Visit Vitals  BP (!) 110/55 (BP 1 Location: Left upper arm, BP Patient Position: Sitting)   Pulse 82   Temp 98.4 °F (36.9 °C)   Resp 16   Ht 5' 4\" (1.626 m)   Wt 309 lb 9.6 oz (140.4 kg)   SpO2 97%   BMI 53.14 kg/m²        Physical Exam:   General: Alert and age appropriately oriented. No acute cardiorespiratory distress. HEENT: Normocephalic, no scleral icterus. Oral mucosa moist without cyanosis. Lungs: Clear to auscultation bilaterally. Respiration even and unlabored. Heart: Regular rate and irreg rhythm, S1, S2. No murmurs, clicks, rub or gallops. Abdomen: Soft, non-tender, not distended. Bowel sounds normoactive. No organomegaly. Genitourinary: Deferred. Neuromuscular:      UE strength and ROM full and symmetric. LE strength at HF 3/5 (R), 3-/5 (L), KF 4/5 (B). Sensation intact. Skin/extremity: No rashes, no erythema. No calf tenderness B LE.  1+ pitting edema to ankles  Erythematous rosacea rash on face improving                                                                              Functional Assessment:          Balance  Sitting - Static: Good (unsupported) (11/22/21 0800)  Sitting - Dynamic: Good (unsupported) (11/22/21 0800)  Standing - Static: Fair (11/22/21 0800)  Standing - Dynamic : Impaired (11/22/21 0800)                     [facility] Fall Risk Assessment:  [facility] Fall Risk  Mobility: Ambulates or transfers with assist devices or assistance (11/22/21 0715)  Mobility Interventions: Patient to call before getting OOB; Utilize walker, cane, or other assistive device (11/22/21 0715)  Mentation: Alert, oriented x 3 (11/22/21 0715)  Medication: Patient receiving anticonvulsants, sedatives(tranquilizers), psychotropics or hypnotics, hypoglycemics, narcotics, sleep aids, antihypertensives, laxatives, or diuretics (11/22/21 0715)  Medication Interventions: Evaluate medications/consider consulting pharmacy; Patient to call before getting OOB (11/22/21 0715)  Elimination: Needs assistance with toileting (11/22/21 0715)  Elimination Interventions: Call light in reach;  Patient to call for help with toileting needs (11/22/21 0715)  Prior Fall History: No (11/22/21 0715)  History of Falls Interventions: Consult care management for discharge planning; Door open when patient unattended (11/22/21 0715)  Total Score: 3 (11/22/21 0715)  Standard Fall Precautions: Yes (11/16/21 1900)  High Fall Risk: Yes (11/22/21 0715)     Speech Assessment:         Ambulation:  Gait  Distance (ft): 70 Feet (ft) (70ft x 1 60ft x 1 50 ft x 1 30 ft x 1 20 ft x 2) (11/22/21 0800)  Assistive Device: Walker, rolling; Orthotic device ([employer]) (11/22/21 0800)     Labs/Studies:  No results found for this or any previous visit (from the past 67 hour(s)).     Assessment:     Problem List as of 11/22/2021 Date Reviewed: 11/5/2021          Codes Class Noted - Resolved    * (Principal) Spinal stenosis, lumbar region with neurogenic claudication ICD-10-CM: M48.062  ICD-9-CM: 724.03  11/3/2021 - Present        A-fib ([facility] ICD-10-CM: I48.91  ICD-9-CM: 427.31  10/30/2021 - Present        ÁNGEL (acute kidney injury) ([facility] ICD-10-CM: N17.9  ICD-9-CM: 584.9  10/30/2021 - Present        Acute respiratory failure with hypoxia ([facility] ICD-10-CM: J96.01  ICD-9-CM: 518.81  10/30/2021 - Present        Hypovolemic shock ([facility] ICD-10-CM: R57.1  ICD-9-CM: 785.59  10/26/2021 - Present        Hypotension ICD-10-CM: I95.9  ICD-9-CM: 458.9  10/26/2021 - Present        Spondylolisthesis of multiple sites in spine ICD-10-CM: M43.19  ICD-9-CM: 738.4  10/25/2021 - Present        Spinal stenosis ICD-10-CM: M48.00  ICD-9-CM: 724.00  10/25/2021 - Present        Obesity, morbid ([facility] ICD-10-CM: E66.01  ICD-9-CM: 278.01  12/22/2017 - Present        Acquired hypothyroidism ICD-10-CM: E03.9  ICD-9-CM: 244.9  9/13/2017 - Present        Postoperative wound infection ICD-10-CM: T81.49XA  ICD-9-CM: 998.59  5/19/2016 - Present        Lumbar stenosis with neurogenic claudication ICD-10-CM: M48.062  ICD-9-CM: 724.03  3/28/2016 - Present        Essential hypertension, benign ICD-10-CM: I10  ICD-9-CM: 401.1  7/24/2013 - Present        Endometriosis ICD-10-CM: N80.9  ICD-9-CM: 617.9  7/24/2013 - Present        Colon polyp ICD-10-CM: K63.5  ICD-9-CM: 211.3  7/24/2013 - Present        Squamous cell skin cancer ICD-10-CM: C44.92  ICD-9-CM: 173.92  7/24/2013 - Present               L2-L4 lumbar direct lateral interbody fusion with anterior plating and redo of L2 L5 laminectomy (10/26/2021 // Dr Maya Masterson) due to spinal stenosis with neurogenic claudication     Plan / Recommendations / Medical Decision Making:      Daily physician / PA medical management:     Lumbar stenosis with neurogenic claudication, spondylolisthesis of lumbar region, lumbar spinal stenosis region with neurogenic claudication - PT / OT; spinal precautions, girdle when ambulating.     Atrial fibrillation - s/p 2 failed ablations, managed with Rhythmol, metoprolol and Eliquis. Currently NSR; Eliquis restarted 10/31 in light of RUL pulmonary embolism. -11/4 irreg, rate controlled, continue meds  -11/5 noticeably RRR to prolonged auscultation  -11/6 IIR today but rate-controlled  -11/12 NSR, rate 60s; continue Rhythmol, metoprolol and Eliquis  -11/14 HR 63-72 range last 24 hours  -11/15 HR 65-71 denies palpitations; continue Rhythmol  -11/16 rate controlled  -11/18 more RRR today, rate controlled  -11/19 RRR nl rhythm  -11/20 more irregular today, rate controlled  -11/22 rate controlled 69-82      Hypotension / hypertension - BP fluctuating, managed medically. Has been hypotensive requiring levophed and now midodrine (pt's home lisinopril 20mg BID and HCTZ 12.5mg BID are on hold); needs TEDs when OOB.  -11/4 hypotensive episodes improved; decrease midodrine to 5mg TID. Continue metoprolol for rate control  -11/5 sBP fluctuating from 102-163, dBP all <85, mostly in 50-60s; monitor as add back PRN furosemide for edema  -11/6 /73  -11/8 -148/63-73; change midodrine to 5mg BID and wean off; MUST wear TEDs when OOB and elevate LEs whenever possible. Bilateral pitting edema; will give 40mg bid of Lasix today and reassess daily. Check renal fxn  -11/9 creat 0.56, bun 16  -11/10 creat 0.67  -11/11 /57 this am, max 143/76; continue midodrine 5mg BID for now.  One episode of dizziness yesterday but BP normal by the time they could check BP  -11/14 /53  -11/15 116/52 controlled  -11/16 VSS; continue midodrine BID  -11/17 will change midodrine to TID; ? labile BP needs more consistent dosing?  -11/19 VSS however quite HTN in /93; continue current meds for now, may need to decrease midodrine if she is having supine hypertension  -11/20 /70, AM midodrine held  -11/22 /50s ; no orthostatics captured when pt has episodes of presyncope when up walking.      Pain control - ongoing significant pain in back which is stable and controlled by PRN meds. Will require regular pain assessment and comprehensive pain management. Continue PRN Norco, tramadol, APAP and gabapentin. -11/4 increase Neurontin to 300mg TID and Elavil at night 25mg due to sciatic pain; add back Requip 1mg QHS  -11/5 start steroid taper for R LE pain  -11/6 R LE radiating pain resolved, finish steroid taper  -11/8 MUCH improved with steroid dose lyudmila  -11/16 pain controlled     Hypokalemia - monitor; currently 4.3 on supplement due to HCTZ; may be able to hold supplement. -11/4 labs pending; d/c daily labs  -11/5 K+ 4.2  -11/6 recheck BMP 11/8 since continuing daily furosemide  -11/8 K 4.2  -11/10 K 4.1, continue supplement while on lasix  -11/12 K 4.0  -11/15 K 4.0  -11/17 K 3.9     Leukocytosis, mild - wbc 11.9 at [facility] admission (11/3). No s/sx of infxn. -11/5 WBC 11.9, afebrile, no s/sx infection; will likely increase with steroids  -11/6 recheck CBC 11/8  -11/8 WBC 16.2 (from 11.9); likely steroid-induced; will check UA (negative); this is last day of steroids  -11/12 WBC 10.4, afebrile, resolved     Anemia, blood loss - Hgb 9.0, improving; had 2000ml blood loss and received blood transfusion x 3, as well as 2800ml of crystalloid. -11/6 recheck CBC 11/8; 9.4 from 8.8 11/5  -11/10 Hgb 9.7, improving  -11/12 Hgb 8.8  -11/17 Hgb 8.1; trending down. Check stool. Check iron studies.    -11/19 Hgb 9.0; stool positive, likely hemorrhoid (bright red).  -11/22 recheck HH in a.m     Hypothyroidism - continue Synthroid.     Pneumonia prophylaxis - incentive spirometer every hour while awake.     Rosacea, 11/16 - start doxycycline 100mg BID x 3 d and then daily per home regimen.     DVT risk / DVT prophylaxis - daily physician / PA exam to assess as patient is at increased risk for of thromboembolism. Mobilize as tolerated. Sequential pneumatic compression devices (SCDs) when in bed; thigh-high or knee-high thromboembolic deterrent hose when out of bed. On Eliquis.     GI prophylaxis - resume PPI. At times may need additional antacids, Maalox prn.     Depression - continue on Lexapro. At risk of depression exacerbation due to pain and loss of independent function.     General skin care / wound prevention - monitor lumbar incision and general skin wound status daily per staff and physician / PA. At risk for failure. Will require 24/7 rehab nursing. -11/4 lateral left hip wound; looks like a blister that had opened up vs sheer injury. Cleanse daily and cover. Back incision healing  -11/15 incision c/d/i     Bladder program / urinary retention / neurogenic bladder - schedule voids q 6-8 hrs. Check post-void residual as needed; in-and-out catheter if post-void residual is more than 400ml.  -voiding continently without residuals  -11/15 remove laboy in day or two. IV lasix was causing very frequent urination  -11/16 d/c laboy     Bowel program - at risk for constipation as a side effect of opioids, other medications, impaired mobility, etc. MiraLAX daily for regularity, Azul-Colace for stool softener. PRN MOM, bisacodyl suppository or tablets for constipation.  -continent, regular     Edema, 11/9 - >1800ml out; continue Lasix 40mg BID for today . [facility] laboy at [facility]  -11/10 [address], wt is up. Will keep laboy and give 40mg IV lasix today. No hx of CHF but has Afib. BNP 2757. Alb 2.7; pt had a 2d echo 10/29/21; EF>70%, severe pulmonary HTN noted with hx of RUL pulmonary embolism. No evidence though of right heart strain. VSS; daily wts  -11/11 5lbs down from yesterday. UOP 1450, BNP 1865, improved; 40mg IV Lasix x 1 today  -11/12 will weigh today. One more dose of IV Lasix today 40mg. Good diuresis. Lungs clear no hx CHF. Off steroids now. BNP pending. UOP -4275  - 11/14 continue on home dose po lasix 40 mg qd, Last weights- 315lbs > 316lbs with iv lasix x 1 given yesterday, today 312lbs. Continue with 1800ml fluid restriction. Continue daily weights. BMP ordered for tomorrow  -11/15 need f/u wt. Significant improvement in edema. Continue oral Lasix 40mg daily. K 4, creat 0.72  -11/16 continue with oral Lasix, home dose of 40mg daily. Renal fxn normal, K nl. Dc laboy cath now. Wt is down 15lbs  -11/18 /70, stable, HR 67  -11/19 UOP now +480; continue daily lasix; BNP improving, must check if she is retaining  -11/20 weight plateau at 546LVD; changed furosemide to torsemide with little change; ?? HFpEF? TTE 10/29/2021 with EF >70%, severe pulmonary HTN; add spironolactone and place laboy x 24h while diuresing  -1/22 wt last documented at 309lbs; cont spironolactone and laboy; check bmp     11/18 reports not feeling well. No specific symptom or complaints. Noted blood in underwear. Denies vaginal bleeding or hemorrhoids. Check UA for UTI  -UA neg  -episode of chest pain yesterday; taken to ED where troponins were normal               Time spent was 25 minutes with over 1/2 in direct patient care/examination, consultation and coordination of care.      Signed By: Linda Pacheco MD     November 22, 2021

## 2021-11-23 ENCOUNTER — HOSPITAL ENCOUNTER (INPATIENT)
Age: 66
LOS: 1 days | Discharge: HOME | DRG: 560 | End: 2021-11-24
Attending: PHYSICAL MEDICINE & REHABILITATION | Admitting: PHYSICAL MEDICINE & REHABILITATION
Payer: MEDICARE

## 2021-11-23 VITALS
BODY MASS INDEX: 50.02 KG/M2 | HEIGHT: 64 IN | SYSTOLIC BLOOD PRESSURE: 107 MMHG | RESPIRATION RATE: 18 BRPM | HEART RATE: 78 BPM | TEMPERATURE: 97.3 F | DIASTOLIC BLOOD PRESSURE: 55 MMHG | WEIGHT: 293 LBS | OXYGEN SATURATION: 97 %

## 2021-11-23 DIAGNOSIS — I95.0 IDIOPATHIC HYPOTENSION: ICD-10-CM

## 2021-11-23 DIAGNOSIS — E03.9 ACQUIRED HYPOTHYROIDISM: ICD-10-CM

## 2021-11-23 DIAGNOSIS — M48.062 SPINAL STENOSIS, LUMBAR REGION WITH NEUROGENIC CLAUDICATION: Primary | ICD-10-CM

## 2021-11-23 DIAGNOSIS — E66.01 OBESITY, MORBID (HCC): ICD-10-CM

## 2021-11-23 DIAGNOSIS — M48.062 LUMBAR STENOSIS WITH NEUROGENIC CLAUDICATION: ICD-10-CM

## 2021-11-23 DIAGNOSIS — R57.1 HYPOVOLEMIC SHOCK (HCC): ICD-10-CM

## 2021-11-23 DIAGNOSIS — G47.00 INSOMNIA, UNSPECIFIED TYPE: ICD-10-CM

## 2021-11-23 DIAGNOSIS — F41.9 ANXIETY: ICD-10-CM

## 2021-11-23 DIAGNOSIS — R11.0 NAUSEA: ICD-10-CM

## 2021-11-23 DIAGNOSIS — I10 ESSENTIAL HYPERTENSION, BENIGN: ICD-10-CM

## 2021-11-23 DIAGNOSIS — J96.01 ACUTE RESPIRATORY FAILURE WITH HYPOXIA (HCC): ICD-10-CM

## 2021-11-23 DIAGNOSIS — N17.9 AKI (ACUTE KIDNEY INJURY) (HCC): ICD-10-CM

## 2021-11-23 DIAGNOSIS — N80.9 ENDOMETRIOSIS: ICD-10-CM

## 2021-11-23 DIAGNOSIS — I48.91 ATRIAL FIBRILLATION, UNSPECIFIED TYPE (HCC): ICD-10-CM

## 2021-11-23 DIAGNOSIS — I48.0 PAROXYSMAL ATRIAL FIBRILLATION (HCC): ICD-10-CM

## 2021-11-23 LAB
ANION GAP SERPL CALC-SCNC: 2 MMOL/L (ref 7–16)
BUN SERPL-MCNC: 19 MG/DL (ref 8–23)
CALCIUM SERPL-MCNC: 8.9 MG/DL (ref 8.3–10.4)
CHLORIDE SERPL-SCNC: 99 MMOL/L (ref 98–107)
CO2 SERPL-SCNC: 36 MMOL/L (ref 21–32)
CREAT SERPL-MCNC: 0.78 MG/DL (ref 0.6–1)
GLUCOSE SERPL-MCNC: 81 MG/DL (ref 65–100)
HCT VFR BLD AUTO: 29.7 % (ref 35.8–46.3)
HGB BLD-MCNC: 8.7 G/DL (ref 11.7–15.4)
MAGNESIUM SERPL-MCNC: 2.1 MG/DL (ref 1.8–2.4)
POTASSIUM SERPL-SCNC: 3.7 MMOL/L (ref 3.5–5.1)
SODIUM SERPL-SCNC: 137 MMOL/L (ref 136–145)

## 2021-11-23 PROCEDURE — 97535 SELF CARE MNGMENT TRAINING: CPT

## 2021-11-23 PROCEDURE — 97530 THERAPEUTIC ACTIVITIES: CPT

## 2021-11-23 PROCEDURE — 80171 DRUG SCREEN QUANT GABAPENTIN: CPT

## 2021-11-23 PROCEDURE — 99233 SBSQ HOSP IP/OBS HIGH 50: CPT | Performed by: PHYSICAL MEDICINE & REHABILITATION

## 2021-11-23 PROCEDURE — 97110 THERAPEUTIC EXERCISES: CPT

## 2021-11-23 PROCEDURE — 74011250637 HC RX REV CODE- 250/637: Performed by: PHYSICAL MEDICINE & REHABILITATION

## 2021-11-23 PROCEDURE — 74011250637 HC RX REV CODE- 250/637: Performed by: PHYSICIAN ASSISTANT

## 2021-11-23 PROCEDURE — 80048 BASIC METABOLIC PNL TOTAL CA: CPT

## 2021-11-23 PROCEDURE — 85018 HEMOGLOBIN: CPT

## 2021-11-23 PROCEDURE — 65310000000 HC RM PRIVATE REHAB

## 2021-11-23 PROCEDURE — 97112 NEUROMUSCULAR REEDUCATION: CPT

## 2021-11-23 PROCEDURE — 36415 COLL VENOUS BLD VENIPUNCTURE: CPT

## 2021-11-23 PROCEDURE — 83735 ASSAY OF MAGNESIUM: CPT

## 2021-11-23 PROCEDURE — 97116 GAIT TRAINING THERAPY: CPT

## 2021-11-23 RX ORDER — MIDODRINE HYDROCHLORIDE 5 MG/1
5 TABLET ORAL
Status: DISCONTINUED | OUTPATIENT
Start: 2021-11-23 | End: 2021-11-24 | Stop reason: HOSPADM

## 2021-11-23 RX ORDER — DOCUSATE SODIUM 100 MG/1
100 CAPSULE, LIQUID FILLED ORAL 2 TIMES DAILY
Status: DISCONTINUED | OUTPATIENT
Start: 2021-11-23 | End: 2021-11-24 | Stop reason: HOSPADM

## 2021-11-23 RX ORDER — PANTOPRAZOLE SODIUM 40 MG/1
40 TABLET, DELAYED RELEASE ORAL
Status: DISCONTINUED | OUTPATIENT
Start: 2021-11-24 | End: 2021-11-24 | Stop reason: HOSPADM

## 2021-11-23 RX ORDER — ADHESIVE BANDAGE
30 BANDAGE TOPICAL DAILY PRN
Status: DISCONTINUED | OUTPATIENT
Start: 2021-11-23 | End: 2021-11-24 | Stop reason: HOSPADM

## 2021-11-23 RX ORDER — AMITRIPTYLINE HYDROCHLORIDE 50 MG/1
25 TABLET, FILM COATED ORAL
Status: DISCONTINUED | OUTPATIENT
Start: 2021-11-23 | End: 2021-11-24 | Stop reason: HOSPADM

## 2021-11-23 RX ORDER — GUAIFENESIN 100 MG/5ML
81 LIQUID (ML) ORAL
Status: DISCONTINUED | OUTPATIENT
Start: 2021-11-23 | End: 2021-11-24 | Stop reason: HOSPADM

## 2021-11-23 RX ORDER — ONDANSETRON 4 MG/1
4 TABLET, ORALLY DISINTEGRATING ORAL
Status: DISCONTINUED | OUTPATIENT
Start: 2021-11-23 | End: 2021-11-24 | Stop reason: HOSPADM

## 2021-11-23 RX ORDER — GABAPENTIN 300 MG/1
300 CAPSULE ORAL 3 TIMES DAILY
Status: DISCONTINUED | OUTPATIENT
Start: 2021-11-23 | End: 2021-11-24 | Stop reason: HOSPADM

## 2021-11-23 RX ORDER — IBUPROFEN 200 MG
1 TABLET ORAL EVERY 24 HOURS
Status: DISCONTINUED | OUTPATIENT
Start: 2021-11-24 | End: 2021-11-24 | Stop reason: HOSPADM

## 2021-11-23 RX ORDER — DOXYCYCLINE 100 MG/1
100 CAPSULE ORAL EVERY 12 HOURS
Status: DISCONTINUED | OUTPATIENT
Start: 2021-11-23 | End: 2021-11-23

## 2021-11-23 RX ORDER — ROPINIROLE 1 MG/1
1 TABLET, FILM COATED ORAL
Status: DISCONTINUED | OUTPATIENT
Start: 2021-11-23 | End: 2021-11-24 | Stop reason: HOSPADM

## 2021-11-23 RX ORDER — LEVOTHYROXINE SODIUM 50 UG/1
50 TABLET ORAL
Status: DISCONTINUED | OUTPATIENT
Start: 2021-11-24 | End: 2021-11-24 | Stop reason: HOSPADM

## 2021-11-23 RX ORDER — TRAMADOL HYDROCHLORIDE 50 MG/1
50 TABLET ORAL
Status: DISCONTINUED | OUTPATIENT
Start: 2021-11-23 | End: 2021-11-24 | Stop reason: HOSPADM

## 2021-11-23 RX ORDER — POTASSIUM CHLORIDE 20 MEQ/1
20 TABLET, EXTENDED RELEASE ORAL DAILY
Status: DISCONTINUED | OUTPATIENT
Start: 2021-11-24 | End: 2021-11-24 | Stop reason: HOSPADM

## 2021-11-23 RX ORDER — ZOLPIDEM TARTRATE 5 MG/1
10 TABLET ORAL
Status: DISCONTINUED | OUTPATIENT
Start: 2021-11-23 | End: 2021-11-24 | Stop reason: HOSPADM

## 2021-11-23 RX ORDER — SPIRONOLACTONE 25 MG/1
12.5 TABLET ORAL DAILY
Status: DISCONTINUED | OUTPATIENT
Start: 2021-11-24 | End: 2021-11-24 | Stop reason: HOSPADM

## 2021-11-23 RX ORDER — TORSEMIDE 20 MG/1
20 TABLET ORAL DAILY
Status: DISCONTINUED | OUTPATIENT
Start: 2021-11-24 | End: 2021-11-24 | Stop reason: HOSPADM

## 2021-11-23 RX ORDER — HYDROCODONE BITARTRATE AND ACETAMINOPHEN 10; 325 MG/1; MG/1
1 TABLET ORAL
Status: DISCONTINUED | OUTPATIENT
Start: 2021-11-23 | End: 2021-11-24 | Stop reason: HOSPADM

## 2021-11-23 RX ORDER — FACIAL-BODY WIPES
25 EACH TOPICAL
Status: DISCONTINUED | OUTPATIENT
Start: 2021-11-23 | End: 2021-11-24 | Stop reason: HOSPADM

## 2021-11-23 RX ORDER — PROPAFENONE HYDROCHLORIDE 150 MG/1
150 TABLET, FILM COATED ORAL 2 TIMES DAILY
Status: DISCONTINUED | OUTPATIENT
Start: 2021-11-23 | End: 2021-11-24 | Stop reason: HOSPADM

## 2021-11-23 RX ORDER — NALOXONE HYDROCHLORIDE 0.4 MG/ML
0.4 INJECTION, SOLUTION INTRAMUSCULAR; INTRAVENOUS; SUBCUTANEOUS AS NEEDED
Status: DISCONTINUED | OUTPATIENT
Start: 2021-11-23 | End: 2021-11-24 | Stop reason: HOSPADM

## 2021-11-23 RX ORDER — ESCITALOPRAM OXALATE 10 MG/1
20 TABLET ORAL DAILY
Status: DISCONTINUED | OUTPATIENT
Start: 2021-11-24 | End: 2021-11-24 | Stop reason: HOSPADM

## 2021-11-23 RX ORDER — POLYETHYLENE GLYCOL 3350 17 G/17G
17 POWDER, FOR SOLUTION ORAL DAILY
Status: DISCONTINUED | OUTPATIENT
Start: 2021-11-24 | End: 2021-11-24 | Stop reason: HOSPADM

## 2021-11-23 RX ORDER — DOXYCYCLINE 100 MG/1
100 CAPSULE ORAL DAILY
Status: DISCONTINUED | OUTPATIENT
Start: 2021-11-24 | End: 2021-11-24 | Stop reason: HOSPADM

## 2021-11-23 RX ORDER — METOPROLOL TARTRATE 25 MG/1
25 TABLET, FILM COATED ORAL 2 TIMES DAILY
Status: DISCONTINUED | OUTPATIENT
Start: 2021-11-23 | End: 2021-11-24

## 2021-11-23 RX ORDER — ACETAMINOPHEN 325 MG/1
650 TABLET ORAL
Status: DISCONTINUED | OUTPATIENT
Start: 2021-11-23 | End: 2021-11-24 | Stop reason: HOSPADM

## 2021-11-23 RX ORDER — FAMOTIDINE 20 MG/1
20 TABLET, FILM COATED ORAL EVERY EVENING
Status: DISCONTINUED | OUTPATIENT
Start: 2021-11-23 | End: 2021-11-24 | Stop reason: HOSPADM

## 2021-11-23 RX ORDER — LIDOCAINE 4 G/100G
1 PATCH TOPICAL DAILY PRN
Status: DISCONTINUED | OUTPATIENT
Start: 2021-11-23 | End: 2021-11-24 | Stop reason: HOSPADM

## 2021-11-23 RX ORDER — LORAZEPAM 0.5 MG/1
0.5 TABLET ORAL
Status: DISCONTINUED | OUTPATIENT
Start: 2021-11-23 | End: 2021-11-24 | Stop reason: HOSPADM

## 2021-11-23 RX ADMIN — LEVOTHYROXINE SODIUM 50 MCG: 0.05 TABLET ORAL at 06:03

## 2021-11-23 RX ADMIN — GABAPENTIN 300 MG: 300 CAPSULE ORAL at 08:32

## 2021-11-23 RX ADMIN — GABAPENTIN 300 MG: 300 CAPSULE ORAL at 16:24

## 2021-11-23 RX ADMIN — MIDODRINE HYDROCHLORIDE 5 MG: 5 TABLET ORAL at 16:25

## 2021-11-23 RX ADMIN — FAMOTIDINE 20 MG: 20 TABLET, FILM COATED ORAL at 18:12

## 2021-11-23 RX ADMIN — HYDROCODONE BITARTRATE AND ACETAMINOPHEN 1 TABLET: 10; 325 TABLET ORAL at 21:26

## 2021-11-23 RX ADMIN — METOPROLOL TARTRATE 25 MG: 25 TABLET, FILM COATED ORAL at 21:26

## 2021-11-23 RX ADMIN — MIDODRINE HYDROCHLORIDE 5 MG: 5 TABLET ORAL at 08:32

## 2021-11-23 RX ADMIN — PROPAFENONE HYDROCHLORIDE 150 MG: 150 TABLET, FILM COATED ORAL at 18:12

## 2021-11-23 RX ADMIN — PANTOPRAZOLE SODIUM 40 MG: 40 TABLET, DELAYED RELEASE ORAL at 06:03

## 2021-11-23 RX ADMIN — ZOLPIDEM TARTRATE 10 MG: 5 TABLET ORAL at 21:22

## 2021-11-23 RX ADMIN — ASPIRIN 81 MG: 81 TABLET, CHEWABLE ORAL at 21:24

## 2021-11-23 RX ADMIN — PROPAFENONE HYDROCHLORIDE 150 MG: 150 TABLET, FILM COATED ORAL at 08:32

## 2021-11-23 RX ADMIN — Medication 1 AMPULE: at 08:39

## 2021-11-23 RX ADMIN — APIXABAN 5 MG: 5 TABLET, FILM COATED ORAL at 18:12

## 2021-11-23 RX ADMIN — GABAPENTIN 300 MG: 300 CAPSULE ORAL at 21:25

## 2021-11-23 RX ADMIN — AMITRIPTYLINE HYDROCHLORIDE 25 MG: 50 TABLET, FILM COATED ORAL at 21:22

## 2021-11-23 RX ADMIN — DOCUSATE SODIUM 100 MG: 100 CAPSULE ORAL at 08:32

## 2021-11-23 RX ADMIN — ESCITALOPRAM OXALATE 20 MG: 10 TABLET ORAL at 08:32

## 2021-11-23 RX ADMIN — HYDROCODONE BITARTRATE AND ACETAMINOPHEN 1 TABLET: 10; 325 TABLET ORAL at 08:30

## 2021-11-23 RX ADMIN — METOPROLOL TARTRATE 25 MG: 25 TABLET, FILM COATED ORAL at 08:32

## 2021-11-23 RX ADMIN — SPIRONOLACTONE 12.5 MG: 25 TABLET ORAL at 08:32

## 2021-11-23 RX ADMIN — Medication 1 AMPULE: at 21:26

## 2021-11-23 RX ADMIN — HYDROCODONE BITARTRATE AND ACETAMINOPHEN 1 TABLET: 10; 325 TABLET ORAL at 14:05

## 2021-11-23 RX ADMIN — DOXYCYCLINE HYCLATE 100 MG: 100 CAPSULE ORAL at 08:32

## 2021-11-23 RX ADMIN — MIDODRINE HYDROCHLORIDE 5 MG: 5 TABLET ORAL at 14:05

## 2021-11-23 RX ADMIN — DOCUSATE SODIUM 100 MG: 100 CAPSULE ORAL at 18:11

## 2021-11-23 RX ADMIN — TORSEMIDE 20 MG: 20 TABLET ORAL at 08:33

## 2021-11-23 RX ADMIN — APIXABAN 5 MG: 5 TABLET, FILM COATED ORAL at 08:32

## 2021-11-23 RX ADMIN — POTASSIUM CHLORIDE 20 MEQ: 20 TABLET, EXTENDED RELEASE ORAL at 08:32

## 2021-11-23 NOTE — PROGRESS NOTES
PHYSICAL THERAPY DAILY NOTE  Time In: 1330  Time Out: 1416  Patient Seen For: PM; Balance activities; Gait training; Patient education; Therapeutic exercise; Transfer training    Subjective: Patient agreeable to second physical therapy treatment today. States no C/O pain or discomfort at this time. No new complaints at this time. Objective:  Patient up sitting in recliner finishing lunch. SPT from sitting to standing with RW and SPV. Orientation Assessment :   Alert and age appropriately oriented. Affect/Behavior:   Appropriate and Cooperative. Basic Command Following:   intact. Safety/Judgment:   intact. Pain Present:   No.     GROSS ASSESSMENT Daily Assessment   NT         BED/MAT MOBILITY Daily Assessment   Up sitting in recliner         TRANSFERS Daily Assessment   Minor verbal cues needed. Transfer Type: SPT with walker  Transfer Assistance : 5 (Supervision/setup)  Sit to Stand Assistance: Supervision  Car Transfers: Not tested       GAIT Daily Assessment   Base of Support: Widened; Center of Gravity altered. Speed/Camila: Slow; Fluctuations; delayed with fatigue. Step Length: Right shortened;Left shortened  Gait Abnormalities: Trunk sway increased; Path deviations; Step thru gait. Distance (ft): 150 Feet (ft) total distance. Assistive Device: Walker, rolling (FWB)  Ambulation - Level of assistance: SPV  Interventions: Safety awareness training; Tactile cues; Verbal cues Amount of Assistance: 5 (Supervision/setup)  Distance (ft): 150 Feet (ft) (total feet 75 ft. x 2)  Assistive Device: Walker, rolling       COGNITION Daily Assessment   Communication: extra time needed to understand requests. Social Interaction: patient presents appropriate, cooperating and participates in treatment. Problem Solving: Verbal cues to teach techniques for completing tasks. Memory: Executing requests without distractions.      Communication:  Social Interaction:  Problem Solving:  Memory:     STEPS or STAIRS Daily Assessment   NT Steps/Stairs Ambulated (#): 0  Level of Assist : 0 (Not tested)       BALANCE Daily Assessment   Standing balance activities of various stance positions, eyes open/closed, 15 seconds each with CGA. Sitting - Static: Good (unsupported)  Sitting - Dynamic: Good (unsupported)  Standing - Static: Fair  Standing - Dynamic : Impaired       WHEELCHAIR MOBILITY Daily Assessment   NT Able to Propel (ft): 0 feet  Curbs/Ramps Assist Required (FIM Score): 0 (Not tested)  Wheelchair Setup Assist Required : 0 (Not tested)       LOWER EXTREMITY EXERCISES Daily Assessment   Completed up/down ramp with RW. Extremity: Both  Exercise Type #1: Standing lower extremity strengthening  Sets Performed: 1  Reps Performed: 10  Level of Assist: Supervision  Exercise Type #2: Other (comment) (balance)  Sets Performed: 3  Reps Performed:  (15 seconds)  Level of Assist: Supervision          Assessment: Education:  Teaching Method:   Demonstration, Explanation. PT Impairments or Limitations:   Ambulation deficits, Balance deficits, Endurance deficits, Strength deficits, Transfer deficits. Rehab Potential Physical Therapy:   Good. Grossly no focal motor deficits noted. No rashes, no erythema. No calf tenderness BLE. Patient performed all given exercises listed. Patient was taken back to room. Left in supine position in bed, call bell and necessities in reach. Nurse notified of completion of treatment. Plan of Care: The patient has shown the ability to tolerate and benefit from physical therapy daily in a comprehensive inpatient rehabilitation program.  Continue intensive Physical Therapy  to address bed mobility, transfers, ambulation, strengthening, balance, and endurance. Continue with plan of care and focus on goals.      Gloria Everett, PTA  11/23/2021

## 2021-11-23 NOTE — PROGRESS NOTES
Ann Marie Martel MD  Medical Director  [address] [facility], [address] [facility]  Tel: [phone]       [facility] PROGRESS NOTE    Cassandra Chapman  Admit Date: 11/23/2021  Admit Diagnosis:  Lumbar stenosis with neurogenic claudication, spondylolisthesis of lumbar region, lumbar spinal stenosis region with neurogenic claudication      Subjective   Reports no issues yesterday with presyncope/ light headedness  Patient seen and examined. Objective:     No current facility-administered medications for this encounter. Review of Systems:   Denies chest pain, shortness of breath, cough, headache, visual problems, abdominal pain, dysuria, calf pain. Pertinent positives are as noted in the HPI, ROS unremarkable otherwise. Visit Vitals  /67   Pulse 71   Temp 98 °F (36.7 °C)   Resp 18   SpO2 90%        Physical Exam:   General: Alert and age appropriately oriented. No acute cardiorespiratory distress. HEENT: Normocephalic, no scleral icterus. Oral mucosa moist without cyanosis. Lungs: Clear to auscultation bilaterally. Respiration even and unlabored. Heart: Regular rate irreg rhythm, S1, S2. No murmurs, clicks, rub or gallops. Abdomen: Soft, non-tender, not distended. Bowel sounds normoactive. No organomegaly. obese   Genitourinary: Deferred. Neuromuscular:      UE strength and ROM full and symmetric. LE strength at HF 3/5 (R), 3-/5 (L), KF 4/5 (B). Sensation intact. Skin/extremity: Rosacea on face with less erythema but face looks a bit swollen, no erythema. No calf tenderness B LE.   Spine inc c/d/i  Pedal edema L> R                                                                              Functional Assessment:          Balance  Sitting - Static: Good (unsupported) (11/23/21 1300)  Sitting - Dynamic: Good (unsupported) (11/23/21 1300)  Standing - Static: Fair (11/23/21 1300)  Standing - Dynamic : Impaired (11/23/21 [address] [place] [address] Fall Risk Assessment:  Supa Greenberg Fall Risk  Mobility: Ambulates or transfers with assist devices or assistance (11/23/21 0920)  Mobility Interventions: Patient to call before getting OOB (11/23/21 0920)  Mentation: Alert, oriented x 3 (11/23/21 0920)  Medication: Patient receiving anticonvulsants, sedatives(tranquilizers), psychotropics or hypnotics, hypoglycemics, narcotics, sleep aids, antihypertensives, laxatives, or diuretics (11/23/21 0920)  Medication Interventions: Patient to call before getting OOB (11/23/21 0920)  Elimination: Needs assistance with toileting (11/23/21 0920)  Elimination Interventions: Call light in reach;  Patient to call for help with toileting needs (11/23/21 0920)  Prior Fall History: No (11/23/21 0920)  History of Falls Interventions: Consult care management for discharge planning; Door open when patient unattended (11/23/21 0920)  Total Score: 3 (11/23/21 0920)  High Fall Risk: Yes (11/23/21 0920)     Speech Assessment:         Ambulation:  Gait  Distance (ft): 50 Feet (ft) (11/23/21 1300)  Assistive Device: Walker, rolling (11/23/21 1300)     Labs/Studies:  Recent Results (from the past 72 hour(s))   HGB & HCT    Collection Time: 11/23/21  6:05 AM   Result Value Ref Range    HGB 8.7 (L) 11.7 - 15.4 g/dL    HCT 29.7 (L) 35.8 - 32.8 %   METABOLIC PANEL, BASIC    Collection Time: 11/23/21  6:05 AM   Result Value Ref Range    Sodium 137 136 - 145 mmol/L    Potassium 3.7 3.5 - 5.1 mmol/L    Chloride 99 98 - 107 mmol/L    CO2 36 (H) 21 - 32 mmol/L    Anion gap 2 (L) 7 - 16 mmol/L    Glucose 81 65 - 100 mg/dL    BUN 19 8 - 23 MG/DL    Creatinine 0.78 0.6 - 1.0 MG/DL    GFR est AA >60 >60 ml/min/1.73m2    GFR est non-AA >60 >60 ml/min/1.73m2    Calcium 8.9 8.3 - 10.4 MG/DL   MAGNESIUM    Collection Time: 11/23/21  6:05 AM   Result Value Ref Range    Magnesium 2.1 1.8 - 2.4 mg/dL       Assessment:     Problem List as of 11/23/2021 Date Reviewed: 11/5/2021 Codes Class Noted - Resolved    Spinal stenosis, lumbar region with neurogenic claudication ICD-10-CM: M48.062  ICD-9-CM: 724.03  11/3/2021 - Present        A-fib ([facility] ICD-10-CM: I48.91  ICD-9-CM: 427.31  10/30/2021 - Present        ÁNGEL (acute kidney injury) ([facility] ICD-10-CM: N17.9  ICD-9-CM: 584.9  10/30/2021 - Present        Acute respiratory failure with hypoxia ([facility] ICD-10-CM: J96.01  ICD-9-CM: 518.81  10/30/2021 - Present        Hypovolemic shock ([facility] ICD-10-CM: R57.1  ICD-9-CM: 785.59  10/26/2021 - Present        Hypotension ICD-10-CM: I95.9  ICD-9-CM: 458.9  10/26/2021 - Present        Spondylolisthesis of multiple sites in spine ICD-10-CM: M43.19  ICD-9-CM: 738.4  10/25/2021 - Present        Spinal stenosis ICD-10-CM: M48.00  ICD-9-CM: 724.00  10/25/2021 - Present        Obesity, morbid ([facility] ICD-10-CM: E66.01  ICD-9-CM: 278.01  12/22/2017 - Present        Acquired hypothyroidism ICD-10-CM: E03.9  ICD-9-CM: 244.9  9/13/2017 - Present        Postoperative wound infection ICD-10-CM: T81.49XA  ICD-9-CM: 998.59  5/19/2016 - Present        Lumbar stenosis with neurogenic claudication ICD-10-CM: M48.062  ICD-9-CM: 724.03  3/28/2016 - Present        Essential hypertension, benign ICD-10-CM: I10  ICD-9-CM: 401.1  7/24/2013 - Present        Endometriosis ICD-10-CM: N80.9  ICD-9-CM: 617.9  7/24/2013 - Present        Colon polyp ICD-10-CM: K63.5  ICD-9-CM: 211.3  7/24/2013 - Present        Squamous cell skin cancer ICD-10-CM: C44.92  ICD-9-CM: 173.92  7/24/2013 - Present                L2-L4 lumbar direct lateral interbody fusion with anterior plating and redo of L2 L5 laminectomy (10/26/2021 // Dr Marissa Hare) due to spinal stenosis with neurogenic claudication     Plan / Recommendations / Medical Decision Making:      Daily physician / PA medical management:     Lumbar stenosis with neurogenic claudication, spondylolisthesis of lumbar region, lumbar spinal stenosis region with neurogenic claudication - PT / OT; spinal precautions, girdle when ambulating.     Atrial fibrillation - s/p 2 failed ablations, managed with Rhythmol, metoprolol and Eliquis. Currently NSR; Eliquis restarted 10/31 in light of RUL pulmonary embolism. -11/4 irreg, rate controlled, continue meds  -11/5 noticeably RRR to prolonged auscultation  -11/6 IIR today but rate-controlled  -11/12 NSR, rate 60s; continue Rhythmol, metoprolol and Eliquis  -11/14 HR 63-72 range last 24 hours  -11/15 HR 65-71 denies palpitations; continue Rhythmol  -11/16 rate controlled  -11/18 more RRR today, rate controlled  -11/19 RRR nl rhythm  -11/20 more irregular today, rate controlled  -11/22 rate controlled 69-82   -11/23 irreg, rate in the 70s     Hypotension / hypertension - BP fluctuating, managed medically. Has been hypotensive requiring levophed and now midodrine (pt's home lisinopril 20mg BID and HCTZ 12.5mg BID are on hold); needs TEDs when OOB.  -11/4 hypotensive episodes improved; decrease midodrine to 5mg TID. Continue metoprolol for rate control  -11/5 sBP fluctuating from 102-163, dBP all <85, mostly in 50-60s; monitor as add back PRN furosemide for edema  -11/6 /73  -11/8 BP 119-148/63-73; change midodrine to 5mg BID and wean off; MUST wear TEDs when OOB and elevate LEs whenever possible. Bilateral pitting edema; will give 40mg bid of Lasix today and reassess daily. Check renal fxn  -11/9 creat 0.56, bun 16  -11/10 creat 0.67  -11/11 /57 this am, max 143/76; continue midodrine 5mg BID for now.  One episode of dizziness yesterday but BP normal by the time they could check BP  -11/14 /53  -11/15 116/52 controlled  -11/16 VSS; continue midodrine BID  -11/17 will change midodrine to TID; ? labile BP needs more consistent dosing?  -11/19 VSS however quite HTN in /93; continue current meds for now, may need to decrease midodrine if she is having supine hypertension  -11/20 /70, AM midodrine held  -11/22 /50s ; no orthostatics captured when pt has episodes of presyncope when up walking. -11/23 low of 90/49- 143/74. No episodes of presync yesterday     Pain control - ongoing significant pain in back which is stable and controlled by PRN meds. Will require regular pain assessment and comprehensive pain management. Continue PRN Norco, tramadol, APAP and gabapentin. -11/4 increase Neurontin to 300mg TID and Elavil at night 25mg due to sciatic pain; add back Requip 1mg QHS  -11/5 start steroid taper for R LE pain  -11/6 R LE radiating pain resolved, finish steroid taper  -11/8 MUCH improved with steroid dose lyudmila  -11/16 pain controlled     Hypokalemia - monitor; currently 4.3 on supplement due to HCTZ; may be able to hold supplement. -11/4 labs pending; d/c daily labs  -11/5 K+ 4.2  -11/6 recheck BMP 11/8 since continuing daily furosemide  -11/8 K 4.2  -11/10 K 4.1, continue supplement while on lasix  -11/12 K 4.0  -11/15 K 4.0  -11/17 K 3.9     Leukocytosis, mild - wbc 11.9 at [facility] admission (11/3). No s/sx of infxn. -11/5 WBC 11.9, afebrile, no s/sx infection; will likely increase with steroids  -11/6 recheck CBC 11/8  -11/8 WBC 16.2 (from 11.9); likely steroid-induced; will check UA (negative); this is last day of steroids  -11/12 WBC 10.4, afebrile, resolved     Anemia, blood loss - Hgb 9.0, improving; had 2000ml blood loss and received blood transfusion x 3, as well as 2800ml of crystalloid. -11/6 recheck CBC 11/8; 9.4 from 8.8 11/5  -11/10 Hgb 9.7, improving  -11/12 Hgb 8.8  -11/17 Hgb 8.1; trending down. Check stool. Check iron studies.    -11/19 Hgb 9.0; stool positive, likely hemorrhoid (bright red).  -11/22 recheck [facility] in a.m; 11/23 hgb 8.7, slt down but stable     Hypothyroidism - continue Synthroid.     Pneumonia prophylaxis - incentive spirometer every hour while awake.     Rosacea, 11/16 - start doxycycline 100mg BID x 3 d and then daily per home regimen.     DVT risk / DVT prophylaxis - daily physician / PA exam to assess as patient is at increased risk for of thromboembolism. Mobilize as tolerated. Sequential pneumatic compression devices (SCDs) when in bed; thigh-high or knee-high thromboembolic deterrent hose when out of bed. On Eliquis.     GI prophylaxis - resume PPI. At times may need additional antacids, Maalox prn.     Depression - continue on Lexapro. At risk of depression exacerbation due to pain and loss of independent function.     General skin care / wound prevention - monitor lumbar incision and general skin wound status daily per staff and physician / PA. At risk for failure. Will require 24/7 rehab nursing. -11/4 lateral left hip wound; looks like a blister that had opened up vs sheer injury. Cleanse daily and cover. Back incision healing  -11/15 incision c/d/i     Bladder program / urinary retention / neurogenic bladder - schedule voids q 6-8 hrs. Check post-void residual as needed; in-and-out catheter if post-void residual is more than 400ml.  -voiding continently without residuals  -11/15 remove laboy in day or two. IV lasix was causing very frequent urination  -11/16 d/c laboy     Bowel program - at risk for constipation as a side effect of opioids, other medications, impaired mobility, etc. MiraLAX daily for regularity, Azul-Colace for stool softener. PRN MOM, bisacodyl suppository or tablets for constipation.  -continent, regular     Edema, 11/9 - >1800ml out; continue Lasix 40mg BID for today . Pt requesting laboy at [facility]  -11/10 [address], wt is up. Will keep laboy and give 40mg IV lasix today. No hx of CHF but has Afib. BNP 2757. Alb 2.7; pt had a 2d echo 10/29/21; EF>70%, severe pulmonary HTN noted with hx of RUL pulmonary embolism. No evidence though of right heart strain. VSS; daily wts  -11/11 5lbs down from yesterday. UOP 1450, BNP 1865, improved; 40mg IV Lasix x 1 today  -11/12 will weigh today. One more dose of IV Lasix today 40mg. Good diuresis. Lungs clear no hx CHF. Off steroids now. BNP pending.  UOP -4275  - 11/14 continue on home dose po lasix 40 mg qd, Last weights- 315lbs > 316lbs with iv lasix x 1 given yesterday, today 312lbs. Continue with 1800ml fluid restriction. Continue daily weights. BMP ordered for tomorrow  -11/15 need f/u wt. Significant improvement in edema. Continue oral Lasix 40mg daily. K 4, creat 0.72  -11/16 continue with oral Lasix, home dose of 40mg daily. Renal fxn normal, K nl. Dc laboy cath now. Wt is down 15lbs  -11/18 BP 124/70, stable, HR 67  -11/19 UOP now +480; continue daily lasix; BNP improving, must check if she is retaining  -11/20 weight plateau at 588GCN; changed furosemide to torsemide with little change; ?? HFpEF? TTE 10/29/2021 with EF >70%, severe pulmonary HTN; add spironolactone and place laboy x 24h while diuresing  -1/22 wt last documented at 309lbs; cont spironolactone and laboy; check bmp  -11/23 wt stable. Discontinue laboy in am. BMP nl. I believe that he symptoms are all stemming from her pulm HTN (swelling, presyncope, fatigue and SOB); she reports never having these symptoms before. Do not want to overdiurese as to cause worsening cardiac fxn     Now that [facility] is downtown again, will consult cardiology; just had ECHO Oct 29th  -currently on spironolactone and demadex. 11/18 reports not feeling well. No specific symptom or complaints. Noted blood in underwear. Denies vaginal bleeding or hemorrhoids. Check UA for UTI  -UA neg  -episode of chest pain yesterday; taken to ED where troponins were normal  -11/23 no recurrence but cont to have edema and sob. No O2 requirements.       Time spent was 35 minutes with over 1/2 in direct patient care/examination, consultation and coordination of care.      Signed By: Kirsten Spaulding MD     November 23, 2021

## 2021-11-23 NOTE — PROGRESS NOTES
Problem: Patient Education: Go to Patient Education Activity  Goal: Patient/Family Education  Outcome: Progressing Towards Goal     Problem: Pain  Goal: *Control of Pain  Outcome: Progressing Towards Goal  Goal: *PALLIATIVE CARE:  Alleviation of Pain  Outcome: Progressing Towards Goal     Problem: Patient Education: Go to Patient Education Activity  Goal: Patient/Family Education  Outcome: Progressing Towards Goal     Problem: Inpatient Rehab (Adult)  Goal: *LTG: Avoids injury/falls 100% of time related to deficits  Outcome: Progressing Towards Goal  Goal: *LTG: Avoids infection 100% of time related to deficits  Outcome: Progressing Towards Goal  Goal: *LTG: Verbalize understanding of diagnosis and risk factors for recurring stroke  Outcome: Progressing Towards Goal  Goal: *LTG: Absence of DVT during hospitalization  Outcome: Progressing Towards Goal  Goal: *LTG: Maintains Skin Integrity With No Evidence of Pressure Injury 100% of Time  Outcome: Progressing Towards Goal  Goal: Interventions  Outcome: Progressing Towards Goal     Problem: Patient Education: Go to Patient Education Activity  Goal: Patient/Family Education  Outcome: Progressing Towards Goal     Problem: Falls - Risk of  Goal: *Absence of Falls  Description: Document Kori Fall Risk and appropriate interventions in the flowsheet.   Outcome: Progressing Towards Goal  Note: Fall Risk Interventions:  Mobility Interventions: Patient to call before getting OOB         Medication Interventions: Patient to call before getting OOB    Elimination Interventions: Call light in reach, Patient to call for help with toileting needs    History of Falls Interventions: Consult care management for discharge planning, Door open when patient unattended         Problem: Patient Education: Go to Patient Education Activity  Goal: Patient/Family Education  Outcome: Progressing Towards Goal

## 2021-11-23 NOTE — PROGRESS NOTES
Time In 0700   Time Out 0757     Mobility   Score Comments   Supine to Sit 4: Supervision or touching A S   Sit to Stand 4: Supervision or touching A SBA   Transfer Assist 4: Supervision or touching A Transfer Type: SPT   Equipment: Rolling Walker   Comments: SBA     Activities of Daily Living    Score Comments   Eating 6: Independent    Oral Hyigene 6: Independent    Bathing 5: S/U or clean-up assist Type of Shower: Shower  Position: Standing PRN and Unsupported Sitting   Adaptive  Equipment: Tub Transfer Bench, Grab Bars and Long Handled Sponge  Comments:    Upper Body  Dressing 4: Supervision or touching A Items Applied: Pullover and Bra  Position: Standing  Comments:    Lower Body Dressing 4: Supervision or touching A Items Applied: Underwear and Elastic pants  Position: Standing PRN and Unsupported Sitting  Adaptive Equipment: Reacher, RW and W/C  Comments: cues for threading laboy through LB clothing   Donning/[place] Footwear 3: Partial/Moderate A Items Applied: Socks and Slip-on shoes  Adaptive Equipment: Sock Aide and Long Handeled Shoe Horn  Comments:    Education  LB dressing while managing laboy       S: \"I slept well until they natalee blood. \" Agreeable to therapy. Patient was able to ambulate ~10 feet using a RW with SBA. Pt completed morning ADLs with quality indicators reflected in chart above. Pain No pain expressed. .  Patient Vitals for the past 12 hrs:   Temp Pulse Resp BP SpO2   11/22/21 2020 97.4 °F (36.3 °C) 64 16 125/70 100 %       Collaborated with RN regarding patient performance and POC. Patient tolerated session well, but activity tolerance, balance, functional mobility, strength, coordination, cognition are still below baseline and require skilled facilitation to successfully and safely complete ADLs and transfers. Patient ended session seated in wc and call remote, phone, all needs within reach.       Eduardo Perales, OTR/L  11/23/2021

## 2021-11-23 NOTE — PROGRESS NOTES
OT Daily Note  Time In 1136   Time Out 1220     Subjective: \"I worked out really hard in PT today. \"  Pain: Rn notified of pain. Education: UB strengthening  Interdisciplinary Communication: Collaborated with PT to ensure progress towards POC and goals. Precautions: Falls and Spinal  Patient Vitals for the past 12 hrs:   Temp Pulse Resp BP SpO2   11/23/21 0939 98 °F (36.7 °C) 71 18 110/67 90 %         Mobility   Score Comments   Sit to Stand 4: Supervision or touching A S   Transfer Assist 4: Supervision or touching A Transfer Type: SPT   Equipment: Rolling Walker   Comments: SBA     Activity Tolerance and Strengthening Daily Assessment   Pt completed 10 minutes on the ergometer frontwards and backwards with medium resistance to increase UB strength and activity tolerance for integration into functional reaching activities. Pt engaged in hand strengthening and reaching activity, manipulating light to heavy resistance large clothespins up a vertical ladder above shoulder height. Pt demonstrated appropriate strength and reaching skills throughout activity. Assessment: Progressing well. Gait appeared more unsteady this session secondary to R foot edema and impaired weight shifting during stepping. Pt demonstrated good participation and engagement in OT session. Pt continues to benefit from skilled OT services to address remaining deficits and return back to baseline with improved independence and safety during ADLs and IADLs. Patient ended session seated in recliner and call remote, phone, all needs within reach  Plan: Continue OT POC with focus on ADL/IADL skills, functional transfers, functional mobility, coordination, strength, static and dynamic balance, and activity tolerance to maximize safety and independence with ADLs and functional transfers.      Eduardo Perales, OTR/L  11/23/2021

## 2021-11-23 NOTE — PROGRESS NOTES
Pt transferred from [place] in [facility] (remote [facility] location) to [address] [facility]. Plan of care remains same as only location changed. CM to continue to follow and monitor for any further needs. Care Management Interventions  PCP Verified by CM: Yes (Dr. Tez Morejon)  Mode of Transport at Discharge:  Other (see comment) (family )  Transition of Care Consult (CM Consult): Discharge Planning, [address]: No  Reason Outside [place]:  (pt choice )  Discharge Durable Medical Equipment: No  Physical Therapy Consult: Yes  Occupational Therapy Consult: Yes  Speech Therapy Consult: No  Support Systems: Child(rober), Other Family Member(s) (sister )  Confirm Follow Up Transport: Family  The Plan for Transition of Care is Related to the Following Treatment Goals : Return to baseline  The Patient and/or Patient Representative was Provided with a Choice of Provider and Agrees with the Discharge Plan?: Yes  Name of the Patient Representative Who was Provided with a Choice of Provider and Agrees with the Discharge Plan: pt  Freedom of Choice List was Provided with Basic Dialogue that Supports the Patient's Individualized Plan of Care/Goals, Treatment Preferences and Shares the Quality Data Associated with the Providers?: Yes  [place] Resource Information Provided?: No  Discharge Location  Discharge Placement: Home with home health ([facility] )

## 2021-11-23 NOTE — PROGRESS NOTES
PHYSICAL THERAPY DAILY NOTE  Time In: 4678  Time Out: 1135  Patient Seen For: AM; Balance activities; Gait training; Patient education; Therapeutic exercise; Transfer training    Subjective: Patient agreeable to physical therapy treatment today. States no C/O pain or discomfort at this time. Medications taken about 2 hour ago per RN. Last PT treatment went good. No new complaints at this time. Objective:  Patient up sitting EOB. SPT from sitting to standing with RW and SPV. Orientation Assessment :   Alert and age appropriately oriented. Affect/Behavior:   Appropriate and Cooperative. Basic Command Following:   intact. Safety/Judgment:   intact. Pain Present:   No.     GROSS ASSESSMENT Daily Assessment   NT         BED/MAT MOBILITY Daily Assessment   Patient up sitting EOB         TRANSFERS Daily Assessment   Verbal cues needed with additional time to complete tasks. Transfer Type: SPT with walker  Transfer Assistance : 5 (Supervision/setup)  Sit to Stand Assistance: Supervision  Car Transfers: Not tested       GAIT Daily Assessment   Base of Support: Widened; Center of Gravity altered at times. Speed/Camila: Slow; Fluctuations. Step Length: Right shortened;Left shortened. Gait Abnormalities: Trunk sway increased; Decreased step clearance left foot; Path deviations; Step thru gait. Distance (ft): 50 Feet (ft). Assistive Device: Walker, rolling (FWB). Ambulation - Level of assistance: CGA  Interventions: Safety awareness training; Tactile cues; Verbal cues Amount of Assistance: 5 (Supervision/setup)  Distance (ft): 50 Feet (ft)  Assistive Device: Walker, rolling       COGNITION Daily Assessment   Communication: extra time needed to understand requests. Social Interaction: patient presents appropriate, cooperating and participates in treatment. Problem Solving: Verbal cues to teach techniques for completing tasks. Memory: Executing requests without distractions. Communication:  Social Interaction:  Problem Solving:  Memory:     STEPS or STAIRS Daily Assessment   NT Steps/Stairs Ambulated (#): 0  Level of Assist : 0 (Not tested)       BALANCE Daily Assessment   Standing balance activities of various stance positions, eyes open/closed, 15 seconds each with CGA. Sitting - Static: Good (unsupported)  Sitting - Dynamic: Good (unsupported)  Standing - Static: Fair  Standing - Dynamic : Impaired       WHEELCHAIR MOBILITY Daily Assessment    Able to Propel (ft): 0 feet  Curbs/Ramps Assist Required (FIM Score): 0 (Not tested)  Wheelchair Setup Assist Required : 0 (Not tested)       LOWER EXTREMITY EXERCISES Daily Assessment    Extremity: Both  Exercise Type #1: Standing lower extremity strengthening  Sets Performed: 1  Reps Performed: 10  Level of Assist: Supervision  Exercise Type #2: Other (comment) (balance)  Sets Performed: 3  Reps Performed:  (15 seconds)  Level of Assist: Supervision          Assessment: Education:  Teaching Method:   Demonstration, Explanation. PT Impairments or Limitations:   Ambulation deficits, Balance deficits, Endurance deficits, Strength deficits, Transfer deficits. Rehab Potential Physical Therapy:   Good. Grossly no focal motor deficits noted. No rashes, no erythema. No calf tenderness BLE. Patient performed all given exercises listed. Plan of Care: The patient has shown the ability to tolerate and benefit from physical therapy daily in a comprehensive inpatient rehabilitation program.  Continue intensive Physical Therapy  to address bed mobility, transfers, ambulation, strengthening, balance, and endurance. Continue with plan of care and focus on goals.     Michael Vieyra, PTA  11/23/2021

## 2021-11-24 VITALS
HEART RATE: 72 BPM | OXYGEN SATURATION: 97 % | RESPIRATION RATE: 18 BRPM | TEMPERATURE: 98 F | DIASTOLIC BLOOD PRESSURE: 68 MMHG | SYSTOLIC BLOOD PRESSURE: 126 MMHG

## 2021-11-24 LAB
ATRIAL RATE: 68 BPM
CALCULATED P AXIS, ECG09: 73 DEGREES
CALCULATED R AXIS, ECG10: 96 DEGREES
CALCULATED T AXIS, ECG11: -13 DEGREES
DIAGNOSIS, 93000: NORMAL
GABAPENTIN SERPLBLD-MCNC: 6.7 UG/ML (ref 4–16)
P-R INTERVAL, ECG05: 150 MS
Q-T INTERVAL, ECG07: 404 MS
QRS DURATION, ECG06: 80 MS
QTC CALCULATION (BEZET), ECG08: 429 MS
VENTRICULAR RATE, ECG03: 68 BPM

## 2021-11-24 PROCEDURE — 74011250637 HC RX REV CODE- 250/637: Performed by: PHYSICAL MEDICINE & REHABILITATION

## 2021-11-24 PROCEDURE — 97112 NEUROMUSCULAR REEDUCATION: CPT

## 2021-11-24 PROCEDURE — 74011250636 HC RX REV CODE- 250/636: Performed by: PHYSICAL MEDICINE & REHABILITATION

## 2021-11-24 PROCEDURE — 90686 IIV4 VACC NO PRSV 0.5 ML IM: CPT | Performed by: PHYSICAL MEDICINE & REHABILITATION

## 2021-11-24 PROCEDURE — 93005 ELECTROCARDIOGRAM TRACING: CPT | Performed by: PHYSICIAN ASSISTANT

## 2021-11-24 PROCEDURE — 90471 IMMUNIZATION ADMIN: CPT

## 2021-11-24 PROCEDURE — 97535 SELF CARE MNGMENT TRAINING: CPT

## 2021-11-24 PROCEDURE — 99223 1ST HOSP IP/OBS HIGH 75: CPT | Performed by: INTERNAL MEDICINE

## 2021-11-24 PROCEDURE — 99239 HOSP IP/OBS DSCHRG MGMT >30: CPT | Performed by: PHYSICAL MEDICINE & REHABILITATION

## 2021-11-24 PROCEDURE — 97110 THERAPEUTIC EXERCISES: CPT

## 2021-11-24 PROCEDURE — 97530 THERAPEUTIC ACTIVITIES: CPT

## 2021-11-24 PROCEDURE — 97116 GAIT TRAINING THERAPY: CPT

## 2021-11-24 RX ORDER — MIDODRINE HYDROCHLORIDE 5 MG/1
5 TABLET ORAL
Qty: 90 TABLET | Refills: 0 | Status: SHIPPED | OUTPATIENT
Start: 2021-11-24 | End: 2021-12-24

## 2021-11-24 RX ORDER — ZOLPIDEM TARTRATE 10 MG/1
TABLET ORAL
Qty: 30 TABLET | Refills: 5 | Status: SHIPPED | OUTPATIENT
Start: 2021-11-24 | End: 2022-01-06 | Stop reason: SDUPTHER

## 2021-11-24 RX ORDER — ROPINIROLE 1 MG/1
TABLET, FILM COATED ORAL
Qty: 45 TABLET | Refills: 3 | Status: SHIPPED | OUTPATIENT
Start: 2021-11-24 | End: 2022-01-06 | Stop reason: SDUPTHER

## 2021-11-24 RX ORDER — TORSEMIDE 20 MG/1
20 TABLET ORAL DAILY
Qty: 30 TABLET | Refills: 3 | Status: SHIPPED | OUTPATIENT
Start: 2021-11-25 | End: 2022-03-21 | Stop reason: SDUPTHER

## 2021-11-24 RX ORDER — LEVOTHYROXINE SODIUM 50 UG/1
50 TABLET ORAL
Qty: 30 TABLET | Refills: 6 | Status: SHIPPED | OUTPATIENT
Start: 2021-11-24 | End: 2022-01-06 | Stop reason: SDUPTHER

## 2021-11-24 RX ORDER — POTASSIUM CHLORIDE 20 MEQ/1
20 TABLET, EXTENDED RELEASE ORAL DAILY
Qty: 30 TABLET | Refills: 3 | Status: SHIPPED | OUTPATIENT
Start: 2021-11-25 | End: 2022-01-06 | Stop reason: SDUPTHER

## 2021-11-24 RX ORDER — DOXYCYCLINE 100 MG/1
100 CAPSULE ORAL DAILY
Qty: 30 CAPSULE | Refills: 1 | Status: SHIPPED | OUTPATIENT
Start: 2021-11-25 | End: 2021-12-17

## 2021-11-24 RX ORDER — OMEPRAZOLE 40 MG/1
40 CAPSULE, DELAYED RELEASE ORAL DAILY
Qty: 30 CAPSULE | Refills: 6 | Status: SHIPPED | OUTPATIENT
Start: 2021-11-24 | End: 2022-01-06 | Stop reason: SDUPTHER

## 2021-11-24 RX ORDER — METOPROLOL TARTRATE 25 MG/1
12.5 TABLET, FILM COATED ORAL 2 TIMES DAILY
Status: DISCONTINUED | OUTPATIENT
Start: 2021-11-24 | End: 2021-11-24 | Stop reason: HOSPADM

## 2021-11-24 RX ORDER — METOPROLOL TARTRATE 25 MG/1
12.5 TABLET, FILM COATED ORAL 2 TIMES DAILY
Qty: 30 TABLET | Refills: 3 | Status: SHIPPED | OUTPATIENT
Start: 2021-11-24 | End: 2022-01-06 | Stop reason: SDUPTHER

## 2021-11-24 RX ORDER — HYDROCODONE BITARTRATE AND ACETAMINOPHEN 10; 325 MG/1; MG/1
1 TABLET ORAL
Qty: 28 TABLET | Refills: 0 | Status: SHIPPED | OUTPATIENT
Start: 2021-11-24 | End: 2021-12-01

## 2021-11-24 RX ORDER — SPIRONOLACTONE 25 MG/1
12.5 TABLET ORAL DAILY
Qty: 30 TABLET | Refills: 3 | Status: SHIPPED | OUTPATIENT
Start: 2021-11-25 | End: 2022-03-21 | Stop reason: ALTCHOICE

## 2021-11-24 RX ORDER — GABAPENTIN 300 MG/1
300 CAPSULE ORAL 3 TIMES DAILY
Qty: 90 CAPSULE | Refills: 1 | Status: SHIPPED | OUTPATIENT
Start: 2021-11-24 | End: 2022-01-06 | Stop reason: SDUPTHER

## 2021-11-24 RX ORDER — ESCITALOPRAM OXALATE 20 MG/1
20 TABLET ORAL DAILY
Qty: 90 TABLET | Refills: 3 | Status: SHIPPED | OUTPATIENT
Start: 2021-11-24 | End: 2022-01-06 | Stop reason: SDUPTHER

## 2021-11-24 RX ADMIN — METOPROLOL TARTRATE 25 MG: 25 TABLET, FILM COATED ORAL at 09:30

## 2021-11-24 RX ADMIN — SPIRONOLACTONE 12.5 MG: 25 TABLET ORAL at 09:26

## 2021-11-24 RX ADMIN — GABAPENTIN 300 MG: 300 CAPSULE ORAL at 09:30

## 2021-11-24 RX ADMIN — PANTOPRAZOLE SODIUM 40 MG: 40 TABLET, DELAYED RELEASE ORAL at 05:53

## 2021-11-24 RX ADMIN — HYDROCODONE BITARTRATE AND ACETAMINOPHEN 1 TABLET: 10; 325 TABLET ORAL at 12:19

## 2021-11-24 RX ADMIN — DOCUSATE SODIUM 100 MG: 100 CAPSULE ORAL at 09:30

## 2021-11-24 RX ADMIN — INFLUENZA VIRUS VACCINE 0.5 ML: 15; 15; 15; 15 SUSPENSION INTRAMUSCULAR at 14:13

## 2021-11-24 RX ADMIN — TORSEMIDE 20 MG: 20 TABLET ORAL at 09:30

## 2021-11-24 RX ADMIN — DOXYCYCLINE HYCLATE 100 MG: 100 CAPSULE ORAL at 09:30

## 2021-11-24 RX ADMIN — LEVOTHYROXINE SODIUM 50 MCG: 0.05 TABLET ORAL at 05:53

## 2021-11-24 RX ADMIN — Medication 1 AMPULE: at 09:26

## 2021-11-24 RX ADMIN — MIDODRINE HYDROCHLORIDE 5 MG: 5 TABLET ORAL at 09:30

## 2021-11-24 RX ADMIN — ESCITALOPRAM OXALATE 20 MG: 10 TABLET ORAL at 09:30

## 2021-11-24 RX ADMIN — PROPAFENONE HYDROCHLORIDE 150 MG: 150 TABLET, FILM COATED ORAL at 09:30

## 2021-11-24 RX ADMIN — POTASSIUM CHLORIDE 20 MEQ: 20 TABLET, EXTENDED RELEASE ORAL at 09:30

## 2021-11-24 RX ADMIN — MIDODRINE HYDROCHLORIDE 5 MG: 5 TABLET ORAL at 11:35

## 2021-11-24 RX ADMIN — APIXABAN 5 MG: 5 TABLET, FILM COATED ORAL at 09:30

## 2021-11-24 NOTE — PROGRESS NOTES
PHYSICAL THERAPY DISCHARGE SUMMARY  [id number]     Precautions at discharge: Spinal; Other (comment) (falls)    Problem List:    Decreased strength B LE  [x]     Decreased strength trunk/core  [x]     Decreased AROM   [x]     Decreased PROM  [x]     Decreased balance sitting  []     Decreased balance standing  [x]     Decreased endurance  [x]     Pain  []       Functional Limitations:   Decreased independence with bed mobility  []     Decreased independence with functional transfers  []     Decreased independence with ambulation  [x]     Decreased independence with stair negotiation  [x]            Outcome Measures: Vital Signs: /68 (BP 1 Location: Right upper arm)   Pulse 72   Temp 98 °F (36.7 °C)   Resp 18   SpO2 97%     Pain level: no c/o pain    Patient education: pt. Educated on how to manage steps w/ B rails as well as with single rail    Interdisciplinary Communication: discussed d/c planning w/     Cognition: Pt. AXOx3. Decreased recall of spinal precautions during functional tasks.      MMT Initial Assessment   Right Lower Extremity Left Lower Extremity   Hip Flexion 3+ 2+   Knee Extension 5 5   Knee Flexion 5 4   Ankle Dorsiflexion 5 4      MMT Discharge Assessment   Right Lower Extremity Left Lower Extremity   Hip Flexion 3+ 2+   Knee Extension 5 5   Knee Flexion 5 4   Ankle Dorsiflexion 5 4   0/5 No palpable muscle contraction  1/5 Palpable muscle contraction, no joint movement  2-/5 Less than full range of motion in gravity eliminated position  2/5 Able to complete full range of motion in gravity eliminated position  2+/5 Able to initiate movement against gravity  3-/5 More than half but not full range of motion against gravity  3/5 Able to complete full range of motion against gravity  3+/5 Completes full range of motion against gravity with minimal resistance  4-/5 Completes full range of motion against gravity with minimal-moderate resistance  4/5 Completes full range of motion against gravity with moderate resistance  4+/5 Completes full range of motion against gravity with moderate-maximum resistance  5/5 Completes full range of motion against gravity with maximum resistance     AROM: limited by body habitus    PRIMARY MODE OF LOCOMOTION: ambulation  Please see [facility] Interdisciplinary Eval: Coordination/Balance Section for details regarding FIM score description.     BED/CHAIR/WHEELCHAIR TRANSFERS Initial Assessment Discharge Assessment   Rolling Right 6 (Modified independent) 6 (Modified independent)   Rolling Left 6 (Modified independent) 6 (Modified independent)   Supine to Sit 6 (Modified independent) 6 (Modified independent)   Sit to Stand Supervision Modified independent   Sit to Supine 6 (Modified independent) 6 (Modified independent)   Transfer Type SPT with walker SPT with walker   Comments       Car Transfer Not tested Not tested   Car Type           WHEELCHAIR MOBILITY/MANAGEMENT Initial Assessment Discharge Assessment   Able to Propel 0 feet 0 feet   Functional Level   NT   Curbs/ramps assistance required 0 (Not tested) 0 (Not tested)   Wheelchair set up assistance required 0 (Not tested) 0 (Not tested)   Wheelchair management   NT       [facility]) INDEPENDENCE Initial Assessment Discharge Assessment   Assistive device Walker, rolling Walker, rolling   Ambulation assistance - level surface 5 (Supervision) 5 (Supervision)   Distance 50 Feet (ft) 100 Feet (ft)   Comments flexed posture, decreased domenica, L sided TRendelenberg, decreased foot clearance B LEs flexed posture, decreased domenica, L sided TRendelenberg, decreased foot clearance B LEs   Ambulation assistance - unlevel surface 5 (Supervision/setup) 5 (Supervision/setup)       STEPS/STAIRS Initial Assessment Discharge Assessment   Steps/Stairs ambulated 0 4   Rail Use Both Both   Functional Level   Dependent (mod A x1 & CGAx2 for safety)   Comments step to gait pattern w/ difficulty clearing R LE on step ascending step to gait pattern w/ difficulty clearing R LE on step ascending   Curbs/Ramps 0 (Not tested) 0 (Not tested)       QUALITY INDICATOR ASSIST COMMENTS   Roll right (&return to back) 6: Independent    Roll left (& return to back) 6: Independent    Supine to sit 6: Independent    Sit to stand 6: Independent    Chair/bed-to-chair transfer 6: Independent    Walk 10 feet 4: Supervision or touching A    Walk 50 feet with 2 turns 4: Supervision or touching A    Walk 150 feet 4: Supervision or touching A    Walk 10 feet on uneven  4: Supervision or touching A    1 step/curb Not Tested: Not attempted due to safety concerns    4 steps 1: Dependent    12 steps Not Tested: Not attempted due to medical concerns     object 5: S/U or clean-up assist    Wheel 48' w/2 turns Not Tested: Not applicable secondary to pt not completing activity previously    Wheel 150' Not Tested: Not applicable secondary to pt not completing activity previously    Car Transfer Not Tested: Not attempted due to environmental concerns: Equipment        Pt. Left up in recliner in NAD, call bell in reach. PHYSICAL THERAPY PLAN OF CARE    LTGs: Pt. Has met 3/5 LTGs    Pt would benefit from continued skilled physical therapy in order to improve independent functional mobility within the home with use of least restrictive device. Interventions may include range of motion (AROM, PROM B LE/trunk), motor function (B LE/trunk strengthening/coordination), activity tolerance (vitals, oxygen saturation levels), bed mobility training, balance activities, gait training (progressive ambulation program), and functional transfer training. HEP handout:  Issued Monday    Pt to be discharged home with supervision provided by pt's family. Therapy Recommendations upon discharge: 24 Hour Supervision; Home Health PT   Equipment needs at discharge:  Pt. Owns necessary equipment    Please see IRC;  Interdisciplinary Eval, Care Plan, and Patient Education for further information regarding physical therapy discharge summary and plan of care.      Nando Camacho, PT  11/24/2021

## 2021-11-24 NOTE — PROGRESS NOTES
Pt discharged home with daughter. Pt transported to car via wheelchair. Pt entered front passenger seat of car independently. Pt required assist with moving seat further back to get her legs in.

## 2021-11-24 NOTE — PROGRESS NOTES
PHYSICAL THERAPY DAILY NOTE  Time In: 0830  Time Out: 7300  Patient Seen For: AM; Balance activities; Gait training; Patient education; Therapeutic exercise; Transfer training    Subjective: Patient agreeable to physical therapy treatment today. Has C/O pain to lower back area. Does not quantify pain level. Demonstrates pain verbally and facial expressions. Last treatment was good and no new complaints. Objective:  Patient up sitting in recliner. SPT from recliner to standing to ambulate to gym for treatment session with RW and SPV. Orientation Assessment :  Alert and age appropriately oriented. Affect/Behavior:   Appropriate and Cooperative. Basic Command Following:   intact. Safety/Judgment:   intact. Pain Present:   Yes. GROSS ASSESSMENT Daily Assessment   Generally decreased functionally. BED/MAT MOBILITY Daily Assessment   Patient up sitting in recliner. TRANSFERS Daily Assessment   Minor verbal cues. Transfer Type: SPT with walker  Other: Toilet Transfer to the right  Transfer Assistance : 5 (Supervision/setup)  Sit to Stand Assistance: Supervision  Car Transfers: Not tested       GAIT Daily Assessment   Base of Support: Widened. Speed/Camila: Slow; Fluctuations. Step Length: Right shortened;Left shortened  Gait Abnormalities: Trunk sway increased; Path deviations; Step thru gait. Distance (ft): 150 Feet (ft) total distance. Assistive Device: Walker, rolling (FWB). Ambulation - Level of assistance: SPV. Interventions: Safety awareness training; Tactile cues; Verbal cues Amount of Assistance: 5 (Supervision/setup)  Distance (ft):  (80 ft then 70 ft)  Assistive Device: Walker, rolling       COGNITION Daily Assessment   Communication: extra time needed to understand requests. Social Interaction: patient presents appropriate, cooperating and participates in treatment. Problem Solving: Verbal cues to teach techniques for completing tasks.   Memory: Executing requests without distractions. Communication:  Social Interaction:  Problem Solving:  Memory:     STEPS or STAIRS Daily Assessment   NT Steps/Stairs Ambulated (#): 0  Level of Assist : 0 (Not tested)       BALANCE Daily Assessment   Standing balance activities of various stance positions, eyes open/closed, 15 seconds each with CGA. Sitting - Static: Good (unsupported)  Sitting - Dynamic: Good (unsupported)  Standing - Static: Good  Standing - Dynamic : Impaired       WHEELCHAIR MOBILITY Daily Assessment   NT Able to Propel (ft): 0 feet  Curbs/Ramps Assist Required (FIM Score): 0 (Not tested)       LOWER EXTREMITY EXERCISES Daily Assessment    Extremity: Both  Exercise Type #1: Supine lower extremity strengthening  Sets Performed: 1  Reps Performed: 10  Level of Assist: Supervision  Exercise Type #2: Standing lower extremity strengthening  Sets Performed: 1  Reps Performed: 10  Level of Assist: Stand-by assistance          Assessment: Education:  Teaching Method:   Demonstration, Explanation. PT Impairments or Limitations:   Ambulation deficits, Balance deficits, Endurance deficits, Pain limiting function, Strength deficits, Transfer deficits. Rehab Potential Physical Therapy:   Good. Grossly no focal motor deficits noted. No rashes, no erythema. No calf tenderness BLE. Patient performed all given exercises listed. Patient was taken back to room. Left in sitting position in recliner, call bell and necessities in reach. Nurse notified of completion of treatment. Plan of Care: The patient has shown the ability to tolerate and benefit from physical therapy daily in a comprehensive inpatient rehabilitation program.  Continue intensive Physical Therapy  to address bed mobility, transfers, ambulation, strengthening, balance, and endurance. Continue with plan of care and focus on goals.      Denis Krabbe, PTA  11/24/2021

## 2021-11-24 NOTE — PROGRESS NOTES
OT DISCHARGE REPORT    Time In: 0700  Time Out: 0754    Mobility   Score Comments   Rolling 6: Independent Side: Bilateral     Supine to Sit 6: Independent    Sit to Supine Not Tested: Not attempted due to environmental concerns: Time    Sit to Stand 6: Independent    Transfer Assist 4: Supervision or touching A Transfer Type: SPT   Equipment: Rolling Walker   Comments: SBA     Activities of Daily Living    Score Comments   Eating 6: Independent    Oral Hyigene 6: Independent    Bathing 6: Independent Type of Shower: Bath Pack  Position: Standing PRN and Unsupported Sitting   Adaptive  Equipment: W/C and Long Handled Sponge  Comments:    Upper Body  Dressing 6: Independent Items Applied: Pullover and Bra  Position: Supported Sitting  Comments:    Lower Body Dressing 6: Independent Items Applied: Underwear and Elastic pants  Position: Standing PRN and Unsupported Sitting  Adaptive Equipment: Reacher and W/C  Comments:    Donning/[place] Footwear 6: Independent Items Applied: Socks and Shoes with fasteners   Adaptive Equipment: Foot Funnel, Reacher and Sock Aide  Comments: Toilet Transfer 6: Independent Transfer Type: SPT   Equipment: Rolling Walker   Comments: SBA   Toileting Hygiene 6: Independent Output: NA  Comments:    Education  Footwear AE        Plan of Care: Please see Care Plan for updated LTGs. Precautions at Discharge: Falls and Spinal  Discharge Location: home, pt's sister-in-law will be moving in with her  Safety/Supervision Recommendations/Functional Level: Pt requires supervision with ambulation using RW secondary to drops in BP and decreased endurance. Pt completes ADLs independently. Family Training: not completed due to patient's preference.      Recommended Continuing Therapy: HHOT  Residual Deficits: decreased endurance, radiating LLE pain, decreased dynamic balance  Progress over LOS: Patient demonstrates good progress with functional mobility, AE/DME use, management of laboy, ADL techniques, spinal precautions, and activity tolerance for performance of ADL and functional transfers, see above for details. Patient continues to require skilled [facility] services to address remaining deficits stated above. Summary of Session: Summary of Session: S: \"My swelling is really bad today. Look how tight my leg is. \" Agreeable to therapy. Focus of session was on morning ADL routine and progress assessment. Patient was able to ambulate ~10 feet using a RW with SBA. Pain indicated in LLE radiating pain. Collaborated with IP team and confirmed patient is ready for discharge. Patient ended session in recliner with call remote and phone within reach.          Eduardo Garnett, OTR/L  11/24/2021

## 2021-11-24 NOTE — PROGRESS NOTES
Problem: Mobility Impaired (Adult and Pediatric)  Goal: *Therapy Goal (Edit Goal, Insert Text)  Description: LTG 1. Patient transfer supine<>sit with mod I in 1 week. Goals transferred from closed chart after move from [facility] to [facility]. Outcome: Resolved/Met  Goal: *Therapy Goal (Edit Goal, Insert Text)  Description: LTG 2. Patient transfer sit<>stand and perform stand pivot transfer with RW and mod I in 1 week. Goals transferred from closed chart after move from [facility] to [facility]. Outcome: Resolved/Met  Goal: *Therapy Goal (Edit Goal, Insert Text)  Description: STG 3. Patient ambulate 100 feet with RW and SBA in 1 week(MET 11/10/21)  LTG 3. Patient ambulate  150 feet with RW and modified independence in 2 weeks    Goals transferred from closed chart after move from [facility] to [facility]. Outcome: Resolved/Not Met  Variance Patient Condition  Note: Pt. Requires supervision due to ongoing concerns w/ SOB & BP  Goal: *Therapy Goal (Edit Goal, Insert Text)  Description: STG 4. Patient ambulate up/down 4 steps with hand rails and min assist in 1 week (cont with STG x 1 wk as of 11/10/21)  LTG 4. Patient ambulate up/down 4 steps with hand rails and SBA in 2 weeks    Goals transferred from closed chart after move from [facility] to [facility]. Outcome: Resolved/Not Met  Variance Patient slowly responding  Goal: *Therapy Goal (Edit Goal, Insert Text)  Description: STG 5. Patient ambulate up/down 10 ft ramp with RW and min assist in 1 week (cont with STG x 1 wk as of 11/10/21)(MET 11/17/21)  LTG 5. Patient ambulate up/down 20 ft ramp with RW and SBA in 2 weeks    Goals transferred from closed chart after move from [facility] to [facility].   Outcome: Resolved/Met

## 2021-11-24 NOTE — DISCHARGE INSTRUCTIONS
-no pushing, pulling, lifting > 5 lbs  -no twisting, bending  -seek medical attention if you develop worsening shortness of breath, chest pain, faintness, dizziness that wont resolve with rest  -call MD if you develop fever >101 not relieved with tylenol. Your incision is healing well but if it should start draining or become red and start to open up, call Dr Marissa Hare immediately.  -no driving until off narcotics and cleared by MD      Keep appointment as scheduled with Cardiology. DISCHARGE SUMMARY from Nurse    PATIENT INSTRUCTIONS:    After general anesthesia or intravenous sedation, for 24 hours or while taking prescription Narcotics:  · Limit your activities  · Do not drive and operate hazardous machinery  · Do not make important personal or business decisions  · Do  not drink alcoholic beverages  · If you have not urinated within 8 hours after discharge, please contact your surgeon on call. Report the following to your surgeon:  · Excessive pain, swelling, redness or odor of or around the surgical area  · Temperature over 100.5  · Nausea and vomiting lasting longer than 4 hours or if unable to take medications  · Any signs of decreased circulation or nerve impairment to extremity: change in color, persistent  numbness, tingling, coldness or increase pain  · Any questions    What to do at Home:  Recommended activity: No lifting, Driving, or Strenuous exercise per above instructions from Dr. Marissa Hare. Resume pre-hospital diet. You may shower, no tub bathes. No driving, no lifting greater than 5lbs, no bending or twisting. If you experience any of the following symptoms increased pain/nausea/vomiting lasting longer than 4 hours, temperature greater than 101 (per instructions above from Dr. Marissa Hare), drainage or redness at incision site, questions or concerns, please follow up with Primary Care Physician or Dr. Marissa Hare.     *  Please give a list of your current medications to your Primary Care Provider. *  Please update this list whenever your medications are discontinued, doses are      changed, or new medications (including over-the-counter products) are added. *  Please carry medication information at all times in case of emergency situations. These are general instructions for a healthy lifestyle:    No smoking/ No tobacco products/ Avoid exposure to second hand smoke  Surgeon General's Warning:  Quitting smoking now greatly reduces serious risk to your health. Obesity, smoking, and sedentary lifestyle greatly increases your risk for illness    A healthy diet, regular physical exercise & weight monitoring are important for maintaining a healthy lifestyle    You may be retaining fluid if you have a history of heart failure or if you experience any of the following symptoms:  Weight gain of 3 pounds or more overnight or 5 pounds in a week, increased swelling in our hands or feet or shortness of breath while lying flat in bed. Please call your doctor as soon as you notice any of these symptoms; do not wait until your next office visit. The discharge information has been reviewed with the patient. The patient verbalized understanding. Discharge medications reviewed with the patient and appropriate educational materials and side effects teaching were provided.   ___________________________________________________________________________________________________________________________________

## 2021-11-24 NOTE — PROGRESS NOTES
Christelle Meyer MD  Medical Director  [address] [facility], [address] [facility]  Tel: [phone]       [facility] PROGRESS NOTE    Trevor Left  Admit Date: 11/23/2021  Admit Diagnosis:   n    Subjective     Patient seen and examined. Feeling well. Legs feel tighter. Some sob but improved. No presyncopal events with therapies.  No heart palpitations  Objective:     Current Facility-Administered Medications   Medication Dose Route Frequency    acetaminophen (TYLENOL) tablet 650 mg  650 mg Oral Q6H PRN    alcohol 62% (NOZIN) nasal  1 Ampule  1 Ampule Topical Q12H    amitriptyline (ELAVIL) tablet 25 mg  25 mg Oral QHS    apixaban (ELIQUIS) tablet 5 mg  5 mg Oral BID    aspirin chewable tablet 81 mg  81 mg Oral QHS    bisacodyL (DULCOLAX) suppository 25 mg  25 mg Rectal Q6H PRN    docusate sodium (COLACE) capsule 100 mg  100 mg Oral BID    escitalopram oxalate (LEXAPRO) tablet 20 mg  20 mg Oral DAILY    famotidine (PEPCID) tablet 20 mg  20 mg Oral QPM    gabapentin (NEURONTIN) capsule 300 mg  300 mg Oral TID    HYDROcodone-acetaminophen (NORCO)  mg tablet 1 Tablet  1 Tablet Oral Q4H PRN    influenza vaccine 2021-22 (6 mos+)(PF) (FLUARIX/FLULAVAL/FLUZONE QUAD) injection 0.5 mL  1 Each IntraMUSCular PRIOR TO DISCHARGE    levothyroxine (SYNTHROID) tablet 50 mcg  50 mcg Oral 6am    lidocaine 4 % patch 1 Patch  1 Patch TransDERmal DAILY PRN    LORazepam (ATIVAN) tablet 0.5 mg  0.5 mg Oral Q6H PRN    magnesium hydroxide (MILK OF MAGNESIA) 400 mg/5 mL oral suspension 30 mL  30 mL Oral DAILY PRN    metoprolol tartrate (LOPRESSOR) tablet 25 mg  25 mg Oral BID    midodrine (PROAMATINE) tablet 5 mg  5 mg Oral TID WITH MEALS    naloxone (NARCAN) injection 0.4 mg  0.4 mg IntraVENous PRN    nicotine (NICODERM CQ) 14 mg/24 hr patch 1 Patch  1 Patch TransDERmal Q24H    ondansetron (ZOFRAN ODT) tablet 4 mg  4 mg Oral Q4H PRN    pantoprazole (PROTONIX) tablet 40 mg  40 mg Oral ACB    phenol throat spray (CHLORASEPTIC) 1 Spray  1 Spray Oral PRN    polyethylene glycol (MIRALAX) packet 17 g  17 g Oral DAILY    potassium chloride (K-DUR, KLOR-CON M20) SR tablet 20 mEq  20 mEq Oral DAILY    propafenone (RYTHMOL) tablet 150 mg  150 mg Oral BID    rOPINIRole (REQUIP) tablet 1 mg  1 mg Oral QHS PRN    spironolactone (ALDACTONE) tablet 12.5 mg  12.5 mg Oral DAILY    torsemide (DEMADEX) tablet 20 mg  20 mg Oral DAILY    traMADoL (ULTRAM) tablet 50 mg  50 mg Oral Q6H PRN    zolpidem (AMBIEN) tablet 10 mg  10 mg Oral QHS PRN    doxycycline (VIBRAMYCIN) capsule 100 mg  100 mg Oral DAILY       Review of Systems:   Denies chest pain,  cough, headache, visual problems, abdominal pain, dysuria, calf pain. Pertinent positives are as noted in the HPI, ROS unremarkable otherwise. Visit Vitals  BP (!) 127/58 (BP 1 Location: Left upper arm, BP Patient Position: Sitting)   Pulse 71   Temp 98 °F (36.7 °C)   Resp 18   SpO2 97%        Physical Exam:   General: Alert and age appropriately oriented. No acute cardiorespiratory distress. HEENT: Normocephalic, no scleral icterus. Oral mucosa moist without cyanosis. Lungs: Clear to auscultation bilaterally. Respiration even and unlabored. Heart: Regular rate and rhythm, S1, S2. No murmurs, clicks, rub or gallops. Abdomen: Soft, non-tender, not distended. Bowel sounds normoactive. No organomegaly. obese   Genitourinary: Deferred. Neuromuscular:      UE strength and ROM full and symmetric. LE strength at HF 3+/5 (R), 3-/5 (L), KF 4/5 (B). Sensation intact   Skin/extremity: No rashes, no erythema. + calf tenderness B LE. Swelling to thighs, calf tight, slt pitting to mid calf. Appears edematous all over. Facial swelling less.    Back inc healing well  Neg Homans                                                                              Functional Assessment:          Balance  Sitting - Static: Good (unsupported) (11/23/21 1400)  Sitting - Dynamic: Good (unsupported) (11/23/21 1400)  Standing - Static: Fair (11/23/21 1400)  Standing - Dynamic : Impaired (11/23/21 1400)                     Clydene Bone Fall Risk Assessment:  Clydene Bone Fall Risk  Mobility: Ambulates or transfers with assist devices or assistance (11/24/21 0344)  Mobility Interventions: Communicate number of staff needed for ambulation/transfer; Patient to call before getting OOB; Strengthening exercises (ROM-active/passive); Utilize walker, cane, or other assistive device (11/24/21 0344)  Mentation: Alert, oriented x 3 (11/24/21 0344)  Medication: Patient receiving anticonvulsants, sedatives(tranquilizers), psychotropics or hypnotics, hypoglycemics, narcotics, sleep aids, antihypertensives, laxatives, or diuretics (11/24/21 0344)  Medication Interventions: Evaluate medications/consider consulting pharmacy; Patient to call before getting OOB; Teach patient to arise slowly (11/24/21 0344)  Elimination: Needs assistance with toileting (11/24/21 0344)  Elimination Interventions: Call light in reach; Patient to call for help with toileting needs; Toilet paper/wipes in reach (11/24/21 0344)  Prior Fall History: No (11/24/21 0344)  History of Falls Interventions: Consult care management for discharge planning; Door open when patient unattended; Evaluate medications/consider consulting pharmacy;  Room close to nurse's station (11/23/21 2011)  Total Score: 3 (11/24/21 0344)  High Fall Risk: Yes (11/24/21 0344)     Speech Assessment:         Ambulation:  Gait  Distance (ft): 150 Feet (ft) (total feet 75 ft. x 2) (11/23/21 1400)  Assistive Device: Walker, rolling (11/23/21 1400)     Labs/Studies:  Recent Results (from the past 72 hour(s))   HGB & HCT    Collection Time: 11/23/21  6:05 AM   Result Value Ref Range    HGB 8.7 (L) 11.7 - 15.4 g/dL    HCT 29.7 (L) 35.8 - 69.7 %   METABOLIC PANEL, BASIC    Collection Time: 11/23/21  6:05 AM   Result Value Ref Range    Sodium 137 136 - 145 mmol/L    Potassium 3.7 3.5 - 5.1 mmol/L    Chloride 99 98 - 107 mmol/L    CO2 36 (H) 21 - 32 mmol/L    Anion gap 2 (L) 7 - 16 mmol/L    Glucose 81 65 - 100 mg/dL    BUN 19 8 - 23 MG/DL    Creatinine 0.78 0.6 - 1.0 MG/DL    GFR est AA >60 >60 ml/min/1.73m2    GFR est non-AA >60 >60 ml/min/1.73m2    Calcium 8.9 8.3 - 10.4 MG/DL   MAGNESIUM    Collection Time: 11/23/21  6:05 AM   Result Value Ref Range    Magnesium 2.1 1.8 - 2.4 mg/dL       Assessment:     Problem List as of 11/24/2021 Date Reviewed: 11/5/2021          Codes Class Noted - Resolved    Spinal stenosis, lumbar region with neurogenic claudication ICD-10-CM: M48.062  ICD-9-CM: 724.03  11/3/2021 - Present        A-fib ([facility] ICD-10-CM: I48.91  ICD-9-CM: 427.31  10/30/2021 - Present        ÁNGEL (acute kidney injury) ([facility] ICD-10-CM: N17.9  ICD-9-CM: 584.9  10/30/2021 - Present        Acute respiratory failure with hypoxia ([facility] ICD-10-CM: J96.01  ICD-9-CM: 518.81  10/30/2021 - Present        Hypovolemic shock ([facility] ICD-10-CM: R57.1  ICD-9-CM: 785.59  10/26/2021 - Present        Hypotension ICD-10-CM: I95.9  ICD-9-CM: 458.9  10/26/2021 - Present        Spondylolisthesis of multiple sites in spine ICD-10-CM: M43.19  ICD-9-CM: 738.4  10/25/2021 - Present        Spinal stenosis ICD-10-CM: M48.00  ICD-9-CM: 724.00  10/25/2021 - Present        Obesity, morbid ([facility] ICD-10-CM: E66.01  ICD-9-CM: 278.01  12/22/2017 - Present        Acquired hypothyroidism ICD-10-CM: E03.9  ICD-9-CM: 244.9  9/13/2017 - Present        Postoperative wound infection ICD-10-CM: T81.49XA  ICD-9-CM: 998.59  5/19/2016 - Present        Lumbar stenosis with neurogenic claudication ICD-10-CM: M48.062  ICD-9-CM: 724.03  3/28/2016 - Present        Essential hypertension, benign ICD-10-CM: I10  ICD-9-CM: 401.1  7/24/2013 - Present        Endometriosis ICD-10-CM: N80.9  ICD-9-CM: 617.9  7/24/2013 - Present        Colon polyp ICD-10-CM: K63.5  ICD-9-CM: 211.3  7/24/2013 - Present        Squamous cell skin cancer ICD-10-CM: C44.92  ICD-9-CM: 173.92  7/24/2013 - Present               L2-L4 lumbar direct lateral interbody fusion with anterior plating and redo of L2 L5 laminectomy (10/26/2021 // Dr Angeli Rand) due to spinal stenosis with neurogenic claudication     Plan / Recommendations / Medical Decision Making:      Daily physician / PA medical management:     Lumbar stenosis with neurogenic claudication, spondylolisthesis of lumbar region, lumbar spinal stenosis region with neurogenic claudication - PT / OT; spinal precautions, girdle when ambulating.     Atrial fibrillation - s/p 2 failed ablations, managed with Rhythmol, metoprolol and Eliquis. Currently NSR; Eliquis restarted 10/31 in light of RUL pulmonary embolism. -11/4 irreg, rate controlled, continue meds  -11/5 noticeably RRR to prolonged auscultation  -11/6 IIR today but rate-controlled  -11/12 NSR, rate 60s; continue Rhythmol, metoprolol and Eliquis  -11/14 HR 63-72 range last 24 hours  -11/15 HR 65-71 denies palpitations; continue Rhythmol  -11/16 rate controlled  -11/18 more RRR today, rate controlled  -11/19 RRR nl rhythm  -11/20 more irregular today, rate controlled  -11/22 rate controlled 69-82   -11/23 irreg, rate in the 70s  -11/24 NSR this a.m rate controlled     Hypotension / hypertension - BP fluctuating, managed medically. Has been hypotensive requiring levophed and now midodrine (pt's home lisinopril 20mg BID and HCTZ 12.5mg BID are on hold); needs TEDs when OOB.  -11/4 hypotensive episodes improved; decrease midodrine to 5mg TID. Continue metoprolol for rate control  -11/5 sBP fluctuating from 102-163, dBP all <85, mostly in 50-60s; monitor as add back PRN furosemide for edema  -11/6 /73  -11/8 BP 119-148/63-73; change midodrine to 5mg BID and wean off; MUST wear TEDs when OOB and elevate LEs whenever possible. Bilateral pitting edema; will give 40mg bid of Lasix today and reassess daily.  Check renal fxn  -11/9 creat 0.56, bun 16  -11/10 creat 0.67  -11/11 /57 this am, max 143/76; continue midodrine 5mg BID for now. One episode of dizziness yesterday but BP normal by the time they could check BP  -11/14 /53  -11/15 116/52 controlled  -11/16 VSS; continue midodrine BID  -11/17 will change midodrine to TID; ? labile BP needs more consistent dosing?  -11/19 VSS however quite HTN in /93; continue current meds for now, may need to decrease midodrine if she is having supine hypertension  -11/20 /70, AM midodrine held  -11/22 /50s ; no orthostatics captured when pt has episodes of presyncope when up walking.   -11/23 low of 90/49- 143/74. No episodes of presync yesterday  -11/25 bp varies, no presyncopal events yesterday     Pain control - ongoing significant pain in back which is stable and controlled by PRN meds. Will require regular pain assessment and comprehensive pain management. Continue PRN Norco, tramadol, APAP and gabapentin. -11/4 increase Neurontin to 300mg TID and Elavil at night 25mg due to sciatic pain; add back Requip 1mg QHS  -11/5 start steroid taper for R LE pain  -11/6 R LE radiating pain resolved, finish steroid taper  -11/8 MUCH improved with steroid dose lyudmila  -11/16 pain controlled     Hypokalemia - monitor; currently 4.3 on supplement due to HCTZ; may be able to hold supplement. -11/4 labs pending; d/c daily labs  -11/5 K+ 4.2  -11/6 recheck BMP 11/8 since continuing daily furosemide  -11/8 K 4.2  -11/10 K 4.1, continue supplement while on lasix  -11/12 K 4.0  -11/15 K 4.0  -11/17 K 3.9     Leukocytosis, mild - wbc 11.9 at [facility] admission (11/3). No s/sx of infxn.   -11/5 WBC 11.9, afebrile, no s/sx infection; will likely increase with steroids  -11/6 recheck CBC 11/8  -11/8 WBC 16.2 (from 11.9); likely steroid-induced; will check UA (negative); this is last day of steroids  -11/12 WBC 10.4, afebrile, resolved     Anemia, blood loss - Hgb 9.0, improving; had 2000ml blood loss and received blood transfusion x 3, as well as 2800ml of crystalloid. -11/6 recheck CBC 11/8; 9.4 from 8.8 11/5  -11/10 Hgb 9.7, improving  -11/12 Hgb 8.8  -11/17 Hgb 8.1; trending down. Check stool. Check iron studies.    -11/19 Hgb 9.0; stool positive, likely hemorrhoid (bright red).  -11/22 recheck [facility] in a.m; 11/23 hgb 8.7, slt down but stable     Hypothyroidism - continue Synthroid.     Pneumonia prophylaxis - incentive spirometer every hour while awake.     Rosacea, 11/16 - start doxycycline 100mg BID x 3 d and then daily per home regimen.     DVT risk / DVT prophylaxis - daily physician / PA exam to assess as patient is at increased risk for of thromboembolism. Mobilize as tolerated. Sequential pneumatic compression devices (SCDs) when in bed; thigh-high or knee-high thromboembolic deterrent hose when out of bed. On Eliquis.     GI prophylaxis - resume PPI. At times may need additional antacids, Maalox prn.     Depression - continue on Lexapro. At risk of depression exacerbation due to pain and loss of independent function.     General skin care / wound prevention - monitor lumbar incision and general skin wound status daily per staff and physician / PA. At risk for failure. Will require 24/7 rehab nursing. -11/4 lateral left hip wound; looks like a blister that had opened up vs sheer injury. Cleanse daily and cover. Back incision healing  -11/15 incision c/d/i     Bladder program / urinary retention / neurogenic bladder - schedule voids q 6-8 hrs. Check post-void residual as needed; in-and-out catheter if post-void residual is more than 400ml.  -voiding continently without residuals  -11/15 remove laboy in day or two. IV lasix was causing very frequent urination  -11/24 d/c laboy     Bowel program - at risk for constipation as a side effect of opioids, other medications, impaired mobility, etc. MiraLAX daily for regularity, Azul-Colace for stool softener.  PRN MOM, bisacodyl suppository or tablets for constipation.  -continent, regular     Edema, 11/9 - >1800ml out; continue Lasix 40mg BID for today . Pt requesting laboy at [facility]  -11/10 [address], wt is up. Will keep laboy and give 40mg IV lasix today. No hx of CHF but has Afib. BNP 2757. Alb 2.7; pt had a 2d echo 10/29/21; EF>70%, severe pulmonary HTN noted with hx of RUL pulmonary embolism. No evidence though of right heart strain. VSS; daily wts  -11/11 5lbs down from yesterday. UOP 1450, BNP 1865, improved; 40mg IV Lasix x 1 today  -11/12 will weigh today. One more dose of IV Lasix today 40mg. Good diuresis. Lungs clear no hx CHF. Off steroids now. BNP pending. UOP -4275  - 11/14 continue on home dose po lasix 40 mg qd, Last weights- 315lbs > 316lbs with iv lasix x 1 given yesterday, today 312lbs. Continue with 1800ml fluid restriction. Continue daily weights. BMP ordered for tomorrow  -11/15 need f/u wt. Significant improvement in edema. Continue oral Lasix 40mg daily. K 4, creat 0.72  -11/16 continue with oral Lasix, home dose of 40mg daily. Renal fxn normal, K nl. Dc laboy cath now. Wt is down 15lbs  -11/18 BP 124/70, stable, HR 67  -11/19 UOP now +480; continue daily lasix; BNP improving, must check if she is retaining  -11/20 weight plateau at 601TPY; changed furosemide to torsemide with little change; ?? HFpEF? TTE 10/29/2021 with EF >70%, severe pulmonary HTN; add spironolactone and place laboy x 24h while diuresing  -1/22 wt last documented at 309lbs; cont spironolactone and laboy; check bmp  -11/23 wt stable. Discontinue laboy in am. BMP nl. I believe that he symptoms are all stemming from her pulm HTN (swelling, presyncope, fatigue and SOB); she reports never having these symptoms before. Do not want to overdiurese as to cause worsening cardiac fxn     Now that [id number] [address] [place] is downtown again, will consult cardiology; just had ECHO Oct 29th  -currently on spironolactone and demadex. 11/24 consult pending     11/18 reports not feeling well.  No specific symptom or complaints. Noted blood in underwear. Denies vaginal bleeding or hemorrhoids. Check UA for UTI  -UA neg  -episode of chest pain yesterday; taken to ED where troponins were normal  -11/23 no recurrence but cont to have edema and sob. No O2 requirements.       Time spent was 25 minutes with over 1/2 in direct patient care/examination, consultation and coordination of care.      Signed By: Theadora Harada, MD     November 24, 2021

## 2021-11-24 NOTE — DISCHARGE SUMMARY
[address] [facility]  DISCHARGE SUMMARY    Name:  Isac Woody  MR#:  [id number]  :  1955  ACCOUNT #:  [de-identified]  ADMIT DATE:  10/25/2021  DISCHARGE DATE:  2021    PRIMARY DIAGNOSIS:  Spinal stenosis with spondylolisthesis. HISTORY OF PRESENT ILLNESS:  The patient is a 46-year-old female who had had a previous laminectomy surgery L2 through L5, who initially did well, but over time had recurrent pain, and she was found to have recurrent stenosis at L2, L3, L4, and L5 with development of new spondylolisthesis at L3-4 and L4-5. She failed to get long-term improvement with activity restriction, pain medication, epidural steroid injections, so she presented for surgery. She was admitted on 10/25 and underwent prone anterior DLIF procedure at L2-3 and L3-4 with placement of interbody devices and an anterior plate and then she underwent a redo bilateral laminectomy at L2, L3, and L4 with combined posterolateral fusion and posterior interbody fusion at L4-5 and posterolateral fusion alone at L2-3 and L3-4 and then she had segmental posterior spinal instrumentation. The patient was very large and she had a large blood loss of 2000 mL and we had to place three drains in her postoperatively and she was taken to floor and her course was fairly unremarkable. She remained afebrile with stable vital signs, but she was very slow to mobilize and progress and she did complain initially of left leg pain and she had some weakness with hip flexion which could have been real weakness versus pain-related weakness. We were eventually able to remove all her drains and she was mobilized by physical therapy and eventually was ambulating in halls very slowly. It was felt that she would be better off going to a skilled nursing facility. Prior to going, we had to remove her Eugene and she was voiding and she was also having bowel movements and tolerating a diet.     DISPOSITION:  On postoperative day #9, on 11/03/2021, she is discharged to skilled nursing facility for further therapy before she is able to go home and be on her own.       MD BLAYNE [email or web address]  D:  11/23/2021 17:57  T:  11/24/2021 3:12  JOB #:  [id number]

## 2021-11-24 NOTE — DISCHARGE SUMMARY
Satinder Hartman MD  Medical Director  [address] [place] [address]  Tel: [phone]       PHYSICAL MEDICINE & REHABILITATION DISCHARGE SUMMARY     Date: 11/24/2021  Admission Date: 11/23/2021  Discharge Date: 11/24/2021    Surgeon: Dr Jazzy Renteria  Primary Care Provider: Christina Reese MD    Admission Condition: good  Discharged Condition: good    Hospital Course: The patient was admitted to [address] [place] [address] by Margret Presley MD on 11/23/2021 s/p L2 L4 lumbar direct lateral interbody fusion with anterior plating and redo of L2 L5 laminectomy     HPI: The patient is a right-hand dominant, previously functionally independent 72 yo super obese female 73 y.o. with history of Afib s/p ablation, Hypothyroidism, OA, lumbar spinal stenosis, HTN, morbid obesity, JULIANN who was admitted for lumbar laminectomy. Patient is postop day 1 from L2 L4 lumbar direct lateral interbody fusion with anterior plating and redo of L2 L5 laminectomy by spinal Ortho. Patient had multiple drains in place postoperatively. on 10/26 POD 1, patient received one dose of IV Dilaudid as well as multiple blood pressure medications. She subsequently suffered from hypotension with BP 70/54. She also was working with PT/OT and became significantly hypotensive when standing up. She did complain of orthostasis at this time. She was given normal saline IV fluid boluses but remains hypotensive with MAPS as low as 49 during my evaluation. Of note, she also received Midodrine 20mg once without no improvement in MAPs. Patient complained of significant drowsiness, and was hardly able to keep her eyes open with current low BP's. She denied any shortness of breath, chest pain or pressure, palpitations, nausea, vomiting, fevers or chills, diaphoresis, near-syncope or syncope. Patient was suffering from ÁNGEL with bump in creatinine and WBC 10/27.    Hypotension was thought to be secondary to pain meds and mutli antihypertensives. WBC was 25K. And pod 1, she reqiuired Levophed to maintain BP. This occurred in the face of large volume blood loss. Cardiology, Shaila, felt that hypotension is mixed etiology related to CNS driven event postoperatively and hypovolemic component due to large volume blood loss. Patient with history of atrial fibrillation ablation. She has had intermittent episodes of A. fib atrial fibrillation which are rate controlled. Radha not feel Rythmol is contributing to hypotension. Certainly large volume blood loss was likely etiology with possible underlying CNS mediated component. Agreed with ProAmatine. Volume status appearedstable. Held  Eliquis at this time until we feel patient is no longer actively bleeding. She has required 2 units PRBC transfusion for slow drop in hemoglobin and persistent hypotension. Her Rythmol was held due to hypotension occurring solely after Rythmol administration. Her blood pressures have slowly improved and she has been stable off of Levophed. REHABILITATION COURSE:      L2-L4 lumbar direct lateral interbody fusion with anterior plating and redo of L2 L5 laminectomy (10/26/2021 // Dr Cristiano Arzola) due to spinal stenosis with neurogenic claudication     Plan / Recommendations / Medical Decision Making:      Daily physician / PA medical management:     Lumbar stenosis with neurogenic claudication, spondylolisthesis of lumbar region, lumbar spinal stenosis region with neurogenic claudication - PT / OT; spinal precautions, girdle when ambulating. Atrial fibrillation - s/p 2 failed ablations, managed with Rhythmol, metoprolol and Eliquis. Currently NSR; Eliquis restarted 10/31 in light of RUL pulmonary embolism.   -11/4 irreg, rate controlled, continue meds  -11/5 noticeably RRR to prolonged auscultation  -11/6 IIR today but rate-controlled  -11/12 NSR, rate 60s; continue Rhythmol, metoprolol and Eliquis  -11/14 HR 63-72 range last 24 hours  -11/15 HR 65-71 denies palpitations; continue Rhythmol  -11/16 rate controlled  -11/18 more RRR today, rate controlled  -11/19 RRR nl rhythm  -11/20 more irregular today, rate controlled  -11/22 rate controlled 69-82   -11/23 irreg, rate in the 70s  -11/24 NSR this a.m rate controlled     Hypotension / hypertension - BP fluctuating, managed medically. Has been hypotensive requiring levophed and now midodrine (pt's home lisinopril 20mg BID and HCTZ 12.5mg BID are on hold); needs TEDs when OOB.  -11/4 hypotensive episodes improved; decrease midodrine to 5mg TID. Continue metoprolol for rate control  -11/5 sBP fluctuating from 102-163, dBP all <85, mostly in 50-60s; monitor as add back PRN furosemide for edema  -11/6 /73  -11/8 -148/63-73; change midodrine to 5mg BID and wean off; MUST wear TEDs when OOB and elevate LEs whenever possible. Bilateral pitting edema; will give 40mg bid of Lasix today and reassess daily. Check renal fxn  -11/9 creat 0.56, bun 16  -11/10 creat 0.67  -11/11 /57 this am, max 143/76; continue midodrine 5mg BID for now. One episode of dizziness yesterday but BP normal by the time they could check BP  -11/14 /53  -11/15 116/52 controlled  -11/16 VSS; continue midodrine BID  -11/17 will change midodrine to TID; ? labile BP needs more consistent dosing?  -11/19 VSS however quite HTN in /93; continue current meds for now, may need to decrease midodrine if she is having supine hypertension  -11/20 /70, AM midodrine held  -11/22 /50s ; no orthostatics captured when pt has episodes of presyncope when up walking. -11/23 low of 90/49- 143/74. No episodes of presync yesterday  -11/25 bp varies, no presyncopal events yesterday     Pain control - ongoing significant pain in back which is stable and controlled by PRN meds. Will require regular pain assessment and comprehensive pain management. Continue PRN Norco, tramadol, APAP and gabapentin.   -11/4 increase Neurontin to 300mg TID and Elavil at night 25mg due to sciatic pain; add back Requip 1mg QHS  -11/5 start steroid taper for R LE pain  -11/6 R LE radiating pain resolved, finish steroid taper  -11/8 MUCH improved with steroid dose lyudmila  -11/16 pain controlled     Hypokalemia - monitor; currently 4.3 on supplement due to HCTZ; may be able to hold supplement. -11/4 labs pending; d/c daily labs  -11/5 K+ 4.2  -11/6 recheck BMP 11/8 since continuing daily furosemide  -11/8 K 4.2  -11/10 K 4.1, continue supplement while on lasix  -11/12 K 4.0  -11/15 K 4.0  -11/17 K 3.9; 11/23 3.7 cont 20meq KCL daily     Leukocytosis, mild - wbc 11.9 at [facility] admission (11/3). No s/sx of infxn. -11/5 WBC 11.9, afebrile, no s/sx infection; will likely increase with steroids  -11/6 recheck CBC 11/8  -11/8 WBC 16.2 (from 11.9); likely steroid-induced; will check UA (negative); this is last day of steroids  -11/12 WBC 10.4, afebrile, resolved     Anemia, blood loss - Hgb 9.0, improving; had 2000ml blood loss and received blood transfusion x 3, as well as 2800ml of crystalloid. -11/6 recheck CBC 11/8; 9.4 from 8.8 11/5  -11/10 Hgb 9.7, improving  -11/12 Hgb 8.8  -11/17 Hgb 8.1; trending down. Check stool. Check iron studies. -11/19 Hgb 9.0; stool positive, likely hemorrhoid (bright red).  -11/22 recheck [place] in a.m; 11/23 hgb 8.7, slt down but stable     Hypothyroidism - continue Synthroid. Pneumonia prophylaxis - incentive spirometer every hour while awake. Rosacea, 11/16 - start doxycycline 100mg BID x 3 d and then daily per home regimen. DVT risk / DVT prophylaxis - daily physician / PA exam to assess as patient is at increased risk for of thromboembolism. Mobilize as tolerated. Sequential pneumatic compression devices (SCDs) when in bed; thigh-high or knee-high thromboembolic deterrent hose when out of bed. On Eliquis. GI prophylaxis - resume PPI. At times may need additional antacids, Maalox prn.      Depression - continue on Lexapro. At risk of depression exacerbation due to pain and loss of independent function. General skin care / wound prevention - monitor lumbar incision and general skin wound status daily per staff and physician / PA. At risk for failure. Will require 24/7 rehab nursing. -11/4 lateral left hip wound; looks like a blister that had opened up vs sheer injury. Cleanse daily and cover. Back incision healing  -11/15 incision c/d/i     Bladder program / urinary retention / neurogenic bladder - schedule voids q 6-8 hrs. Check post-void residual as needed; in-and-out catheter if post-void residual is more than 400ml.  -voiding continently without residuals  -11/15 remove laboy in day or two. IV lasix was causing very frequent urination  -11/24 d/c laboy     Bowel program - at risk for constipation as a side effect of opioids, other medications, impaired mobility, etc. MiraLAX daily for regularity, Azul-Colace for stool softener. PRN MOM, bisacodyl suppository or tablets for constipation.  -continent, regular     Edema, 11/9 - >1800ml out; continue Lasix 40mg BID for today . Pt requesting laboy at [facility]  -11/10 [address], wt is up. Will keep laboy and give 40mg IV lasix today. No hx of CHF but has Afib. BNP 2757. Alb 2.7; pt had a 2d echo 10/29/21; EF>70%, severe pulmonary HTN noted with hx of RUL pulmonary embolism. No evidence though of right heart strain. VSS; daily wts  -11/11 5lbs down from yesterday. UOP 1450, BNP 1865, improved; 40mg IV Lasix x 1 today  -11/12 will weigh today. One more dose of IV Lasix today 40mg. Good diuresis. Lungs clear no hx CHF. Off steroids now. BNP pending. UOP -5504  - 11/14 continue on home dose po lasix 40 mg qd, Last weights- 315lbs > 316lbs with iv lasix x 1 given yesterday, today 312lbs. Continue with 1800ml fluid restriction. Continue daily weights. BMP ordered for tomorrow  -11/15 need f/u wt. Significant improvement in edema. Continue oral Lasix 40mg daily.  K 4, creat 0.72  -11/16 continue with oral Lasix, home dose of 40mg daily. Renal fxn normal, K nl. Dc laboy cath now. Wt is down 15lbs  -11/18 /70, stable, HR 67  -11/19 UOP now +480; continue daily lasix; BNP improving, must check if she is retaining  -11/20 weight plateau at 250HPI; changed furosemide to torsemide with little change; ?? HFpEF? TTE 10/29/2021 with EF >70%, severe pulmonary HTN; add spironolactone and place laboy x 24h while diuresing  -1/22 wt last documented at 309lbs; cont spironolactone and laboy; check bmp  -11/23 wt stable. Discontinue laboy in am. BMP nl. I believe that he symptoms are all stemming from her pulm HTN (swelling, presyncope, fatigue and SOB); she reports never having these symptoms before. Do not want to overdiurese as to cause worsening cardiac fxn     Now that [facility] is downtown again, will consult cardiology; just had ECHO Oct 29th  -currently on spironolactone and demadex. 11/24 Dr Leslie Carmona has seen pt and has lowered Lopressor to 12.5 mg bid. He indicated episodes were positional, when in fact, they were not. It would occur when she would be up walking for awhile. It was felt that her breathing had improved as well as edema. Dyspnea attributed by morbid obesity, deconditioning and severe pulm HTN; pt has been off rythmol since acute stay. Will not yet restart. F/u with primary cardiologist with [facility]     11/18 reports not feeling well. No specific symptom or complaints. Noted blood in underwear. Denies vaginal bleeding or hemorrhoids. Check UA for UTI  -UA neg  -episode of chest pain yesterday; taken to ED where troponins were normal  -11/23 no recurrence but cont to have edema and sob. No O2 requirements.            FUNCTIONAL LEVEL ON ADMISSION:  OT; mod assist for dressing, min assist functional mobility, others not tested  PT; min assist bed mobility, CGA STS, min assist transfers, gait 15ft with RW with CGA    FUNCTIONAL LEVEL AT DISCHARGE:      HOME ARCHITECTURE:  Prior Home Situation:   Lives in a private residence alone but will have sister in law staying with her. She has a cane , grab bars, shower chair. She has a wc ramp and bilateral railings        Previous Level of Function / Work / Activity:   Prior to admission, Pt was I with ADLs and IADLs. Pt was managing medications and finances. Pt was working part time for [employer] Pt was still driving.          Past Medical History:   Diagnosis Date    A-fib [facility])     Acquired hypothyroidism 9/13/2017    Anxiety and depression     Arthritis     osteo    Chronic pain     back and R leg    GERD (gastroesophageal reflux disease)     well controlled with med    H/O bone density study 10/2011    normal    Hypertension     controlled by med    Low back pain     Morbid obesity ([address]     bmi 48.63    Pneumonia     1/2020    Rectal bleeding onset x 2 months    Restless legs     Rosacea     Skin cancer     squamous- removed    Smoker     current 10/18/21 5-10 daily \"trying to quit\"-- previously 1 ppd x since age 22    Spinal stenosis     Staph infection 03/2016    from back surgery----- NOT mrsa    Suspected sleep apnea      test not completed 11/2018      Past Surgical History:   Procedure Laterality Date    COLONOSCOPY N/A 10/8/2018    COLONOSCOPY / BMI=50  performed by Praneeth De Dios MD at [facility] ENDOSCOPY    HX AFIB ABLATION  2017 & 2018    HX APPENDECTOMY  age 13    HX [address]  2016    spine I&D post op infection    HX BREAST BIOPSY Right 2011    benign    HX CARPAL TUNNEL RELEASE Left     HX CHOLECYSTECTOMY  2014    HX COLONOSCOPY  2014         HX CYST REMOVAL      foot    HX KNEE ARTHROSCOPY Right     HX KNEE ARTHROSCOPY Left     HX LUMBAR LAMINECTOMY  2016    L2-S1    HX MALIGNANT SKIN LESION EXCISION      x 3     HX MALIGNANT SKIN LESION EXCISION      Jan 29,2019    HX MALIGNANT SKIN LESION EXCISION  06/16/2020    Nasal    HX MALIGNANT SKIN LESION EXCISION  06/30/2021    face    HX ORTHOPAEDIC      heel spur    HX ORTHOPAEDIC Left 2000's    achilles tendon    HX PELVIC LAPAROSCOPY      x 2    HX PREMALIG/BENIGN SKIN LESION EXCISION      HX GUSTAVO AND BSO  2004    HX TONSIL AND ADENOIDECTOMY  age 12    HX TUBAL LIGATION      OH ELBOW ARTHROSCOP,DIAGNOSTIC Left 2013    along with carpal tunnel      Family History   Problem Relation Age of Onset    Cancer Mother         colon ca    Arthritis-rheumatoid Mother     Heart Disease Father     Diabetes Father     Hypertension Father       Social History     Tobacco Use    Smoking status: Current Every Day Smoker     Packs/day: 0.75     Years: 25.00     Pack years: 18.75     Types: Cigarettes    Smokeless tobacco: Never Used    Tobacco comment: 05-1 ppd since age 22 (quit briefly   Substance Use Topics    Alcohol use: No     Allergies   Allergen Reactions    Blue Dye Anaphylaxis     BLUE INDIGO DYE     Pcn [Penicillins] Hives    Adhesive Other (comments)     Skin irritation     Bee Venom Protein (Honey Bee) Swelling    Cefdinir Other (comments)     Tremor, can tolerate Ceftin       Prior to Admission medications    Medication Sig Start Date End Date Taking? Authorizing Provider   omeprazole (PRILOSEC) 40 mg capsule Take 1 Capsule by mouth daily. 11/24/21  Yes Margret Oleary MD   apixaban (ELIQUIS) 5 mg tablet Take 1 Tablet by mouth two (2) times a day. 11/24/21  Yes Margret Oleary MD   escitalopram oxalate (LEXAPRO) 20 mg tablet Take 1 Tablet by mouth daily. 11/24/21  Yes Margret Oleary MD   gabapentin (NEURONTIN) 300 mg capsule Take 1 Capsule by mouth three (3) times daily. 11/24/21  Yes Margret Oleary MD   levothyroxine (SYNTHROID) 50 mcg tablet Take 1 Tablet by mouth Daily (before breakfast). 11/24/21  Yes Margret Oleary MD   metoprolol tartrate (LOPRESSOR) 25 mg tablet Take 0.5 Tablets by mouth two (2) times a day.  11/24/21  Yes Margret Oleary MD   potassium chloride (K-DUR, KLOR-CON M20) 20 mEq tablet Take 1 Tablet by mouth daily. 11/25/21  Yes Margret Oleary MD   rOPINIRole (REQUIP) 1 mg tablet TAKE 1/2 TABLET BY MOUTH NIGHTLY 11/24/21  Yes Margret Oleary MD   zolpidem (AMBIEN) 10 mg tablet TAKE 1 TABLET BY MOUTH AT BEDTIME AS NEEDED FOR SLEEP 11/24/21  Yes Margret Oleary MD   doxycycline (VIBRAMYCIN) 100 mg capsule Take 1 Capsule by mouth daily. 11/25/21  Yes Margret Oleary MD   midodrine (PROAMATINE) 5 mg tablet Take 1 Tablet by mouth three (3) times daily (with meals) for 30 days. 11/24/21 12/24/21 Yes Margret Oleary MD   spironolactone (ALDACTONE) 25 mg tablet Take 0.5 Tablets by mouth daily. 11/25/21  Yes Margret Oleary MD   torsemide (DEMADEX) 20 mg tablet Take 1 Tablet by mouth daily. 11/25/21  Yes Margret Oleary MD   HYDROcodone-acetaminophen [facility])  mg tablet Take 1 Tablet by mouth every six (6) hours as needed for Pain for up to 7 days. Max Daily Amount: 4 Tablets. 11/24/21 12/1/21 Yes Margret Oleary MD   aspirin delayed-release 81 mg tablet Take 81 mg by mouth nightly. Take only while holding Eliquis for surgery per anesthesia protocol    Provider, Historical   benazepril-hydroCHLOROthiazide (LOTENSIN HCT) 20-12.5 mg per tablet Take 1 Tablet by mouth two (2) times a day. 9/21/21   Ioana Wade MD   furosemide (LASIX) 40 mg tablet Take 1 Tablet by mouth daily. Patient taking differently: Take 40 mg by mouth daily as needed. 9/21/21   Ioana Wade MD   potassium chloride SR (KLOR-CON 10) 10 mEq tablet Take 1 Tablet by mouth daily. Patient taking differently: Take 10 mEq by mouth daily as needed. With Lasix prn 9/21/21   Ioana Wade MD   gabapentin (NEURONTIN) 300 mg capsule Take 2 Capsules by mouth nightly.  9/21/21   Ioana Wade MD   metoprolol tartrate (LOPRESSOR) 25 mg tablet TAKE 1 TABLET (25 MG) BY MOUTH 2 (TWO) TIMES A DAY 6/17/21   Provider, Historical   traMADoL (ULTRAM) 50 mg tablet TAKE 1 TAB BY MOUTH EVERY 6 HRS AS NEEDED FOR PAIN FOR UP TO 3 DAYS. MAX DAILY AMT  200 MG 7/1/21   Provider, Historical   doxycycline (MONODOX) 100 mg capsule TAKE 1 CAPSULE BY MOUTH EVERY DAY 7/6/21   Max Paez MD   LORazepam (ATIVAN) 0.5 mg tablet TAKE 1 TAB BY MOUTH EVERY EIGHT HOURS AS NEEDED FOR ANXIETY. 7/6/21   Max Paez MD   OTHER Nebulizer (J11.00) Influenza and pneumonia  (primary encounter diagnosis)  (J20.9) Acute bronchitis, unspecified organism  (R06.2) Wheezing 3/12/20   Uzma Rust MD   propafenone (RYTHMOL) 150 mg tablet Take 150 mg by mouth two (2) times a day. Provider, Historical   cholecalciferol (VITAMIN D3) 1,000 unit tablet Take 1,000 Units by mouth every morning.     Provider, Historical       Lab Review:   Recent Results (from the past 72 hour(s))   HGB & HCT    Collection Time: 11/23/21  6:05 AM   Result Value Ref Range    HGB 8.7 (L) 11.7 - 15.4 g/dL    HCT 29.7 (L) 35.8 - 71.9 %   METABOLIC PANEL, BASIC    Collection Time: 11/23/21  6:05 AM   Result Value Ref Range    Sodium 137 136 - 145 mmol/L    Potassium 3.7 3.5 - 5.1 mmol/L    Chloride 99 98 - 107 mmol/L    CO2 36 (H) 21 - 32 mmol/L    Anion gap 2 (L) 7 - 16 mmol/L    Glucose 81 65 - 100 mg/dL    BUN 19 8 - 23 MG/DL    Creatinine 0.78 0.6 - 1.0 MG/DL    GFR est AA >60 >60 ml/min/1.73m2    GFR est non-AA >60 >60 ml/min/1.73m2    Calcium 8.9 8.3 - 10.4 MG/DL   MAGNESIUM    Collection Time: 11/23/21  6:05 AM   Result Value Ref Range    Magnesium 2.1 1.8 - 2.4 mg/dL   EKG, 12 LEAD, SUBSEQUENT    Collection Time: 11/24/21 10:56 AM   Result Value Ref Range    Ventricular Rate 68 BPM    Atrial Rate 68 BPM    P-R Interval 150 ms    QRS Duration 80 ms    Q-T Interval 404 ms    QTC Calculation (Bezet) 429 ms    Calculated P Axis 73 degrees    Calculated R Axis 96 degrees    Calculated T Axis -13 degrees    Diagnosis       Normal sinus rhythm  Rightward axis  Non specific ST /T wave changes  When compared with ECG of 18-NOV-2021 15:48,  Nonspecific T wave abnormality now evident in Lateral leads  Confirmed by Katherine Jimenez MD, Clara Bartholomew ([id number]) on 11/24/2021 12:12:58 PM         Functional Assessment:          Balance  Sitting - Static: Good (unsupported) (11/24/21 0900)  Sitting - Dynamic: Good (unsupported) (11/24/21 0900)  Standing - Static: Good (11/24/21 0900)  Standing - Dynamic : Impaired (11/24/21 0900)                     Tan Maldonado Fall Risk Assessment:  Tan Maldonado Fall Risk  Mobility: Ambulates or transfers with assist devices or assistance (11/24/21 0750)  Mobility Interventions: Communicate number of staff needed for ambulation/transfer; Patient to call before getting OOB; Strengthening exercises (ROM-active/passive); Utilize walker, cane, or other assistive device; Utilize gait belt for transfers/ambulation (11/24/21 0750)  Mentation: Alert, oriented x 3 (11/24/21 0750)  Medication: Patient receiving anticonvulsants, sedatives(tranquilizers), psychotropics or hypnotics, hypoglycemics, narcotics, sleep aids, antihypertensives, laxatives, or diuretics (11/24/21 0750)  Medication Interventions: Evaluate medications/consider consulting pharmacy; Patient to call before getting OOB; Teach patient to arise slowly; Utilize gait belt for transfers/ambulation (11/24/21 0750)  Elimination: Needs assistance with toileting (11/24/21 0750)  Elimination Interventions: Call light in reach; Patient to call for help with toileting needs; Toilet paper/wipes in reach; Toileting schedule/hourly rounds; Urinal in reach (11/24/21 0750)  Prior Fall History: No (11/24/21 0750)  History of Falls Interventions: Consult care management for discharge planning; Door open when patient unattended; Evaluate medications/consider consulting pharmacy;  Room close to nurse's station (11/23/21 2011)  Total Score: 3 (11/24/21 0750)  High Fall Risk: Yes (11/24/21 0750)     Speech Assessment:         Ambulation:  Gait  Distance (ft):  (80 ft then 70 ft) (11/24/21 0900)  Assistive Device: Walker, rolling (11/24/21 0900)     Visit Vitals  /68 (BP 1 Location: Right upper arm)   Pulse 72   Temp 98 °F (36.7 °C)   Resp 18   SpO2 97%      Intake and Output:    11/22 1901 - 11/24 0700  In: -   Out: 1200 [Urine:1200]    Discharge Exam:  General: Alert and age appropriately oriented. No acute cardiorespiratory distress. HEENT: Normocephalic, no scleral icterus. Oral mucosa moist without cyanosis. Lungs: Clear to auscultation bilaterally. Respiration even and unlabored. Heart: Regular rate and rhythm, S1, S2. No murmurs, clicks, rub or gallops. Abdomen: Soft, non-tender, not distended. Bowel sounds normoactive. No organomegaly. obese   Genitourinary: Deferred. Neuromuscular:        UE strength and ROM full and symmetric. LE strength at HF 3+/5 (R), 3-/5 (L), KF 4/5 (B). Sensation intact   Skin/extremity: No rashes, no erythema. + calf tenderness B LE. Swelling to thighs, calf tight, slt pitting to mid calf. Appears edematous all over. Facial swelling less.    Back inc healing well  Neg Homans         Problem List as of 11/24/2021 Date Reviewed: 11/5/2021            Codes Class Noted - Resolved    Spinal stenosis, lumbar region with neurogenic claudication ICD-10-CM: M48.062  ICD-9-CM: 724.03  11/3/2021 - Present        A-fib ([facility] ICD-10-CM: I48.91  ICD-9-CM: 427.31  10/30/2021 - Present        ÁNGEL (acute kidney injury) ([facility] ICD-10-CM: N17.9  ICD-9-CM: 584.9  10/30/2021 - Present        Acute respiratory failure with hypoxia ([facility] ICD-10-CM: J96.01  ICD-9-CM: 518.81  10/30/2021 - Present        Hypovolemic shock ([facility] ICD-10-CM: R57.1  ICD-9-CM: 785.59  10/26/2021 - Present        Hypotension ICD-10-CM: I95.9  ICD-9-CM: 458.9  10/26/2021 - Present        Spondylolisthesis of multiple sites in spine ICD-10-CM: M43.19  ICD-9-CM: 738.4  10/25/2021 - Present        Spinal stenosis ICD-10-CM: M48.00  ICD-9-CM: 724.00  10/25/2021 - Present        Obesity, morbid ([address] ICD-10-CM: E66.01  ICD-9-CM: 278.01  12/22/2017 - Present        Acquired hypothyroidism ICD-10-CM: E03.9  ICD-9-CM: 244.9  9/13/2017 - Present        Postoperative wound infection ICD-10-CM: T81.49XA  ICD-9-CM: 998.59  5/19/2016 - Present        Lumbar stenosis with neurogenic claudication ICD-10-CM: M48.062  ICD-9-CM: 724.03  3/28/2016 - Present        Essential hypertension, benign ICD-10-CM: I10  ICD-9-CM: 401.1  7/24/2013 - Present        Endometriosis ICD-10-CM: N80.9  ICD-9-CM: 617.9  7/24/2013 - Present        Colon polyp ICD-10-CM: K63.5  ICD-9-CM: 211.3  7/24/2013 - Present        Squamous cell skin cancer ICD-10-CM: C44.92  ICD-9-CM: 173.92  7/24/2013 - Present                DISCHARGE INSTRUCTIONS:   1. Diet: Regular Diet  2. Activity: No lifting, Driving, or Strenuous exercise until cleared by surgeon and PT/OT per [facility]  3. Incision / wound care: Keep wound clean and dry    Current Discharge Medication List        START taking these medications    Details   potassium chloride (K-DUR, KLOR-CON M20) 20 mEq tablet Take 1 Tablet by mouth daily. Qty: 30 Tablet, Refills: 3      doxycycline (VIBRAMYCIN) 100 mg capsule Take 1 Capsule by mouth daily. Qty: 30 Capsule, Refills: 1      midodrine (PROAMATINE) 5 mg tablet Take 1 Tablet by mouth three (3) times daily (with meals) for 30 days. Qty: 90 Tablet, Refills: 0      spironolactone (ALDACTONE) 25 mg tablet Take 0.5 Tablets by mouth daily. Qty: 30 Tablet, Refills: 3      torsemide (DEMADEX) 20 mg tablet Take 1 Tablet by mouth daily. Qty: 30 Tablet, Refills: 3      HYDROcodone-acetaminophen (NORCO)  mg tablet Take 1 Tablet by mouth every six (6) hours as needed for Pain for up to 7 days. Max Daily Amount: 4 Tablets.   Qty: 28 Tablet, Refills: 0    Associated Diagnoses: Spinal stenosis, lumbar region with neurogenic claudication           CONTINUE these medications which have CHANGED    Details   omeprazole (PRILOSEC) 40 mg capsule Take 1 Capsule by mouth daily.  Qty: 30 Capsule, Refills: 6    Associated Diagnoses: Nausea      apixaban (ELIQUIS) 5 mg tablet Take 1 Tablet by mouth two (2) times a day. Qty: 180 Tablet, Refills: 3    Associated Diagnoses: Atrial fibrillation, unspecified type (HCC)      escitalopram oxalate (LEXAPRO) 20 mg tablet Take 1 Tablet by mouth daily. Qty: 90 Tablet, Refills: 3    Associated Diagnoses: Insomnia, unspecified type; Anxiety      gabapentin (NEURONTIN) 300 mg capsule Take 1 Capsule by mouth three (3) times daily. Qty: 90 Capsule, Refills: 1      levothyroxine (SYNTHROID) 50 mcg tablet Take 1 Tablet by mouth Daily (before breakfast). Qty: 30 Tablet, Refills: 6    Associated Diagnoses: Acquired hypothyroidism      metoprolol tartrate (LOPRESSOR) 25 mg tablet Take 0.5 Tablets by mouth two (2) times a day. Qty: 30 Tablet, Refills: 3      rOPINIRole (REQUIP) 1 mg tablet TAKE 1/2 TABLET BY MOUTH NIGHTLY  Qty: 45 Tablet, Refills: 3      zolpidem (AMBIEN) 10 mg tablet TAKE 1 TABLET BY MOUTH AT BEDTIME AS NEEDED FOR SLEEP  Qty: 30 Tablet, Refills: 5    Comments: This request is for a new prescription for a controlled substance as required by Federal/State law. Associated Diagnoses: Insomnia, unspecified type           CONTINUE these medications which have NOT CHANGED    Details   aspirin delayed-release 81 mg tablet Take 81 mg by mouth nightly. Take only while holding Eliquis for surgery per anesthesia protocol      traMADoL (ULTRAM) 50 mg tablet TAKE 1 TAB BY MOUTH EVERY 6 HRS AS NEEDED FOR PAIN FOR UP TO 3 DAYS. MAX DAILY AMT  200 MG      LORazepam (ATIVAN) 0.5 mg tablet TAKE 1 TAB BY MOUTH EVERY EIGHT HOURS AS NEEDED FOR ANXIETY. Qty: 45 Tablet, Refills: 5    Comments: This request is for a new prescription for a controlled substance as required by Federal/State law. Associated Diagnoses: Anxiety      cholecalciferol (VITAMIN D3) 1,000 unit tablet Take 1,000 Units by mouth every morning.            STOP taking these medications benazepril-hydroCHLOROthiazide (LOTENSIN HCT) 20-12.5 mg per tablet Comments:   Reason for Stopping:         furosemide (LASIX) 40 mg tablet Comments:   Reason for Stopping:         potassium chloride SR (KLOR-CON 10) 10 mEq tablet Comments:   Reason for Stopping:         doxycycline (MONODOX) 100 mg capsule Comments:   Reason for Stopping:         OTHER Comments:   Reason for Stopping:         propafenone (RYTHMOL) 150 mg tablet Comments:   Reason for Stopping:               I have reviewed the patients controlled substance prescription history as maintained in the [place] prescription monitoring program () so that prescription(s) for controlled substance, if any provided, could be given. REHABILITATION MANAGEMENT:   1. Devices: RW  2. Consult:PT/OT/CM/Cardiology  DISPOSITION: home    Follow-up Information       Follow up With Specialties Details Why Contact Charan Morocho MD Family Medicine In 1 week  [address] [place] [address]  [phone]      Wilfred Rollins MD Orthopedic Surgery In 1 week routine follow up s/p surgical procedure [address] [facility]  [phone]            Time spent in patient discharge planning and coordination: >35 minutes. Margret Ybarra MD, Medical Director  [address] [facility]

## 2021-11-24 NOTE — PROGRESS NOTES
Spoke with Lashaun Cat at Dr. Diana Mena office, no appointments available for one week f/u, office will call pt with appt.

## 2021-11-24 NOTE — PROGRESS NOTES
Team conference today with discharge set for tomorrow, 11/25 pending cardiology consult. BSN, CM met with pt at bedside and updated on d/c date. HH needs include RN/PT/OT (offered aide but pt declined) and pt would like to use [facility] after reviewing choices. Referral placed to [facility]. DME needs include bariatric RW and order with [facility] and already delivered. Pt already declined family training. Cardiology saw pt and Dr. Hoff Hem to d/c pt today. Contacted daughter, Jonnie Ruggiero, and made aware of d/c date and all questions answered. Daughter to pick pt up around 1600. [facility] aware of d/c and d/c summary sent. CM available if any new needs arise. Care Management Interventions  PCP Verified by CM: Yes (Dr. Rupa Mcmanus)  Mode of Transport at Discharge:  Other (see comment) (family )  Transition of Care Consult (CM Consult): Discharge Planning, [address]: No  Reason Outside [place]:  (pt choice )  Discharge Durable Medical Equipment: No  Physical Therapy Consult: Yes  Occupational Therapy Consult: Yes  Speech Therapy Consult: No  Support Systems: Child(rober), Other Family Member(s) (sister )  Confirm Follow Up Transport: Family  The Plan for Transition of Care is Related to the Following Treatment Goals : Return to baseline  The Patient and/or Patient Representative was Provided with a Choice of Provider and Agrees with the Discharge Plan?: Yes  Name of the Patient Representative Who was Provided with a Choice of Provider and Agrees with the Discharge Plan: pt  Freedom of Choice List was Provided with Basic Dialogue that Supports the Patient's Individualized Plan of Care/Goals, Treatment Preferences and Shares the Quality Data Associated with the Providers?: Yes   Resource Information Provided?: No  Discharge Location  Discharge Placement: Home with home health ([facility] )

## 2021-11-24 NOTE — PROGRESS NOTES
Problem: Self Care Deficits Care Plan (Adult)  Goal: *Therapy Goal (Edit Goal, Insert Text)  Description: STG 1: Patient will complete UB dressing with setup assistance using AE/DME PRN within 7 days. LTG 1: Patient will complete UB dressing with independence using AE/DME PRN within 21 days. GOAL MET 11/24/2021    *Start date is incorrect due to patient being discharged and transferred from [facility]. Outcome: Resolved/Met  Goal: *Therapy Goal (Edit Goal, Insert Text)  Description: STG 2: Patient will complete LB dressing with moderate assistance using AE/DME PRN within 7 days. LTG 2: Patient will complete LB dressing with independence using AE/DME PRN within 21 days. GOAL MET 11/24/2021      *Start date is incorrect due to patient being discharged and transferred from [facility]. Outcome: Resolved/Met  Goal: *Therapy Goal (Edit Goal, Insert Text)  Description: STG 3: Patient will don footwear with moderate assistance using AE/DME PRN within 7 days. LTG 3: Patient will don footwear with independence using AE/DME PRN within 21 days. GOAL MET 11/24/2021    *Start date is incorrect due to patient being discharged and transferred from [facility]. Outcome: Resolved/Met  Goal: *Therapy Goal (Edit Goal, Insert Text)  Description: STG 4: Patient will complete bathing with touching assistance using AE/DME PRN within 7 days. LTG 4: Patient will complete bathing with independence using AE/DME PRN within 21 days. GOAL MET 11/24/2021    *Start date is incorrect due to patient being discharged and transferred from [facility]. Outcome: Resolved/Met  Goal: *Therapy Goal (Edit Goal, Insert Text)  Description: STG 5: Patient will complete toileting with touching assistance using AE/DME PRN within 7 days. LTG 5: Patient will complete toileting with independence using AE/DME PRN within 21 days.    GOAL MET 11/24/2021    *Start date is incorrect due to patient being discharged and transferred from [facility].    Outcome: Resolved/Met  Goal: Interventions  Outcome: Resolved/Met     Problem: Patient Education: Go to Patient Education Activity  Goal: Patient/Family Education  Description: Pt/caregiver will demonstrate and verbalize good understanding of OT recommendations regarding ADL status, functional transfer status, home safety, DME, AE, energy conservation techniques, follow-up therapy, for increasing safety with functional tasks upon discharge.   GOAL MET 11/24/2021    Outcome: Resolved/Met     Eduardo Perales OTR/L

## 2021-11-24 NOTE — PROGRESS NOTES
Problem: Patient Education: Go to Patient Education Activity  Goal: Patient/Family Education  Description: Pt/caregiver will demonstrate and verbalize good understanding of OT recommendations regarding ADL status, functional transfer status, home safety, DME, AE, energy conservation techniques, follow-up therapy, for increasing safety with functional tasks upon discharge. Outcome: Resolved/Met     Problem: Pain  Goal: *PALLIATIVE CARE:  Alleviation of Pain  Outcome: Resolved/Met     Problem: Patient Education: Go to Patient Education Activity  Goal: Patient/Family Education  Outcome: Resolved/Met     Problem: Inpatient Rehab (Adult)  Goal: *LTG: Avoids injury/falls 100% of time related to deficits  Outcome: Resolved/Met     Problem: Inpatient Rehab (Adult)  Goal: *LTG: Avoids infection 100% of time related to deficits  Outcome: Resolved/Met     Problem: Inpatient Rehab (Adult)  Goal: *LTG: Verbalize understanding of diagnosis and risk factors for recurring stroke  Outcome: Resolved/Met     Problem: Inpatient Rehab (Adult)  Goal: *LTG: Absence of DVT during hospitalization  Outcome: Resolved/Met     Problem: Inpatient Rehab (Adult)  Goal: *LTG: Maintains Skin Integrity With No Evidence of Pressure Injury 100% of Time  Outcome: Resolved/Met     Problem: Inpatient Rehab (Adult)  Goal: Interventions  Outcome: Resolved/Met     Problem: Falls - Risk of  Goal: *Absence of Falls  Description: Document Kori Fall Risk and appropriate interventions in the flowsheet.   Outcome: Resolved/Met  Note: Fall Risk Interventions:  Mobility Interventions: Communicate number of staff needed for ambulation/transfer, Patient to call before getting OOB, Strengthening exercises (ROM-active/passive), Utilize walker, cane, or other assistive device, Utilize gait belt for transfers/ambulation         Medication Interventions: Evaluate medications/consider consulting pharmacy, Patient to call before getting OOB, Teach patient to arise slowly, Utilize gait belt for transfers/ambulation    Elimination Interventions: Call light in reach, Patient to call for help with toileting needs, Toilet paper/wipes in reach, Toileting schedule/hourly rounds, Urinal in reach    History of Falls Interventions: Consult care management for discharge planning, Door open when patient unattended, Evaluate medications/consider consulting pharmacy, Room close to nurse's station         Problem: Patient Education: Go to Patient Education Activity  Goal: Patient/Family Education  Outcome: Resolved/Met

## 2021-11-24 NOTE — H&P
[facility] Cardiology Initial Cardiac Evaluation      Date of  Admission: 11/23/2021  9:35 AM     Primary Care Physician: Dandy Mendes MD  Primary Cardiologist: [facility]  Referring Physician: Dr Leif Núñez  Supervising Physician: Dr Jori Luna    CC/Reason for evaluation: near syncope, dyspnea, lower extremity edema, severe pulmonary edema     HPI:  Mark Rios is a 73 y.o. female with PMH of typical atrial flutter with hx ablation in 2017 and 2020, persistent atrial fibrillation with hx afib ablation in 2018, palpitations, HTN, GERD, hypothyroidism, OA, lumbar stenosis with neurogenic claudication, spondylolisthesis of lumbar region, and morbid obesity who presented to [facility] for  L2 L4 lumbar direct lateral interbody fusion with anterior plating and redo of L2 L5 laminectomy. Patient developed hypotension postoperatively. This occurred in the face of large volume blood loss. Patient was treated in ICU with pressors and started on midodrine. During hospital stay patient had ECHO performed that showed preserved LV systolic function and severe pulmonary HTN. CT was performed and revealed single segmental pulmonary embolism and apical segment right upper lobe and associated pulmonary infarct in the right upper lobe. Patient was started on Eliquis 5 mg BID. Patient was very slow to mobilize and progress and she did complain initially of left leg pain and she had some weakness with hip flexion which could have been real weakness versus pain-related weakness. It was felt that she would be better off going to a skilled nursing facility. After hospital course patient was discharged to skilled nursing facility. During rehab admission patient has reported intermittent episodes of near syncope when standing from seated position. Pt reported swelling, fatigue and SOB as well. Cardiology was consulted due to near syncope, swelling, and severe pulmonary HTN. Pt feeling much better today and wants to go home.       Past Medical History:   Diagnosis Date    A-fib [facility])     Acquired hypothyroidism 9/13/2017    Anxiety and depression     Arthritis     osteo    Chronic pain     back and R leg    GERD (gastroesophageal reflux disease)     well controlled with med    H/O bone density study 10/2011    normal    Hypertension     controlled by med    Low back pain     Morbid obesity ([address]     bmi 48.63    Pneumonia     1/2020    Rectal bleeding onset x 2 months    Restless legs     Rosacea     Skin cancer     squamous- removed    Smoker     current 10/18/21 5-10 daily \"trying to quit\"-- previously 1 ppd x since age 22    Spinal stenosis     Staph infection 03/2016    from back surgery----- NOT mrsa    Suspected sleep apnea      test not completed 11/2018      Past Surgical History:   Procedure Laterality Date    COLONOSCOPY N/A 10/8/2018    COLONOSCOPY / BMI=50  performed by Jennifer [facility]ripa [facility] at [facility] ENDOSCOPY    HX AFIB ABLATION  2017 & 2018    HX APPENDECTOMY  age 13    HX BACK SURGERY  2016    spine I&D post op infection    HX BREAST BIOPSY Right 2011    benign    HX CARPAL TUNNEL RELEASE Left     HX CHOLECYSTECTOMY  2014    HX COLONOSCOPY  2014         HX CYST REMOVAL      foot    HX KNEE ARTHROSCOPY Right     HX KNEE ARTHROSCOPY Left     HX LUMBAR LAMINECTOMY  2016    L2-S1    HX MALIGNANT SKIN LESION EXCISION      x 3     HX MALIGNANT SKIN LESION EXCISION      Jan 29,2019    HX MALIGNANT SKIN LESION EXCISION  06/16/2020    Nasal    HX MALIGNANT SKIN LESION EXCISION  06/30/2021    face    HX ORTHOPAEDIC      heel spur    HX ORTHOPAEDIC Left 2000's    achilles tendon    HX PELVIC LAPAROSCOPY      x 2    HX PREMALIG/BENIGN SKIN LESION EXCISION      HX GUSTAVO AND BSO  2004    HX TONSIL AND ADENOIDECTOMY  age 12   [de-identified] TUBAL LIGATION      CA ELBOW ARTHROSCOP,DIAGNOSTIC Left 2013    along with carpal tunnel       Allergies   Allergen Reactions    Blue Dye Anaphylaxis     BLUE INDIGO DYE     Pcn [Penicillins] Hives    Adhesive Other (comments)     Skin irritation     Bee Venom Protein (Honey Bee) Swelling    Cefdinir Other (comments)     Tremor, can tolerate Ceftin       Social History     Socioeconomic History    Marital status:      Spouse name: Not on file    Number of children: Not on file    Years of education: Not on file    Highest education level: Not on file   Occupational History    Not on file   Tobacco Use    Smoking status: Current Every Day Smoker     Packs/day: 0.75     Years: 25.00     Pack years: 18.75     Types: Cigarettes    Smokeless tobacco: Never Used    Tobacco comment: 05-1 ppd since age 22 (quit briefly   Vaping Use    Vaping Use: Never used   Substance and Sexual Activity    Alcohol use: No    Drug use: No    Sexual activity: Not Currently   Other Topics Concern     Service Not Asked    Blood Transfusions Not Asked    Caffeine Concern No    Occupational Exposure Not Asked    Hobby Hazards Not Asked    Sleep Concern Not Asked    Stress Concern Not Asked    Weight Concern Not Asked    Special Diet Not Asked    Back Care Not Asked    Exercise Yes    Bike Helmet Not Asked    Seat Belt Yes    Self-Exams Yes   Social History Narrative    No history of physical or sexual abuse feels safe at home     Social Determinants of Health     Financial Resource Strain:     Difficulty of Paying Living Expenses: Not on file   Food Insecurity:     Worried About Running Out of Food in the Last Year: Not on file    Yenny of Food in the Last Year: Not on file   Transportation Needs:     Lack of Transportation (Medical): Not on file    Lack of Transportation (Non-Medical):  Not on file   Physical Activity:     Days of Exercise per Week: Not on file    Minutes of Exercise per Session: Not on file   Stress:     Feeling of Stress : Not on file   Social Connections:     Frequency of Communication with Friends and Family: Not on file    Frequency of Social Gatherings with Friends and Family: Not on file    Attends Taoist Services: Not on file    Active Member of Clubs or Organizations: Not on file    Attends Club or Organization Meetings: Not on file    Marital Status: Not on file   Intimate Partner Violence:     Fear of Current or Ex-Partner: Not on file    Emotionally Abused: Not on file    Physically Abused: Not on file    Sexually Abused: Not on file   Housing Stability:     Unable to Pay for Housing in the Last Year: Not on file    Number of Places Lived in the Last Year: Not on file    Unstable Housing in the Last Year: Not on file     Family History   Problem Relation Age of Onset    Cancer Mother         colon ca   Bharath Spina Arthritis-rheumatoid Mother     Heart Disease Father     Diabetes Father     Hypertension Father         Current Facility-Administered Medications   Medication Dose Route Frequency    acetaminophen (TYLENOL) tablet 650 mg  650 mg Oral Q6H PRN    alcohol 62% (NOZIN) nasal  1 Ampule  1 Ampule Topical Q12H    amitriptyline (ELAVIL) tablet 25 mg  25 mg Oral QHS    apixaban (ELIQUIS) tablet 5 mg  5 mg Oral BID    aspirin chewable tablet 81 mg  81 mg Oral QHS    bisacodyL (DULCOLAX) suppository 25 mg  25 mg Rectal Q6H PRN    docusate sodium (COLACE) capsule 100 mg  100 mg Oral BID    escitalopram oxalate (LEXAPRO) tablet 20 mg  20 mg Oral DAILY    famotidine (PEPCID) tablet 20 mg  20 mg Oral QPM    gabapentin (NEURONTIN) capsule 300 mg  300 mg Oral TID    HYDROcodone-acetaminophen (NORCO)  mg tablet 1 Tablet  1 Tablet Oral Q4H PRN    influenza vaccine 2021-22 (6 mos+)(PF) (FLUARIX/FLULAVAL/FLUZONE QUAD) injection 0.5 mL  1 Each IntraMUSCular PRIOR TO DISCHARGE    levothyroxine (SYNTHROID) tablet 50 mcg  50 mcg Oral 6am    lidocaine 4 % patch 1 Patch  1 Patch TransDERmal DAILY PRN    LORazepam (ATIVAN) tablet 0.5 mg  0.5 mg Oral Q6H PRN    magnesium hydroxide (MILK OF MAGNESIA) 400 mg/5 mL oral suspension 30 mL  30 mL Oral DAILY PRN    metoprolol tartrate (LOPRESSOR) tablet 25 mg  25 mg Oral BID    midodrine (PROAMATINE) tablet 5 mg  5 mg Oral TID WITH MEALS    naloxone (NARCAN) injection 0.4 mg  0.4 mg IntraVENous PRN    nicotine (NICODERM CQ) 14 mg/24 hr patch 1 Patch  1 Patch TransDERmal Q24H    ondansetron (ZOFRAN ODT) tablet 4 mg  4 mg Oral Q4H PRN    pantoprazole (PROTONIX) tablet 40 mg  40 mg Oral ACB    phenol throat spray (CHLORASEPTIC) 1 Spray  1 Spray Oral PRN    polyethylene glycol (MIRALAX) packet 17 g  17 g Oral DAILY    potassium chloride (K-DUR, KLOR-CON M20) SR tablet 20 mEq  20 mEq Oral DAILY    propafenone (RYTHMOL) tablet 150 mg  150 mg Oral BID    rOPINIRole (REQUIP) tablet 1 mg  1 mg Oral QHS PRN    spironolactone (ALDACTONE) tablet 12.5 mg  12.5 mg Oral DAILY    torsemide (DEMADEX) tablet 20 mg  20 mg Oral DAILY    traMADoL (ULTRAM) tablet 50 mg  50 mg Oral Q6H PRN    zolpidem (AMBIEN) tablet 10 mg  10 mg Oral QHS PRN    doxycycline (VIBRAMYCIN) capsule 100 mg  100 mg Oral DAILY       Review of Symptoms:  General: No weight changes,  weakness, fever or chills  Skin: no rashes, lumps, or other skin changes  HEENT: Positive for near syncope. No headache, vision changes, hearing changes, tinnitus, vertigo, sinus pressure/pain, bleeding gums, sore throat, or hoarseness  Neck: no swollen glands, goiter, pain or stiffness  Respiratory: Positive for dyspnea. No cough, sputum, hemoptysis, wheezing  Cardiovascular: + as per HPI  Gastrointestinal: Positive for LUDIN.  No constipation, diarrhea, liver problems, or h/o GI bleed  Urinary: no frequency, urgency , hematuria, burning/pain with urination, recent flank pain, polyuria, nocturia, or difficulty urinating  Peripheral Vascular: no claudication, leg cramps, prior DVTs, swelling of calves, legs, or feet, color change, or swelling with redness or tenderness  Musculoskeletal: no muscle or joint pain/stiffness, joint swelling, erythema of joints, or back pain  Psychiatric: no depression or excessive stress  Neurological: no sensory or motor loss, seizures, syncope, tremors, numbness, no dementia  Hematologic: no anemia, easy bruising or bleeding  Endocrine: Positive for thyroid problems. No diabetes. Subjective:     Physical Exam:    Vitals:    11/23/21 1645 11/23/21 2011 11/24/21 0750 11/24/21 1134   BP: 111/60 103/60 (!) 127/58 126/68   Pulse: 64 66 71 72   Resp: 18 16 18    Temp: 98.4 °F (36.9 °C) 98.8 °F (37.1 °C) 98 °F (36.7 °C)    SpO2: 97% 93% 97%        General: Well Developed, Well Nourished, No Acute Distress  HEENT: pupils equal and round, no abnormalities noted  Neck: supple, no JVD, no carotid bruits  Heart: S1S2 with RRR without murmurs or gallops  Lungs: Clear throughout auscultation bilaterally without adventitious sounds  Abd: soft, nontender, nondistended, with good bowel sounds  Ext: warm, no edema, calves supple/nontender, pulses 2+ bilaterally  Skin: warm and dry  Psychiatric: Normal mood and affect  Neurologic: Alert and oriented X 3    Cardiographics    Telemetry: not on telemetry  ECG: ordered   Echocardiogram: 10/29/2021  Left Ventricle Normal cavity size and wall thickness. The estimated EF is >70%. Hyperdynamic systolic function. Atrial fibrillation observed. Left Atrium Moderately dilated left atrium. Right Ventricle Normal cavity size and global systolic function. Right Atrium Mildly dilated right atrium. Aortic Valve No stenosis. Mild aortic valve sclerosis. Trace aortic valve regurgitation. Mitral Valve Normal valve structure and no stenosis. Mild regurgitation. Tricuspid Valve Normal valve structure and no stenosis. Mild to moderate regurgitation. Right Ventricular Arterial Pressure (RVSP) is 61 mmHg. Pulmonary hypertension found to be severe. Pulmonic Valve Normal valve structure and no stenosis. Mild regurgitation. Aorta Normal aortic root, ascending aortic, and aortic arch.    IVC/Hepatic Veins Normal central venous pressure (3 mmHg); IVC diameter is less than 21 mm and collapses more than 50% with respiration. Pericardium No evidence of pericardial effusion. Labs:   Recent Results (from the past 24 hour(s))   EKG, 12 LEAD, SUBSEQUENT    Collection Time: 11/24/21 10:56 AM   Result Value Ref Range    Ventricular Rate 68 BPM    Atrial Rate 68 BPM    P-R Interval 150 ms    QRS Duration 80 ms    Q-T Interval 404 ms    QTC Calculation (Bezet) 429 ms    Calculated P Axis 73 degrees    Calculated R Axis 96 degrees    Calculated T Axis -13 degrees    Diagnosis       Normal sinus rhythm  Rightward axis  Abnormal QRS-T angle, consider primary T wave abnormality  Abnormal ECG  When compared with ECG of 18-NOV-2021 15:48,  Nonspecific T wave abnormality now evident in Lateral leads         Pt has been seen and examined by Dr. Siobhan Weeks. He agrees with the following assessment and plan. Assessment/Plan:      Near syncope   - likely related to orthostatic hypotension  - continue midodrine   - no further cardiac work up  - ECHO reviewed   - continue current medications      Orthostatic hypotension  - continue midodrine 5 mg TID    Elevated RVSP  - Small subsegmental pulmonary embolus unlikely to cause elevation of RVSP  - would benefit from outpatient sleep study    Dyspnea  - multifactorial in setting of morbid obesity, deconditioning, severe pulmonary hypertension  - patient reports this has improved     PE  - subsegmental pulmonary embolus   - Eliquis    Paroxysmal atrial fibrillation/ typical atrial flutter with hx ablation in 2017 and 2020  - continue Rythmol, metoprolol tartrate   - continue Eliqius for CVA protection       Thank you for requesting cardiac evaluation and allowing us to participate in the care of this patient. We will continue to follow along with you.       Candy Naylor PA-C  Supervising Physician: Dr Siobhan Weeks

## 2021-11-24 NOTE — PROGRESS NOTES
Problem: Falls - Risk of  Goal: *Absence of Falls  Description: Document Rasmussen Reason Fall Risk and appropriate interventions in the flowsheet.   Outcome: Progressing Towards Goal  Note: Fall Risk Interventions:  Mobility Interventions: Communicate number of staff needed for ambulation/transfer, Patient to call before getting OOB, Strengthening exercises (ROM-active/passive), Utilize walker, cane, or other assistive device         Medication Interventions: Evaluate medications/consider consulting pharmacy, Patient to call before getting OOB, Teach patient to arise slowly    Elimination Interventions: Call light in reach, Patient to call for help with toileting needs, Toilet paper/wipes in reach    History of Falls Interventions: Consult care management for discharge planning, Door open when patient unattended, Evaluate medications/consider consulting pharmacy, Room close to nurse's station

## 2021-11-24 NOTE — PROGRESS NOTES
Discharge instructions and discharge med list given to pt, voiced understanding, paper copy placed in paper chart.

## 2021-12-01 PROBLEM — F11.99 OPIOID USE, UNSPECIFIED WITH UNSPECIFIED OPIOID-INDUCED DISORDER (HCC): Status: ACTIVE | Noted: 2021-12-01

## 2022-03-10 ENCOUNTER — HOSPITAL ENCOUNTER (OUTPATIENT)
Dept: MAMMOGRAPHY | Age: 67
Discharge: HOME | End: 2022-03-10
Attending: PHYSICIAN ASSISTANT
Payer: MEDICARE

## 2022-03-10 DIAGNOSIS — N63.21 UNSPECIFIED LUMP IN THE LEFT BREAST, UPPER OUTER QUADRANT: ICD-10-CM

## 2022-03-10 PROCEDURE — 77066 DX MAMMO INCL CAD BI: CPT

## 2022-03-10 PROCEDURE — 76642 ULTRASOUND BREAST LIMITED: CPT

## 2022-03-18 PROBLEM — E66.01 OBESITY, MORBID (HCC): Status: ACTIVE | Noted: 2017-12-22

## 2022-03-18 PROBLEM — M48.00 SPINAL STENOSIS: Status: ACTIVE | Noted: 2021-10-25

## 2022-03-18 PROBLEM — E03.9 ACQUIRED HYPOTHYROIDISM: Status: ACTIVE | Noted: 2017-09-13

## 2022-03-19 PROBLEM — R57.1 HYPOVOLEMIC SHOCK (HCC): Status: ACTIVE | Noted: 2021-10-26

## 2022-03-19 PROBLEM — M48.062 SPINAL STENOSIS, LUMBAR REGION WITH NEUROGENIC CLAUDICATION: Status: ACTIVE | Noted: 2021-11-03

## 2022-03-19 PROBLEM — J96.01 ACUTE RESPIRATORY FAILURE WITH HYPOXIA (HCC): Status: ACTIVE | Noted: 2021-10-30

## 2022-03-19 PROBLEM — M43.19 SPONDYLOLISTHESIS OF MULTIPLE SITES IN SPINE: Status: ACTIVE | Noted: 2021-10-25

## 2022-03-20 PROBLEM — N17.9 AKI (ACUTE KIDNEY INJURY) (HCC): Status: ACTIVE | Noted: 2021-10-30

## 2022-03-20 PROBLEM — I48.91 A-FIB (HCC): Status: ACTIVE | Noted: 2021-10-30

## 2022-03-20 PROBLEM — F11.99 OPIOID USE, UNSPECIFIED WITH UNSPECIFIED OPIOID-INDUCED DISORDER (HCC): Status: ACTIVE | Noted: 2021-12-01

## 2022-03-20 PROBLEM — I95.9 HYPOTENSION: Status: ACTIVE | Noted: 2021-10-26

## 2022-03-21 PROBLEM — F11.99 OPIOID USE, UNSPECIFIED WITH UNSPECIFIED OPIOID-INDUCED DISORDER (HCC): Status: RESOLVED | Noted: 2021-12-01 | Resolved: 2022-03-21

## 2022-03-24 PROBLEM — F11.99 OPIOID USE, UNSPECIFIED WITH UNSPECIFIED OPIOID-INDUCED DISORDER (HCC): Status: RESOLVED | Noted: 2021-12-01 | Resolved: 2022-03-21

## 2022-04-06 ENCOUNTER — HOSPITAL ENCOUNTER (OUTPATIENT)
Dept: LAB | Age: 67
Discharge: HOME | End: 2022-04-06

## 2022-04-06 PROCEDURE — 88305 TISSUE EXAM BY PATHOLOGIST: CPT

## 2022-04-06 PROCEDURE — 88312 SPECIAL STAINS GROUP 1: CPT

## 2022-05-31 ENCOUNTER — PATIENT MESSAGE (OUTPATIENT)
Dept: FAMILY MEDICINE CLINIC | Facility: CLINIC | Age: 67
End: 2022-05-31

## 2022-05-31 DIAGNOSIS — B86 SCABIES: Primary | ICD-10-CM

## 2022-06-01 NOTE — TELEPHONE ENCOUNTER
From: Octavio Garnica  To: Dr. Justin Sebastian: 5/31/2022 8:39 PM EDT  Subject: The Itch    I went to a Derm office on [address] [facility] and she said I had scabies (from rehab) she gave me and Monico Small (friend) cream to use 2 times a week a part. Still itching a month later she said they should have be gone. I'm still itching and break out. Philippe Degroot (daughter) said there was a pill that we needed plus the cream. Can you help me and Monico Small I'm about to lose my mind scratching.  Thanks Aneesh Graff

## 2022-06-03 RX ORDER — PERMETHRIN 50 MG/G
CREAM TOPICAL
Qty: 60 G | Refills: 1 | Status: SHIPPED | OUTPATIENT
Start: 2022-06-03 | End: 2022-09-19

## 2022-06-03 RX ORDER — IVERMECTIN 3 MG/1
24 TABLET ORAL ONCE
Qty: 8 TABLET | Refills: 1 | Status: SHIPPED | OUTPATIENT
Start: 2022-06-03 | End: 2022-06-03

## 2022-06-03 NOTE — TELEPHONE ENCOUNTER
Sent in prescription for:     Requested Prescriptions     Signed Prescriptions Disp Refills    ivermectin 3 MG tablet 8 tablet 1     Sig: Take 8 tablets by mouth once for 1 dose     Authorizing Provider: Orlando PETE    permethrin (ELIMITE) 5 % cream 60 g 1     Sig: Apply topically as directed neck down and wash after 8 hours.   May repeat in a week     Authorizing Provider: Judson Goldsmith

## 2022-06-27 ENCOUNTER — TELEPHONE (OUTPATIENT)
Dept: ORTHOPEDIC SURGERY | Age: 67
End: 2022-06-27

## 2022-07-17 DIAGNOSIS — G47.00 INSOMNIA, UNSPECIFIED: ICD-10-CM

## 2022-07-17 DIAGNOSIS — F41.9 ANXIETY DISORDER, UNSPECIFIED: ICD-10-CM

## 2022-07-18 RX ORDER — LORAZEPAM 0.5 MG/1
TABLET ORAL
Qty: 45 TABLET | Refills: 5 | Status: SHIPPED | OUTPATIENT
Start: 2022-07-18 | End: 2022-12-18

## 2022-07-18 RX ORDER — ZOLPIDEM TARTRATE 10 MG/1
TABLET ORAL
Qty: 30 TABLET | Refills: 5 | Status: SHIPPED | OUTPATIENT
Start: 2022-07-18 | End: 2022-12-18

## 2022-08-15 RX ORDER — ALBUTEROL SULFATE 90 UG/1
AEROSOL, METERED RESPIRATORY (INHALATION)
Qty: 6.7 EACH | Refills: 3 | Status: SHIPPED | OUTPATIENT
Start: 2022-08-15

## 2022-09-19 ENCOUNTER — OFFICE VISIT (OUTPATIENT)
Dept: FAMILY MEDICINE CLINIC | Facility: CLINIC | Age: 67
End: 2022-09-19
Payer: MEDICARE

## 2022-09-19 ENCOUNTER — HOSPITAL ENCOUNTER (OUTPATIENT)
Dept: ULTRASOUND IMAGING | Age: 67
Discharge: HOME | End: 2022-09-22
Payer: MEDICARE

## 2022-09-19 ENCOUNTER — TELEPHONE (OUTPATIENT)
Dept: FAMILY MEDICINE CLINIC | Facility: CLINIC | Age: 67
End: 2022-09-19

## 2022-09-19 ENCOUNTER — NURSE TRIAGE (OUTPATIENT)
Dept: OTHER | Facility: CLINIC | Age: 67
End: 2022-09-19

## 2022-09-19 VITALS
DIASTOLIC BLOOD PRESSURE: 70 MMHG | SYSTOLIC BLOOD PRESSURE: 132 MMHG | HEIGHT: 64 IN | BODY MASS INDEX: 47.91 KG/M2 | TEMPERATURE: 97 F | OXYGEN SATURATION: 97 % | HEART RATE: 60 BPM | WEIGHT: 280.6 LBS | RESPIRATION RATE: 17 BRPM

## 2022-09-19 DIAGNOSIS — M79.605 LEFT LEG PAIN: ICD-10-CM

## 2022-09-19 DIAGNOSIS — M79.605 LEFT LEG PAIN: Primary | ICD-10-CM

## 2022-09-19 PROCEDURE — 99213 OFFICE O/P EST LOW 20 MIN: CPT | Performed by: FAMILY MEDICINE

## 2022-09-19 PROCEDURE — G8427 DOCREV CUR MEDS BY ELIG CLIN: HCPCS | Performed by: FAMILY MEDICINE

## 2022-09-19 PROCEDURE — 93971 EXTREMITY STUDY: CPT

## 2022-09-19 PROCEDURE — 1090F PRES/ABSN URINE INCON ASSESS: CPT | Performed by: FAMILY MEDICINE

## 2022-09-19 PROCEDURE — 1123F ACP DISCUSS/DSCN MKR DOCD: CPT | Performed by: FAMILY MEDICINE

## 2022-09-19 PROCEDURE — 3017F COLORECTAL CA SCREEN DOC REV: CPT | Performed by: FAMILY MEDICINE

## 2022-09-19 PROCEDURE — 4004F PT TOBACCO SCREEN RCVD TLK: CPT | Performed by: FAMILY MEDICINE

## 2022-09-19 PROCEDURE — G8417 CALC BMI ABV UP PARAM F/U: HCPCS | Performed by: FAMILY MEDICINE

## 2022-09-19 PROCEDURE — G8400 PT W/DXA NO RESULTS DOC: HCPCS | Performed by: FAMILY MEDICINE

## 2022-09-19 RX ORDER — PROPAFENONE HYDROCHLORIDE 150 MG/1
150 TABLET, FILM COATED ORAL 2 TIMES DAILY
COMMUNITY
Start: 2022-07-18

## 2022-09-19 RX ORDER — DOXYCYCLINE HYCLATE 100 MG/1
100 CAPSULE ORAL 2 TIMES DAILY
Qty: 20 CAPSULE | Refills: 0 | Status: SHIPPED | OUTPATIENT
Start: 2022-09-19 | End: 2022-09-29

## 2022-09-19 ASSESSMENT — PATIENT HEALTH QUESTIONNAIRE - PHQ9
2. FEELING DOWN, DEPRESSED OR HOPELESS: 0
SUM OF ALL RESPONSES TO PHQ QUESTIONS 1-9: 0
1. LITTLE INTEREST OR PLEASURE IN DOING THINGS: 0
SUM OF ALL RESPONSES TO PHQ QUESTIONS 1-9: 0
SUM OF ALL RESPONSES TO PHQ9 QUESTIONS 1 & 2: 0
SUM OF ALL RESPONSES TO PHQ QUESTIONS 1-9: 0
SUM OF ALL RESPONSES TO PHQ QUESTIONS 1-9: 0

## 2022-09-19 NOTE — TELEPHONE ENCOUNTER
Let patient know that venous ultrasound was negative for DVT. Would continue on Demadex or torsemide as a fluid pill toward but we will send in a prescription for antibiotics to cover for phlebitis.   Let me know if symptoms or not improving    Sent in prescription for:     Requested Prescriptions     Signed Prescriptions Disp Refills    doxycycline hyclate (VIBRAMYCIN) 100 MG capsule 20 capsule 0     Sig: Take 1 capsule by mouth 2 times daily for 10 days     Authorizing Provider: William Nettles

## 2022-09-19 NOTE — TELEPHONE ENCOUNTER
Received call from Syeda at [facility] with [facility]. Subjective: Caller states \"for 3 days my left leg is swelling and bruising \"     Current Symptoms: leg swelling    Onset: 3 days ago;     Associated Symptoms: NA    Pain Severity: 5/10; burning; intermittent    Temperature:   Denies Fever    What has been tried: elevation, water pill    LMP: NA Pregnant: NA    Recommended disposition: Go to ED/UCC Now (Or to Office with PCP Approval)    Care advice provided, patient verbalizes understanding; denies any other questions or concerns; instructed to call back for any new or worsening symptoms. Writer provided warm transfer to [facility] at [facility] for 2nd level triage      Attention Provider: Thank you for allowing me to participate in the care of your patient. The patient was connected to triage in response to information provided to the ECC/PSC. Please do not respond through this encounter as the response is not directed to a shared pool.           Reason for Disposition   Thigh, calf, or ankle swelling in only one leg    Protocols used: Leg Swelling and Edema-ADULT-[place]

## 2022-09-20 NOTE — PROGRESS NOTES
Subjective:      Patient ID: Jessica Christine is a 73 y.o. female. HPI  Patient comes in today with swelling and discomfort in her left calf that started last 3 to 4 days. She is on Eliquis already for other reasons,. She does take Demadex as needed. Past Medical History:   Diagnosis Date    A-fib [facility])     Acquired hypothyroidism 9/13/2017    Anxiety and depression     Arthritis     osteo    Chronic pain     back and R leg    GERD (gastroesophageal reflux disease)     well controlled with med    H/O bone density study 10/2011    normal    History of mammogram 03/10/2022    BI-RADS Category 2. Lipoma noted.   Recommend follow-up in 1 year    Hypertension     controlled by med    Low back pain     Morbid obesity ([address]     bmi 48.63    Pneumonia     1/2020    Rectal bleeding onset x 2 months    Restless legs     Rosacea     Skin cancer     squamous- removed    Smoker     current 10/18/21 5-10 daily \"trying to quit\"-- previously 1 ppd x since age 22    Spinal stenosis     Staph infection 03/2016    from back surgery----- NOT mrsa    Suspected sleep apnea      test not completed 11/2018       Allergies   Allergen Reactions    Penicillins Hives    Bee Venom Swelling    Cefdinir Other (See Comments)     Tremor, can tolerate Ceftin     Hydrocodone-Acetaminophen Rash       Current Outpatient Medications   Medication Sig Dispense Refill    propafenone (RYTHMOL) 150 MG tablet Take 150 mg by mouth 2 times daily      albuterol sulfate HFA (PROVENTIL;VENTOLIN;PROAIR) 108 (90 Base) MCG/ACT inhaler INHALE 2 PUFFS EVERY 6 HOURS AS NEEDED FOR WHEEZE 6.7 each 3    LORazepam (ATIVAN) 0.5 MG tablet TAKE 1 TABLET BY MOUTH EVERY 6 HOURS AS NEEDED 45 tablet 5    zolpidem (AMBIEN) 10 MG tablet TAKE 1 TABLET BY MOUTH EVERY DAY AT BEDTIME AS NEEDED FOR SLEEP 30 tablet 5    apixaban (ELIQUIS) 5 MG TABS tablet Take 5 mg by mouth 2 times daily      vitamin D (CHOLECALCIFEROL) 25 MCG (1000 UT) TABS tablet Take 1,000 Units by mouth doxycycline monohydrate (MONODOX) 100 MG capsule Take 100 mg by mouth once      escitalopram (LEXAPRO) 20 MG tablet Take 20 mg by mouth daily      ferrous sulfate (FE TABS 325) 325 (65 Fe) MG EC tablet Take 325 mg by mouth 2 times daily      gabapentin (NEURONTIN) 300 MG capsule Take 300 mg by mouth 3 times daily. levothyroxine (SYNTHROID) 50 MCG tablet Take 50 mcg by mouth every morning (before breakfast)      metoprolol tartrate (LOPRESSOR) 25 MG tablet Take 12.5 mg by mouth 2 times daily      omeprazole (PRILOSEC) 40 MG delayed release capsule Take 40 mg by mouth daily      potassium chloride (KLOR-CON M) 10 MEQ extended release tablet Take 20 mEq by mouth daily      rOPINIRole (REQUIP) 1 MG tablet TAKE 1/2 TABLET BY MOUTH NIGHTLY      torsemide (DEMADEX) 20 MG tablet TAKE 1 TABLET BY MOUTH EVERY DAY      doxycycline hyclate (VIBRAMYCIN) 100 MG capsule Take 1 capsule by mouth 2 times daily for 10 days 20 capsule 0     No current facility-administered medications for this visit. Social History     Tobacco Use    Smoking status: Every Day     Packs/day: 0.75     Types: Cigarettes    Smokeless tobacco: Never    Tobacco comments:     Quit smokin- ppd since age 22 (quit briefly   Substance Use Topics    Alcohol use: No    Drug use: No     Review of Systems   Cardiovascular:  Positive for leg swelling. Musculoskeletal:  Negative for arthralgias. Blood pressure 132/70, pulse 60, temperature 97 °F (36.1 °C), temperature source Temporal, resp. rate 17, height 5' 4\" (1.626 m), weight 280 lb 9.6 oz (127.3 kg), SpO2 97 %. Objective:   Physical Exam  Vitals reviewed. Constitutional:       General: She is not in acute distress. Appearance: Normal appearance. Cardiovascular:      Rate and Rhythm: Normal rate and regular rhythm. Pulses: Normal pulses. Pulmonary:      Effort: Pulmonary effort is normal.      Breath sounds: Normal breath sounds.    Musculoskeletal:         General: Swelling present. Comments: Tenderness and induration noted over the left medial the posterior calf with some erythema noted as well   Neurological:      General: No focal deficit present. Mental Status: She is alert and oriented to person, place, and time. Assessment / Plan:       Kyra Lazcano was seen today for leg swelling. Diagnoses and all orders for this visit:    Left leg pain  -     Vascular duplex lower extremity venous left; Future       Will notify the results of the ultrasound and consider antibiotics if negative and continue on the Demadex. Follow-up and Dispositions    Return if symptoms worsen or fail to improve. Dictated using voice recognition software.  Proofread, but unrecognized errors may exist.

## 2022-10-05 ENCOUNTER — OFFICE VISIT (OUTPATIENT)
Dept: ORTHOPEDIC SURGERY | Age: 67
End: 2022-10-05
Payer: MEDICARE

## 2022-10-05 DIAGNOSIS — Z98.1 ARTHRODESIS STATUS: Primary | ICD-10-CM

## 2022-10-05 PROCEDURE — G8417 CALC BMI ABV UP PARAM F/U: HCPCS | Performed by: NURSE PRACTITIONER

## 2022-10-05 PROCEDURE — 4004F PT TOBACCO SCREEN RCVD TLK: CPT | Performed by: NURSE PRACTITIONER

## 2022-10-05 PROCEDURE — G8400 PT W/DXA NO RESULTS DOC: HCPCS | Performed by: NURSE PRACTITIONER

## 2022-10-05 PROCEDURE — 1123F ACP DISCUSS/DSCN MKR DOCD: CPT | Performed by: NURSE PRACTITIONER

## 2022-10-05 PROCEDURE — 99213 OFFICE O/P EST LOW 20 MIN: CPT | Performed by: NURSE PRACTITIONER

## 2022-10-05 PROCEDURE — G8484 FLU IMMUNIZE NO ADMIN: HCPCS | Performed by: NURSE PRACTITIONER

## 2022-10-05 PROCEDURE — 3017F COLORECTAL CA SCREEN DOC REV: CPT | Performed by: NURSE PRACTITIONER

## 2022-10-05 PROCEDURE — 1090F PRES/ABSN URINE INCON ASSESS: CPT | Performed by: NURSE PRACTITIONER

## 2022-10-05 PROCEDURE — G8428 CUR MEDS NOT DOCUMENT: HCPCS | Performed by: NURSE PRACTITIONER

## 2022-10-05 RX ORDER — HYDROCODONE BITARTRATE AND ACETAMINOPHEN 10; 325 MG/1; MG/1
TABLET ORAL
COMMUNITY
Start: 2021-11-24

## 2022-10-05 NOTE — PROGRESS NOTES
Name: Eun Fontenot  YOB: 1955  Gender: female  MRN: [id number]    CC:  Lumbar spine surgery follow up    HPI:  Eun Fontenot  is here for 1 year follow up of her  lumbar posteolateral fusion and DLIF surgery. She reports significant relief of preoperative lower extremity pain, weakness and parasthesias. There is the expected residual low back pain. Back pain is worse with heavier activities and better with rest.  Patient still has a chronic back pain. This is managed by her primary doctor. She also reports she has been having some blood pressure issues. Pain is worse with activities such as mopping or sweeping. Pain is overall improved. Current Outpatient Medications:     propafenone (RYTHMOL) 150 MG tablet, Take 150 mg by mouth 2 times daily, Disp: , Rfl:     albuterol sulfate HFA (PROVENTIL;VENTOLIN;PROAIR) 108 (90 Base) MCG/ACT inhaler, INHALE 2 PUFFS EVERY 6 HOURS AS NEEDED FOR WHEEZE, Disp: 6.7 each, Rfl: 3    LORazepam (ATIVAN) 0.5 MG tablet, TAKE 1 TABLET BY MOUTH EVERY 6 HOURS AS NEEDED, Disp: 45 tablet, Rfl: 5    zolpidem (AMBIEN) 10 MG tablet, TAKE 1 TABLET BY MOUTH EVERY DAY AT BEDTIME AS NEEDED FOR SLEEP, Disp: 30 tablet, Rfl: 5    apixaban (ELIQUIS) 5 MG TABS tablet, Take 5 mg by mouth 2 times daily, Disp: , Rfl:     vitamin D (CHOLECALCIFEROL) 25 MCG (1000 UT) TABS tablet, Take 1,000 Units by mouth, Disp: , Rfl:     escitalopram (LEXAPRO) 20 MG tablet, Take 20 mg by mouth daily, Disp: , Rfl:     gabapentin (NEURONTIN) 300 MG capsule, Take 300 mg by mouth 3 times daily. , Disp: , Rfl:     levothyroxine (SYNTHROID) 50 MCG tablet, Take 50 mcg by mouth every morning (before breakfast), Disp: , Rfl:     metoprolol tartrate (LOPRESSOR) 25 MG tablet, Take 12.5 mg by mouth 2 times daily, Disp: , Rfl:     omeprazole (PRILOSEC) 40 MG delayed release capsule, Take 40 mg by mouth daily, Disp: , Rfl:     potassium chloride (KLOR-CON M) 10 MEQ extended release tablet, Take 20 mEq by mouth daily, Disp: , Rfl:     rOPINIRole (REQUIP) 1 MG tablet, TAKE 1/2 TABLET BY MOUTH NIGHTLY, Disp: , Rfl:     torsemide (DEMADEX) 20 MG tablet, TAKE 1 TABLET BY MOUTH EVERY DAY, Disp: , Rfl:     HYDROcodone-acetaminophen (NORCO)  MG per tablet, , Disp: , Rfl:     doxycycline monohydrate (MONODOX) 100 MG capsule, Take 100 mg by mouth once (Patient not taking: Reported on 10/5/2022), Disp: , Rfl:     ferrous sulfate (FE TABS 325) 325 (65 Fe) MG EC tablet, Take 325 mg by mouth 2 times daily (Patient not taking: Reported on 10/5/2022), Disp: , Rfl:     Allergies   Allergen Reactions    Penicillins Hives    Bee Venom Swelling    Cefdinir Other (See Comments)     Tremor, can tolerate Ceftin     Hydrocodone-Acetaminophen Rash     Past Medical History:   Diagnosis Date    A-fib ([facility] [address]     Acquired hypothyroidism 9/13/2017    Anxiety and depression     Arthritis     osteo    Chronic pain     back and R leg    GERD (gastroesophageal reflux disease)     well controlled with med    H/O bone density study 10/2011    normal    History of mammogram 03/10/2022    BI-RADS Category 2. Lipoma noted. Recommend follow-up in 1 year    Hypertension     controlled by med    Low back pain     Morbid obesity ([address]     bmi 48.63    Pneumonia     1/2020    Rectal bleeding onset x 2 months    Restless legs     Rosacea     Skin cancer     squamous- removed    Smoker     current 10/18/21 5-10 daily \"trying to quit\"-- previously 1 ppd x since age 22    Spinal stenosis     Staph infection 03/2016    from back surgery----- NOT mrsa    Suspected sleep apnea      test not completed 11/2018       Objective:  BP: 130/80    Alert and oriented. Mood and affect are appropriate. Oriented to person, place, and time. Respirations are unlabored     Sensory testing reveals intact sensation to light touch in the distribution of the L3-S1 dermatomes bilaterally.     Strength testing in the lower extremity reveals the following based on the 5 point grading scale:       HF (L2) H Ab (L5) KE (L3/4) ADF (L4) EHL (L5) A Ev (S1) APF (S1)   Right 5 5 5 5 5 5 5   Left 5 5 5 5 5 5 5     The feet are warm with good capillary refill and palpable pedal pulses. Gait is normal    Imaging:     X-Ray: 2 views of the lumbar spine taken today reveal postoperative changes status post instrumented fusion with excellent graft and hardware placement and no interval decompensation from the immediate postoperative appearance. Some degenerative disc disease at L1-2. Otherwise no evidence of hardware failure. Radiographic Impression: Satisfactory postoperative appearance, lumbar fusion    Assessment/Plan:       ICD-10-CM    1. Arthrodesis status  Z98.1 XR LUMBAR SPINE (2-3 VIEWS)           This patient is following the expected course following the spine surgery. She can continue activities as tolerated and return for follow up as needed. Can follow-up in 1 year for repeat x-rays. No orders of the defined types were placed in this encounter. Orders Placed This Encounter   Procedures    XR LUMBAR SPINE (2-3 VIEWS)          Return in about 1 year (around 10/5/2023). MATEO Gimenez CNP  10/05/22      Elements of this note were created using speech recognition software. As such, errors of speech recognition may be present.

## 2022-10-16 RX ORDER — TORSEMIDE 20 MG/1
TABLET ORAL
Qty: 90 TABLET | Refills: 1 | Status: SHIPPED | OUTPATIENT
Start: 2022-10-16

## 2022-10-22 ENCOUNTER — PATIENT MESSAGE (OUTPATIENT)
Dept: FAMILY MEDICINE CLINIC | Facility: CLINIC | Age: 67
End: 2022-10-22

## 2022-10-22 DIAGNOSIS — M48.061 SPINAL STENOSIS, LUMBAR REGION WITHOUT NEUROGENIC CLAUDICATION: Primary | ICD-10-CM

## 2022-10-24 RX ORDER — OXYCODONE HYDROCHLORIDE AND ACETAMINOPHEN 5; 325 MG/1; MG/1
1 TABLET ORAL EVERY 6 HOURS PRN
Qty: 20 TABLET | Refills: 0 | Status: SHIPPED | OUTPATIENT
Start: 2022-10-24 | End: 2022-10-29

## 2022-10-24 NOTE — TELEPHONE ENCOUNTER
From: Anusha Morgan  To: Dr. Lindy Crisostomo: 10/22/2022 7:27 AM EDT  Subject: Pain Meds    Can I Please have a script for Oxycodone-acetaminophen 5-325. I forgot to ask you the other week. It been a long time since I had any. Pain in back has been bad this past week.  Thanks, Mabel Walker

## 2022-10-24 NOTE — TELEPHONE ENCOUNTER
I have reviewed the patients controlled substance prescription history, as maintained in the [place] prescription monitoring program, so that the prescription(s) for a  controlled substance can be given. I did query the [facility] prescribers database and no suspicious activity noted. She is allergic to hydrocodone. Sent in prescription for:     Requested Prescriptions     Signed Prescriptions Disp Refills    oxyCODONE-acetaminophen (PERCOCET) 5-325 MG per tablet 20 tablet 0     Sig: Take 1 tablet by mouth every 6 hours as needed for Pain for up to 5 days. Intended supply: 3 days.  Take lowest dose possible to manage pain     Authorizing Provider: Abel Mina         Due to history of chronic lower back pain, lumbar spinal stenosis status post surgery for short-term basis

## 2022-12-29 ENCOUNTER — PATIENT MESSAGE (OUTPATIENT)
Dept: FAMILY MEDICINE CLINIC | Facility: CLINIC | Age: 67
End: 2022-12-29

## 2022-12-29 RX ORDER — TRAZODONE HYDROCHLORIDE 50 MG/1
50 TABLET ORAL NIGHTLY PRN
Qty: 30 TABLET | Refills: 5 | Status: SHIPPED | OUTPATIENT
Start: 2022-12-29

## 2022-12-29 NOTE — TELEPHONE ENCOUNTER
Sent in prescription for:     Requested Prescriptions     Signed Prescriptions Disp Refills    traZODone (DESYREL) 50 MG tablet 30 tablet 5     Sig: Take 1 tablet by mouth nightly as needed for Sleep     Authorizing Provider: Nataly aJime

## 2022-12-29 NOTE — TELEPHONE ENCOUNTER
From: Gideon Cabrera  To: Dr. Schafer Lever: 12/29/2022 4:45 PM EST  Subject: Sleeping alot    Having trouble with my sleeping. Get up every night around 2 or 3 o'clock for a couple of hours. If I make coffee, I get sleepy and fall asleep wherever I'm sitting. Then I want to sleep a half a day. I have a good bit of wheezing in my chest and stay dizzy alot.  Thanks, Anastasia Face

## 2023-01-04 ENCOUNTER — OFFICE VISIT (OUTPATIENT)
Dept: ORTHOPEDIC SURGERY | Age: 68
End: 2023-01-04
Payer: MEDICARE

## 2023-01-04 DIAGNOSIS — Z98.1 ARTHRODESIS STATUS: Primary | ICD-10-CM

## 2023-01-04 DIAGNOSIS — M54.16 LUMBAR RADICULOPATHY: ICD-10-CM

## 2023-01-04 DIAGNOSIS — M51.36 LUMBAR DEGENERATIVE DISC DISEASE: ICD-10-CM

## 2023-01-04 PROCEDURE — G8417 CALC BMI ABV UP PARAM F/U: HCPCS | Performed by: NURSE PRACTITIONER

## 2023-01-04 PROCEDURE — 4004F PT TOBACCO SCREEN RCVD TLK: CPT | Performed by: NURSE PRACTITIONER

## 2023-01-04 PROCEDURE — 1090F PRES/ABSN URINE INCON ASSESS: CPT | Performed by: NURSE PRACTITIONER

## 2023-01-04 PROCEDURE — G8484 FLU IMMUNIZE NO ADMIN: HCPCS | Performed by: NURSE PRACTITIONER

## 2023-01-04 PROCEDURE — 3017F COLORECTAL CA SCREEN DOC REV: CPT | Performed by: NURSE PRACTITIONER

## 2023-01-04 PROCEDURE — G8400 PT W/DXA NO RESULTS DOC: HCPCS | Performed by: NURSE PRACTITIONER

## 2023-01-04 PROCEDURE — 99214 OFFICE O/P EST MOD 30 MIN: CPT | Performed by: NURSE PRACTITIONER

## 2023-01-04 PROCEDURE — G8428 CUR MEDS NOT DOCUMENT: HCPCS | Performed by: NURSE PRACTITIONER

## 2023-01-04 PROCEDURE — 1123F ACP DISCUSS/DSCN MKR DOCD: CPT | Performed by: NURSE PRACTITIONER

## 2023-01-04 RX ORDER — OXYCODONE HYDROCHLORIDE AND ACETAMINOPHEN 5; 325 MG/1; MG/1
1 TABLET ORAL EVERY 8 HOURS PRN
Qty: 15 TABLET | Refills: 0 | Status: SHIPPED | OUTPATIENT
Start: 2023-01-04 | End: 2023-01-09

## 2023-01-04 RX ORDER — PREDNISONE 5 MG/1
5 TABLET ORAL SEE ADMIN INSTRUCTIONS
Qty: 48 EACH | Refills: 0 | Status: SHIPPED | OUTPATIENT
Start: 2023-01-04 | End: 2023-01-14

## 2023-01-04 RX ORDER — NALOXONE HYDROCHLORIDE 4 MG/.1ML
1 SPRAY NASAL PRN
Qty: 1 EACH | Refills: 0 | Status: SHIPPED | OUTPATIENT
Start: 2023-01-04

## 2023-01-04 NOTE — PROGRESS NOTES
Name: Jaun Figueroa  YOB: 1955  Gender: female  MRN: [id number]    CC: Acute severe low back and left leg pain    HPI: This is a 78y.o. year old female who is now over a year status post posterior fusion and TLIF. She has had extensive surgery in the past and is fused from L2-L5. When I last saw her in October she had chronic back pain. Been doing a home exercise program.  She denies any falls but states she flared up significantly on the left in December. This then radiates down the back of the thigh stopping at the knee. It is slowly improving. She is on gabapentin and does not find it helpful. She cannot tolerate hydrocodone. Tramadol does nothing for her. She is on Eliquis. She is not had any recent steroids or any recent injections. We had discussed at last visit that I would not recommend any other surgical intervention given her weight. She also today is exhibiting what seems like some degree of COPD. She states that a days her breathing is okay other times she does have difficulty. She is managed by her primary doctor and has not seen a pulmonologist.  I do recommend that she request a pulmonology referral.         AMB PAIN ASSESSMENT 10/5/2022   Location of Pain Back   Severity of Pain 8   Frequency of Pain Intermittent   Limiting Behavior Yes   Result of Injury No   Work-Related Injury No   Are there other pain locations you wish to document? No            Allergies   Allergen Reactions    Penicillins Hives    Bee Venom Swelling    Cefdinir Other (See Comments)     Tremor, can tolerate Ceftin     Hydrocodone-Acetaminophen Rash       Current Outpatient Medications:     oxyCODONE-acetaminophen (PERCOCET) 5-325 MG per tablet, Take 1 tablet by mouth every 8 hours as needed for Pain for up to 5 days. Intended supply: 5 days.  Take lowest dose possible to manage pain Max Daily Amount: 3 tablets, Disp: 15 tablet, Rfl: 0    predniSONE 5 MG (48) TBPK, Take 5 mg by mouth See Admin Instructions for 10 days, Disp: 48 each, Rfl: 0    naloxone (NARCAN) 4 MG/0.1ML LIQD nasal spray, 1 spray by Nasal route as needed for Opioid Reversal, Disp: 1 each, Rfl: 0    traZODone (DESYREL) 50 MG tablet, Take 1 tablet by mouth nightly as needed for Sleep, Disp: 30 tablet, Rfl: 5    torsemide (DEMADEX) 20 MG tablet, TAKE 1 TABLET BY MOUTH EVERY DAY, Disp: 90 tablet, Rfl: 1    propafenone (RYTHMOL) 150 MG tablet, Take 150 mg by mouth 2 times daily, Disp: , Rfl:     albuterol sulfate HFA (PROVENTIL;VENTOLIN;PROAIR) 108 (90 Base) MCG/ACT inhaler, INHALE 2 PUFFS EVERY 6 HOURS AS NEEDED FOR WHEEZE, Disp: 6.7 each, Rfl: 3    apixaban (ELIQUIS) 5 MG TABS tablet, Take 5 mg by mouth 2 times daily, Disp: , Rfl:     vitamin D (CHOLECALCIFEROL) 25 MCG (1000 UT) TABS tablet, Take 1,000 Units by mouth, Disp: , Rfl:     escitalopram (LEXAPRO) 20 MG tablet, Take 20 mg by mouth daily, Disp: , Rfl:     gabapentin (NEURONTIN) 300 MG capsule, Take 300 mg by mouth 3 times daily. , Disp: , Rfl:     levothyroxine (SYNTHROID) 50 MCG tablet, Take 50 mcg by mouth every morning (before breakfast), Disp: , Rfl:     metoprolol tartrate (LOPRESSOR) 25 MG tablet, Take 12.5 mg by mouth 2 times daily, Disp: , Rfl:     omeprazole (PRILOSEC) 40 MG delayed release capsule, Take 40 mg by mouth daily, Disp: , Rfl:     potassium chloride (KLOR-CON M) 10 MEQ extended release tablet, Take 20 mEq by mouth daily, Disp: , Rfl:     rOPINIRole (REQUIP) 1 MG tablet, TAKE 1/2 TABLET BY MOUTH NIGHTLY, Disp: , Rfl:   Past Medical History:   Diagnosis Date    A-fib ([facility] [address]     Acquired hypothyroidism 9/13/2017    Anxiety and depression     Arthritis     osteo    Chronic pain     back and R leg    GERD (gastroesophageal reflux disease)     well controlled with med    H/O bone density study 10/2011    normal    History of mammogram 03/10/2022    BI-RADS Category 2. Lipoma noted.   Recommend follow-up in 1 year    Hypertension     controlled by med    Low back pain Morbid obesity (HCC)     bmi 48.63    Pneumonia     1/2020    Rectal bleeding onset x 2 months    Restless legs     Rosacea     Skin cancer     squamous- removed    Smoker     current 10/18/21 5-10 daily \"trying to quit\"-- previously 1 ppd x since age 22    Spinal stenosis     Staph infection 03/2016    from back surgery----- NOT mrsa    Suspected sleep apnea      test not completed 11/2018     Tobacco:  reports that she has been smoking. She has been smoking an average of .75 packs per day. She has never used smokeless tobacco.  Alcohol:   Social History     Substance and Sexual Activity   Alcohol Use No          Radiographic Studies:   X-rays of the lumbar spine images independently reviewed: There is postoperative changes status post instrumented fusion with graft and hardware placement that is adequate. No interval decompensation. There is degenerative disc disease at L1-2 and severe degenerative disc disease at L5-S1. Not see any acute changes noted. Impression: Status post L2-L5 fusion with interbody caging. Adjacent level degenerative disc disease is noted. Assessment/Plan:        ICD-10-CM    1. Arthrodesis status  Z98.1 XR LUMBAR SPINE (2-3 VIEWS)     oxyCODONE-acetaminophen (PERCOCET) 5-325 MG per tablet      2. Lumbar degenerative disc disease  M51.36       3. Body mass index (BMI) 45.0-49.9, adult ([facility])  Z68.42       4. Lumbar radiculopathy  M54.16 oxyCODONE-acetaminophen (PERCOCET) 5-325 MG per tablet           I discussed with patient that I will give her a prednisone pack and I have agreed to give her a short duration of Percocet to use very sparingly. If she does not improve with this then I would recommend further imaging versus a pain management referral.  I currently at this time would not recommend any other surgical intervention. I did tell her that we unfortunately do not do any kind of long-term medication management either.   We will see how she does with some home exercises and the steroids. Again I recommend weight loss for her obesity.    -The patient will be treated observantly with self directed symptomatic care. Instructions were given to follow up if there is any neurologic or functional decline. - A home exercise program was prescribed for stretching and strengthening. A list of exercises was provided. - Oral Steroids: A short course of oral steroids was prescribed in an attempt to bring the acute radicular symptoms under control. The patient understands the risks and side effects of oral steroids including immunosuppression, hypertension, mood swings, increased blood sugar, including glaucoma. The steroid taper can be followed by NSAIDs once completed. - Opioid: The patient was prescribed an oral pain medication. The patient understands that this is a temporary measure to bring acute or post-surgical pain under control and that it is out of the scope of this practice to continue opiates on a long-term basis. The [place] Controlled Substance database was reviewed prior to prescribing. Orders Placed This Encounter   Medications    oxyCODONE-acetaminophen (PERCOCET) 5-325 MG per tablet     Sig: Take 1 tablet by mouth every 8 hours as needed for Pain for up to 5 days. Intended supply: 5 days. Take lowest dose possible to manage pain Max Daily Amount: 3 tablets     Dispense:  15 tablet     Refill:  0     Reduce doses taken as pain becomes manageable    predniSONE 5 MG (48) TBPK     Sig: Take 5 mg by mouth See Admin Instructions for 10 days     Dispense:  48 each     Refill:  0    naloxone (NARCAN) 4 MG/0.1ML LIQD nasal spray     Si spray by Nasal route as needed for Opioid Reversal     Dispense:  1 each     Refill:  0        Orders Placed This Encounter   Procedures    XR LUMBAR SPINE (2-3 VIEWS)        4 This is a chronic illness/condition with exacerbation and progression      Return will call.      MATEO Chaney - CNP  23      Elements of this note were created using speech recognition software. As such, errors of speech recognition may be present.

## 2023-02-05 RX ORDER — ROPINIROLE 1 MG/1
TABLET, FILM COATED ORAL
Qty: 45 TABLET | Refills: 5 | Status: SHIPPED | OUTPATIENT
Start: 2023-02-05

## 2023-02-16 ENCOUNTER — TELEPHONE (OUTPATIENT)
Dept: FAMILY MEDICINE CLINIC | Facility: CLINIC | Age: 68
End: 2023-02-16

## 2023-02-16 DIAGNOSIS — M54.41 CHRONIC LOW BACK PAIN WITH RIGHT-SIDED SCIATICA, UNSPECIFIED BACK PAIN LATERALITY: Primary | ICD-10-CM

## 2023-02-16 DIAGNOSIS — G89.29 CHRONIC LOW BACK PAIN WITH RIGHT-SIDED SCIATICA, UNSPECIFIED BACK PAIN LATERALITY: Primary | ICD-10-CM

## 2023-02-16 RX ORDER — METHYLPREDNISOLONE 4 MG/1
TABLET ORAL
Qty: 1 KIT | Refills: 0 | Status: SHIPPED | OUTPATIENT
Start: 2023-02-16

## 2023-02-16 NOTE — TELEPHONE ENCOUNTER
We could try a Medrol Dosepak to see if that will calm down the sciatica symptoms. Sent in prescription for:     Requested Prescriptions     Signed Prescriptions Disp Refills    methylPREDNISolone (MEDROL, TRUDI,) 4 MG tablet 1 kit 0     Sig: Take by mouth.      Authorizing Provider: Keegan Haque

## 2023-02-16 NOTE — TELEPHONE ENCOUNTER
Pt called complaining with low back pain. Goes into groin area and down her leg. Using a heating pad which is not working. Asking what can she do? Last OV 9/19/22. No follow up scheduled.

## 2023-02-22 DIAGNOSIS — E03.9 HYPOTHYROIDISM, UNSPECIFIED: ICD-10-CM

## 2023-02-22 DIAGNOSIS — F41.9 ANXIETY DISORDER, UNSPECIFIED: ICD-10-CM

## 2023-02-22 RX ORDER — LEVOTHYROXINE SODIUM 0.05 MG/1
TABLET ORAL
Qty: 90 TABLET | Refills: 3 | Status: SHIPPED | OUTPATIENT
Start: 2023-02-22

## 2023-02-22 RX ORDER — LORAZEPAM 0.5 MG/1
TABLET ORAL
Qty: 45 TABLET | Refills: 3 | Status: SHIPPED | OUTPATIENT
Start: 2023-02-22 | End: 2023-07-25

## 2023-02-22 RX ORDER — DOXYCYCLINE 100 MG/1
CAPSULE ORAL
Qty: 90 CAPSULE | Refills: 3 | Status: SHIPPED | OUTPATIENT
Start: 2023-02-22

## 2023-03-08 RX ORDER — POTASSIUM CHLORIDE 750 MG/1
TABLET, EXTENDED RELEASE ORAL
Qty: 180 TABLET | Refills: 3 | Status: SHIPPED | OUTPATIENT
Start: 2023-03-08

## 2023-03-11 ENCOUNTER — HOSPITAL ENCOUNTER (OUTPATIENT)
Dept: MAMMOGRAPHY | Age: 68
Discharge: HOME | End: 2023-03-11
Payer: MEDICARE

## 2023-03-11 DIAGNOSIS — Z12.31 VISIT FOR SCREENING MAMMOGRAM: ICD-10-CM

## 2023-03-11 DIAGNOSIS — F41.9 ANXIETY DISORDER, UNSPECIFIED: ICD-10-CM

## 2023-03-11 DIAGNOSIS — G47.00 INSOMNIA, UNSPECIFIED: ICD-10-CM

## 2023-03-11 PROCEDURE — 77063 BREAST TOMOSYNTHESIS BI: CPT

## 2023-03-12 RX ORDER — ESCITALOPRAM OXALATE 20 MG/1
TABLET ORAL
Qty: 90 TABLET | Refills: 3 | Status: SHIPPED | OUTPATIENT
Start: 2023-03-12

## 2023-05-05 ENCOUNTER — TELEPHONE (OUTPATIENT)
Dept: FAMILY MEDICINE CLINIC | Facility: CLINIC | Age: 68
End: 2023-05-05

## 2023-05-09 ENCOUNTER — OFFICE VISIT (OUTPATIENT)
Dept: FAMILY MEDICINE CLINIC | Facility: CLINIC | Age: 68
End: 2023-05-09
Payer: MEDICARE

## 2023-05-09 VITALS
SYSTOLIC BLOOD PRESSURE: 150 MMHG | DIASTOLIC BLOOD PRESSURE: 98 MMHG | OXYGEN SATURATION: 96 % | BODY MASS INDEX: 49.51 KG/M2 | RESPIRATION RATE: 18 BRPM | HEIGHT: 64 IN | HEART RATE: 62 BPM | WEIGHT: 290 LBS | TEMPERATURE: 98.4 F

## 2023-05-09 DIAGNOSIS — G89.29 CHRONIC LOW BACK PAIN, UNSPECIFIED BACK PAIN LATERALITY, UNSPECIFIED WHETHER SCIATICA PRESENT: ICD-10-CM

## 2023-05-09 DIAGNOSIS — J98.01 BRONCHOSPASM: ICD-10-CM

## 2023-05-09 DIAGNOSIS — I10 ESSENTIAL (PRIMARY) HYPERTENSION: Primary | ICD-10-CM

## 2023-05-09 DIAGNOSIS — M54.50 CHRONIC LOW BACK PAIN, UNSPECIFIED BACK PAIN LATERALITY, UNSPECIFIED WHETHER SCIATICA PRESENT: ICD-10-CM

## 2023-05-09 DIAGNOSIS — Z72.0 TOBACCO USE: ICD-10-CM

## 2023-05-09 DIAGNOSIS — J01.00 ACUTE MAXILLARY SINUSITIS, RECURRENCE NOT SPECIFIED: ICD-10-CM

## 2023-05-09 PROCEDURE — 1123F ACP DISCUSS/DSCN MKR DOCD: CPT | Performed by: PHYSICIAN ASSISTANT

## 2023-05-09 PROCEDURE — G8417 CALC BMI ABV UP PARAM F/U: HCPCS | Performed by: PHYSICIAN ASSISTANT

## 2023-05-09 PROCEDURE — 1090F PRES/ABSN URINE INCON ASSESS: CPT | Performed by: PHYSICIAN ASSISTANT

## 2023-05-09 PROCEDURE — 99214 OFFICE O/P EST MOD 30 MIN: CPT | Performed by: PHYSICIAN ASSISTANT

## 2023-05-09 PROCEDURE — 3080F DIAST BP >= 90 MM HG: CPT | Performed by: PHYSICIAN ASSISTANT

## 2023-05-09 PROCEDURE — G8400 PT W/DXA NO RESULTS DOC: HCPCS | Performed by: PHYSICIAN ASSISTANT

## 2023-05-09 PROCEDURE — 3017F COLORECTAL CA SCREEN DOC REV: CPT | Performed by: PHYSICIAN ASSISTANT

## 2023-05-09 PROCEDURE — G8427 DOCREV CUR MEDS BY ELIG CLIN: HCPCS | Performed by: PHYSICIAN ASSISTANT

## 2023-05-09 PROCEDURE — 3077F SYST BP >= 140 MM HG: CPT | Performed by: PHYSICIAN ASSISTANT

## 2023-05-09 PROCEDURE — 4004F PT TOBACCO SCREEN RCVD TLK: CPT | Performed by: PHYSICIAN ASSISTANT

## 2023-05-09 RX ORDER — IPRATROPIUM BROMIDE AND ALBUTEROL SULFATE 2.5; .5 MG/3ML; MG/3ML
3 SOLUTION RESPIRATORY (INHALATION) EVERY 6 HOURS
Qty: 360 ML | Refills: 11 | COMMUNITY
Start: 2023-05-08 | End: 2024-05-07

## 2023-05-09 RX ORDER — MIDODRINE HYDROCHLORIDE 5 MG/1
5 TABLET ORAL
COMMUNITY
Start: 2021-11-24

## 2023-05-09 RX ORDER — OLMESARTAN MEDOXOMIL 20 MG/1
20 TABLET ORAL DAILY
Qty: 30 TABLET | Refills: 5 | Status: SHIPPED | OUTPATIENT
Start: 2023-05-09

## 2023-05-09 RX ORDER — CEFUROXIME AXETIL 500 MG/1
500 TABLET ORAL 2 TIMES DAILY
Qty: 20 TABLET | Refills: 0 | Status: SHIPPED | OUTPATIENT
Start: 2023-05-09 | End: 2023-05-19

## 2023-05-09 RX ORDER — PREDNISONE 20 MG/1
40 TABLET ORAL DAILY
COMMUNITY
Start: 2023-05-08

## 2023-05-09 RX ORDER — GUAIFENESIN AND CODEINE PHOSPHATE 100; 10 MG/5ML; MG/5ML
10 SOLUTION ORAL 3 TIMES DAILY PRN
COMMUNITY
Start: 2023-05-08

## 2023-05-09 RX ORDER — GABAPENTIN 300 MG/1
300 CAPSULE ORAL 2 TIMES DAILY
Qty: 180 CAPSULE | Refills: 1 | Status: SHIPPED | OUTPATIENT
Start: 2023-05-09 | End: 2023-11-05

## 2023-05-09 SDOH — ECONOMIC STABILITY: HOUSING INSECURITY
IN THE LAST 12 MONTHS, WAS THERE A TIME WHEN YOU DID NOT HAVE A STEADY PLACE TO SLEEP OR SLEPT IN A SHELTER (INCLUDING NOW)?: NO

## 2023-05-09 SDOH — ECONOMIC STABILITY: INCOME INSECURITY: HOW HARD IS IT FOR YOU TO PAY FOR THE VERY BASICS LIKE FOOD, HOUSING, MEDICAL CARE, AND HEATING?: NOT HARD AT ALL

## 2023-05-09 SDOH — ECONOMIC STABILITY: FOOD INSECURITY: WITHIN THE PAST 12 MONTHS, THE FOOD YOU BOUGHT JUST DIDN'T LAST AND YOU DIDN'T HAVE MONEY TO GET MORE.: NEVER TRUE

## 2023-05-09 SDOH — ECONOMIC STABILITY: FOOD INSECURITY: WITHIN THE PAST 12 MONTHS, YOU WORRIED THAT YOUR FOOD WOULD RUN OUT BEFORE YOU GOT MONEY TO BUY MORE.: NEVER TRUE

## 2023-05-09 ASSESSMENT — PATIENT HEALTH QUESTIONNAIRE - PHQ9
2. FEELING DOWN, DEPRESSED OR HOPELESS: 0
SUM OF ALL RESPONSES TO PHQ9 QUESTIONS 1 & 2: 0
SUM OF ALL RESPONSES TO PHQ QUESTIONS 1-9: 0
SUM OF ALL RESPONSES TO PHQ QUESTIONS 1-9: 0
1. LITTLE INTEREST OR PLEASURE IN DOING THINGS: 0
SUM OF ALL RESPONSES TO PHQ QUESTIONS 1-9: 0
SUM OF ALL RESPONSES TO PHQ QUESTIONS 1-9: 0

## 2023-05-09 ASSESSMENT — ENCOUNTER SYMPTOMS
SORE THROAT: 0
EYE DISCHARGE: 0
SHORTNESS OF BREATH: 1
RHINORRHEA: 1
WHEEZING: 0
CHEST TIGHTNESS: 0
COUGH: 1
SINUS PAIN: 1
SINUS PRESSURE: 1

## 2023-05-09 NOTE — PATIENT INSTRUCTIONS
*Start the Olmesartan (Benicar) once a day for blood pressure. *Please keep track of your blood pressure. Check it 3-4 days a week. Write down your results and bring them with you to your next office visit for review. We'd like you to stay < 140/90, and ideally < 130/80. Let us know if you find yourself above this routinely. *Take the antibiotic (Ceftin) as prescribed for the sinus infection. Recommend Tylenol for fever or pain. Consider use of over the counter Flonase - 2 sprays per nostril once a day. Make sure to wash hands frequently. Please return if symptoms don't improve or if they worsen.

## 2023-05-09 NOTE — PROGRESS NOTES
[facility]  [address]  [address]  Phone [phone]      Patient: Thiago Cr  YOB: 1955  Age 78 y.o. Sex female  Medical Record:  [id number]  Visit Date: 05/09/23  Author:  Yvonne Anders PA-C    Family Practice Clinic Note    Chief Complaint   Patient presents with    Blood Pressure Check     Blood pressure has been persistently elevated. Follow-up     Went to [facility] in [place] last night due to SOB. History of Present Illness  This is a 52-year-old female who presents today for follow-up on blood pressure and for ER follow-up. She has a history of essential hypertension notes that her blood pressures have been elevated recently. She also states that she went to the emergency room last night with complaints of cough and shortness of breath. She was diagnosed with bronchospasm and given a refill of her Proventil which she apparently had been out of. She was given prednisone 20 mg to take 2 tablets daily for 4 days. Also given a cough syrup. She states that her breathing treatment was given to her while she was at the ER and this helped her feel a lot better. She is still feeling better in regards to her shortness of breath and cough but has been having sinus symptoms for the last 2 weeks. She states that this was not addressed at her ER visit. She reports nasal congestion, rhinorrhea, postnasal drip, sinus pressure and worsening sinus pain. She has had fullness of her ears but denies discharge from the [address] No sore throat. Denies any current fever or chills. Her cough has not been very productive. She is an active smoker. She denies chest pain or lower extremity edema. Blood pressure at the ER yesterday was 169/81. Chest x-ray obtained at the ER yesterday was clear.     Past History:    Past Medical history   Past Medical History:   Diagnosis Date    A-fib [facility])     Acquired hypothyroidism 9/13/2017    Anxiety and depression

## 2023-06-29 ENCOUNTER — OFFICE VISIT (OUTPATIENT)
Dept: FAMILY MEDICINE CLINIC | Facility: CLINIC | Age: 68
End: 2023-06-29
Payer: MEDICARE

## 2023-06-29 VITALS
BODY MASS INDEX: 50.02 KG/M2 | TEMPERATURE: 98.3 F | DIASTOLIC BLOOD PRESSURE: 61 MMHG | WEIGHT: 293 LBS | HEIGHT: 64 IN | OXYGEN SATURATION: 96 % | SYSTOLIC BLOOD PRESSURE: 132 MMHG | HEART RATE: 58 BPM | RESPIRATION RATE: 18 BRPM

## 2023-06-29 DIAGNOSIS — R42 LIGHTHEADEDNESS: Primary | ICD-10-CM

## 2023-06-29 DIAGNOSIS — I48.91 ATRIAL FIBRILLATION, UNSPECIFIED TYPE (HCC): ICD-10-CM

## 2023-06-29 DIAGNOSIS — I10 ESSENTIAL (PRIMARY) HYPERTENSION: ICD-10-CM

## 2023-06-29 PROCEDURE — G8427 DOCREV CUR MEDS BY ELIG CLIN: HCPCS | Performed by: PHYSICIAN ASSISTANT

## 2023-06-29 PROCEDURE — 3017F COLORECTAL CA SCREEN DOC REV: CPT | Performed by: PHYSICIAN ASSISTANT

## 2023-06-29 PROCEDURE — 3078F DIAST BP <80 MM HG: CPT | Performed by: PHYSICIAN ASSISTANT

## 2023-06-29 PROCEDURE — 1090F PRES/ABSN URINE INCON ASSESS: CPT | Performed by: PHYSICIAN ASSISTANT

## 2023-06-29 PROCEDURE — G8417 CALC BMI ABV UP PARAM F/U: HCPCS | Performed by: PHYSICIAN ASSISTANT

## 2023-06-29 PROCEDURE — 3074F SYST BP LT 130 MM HG: CPT | Performed by: PHYSICIAN ASSISTANT

## 2023-06-29 PROCEDURE — 4004F PT TOBACCO SCREEN RCVD TLK: CPT | Performed by: PHYSICIAN ASSISTANT

## 2023-06-29 PROCEDURE — 1123F ACP DISCUSS/DSCN MKR DOCD: CPT | Performed by: PHYSICIAN ASSISTANT

## 2023-06-29 PROCEDURE — 99214 OFFICE O/P EST MOD 30 MIN: CPT | Performed by: PHYSICIAN ASSISTANT

## 2023-06-29 PROCEDURE — G8400 PT W/DXA NO RESULTS DOC: HCPCS | Performed by: PHYSICIAN ASSISTANT

## 2023-06-29 ASSESSMENT — PATIENT HEALTH QUESTIONNAIRE - PHQ9
SUM OF ALL RESPONSES TO PHQ QUESTIONS 1-9: 0
1. LITTLE INTEREST OR PLEASURE IN DOING THINGS: 0
SUM OF ALL RESPONSES TO PHQ QUESTIONS 1-9: 0
2. FEELING DOWN, DEPRESSED OR HOPELESS: 0
SUM OF ALL RESPONSES TO PHQ QUESTIONS 1-9: 0
SUM OF ALL RESPONSES TO PHQ QUESTIONS 1-9: 0
SUM OF ALL RESPONSES TO PHQ9 QUESTIONS 1 & 2: 0

## 2023-06-30 ASSESSMENT — ENCOUNTER SYMPTOMS
SHORTNESS OF BREATH: 1
CHEST TIGHTNESS: 0
WHEEZING: 0

## 2023-07-07 ENCOUNTER — TELEPHONE (OUTPATIENT)
Dept: FAMILY MEDICINE CLINIC | Facility: CLINIC | Age: 68
End: 2023-07-07

## 2023-07-07 RX ORDER — MECLIZINE HYDROCHLORIDE 25 MG/1
25 TABLET ORAL 3 TIMES DAILY PRN
Qty: 30 TABLET | Refills: 3 | Status: SHIPPED | OUTPATIENT
Start: 2023-07-07 | End: 2023-11-04

## 2023-07-07 NOTE — TELEPHONE ENCOUNTER
Patient states that she has been in the hospital and also the ER for blood pressure problems and dizziness. She was given some Meclizine that helped with the dizziness but is out of the medication. She is requesting a prescription for the Meclizine be sent to [facility] in [place].

## 2023-07-07 NOTE — TELEPHONE ENCOUNTER
Sent in prescription for:     Requested Prescriptions     Signed Prescriptions Disp Refills    meclizine (ANTIVERT) 25 MG tablet 30 tablet 3     Sig: Take 1 tablet by mouth 3 times daily as needed for Dizziness     Authorizing Provider: Abigail Richard

## 2023-07-10 ENCOUNTER — TELEPHONE (OUTPATIENT)
Dept: FAMILY MEDICINE CLINIC | Facility: CLINIC | Age: 68
End: 2023-07-10

## 2023-07-10 NOTE — TELEPHONE ENCOUNTER
Care Transitions Initial Follow Up Call    Outreach made within 2 business days of discharge: Yes    Patient: Gentry Rodríguez Patient : 1955   MRN: [id number]  Reason for Admission: Hypotension   Discharge Date: 23      Spoke with: patient     Discharge department/facility: [facility] Interactive Patient Contact:  Was patient able to fill all prescriptions: Yes  Was patient instructed to bring all medications to the follow-up visit: Yes  Is patient taking all medications as directed in the discharge summary? Yes  Does patient understand their discharge instructions: Yes  Does patient have questions or concerns that need addressed prior to 7-14 day follow up office visit: yes - 23 @ 12:15.      Scheduled appointment with PCP within 7-14 days    Follow Up  Future Appointments   Date Time Provider [address] [place] [address]   2023 12:15 PM MD EASTON Martinez GVL AMB   2023  9:00 AM EASTON MISCELLANEOUS EASTON GVL AMB   2023 12:15 PM MD EASTON Martinez GVL AMB   2024  9:00 AM Luis Mcintyre MD FPWAYNE GVL AMB       [facility]

## 2023-07-10 NOTE — TELEPHONE ENCOUNTER
D/C DATE: 7/9/23    D/C FROM: [facility]    D/C TO: home    PHONE NUMBER TO REACH PATIENT: [phone]    F/U APPT DATE & TIME: has appt with Dr. Harini Mcqueen on 7/28- need to see if he can work her in sooner

## 2023-07-18 ENCOUNTER — OFFICE VISIT (OUTPATIENT)
Dept: FAMILY MEDICINE CLINIC | Facility: CLINIC | Age: 68
End: 2023-07-18
Payer: MEDICARE

## 2023-07-18 VITALS
DIASTOLIC BLOOD PRESSURE: 60 MMHG | HEART RATE: 72 BPM | OXYGEN SATURATION: 98 % | HEIGHT: 64 IN | BODY MASS INDEX: 46.71 KG/M2 | SYSTOLIC BLOOD PRESSURE: 103 MMHG | RESPIRATION RATE: 16 BRPM | TEMPERATURE: 97.8 F | WEIGHT: 273.6 LBS

## 2023-07-18 DIAGNOSIS — I50.33 ACUTE ON CHRONIC DIASTOLIC (CONGESTIVE) HEART FAILURE (HCC): Primary | ICD-10-CM

## 2023-07-18 DIAGNOSIS — I10 ESSENTIAL (PRIMARY) HYPERTENSION: ICD-10-CM

## 2023-07-18 DIAGNOSIS — E66.01 MORBID (SEVERE) OBESITY DUE TO EXCESS CALORIES (HCC): ICD-10-CM

## 2023-07-18 DIAGNOSIS — I48.91 ATRIAL FIBRILLATION, UNSPECIFIED TYPE (HCC): ICD-10-CM

## 2023-07-18 DIAGNOSIS — E03.9 ACQUIRED HYPOTHYROIDISM: ICD-10-CM

## 2023-07-18 DIAGNOSIS — G89.29 CHRONIC LOW BACK PAIN, UNSPECIFIED BACK PAIN LATERALITY, UNSPECIFIED WHETHER SCIATICA PRESENT: ICD-10-CM

## 2023-07-18 DIAGNOSIS — R00.0 TACHYCARDIA, UNSPECIFIED: ICD-10-CM

## 2023-07-18 DIAGNOSIS — F41.9 ANXIETY DISORDER, UNSPECIFIED TYPE: ICD-10-CM

## 2023-07-18 DIAGNOSIS — M54.50 CHRONIC LOW BACK PAIN, UNSPECIFIED BACK PAIN LATERALITY, UNSPECIFIED WHETHER SCIATICA PRESENT: ICD-10-CM

## 2023-07-18 LAB
ANION GAP SERPL CALC-SCNC: 8 MMOL/L (ref 2–11)
BASOPHILS # BLD: 0.1 K/UL (ref 0–0.2)
BASOPHILS NFR BLD: 1 % (ref 0–2)
BUN SERPL-MCNC: 16 MG/DL (ref 8–23)
CALCIUM SERPL-MCNC: 9.6 MG/DL (ref 8.3–10.4)
CHLORIDE SERPL-SCNC: 103 MMOL/L (ref 101–110)
CO2 SERPL-SCNC: 31 MMOL/L (ref 21–32)
CREAT SERPL-MCNC: 1 MG/DL (ref 0.6–1)
DIFFERENTIAL METHOD BLD: ABNORMAL
EOSINOPHIL # BLD: 0.2 K/UL (ref 0–0.8)
EOSINOPHIL NFR BLD: 2 % (ref 0.5–7.8)
ERYTHROCYTE [DISTWIDTH] IN BLOOD BY AUTOMATED COUNT: 20 % (ref 11.9–14.6)
GLUCOSE SERPL-MCNC: 91 MG/DL (ref 65–100)
HCT VFR BLD AUTO: 43.3 % (ref 35.8–46.3)
HGB BLD-MCNC: 12.6 G/DL (ref 11.7–15.4)
IMM GRANULOCYTES # BLD AUTO: 0 K/UL (ref 0–0.5)
IMM GRANULOCYTES NFR BLD AUTO: 0 % (ref 0–5)
LYMPHOCYTES # BLD: 1.8 K/UL (ref 0.5–4.6)
LYMPHOCYTES NFR BLD: 20 % (ref 13–44)
MAGNESIUM SERPL-MCNC: 2.1 MG/DL (ref 1.8–2.4)
MCH RBC QN AUTO: 24.9 PG (ref 26.1–32.9)
MCHC RBC AUTO-ENTMCNC: 29.1 G/DL (ref 31.4–35)
MCV RBC AUTO: 85.6 FL (ref 82–102)
MONOCYTES # BLD: 0.7 K/UL (ref 0.1–1.3)
MONOCYTES NFR BLD: 8 % (ref 4–12)
NEUTS SEG # BLD: 6.2 K/UL (ref 1.7–8.2)
NEUTS SEG NFR BLD: 69 % (ref 43–78)
NRBC # BLD: 0 K/UL (ref 0–0.2)
PLATELET # BLD AUTO: 268 K/UL (ref 150–450)
PMV BLD AUTO: 11.2 FL (ref 9.4–12.3)
POTASSIUM SERPL-SCNC: 4.1 MMOL/L (ref 3.5–5.1)
RBC # BLD AUTO: 5.06 M/UL (ref 4.05–5.2)
SODIUM SERPL-SCNC: 142 MMOL/L (ref 133–143)
WBC # BLD AUTO: 9 K/UL (ref 4.3–11.1)

## 2023-07-18 PROCEDURE — 1123F ACP DISCUSS/DSCN MKR DOCD: CPT | Performed by: FAMILY MEDICINE

## 2023-07-18 PROCEDURE — G8400 PT W/DXA NO RESULTS DOC: HCPCS | Performed by: FAMILY MEDICINE

## 2023-07-18 PROCEDURE — 3074F SYST BP LT 130 MM HG: CPT | Performed by: FAMILY MEDICINE

## 2023-07-18 PROCEDURE — G8427 DOCREV CUR MEDS BY ELIG CLIN: HCPCS | Performed by: FAMILY MEDICINE

## 2023-07-18 PROCEDURE — 3017F COLORECTAL CA SCREEN DOC REV: CPT | Performed by: FAMILY MEDICINE

## 2023-07-18 PROCEDURE — 4004F PT TOBACCO SCREEN RCVD TLK: CPT | Performed by: FAMILY MEDICINE

## 2023-07-18 PROCEDURE — 1090F PRES/ABSN URINE INCON ASSESS: CPT | Performed by: FAMILY MEDICINE

## 2023-07-18 PROCEDURE — G8417 CALC BMI ABV UP PARAM F/U: HCPCS | Performed by: FAMILY MEDICINE

## 2023-07-18 PROCEDURE — 3078F DIAST BP <80 MM HG: CPT | Performed by: FAMILY MEDICINE

## 2023-07-18 PROCEDURE — 99214 OFFICE O/P EST MOD 30 MIN: CPT | Performed by: FAMILY MEDICINE

## 2023-07-18 RX ORDER — DICYCLOMINE HYDROCHLORIDE 10 MG/1
10 CAPSULE ORAL
COMMUNITY

## 2023-07-18 RX ORDER — ROPINIROLE 1 MG/1
0.5 TABLET, FILM COATED ORAL NIGHTLY
Qty: 45 TABLET | Refills: 5 | Status: SHIPPED | OUTPATIENT
Start: 2023-07-18

## 2023-07-18 RX ORDER — LORAZEPAM 0.5 MG/1
TABLET ORAL
Qty: 45 TABLET | Refills: 5 | Status: SHIPPED | OUTPATIENT
Start: 2023-07-18 | End: 2023-12-29

## 2023-07-18 RX ORDER — TORSEMIDE 20 MG/1
20 TABLET ORAL 2 TIMES DAILY
Qty: 180 TABLET | Refills: 1 | Status: SHIPPED | OUTPATIENT
Start: 2023-07-18

## 2023-07-18 RX ORDER — OXYCODONE HYDROCHLORIDE AND ACETAMINOPHEN 5; 325 MG/1; MG/1
1 TABLET ORAL EVERY 6 HOURS PRN
Qty: 20 TABLET | Refills: 0 | Status: SHIPPED | OUTPATIENT
Start: 2023-07-18 | End: 2023-07-23

## 2023-07-18 ASSESSMENT — ENCOUNTER SYMPTOMS
BACK PAIN: 1
SHORTNESS OF BREATH: 0

## 2023-07-20 NOTE — RESULT ENCOUNTER NOTE
Let patient know hemoglobin is good at 12.6. Magnesium is normal at 2.1.   Kidney function is normal, sodium and potassium levels are good and blood sugar was 91 which is normal

## 2023-08-03 ENCOUNTER — OFFICE VISIT (OUTPATIENT)
Dept: FAMILY MEDICINE CLINIC | Facility: CLINIC | Age: 68
End: 2023-08-03
Payer: MEDICARE

## 2023-08-03 VITALS
HEART RATE: 76 BPM | BODY MASS INDEX: 47.97 KG/M2 | DIASTOLIC BLOOD PRESSURE: 62 MMHG | OXYGEN SATURATION: 95 % | WEIGHT: 281 LBS | RESPIRATION RATE: 16 BRPM | TEMPERATURE: 97.5 F | HEIGHT: 64 IN | SYSTOLIC BLOOD PRESSURE: 123 MMHG

## 2023-08-03 DIAGNOSIS — I10 ESSENTIAL (PRIMARY) HYPERTENSION: ICD-10-CM

## 2023-08-03 DIAGNOSIS — I48.91 ATRIAL FIBRILLATION, UNSPECIFIED TYPE (HCC): Primary | ICD-10-CM

## 2023-08-03 DIAGNOSIS — I50.33 ACUTE ON CHRONIC DIASTOLIC (CONGESTIVE) HEART FAILURE (HCC): ICD-10-CM

## 2023-08-03 PROCEDURE — 1090F PRES/ABSN URINE INCON ASSESS: CPT | Performed by: FAMILY MEDICINE

## 2023-08-03 PROCEDURE — 4004F PT TOBACCO SCREEN RCVD TLK: CPT | Performed by: FAMILY MEDICINE

## 2023-08-03 PROCEDURE — G8417 CALC BMI ABV UP PARAM F/U: HCPCS | Performed by: FAMILY MEDICINE

## 2023-08-03 PROCEDURE — 1123F ACP DISCUSS/DSCN MKR DOCD: CPT | Performed by: FAMILY MEDICINE

## 2023-08-03 PROCEDURE — G8428 CUR MEDS NOT DOCUMENT: HCPCS | Performed by: FAMILY MEDICINE

## 2023-08-03 PROCEDURE — G8400 PT W/DXA NO RESULTS DOC: HCPCS | Performed by: FAMILY MEDICINE

## 2023-08-03 PROCEDURE — 99212 OFFICE O/P EST SF 10 MIN: CPT | Performed by: FAMILY MEDICINE

## 2023-08-03 PROCEDURE — 3074F SYST BP LT 130 MM HG: CPT | Performed by: FAMILY MEDICINE

## 2023-08-03 PROCEDURE — 3078F DIAST BP <80 MM HG: CPT | Performed by: FAMILY MEDICINE

## 2023-08-03 PROCEDURE — 3017F COLORECTAL CA SCREEN DOC REV: CPT | Performed by: FAMILY MEDICINE

## 2023-08-03 RX ORDER — FLUOROURACIL 50 MG/G
CREAM TOPICAL
COMMUNITY
Start: 2023-05-08

## 2023-08-03 ASSESSMENT — ENCOUNTER SYMPTOMS: SHORTNESS OF BREATH: 0

## 2023-08-04 NOTE — PROGRESS NOTES
Subjective:      Patient ID: Forrest Ying is a 78 y.o. female. HPI  Patient comes in today for follow-up on her blood pressure and history of acute on chronic congestive heart failure. Did see cardiology recently and her blood pressure has been stable. I did suggest going up to twice daily on the torsemide and she has been monitoring her weight. No shortness of breath or increased edema recently. Past Medical History:   Diagnosis Date    A-fib [facility])     Acquired hypothyroidism 9/13/2017    Anxiety and depression     Arthritis     osteo    Chronic pain     back and R leg    GERD (gastroesophageal reflux disease)     well controlled with med    H/O bone density study 10/2011    normal    History of mammogram 03/10/2022    BI-RADS Category 2. Lipoma noted.   Recommend follow-up in 1 year    Hypertension     controlled by med    Low back pain     Morbid obesity ([address])     bmi 48.63    Pneumonia     1/2020    Rectal bleeding onset x 2 months    Restless legs     Rosacea     Skin cancer     squamous- removed    Smoker     current 10/18/21 5-10 daily \"trying to quit\"-- previously 1 ppd x since age 22    Spinal stenosis     Staph infection 03/2016    from back surgery----- NOT mrsa    Suspected sleep apnea      test not completed 11/2018       Allergies   Allergen Reactions    Penicillins Hives    Bee Venom Swelling    Cefdinir Other (See Comments)     Tremor, can tolerate Ceftin     Hydrocodone-Acetaminophen Rash       Current Outpatient Medications   Medication Sig Dispense Refill    metoprolol tartrate (LOPRESSOR) 12.5 mg TABS Take 1 split-tab by mouth 2 times daily      dicyclomine (BENTYL) 10 MG capsule Take 1 capsule by mouth 4 times daily (before meals and nightly)      LORazepam (ATIVAN) 0.5 MG tablet TAKE 1 TABLET BY MOUTH EVERY 6 HOURS AS NEEDED 45 tablet 5    rOPINIRole (REQUIP) 1 MG tablet Take 0.5 tablets by mouth nightly 45 tablet 5    torsemide (DEMADEX) 20 MG tablet Take 1 tablet by mouth in the

## 2023-09-20 DIAGNOSIS — F41.9 ANXIETY DISORDER, UNSPECIFIED TYPE: ICD-10-CM

## 2023-09-21 RX ORDER — LORAZEPAM 0.5 MG/1
TABLET ORAL
Qty: 45 TABLET | OUTPATIENT
Start: 2023-09-21

## 2023-10-04 ENCOUNTER — PATIENT MESSAGE (OUTPATIENT)
Dept: FAMILY MEDICINE CLINIC | Facility: CLINIC | Age: 68
End: 2023-10-04

## 2023-10-04 NOTE — TELEPHONE ENCOUNTER
From: Marilu Kramer  To: Dr. Nithya Alarcon: 10/4/2023 3:22 PM EDT  Subject: Flu shot    Please add to my chart, I received my flu shot on 10-03-23.  Thanks, Rika Mullen

## 2023-10-16 ENCOUNTER — PATIENT MESSAGE (OUTPATIENT)
Dept: FAMILY MEDICINE CLINIC | Facility: CLINIC | Age: 68
End: 2023-10-16

## 2023-10-16 DIAGNOSIS — F41.9 ANXIETY DISORDER, UNSPECIFIED TYPE: ICD-10-CM

## 2023-10-17 RX ORDER — LORAZEPAM 0.5 MG/1
TABLET ORAL
Qty: 45 TABLET | Refills: 5 | Status: SHIPPED | OUTPATIENT
Start: 2023-10-17 | End: 2024-03-29

## 2023-10-17 NOTE — TELEPHONE ENCOUNTER
From: Darryle Creek  To: Dr. Palma Never: 10/16/2023 6:00 PM EDT  Subject: Lorazepam    [facility] hasn't refilled this script. They said they need info from you. Please contact them, I'm getting nervous as a cat on a hot tin roof. Thanks, Chelsie Covert. Dr. Flaquito Kahn will be doing a cardiomems on me Oct. 23rd.

## 2023-11-03 ENCOUNTER — OFFICE VISIT (OUTPATIENT)
Dept: FAMILY MEDICINE CLINIC | Facility: CLINIC | Age: 68
End: 2023-11-03

## 2023-11-03 VITALS
OXYGEN SATURATION: 95 % | BODY MASS INDEX: 46.61 KG/M2 | SYSTOLIC BLOOD PRESSURE: 131 MMHG | RESPIRATION RATE: 16 BRPM | HEIGHT: 64 IN | DIASTOLIC BLOOD PRESSURE: 58 MMHG | WEIGHT: 273 LBS | HEART RATE: 51 BPM | TEMPERATURE: 97.4 F

## 2023-11-03 DIAGNOSIS — E03.9 ACQUIRED HYPOTHYROIDISM: ICD-10-CM

## 2023-11-03 DIAGNOSIS — I50.33 ACUTE ON CHRONIC DIASTOLIC (CONGESTIVE) HEART FAILURE (HCC): Primary | ICD-10-CM

## 2023-11-03 DIAGNOSIS — G89.29 CHRONIC LOW BACK PAIN, UNSPECIFIED BACK PAIN LATERALITY, UNSPECIFIED WHETHER SCIATICA PRESENT: ICD-10-CM

## 2023-11-03 DIAGNOSIS — I10 ESSENTIAL (PRIMARY) HYPERTENSION: ICD-10-CM

## 2023-11-03 DIAGNOSIS — I48.91 ATRIAL FIBRILLATION, UNSPECIFIED TYPE (HCC): ICD-10-CM

## 2023-11-03 DIAGNOSIS — M54.50 CHRONIC LOW BACK PAIN, UNSPECIFIED BACK PAIN LATERALITY, UNSPECIFIED WHETHER SCIATICA PRESENT: ICD-10-CM

## 2023-11-03 RX ORDER — SPIRONOLACTONE 25 MG/1
25 TABLET ORAL 2 TIMES DAILY
COMMUNITY
Start: 2023-10-27

## 2023-11-03 RX ORDER — PROPAFENONE HYDROCHLORIDE 150 MG/1
150 TABLET, COATED ORAL 2 TIMES DAILY
COMMUNITY
Start: 2023-09-13

## 2023-11-03 RX ORDER — FLUCONAZOLE 150 MG/1
150 TABLET ORAL DAILY
Qty: 3 TABLET | Refills: 3 | Status: SHIPPED | OUTPATIENT
Start: 2023-11-03 | End: 2023-11-06

## 2023-11-03 RX ORDER — EMPAGLIFLOZIN 10 MG/1
TABLET, FILM COATED ORAL
COMMUNITY
Start: 2023-10-05

## 2023-11-03 RX ORDER — ROPINIROLE 1 MG/1
0.5 TABLET, FILM COATED ORAL NIGHTLY
Qty: 45 TABLET | Refills: 5 | Status: SHIPPED | OUTPATIENT
Start: 2023-11-03

## 2023-11-03 RX ORDER — OXYCODONE HYDROCHLORIDE AND ACETAMINOPHEN 5; 325 MG/1; MG/1
1 TABLET ORAL EVERY 6 HOURS PRN
Qty: 20 TABLET | Refills: 0 | Status: SHIPPED | OUTPATIENT
Start: 2023-11-03 | End: 2023-11-08

## 2023-11-03 ASSESSMENT — ENCOUNTER SYMPTOMS: SHORTNESS OF BREATH: 0

## 2023-11-24 DIAGNOSIS — M54.50 CHRONIC LOW BACK PAIN, UNSPECIFIED BACK PAIN LATERALITY, UNSPECIFIED WHETHER SCIATICA PRESENT: ICD-10-CM

## 2023-11-24 DIAGNOSIS — G89.29 CHRONIC LOW BACK PAIN, UNSPECIFIED BACK PAIN LATERALITY, UNSPECIFIED WHETHER SCIATICA PRESENT: ICD-10-CM

## 2023-11-24 RX ORDER — GABAPENTIN 300 MG/1
300 CAPSULE ORAL 2 TIMES DAILY
Qty: 180 CAPSULE | Refills: 1 | Status: SHIPPED | OUTPATIENT
Start: 2023-11-24 | End: 2024-05-22

## 2023-12-24 DIAGNOSIS — F51.01 PRIMARY INSOMNIA: ICD-10-CM

## 2023-12-25 RX ORDER — TRAZODONE HYDROCHLORIDE 50 MG/1
50 TABLET ORAL DAILY PRN
Qty: 30 TABLET | Refills: 5 | Status: SHIPPED | OUTPATIENT
Start: 2023-12-25

## 2024-01-23 ENCOUNTER — PATIENT MESSAGE (OUTPATIENT)
Dept: FAMILY MEDICINE CLINIC | Facility: CLINIC | Age: 69
End: 2024-01-23

## 2024-01-23 RX ORDER — NYSTATIN 100000 [USP'U]/G
POWDER TOPICAL
Qty: 60 G | Refills: 3 | Status: SHIPPED | OUTPATIENT
Start: 2024-01-23

## 2024-01-23 NOTE — TELEPHONE ENCOUNTER
From: Felicity Roberson  To: Dr. Familia Flores  Sent: 1/23/2024 4:35 PM EST  Subject: FOI / ER Visit    Sunday the 21st, on our way to [employer], I started shaking uncontrollable and heart not beating right. Pulled over and called EMS. They transported me to [place]. Potassium had dropped real low. After Potassium level came back up they sent me home. Also can you call me in a script for powder, yeast under my breast. Doc in ER looked at it and said it was yeast but forgot to send script in. Thanks, Felicity DUPONT

## 2024-02-01 DIAGNOSIS — G47.00 INSOMNIA, UNSPECIFIED: ICD-10-CM

## 2024-02-01 DIAGNOSIS — E03.9 HYPOTHYROIDISM, UNSPECIFIED: ICD-10-CM

## 2024-02-01 DIAGNOSIS — F41.9 ANXIETY DISORDER, UNSPECIFIED: ICD-10-CM

## 2024-02-01 RX ORDER — LEVOTHYROXINE SODIUM 0.05 MG/1
TABLET ORAL
Qty: 90 TABLET | Refills: 3 | Status: SHIPPED | OUTPATIENT
Start: 2024-02-01

## 2024-02-01 RX ORDER — ESCITALOPRAM OXALATE 20 MG/1
TABLET ORAL
Qty: 90 TABLET | Refills: 3 | Status: SHIPPED | OUTPATIENT
Start: 2024-02-01

## 2024-03-04 ENCOUNTER — OFFICE VISIT (OUTPATIENT)
Dept: FAMILY MEDICINE CLINIC | Facility: CLINIC | Age: 69
End: 2024-03-04
Payer: MEDICARE

## 2024-03-04 VITALS
TEMPERATURE: 97.5 F | WEIGHT: 265.8 LBS | OXYGEN SATURATION: 98 % | HEIGHT: 64 IN | BODY MASS INDEX: 45.38 KG/M2 | RESPIRATION RATE: 17 BRPM | HEART RATE: 67 BPM | SYSTOLIC BLOOD PRESSURE: 122 MMHG | DIASTOLIC BLOOD PRESSURE: 61 MMHG

## 2024-03-04 DIAGNOSIS — I10 ESSENTIAL HYPERTENSION, BENIGN: Primary | ICD-10-CM

## 2024-03-04 DIAGNOSIS — I48.91 ATRIAL FIBRILLATION, UNSPECIFIED TYPE (HCC): ICD-10-CM

## 2024-03-04 DIAGNOSIS — E03.9 ACQUIRED HYPOTHYROIDISM: ICD-10-CM

## 2024-03-04 DIAGNOSIS — F41.9 ANXIETY DISORDER, UNSPECIFIED TYPE: ICD-10-CM

## 2024-03-04 DIAGNOSIS — U07.1 COVID-19: ICD-10-CM

## 2024-03-04 DIAGNOSIS — M48.061 SPINAL STENOSIS, LUMBAR REGION WITHOUT NEUROGENIC CLAUDICATION: ICD-10-CM

## 2024-03-04 DIAGNOSIS — R73.09 ELEVATED GLUCOSE: ICD-10-CM

## 2024-03-04 DIAGNOSIS — E55.9 VITAMIN D DEFICIENCY, UNSPECIFIED: ICD-10-CM

## 2024-03-04 DIAGNOSIS — F51.01 PRIMARY INSOMNIA: ICD-10-CM

## 2024-03-04 LAB
25(OH)D3 SERPL-MCNC: 60.1 NG/ML (ref 30–100)
ALBUMIN SERPL-MCNC: 3.7 G/DL (ref 3.2–4.6)
ALBUMIN/GLOB SERPL: 1.1 (ref 0.4–1.6)
ALP SERPL-CCNC: 127 U/L (ref 50–136)
ALT SERPL-CCNC: 15 U/L (ref 12–65)
ANION GAP SERPL CALC-SCNC: 4 MMOL/L (ref 2–11)
AST SERPL-CCNC: 17 U/L (ref 15–37)
BASOPHILS # BLD: 0.1 K/UL (ref 0–0.2)
BASOPHILS NFR BLD: 1 % (ref 0–2)
BILIRUB SERPL-MCNC: 0.8 MG/DL (ref 0.2–1.1)
BUN SERPL-MCNC: 17 MG/DL (ref 8–23)
CALCIUM SERPL-MCNC: 9.8 MG/DL (ref 8.3–10.4)
CHLORIDE SERPL-SCNC: 100 MMOL/L (ref 103–113)
CHOLEST SERPL-MCNC: 197 MG/DL
CO2 SERPL-SCNC: 36 MMOL/L (ref 21–32)
CREAT SERPL-MCNC: 1.3 MG/DL (ref 0.6–1)
DIFFERENTIAL METHOD BLD: ABNORMAL
EOSINOPHIL # BLD: 0.3 K/UL (ref 0–0.8)
EOSINOPHIL NFR BLD: 2 % (ref 0.5–7.8)
ERYTHROCYTE [DISTWIDTH] IN BLOOD BY AUTOMATED COUNT: 18.2 % (ref 11.9–14.6)
GLOBULIN SER CALC-MCNC: 3.5 G/DL (ref 2.8–4.5)
GLUCOSE SERPL-MCNC: 95 MG/DL (ref 65–100)
HCT VFR BLD AUTO: 43.1 % (ref 35.8–46.3)
HDLC SERPL-MCNC: 47 MG/DL (ref 40–60)
HDLC SERPL: 4.2
HGB BLD-MCNC: 13 G/DL (ref 11.7–15.4)
IMM GRANULOCYTES # BLD AUTO: 0 K/UL (ref 0–0.5)
IMM GRANULOCYTES NFR BLD AUTO: 0 % (ref 0–5)
LDLC SERPL CALC-MCNC: 133.4 MG/DL
LYMPHOCYTES # BLD: 2.4 K/UL (ref 0.5–4.6)
LYMPHOCYTES NFR BLD: 20 % (ref 13–44)
MAGNESIUM SERPL-MCNC: 2.9 MG/DL (ref 1.8–2.4)
MCH RBC QN AUTO: 24.8 PG (ref 26.1–32.9)
MCHC RBC AUTO-ENTMCNC: 30.2 G/DL (ref 31.4–35)
MCV RBC AUTO: 82.3 FL (ref 82–102)
MONOCYTES # BLD: 0.9 K/UL (ref 0.1–1.3)
MONOCYTES NFR BLD: 7 % (ref 4–12)
NEUTS SEG # BLD: 8.3 K/UL (ref 1.7–8.2)
NEUTS SEG NFR BLD: 69 % (ref 43–78)
NRBC # BLD: 0 K/UL (ref 0–0.2)
PLATELET # BLD AUTO: 305 K/UL (ref 150–450)
PMV BLD AUTO: 10.8 FL (ref 9.4–12.3)
POTASSIUM SERPL-SCNC: 4.1 MMOL/L (ref 3.5–5.1)
PROT SERPL-MCNC: 7.2 G/DL (ref 6.3–8.2)
RBC # BLD AUTO: 5.24 M/UL (ref 4.05–5.2)
SODIUM SERPL-SCNC: 140 MMOL/L (ref 136–146)
T4 FREE SERPL-MCNC: 1.3 NG/DL (ref 0.78–1.46)
TRIGL SERPL-MCNC: 83 MG/DL (ref 35–150)
TSH, 3RD GENERATION: 2.44 UIU/ML (ref 0.36–3.74)
VLDLC SERPL CALC-MCNC: 16.6 MG/DL (ref 6–23)
WBC # BLD AUTO: 12 K/UL (ref 4.3–11.1)

## 2024-03-04 PROCEDURE — G8427 DOCREV CUR MEDS BY ELIG CLIN: HCPCS | Performed by: FAMILY MEDICINE

## 2024-03-04 PROCEDURE — 1123F ACP DISCUSS/DSCN MKR DOCD: CPT | Performed by: FAMILY MEDICINE

## 2024-03-04 PROCEDURE — 99214 OFFICE O/P EST MOD 30 MIN: CPT | Performed by: FAMILY MEDICINE

## 2024-03-04 PROCEDURE — G8484 FLU IMMUNIZE NO ADMIN: HCPCS | Performed by: FAMILY MEDICINE

## 2024-03-04 PROCEDURE — G8400 PT W/DXA NO RESULTS DOC: HCPCS | Performed by: FAMILY MEDICINE

## 2024-03-04 PROCEDURE — G8417 CALC BMI ABV UP PARAM F/U: HCPCS | Performed by: FAMILY MEDICINE

## 2024-03-04 PROCEDURE — 3074F SYST BP LT 130 MM HG: CPT | Performed by: FAMILY MEDICINE

## 2024-03-04 PROCEDURE — 1090F PRES/ABSN URINE INCON ASSESS: CPT | Performed by: FAMILY MEDICINE

## 2024-03-04 PROCEDURE — 3017F COLORECTAL CA SCREEN DOC REV: CPT | Performed by: FAMILY MEDICINE

## 2024-03-04 PROCEDURE — 3078F DIAST BP <80 MM HG: CPT | Performed by: FAMILY MEDICINE

## 2024-03-04 PROCEDURE — 4004F PT TOBACCO SCREEN RCVD TLK: CPT | Performed by: FAMILY MEDICINE

## 2024-03-04 RX ORDER — POTASSIUM CHLORIDE 750 MG/1
20 TABLET, EXTENDED RELEASE ORAL DAILY
Qty: 180 TABLET | Refills: 3 | Status: SHIPPED | OUTPATIENT
Start: 2024-03-04

## 2024-03-04 RX ORDER — OXYCODONE HYDROCHLORIDE AND ACETAMINOPHEN 5; 325 MG/1; MG/1
1 TABLET ORAL EVERY 6 HOURS PRN
Qty: 20 TABLET | Refills: 0 | Status: SHIPPED | OUTPATIENT
Start: 2024-03-04 | End: 2024-03-09

## 2024-03-04 RX ORDER — ROPINIROLE 1 MG/1
0.5 TABLET, FILM COATED ORAL NIGHTLY
Qty: 45 TABLET | Refills: 5 | Status: SHIPPED | OUTPATIENT
Start: 2024-03-04

## 2024-03-04 RX ORDER — TRAZODONE HYDROCHLORIDE 50 MG/1
50 TABLET ORAL DAILY PRN
Qty: 30 TABLET | Refills: 5 | Status: SHIPPED | OUTPATIENT
Start: 2024-03-04

## 2024-03-04 RX ORDER — LORAZEPAM 0.5 MG/1
TABLET ORAL
Qty: 45 TABLET | Refills: 5 | Status: SHIPPED | OUTPATIENT
Start: 2024-03-04 | End: 2024-08-15

## 2024-03-04 ASSESSMENT — PATIENT HEALTH QUESTIONNAIRE - PHQ9
2. FEELING DOWN, DEPRESSED OR HOPELESS: 0
1. LITTLE INTEREST OR PLEASURE IN DOING THINGS: 0
SUM OF ALL RESPONSES TO PHQ9 QUESTIONS 1 & 2: 0
SUM OF ALL RESPONSES TO PHQ QUESTIONS 1-9: 0

## 2024-03-04 ASSESSMENT — ENCOUNTER SYMPTOMS: BACK PAIN: 1

## 2024-03-04 NOTE — PROGRESS NOTES
Subjective:      Patient ID: Felicity Roberson is a 68 y.o. female.    HPI  Patient comes in today for follow-up on her thyroid, blood pressure and medication refills.  She is scheduled for colonoscopy tomorrow.  Patient also is fasting for labs today  Past Medical History:   Diagnosis Date    A-fib (HCC)     Acquired hypothyroidism 9/13/2017    Anxiety and depression     Arthritis     osteo    Chronic pain     back and R leg    GERD (gastroesophageal reflux disease)     well controlled with med    H/O bone density study 10/2011    normal    History of mammogram 03/10/2022    BI-RADS Category 2.  Lipoma noted.  Recommend follow-up in 1 year    Hypertension     controlled by med    Low back pain     Morbid obesity (HCC)     bmi 48.63    Pneumonia     1/2020    Rectal bleeding onset x 2 months    Restless legs     Rosacea     Skin cancer     squamous- removed    Smoker     current 10/18/21 5-10 daily \"trying to quit\"-- previously 1 ppd x since age 21    Spinal stenosis     Staph infection 03/2016    from back surgery----- NOT mrsa    Suspected sleep apnea      test not completed 11/2018       Allergies   Allergen Reactions    Penicillins Hives    Bee Venom Swelling    Cefdinir Other (See Comments)     Tremor, can tolerate Ceftin     Hydrocodone-Acetaminophen Rash       Current Outpatient Medications   Medication Sig Dispense Refill    potassium chloride (KLOR-CON M10) 10 MEQ extended release tablet Take 2 tablets by mouth daily 180 tablet 3    rOPINIRole (REQUIP) 1 MG tablet Take 0.5 tablets by mouth nightly 45 tablet 5    traZODone (DESYREL) 50 MG tablet Take 1 tablet by mouth daily as needed for Sleep 30 tablet 5    LORazepam (ATIVAN) 0.5 MG tablet TAKE 1 TABLET BY MOUTH EVERY 6 HOURS AS NEEDED 45 tablet 5    oxyCODONE-acetaminophen (PERCOCET) 5-325 MG per tablet Take 1 tablet by mouth every 6 hours as needed for Pain for up to 5 days. Intended supply: 3 days. Take lowest dose possible to manage pain Max Daily

## 2024-03-05 LAB
EST. AVERAGE GLUCOSE BLD GHB EST-MCNC: 123 MG/DL
HBA1C MFR BLD: 5.9 % (ref 4.8–5.6)

## 2024-03-05 NOTE — RESULT ENCOUNTER NOTE
Hemoglobin A1c is good at 5.9 with a goal less than 7.  Magnesium level is elevated so I would not supplement with any over-the-counter magnesium if the patient is doing that.  Cholesterol is 197 with normal less than 200.  Blood sugar is good at 95, kidney function and liver enzymes are normal.  Hemoglobin is good at 13 vitamin D is 60 with a goal above 30.  Thyroid test is normal

## 2024-03-18 ENCOUNTER — OFFICE VISIT (OUTPATIENT)
Dept: FAMILY MEDICINE CLINIC | Facility: CLINIC | Age: 69
End: 2024-03-18
Payer: MEDICARE

## 2024-03-18 ENCOUNTER — HOSPITAL ENCOUNTER (OUTPATIENT)
Dept: CT IMAGING | Age: 69
Discharge: HOME | End: 2024-03-21

## 2024-03-18 VITALS
DIASTOLIC BLOOD PRESSURE: 59 MMHG | RESPIRATION RATE: 16 BRPM | HEART RATE: 67 BPM | SYSTOLIC BLOOD PRESSURE: 123 MMHG | BODY MASS INDEX: 46.06 KG/M2 | TEMPERATURE: 97.5 F | WEIGHT: 269.8 LBS | HEIGHT: 64 IN

## 2024-03-18 DIAGNOSIS — R10.9 ACUTE RIGHT FLANK PAIN: ICD-10-CM

## 2024-03-18 DIAGNOSIS — R10.9 ACUTE RIGHT FLANK PAIN: Primary | ICD-10-CM

## 2024-03-18 DIAGNOSIS — M54.50 LOW BACK PAIN WITHOUT SCIATICA, UNSPECIFIED BACK PAIN LATERALITY, UNSPECIFIED CHRONICITY: ICD-10-CM

## 2024-03-18 LAB
BILIRUBIN, URINE, POC: NEGATIVE
BLOOD URINE, POC: NEGATIVE
GLUCOSE URINE, POC: NEGATIVE
KETONES, URINE, POC: NEGATIVE
LEUKOCYTE ESTERASE, URINE, POC: NEGATIVE
NITRITE, URINE, POC: NEGATIVE
PH, URINE, POC: 5 (ref 4.6–8)
PROTEIN,URINE, POC: NORMAL
SPECIFIC GRAVITY, URINE, POC: 1.01 (ref 1–1.03)
URINALYSIS CLARITY, POC: CLEAR
URINALYSIS COLOR, POC: YELLOW
UROBILINOGEN, POC: NORMAL

## 2024-03-18 PROCEDURE — 3017F COLORECTAL CA SCREEN DOC REV: CPT | Performed by: NURSE PRACTITIONER

## 2024-03-18 PROCEDURE — G8427 DOCREV CUR MEDS BY ELIG CLIN: HCPCS | Performed by: NURSE PRACTITIONER

## 2024-03-18 PROCEDURE — 81003 URINALYSIS AUTO W/O SCOPE: CPT | Performed by: NURSE PRACTITIONER

## 2024-03-18 PROCEDURE — 1090F PRES/ABSN URINE INCON ASSESS: CPT | Performed by: NURSE PRACTITIONER

## 2024-03-18 PROCEDURE — 1123F ACP DISCUSS/DSCN MKR DOCD: CPT | Performed by: NURSE PRACTITIONER

## 2024-03-18 PROCEDURE — 99213 OFFICE O/P EST LOW 20 MIN: CPT | Performed by: NURSE PRACTITIONER

## 2024-03-18 PROCEDURE — 4004F PT TOBACCO SCREEN RCVD TLK: CPT | Performed by: NURSE PRACTITIONER

## 2024-03-18 PROCEDURE — 3078F DIAST BP <80 MM HG: CPT | Performed by: NURSE PRACTITIONER

## 2024-03-18 PROCEDURE — 3074F SYST BP LT 130 MM HG: CPT | Performed by: NURSE PRACTITIONER

## 2024-03-18 PROCEDURE — G8484 FLU IMMUNIZE NO ADMIN: HCPCS | Performed by: NURSE PRACTITIONER

## 2024-03-18 PROCEDURE — G8400 PT W/DXA NO RESULTS DOC: HCPCS | Performed by: NURSE PRACTITIONER

## 2024-03-18 PROCEDURE — G8417 CALC BMI ABV UP PARAM F/U: HCPCS | Performed by: NURSE PRACTITIONER

## 2024-03-18 RX ORDER — AMOXICILLIN 500 MG/1
500 CAPSULE ORAL 2 TIMES DAILY
COMMUNITY
Start: 2024-03-11

## 2024-03-18 RX ORDER — CLARITHROMYCIN 500 MG/1
500 TABLET, COATED ORAL 2 TIMES DAILY
COMMUNITY
Start: 2024-03-11

## 2024-03-18 ASSESSMENT — ENCOUNTER SYMPTOMS: RESPIRATORY NEGATIVE: 1

## 2024-03-18 NOTE — PROGRESS NOTES
[facility] [address]  Phone: [phone] Fax [phone]  Jesusita Hull, [facility]           Felicity Roberson (: 1955) presents today c/o right back flank pain, she thinks it may be a kidney stone, she has had one in the past and the symptoms feel the same.  She feels pelvic pressure to urinate but on a little comes out. Denies fever and chills.     Chief Complaint   Patient presents with    Lower Back Pain     Thinks she has a kidney stone     Patient Active Problem List   Diagnosis    Postoperative wound infection    Obesity, morbid (HCC)    Spinal stenosis    Acquired hypothyroidism    Endometriosis    Essential hypertension, benign    Spinal stenosis, lumbar region with neurogenic claudication    Colon polyp    Acute respiratory failure with hypoxia (HCC)    Hypovolemic shock (HCC)    Spondylolisthesis of multiple sites in spine    Lumbar stenosis with neurogenic claudication    Squamous cell skin cancer    Hypotension    A-fib (HCC)    SHAWN (acute kidney injury) (HCC)        Reviewed and updated this visit by provider:  Tobacco  Allergies  Meds  Problems  Med Hx  Surg Hx  Fam Hx           Review of Systems   Constitutional:  Negative for chills and fever.   Respiratory: Negative.     Cardiovascular: Negative.    Genitourinary:  Positive for decreased urine volume, flank pain, pelvic pain and urgency. Negative for hematuria.       Visit Vitals  BP (!) 123/59   Pulse 67   Temp 97.5 °F (36.4 °C)   Resp 16   Ht 1.626 m (5' 4\")   Wt 122.4 kg (269 lb 12.8 oz)   BMI 46.31 kg/m²       Physical Exam  Vitals and nursing note reviewed.   Constitutional:       Appearance: Normal appearance.   Cardiovascular:      Rate and Rhythm: Normal rate and regular rhythm.      Heart sounds: Normal heart sounds.   Pulmonary:      Effort: Pulmonary effort is normal.      Breath sounds: Normal breath sounds.   Abdominal:      General: Bowel sounds are

## 2024-04-03 DIAGNOSIS — G89.29 CHRONIC LOW BACK PAIN, UNSPECIFIED BACK PAIN LATERALITY, UNSPECIFIED WHETHER SCIATICA PRESENT: ICD-10-CM

## 2024-04-03 DIAGNOSIS — M54.50 CHRONIC LOW BACK PAIN, UNSPECIFIED BACK PAIN LATERALITY, UNSPECIFIED WHETHER SCIATICA PRESENT: ICD-10-CM

## 2024-04-03 RX ORDER — GABAPENTIN 300 MG/1
300 CAPSULE ORAL 2 TIMES DAILY
Qty: 180 CAPSULE | Refills: 0 | Status: SHIPPED | OUTPATIENT
Start: 2024-04-03 | End: 2024-09-30

## 2024-04-12 ENCOUNTER — TRANSCRIBE ORDERS (OUTPATIENT)
Dept: SCHEDULING | Age: 69
End: 2024-04-12

## 2024-04-12 DIAGNOSIS — Z12.31 VISIT FOR SCREENING MAMMOGRAM: Primary | ICD-10-CM

## 2024-05-15 ENCOUNTER — HOSPITAL ENCOUNTER (OUTPATIENT)
Dept: MAMMOGRAPHY | Age: 69
Discharge: HOME | End: 2024-05-18
Attending: FAMILY MEDICINE
Payer: MEDICARE

## 2024-05-15 DIAGNOSIS — Z12.31 VISIT FOR SCREENING MAMMOGRAM: ICD-10-CM

## 2024-05-15 PROCEDURE — 77063 BREAST TOMOSYNTHESIS BI: CPT

## 2024-07-01 DIAGNOSIS — M54.50 CHRONIC LOW BACK PAIN, UNSPECIFIED BACK PAIN LATERALITY, UNSPECIFIED WHETHER SCIATICA PRESENT: ICD-10-CM

## 2024-07-01 DIAGNOSIS — G89.29 CHRONIC LOW BACK PAIN, UNSPECIFIED BACK PAIN LATERALITY, UNSPECIFIED WHETHER SCIATICA PRESENT: ICD-10-CM

## 2024-07-02 RX ORDER — GABAPENTIN 300 MG/1
300 CAPSULE ORAL 2 TIMES DAILY
Qty: 180 CAPSULE | Refills: 1 | Status: SHIPPED | OUTPATIENT
Start: 2024-07-02 | End: 2024-12-29

## 2024-07-17 DIAGNOSIS — G47.00 INSOMNIA, UNSPECIFIED: ICD-10-CM

## 2024-07-17 DIAGNOSIS — F41.9 ANXIETY DISORDER, UNSPECIFIED: ICD-10-CM

## 2024-07-17 RX ORDER — ESCITALOPRAM OXALATE 20 MG/1
20 TABLET ORAL DAILY
Qty: 90 TABLET | Refills: 3 | OUTPATIENT
Start: 2024-07-17

## 2024-07-23 ENCOUNTER — OFFICE VISIT (OUTPATIENT)
Dept: ORTHOPEDIC SURGERY | Age: 69
End: 2024-07-23
Payer: MEDICARE

## 2024-07-23 DIAGNOSIS — M54.16 LUMBAR RADICULOPATHY: ICD-10-CM

## 2024-07-23 DIAGNOSIS — M54.50 LUMBAR SPINE PAIN: Primary | ICD-10-CM

## 2024-07-23 DIAGNOSIS — M47.816 SPONDYLOSIS OF LUMBAR REGION WITHOUT MYELOPATHY OR RADICULOPATHY: ICD-10-CM

## 2024-07-23 DIAGNOSIS — M47.816 LUMBAR SPONDYLOSIS: Primary | ICD-10-CM

## 2024-07-23 PROCEDURE — G8400 PT W/DXA NO RESULTS DOC: HCPCS | Performed by: PHYSICAL MEDICINE & REHABILITATION

## 2024-07-23 PROCEDURE — 1090F PRES/ABSN URINE INCON ASSESS: CPT | Performed by: PHYSICAL MEDICINE & REHABILITATION

## 2024-07-23 PROCEDURE — 64493 INJ PARAVERT F JNT L/S 1 LEV: CPT | Performed by: PHYSICAL MEDICINE & REHABILITATION

## 2024-07-23 PROCEDURE — 64483 NJX AA&/STRD TFRM EPI L/S 1: CPT | Performed by: PHYSICAL MEDICINE & REHABILITATION

## 2024-07-23 PROCEDURE — 99214 OFFICE O/P EST MOD 30 MIN: CPT | Performed by: PHYSICAL MEDICINE & REHABILITATION

## 2024-07-23 PROCEDURE — G8428 CUR MEDS NOT DOCUMENT: HCPCS | Performed by: PHYSICAL MEDICINE & REHABILITATION

## 2024-07-23 PROCEDURE — 4004F PT TOBACCO SCREEN RCVD TLK: CPT | Performed by: PHYSICAL MEDICINE & REHABILITATION

## 2024-07-23 PROCEDURE — 1123F ACP DISCUSS/DSCN MKR DOCD: CPT | Performed by: PHYSICAL MEDICINE & REHABILITATION

## 2024-07-23 PROCEDURE — G8417 CALC BMI ABV UP PARAM F/U: HCPCS | Performed by: PHYSICAL MEDICINE & REHABILITATION

## 2024-07-23 PROCEDURE — 3017F COLORECTAL CA SCREEN DOC REV: CPT | Performed by: PHYSICAL MEDICINE & REHABILITATION

## 2024-07-23 RX ORDER — TRIAMCINOLONE ACETONIDE 40 MG/ML
200 INJECTION, SUSPENSION INTRA-ARTICULAR; INTRAMUSCULAR ONCE
Status: COMPLETED | OUTPATIENT
Start: 2024-07-23 | End: 2024-07-23

## 2024-07-23 RX ADMIN — TRIAMCINOLONE ACETONIDE 200 MG: 40 INJECTION, SUSPENSION INTRA-ARTICULAR; INTRAMUSCULAR at 10:01

## 2024-07-23 NOTE — PROGRESS NOTES
Date: 07/23/24   Name: Felicity Roberson    Pre-Procedural Diagnosis:    Diagnosis Orders   1. Lumbar spondylosis  FL INJ LUMB/SAC FACET SINGLE LEVEL      2. Lumbar radiculopathy  FL NERVE BLOCK LUMBOSACRAL 1ST          Procedure: Facet Injections- Single Level with SNRB    I have reviewed prior lumbar spine radiographs that reveal 5 non rib-bearing lumbar vertebrae., I have verbally confirmed side of symptomatology with patient., and I have personally reviewed with patient the informed consent for operation/procedure per [facility] protocol.  This involves risks and benefits of procedure, potential for success/improvement of injections,qualifications of physician performing procedure.  Consent form addressed appropriate local anesthesia, emergent blood transfusion, clarification of DNR status.  This form was signed by all appropriate parties and scanned into the medical record. Note that is not appropriate for me to discuss spine surgical issues or other treatment options if I am not the primary clinician.  If I am the ordering clinician, those issues would have been discussed at the appropriate office visit or at upcoming encounter.     Precautions: [facility] Precautions spine injections: None.  Patient denies any prior sensitivity to steroid, local anesthetic, contrast dye, iodine or shellfish.    The procedure was discussed at length with the patient and informed consent was signed. The patient was placed in a prone position on the fluoroscopy table and the skin was prepped and draped in a routine sterile fashion. The areas to be injected were each anesthetized with approximately 5cc of 1% Lidocaine. Initially a 22-gauge 5 inch spinal needle was carefully advanced under fluoroscopic guidance to the bilateral L5-S1 facet joint space(s).  Once proper placement was confirmed, 2 cc of 0.25% Marcaine and 60 mg of Kenalog were injected through the spinal needle at that/each site.     The areas to be injected was/were

## 2024-07-23 NOTE — PROGRESS NOTES
tablet Take 1 tablet by mouth      omeprazole (PRILOSEC) 40 MG delayed release capsule Take 1 capsule by mouth daily       No current facility-administered medications for this visit.        Allergies   Allergen Reactions    Penicillins Hives    Bee Venom Swelling    Cefdinir Other (See Comments)     Tremor, can tolerate Ceftin     Hydrocodone-Acetaminophen Rash         PHYSICAL EXAM    General: Alert and cooperative    HEENT: Clear speech    Motor Exam: Mild weakness LE's    Sensory Exam: No lateralizing findings    Deep Tendon Reflexes: Decreased reflexes in LE's, 1+ in upper extremities.  No Tromner reflexes.    Cerebellar Exam: No ataxia in the Ue's    Extremities: Deferred    Gait / Station: Ambulates with assistance, uses a cane.    Lumbar Spine: Has tenderness lower lumbar paraspinal areas, not really in the buttocks.  Good range of motion of the hips.    Cervical Spine: Not examined.      IMPRESSION/PLAN:       Predominantly Musculoskeletal Lumbosacral Spine Pain.  Lower lumbar paraspinal pain with symptoms and an incomplete left S1 distribution.  Recheck lumbosacral spine films today.  Not a lot I can add or offer medicinally.  I think she takes just 1 ibuprofen a day that is probably okay with her Eliquis.  I would not take any more of it than that.  I do not think there is a repeat spine surgical issue.  I would advocate she continue her lumbar spine exercises.  Will offer bilateral L5-S1 facet injections with a left S1 selective nerve root block and go from there.  Will see how she does over the next several weeks and reevaluate.    Review imaging.  AP and lateral lumbar spine films were obtained.  Alignment is good.  There are 5 nonrib-bearing lumbar vertebrae.  There is postoperative changes with instrumented fusion with graft and hardware placement.  Disc disease is noted at L1-L2 and L5-S1 level.  Interbody cages noted.  I did compare this study to a prior study from early 2023 and there is no

## 2024-08-16 ENCOUNTER — TELEPHONE (OUTPATIENT)
Dept: ORTHOPEDIC SURGERY | Age: 69
End: 2024-08-16

## 2024-08-16 NOTE — TELEPHONE ENCOUNTER
She had a bad fall on Monday. She is having LBP on the right side which is the side she fell on. Both of her legs are aching. She is asking for a call from Sara.

## 2024-08-19 NOTE — TELEPHONE ENCOUNTER
Per [facility] patient will need first available appointment to discuss new symptoms after fall.

## 2024-08-27 ENCOUNTER — OFFICE VISIT (OUTPATIENT)
Dept: ORTHOPEDIC SURGERY | Age: 69
End: 2024-08-27
Payer: MEDICARE

## 2024-08-27 DIAGNOSIS — M54.16 LUMBAR RADICULOPATHY: ICD-10-CM

## 2024-08-27 DIAGNOSIS — M47.816 SPONDYLOSIS OF LUMBAR REGION WITHOUT MYELOPATHY OR RADICULOPATHY: ICD-10-CM

## 2024-08-27 DIAGNOSIS — M47.816 SPONDYLOSIS OF LUMBAR REGION WITHOUT MYELOPATHY OR RADICULOPATHY: Primary | ICD-10-CM

## 2024-08-27 DIAGNOSIS — M54.16 LUMBAR RADICULOPATHY: Primary | ICD-10-CM

## 2024-08-27 PROCEDURE — 1090F PRES/ABSN URINE INCON ASSESS: CPT | Performed by: PHYSICAL MEDICINE & REHABILITATION

## 2024-08-27 PROCEDURE — G8400 PT W/DXA NO RESULTS DOC: HCPCS | Performed by: PHYSICAL MEDICINE & REHABILITATION

## 2024-08-27 PROCEDURE — 4004F PT TOBACCO SCREEN RCVD TLK: CPT | Performed by: PHYSICAL MEDICINE & REHABILITATION

## 2024-08-27 PROCEDURE — 3017F COLORECTAL CA SCREEN DOC REV: CPT | Performed by: PHYSICAL MEDICINE & REHABILITATION

## 2024-08-27 PROCEDURE — G8417 CALC BMI ABV UP PARAM F/U: HCPCS | Performed by: PHYSICAL MEDICINE & REHABILITATION

## 2024-08-27 PROCEDURE — 64483 NJX AA&/STRD TFRM EPI L/S 1: CPT | Performed by: PHYSICAL MEDICINE & REHABILITATION

## 2024-08-27 PROCEDURE — G8428 CUR MEDS NOT DOCUMENT: HCPCS | Performed by: PHYSICAL MEDICINE & REHABILITATION

## 2024-08-27 PROCEDURE — 1123F ACP DISCUSS/DSCN MKR DOCD: CPT | Performed by: PHYSICAL MEDICINE & REHABILITATION

## 2024-08-27 PROCEDURE — 64493 INJ PARAVERT F JNT L/S 1 LEV: CPT | Performed by: PHYSICAL MEDICINE & REHABILITATION

## 2024-08-27 PROCEDURE — 99213 OFFICE O/P EST LOW 20 MIN: CPT | Performed by: PHYSICAL MEDICINE & REHABILITATION

## 2024-08-27 RX ORDER — TRIAMCINOLONE ACETONIDE 40 MG/ML
280 INJECTION, SUSPENSION INTRA-ARTICULAR; INTRAMUSCULAR ONCE
Status: COMPLETED | OUTPATIENT
Start: 2024-08-27 | End: 2024-08-27

## 2024-08-27 RX ADMIN — TRIAMCINOLONE ACETONIDE 280 MG: 40 INJECTION, SUSPENSION INTRA-ARTICULAR; INTRAMUSCULAR at 10:29

## 2024-08-27 NOTE — PROGRESS NOTES
Date: 08/27/24   Name: Felicity Roberson    Pre-Procedural Diagnosis:    Diagnosis Orders   1. Lumbar radiculopathy  FL INJ LUMB/SAC FACET SINGLE LEVEL    FL NERVE BLOCK LUMBOSACRAL 1ST      2. Spondylosis of lumbar region without myelopathy or radiculopathy  FL INJ LUMB/SAC FACET SINGLE LEVEL    FL NERVE BLOCK LUMBOSACRAL 1ST          Procedure: Facet Injections- Single Level with SNRB    I have reviewed prior lumbar spine radiographs that reveal 5 non rib-bearing lumbar vertebrae. and I have personally reviewed with patient the informed consent for operation/procedure per [facility] protocol.  This involves risks and benefits of procedure, potential for success/improvement of injections,qualifications of physician performing procedure.  Consent form addressed appropriate local anesthesia, emergent blood transfusion, clarification of DNR status.  This form was signed by all appropriate parties and scanned into the medical record. Note that is not appropriate for me to discuss spine surgical issues or other treatment options if I am not the primary clinician.  If I am the ordering clinician, those issues would have been discussed at the appropriate office visit or at upcoming encounter.     Precautions: [facility] Precautions spine injections: None.  Patient denies any prior sensitivity to steroid, local anesthetic, contrast dye, iodine or shellfish.    The procedure was discussed at length with the patient and informed consent was signed. The patient was placed in a prone position on the fluoroscopy table and the skin was prepped and draped in a routine sterile fashion. The areas to be injected were each anesthetized with approximately 5cc of 1% Lidocaine. Initially a 22-gauge 5 inch spinal needle was carefully advanced under fluoroscopic guidance to the bilateral L5-S1 facet joint space(s).  Once proper placement was confirmed, 2 cc of 0.25% Marcaine and 60 mg of Kenalog were injected through the spinal needle at  that/each site.     The areas to be injected was/were anesthetized with approximately 5 cc of 1% Lidocaine. A 22-gauge 5 inch inch spinal needle was carefully advanced under fluoroscopic guidance to the bilateral S1 transforaminal spaces. At this time 0.25 cc of omnipaque administered.Once proper placement was confirmed, 2 cc of 0.25% Marcaine and 80 mg of Kenalog were injected through the spinal needle at that/each site.     Fluoroscopic guidance was used intermittently over a 10-minute period to insure proper needle placement and patient safety. A hard copy of the fluoroscopic  images has been placed in the patient's chart. The patient was monitored after the procedure and discharged home without complication.     A total of 7 cc of Kenalog were administered during this procedure.    Resume Meds:  N/A Remains on Eliquis.    PRITESH BALDERAS JR, MD  08/27/24

## 2024-09-03 SDOH — ECONOMIC STABILITY: FOOD INSECURITY: WITHIN THE PAST 12 MONTHS, YOU WORRIED THAT YOUR FOOD WOULD RUN OUT BEFORE YOU GOT MONEY TO BUY MORE.: NEVER TRUE

## 2024-09-03 SDOH — ECONOMIC STABILITY: FOOD INSECURITY: WITHIN THE PAST 12 MONTHS, THE FOOD YOU BOUGHT JUST DIDN'T LAST AND YOU DIDN'T HAVE MONEY TO GET MORE.: NEVER TRUE

## 2024-09-03 SDOH — ECONOMIC STABILITY: TRANSPORTATION INSECURITY
IN THE PAST 12 MONTHS, HAS LACK OF TRANSPORTATION KEPT YOU FROM MEETINGS, WORK, OR FROM GETTING THINGS NEEDED FOR DAILY LIVING?: NO

## 2024-09-03 SDOH — ECONOMIC STABILITY: INCOME INSECURITY: HOW HARD IS IT FOR YOU TO PAY FOR THE VERY BASICS LIKE FOOD, HOUSING, MEDICAL CARE, AND HEATING?: NOT HARD AT ALL

## 2024-09-06 ENCOUNTER — OFFICE VISIT (OUTPATIENT)
Dept: FAMILY MEDICINE CLINIC | Facility: CLINIC | Age: 69
End: 2024-09-06

## 2024-09-06 VITALS
BODY MASS INDEX: 46.81 KG/M2 | DIASTOLIC BLOOD PRESSURE: 72 MMHG | TEMPERATURE: 97.5 F | SYSTOLIC BLOOD PRESSURE: 120 MMHG | OXYGEN SATURATION: 97 % | HEIGHT: 64 IN | HEART RATE: 65 BPM | RESPIRATION RATE: 16 BRPM | WEIGHT: 274.2 LBS

## 2024-09-06 DIAGNOSIS — G89.29 CHRONIC LOW BACK PAIN, UNSPECIFIED BACK PAIN LATERALITY, UNSPECIFIED WHETHER SCIATICA PRESENT: ICD-10-CM

## 2024-09-06 DIAGNOSIS — Z91.81 AT HIGH RISK FOR FALLS: ICD-10-CM

## 2024-09-06 DIAGNOSIS — E03.9 ACQUIRED HYPOTHYROIDISM: ICD-10-CM

## 2024-09-06 DIAGNOSIS — M48.061 SPINAL STENOSIS, LUMBAR REGION WITHOUT NEUROGENIC CLAUDICATION: ICD-10-CM

## 2024-09-06 DIAGNOSIS — M54.50 CHRONIC LOW BACK PAIN, UNSPECIFIED BACK PAIN LATERALITY, UNSPECIFIED WHETHER SCIATICA PRESENT: ICD-10-CM

## 2024-09-06 DIAGNOSIS — E55.9 VITAMIN D DEFICIENCY, UNSPECIFIED: Primary | ICD-10-CM

## 2024-09-06 DIAGNOSIS — I50.33 ACUTE ON CHRONIC DIASTOLIC (CONGESTIVE) HEART FAILURE (HCC): ICD-10-CM

## 2024-09-06 DIAGNOSIS — I48.91 ATRIAL FIBRILLATION, UNSPECIFIED TYPE (HCC): ICD-10-CM

## 2024-09-06 DIAGNOSIS — R73.09 ELEVATED HEMOGLOBIN A1C: ICD-10-CM

## 2024-09-06 PROBLEM — J96.01 ACUTE RESPIRATORY FAILURE WITH HYPOXIA (HCC): Status: RESOLVED | Noted: 2021-10-30 | Resolved: 2024-09-06

## 2024-09-06 LAB
25(OH)D3 SERPL-MCNC: 72 NG/ML (ref 30–100)
ALBUMIN SERPL-MCNC: 3.6 G/DL (ref 3.2–4.6)
ALBUMIN/GLOB SERPL: 1.2 (ref 1–1.9)
ALP SERPL-CCNC: 92 U/L (ref 35–104)
ALT SERPL-CCNC: 31 U/L (ref 12–65)
ANION GAP SERPL CALC-SCNC: 9 MMOL/L (ref 9–18)
AST SERPL-CCNC: 28 U/L (ref 15–37)
BASOPHILS # BLD: 0 K/UL (ref 0–0.2)
BASOPHILS NFR BLD: 0 % (ref 0–2)
BILIRUB SERPL-MCNC: 0.5 MG/DL (ref 0–1.2)
BUN SERPL-MCNC: 30 MG/DL (ref 8–23)
CALCIUM SERPL-MCNC: 9 MG/DL (ref 8.8–10.2)
CHLORIDE SERPL-SCNC: 94 MMOL/L (ref 98–107)
CHOLEST SERPL-MCNC: 192 MG/DL (ref 0–200)
CO2 SERPL-SCNC: 40 MMOL/L (ref 20–28)
CREAT SERPL-MCNC: 1.3 MG/DL (ref 0.6–1.1)
DIFFERENTIAL METHOD BLD: ABNORMAL
EOSINOPHIL # BLD: 0 K/UL (ref 0–0.8)
EOSINOPHIL NFR BLD: 0 % (ref 0.5–7.8)
ERYTHROCYTE [DISTWIDTH] IN BLOOD BY AUTOMATED COUNT: 21.1 % (ref 11.9–14.6)
EST. AVERAGE GLUCOSE BLD GHB EST-MCNC: 150 MG/DL
GLOBULIN SER CALC-MCNC: 2.9 G/DL (ref 2.3–3.5)
GLUCOSE SERPL-MCNC: 113 MG/DL (ref 70–99)
HBA1C MFR BLD: 6.8 % (ref 0–5.6)
HCT VFR BLD AUTO: 49.5 % (ref 35.8–46.3)
HDLC SERPL-MCNC: 46 MG/DL (ref 40–60)
HDLC SERPL: 4.2 (ref 0–5)
HGB BLD-MCNC: 15.4 G/DL (ref 11.7–15.4)
IMM GRANULOCYTES # BLD AUTO: 0.1 K/UL (ref 0–0.5)
IMM GRANULOCYTES NFR BLD AUTO: 1 % (ref 0–5)
LDLC SERPL CALC-MCNC: 132 MG/DL (ref 0–100)
LYMPHOCYTES # BLD: 1.9 K/UL (ref 0.5–4.6)
LYMPHOCYTES NFR BLD: 10 % (ref 13–44)
MAGNESIUM SERPL-MCNC: 2.6 MG/DL (ref 1.8–2.4)
MCH RBC QN AUTO: 27.3 PG (ref 26.1–32.9)
MCHC RBC AUTO-ENTMCNC: 31.1 G/DL (ref 31.4–35)
MCV RBC AUTO: 87.8 FL (ref 82–102)
MONOCYTES # BLD: 0.8 K/UL (ref 0.1–1.3)
MONOCYTES NFR BLD: 4 % (ref 4–12)
NEUTS SEG # BLD: 15.4 K/UL (ref 1.7–8.2)
NEUTS SEG NFR BLD: 84 % (ref 43–78)
NRBC # BLD: 0.02 K/UL (ref 0–0.2)
PLATELET # BLD AUTO: 269 K/UL (ref 150–450)
PMV BLD AUTO: 10.2 FL (ref 9.4–12.3)
POTASSIUM SERPL-SCNC: 3.7 MMOL/L (ref 3.5–5.1)
PROT SERPL-MCNC: 6.5 G/DL (ref 6.3–8.2)
RBC # BLD AUTO: 5.64 M/UL (ref 4.05–5.2)
SODIUM SERPL-SCNC: 142 MMOL/L (ref 136–145)
T4 FREE SERPL-MCNC: 1.1 NG/DL (ref 0.9–1.7)
TRIGL SERPL-MCNC: 68 MG/DL (ref 0–150)
TSH, 3RD GENERATION: 2.48 UIU/ML (ref 0.27–4.2)
VLDLC SERPL CALC-MCNC: 14 MG/DL (ref 6–23)
WBC # BLD AUTO: 18.3 K/UL (ref 4.3–11.1)

## 2024-09-06 RX ORDER — ROPINIROLE 1 MG/1
0.5 TABLET, FILM COATED ORAL NIGHTLY
Qty: 45 TABLET | Refills: 5 | Status: SHIPPED | OUTPATIENT
Start: 2024-09-06

## 2024-09-06 RX ORDER — OXYCODONE AND ACETAMINOPHEN 10; 325 MG/1; MG/1
1 TABLET ORAL EVERY 6 HOURS PRN
Qty: 20 TABLET | Refills: 0 | Status: SHIPPED | OUTPATIENT
Start: 2024-09-06 | End: 2024-09-11

## 2024-09-06 RX ORDER — METOPROLOL SUCCINATE 25 MG/1
25 TABLET, EXTENDED RELEASE ORAL DAILY
COMMUNITY
Start: 2024-08-01

## 2024-09-06 RX ORDER — GABAPENTIN 300 MG/1
300 CAPSULE ORAL 2 TIMES DAILY
Qty: 180 CAPSULE | Refills: 1 | Status: SHIPPED | OUTPATIENT
Start: 2024-09-06 | End: 2025-03-05

## 2024-09-06 RX ORDER — METOLAZONE 2.5 MG/1
2.5 TABLET ORAL DAILY
COMMUNITY

## 2024-09-06 RX ORDER — TORSEMIDE 100 MG/1
100 TABLET ORAL 2 TIMES DAILY
COMMUNITY

## 2024-09-06 RX ORDER — CYCLOBENZAPRINE HCL 10 MG
10 TABLET ORAL 3 TIMES DAILY PRN
Qty: 30 TABLET | Refills: 5 | Status: SHIPPED | OUTPATIENT
Start: 2024-09-06 | End: 2024-09-16

## 2024-09-06 RX ORDER — DOXEPIN HYDROCHLORIDE 100 MG/1
100 CAPSULE ORAL NIGHTLY
COMMUNITY
Start: 2024-08-13

## 2024-09-06 ASSESSMENT — ENCOUNTER SYMPTOMS
SHORTNESS OF BREATH: 1
BACK PAIN: 1

## 2024-09-06 NOTE — PROGRESS NOTES
(SINEQUAN) 100 MG capsule Take 1 capsule by mouth nightly      metoprolol succinate (TOPROL XL) 25 MG extended release tablet Take 1 tablet by mouth daily      torsemide (DEMADEX) 100 MG tablet Take 1 tablet by mouth in the morning and at bedtime      rOPINIRole (REQUIP) 1 MG tablet Take 0.5 tablets by mouth nightly 45 tablet 5    gabapentin (NEURONTIN) 300 MG capsule Take 1 capsule by mouth 2 times daily for 180 days. 180 capsule 1    oxyCODONE-acetaminophen (PERCOCET)  MG per tablet Take 1 tablet by mouth every 6 hours as needed for Pain for up to 5 days. Intended supply: 30 days Max Daily Amount: 4 tablets 20 tablet 0    cyclobenzaprine (FLEXERIL) 10 MG tablet Take 1 tablet by mouth 3 times daily as needed for Muscle spasms 30 tablet 5    potassium chloride (KLOR-CON M10) 10 MEQ extended release tablet Take 2 tablets by mouth daily 180 tablet 3    levothyroxine (SYNTHROID) 50 MCG tablet TAKE 1 TABLET BY MOUTH EVERY DAY BEFORE BREAKFAST 90 tablet 3    escitalopram (LEXAPRO) 20 MG tablet TAKE 1 TABLET BY MOUTH EVERY DAY 90 tablet 3    nystatin (MYCOSTATIN) 605129 UNIT/GM powder Apply 3 times daily. 60 g 3    spironolactone (ALDACTONE) 25 MG tablet Take 1 tablet by mouth 2 times daily      propafenone (RYTHMOL) 150 MG tablet Take 1 tablet by mouth 2 times daily      JARDIANCE 10 MG tablet TAKE 1 TABLET (10 MG) BY MOUTH DAILY.      torsemide (DEMADEX) 20 MG tablet Take 1 tablet by mouth in the morning and at bedtime (Patient taking differently: Take 1 tablet by mouth daily 80 mg  BID) 180 tablet 1    ipratropium-albuterol (DUONEB) 0.5-2.5 (3) MG/3ML SOLN nebulizer solution Inhale 3 mLs into the lungs every 6 hours 360 mL 11    albuterol sulfate HFA (PROVENTIL;VENTOLIN;PROAIR) 108 (90 Base) MCG/ACT inhaler INHALE 2 PUFFS EVERY 6 HOURS AS NEEDED FOR WHEEZE 6.7 each 3    apixaban (ELIQUIS) 5 MG TABS tablet Take 1 tablet by mouth 2 times daily      vitamin D (CHOLECALCIFEROL) 25 MCG (1000 UT) TABS tablet Take 1

## 2024-09-21 ENCOUNTER — PATIENT MESSAGE (OUTPATIENT)
Dept: FAMILY MEDICINE CLINIC | Facility: CLINIC | Age: 69
End: 2024-09-21

## 2024-09-21 DIAGNOSIS — F41.9 ANXIETY DISORDER, UNSPECIFIED TYPE: Primary | ICD-10-CM

## 2024-09-22 RX ORDER — LORAZEPAM 0.5 MG/1
0.5 TABLET ORAL 3 TIMES DAILY PRN
Qty: 90 TABLET | Refills: 3 | Status: SHIPPED | OUTPATIENT
Start: 2024-09-22 | End: 2025-01-20

## 2024-10-18 ENCOUNTER — PATIENT MESSAGE (OUTPATIENT)
Dept: FAMILY MEDICINE CLINIC | Facility: CLINIC | Age: 69
End: 2024-10-18

## 2024-10-18 RX ORDER — MUPIROCIN 20 MG/G
OINTMENT TOPICAL
Qty: 22 G | Refills: 1 | Status: SHIPPED | OUTPATIENT
Start: 2024-10-18

## 2024-10-18 RX ORDER — DOXYCYCLINE HYCLATE 100 MG
100 TABLET ORAL 2 TIMES DAILY
Qty: 20 TABLET | Refills: 0 | Status: SHIPPED | OUTPATIENT
Start: 2024-10-18 | End: 2024-10-28

## 2024-10-18 NOTE — TELEPHONE ENCOUNTER
Sent in prescription for:     Requested Prescriptions     Signed Prescriptions Disp Refills    doxycycline hyclate (VIBRA-TABS) 100 MG tablet 20 tablet 0     Sig: Take 1 tablet by mouth 2 times daily for 10 days     Authorizing Provider: KENDY GARAY    mupirocin (BACTROBAN) 2 % ointment 22 g 1     Sig: Apply topically 2 times daily.     Authorizing Provider: KENDY GARAY

## 2024-10-28 ENCOUNTER — TELEPHONE (OUTPATIENT)
Dept: FAMILY MEDICINE CLINIC | Facility: CLINIC | Age: 69
End: 2024-10-28

## 2024-10-28 NOTE — TELEPHONE ENCOUNTER
D/C DATE: 10/28/2024    D/C FROM: [facility]     D/C TO: Home     PHONE NUMBER TO REACH PATIENT: [phone]      F/U APPT DATE & TIME: 11/04/2024  @ 2:40  with Dr Camarillo

## 2024-10-28 NOTE — TELEPHONE ENCOUNTER
Care Transitions Initial Follow Up Call    Outreach made within 2 business days of discharge: Yes    Patient: Felicity Roberson Patient : 1955   MRN: [id number]  Reason for Admission: 10/26/24  Discharge Date: 10/28/24       Spoke with: Felicity Roberson    Discharge department/facility: Home    TCM Interactive Patient Contact:  Was patient able to fill all prescriptions: No: p was not prescribed any medications in the hospital  Was patient instructed to bring all medications to the follow-up visit: Yes  Is patient taking all medications as directed in the discharge summary? Yes  Does patient understand their discharge instructions: Yes  Does patient have questions or concerns that need addressed prior to 7-14 day follow up office visit: no    Additional needs identified to be addressed with provider  No needs identified             Scheduled appointment with PCP within 7-14 days    Follow Up  Future Appointments   Date Time Provider Department Center   2024  2:40 PM Sandra Camarillo MD [facility]   3/14/2025  9:30 AM Familia Flores MD [facility]       CHANDLER HEWITT MA

## 2024-11-04 ENCOUNTER — OFFICE VISIT (OUTPATIENT)
Dept: FAMILY MEDICINE CLINIC | Facility: CLINIC | Age: 69
End: 2024-11-04

## 2024-11-04 VITALS
WEIGHT: 285 LBS | SYSTOLIC BLOOD PRESSURE: 116 MMHG | RESPIRATION RATE: 20 BRPM | DIASTOLIC BLOOD PRESSURE: 64 MMHG | TEMPERATURE: 97.4 F | OXYGEN SATURATION: 99 % | BODY MASS INDEX: 48.65 KG/M2 | HEART RATE: 90 BPM | HEIGHT: 64 IN

## 2024-11-04 DIAGNOSIS — E87.6 HYPOKALEMIA: ICD-10-CM

## 2024-11-04 DIAGNOSIS — E86.0 DEHYDRATION: Primary | ICD-10-CM

## 2024-11-04 DIAGNOSIS — Z72.0 TOBACCO USE: ICD-10-CM

## 2024-11-04 DIAGNOSIS — Z09 HOSPITAL DISCHARGE FOLLOW-UP: ICD-10-CM

## 2024-11-04 DIAGNOSIS — N17.9 AKI (ACUTE KIDNEY INJURY) (HCC): ICD-10-CM

## 2024-11-04 DIAGNOSIS — R55 SYNCOPE, UNSPECIFIED SYNCOPE TYPE: ICD-10-CM

## 2024-11-04 LAB
ALBUMIN SERPL-MCNC: 3.2 G/DL (ref 3.2–4.6)
ALBUMIN/GLOB SERPL: 0.9 (ref 1–1.9)
ALP SERPL-CCNC: 100 U/L (ref 35–104)
ALT SERPL-CCNC: 22 U/L (ref 8–45)
ANION GAP SERPL CALC-SCNC: 13 MMOL/L (ref 7–16)
AST SERPL-CCNC: 30 U/L (ref 15–37)
BASOPHILS # BLD: 0.1 K/UL (ref 0–0.2)
BASOPHILS NFR BLD: 1 % (ref 0–2)
BILIRUB SERPL-MCNC: 0.4 MG/DL (ref 0–1.2)
BUN SERPL-MCNC: 32 MG/DL (ref 8–23)
CALCIUM SERPL-MCNC: 8.9 MG/DL (ref 8.8–10.2)
CHLORIDE SERPL-SCNC: 94 MMOL/L (ref 98–107)
CO2 SERPL-SCNC: 32 MMOL/L (ref 20–29)
CREAT SERPL-MCNC: 1.79 MG/DL (ref 0.6–1.1)
DIFFERENTIAL METHOD BLD: ABNORMAL
EOSINOPHIL # BLD: 0.1 K/UL (ref 0–0.8)
EOSINOPHIL NFR BLD: 1 % (ref 0.5–7.8)
ERYTHROCYTE [DISTWIDTH] IN BLOOD BY AUTOMATED COUNT: 18.4 % (ref 11.9–14.6)
GLOBULIN SER CALC-MCNC: 3.4 G/DL (ref 2.3–3.5)
GLUCOSE SERPL-MCNC: 136 MG/DL (ref 70–99)
HCT VFR BLD AUTO: 44.1 % (ref 35.8–46.3)
HGB BLD-MCNC: 13.3 G/DL (ref 11.7–15.4)
IMM GRANULOCYTES # BLD AUTO: 0.1 K/UL (ref 0–0.5)
IMM GRANULOCYTES NFR BLD AUTO: 1 % (ref 0–5)
LYMPHOCYTES # BLD: 2 K/UL (ref 0.5–4.6)
LYMPHOCYTES NFR BLD: 12 % (ref 13–44)
MCH RBC QN AUTO: 28.6 PG (ref 26.1–32.9)
MCHC RBC AUTO-ENTMCNC: 30.2 G/DL (ref 31.4–35)
MCV RBC AUTO: 94.8 FL (ref 82–102)
MONOCYTES # BLD: 0.9 K/UL (ref 0.1–1.3)
MONOCYTES NFR BLD: 5 % (ref 4–12)
NEUTS SEG # BLD: 13.6 K/UL (ref 1.7–8.2)
NEUTS SEG NFR BLD: 81 % (ref 43–78)
NRBC # BLD: 0 K/UL (ref 0–0.2)
PLATELET # BLD AUTO: 391 K/UL (ref 150–450)
PMV BLD AUTO: 9.8 FL (ref 9.4–12.3)
POTASSIUM SERPL-SCNC: 3 MMOL/L (ref 3.5–5.1)
PROT SERPL-MCNC: 6.6 G/DL (ref 6.3–8.2)
RBC # BLD AUTO: 4.65 M/UL (ref 4.05–5.2)
SODIUM SERPL-SCNC: 139 MMOL/L (ref 136–145)
WBC # BLD AUTO: 16.7 K/UL (ref 4.3–11.1)

## 2024-11-04 ASSESSMENT — PATIENT HEALTH QUESTIONNAIRE - PHQ9
SUM OF ALL RESPONSES TO PHQ QUESTIONS 1-9: 0
1. LITTLE INTEREST OR PLEASURE IN DOING THINGS: NOT AT ALL
SUM OF ALL RESPONSES TO PHQ QUESTIONS 1-9: 0
2. FEELING DOWN, DEPRESSED OR HOPELESS: NOT AT ALL
SUM OF ALL RESPONSES TO PHQ QUESTIONS 1-9: 0
SUM OF ALL RESPONSES TO PHQ9 QUESTIONS 1 & 2: 0
SUM OF ALL RESPONSES TO PHQ QUESTIONS 1-9: 0

## 2024-11-04 NOTE — PROGRESS NOTES
levothyroxine 50 MCG tablet  Commonly known as: SYNTHROID  TAKE 1 TABLET BY MOUTH EVERY DAY BEFORE BREAKFAST     LORazepam 0.5 MG tablet  Commonly known as: ATIVAN  Take 1 tablet by mouth 3 times daily as needed for Anxiety for up to 120 days. Max Daily Amount: 1.5 mg     metOLazone 2.5 MG tablet  Commonly known as: ZAROXOLYN     metoprolol succinate 25 MG extended release tablet  Commonly known as: TOPROL XL     mupirocin 2 % ointment  Commonly known as: BACTROBAN  Apply topically 2 times daily.     naloxone 4 MG/0.1ML Liqd nasal spray  Commonly known as: Narcan  1 spray by Nasal route as needed for Opioid Reversal     nystatin 382758 UNIT/GM powder  Commonly known as: MYCOSTATIN  Apply 3 times daily.     omeprazole 40 MG delayed release capsule  Commonly known as: PRILOSEC     potassium chloride 10 MEQ extended release tablet  Commonly known as: Klor-Con M10  Take 2 tablets by mouth daily     propafenone 150 MG tablet  Commonly known as: RYTHMOL     rOPINIRole 1 MG tablet  Commonly known as: REQUIP  Take 0.5 tablets by mouth nightly     spironolactone 25 MG tablet  Commonly known as: ALDACTONE     torsemide 100 MG tablet  Commonly known as: DEMADEX     vitamin D 25 MCG (1000 UT) Tabs tablet  Commonly known as: CHOLECALCIFEROL                Medications marked \"taking\" at this time  Outpatient Medications Marked as Taking for the 11/4/24 encounter (Office Visit) with Sandra Camarillo MD   Medication Sig Dispense Refill    Biotin 5 MG TBDP Take by mouth      mupirocin (BACTROBAN) 2 % ointment Apply topically 2 times daily. 22 g 1    LORazepam (ATIVAN) 0.5 MG tablet Take 1 tablet by mouth 3 times daily as needed for Anxiety for up to 120 days. Max Daily Amount: 1.5 mg 90 tablet 3    doxepin (SINEQUAN) 100 MG capsule Take 1 capsule by mouth nightly      metoprolol succinate (TOPROL XL) 25 MG extended release tablet Take 1 tablet by mouth daily      torsemide (DEMADEX) 100 MG tablet Take 1 tablet by mouth in the

## 2024-11-05 DIAGNOSIS — N17.9 AKI (ACUTE KIDNEY INJURY) (HCC): Primary | ICD-10-CM

## 2024-11-23 ENCOUNTER — PATIENT MESSAGE (OUTPATIENT)
Dept: FAMILY MEDICINE CLINIC | Facility: CLINIC | Age: 69
End: 2024-11-23

## 2024-11-25 RX ORDER — ALBUTEROL SULFATE 90 UG/1
2 INHALANT RESPIRATORY (INHALATION) EVERY 6 HOURS PRN
Qty: 6.7 EACH | Refills: 3 | Status: SHIPPED | OUTPATIENT
Start: 2024-11-25

## 2025-01-30 DIAGNOSIS — E03.9 HYPOTHYROIDISM, UNSPECIFIED: ICD-10-CM

## 2025-01-30 RX ORDER — LEVOTHYROXINE SODIUM 50 UG/1
TABLET ORAL
Qty: 90 TABLET | Refills: 3 | Status: SHIPPED | OUTPATIENT
Start: 2025-01-30

## 2025-02-12 ENCOUNTER — TELEPHONE (OUTPATIENT)
Dept: FAMILY MEDICINE CLINIC | Facility: CLINIC | Age: 70
End: 2025-02-12

## 2025-02-12 NOTE — TELEPHONE ENCOUNTER
Pt took an at home test and tested positive for covid on 02/12/25. Pts symptoms started about a week ago and just have gotten worse. She has congestion, cough, chest congestion and has no energy. Asking if something can be called in or is she needs to be evaluated in office. [facility] uses [facility] [place]. Please give pt a call back with what Dr. Flores says or  on call.

## 2025-02-18 ENCOUNTER — TELEPHONE (OUTPATIENT)
Dept: FAMILY MEDICINE CLINIC | Facility: CLINIC | Age: 70
End: 2025-02-18

## 2025-02-19 ENCOUNTER — OFFICE VISIT (OUTPATIENT)
Dept: FAMILY MEDICINE CLINIC | Facility: CLINIC | Age: 70
End: 2025-02-19
Payer: MEDICARE

## 2025-02-19 VITALS
SYSTOLIC BLOOD PRESSURE: 106 MMHG | BODY MASS INDEX: 47.12 KG/M2 | OXYGEN SATURATION: 99 % | DIASTOLIC BLOOD PRESSURE: 74 MMHG | WEIGHT: 276 LBS | HEIGHT: 64 IN | HEART RATE: 80 BPM | TEMPERATURE: 97.3 F

## 2025-02-19 DIAGNOSIS — R05.3 PERSISTENT COUGH: ICD-10-CM

## 2025-02-19 DIAGNOSIS — U07.1 COVID-19: Primary | ICD-10-CM

## 2025-02-19 DIAGNOSIS — J44.9 CHRONIC OBSTRUCTIVE PULMONARY DISEASE, UNSPECIFIED COPD TYPE (HCC): ICD-10-CM

## 2025-02-19 DIAGNOSIS — Z72.0 TOBACCO USE: ICD-10-CM

## 2025-02-19 PROCEDURE — 1160F RVW MEDS BY RX/DR IN RCRD: CPT | Performed by: PHYSICIAN ASSISTANT

## 2025-02-19 PROCEDURE — 1090F PRES/ABSN URINE INCON ASSESS: CPT | Performed by: PHYSICIAN ASSISTANT

## 2025-02-19 PROCEDURE — G8417 CALC BMI ABV UP PARAM F/U: HCPCS | Performed by: PHYSICIAN ASSISTANT

## 2025-02-19 PROCEDURE — 1123F ACP DISCUSS/DSCN MKR DOCD: CPT | Performed by: PHYSICIAN ASSISTANT

## 2025-02-19 PROCEDURE — G8400 PT W/DXA NO RESULTS DOC: HCPCS | Performed by: PHYSICIAN ASSISTANT

## 2025-02-19 PROCEDURE — 3017F COLORECTAL CA SCREEN DOC REV: CPT | Performed by: PHYSICIAN ASSISTANT

## 2025-02-19 PROCEDURE — 4004F PT TOBACCO SCREEN RCVD TLK: CPT | Performed by: PHYSICIAN ASSISTANT

## 2025-02-19 PROCEDURE — 1159F MED LIST DOCD IN RCRD: CPT | Performed by: PHYSICIAN ASSISTANT

## 2025-02-19 PROCEDURE — 3078F DIAST BP <80 MM HG: CPT | Performed by: PHYSICIAN ASSISTANT

## 2025-02-19 PROCEDURE — 99213 OFFICE O/P EST LOW 20 MIN: CPT | Performed by: PHYSICIAN ASSISTANT

## 2025-02-19 PROCEDURE — 3074F SYST BP LT 130 MM HG: CPT | Performed by: PHYSICIAN ASSISTANT

## 2025-02-19 PROCEDURE — G8427 DOCREV CUR MEDS BY ELIG CLIN: HCPCS | Performed by: PHYSICIAN ASSISTANT

## 2025-02-19 PROCEDURE — 3023F SPIROM DOC REV: CPT | Performed by: PHYSICIAN ASSISTANT

## 2025-02-19 RX ORDER — PREDNISONE 10 MG/1
TABLET ORAL
Qty: 28 TABLET | Refills: 0 | Status: SHIPPED | OUTPATIENT
Start: 2025-02-19

## 2025-02-19 RX ORDER — AZITHROMYCIN 250 MG/1
TABLET, FILM COATED ORAL
Qty: 6 TABLET | Refills: 0 | Status: SHIPPED | OUTPATIENT
Start: 2025-02-19

## 2025-02-19 SDOH — ECONOMIC STABILITY: FOOD INSECURITY: WITHIN THE PAST 12 MONTHS, THE FOOD YOU BOUGHT JUST DIDN'T LAST AND YOU DIDN'T HAVE MONEY TO GET MORE.: NEVER TRUE

## 2025-02-19 SDOH — ECONOMIC STABILITY: FOOD INSECURITY: WITHIN THE PAST 12 MONTHS, YOU WORRIED THAT YOUR FOOD WOULD RUN OUT BEFORE YOU GOT MONEY TO BUY MORE.: NEVER TRUE

## 2025-02-19 SDOH — ECONOMIC STABILITY: INCOME INSECURITY: IN THE LAST 12 MONTHS, WAS THERE A TIME WHEN YOU WERE NOT ABLE TO PAY THE MORTGAGE OR RENT ON TIME?: NO

## 2025-02-19 SDOH — ECONOMIC STABILITY: TRANSPORTATION INSECURITY
IN THE PAST 12 MONTHS, HAS THE LACK OF TRANSPORTATION KEPT YOU FROM MEDICAL APPOINTMENTS OR FROM GETTING MEDICATIONS?: NO

## 2025-02-19 ASSESSMENT — PATIENT HEALTH QUESTIONNAIRE - PHQ9
2. FEELING DOWN, DEPRESSED OR HOPELESS: NOT AT ALL
SUM OF ALL RESPONSES TO PHQ9 QUESTIONS 1 & 2: 0
1. LITTLE INTEREST OR PLEASURE IN DOING THINGS: NOT AT ALL
SUM OF ALL RESPONSES TO PHQ QUESTIONS 1-9: 0
SUM OF ALL RESPONSES TO PHQ QUESTIONS 1-9: 0
1. LITTLE INTEREST OR PLEASURE IN DOING THINGS: NOT AT ALL
2. FEELING DOWN, DEPRESSED OR HOPELESS: NOT AT ALL
SUM OF ALL RESPONSES TO PHQ QUESTIONS 1-9: 0
SUM OF ALL RESPONSES TO PHQ9 QUESTIONS 1 & 2: 0
SUM OF ALL RESPONSES TO PHQ QUESTIONS 1-9: 0

## 2025-02-19 ASSESSMENT — ENCOUNTER SYMPTOMS
SHORTNESS OF BREATH: 1
SORE THROAT: 0
SINUS PRESSURE: 0
WHEEZING: 1
CHEST TIGHTNESS: 0
EYE DISCHARGE: 0
RHINORRHEA: 1
COUGH: 1
SINUS PAIN: 0

## 2025-02-19 NOTE — PATIENT INSTRUCTIONS
*Have the chest x-ray as soon as able. We will call you with results.  *Take the antibiotic (Zithromax) and steroid  (prednisone) taper as prescribed. Recommend Tylenol for fever or pain, Mucinex DM, over the counter, for cough and congestion. Consider use of over the counter Flonase - 2 sprays per nostril once a day for nasal congestion and ear fullness. If sore throat is present, may mix 1/2 to 1 tsp of table salt in 8oz of warm water and gargle every 1-2 hours. Do not swallow the salt water, it could cause nausea.  Increase fluid intake.  Make sure to wash hands frequently. Please return if symptoms don't improve or if they worsen.

## 2025-02-19 NOTE — PROGRESS NOTES
[facility] [address] [place] [address]  Phone [phone]      Patient: Felicity Roberson  YOB: 1955  Age 69 y.o.  Sex female  Medical Record:  [id number]  Visit Date: 02/19/25  Author:  Ray Lagunas PA-C    Family Practice Clinic Note    Chief Complaint   Patient presents with    Follow-up     Post Covid follow up, still having congestion/wheezing/cough 3 weeks       History of Present Illness  This is a 69-year-old female with a history of COPD, CHF, hypertension and atrial fibrillation.  She presents today with complaints of persistent respiratory symptoms which have been ongoing now between 2 and 3 weeks.  She did a COVID test at home on 2/12/2025 which was positive.  Her symptoms had been ongoing for more than a week so she was not a candidate for Paxlovid.  She has been taking over-the-counter medications such as Mucinex DM for cough is not responding.  Her chest congestion and cough seem to be worsening.  Cough has been productive.  She is noting some shortness of breath and wheeze.  Notes some nasal congestion and rhinorrhea as well.  Denies sinus pain or pressure.  Denies ear pain or sore throat.  Denies any recent fever.  No chills or myalgias.  She is reporting fatigue.  She is an active smoker.    Past History:    Past Medical history   Past Medical History:   Diagnosis Date    A-fib (HCC)     Acquired hypothyroidism 9/13/2017    Anxiety and depression     Arthritis     osteo    Chronic pain     back and R leg    COPD (chronic obstructive pulmonary disease) (HCC)     Follows with pulmonology    GERD (gastroesophageal reflux disease)     well controlled with med    H/O bone density study 10/2011    normal    History of mammogram 03/10/2022    BI-RADS Category 2.  Lipoma noted.  Recommend follow-up in 1 year    Hypertension     controlled by med    Low back pain     Morbid obesity     bmi 48.63    Pneumonia     1/2020    Rectal bleeding onset x 2 months

## 2025-02-20 ENCOUNTER — TELEPHONE (OUTPATIENT)
Dept: FAMILY MEDICINE CLINIC | Facility: CLINIC | Age: 70
End: 2025-02-20

## 2025-02-24 ENCOUNTER — PATIENT MESSAGE (OUTPATIENT)
Dept: FAMILY MEDICINE CLINIC | Facility: CLINIC | Age: 70
End: 2025-02-24

## 2025-02-24 RX ORDER — NYSTATIN 100000 [USP'U]/ML
500000 SUSPENSION ORAL 4 TIMES DAILY
Qty: 200 ML | Refills: 0 | Status: SHIPPED | OUTPATIENT
Start: 2025-02-24 | End: 2025-03-06

## 2025-02-24 NOTE — TELEPHONE ENCOUNTER
Sent in prescription for:     Requested Prescriptions     Signed Prescriptions Disp Refills    nystatin (MYCOSTATIN) 824345 UNIT/ML suspension 200 mL 0     Sig: Take 5 mLs by mouth 4 times daily for 10 days Retain in mouth as long as possible     Authorizing Provider: KENDY GARAY

## 2025-03-04 ENCOUNTER — CARE COORDINATION (OUTPATIENT)
Dept: CARE COORDINATION | Facility: CLINIC | Age: 70
End: 2025-03-04

## 2025-03-04 ENCOUNTER — OFFICE VISIT (OUTPATIENT)
Dept: FAMILY MEDICINE CLINIC | Facility: CLINIC | Age: 70
End: 2025-03-04

## 2025-03-04 ENCOUNTER — TELEPHONE (OUTPATIENT)
Dept: FAMILY MEDICINE CLINIC | Facility: CLINIC | Age: 70
End: 2025-03-04

## 2025-03-04 VITALS
WEIGHT: 278 LBS | HEART RATE: 78 BPM | DIASTOLIC BLOOD PRESSURE: 75 MMHG | BODY MASS INDEX: 47.46 KG/M2 | OXYGEN SATURATION: 97 % | HEIGHT: 64 IN | SYSTOLIC BLOOD PRESSURE: 101 MMHG | TEMPERATURE: 98 F

## 2025-03-04 DIAGNOSIS — R55 SYNCOPE AND COLLAPSE: ICD-10-CM

## 2025-03-04 DIAGNOSIS — U07.1 COVID-19: ICD-10-CM

## 2025-03-04 DIAGNOSIS — I48.0 PAROXYSMAL A-FIB (HCC): ICD-10-CM

## 2025-03-04 DIAGNOSIS — E86.1 HYPOVOLEMIA: ICD-10-CM

## 2025-03-04 DIAGNOSIS — Z09 HOSPITAL DISCHARGE FOLLOW-UP: Primary | ICD-10-CM

## 2025-03-04 DIAGNOSIS — N17.9 AKI (ACUTE KIDNEY INJURY): ICD-10-CM

## 2025-03-04 DIAGNOSIS — I50.33 ACUTE ON CHRONIC DIASTOLIC (CONGESTIVE) HEART FAILURE (HCC): ICD-10-CM

## 2025-03-04 RX ORDER — METOLAZONE 2.5 MG/1
1.25 TABLET ORAL DAILY
COMMUNITY

## 2025-03-04 RX ORDER — MIDODRINE HYDROCHLORIDE 5 MG/1
TABLET ORAL
COMMUNITY
Start: 2025-02-26 | End: 2025-03-04 | Stop reason: ALTCHOICE

## 2025-03-04 ASSESSMENT — ENCOUNTER SYMPTOMS: SHORTNESS OF BREATH: 0

## 2025-03-04 NOTE — PROGRESS NOTES
infection.  No further episodes since discharge.    3. Hypovolemia  *Received IV fluids in hospital.  Demadex was discontinued.    4. COVID-19  *Symptoms seem to be resolving.    5. Acute on chronic diastolic (congestive) heart failure (HCC)  *Following with cardiology.  Patient to call and discuss follow-up office visit with them.    6. SHAWN (acute kidney injury)  *Improved during hospitalization.  Labs drawn yesterday prior to discharge.  Will likely recheck at her follow-up here in 10 days.    7. Paroxysmal A-fib (HCC)  *Stable issue.  Continuing on Eliquis 5 mg twice daily and Rythmol 150 mg twice daily.      Orders Placed This Encounter   Procedures    MI DISCHARGE MEDS RECONCILED W/ CURRENT OUTPATIENT MED LIST     I have reviewed the patient's past medical history, social history and family history and vitals.  We have discussed treatment plan and follow up and given patient instructions.  Patient's questions are answered and we will follow up as indicated.    Dictated using voice recognition software.  Proof read but unrecognized errors may exist.    Follow-up and Dispositions    Return as previously scheduled, 3/14/25, with .         Ray Lagunas PA-C

## 2025-03-04 NOTE — CARE COORDINATION
Care Transitions Note    Initial Call - Call within 2 business days of discharge: Yes    Patient Current Location:  Home: [address] [place] [address]  [place] [address]    Care Transition Nurse contacted the patient by telephone to perform post hospital discharge assessment, verified name and  as identifiers. Provided introduction to self, and explanation of the Care Transition Nurse role.     Patient: Felicity Roberson    Patient : 1955   MRN: [id number]    Reason for Admission: Syncope and fall  Discharge Date: 3/3/2025 RURS: No data recorded      Was this an external facility discharge? Yes. Discharge Date: 3/3/2025. Facility Name: [facility]    Additional needs identified to be addressed with provider   No needs identified             Method of communication with provider: none.    Patients top risk factors for readmission: medical condition-syncope, fall, HTN, CHF,COPD,A-fib,DM    Interventions to address risk factors:   Home Health: [facility] to visit for start of care-3/5/2025   Reviewed discharge instructions and offered opportunity to ask any questions regarding discharge instructions.  No new medication orders at discharge. Multiple medications discontinued. Patient to take discharge medication list with her to PCP follow up appointment today for review  Reviewed upcoming follow up appointments:  PCP-3/4/2025 at 11:00 pm  Patient  to contact CTN with any issues, questions, or concerns prior to next outreach.    Care Transition Nurse reviewed discharge instructions and medical action plan with patient. The patient was given an opportunity to ask questions; no further questions or concerns at this time.. The patient verbalized understanding.   Were discharge instructions available to patient? Yes.   Reviewed appropriate site of care based on symptoms and resources available to patient including: PCP  Specialist  Urgent care clinics  [facility]  When to call 911. The patient agrees to contact

## 2025-03-05 ENCOUNTER — TELEPHONE (OUTPATIENT)
Dept: FAMILY MEDICINE CLINIC | Facility: CLINIC | Age: 70
End: 2025-03-05

## 2025-03-05 NOTE — TELEPHONE ENCOUNTER
Yazmin calling from interim Calling to get verbal orders for home health for pt for 9 weeks for nursing and pt. Verbal given. Also asking if she is supposed to be checking her blood sugars or not. Please call Yazmin back about the blood sugars. [phone]

## 2025-03-11 ENCOUNTER — CARE COORDINATION (OUTPATIENT)
Dept: CARE COORDINATION | Facility: CLINIC | Age: 70
End: 2025-03-11

## 2025-03-11 SDOH — HEALTH STABILITY: PHYSICAL HEALTH: ON AVERAGE, HOW MANY MINUTES DO YOU ENGAGE IN EXERCISE AT THIS LEVEL?: 20 MIN

## 2025-03-11 SDOH — HEALTH STABILITY: PHYSICAL HEALTH: ON AVERAGE, HOW MANY DAYS PER WEEK DO YOU ENGAGE IN MODERATE TO STRENUOUS EXERCISE (LIKE A BRISK WALK)?: 7 DAYS

## 2025-03-11 ASSESSMENT — PATIENT HEALTH QUESTIONNAIRE - PHQ9
1. LITTLE INTEREST OR PLEASURE IN DOING THINGS: NOT AT ALL
SUM OF ALL RESPONSES TO PHQ QUESTIONS 1-9: 0
2. FEELING DOWN, DEPRESSED OR HOPELESS: NOT AT ALL
SUM OF ALL RESPONSES TO PHQ QUESTIONS 1-9: 0

## 2025-03-11 ASSESSMENT — LIFESTYLE VARIABLES
HOW MANY STANDARD DRINKS CONTAINING ALCOHOL DO YOU HAVE ON A TYPICAL DAY: PATIENT DOES NOT DRINK
HOW OFTEN DO YOU HAVE A DRINK CONTAINING ALCOHOL: NEVER

## 2025-03-11 NOTE — CARE COORDINATION
Care Transitions Note    Follow Up Call     Patient Current Location:  Home: [address] [place] [address]    [facility] Care Coordinator contacted the patient by telephone. Verified name and  as identifiers.    Additional needs identified to be addressed with provider   No needs identified                 Method of communication with provider: none.    Care Summary Note: Patient reports doing fine and has had no further syncopal episodes/falls.  She is scheduled with her PCP, Dr. Flores on 3/13/25 and will f/u with [facility] next week.  Patient remains engaged with [facility] and reports having a visit earlier today.  No questions or concerns at this time.     Advance Care Planning:   Does patient have an Advance Directive: reviewed during previous call, see note. .    Medication Review:  No changes since last call.       Assessments:   Goals Addressed                   This Visit's Progress     Returns to baseline activity level.   On track     Patient/Family able to obtain medicine after d/c     Patient/Family able to verbalize medicine changes     Patient/Family aware and attends follow up appointments s/p d/c.     Patient/Family agrees to notify provider of any barriers to plan of care.    Patient/Family agrees to notify provider of any symptoms that indicate a worsening of condition    Patient/Family agrees to monitor and record weights daily and report a gain of 2 lbs in a day or 5 lbs in a week to MD for review.    Patient/Family agrees to monitor and record BP for MD review.                Follow Up Appointment:   Reviewed upcoming appointment(s).  Future Appointments         Provider Specialty Dept Phone    3/14/2025 9:30 AM Familia Flores MD Family Medicine [phone]            [facility] Care Coordinator provided contact information.  Plan for follow-up call in 6-10 days based on severity of symptoms and risk factors.  Plan for next call: symptom management    Yashira

## 2025-03-14 ENCOUNTER — CARE COORDINATION (OUTPATIENT)
Dept: CARE COORDINATION | Facility: CLINIC | Age: 70
End: 2025-03-14

## 2025-03-14 ASSESSMENT — LIFESTYLE VARIABLES
HOW MANY STANDARD DRINKS CONTAINING ALCOHOL DO YOU HAVE ON A TYPICAL DAY: 0
HOW OFTEN DO YOU HAVE SIX OR MORE DRINKS ON ONE OCCASION: 1
HOW OFTEN DO YOU HAVE A DRINK CONTAINING ALCOHOL: 1

## 2025-03-14 NOTE — CARE COORDINATION
Care Transitions Note    Follow Up Call     Patient Current Location:  Home: [address] [place] [address]    [facility] Care Coordinator contacted the patient by telephone. Verified name and  as identifiers.    Additional needs identified to be addressed with provider   No needs identified                 Method of communication with provider: none.    Care Summary Note: Patient reports some swelling BLE.  Her weight this morning was 289.  Patient called [facility] and reports per Dr Vern Huang she has increased her torsemide to 200mg qd.  Patient states she will continue to keep a close watch on her swelling and weight and contact Dr. Allison as needed.  Pt remains engaged with [facility].  No other questions or concerns voiced at time of call.      Advance Care Planning:   Does patient have an Advance Directive: reviewed during previous call, see note. .    Medication Review:  Medications changed since last call, reviewed today.     Assessments:   Goals Addressed                   This Visit's Progress     Returns to baseline activity level.   On track     Patient/Family able to obtain medicine after d/c     Patient/Family able to verbalize medicine changes     Patient/Family aware and attends follow up appointments s/p d/c.     Patient/Family agrees to notify provider of any barriers to plan of care.    Patient/Family agrees to notify provider of any symptoms that indicate a worsening of condition    Patient/Family agrees to monitor and record weights daily and report a gain of 2 lbs in a day or 5 lbs in a week to MD for review.    Patient/Family agrees to monitor and record BP for MD review.                Follow Up Appointment:   Reviewed upcoming appointment(s).  Future Appointments         Provider Specialty Dept Phone    3/21/2025 4:15 PM Familia Flores MD Family Medicine [phone]            [facility] Care Coordinator provided contact information.  Plan for follow-up call in  Render Risk Assessment In Note?: yes

## 2025-03-21 ENCOUNTER — CARE COORDINATION (OUTPATIENT)
Dept: CARE COORDINATION | Facility: CLINIC | Age: 70
End: 2025-03-21

## 2025-03-21 ENCOUNTER — OFFICE VISIT (OUTPATIENT)
Dept: FAMILY MEDICINE CLINIC | Facility: CLINIC | Age: 70
End: 2025-03-21

## 2025-03-21 VITALS
RESPIRATION RATE: 16 BRPM | BODY MASS INDEX: 48.86 KG/M2 | HEIGHT: 64 IN | WEIGHT: 286.2 LBS | TEMPERATURE: 97.6 F | SYSTOLIC BLOOD PRESSURE: 119 MMHG | OXYGEN SATURATION: 97 % | HEART RATE: 68 BPM | DIASTOLIC BLOOD PRESSURE: 70 MMHG

## 2025-03-21 DIAGNOSIS — F41.9 ANXIETY DISORDER, UNSPECIFIED TYPE: ICD-10-CM

## 2025-03-21 DIAGNOSIS — J44.9 CHRONIC OBSTRUCTIVE PULMONARY DISEASE, UNSPECIFIED COPD TYPE (HCC): ICD-10-CM

## 2025-03-21 DIAGNOSIS — M48.062 SPINAL STENOSIS OF LUMBAR REGION WITH NEUROGENIC CLAUDICATION: ICD-10-CM

## 2025-03-21 DIAGNOSIS — G47.00 INSOMNIA, UNSPECIFIED TYPE: ICD-10-CM

## 2025-03-21 DIAGNOSIS — I50.33 ACUTE ON CHRONIC DIASTOLIC (CONGESTIVE) HEART FAILURE: ICD-10-CM

## 2025-03-21 DIAGNOSIS — Z00.00 MEDICARE ANNUAL WELLNESS VISIT, SUBSEQUENT: Primary | ICD-10-CM

## 2025-03-21 DIAGNOSIS — G89.29 CHRONIC LOW BACK PAIN, UNSPECIFIED BACK PAIN LATERALITY, UNSPECIFIED WHETHER SCIATICA PRESENT: ICD-10-CM

## 2025-03-21 DIAGNOSIS — M54.50 CHRONIC LOW BACK PAIN, UNSPECIFIED BACK PAIN LATERALITY, UNSPECIFIED WHETHER SCIATICA PRESENT: ICD-10-CM

## 2025-03-21 RX ORDER — NYSTATIN 100000 [USP'U]/G
POWDER TOPICAL
Qty: 60 G | Refills: 3 | Status: SHIPPED | OUTPATIENT
Start: 2025-03-21

## 2025-03-21 RX ORDER — TORSEMIDE 100 MG/1
200 TABLET ORAL 2 TIMES DAILY
COMMUNITY
Start: 2025-03-17

## 2025-03-21 RX ORDER — ESCITALOPRAM OXALATE 20 MG/1
20 TABLET ORAL DAILY
Qty: 90 TABLET | Refills: 3 | Status: SHIPPED | OUTPATIENT
Start: 2025-03-21

## 2025-03-21 RX ORDER — GABAPENTIN 300 MG/1
300 CAPSULE ORAL 2 TIMES DAILY
Qty: 180 CAPSULE | Refills: 1 | Status: SHIPPED | OUTPATIENT
Start: 2025-03-21 | End: 2025-09-17

## 2025-03-21 RX ORDER — ROPINIROLE 1 MG/1
0.5 TABLET, FILM COATED ORAL NIGHTLY
Qty: 45 TABLET | Refills: 5 | Status: SHIPPED | OUTPATIENT
Start: 2025-03-21

## 2025-03-21 RX ORDER — OXYCODONE AND ACETAMINOPHEN 10; 325 MG/1; MG/1
1 TABLET ORAL EVERY 6 HOURS PRN
Qty: 20 TABLET | Refills: 0 | Status: SHIPPED | OUTPATIENT
Start: 2025-03-21 | End: 2025-03-26

## 2025-03-21 ASSESSMENT — PATIENT HEALTH QUESTIONNAIRE - PHQ9
SUM OF ALL RESPONSES TO PHQ QUESTIONS 1-9: 0
SUM OF ALL RESPONSES TO PHQ QUESTIONS 1-9: 0
2. FEELING DOWN, DEPRESSED OR HOPELESS: NOT AT ALL
SUM OF ALL RESPONSES TO PHQ QUESTIONS 1-9: 0
1. LITTLE INTEREST OR PLEASURE IN DOING THINGS: NOT AT ALL
SUM OF ALL RESPONSES TO PHQ QUESTIONS 1-9: 0

## 2025-03-21 NOTE — PATIENT INSTRUCTIONS
Yaneth Roberson - 3/21/2025  Medicare offers a range of preventive health benefits. Some of the tests and screenings are paid in full while other may be subject to a deductible, co-insurance, and/or copay.  Some of these benefits include a comprehensive review of your medical history including lifestyle, illnesses that may run in your family, and various assessments and screenings as appropriate.  After reviewing your medical record and screening and assessments performed today your provider may have ordered immunizations, labs, imaging, and/or referrals for you.  A list of these orders (if applicable) as well as your Preventive Care list are included within your After Visit Summary for your review.

## 2025-03-21 NOTE — PROGRESS NOTES
lungs every 6 hours as needed for Wheezing Yes Familia Flores MD   Biotin 5 MG TBDP Take by mouth Yes Carol Ann Carias MD   doxepin (SINEQUAN) 100 MG capsule Take 1 capsule by mouth nightly Yes Carol Ann Carias MD   spironolactone (ALDACTONE) 25 MG tablet Take 1 tablet by mouth 2 times daily Yes Carol Ann Carias MD   propafenone (RYTHMOL) 150 MG tablet Take 1 tablet by mouth 2 times daily Yes Carol Ann Carias MD   JARDIANCE 10 MG tablet TAKE 1 TABLET (10 MG) BY MOUTH DAILY. Yes Carol Ann Carias MD   ipratropium-albuterol (DUONEB) 0.5-2.5 (3) MG/3ML SOLN nebulizer solution Inhale 3 mLs into the lungs every 6 hours Yes Carol Ann Carias MD   naloxone (NARCAN) 4 MG/0.1ML LIQD nasal spray 1 spray by Nasal route as needed for Opioid Reversal Yes Marcin Molina APRN - CNP   apixaban (ELIQUIS) 5 MG TABS tablet Take 1 tablet by mouth 2 times daily Yes Automatic Reconciliation, Ar   vitamin D (CHOLECALCIFEROL) 25 MCG (1000 UT) TABS tablet Take 1 tablet by mouth Yes Automatic Reconciliation, Ar   omeprazole (PRILOSEC) 40 MG delayed release capsule Take 1 capsule by mouth daily Yes Automatic Reconciliation, Ar   metOLazone (ZAROXOLYN) 2.5 MG tablet Take 0.5 tablets by mouth daily  Patient not taking: Reported on 3/21/2025  Carol Ann Carias MD CareTe (Including outside providers/suppliers regularly involved in providing care):   Patient Care Team:  Familia Flores MD as PCP - General  Familia Flores MD as PCP - Empaneled Provider  Montserrat Matthew, RN as Care Transitions Nurse     Recommendations for Preventive Services Due: see orders and patient instructions/AVS.  Recommended screening schedule for the next 5-10 years is provided to the patient in written form: see Patient Instructions/AVS.     Reviewed and updated this visit:  Tobacco  Allergies  Meds  Med Hx  Surg Hx  Fam Hx  Sexual Hx

## 2025-03-21 NOTE — CARE COORDINATION
Care Transitions Note    Follow Up Call     Attempted to reach patient for transitions of care follow up.  Unable to reach patient.      Outreach Attempts:   HIPAA compliant voicemail left for patient.       Follow Up Appointment:   Future Appointments         Provider Specialty Dept Phone    3/21/2025 4:15 PM Familia Flores MD Family Medicine [phone]            Plan for follow-up call in 6-10 days based on severity of symptoms and risk factors. Plan for next call: symptom management    Yashira Goodwin LPN

## 2025-03-25 ENCOUNTER — CARE COORDINATION (OUTPATIENT)
Dept: CARE COORDINATION | Facility: CLINIC | Age: 70
End: 2025-03-25

## 2025-03-25 NOTE — CARE COORDINATION
Care Transitions Note    Follow Up Call     Patient Current Location:  Home: [address] [place] [address]    [facility] Care Coordinator contacted the patient by telephone. Verified name and  as identifiers.    Additional needs identified to be addressed with provider   No needs identified                 Method of communication with provider: none.    Care Summary Note: Mrs. Roberson reports doing well today. She was seen for yearly well visit with her PCP, Dr. Familia Flores on 3/21/25.  No changes reported or noted since our last conversation on 3/14/25.  Patient remains engaged with [facility].  Her vitals were WNL, weight 286 lb 3.2oz.  Mrs. Roberson is a patient of Dr. Dakota Huang at [facility].  No questions or concerns voiced at time of call.   Patient is agreeable to final outreach by phone next week.    Advance Care Planning:   Does patient have an Advance Directive: reviewed during previous call, see note. .    Medication Review:  No changes since last call.     Assessments:   Goals Addressed                   This Visit's Progress     Returns to baseline activity level.   On track     Patient/Family able to obtain medicine after d/c     Patient/Family able to verbalize medicine changes     Patient/Family aware and attends follow up appointments s/p d/c.     Patient/Family agrees to notify provider of any barriers to plan of care.    Patient/Family agrees to notify provider of any symptoms that indicate a worsening of condition    Patient/Family agrees to monitor and record weights daily and report a gain of 2 lbs in a day or 5 lbs in a week to MD for review.    Patient/Family agrees to monitor and record BP for MD review.                Follow Up Appointment:   Reviewed upcoming appointment(s).      [facility] Care Coordinator provided contact information.  Plan for follow-up call in 6-10 days based on severity of symptoms and risk factors.  Plan for next call:  final

## 2025-04-03 ENCOUNTER — CARE COORDINATION (OUTPATIENT)
Dept: CARE COORDINATION | Facility: CLINIC | Age: 70
End: 2025-04-03

## 2025-04-03 NOTE — CARE COORDINATION
Care Transitions Note    Final Call     Attempted to reach patient for transitions of care follow up.  Unable to reach patient.      Outreach Attempts:   HIPAA compliant voicemail left for patient.     Patient graduated from the Care Transitions program on 4/3/2025.  Patient/family has the ability to self-manage at this time..      Handoff:   Patient was not referred to the ACM team due to unable to contact patient.      Assessments:  No changes since last call    Upcoming Appointments:    [facility]-4/9/2025 at 9:00 am  Montserrat Matthew RN

## 2025-04-03 NOTE — CARE COORDINATION
Received return call from patient for final KATT outreach.  Care Transitions Note    Final Call     Patient Current Location:  Home: [address] [place] [address]    Care Transition Nurse contacted the patient by telephone. Verified name and  as identifiers.    Patient graduated from the Care Transitions program on 4/3/2025.  Patient/family has the ability to self-manage at this time..      Advance Care Planning:   Does patient have an Advance Directive: reviewed during previous call, see note. .    Handoff:   Patient was not referred to the ACM team due to patient declined services.      Assessments:   Goals Addressed                   This Visit's Progress     COMPLETED: Returns to baseline activity level.        Patient/Family able to obtain medicine after d/c     Patient/Family able to verbalize medicine changes     Patient/Family aware and attends follow up appointments s/p d/c.     Patient/Family agrees to notify provider of any barriers to plan of care.    Patient/Family agrees to notify provider of any symptoms that indicate a worsening of condition    Patient/Family agrees to monitor and record weights daily and report a gain of 2 lbs in a day or 5 lbs in a week to MD for review.    Patient/Family agrees to monitor and record BP for MD review.                Upcoming Appointments:    [facility]-2025 at 9:00 am     Patient has agreed to contact primary care provider and/or specialist for any further questions, concerns, or needs.    Montserrat Matthew RN

## 2025-04-04 ENCOUNTER — PATIENT MESSAGE (OUTPATIENT)
Dept: FAMILY MEDICINE CLINIC | Facility: CLINIC | Age: 70
End: 2025-04-04

## 2025-04-04 DIAGNOSIS — M54.50 CHRONIC LOW BACK PAIN, UNSPECIFIED BACK PAIN LATERALITY, UNSPECIFIED WHETHER SCIATICA PRESENT: ICD-10-CM

## 2025-04-04 DIAGNOSIS — G89.29 CHRONIC LOW BACK PAIN, UNSPECIFIED BACK PAIN LATERALITY, UNSPECIFIED WHETHER SCIATICA PRESENT: ICD-10-CM

## 2025-04-04 DIAGNOSIS — F41.9 ANXIETY DISORDER, UNSPECIFIED TYPE: ICD-10-CM

## 2025-04-04 RX ORDER — LORAZEPAM 0.5 MG/1
0.5 TABLET ORAL EVERY 8 HOURS PRN
Qty: 90 TABLET | Refills: 5 | Status: SHIPPED | OUTPATIENT
Start: 2025-04-04 | End: 2025-10-01

## 2025-04-04 RX ORDER — GABAPENTIN 300 MG/1
300 CAPSULE ORAL 2 TIMES DAILY
Qty: 180 CAPSULE | Refills: 1 | Status: SHIPPED | OUTPATIENT
Start: 2025-04-04 | End: 2025-10-01

## 2025-04-04 NOTE — TELEPHONE ENCOUNTER
AreSent in prescription for:     Requested Prescriptions     Signed Prescriptions Disp Refills    LORazepam (ATIVAN) 0.5 MG tablet 90 tablet 5     Sig: Take 1 tablet by mouth every 8 hours as needed for Anxiety for up to 180 days. Max Daily Amount: 1.5 mg     Authorizing Provider: KENDY GARAY    gabapentin (NEURONTIN) 300 MG capsule 180 capsule 1     Sig: Take 1 capsule by mouth 2 times daily for 180 days.     Authorizing Provider: KENDY GARAY

## 2025-04-08 ENCOUNTER — TELEPHONE (OUTPATIENT)
Dept: FAMILY MEDICINE CLINIC | Facility: CLINIC | Age: 70
End: 2025-04-08

## 2025-04-08 RX ORDER — DOXYCYCLINE HYCLATE 100 MG
100 TABLET ORAL 2 TIMES DAILY
Qty: 20 TABLET | Refills: 0 | Status: SHIPPED | OUTPATIENT
Start: 2025-04-08 | End: 2025-04-18

## 2025-04-08 NOTE — TELEPHONE ENCOUNTER
Agree with wound care and will send in some doxycycline to cover for wound infection.    Sent in prescription for:     Requested Prescriptions     Signed Prescriptions Disp Refills    doxycycline hyclate (VIBRA-TABS) 100 MG tablet 20 tablet 0     Sig: Take 1 tablet by mouth 2 times daily for 10 days     Authorizing Provider: KENDY GARAY

## 2025-04-08 NOTE — TELEPHONE ENCOUNTER
HOME HEALTH NURSE CALL      Name of the Nurse Calling and Facility: [facility]       Best Contact Number to Reach the Nurse: [phone]            Reason For Call: On Saturday patient fell and has stitches Lt Leg       Verbal order given for wound care order 3 times week       Clean with saline  Apply Zeroform   Cover with non adherhent pad and Coban       She is still having a good bit of drianage from th wound      Zeroform to the right arm as well      She has redness area on her leg and warm to the touch would an antibiotic be beneficial?

## 2025-04-14 ENCOUNTER — CARE COORDINATION (OUTPATIENT)
Dept: CARE COORDINATION | Facility: CLINIC | Age: 70
End: 2025-04-14

## 2025-04-14 NOTE — CARE COORDINATION
Care Transitions Note    Initial Call - Call within 2 business days of discharge: Yes    Patient Current Location:  Home: [address] [place] [address]  [place] [address]    Care Transition Nurse contacted the patient by telephone to perform post hospital discharge assessment, verified name and  as identifiers.  Provided introduction to self, and explanation of the Care Transition Nurse role.    Patient: Felicity Roberson    Patient : 1955   MRN: [id number]    Reason for Admission: syncope and collapse  Discharge Date: 21  RURS: No data recorded    Last Discharge Facility       None            Was this an external facility discharge? Yes. Discharge Date: . [facility]: [facility]    Additional needs identified to be addressed with provider   No needs identified             Method of communication with provider: none.    Patients top risk factors for readmission: medical condition-COPD, CHF, syncope collapse    Interventions to address risk factors:   Review of patient management of conditions/medications: reviewed recent IP admission,  reviewed Upcoming appts and allowed for questions/concerns, HH    Care Summary Note: Patient home after recent IP admission. Patient indicated that she feels 'pretty good' and has been recovering since being home. Patient denies any new s/s of concern and has HH ordered and has been in contact with patient. Patient has appt with PCP scheduled for within next 2 weeks and feels she has all resources she needs. Patient received call from cardiology for follow up and feels she does not need any further assistance or has any current needs.    Care Transition Nurse reviewed discharge instructions, medical action plan, and red flags with patient. The patient was given an opportunity to ask questions; all questions answered at this time.. The patient verbalized understanding.   Were discharge instructions available to patient? Yes.   Reviewed appropriate site

## 2025-04-22 ENCOUNTER — TRANSCRIBE ORDERS (OUTPATIENT)
Dept: SCHEDULING | Age: 70
End: 2025-04-22

## 2025-04-22 DIAGNOSIS — Z12.31 VISIT FOR SCREENING MAMMOGRAM: Primary | ICD-10-CM

## 2025-04-23 ENCOUNTER — OFFICE VISIT (OUTPATIENT)
Dept: FAMILY MEDICINE CLINIC | Facility: CLINIC | Age: 70
End: 2025-04-23
Payer: MEDICARE

## 2025-04-23 VITALS
RESPIRATION RATE: 17 BRPM | HEIGHT: 64 IN | TEMPERATURE: 97.8 F | HEART RATE: 76 BPM | SYSTOLIC BLOOD PRESSURE: 110 MMHG | OXYGEN SATURATION: 97 % | WEIGHT: 287.6 LBS | DIASTOLIC BLOOD PRESSURE: 59 MMHG | BODY MASS INDEX: 49.1 KG/M2

## 2025-04-23 DIAGNOSIS — S81.812D LACERATION OF LEFT LOWER LEG, SUBSEQUENT ENCOUNTER: Primary | ICD-10-CM

## 2025-04-23 DIAGNOSIS — I50.32 CHRONIC HEART FAILURE WITH PRESERVED EJECTION FRACTION (HCC): ICD-10-CM

## 2025-04-23 DIAGNOSIS — R55 SYNCOPE AND COLLAPSE: ICD-10-CM

## 2025-04-23 PROCEDURE — G8417 CALC BMI ABV UP PARAM F/U: HCPCS | Performed by: PHYSICIAN ASSISTANT

## 2025-04-23 PROCEDURE — 1159F MED LIST DOCD IN RCRD: CPT | Performed by: PHYSICIAN ASSISTANT

## 2025-04-23 PROCEDURE — 4004F PT TOBACCO SCREEN RCVD TLK: CPT | Performed by: PHYSICIAN ASSISTANT

## 2025-04-23 PROCEDURE — 1123F ACP DISCUSS/DSCN MKR DOCD: CPT | Performed by: PHYSICIAN ASSISTANT

## 2025-04-23 PROCEDURE — 1160F RVW MEDS BY RX/DR IN RCRD: CPT | Performed by: PHYSICIAN ASSISTANT

## 2025-04-23 PROCEDURE — 3074F SYST BP LT 130 MM HG: CPT | Performed by: PHYSICIAN ASSISTANT

## 2025-04-23 PROCEDURE — 1090F PRES/ABSN URINE INCON ASSESS: CPT | Performed by: PHYSICIAN ASSISTANT

## 2025-04-23 PROCEDURE — G8400 PT W/DXA NO RESULTS DOC: HCPCS | Performed by: PHYSICIAN ASSISTANT

## 2025-04-23 PROCEDURE — 99214 OFFICE O/P EST MOD 30 MIN: CPT | Performed by: PHYSICIAN ASSISTANT

## 2025-04-23 PROCEDURE — G8427 DOCREV CUR MEDS BY ELIG CLIN: HCPCS | Performed by: PHYSICIAN ASSISTANT

## 2025-04-23 PROCEDURE — 3017F COLORECTAL CA SCREEN DOC REV: CPT | Performed by: PHYSICIAN ASSISTANT

## 2025-04-23 PROCEDURE — 3078F DIAST BP <80 MM HG: CPT | Performed by: PHYSICIAN ASSISTANT

## 2025-04-23 RX ORDER — DOXYCYCLINE HYCLATE 100 MG
100 TABLET ORAL 2 TIMES DAILY
COMMUNITY
Start: 2025-04-08 | End: 2025-04-18

## 2025-04-23 RX ORDER — MIDODRINE HYDROCHLORIDE 2.5 MG/1
2.5 TABLET ORAL 3 TIMES DAILY
COMMUNITY

## 2025-04-23 RX ORDER — METOPROLOL SUCCINATE 25 MG/1
25 TABLET, EXTENDED RELEASE ORAL DAILY
COMMUNITY
Start: 2025-04-21 | End: 2026-04-21

## 2025-04-23 ASSESSMENT — ENCOUNTER SYMPTOMS: SHORTNESS OF BREATH: 0

## 2025-04-23 ASSESSMENT — PATIENT HEALTH QUESTIONNAIRE - PHQ9
2. FEELING DOWN, DEPRESSED OR HOPELESS: NOT AT ALL
SUM OF ALL RESPONSES TO PHQ QUESTIONS 1-9: 0
SUM OF ALL RESPONSES TO PHQ QUESTIONS 1-9: 0
1. LITTLE INTEREST OR PLEASURE IN DOING THINGS: NOT AT ALL
SUM OF ALL RESPONSES TO PHQ QUESTIONS 1-9: 0
SUM OF ALL RESPONSES TO PHQ QUESTIONS 1-9: 0

## 2025-04-23 NOTE — PROGRESS NOTES
[facility] [address] [address]  Phone [phone]      Patient: Felicity Roberson  YOB: 1955  Age 69 y.o.  Sex female  Medical Record:  [id number]  Visit Date: 04/23/25  Author:  Ray Lagunas PA-C    Family Practice Clinic Note    Chief Complaint   Patient presents with    ER follow up     Went to ER due to falling  Stitches are located in left shin.  Have been in since the 5th    Other     States she's dizzy this morning       History of Present Illness  History of Present Illness  The patient is a 69-year-old female who presents for ER follow-up and suture removal.  She has had 2 ER visits this month due to syncopal episode and collapse.  She was seen at the [facility] ER 4/5/2025 and again 4/8/2025.  At her initial visit she was noted to have a large laceration on the left lower leg/pretibial region.  She had multiple sutures placed at that time and was instructed to follow-up here for removal.  She had another syncopal episode 4/8/2025 and went back to the [facility] emergency department for evaluation.  She was admitted at that time and discharged 4/11/2025. Diagnostic imaging, including x-rays and CT scans of the head, did not reveal any new findings. Syncope was hypothesized to be due to hypotension. Blood pressure was low this morning, and home monitoring has been ongoing. [facility] her blood pressure medication regimen on Monday, reintroducing midodrine three times daily and metoprolol 25 mg once daily. A follow-up appointment with her cardiologist is scheduled for next wee She has a previous history of A-fib, hypertension, hypothyroidism, heart failure, COPD and type 2 diabetes.    She presents today for the removal of sutures from her leg, which have been in place for 18 days.       Past History:    Past Medical history   Past Medical History:   Diagnosis Date    A-fib (HCC)     Acquired

## 2025-04-27 DIAGNOSIS — F41.9 ANXIETY DISORDER, UNSPECIFIED TYPE: ICD-10-CM

## 2025-04-27 DIAGNOSIS — G47.00 INSOMNIA, UNSPECIFIED TYPE: ICD-10-CM

## 2025-04-27 RX ORDER — ESCITALOPRAM OXALATE 20 MG/1
20 TABLET ORAL DAILY
Qty: 90 TABLET | Refills: 3 | Status: SHIPPED | OUTPATIENT
Start: 2025-04-27

## 2025-04-30 ENCOUNTER — TELEPHONE (OUTPATIENT)
Dept: FAMILY MEDICINE CLINIC | Facility: CLINIC | Age: 70
End: 2025-04-30

## 2025-04-30 NOTE — TELEPHONE ENCOUNTER
Yazmin Calling with interim to get verbal for re certification for home health. Verbal given please call her back at  [phone] with any questions.

## 2025-05-01 ENCOUNTER — OFFICE VISIT (OUTPATIENT)
Dept: ORTHOPEDIC SURGERY | Age: 70
End: 2025-05-01

## 2025-05-01 DIAGNOSIS — S92.254A CLOSED NONDISPLACED FRACTURE OF NAVICULAR BONE OF RIGHT FOOT, INITIAL ENCOUNTER: Primary | ICD-10-CM

## 2025-05-01 DIAGNOSIS — R52 PAIN: ICD-10-CM

## 2025-05-01 RX ORDER — ERGOCALCIFEROL 1.25 MG/1
50000 CAPSULE, LIQUID FILLED ORAL WEEKLY
Qty: 8 CAPSULE | Refills: 0 | Status: SHIPPED | OUTPATIENT
Start: 2025-05-01

## 2025-05-01 NOTE — PROGRESS NOTES
Name: Felicity Roberson  YOB: 1955  Gender: female  MRN: [id number]    Treatment Plan:   I had a thorough discussion with the patient regarding the treatment plan   Right navicular fracture    Patient already in CAM boot.  Strict NWB in the boot  CT scan right foot to evaluate nature of the fracture and potential need for surgery  Vitamin D 50,000 units/weekly x 8 weeks  Patient to get a knee scooter off of [employer]    Patient understands and in full agreement with the treatment plan.    Weight-bearing status: NWB        Return to work/work restrictions: none  Vitamin D2 p.o. 50,000 units, 1 tab every 8 weeks.  I discussed how this is Rx strength.      Will call with CT results  F/u 4 weeks w/ Foot Xray     CC: right foot pain    HPI: Felicity Roberson is a 69 y.o. female presents with right foot pain that has been ongoing for approximately 3 weeks.  Patient states that she passed out causing her to fall, mainly onto her left side.  At the time of the fall, she did not report any right foot pain because she had a very large cut to her left lower extremity that required 27 stitches.  She also has a very bad knee and her knee was causing her more pain at that time.  She continued to ambulate on this foot and her pain worsened.  She went to urgent care and was diagnosed with a fracture and placed in a boot.  She has been ambulating in the boot.      ROS:  Patient Denies fever/chills, headache, visual changes, chest pain, shortness of breath, and nausea/vomiting/diarrhea     Tobacco:  reports that she has been smoking cigarettes. She started smoking about 45 years ago. She has a 34 pack-year smoking history. She has been exposed to tobacco smoke. She has never used smokeless tobacco.  Diabetes: Diabetic - non insulin  Lab Results   Component Value Date    LABA1C 6.8 (H) 09/06/2024       Past Medical History:   Diagnosis Date    A-fib (HCC)     Acquired hypothyroidism 9/13/2017    Anxiety and depression

## 2025-05-07 ENCOUNTER — TELEPHONE (OUTPATIENT)
Dept: ORTHOPEDIC SURGERY | Age: 70
End: 2025-05-07

## 2025-05-07 DIAGNOSIS — S92.254A CLOSED NONDISPLACED FRACTURE OF NAVICULAR BONE OF RIGHT FOOT, INITIAL ENCOUNTER: Primary | ICD-10-CM

## 2025-05-07 NOTE — TELEPHONE ENCOUNTER
I called and spoke with patient about CT scan    CT shows non displaced navicular fracture    We discussed ORIF vs Closed treatment in great detail.  She said she does not want surgery unless she has to.  She elected for non surgical treatment.    She will be NWB in a splint/cast x 6 wk then begin HWB x 2 weeks, then at a8 weeks begain WB in boot.      She agreess

## 2025-05-12 ENCOUNTER — TELEPHONE (OUTPATIENT)
Dept: FAMILY MEDICINE CLINIC | Facility: CLINIC | Age: 70
End: 2025-05-12

## 2025-05-12 ENCOUNTER — CARE COORDINATION (OUTPATIENT)
Dept: CARE COORDINATION | Facility: CLINIC | Age: 70
End: 2025-05-12

## 2025-05-12 NOTE — CARE COORDINATION
Care Transitions Note    Initial Call - Call within 2 business days of discharge: Yes    Patient Current Location:  Home: [address] [place] [address]    Care Transition Nurse contacted the patient by telephone to perform post hospital discharge assessment, verified name and  as identifiers.  Provided introduction to self, and explanation of the Care Transition Nurse role.    Patient: Felicity Roberson    Patient : 1955   MRN: [id number]    Reason for Admission: SOB/COPD exacerbation  Discharge Date: 5/10/2025 RURS: No data recorded    Was this an external facility discharge? Yes. Discharge Date: 5/10/2025. Facility Name: [facility]    Additional needs identified to be addressed with provider   No needs identified             Method of communication with provider: none.    Patients top risk factors for readmission: medical condition-PAF,COPD,HTN,CHF,Displaced fracture of navicular (scaphoid) of right foot     Interventions to address risk factors:   Home Health: CTN spoke with Vanessa at [facility]. Nurse to contact patient this afternoon to schedule ROMY visit  Reviewed discharge instructions and offered opportunity to ask any questions regarding discharge instructions  Confirmed medications obtained and taking as ordered per [facility]-  cefuroxime 500 mg tablet-Take 1 tablet (500 mg) by mouth 2 (two) times a day for 6 doses  doxycycline 100 mg capsule-Take 1 capsule (100 mg) by mouth 2 (two) times a day for 9 doses  nicotine 21 mg/24 hr-Place 1 patch on the skin daily  predniSONE 10 mg tablet pack-  Day 1: 2 at bfst 1 at mandeep 1 at din 2 at bed. Day 2: 1 at bfst 1 at mandeep 1 at din 2 at bd. Day 3: 1 at bfst 1 at mandeep 1 at din 1 at bd. Day 4: 1 at bfst 1 at mandeep 1 at bd. Day 5: 1 at bfst 1 at bd. Day 6: 1 at bfst.  spironolactone 50 MG tablet-Take 1 tablet (50 mg) by mouth daily  Reviewed upcoming follow up appointments:  [facility]-2025 at 1:00 pm  CTN scheduled PCP

## 2025-05-12 NOTE — TELEPHONE ENCOUNTER
D/C DATE: 05/10/25    D/C FROM: [facility]    D/C TO: Home    PHONE NUMBER TO REACH PATIENT: [phone]    F/U APPT DATE & TIME: 05/16/25 at 11:00 am.       Please call patient within 24-48 hours.

## 2025-05-12 NOTE — TELEPHONE ENCOUNTER
Care Transitions Initial Follow Up Call    Outreach made within 2 business days of discharge: Yes    Patient: Felicity Roberson Patient : 1955   MRN: [id number]  Reason for Admission: Congestive Heart Disease, SOB  Discharge Date: 21       Spoke with: Pt    Discharge department/facility: [facility] Patient Contact:  Was patient able to fill all prescriptions: Yes  Was patient instructed to bring all medications to the follow-up visit: Yes  Is patient taking all medications as directed in the discharge summary? Yes  Does patient understand their discharge instructions: Yes  Does patient have questions or concerns that need addressed prior to 7-14 day follow up office visit: no    Additional needs identified to be addressed with provider  No needs identified             Scheduled appointment with PCP within 7-14 days    Follow Up  Future Appointments   Date Time Provider Department Center   2025 11:00 AM Ray Lagunas PA-C [facility] DEP   2025  5:00 PM [facility] ROOM 3 [facility]   2025  1:40 PM Linnea Larson PA [facility] AMB   2025  3:30 PM Familia Flores MD [facility] DEP       Gila Israel LPN

## 2025-05-16 ENCOUNTER — OFFICE VISIT (OUTPATIENT)
Dept: FAMILY MEDICINE CLINIC | Facility: CLINIC | Age: 70
End: 2025-05-16

## 2025-05-16 ENCOUNTER — HOSPITAL ENCOUNTER (OUTPATIENT)
Dept: MAMMOGRAPHY | Age: 70
Discharge: HOME | End: 2025-05-19
Attending: FAMILY MEDICINE
Payer: MEDICARE

## 2025-05-16 VITALS
RESPIRATION RATE: 17 BRPM | DIASTOLIC BLOOD PRESSURE: 54 MMHG | TEMPERATURE: 97.6 F | BODY MASS INDEX: 49.51 KG/M2 | OXYGEN SATURATION: 95 % | HEART RATE: 73 BPM | SYSTOLIC BLOOD PRESSURE: 114 MMHG | WEIGHT: 290 LBS | HEIGHT: 64 IN

## 2025-05-16 DIAGNOSIS — I48.0 PAROXYSMAL A-FIB (HCC): ICD-10-CM

## 2025-05-16 DIAGNOSIS — Z09 HOSPITAL DISCHARGE FOLLOW-UP: Primary | ICD-10-CM

## 2025-05-16 DIAGNOSIS — I10 ESSENTIAL HYPERTENSION, BENIGN: ICD-10-CM

## 2025-05-16 DIAGNOSIS — Z12.31 VISIT FOR SCREENING MAMMOGRAM: ICD-10-CM

## 2025-05-16 DIAGNOSIS — D64.9 ANEMIA, UNSPECIFIED TYPE: ICD-10-CM

## 2025-05-16 DIAGNOSIS — J44.1 COPD EXACERBATION (HCC): ICD-10-CM

## 2025-05-16 DIAGNOSIS — I50.33 ACUTE ON CHRONIC DIASTOLIC (CONGESTIVE) HEART FAILURE (HCC): ICD-10-CM

## 2025-05-16 PROCEDURE — 77063 BREAST TOMOSYNTHESIS BI: CPT

## 2025-05-16 RX ORDER — DOXYCYCLINE 100 MG/1
100 CAPSULE ORAL 2 TIMES DAILY
COMMUNITY
Start: 2025-05-10

## 2025-05-16 RX ORDER — BUDESONIDE AND FORMOTEROL FUMARATE DIHYDRATE 80; 4.5 UG/1; UG/1
2 AEROSOL RESPIRATORY (INHALATION) 2 TIMES DAILY
COMMUNITY
Start: 2025-05-10 | End: 2025-06-09

## 2025-05-16 ASSESSMENT — ENCOUNTER SYMPTOMS
WHEEZING: 0
CHEST TIGHTNESS: 0
SHORTNESS OF BREATH: 1
COUGH: 0

## 2025-05-16 NOTE — PROGRESS NOTES
social history and family history and vitals.  We have discussed treatment plan and follow up and given patient instructions.  Patient's questions are answered and we will follow up as indicated.      Dictated using voice recognition software.  Proof read but unrecognized errors may exist.    Follow-up and Dispositions    Return as previously scheduled, 6/23/25, with .         Ray Lagunas PA-C

## 2025-05-19 ENCOUNTER — CARE COORDINATION (OUTPATIENT)
Dept: CARE COORDINATION | Facility: CLINIC | Age: 70
End: 2025-05-19

## 2025-05-19 NOTE — CARE COORDINATION
Care Transitions Note    Follow Up Call     Attempted to reach patient for transitions of care follow up.  Unable to reach patient.      Outreach Attempts:   HIPAA compliant voicemail left for patient.       Follow Up Appointment:   Future Appointments         Provider Specialty Dept Phone    6/2/2025 1:40 PM (Arrive by 1:25 PM) Marsha, MY[facility] [phone]    6/23/2025 3:30 PM Familia Flores MD Family Medicine [phone]            Plan for follow-up call in 6-10 days based on severity of symptoms and risk factors. Plan for next call: symptom management    Yashira Goodwin LPN

## 2025-05-20 ENCOUNTER — RESULTS FOLLOW-UP (OUTPATIENT)
Dept: FAMILY MEDICINE CLINIC | Facility: CLINIC | Age: 70
End: 2025-05-20

## 2025-05-21 ENCOUNTER — TELEPHONE (OUTPATIENT)
Dept: FAMILY MEDICINE CLINIC | Facility: CLINIC | Age: 70
End: 2025-05-21

## 2025-05-21 NOTE — TELEPHONE ENCOUNTER
Yazmin with [facility] called stating patients kidney lab work had not been good lately and wanted to advise Dr. Flores on this. She also has a bruise to her left lower leg that the stitches was recently removed. She has a 1 cm by 1 cm incision still in the center of that wound that is oozing yellow stuff. Yazmin was going to order some lodosorb and put onto it if that is okay per Dr. Flores.

## 2025-05-30 ENCOUNTER — CARE COORDINATION (OUTPATIENT)
Dept: CARE COORDINATION | Facility: CLINIC | Age: 70
End: 2025-05-30

## 2025-05-30 NOTE — CARE COORDINATION
Care Transitions Note    Follow Up Call     Attempted to reach patient for transitions of care follow up.  Unable to reach patient.      Outreach Attempts:   Unable to leave message.   Upcoming appointments reviewed and are scheduled appropriately.    Follow Up Appointment:   Future Appointments         Provider Specialty Dept Phone    6/2/2025 1:40 PM (Arrive by 1:25 PM) Marsha, [facility] [phone]    6/23/2025 3:30 PM Familia Flores MD Family Medicine [phone]            Plan for follow-up call in 6-10 days based on severity of symptoms and risk factors. Plan for next call: symptom management    Yashira Goodwin LPN

## 2025-05-31 ENCOUNTER — PATIENT MESSAGE (OUTPATIENT)
Dept: FAMILY MEDICINE CLINIC | Facility: CLINIC | Age: 70
End: 2025-05-31

## 2025-05-31 DIAGNOSIS — G47.00 INSOMNIA, UNSPECIFIED TYPE: ICD-10-CM

## 2025-05-31 DIAGNOSIS — F41.9 ANXIETY DISORDER, UNSPECIFIED TYPE: ICD-10-CM

## 2025-06-01 RX ORDER — ESCITALOPRAM OXALATE 20 MG/1
20 TABLET ORAL DAILY
Qty: 90 TABLET | Refills: 3 | Status: SHIPPED | OUTPATIENT
Start: 2025-06-01

## 2025-06-02 ENCOUNTER — OFFICE VISIT (OUTPATIENT)
Dept: ORTHOPEDIC SURGERY | Age: 70
End: 2025-06-02
Payer: MEDICARE

## 2025-06-02 DIAGNOSIS — R52 PAIN: ICD-10-CM

## 2025-06-02 DIAGNOSIS — S92.254D CLOSED NONDISPLACED FRACTURE OF NAVICULAR BONE OF RIGHT FOOT WITH ROUTINE HEALING, SUBSEQUENT ENCOUNTER: Primary | ICD-10-CM

## 2025-06-02 PROCEDURE — 99213 OFFICE O/P EST LOW 20 MIN: CPT | Performed by: PHYSICIAN ASSISTANT

## 2025-06-02 PROCEDURE — 4004F PT TOBACCO SCREEN RCVD TLK: CPT | Performed by: PHYSICIAN ASSISTANT

## 2025-06-02 PROCEDURE — 1090F PRES/ABSN URINE INCON ASSESS: CPT | Performed by: PHYSICIAN ASSISTANT

## 2025-06-02 PROCEDURE — G8417 CALC BMI ABV UP PARAM F/U: HCPCS | Performed by: PHYSICIAN ASSISTANT

## 2025-06-02 PROCEDURE — G8428 CUR MEDS NOT DOCUMENT: HCPCS | Performed by: PHYSICIAN ASSISTANT

## 2025-06-02 PROCEDURE — G8400 PT W/DXA NO RESULTS DOC: HCPCS | Performed by: PHYSICIAN ASSISTANT

## 2025-06-02 PROCEDURE — 1123F ACP DISCUSS/DSCN MKR DOCD: CPT | Performed by: PHYSICIAN ASSISTANT

## 2025-06-02 PROCEDURE — 3017F COLORECTAL CA SCREEN DOC REV: CPT | Performed by: PHYSICIAN ASSISTANT

## 2025-06-02 NOTE — PROGRESS NOTES
Name: Felicity Roberson  YOB: 1955  Gender: female  MRN: [id number]    Treatment Plan:   I had a thorough discussion with the patient regarding the treatment plan   Right navicular fracture    Continue CAM boot.  Strict NWB x 2 more weeks.  In 2 weeks she may begin heel weightbearing in the boot  Continue Vitamin D 50,000 units/weekly x 8 weeks      Patient understands and in full agreement with the treatment plan.    Weight-bearing status: NWB        Return to work/work restrictions: none  No medications given      F/u 4 weeks w/ Foot Xray-progressed to full weightbearing in the boot at this time     CC: right foot pain    HPI: Felicity Roberson is a 69 y.o. female presents with right foot pain that has been ongoing for approximately 3 weeks.  Patient states that she passed out causing her to fall, mainly onto her left side.  At the time of the fall, she did not report any right foot pain because she had a very large cut to her left lower extremity that required 27 stitches.  She also has a very bad knee and her knee was causing her more pain at that time.  She continued to ambulate on this foot and her pain worsened.  She went to urgent care and was diagnosed with a fracture and placed in a boot.  She has been ambulating in the boot.    6/2/2025-patient states she is doing much better than her last office visit.  She notes that her pain is significantly improved.  She presents today in a short walking boot.  She states she has been very compliant with nonweightbearing in the boot.  ROS:  Patient Denies fever/chills, headache, visual changes, chest pain, shortness of breath, and nausea/vomiting/diarrhea     Tobacco:  reports that she has been smoking cigarettes. She started smoking about 45 years ago. She has a 34.1 pack-year smoking history. She has been exposed to tobacco smoke. She has never used smokeless tobacco.  Diabetes: Diabetic - non insulin  Lab Results   Component Value Date    LABA1C

## 2025-06-04 ENCOUNTER — TELEPHONE (OUTPATIENT)
Dept: FAMILY MEDICINE CLINIC | Facility: CLINIC | Age: 70
End: 2025-06-04

## 2025-06-04 DIAGNOSIS — I50.32 CHRONIC HEART FAILURE WITH PRESERVED EJECTION FRACTION (HCC): ICD-10-CM

## 2025-06-04 DIAGNOSIS — I48.0 PAROXYSMAL A-FIB (HCC): Primary | ICD-10-CM

## 2025-06-04 NOTE — TELEPHONE ENCOUNTER
Pt called asking for a referral to a Dr. Hwang for cardiology and heart failure. Pt says that his practice is on [facility] in [place], but the one I see is [facility] in [facility] on [facility]. Phone: [phone]

## 2025-06-06 ENCOUNTER — TELEPHONE (OUTPATIENT)
Dept: FAMILY MEDICINE CLINIC | Facility: CLINIC | Age: 70
End: 2025-06-06

## 2025-06-06 ENCOUNTER — CARE COORDINATION (OUTPATIENT)
Dept: CARE COORDINATION | Facility: CLINIC | Age: 70
End: 2025-06-06

## 2025-06-06 NOTE — TELEPHONE ENCOUNTER
Allison calling from [facility] calling asking to get order for wound care. Please call Patience back at [phone]

## 2025-06-18 RX ORDER — ALBUTEROL SULFATE 90 UG/1
2 INHALANT RESPIRATORY (INHALATION) EVERY 6 HOURS PRN
Qty: 6.7 EACH | Refills: 3 | Status: SHIPPED | OUTPATIENT
Start: 2025-06-18

## 2025-06-19 ENCOUNTER — TELEPHONE (OUTPATIENT)
Dept: FAMILY MEDICINE CLINIC | Facility: CLINIC | Age: 70
End: 2025-06-19

## 2025-06-19 RX ORDER — DOXYCYCLINE HYCLATE 100 MG
100 TABLET ORAL 2 TIMES DAILY
Qty: 20 TABLET | Refills: 0 | Status: SHIPPED | OUTPATIENT
Start: 2025-06-19 | End: 2025-06-29

## 2025-06-23 ENCOUNTER — OFFICE VISIT (OUTPATIENT)
Dept: FAMILY MEDICINE CLINIC | Facility: CLINIC | Age: 70
End: 2025-06-23
Payer: MEDICARE

## 2025-06-23 VITALS
SYSTOLIC BLOOD PRESSURE: 112 MMHG | RESPIRATION RATE: 16 BRPM | OXYGEN SATURATION: 97 % | TEMPERATURE: 97.7 F | HEART RATE: 68 BPM | DIASTOLIC BLOOD PRESSURE: 57 MMHG

## 2025-06-23 DIAGNOSIS — R53.83 FATIGUE, UNSPECIFIED TYPE: ICD-10-CM

## 2025-06-23 DIAGNOSIS — M54.50 CHRONIC LOW BACK PAIN, UNSPECIFIED BACK PAIN LATERALITY, UNSPECIFIED WHETHER SCIATICA PRESENT: ICD-10-CM

## 2025-06-23 DIAGNOSIS — I50.32 CHRONIC HEART FAILURE WITH PRESERVED EJECTION FRACTION (HCC): ICD-10-CM

## 2025-06-23 DIAGNOSIS — F41.9 ANXIETY DISORDER, UNSPECIFIED TYPE: ICD-10-CM

## 2025-06-23 DIAGNOSIS — I48.0 PAROXYSMAL A-FIB (HCC): Primary | ICD-10-CM

## 2025-06-23 DIAGNOSIS — J44.1 COPD EXACERBATION (HCC): ICD-10-CM

## 2025-06-23 DIAGNOSIS — S92.254D CLOSED NONDISPLACED FRACTURE OF NAVICULAR BONE OF RIGHT FOOT WITH ROUTINE HEALING, SUBSEQUENT ENCOUNTER: ICD-10-CM

## 2025-06-23 DIAGNOSIS — M48.062 SPINAL STENOSIS OF LUMBAR REGION WITH NEUROGENIC CLAUDICATION: ICD-10-CM

## 2025-06-23 DIAGNOSIS — D64.9 ANEMIA, UNSPECIFIED TYPE: ICD-10-CM

## 2025-06-23 DIAGNOSIS — G89.29 CHRONIC LOW BACK PAIN, UNSPECIFIED BACK PAIN LATERALITY, UNSPECIFIED WHETHER SCIATICA PRESENT: ICD-10-CM

## 2025-06-23 LAB
ALBUMIN SERPL-MCNC: 3.2 G/DL (ref 3.2–4.6)
ALBUMIN/GLOB SERPL: 0.8 (ref 1–1.9)
ALP SERPL-CCNC: 108 U/L (ref 35–104)
ALT SERPL-CCNC: 12 U/L (ref 8–45)
ANION GAP SERPL CALC-SCNC: 13 MMOL/L (ref 7–16)
AST SERPL-CCNC: 26 U/L (ref 15–37)
BASOPHILS # BLD: 0.11 K/UL (ref 0–0.2)
BASOPHILS NFR BLD: 0.8 % (ref 0–2)
BILIRUB SERPL-MCNC: 0.5 MG/DL (ref 0–1.2)
BUN SERPL-MCNC: 27 MG/DL (ref 8–23)
CALCIUM SERPL-MCNC: 9.5 MG/DL (ref 8.8–10.2)
CHLORIDE SERPL-SCNC: 93 MMOL/L (ref 98–107)
CO2 SERPL-SCNC: 31 MMOL/L (ref 20–29)
CREAT SERPL-MCNC: 2 MG/DL (ref 0.6–1.1)
DIFFERENTIAL METHOD BLD: ABNORMAL
EOSINOPHIL # BLD: 0.19 K/UL (ref 0–0.8)
EOSINOPHIL NFR BLD: 1.4 % (ref 0.5–7.8)
ERYTHROCYTE [DISTWIDTH] IN BLOOD BY AUTOMATED COUNT: 22.1 % (ref 11.9–14.6)
FERRITIN SERPL-MCNC: 33 NG/ML (ref 8–388)
GLOBULIN SER CALC-MCNC: 3.9 G/DL (ref 2.3–3.5)
GLUCOSE SERPL-MCNC: 94 MG/DL (ref 70–99)
HCT VFR BLD AUTO: 37.1 % (ref 35.8–46.3)
HGB BLD-MCNC: 10.7 G/DL (ref 11.7–15.4)
IMM GRANULOCYTES # BLD AUTO: 0.05 K/UL (ref 0–0.5)
IMM GRANULOCYTES NFR BLD AUTO: 0.4 % (ref 0–5)
LYMPHOCYTES # BLD: 1.69 K/UL (ref 0.5–4.6)
LYMPHOCYTES NFR BLD: 12.8 % (ref 13–44)
MAGNESIUM SERPL-MCNC: 2.7 MG/DL (ref 1.8–2.4)
MCH RBC QN AUTO: 23.2 PG (ref 26.1–32.9)
MCHC RBC AUTO-ENTMCNC: 28.8 G/DL (ref 31.4–35)
MCV RBC AUTO: 80.5 FL (ref 82–102)
MONOCYTES # BLD: 1.02 K/UL (ref 0.1–1.3)
MONOCYTES NFR BLD: 7.7 % (ref 4–12)
NEUTS SEG # BLD: 10.18 K/UL (ref 1.7–8.2)
NEUTS SEG NFR BLD: 76.9 % (ref 43–78)
NRBC # BLD: 0 K/UL (ref 0–0.2)
PLATELET # BLD AUTO: 382 K/UL (ref 150–450)
PMV BLD AUTO: 9.7 FL (ref 9.4–12.3)
POTASSIUM SERPL-SCNC: 4.1 MMOL/L (ref 3.5–5.1)
PROT SERPL-MCNC: 7.1 G/DL (ref 6.3–8.2)
RBC # BLD AUTO: 4.61 M/UL (ref 4.05–5.2)
SODIUM SERPL-SCNC: 137 MMOL/L (ref 136–145)
TSH, 3RD GENERATION: 3.68 UIU/ML (ref 0.27–4.2)
WBC # BLD AUTO: 13.2 K/UL (ref 4.3–11.1)

## 2025-06-23 PROCEDURE — 1125F AMNT PAIN NOTED PAIN PRSNT: CPT | Performed by: FAMILY MEDICINE

## 2025-06-23 PROCEDURE — 1123F ACP DISCUSS/DSCN MKR DOCD: CPT | Performed by: FAMILY MEDICINE

## 2025-06-23 PROCEDURE — G8417 CALC BMI ABV UP PARAM F/U: HCPCS | Performed by: FAMILY MEDICINE

## 2025-06-23 PROCEDURE — 4004F PT TOBACCO SCREEN RCVD TLK: CPT | Performed by: FAMILY MEDICINE

## 2025-06-23 PROCEDURE — 1090F PRES/ABSN URINE INCON ASSESS: CPT | Performed by: FAMILY MEDICINE

## 2025-06-23 PROCEDURE — G8427 DOCREV CUR MEDS BY ELIG CLIN: HCPCS | Performed by: FAMILY MEDICINE

## 2025-06-23 PROCEDURE — G8400 PT W/DXA NO RESULTS DOC: HCPCS | Performed by: FAMILY MEDICINE

## 2025-06-23 PROCEDURE — 99214 OFFICE O/P EST MOD 30 MIN: CPT | Performed by: FAMILY MEDICINE

## 2025-06-23 PROCEDURE — 3023F SPIROM DOC REV: CPT | Performed by: FAMILY MEDICINE

## 2025-06-23 PROCEDURE — 1159F MED LIST DOCD IN RCRD: CPT | Performed by: FAMILY MEDICINE

## 2025-06-23 PROCEDURE — 3017F COLORECTAL CA SCREEN DOC REV: CPT | Performed by: FAMILY MEDICINE

## 2025-06-23 PROCEDURE — 3074F SYST BP LT 130 MM HG: CPT | Performed by: FAMILY MEDICINE

## 2025-06-23 PROCEDURE — 3078F DIAST BP <80 MM HG: CPT | Performed by: FAMILY MEDICINE

## 2025-06-23 RX ORDER — OXYCODONE AND ACETAMINOPHEN 10; 325 MG/1; MG/1
1 TABLET ORAL EVERY 6 HOURS PRN
Qty: 20 TABLET | Refills: 0 | Status: SHIPPED | OUTPATIENT
Start: 2025-06-23 | End: 2025-06-28

## 2025-06-23 RX ORDER — OXYCODONE AND ACETAMINOPHEN 10; 325 MG/1; MG/1
1 TABLET ORAL EVERY 4 HOURS PRN
COMMUNITY
End: 2025-06-23

## 2025-06-23 RX ORDER — BUDESONIDE AND FORMOTEROL FUMARATE DIHYDRATE 80; 4.5 UG/1; UG/1
2 AEROSOL RESPIRATORY (INHALATION) 2 TIMES DAILY
Qty: 10.2 G | Refills: 5 | Status: SHIPPED | OUTPATIENT
Start: 2025-06-23 | End: 2025-08-06

## 2025-06-23 RX ORDER — GABAPENTIN 300 MG/1
300 CAPSULE ORAL 2 TIMES DAILY
Qty: 180 CAPSULE | Refills: 1 | Status: SHIPPED | OUTPATIENT
Start: 2025-06-23 | End: 2025-12-20

## 2025-06-23 ASSESSMENT — ENCOUNTER SYMPTOMS
WHEEZING: 1
SHORTNESS OF BREATH: 1

## 2025-06-23 NOTE — PROGRESS NOTES
HISTORY OF PRESENT ILLNESS     Felicity Roberson is a 69 y.o. female who presents for       HPI  History of Present Illness  The patient presents for evaluation of dizziness, wheezing, and foot pain. She is accompanied by her daughter.    She reports experiencing significant wheezing, which is more noticeable to her daughter than to herself. Her respiratory function is currently stable, and she has not experienced any episodes of tachycardia. She has been prescribed an additional inhaler, Symbicort, to be used twice daily in addition to her as-needed inhaler.    She has been under the care of a physician assistant at [facility], with her last visit approximately 2 weeks ago. She reports persistent dizziness. Her blood pressure was recorded as low today. She is currently on a regimen of midodrine 2.5 mg three times daily. She reports no episodes of syncope.  Continues on spironolactone, torsemide for history of congestive heart failure.    She experiences bilateral pedal edema, which is more pronounced than any leg swelling. She continues to receive care from the orthopedic group for her foot navicular fracture and has been advised to limit weight-bearing activities. A follow-up appointment is scheduled for the upcoming Monday, during which additional x-rays will be performed.    She has been prescribed gabapentin for pain management, which provides some relief but does not fully alleviate her symptoms. She is requesting a refill of her oxycodone prescription, as she has been using it more frequently due to a foot fracture. She reports that the medication does not cause her to feel drowsy.  The medication was last filled in March    Her daughter inquires about the potential impact of low iron levels on her dizziness, noting that she received an intravenous infusion during her last hospital stay due to low iron levels.      Past Medical History:   Diagnosis Date    A-fib (HCC)     Acquired hypothyroidism

## 2025-06-24 ENCOUNTER — RESULTS FOLLOW-UP (OUTPATIENT)
Dept: FAMILY MEDICINE CLINIC | Facility: CLINIC | Age: 70
End: 2025-06-24

## 2025-06-30 ENCOUNTER — OFFICE VISIT (OUTPATIENT)
Dept: ORTHOPEDIC SURGERY | Age: 70
End: 2025-06-30
Payer: MEDICARE

## 2025-06-30 ENCOUNTER — TELEPHONE (OUTPATIENT)
Dept: FAMILY MEDICINE CLINIC | Facility: CLINIC | Age: 70
End: 2025-06-30

## 2025-06-30 DIAGNOSIS — R52 PAIN: Primary | ICD-10-CM

## 2025-06-30 DIAGNOSIS — S92.254A CLOSED NONDISPLACED FRACTURE OF NAVICULAR BONE OF RIGHT FOOT, INITIAL ENCOUNTER: ICD-10-CM

## 2025-06-30 DIAGNOSIS — S92.254D CLOSED NONDISPLACED FRACTURE OF NAVICULAR BONE OF RIGHT FOOT WITH ROUTINE HEALING, SUBSEQUENT ENCOUNTER: ICD-10-CM

## 2025-06-30 PROCEDURE — 99214 OFFICE O/P EST MOD 30 MIN: CPT | Performed by: PHYSICIAN ASSISTANT

## 2025-06-30 PROCEDURE — 1123F ACP DISCUSS/DSCN MKR DOCD: CPT | Performed by: PHYSICIAN ASSISTANT

## 2025-06-30 PROCEDURE — G8428 CUR MEDS NOT DOCUMENT: HCPCS | Performed by: PHYSICIAN ASSISTANT

## 2025-06-30 PROCEDURE — 3017F COLORECTAL CA SCREEN DOC REV: CPT | Performed by: PHYSICIAN ASSISTANT

## 2025-06-30 PROCEDURE — 1090F PRES/ABSN URINE INCON ASSESS: CPT | Performed by: PHYSICIAN ASSISTANT

## 2025-06-30 PROCEDURE — G8400 PT W/DXA NO RESULTS DOC: HCPCS | Performed by: PHYSICIAN ASSISTANT

## 2025-06-30 PROCEDURE — G8417 CALC BMI ABV UP PARAM F/U: HCPCS | Performed by: PHYSICIAN ASSISTANT

## 2025-06-30 PROCEDURE — 4004F PT TOBACCO SCREEN RCVD TLK: CPT | Performed by: PHYSICIAN ASSISTANT

## 2025-06-30 RX ORDER — ERGOCALCIFEROL 1.25 MG/1
50000 CAPSULE, LIQUID FILLED ORAL WEEKLY
Qty: 8 CAPSULE | Refills: 0 | Status: SHIPPED | OUTPATIENT
Start: 2025-06-30

## 2025-06-30 NOTE — TELEPHONE ENCOUNTER
Nurse with [facility]:   Requesting verbals for recert for another 60 days of home health     2.   Requesting to add a physical therapy eval for pt as well.     Verbal given.

## 2025-06-30 NOTE — PROGRESS NOTES
Name: Felicity Roberson  YOB: 1955  Gender: female  MRN: [id number]    Treatment Plan:   I had a thorough discussion with the patient regarding the treatment plan   Right navicular fracture    May increase full weightbearing in the boot at this time  New prescription Vitamin D 50,000 units/weekly x 8 weeks      Patient understands and in full agreement with the treatment plan.    Weight-bearing status: WBAT in Boot/hardsole shoe        Return to work/work restrictions: none  No medications given      F/u 4 weeks w/ Foot Xray-wean out of the boot at this time     CC: right foot pain    HPI: Felicity Roberson is a 69 y.o. female presents with right foot pain that has been ongoing for approximately 3 weeks.  Patient states that she passed out causing her to fall, mainly onto her left side.  At the time of the fall, she did not report any right foot pain because she had a very large cut to her left lower extremity that required 27 stitches.  She also has a very bad knee and her knee was causing her more pain at that time.  She continued to ambulate on this foot and her pain worsened.  She went to urgent care and was diagnosed with a fracture and placed in a boot.  She has been ambulating in the boot.    6/2/2025-patient states she is doing much better than her last office visit.  She notes that her pain is significantly improved.  She presents today in a short walking boot.  She states she has been very compliant with nonweightbearing in the boot.    6/30/2025-patient states that she is doing well.  She denies any pain.  She does admit that she has been walking a lot in the boot over the last 2 to 3 weeks and not been compliant with nonweightbearing.  She denies any pain.  She did have another fall that resulted in a large cut on her left leg and she has been receiving home health physical therapy for this.    ROS:  Patient Denies fever/chills, headache, visual changes, chest pain, shortness of breath,

## 2025-07-17 ENCOUNTER — TELEPHONE (OUTPATIENT)
Dept: FAMILY MEDICINE CLINIC | Facility: CLINIC | Age: 70
End: 2025-07-17

## 2025-07-17 NOTE — TELEPHONE ENCOUNTER
Patience from [facility] calling to let provider know pt is having some hypotensive signs and symptoms. She thinks pt is dehydrated and encouraged pt to hydrate more. She did do a basic walk test and bp was 122/76 resting and while walking it was 120/68. Not having many changes but patient did feel faint at times but Patience thinks she is dehydrated. Pt states she went out with family yesterday and didn't hydrate well. Please call Patience at [phone] with any questions. Wanted to update PCP just in case he wanted to see pt.

## 2025-08-04 ENCOUNTER — OFFICE VISIT (OUTPATIENT)
Dept: ORTHOPEDIC SURGERY | Age: 70
End: 2025-08-04
Payer: MEDICARE

## 2025-08-04 DIAGNOSIS — S92.254D CLOSED NONDISPLACED FRACTURE OF NAVICULAR BONE OF RIGHT FOOT WITH ROUTINE HEALING, SUBSEQUENT ENCOUNTER: Primary | ICD-10-CM

## 2025-08-04 DIAGNOSIS — R52 PAIN: ICD-10-CM

## 2025-08-04 PROCEDURE — 1090F PRES/ABSN URINE INCON ASSESS: CPT | Performed by: PHYSICIAN ASSISTANT

## 2025-08-04 PROCEDURE — 99213 OFFICE O/P EST LOW 20 MIN: CPT | Performed by: PHYSICIAN ASSISTANT

## 2025-08-04 PROCEDURE — 1123F ACP DISCUSS/DSCN MKR DOCD: CPT | Performed by: PHYSICIAN ASSISTANT

## 2025-08-04 PROCEDURE — G8428 CUR MEDS NOT DOCUMENT: HCPCS | Performed by: PHYSICIAN ASSISTANT

## 2025-08-04 PROCEDURE — 3017F COLORECTAL CA SCREEN DOC REV: CPT | Performed by: PHYSICIAN ASSISTANT

## 2025-08-04 PROCEDURE — G8400 PT W/DXA NO RESULTS DOC: HCPCS | Performed by: PHYSICIAN ASSISTANT

## 2025-08-04 PROCEDURE — 4004F PT TOBACCO SCREEN RCVD TLK: CPT | Performed by: PHYSICIAN ASSISTANT

## 2025-08-04 PROCEDURE — G8417 CALC BMI ABV UP PARAM F/U: HCPCS | Performed by: PHYSICIAN ASSISTANT

## 2025-08-06 ENCOUNTER — TELEPHONE (OUTPATIENT)
Dept: FAMILY MEDICINE CLINIC | Facility: CLINIC | Age: 70
End: 2025-08-06

## 2025-08-22 ENCOUNTER — TELEPHONE (OUTPATIENT)
Dept: FAMILY MEDICINE CLINIC | Facility: CLINIC | Age: 70
End: 2025-08-22

## 2025-08-25 ENCOUNTER — TELEPHONE (OUTPATIENT)
Dept: FAMILY MEDICINE CLINIC | Facility: CLINIC | Age: 70
End: 2025-08-25

## 2025-08-27 ENCOUNTER — TELEPHONE (OUTPATIENT)
Dept: FAMILY MEDICINE CLINIC | Facility: CLINIC | Age: 70
End: 2025-08-27

## (undated) DEVICE — BIPOLAR SEALER 23-113-1 AQM 2.3 OM NEURO: Brand: AQUAMANTYS ®

## (undated) DEVICE — TRAY,URINE METER,100% SILICONE,16FR10ML: Brand: MEDLINE

## (undated) DEVICE — CONTAINER,SPECIMEN,O.R.STRL,4.5OZ: Brand: MEDLINE

## (undated) DEVICE — CONTAINER PREFIL FRMLN 40ML --

## (undated) DEVICE — CANNULA NSL ORAL AD FOR CAPNOFLEX CO2 O2 AIRLFE

## (undated) DEVICE — C-ARM: Brand: UNBRANDED

## (undated) DEVICE — INTENDED FOR TISSUE SEPARATION, AND OTHER PROCEDURES THAT REQUIRE A SHARP SURGICAL BLADE TO PUNCTURE OR CUT.: Brand: BARD-PARKER SAFETY BLADES SIZE 10, STERILE

## (undated) DEVICE — DRAPE,TOP,102X53,STERILE: Brand: MEDLINE

## (undated) DEVICE — GAUZE,SPONGE,4"X4",16PLY,STRL,LF,10/TRAY: Brand: MEDLINE

## (undated) DEVICE — Device

## (undated) DEVICE — NDL PRT INJ NSAF BLNT 18GX1.5 --

## (undated) DEVICE — 2000CC GUARDIAN II: Brand: GUARDIAN

## (undated) DEVICE — SYR 5ML 1/5 GRAD LL NSAF LF --

## (undated) DEVICE — BONE WAX WHITE: Brand: BONE WAX WHITE

## (undated) DEVICE — 3M™ TEGADERM™ TRANSPARENT FILM DRESSING FRAME STYLE, 1626W, 4 IN X 4-3/4 IN (10 CM X 12 CM), 50/CT 4CT/CASE: Brand: 3M™ TEGADERM™

## (undated) DEVICE — SUTURE VCRL SZ 2-0 L27IN ABSRB UD L36MM CP-1 1/2 CIR REV J266H

## (undated) DEVICE — SHIM

## (undated) DEVICE — BIPOLAR FORCEPS CORD: Brand: VALLEYLAB

## (undated) DEVICE — PAD,NON-ADHERENT,3X8,STERILE,LF,1/PK: Brand: MEDLINE

## (undated) DEVICE — 3M™ IOBAN™ 2 ANTIMICROBIAL INCISE DRAPE 6650EZ: Brand: IOBAN™ 2

## (undated) DEVICE — TUBING, SUCTION, 1/4" X 10', STRAIGHT: Brand: MEDLINE

## (undated) DEVICE — CONNECTOR TBNG OD5-7MM O2 END DISP

## (undated) DEVICE — DRAPE TBL W72XH34IN D30IN SGL PC DISPOSABLE

## (undated) DEVICE — GARMENT,MEDLINE,DVT,INT,CALF,LG, GEN2: Brand: MEDLINE

## (undated) DEVICE — UNIVERSAL DRAPES: Brand: MEDLINE INDUSTRIES, INC.

## (undated) DEVICE — KIT EVAC 0.13IN RECT TB DIA10FR 400CC PVC 3 SPR Y CONN DRN

## (undated) DEVICE — BLUNT DISSECTOR: Brand: ENDO PEANUT

## (undated) DEVICE — POSTERIOR LAMI VANPLT-LUCAS: Brand: MEDLINE INDUSTRIES, INC.

## (undated) DEVICE — SYR 3ML LL TIP 1/10ML GRAD --

## (undated) DEVICE — SUTURE VCRL SZ 1 L27IN ABSRB UD L36MM CP-1 1/2 CIR REV CUT J268H

## (undated) DEVICE — STERILE PACKAGE WITH CANNULA: Brand: LITE BIO DELIVERY SYSTEM

## (undated) DEVICE — GAUZE,SPONGE,8"X4",12PLY,XRAY,STRL,LF: Brand: MEDLINE

## (undated) DEVICE — PREP SKN CHLRAPRP APL 26ML STR --

## (undated) DEVICE — FORCEPS BX L240CM JAW DIA2.8MM L CAP W/ NDL MIC MESH TOOTH

## (undated) DEVICE — SUTURE VCRL + 3-0 L27IN ABSRB UD PS-2 L19MM 3/8 CIR PRIM VCP427H

## (undated) DEVICE — ZIP 16 SURGICAL SKIN CLOSURE DEVICE: Brand: ZIP 16 SURGICAL SKIN CLOSURE DEVICE

## (undated) DEVICE — BIPOLAR SEALER 23-112-1 AQM 6.0: Brand: AQUAMANTYS ®

## (undated) DEVICE — 3M™ TEGADERM™ TRANSPARENT FILM DRESSING FRAME STYLE, 1628, 6 IN X 8 IN (15 CM X 20 CM), 10/CT 8CT/CASE: Brand: 3M™ TEGADERM™

## (undated) DEVICE — ILLUMINATION SYSTEM

## (undated) DEVICE — SOLUTION IRRIG 1000ML 09% SOD CHL USP PIC PLAS CONTAINER

## (undated) DEVICE — SPONGE LAP 18X18IN STRL -- 5/PK

## (undated) DEVICE — KENDALL RADIOLUCENT FOAM MONITORING ELECTRODE RECTANGULAR SHAPE: Brand: KENDALL

## (undated) DEVICE — 5.0MM PRECISION ROUND

## (undated) DEVICE — 1010 S-DRAPE TOWEL DRAPE 10/BX: Brand: STERI-DRAPE™

## (undated) DEVICE — 3M™ STERI-DRAPE™ INSTRUMENT POUCH 1018: Brand: STERI-DRAPE™

## (undated) DEVICE — INTENDED FOR TISSUE SEPARATION, AND OTHER PROCEDURES THAT REQUIRE A SHARP SURGICAL BLADE TO PUNCTURE OR CUT.: Brand: BARD-PARKER SAFETY BLADES SIZE 15, STERILE

## (undated) DEVICE — DRAPE SHT 3 QTR PROXIMA 53X77 --

## (undated) DEVICE — REM POLYHESIVE ADULT PATIENT RETURN ELECTRODE: Brand: VALLEYLAB

## (undated) DEVICE — AGENT HEMSTAT 8ML FLX TIP MTRX + DISP SURGIFLO

## (undated) DEVICE — JACKSON TABLE POSITIONER KIT: Brand: MEDLINE INDUSTRIES, INC.

## (undated) DEVICE — DRAPE C-ARMOUR C-ARM KIT --

## (undated) DEVICE — BUTTON SWITCH PENCIL BLADE ELECTRODE, HOLSTER: Brand: EDGE

## (undated) DEVICE — SYR 10ML LUER LOK 1/5ML GRAD --

## (undated) DEVICE — CARDINAL HEALTH FLEXIBLE LIGHT HANDLE COVER: Brand: CARDINAL HEALTH

## (undated) DEVICE — DRAPE TWL SURG 16X26IN BLU ORB04] ALLCARE INC]